# Patient Record
Sex: FEMALE | Race: WHITE | NOT HISPANIC OR LATINO | Employment: OTHER | URBAN - METROPOLITAN AREA
[De-identification: names, ages, dates, MRNs, and addresses within clinical notes are randomized per-mention and may not be internally consistent; named-entity substitution may affect disease eponyms.]

---

## 2017-04-20 ENCOUNTER — ALLSCRIPTS OFFICE VISIT (OUTPATIENT)
Dept: OTHER | Facility: OTHER | Age: 72
End: 2017-04-20

## 2017-04-20 DIAGNOSIS — E78.5 HYPERLIPIDEMIA: ICD-10-CM
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
## 2017-04-21 ENCOUNTER — ALLSCRIPTS OFFICE VISIT (OUTPATIENT)
Dept: OTHER | Facility: OTHER | Age: 72
End: 2017-04-21

## 2017-06-01 ENCOUNTER — GENERIC CONVERSION - ENCOUNTER (OUTPATIENT)
Dept: OTHER | Facility: OTHER | Age: 72
End: 2017-06-01

## 2017-06-01 ENCOUNTER — HOSPITAL ENCOUNTER (OUTPATIENT)
Dept: MAMMOGRAPHY | Facility: HOSPITAL | Age: 72
Discharge: HOME | End: 2017-06-01
Attending: FAMILY MEDICINE
Payer: MEDICARE

## 2017-06-01 DIAGNOSIS — E78.5 HYPERLIPIDEMIA: ICD-10-CM

## 2017-06-01 PROCEDURE — G0202 SCR MAMMO BI INCL CAD: HCPCS

## 2017-08-08 ENCOUNTER — TRANSCRIBE ORDERS (OUTPATIENT)
Dept: ADMINISTRATIVE | Facility: HOSPITAL | Age: 72
End: 2017-08-08

## 2017-08-08 ENCOUNTER — HOSPITAL ENCOUNTER (OUTPATIENT)
Dept: RADIOLOGY | Facility: HOSPITAL | Age: 72
Discharge: HOME | End: 2017-08-08
Attending: FAMILY MEDICINE
Payer: MEDICARE

## 2017-08-08 ENCOUNTER — ALLSCRIPTS OFFICE VISIT (OUTPATIENT)
Dept: OTHER | Facility: OTHER | Age: 72
End: 2017-08-08

## 2017-08-08 DIAGNOSIS — M54.2 CERVICALGIA: ICD-10-CM

## 2017-08-08 PROCEDURE — 72050 X-RAY EXAM NECK SPINE 4/5VWS: CPT

## 2017-08-13 ENCOUNTER — GENERIC CONVERSION - ENCOUNTER (OUTPATIENT)
Dept: OTHER | Facility: OTHER | Age: 72
End: 2017-08-13

## 2017-08-16 ENCOUNTER — APPOINTMENT (OUTPATIENT)
Dept: PHYSICAL THERAPY | Facility: CLINIC | Age: 72
End: 2017-08-16
Payer: MEDICARE

## 2017-08-16 DIAGNOSIS — M54.2 CERVICALGIA: ICD-10-CM

## 2017-08-16 PROCEDURE — G8990 OTHER PT/OT CURRENT STATUS: HCPCS

## 2017-08-16 PROCEDURE — 97140 MANUAL THERAPY 1/> REGIONS: CPT

## 2017-08-16 PROCEDURE — 97110 THERAPEUTIC EXERCISES: CPT

## 2017-08-16 PROCEDURE — G8991 OTHER PT/OT GOAL STATUS: HCPCS

## 2017-08-16 PROCEDURE — 97162 PT EVAL MOD COMPLEX 30 MIN: CPT

## 2017-08-17 ENCOUNTER — APPOINTMENT (OUTPATIENT)
Dept: PHYSICAL THERAPY | Facility: CLINIC | Age: 72
End: 2017-08-17
Payer: MEDICARE

## 2017-08-17 PROCEDURE — 97110 THERAPEUTIC EXERCISES: CPT

## 2017-08-17 PROCEDURE — 97112 NEUROMUSCULAR REEDUCATION: CPT

## 2017-08-17 PROCEDURE — 97140 MANUAL THERAPY 1/> REGIONS: CPT

## 2017-08-22 ENCOUNTER — APPOINTMENT (OUTPATIENT)
Dept: PHYSICAL THERAPY | Facility: CLINIC | Age: 72
End: 2017-08-22
Payer: MEDICARE

## 2017-08-23 ENCOUNTER — APPOINTMENT (OUTPATIENT)
Dept: PHYSICAL THERAPY | Facility: CLINIC | Age: 72
End: 2017-08-23
Payer: MEDICARE

## 2017-08-23 PROCEDURE — 97110 THERAPEUTIC EXERCISES: CPT

## 2017-08-23 PROCEDURE — 97140 MANUAL THERAPY 1/> REGIONS: CPT

## 2017-08-25 ENCOUNTER — APPOINTMENT (OUTPATIENT)
Dept: PHYSICAL THERAPY | Facility: CLINIC | Age: 72
End: 2017-08-25
Payer: MEDICARE

## 2017-08-25 PROCEDURE — 97110 THERAPEUTIC EXERCISES: CPT

## 2017-08-25 PROCEDURE — 97140 MANUAL THERAPY 1/> REGIONS: CPT

## 2017-08-25 PROCEDURE — 97112 NEUROMUSCULAR REEDUCATION: CPT

## 2017-08-29 ENCOUNTER — APPOINTMENT (OUTPATIENT)
Dept: PHYSICAL THERAPY | Facility: CLINIC | Age: 72
End: 2017-08-29
Payer: MEDICARE

## 2017-08-29 PROCEDURE — 97112 NEUROMUSCULAR REEDUCATION: CPT

## 2017-08-29 PROCEDURE — 97140 MANUAL THERAPY 1/> REGIONS: CPT

## 2017-08-29 PROCEDURE — 97110 THERAPEUTIC EXERCISES: CPT

## 2017-08-31 ENCOUNTER — APPOINTMENT (OUTPATIENT)
Dept: PHYSICAL THERAPY | Facility: CLINIC | Age: 72
End: 2017-08-31
Payer: MEDICARE

## 2017-08-31 PROCEDURE — 97110 THERAPEUTIC EXERCISES: CPT

## 2017-08-31 PROCEDURE — 97140 MANUAL THERAPY 1/> REGIONS: CPT

## 2017-09-01 ENCOUNTER — TRANSCRIBE ORDERS (OUTPATIENT)
Dept: ADMINISTRATIVE | Facility: HOSPITAL | Age: 72
End: 2017-09-01

## 2017-09-01 ENCOUNTER — ALLSCRIPTS OFFICE VISIT (OUTPATIENT)
Dept: OTHER | Facility: OTHER | Age: 72
End: 2017-09-01

## 2017-09-01 DIAGNOSIS — M54.2 CERVICALGIA: Primary | ICD-10-CM

## 2017-09-05 ENCOUNTER — APPOINTMENT (OUTPATIENT)
Dept: PHYSICAL THERAPY | Facility: CLINIC | Age: 72
End: 2017-09-05
Payer: MEDICARE

## 2017-09-05 ENCOUNTER — GENERIC CONVERSION - ENCOUNTER (OUTPATIENT)
Dept: OTHER | Facility: OTHER | Age: 72
End: 2017-09-05

## 2017-09-07 ENCOUNTER — HOSPITAL ENCOUNTER (OUTPATIENT)
Dept: RADIOLOGY | Age: 72
Discharge: HOME | End: 2017-09-07
Attending: FAMILY MEDICINE
Payer: MEDICARE

## 2017-09-07 ENCOUNTER — APPOINTMENT (OUTPATIENT)
Dept: PHYSICAL THERAPY | Facility: CLINIC | Age: 72
End: 2017-09-07
Payer: MEDICARE

## 2017-09-07 DIAGNOSIS — M54.2 CERVICALGIA: ICD-10-CM

## 2017-09-07 PROCEDURE — 72141 MRI NECK SPINE W/O DYE: CPT

## 2017-09-08 ENCOUNTER — GENERIC CONVERSION - ENCOUNTER (OUTPATIENT)
Dept: OTHER | Facility: OTHER | Age: 72
End: 2017-09-08

## 2017-09-14 ENCOUNTER — APPOINTMENT (OUTPATIENT)
Dept: PHYSICAL THERAPY | Facility: CLINIC | Age: 72
End: 2017-09-14
Payer: MEDICARE

## 2017-09-14 PROCEDURE — 97110 THERAPEUTIC EXERCISES: CPT

## 2017-09-14 PROCEDURE — 97140 MANUAL THERAPY 1/> REGIONS: CPT

## 2017-09-14 PROCEDURE — 97014 ELECTRIC STIMULATION THERAPY: CPT

## 2017-09-19 ENCOUNTER — ALLSCRIPTS OFFICE VISIT (OUTPATIENT)
Dept: OTHER | Facility: OTHER | Age: 72
End: 2017-09-19

## 2017-09-19 ENCOUNTER — APPOINTMENT (OUTPATIENT)
Dept: PHYSICAL THERAPY | Facility: CLINIC | Age: 72
End: 2017-09-19
Payer: MEDICARE

## 2017-09-26 ENCOUNTER — GENERIC CONVERSION - ENCOUNTER (OUTPATIENT)
Dept: OTHER | Facility: OTHER | Age: 72
End: 2017-09-26

## 2017-09-26 ENCOUNTER — APPOINTMENT (OUTPATIENT)
Dept: PHYSICAL THERAPY | Facility: CLINIC | Age: 72
End: 2017-09-26
Payer: MEDICARE

## 2017-09-28 ENCOUNTER — APPOINTMENT (OUTPATIENT)
Dept: PHYSICAL THERAPY | Facility: CLINIC | Age: 72
End: 2017-09-28
Payer: MEDICARE

## 2017-09-29 ENCOUNTER — GENERIC CONVERSION - ENCOUNTER (OUTPATIENT)
Dept: OTHER | Facility: OTHER | Age: 72
End: 2017-09-29

## 2017-10-03 ENCOUNTER — ALLSCRIPTS OFFICE VISIT (OUTPATIENT)
Dept: OTHER | Facility: OTHER | Age: 72
End: 2017-10-03

## 2017-10-03 ENCOUNTER — APPOINTMENT (OUTPATIENT)
Dept: PHYSICAL THERAPY | Facility: CLINIC | Age: 72
End: 2017-10-03
Payer: MEDICARE

## 2017-10-03 DIAGNOSIS — M54.81 OCCIPITAL NEURALGIA: ICD-10-CM

## 2017-10-03 DIAGNOSIS — M79.10 MYALGIA: ICD-10-CM

## 2017-10-03 DIAGNOSIS — M54.2 CERVICALGIA: ICD-10-CM

## 2017-10-05 ENCOUNTER — APPOINTMENT (OUTPATIENT)
Dept: PHYSICAL THERAPY | Facility: CLINIC | Age: 72
End: 2017-10-05
Payer: MEDICARE

## 2017-10-10 ENCOUNTER — APPOINTMENT (OUTPATIENT)
Dept: PHYSICAL THERAPY | Facility: CLINIC | Age: 72
End: 2017-10-10
Payer: MEDICARE

## 2017-10-12 ENCOUNTER — GENERIC CONVERSION - ENCOUNTER (OUTPATIENT)
Dept: OTHER | Facility: OTHER | Age: 72
End: 2017-10-12

## 2017-10-12 ENCOUNTER — APPOINTMENT (OUTPATIENT)
Dept: PHYSICAL THERAPY | Facility: CLINIC | Age: 72
End: 2017-10-12
Payer: MEDICARE

## 2017-10-20 ENCOUNTER — GENERIC CONVERSION - ENCOUNTER (OUTPATIENT)
Dept: OTHER | Facility: OTHER | Age: 72
End: 2017-10-20

## 2017-10-23 ENCOUNTER — GENERIC CONVERSION - ENCOUNTER (OUTPATIENT)
Dept: OTHER | Facility: OTHER | Age: 72
End: 2017-10-23

## 2017-10-25 ENCOUNTER — APPOINTMENT (OUTPATIENT)
Dept: PHYSICAL THERAPY | Facility: CLINIC | Age: 72
End: 2017-10-25
Payer: MEDICARE

## 2017-10-25 PROCEDURE — 97140 MANUAL THERAPY 1/> REGIONS: CPT

## 2017-10-25 PROCEDURE — G8990 OTHER PT/OT CURRENT STATUS: HCPCS

## 2017-10-25 PROCEDURE — G8991 OTHER PT/OT GOAL STATUS: HCPCS

## 2017-10-25 PROCEDURE — 97162 PT EVAL MOD COMPLEX 30 MIN: CPT

## 2017-10-25 PROCEDURE — 97110 THERAPEUTIC EXERCISES: CPT

## 2017-10-26 ENCOUNTER — GENERIC CONVERSION - ENCOUNTER (OUTPATIENT)
Dept: OTHER | Facility: OTHER | Age: 72
End: 2017-10-26

## 2017-10-26 NOTE — CONSULTS
Assessment  Assessed    1  Neck pain (723 1) (M54 2)   2  Occipital neuralgia of right side (723 8) (M54 81)    Plan  Neck pain    · PredniSONE 10 MG Oral Tablet; Take as directed according to info sheet given at  office   Rx By: Arlene Shah; Dispense: 7 Days ; #:21 Tablet; Refill: 0;For: Neck pain; SUSHMA = N; Verified Transmission to [facility]; Last Updated By: System, [employer]; 9/19/2017 8:04:45 AM  Occipital neuralgia of right side    · Follow-up visit in 1 week Evaluation and Treatment  Follow-up  Status: Hold For -  Scheduling  Requested for: 80Ova6236   Ordered; For: Occipital neuralgia of right side; Ordered By: Arlene Shah Performed:  Due: 78EOA2714  PMH: Neck pain on right side    · From  Methocarbamol 500 MG Oral Tablet Take 1 to 2 tablets up to four times  per day, as needed for muscular spasm To Methocarbamol 500 MG Oral Tablet TAKE 1  TABLET 3 times daily   Rx By: Arlene Shah; Dispense: 10 Days ; #:30 Tablet; Refill: 1;For: PMH: Neck pain on right side; SUSHMA = N; Record    Discussion/Summary    Extremely pleasant 40-year-old female with a 3 month history of right-sided neck pain despite nonsteroidal anti-inflammatories and physical therapy  stated she only took 1 methocarbamol did not feel like it provided relief so discontinued the medication  believe she has a combination of cervical myofascial pain secondary to possible underlying facet syndrome with occipital neuralgia  am going to start her on a short course oral prednisone, a titrating dose to address any inflammatory component of her pain  She understands she will not take nonsteroidal anti-inflammatories until she is finished with the course of steroids  have instructed her that start the methocarbamol on a daily basis 3 times a day for the weeks she is taking the prednisone to add in her relief    discuss in 1 week as to how she is doing, if her pain persists within 1 May consider occipital nerve block and cervical trigger points but will re-evaluate if needed  document utilizing voice recognition software and errors may have occurred  The patient has the current Goals: Decreased pain and increased function  The patent has the current Barriers: Cervical spondylosis  Patient is able to Self-Care  Educational resources provided: Information occipital neuralgia  Possible side effects of new medications were reviewed with the patient/guardian today  The treatment plan was reviewed with the patient/guardian  The patient/guardian understands and agrees with the treatment plan   The patient was counseled regarding diagnostic results,-- instructions for management,-- risk factor reductions,-- prognosis,-- patient and family education,-- risks and benefits of treatment options-- and-- importance of compliance with treatment  Self Referrals: No      Chief Complaint  Chief Complaints    1  Pain    History of Present Illness  Extremely pleasant 63-year-old female with 3 month history of right-sided neck pain now radiating to the occipital region  She is unaware of any clear precipitating event denies any trauma or injury  She has undergone physical therapy for 3 weeks per symptoms persist despite nonsteroidal anti-inflammatories and muscle relaxants  personally reviewed the patient's past medical history, surgical history, allergies, current medications social history, family history and review of systems  intake form reviewed with the patient  LIAM ALFONSO presents with complaints of constant episodes of moderate r>l and bilateral neck pain, described as aching and burning, radiating to the head  On a scale of 1 to 10, the patient rates the pain as 6  Episodes started about 3 months ago  Her symptoms are caused by no known event  Symptoms are improved by rest  Symptoms are made worse by bending  Symptoms are worsening  Review of Systems    Constitutional: no fever,-- no recent weight gain-- and-- no recent weight loss     Eyes: no double vision-- and-- no blurry vision  Cardiovascular: no chest pain,-- no palpitations-- and-- no lower extremity edema  Respiratory: no complaints of shortness of breath-- and-- no wheezing  Musculoskeletal: joint stiffness-- and-- decreased range of motion, but-- no difficulty walking,-- no muscle weakness,-- no joint swelling,-- no limb swelling-- and-- no pain in extremity  Neurological: no dizziness,-- no difficulty swallowing,-- no memory loss,-- no loss of consciousness-- and-- no seizures  Gastrointestinal: no nausea,-- no vomiting,-- no constipation-- and-- no diarrhea  Genitourinary: no difficulty initiating urine stream,-- no genital pain-- and-- no frequent urination  Integumentary: no complaints of skin rash  Psychiatric: no depression  Endocrine: no excessive thirst,-- no adrenal disease,-- no hypothyroidism-- and-- no hyperthyroidism  Hematologic/Lymphatic: no tendency for easy bruising-- and-- no tendency for easy bleeding  ROS reviewed  Active Problems  Problems    1  Hyperlipidemia (272 4) (E78 5)   2  Neck pain (723 1) (M54 2)   3  Palpitations (785 1) (R00 2)   4  Visit for screening mammogram (V76 12) (Z12 31)    Past Medical History  Problems    1  History of Effusion of left knee (719 06) (M25 462)   2  History of Hamstring strain (843 8) (S76 319A)   3  History of Neck pain on right side (723 1) (M54 2)   4  History of Tick bite (919 4,E906 4) (W57 XXXA)    The active problems and past medical history were reviewed and updated today  Surgical History  Problems    1  History of Appendectomy (47 0)   2  History of Oral Surgery Tooth Extraction [place] Tooth   3  History of Tonsillectomy   4  History of Tubal Ligation    The surgical history was reviewed and updated today  Family History  Mother    1  Family history of Coronary artery disease   2  Family history of arthritis (V17 7) (Z82 61)   3   Family history of cardiac disorder (V17 49) (Z82 49) 4  Family history of transient ischemic attacks (V17 1) (Z82 3)  Father    5  Family history of    6  Family history of myocardial infarction (V17 3) (Z82 49)   7  Family history of sudden cardiac death (SCD) (V17 41) (Z82 41)    The family history was reviewed and updated today  Social History  Problems    · Minimum alcohol consumption   · Never a smoker  The social history was reviewed and updated today  Current Meds   1  Advil TABS; Therapy: (Recorded:2017) to Recorded   2  Atorvastatin Calcium 10 MG Oral Tablet; take 1 tablet every other day  Requested for:   13XCY4335; Last Rx:26Qxw3240 Ordered   3  Calcium 600 + D TABS; TAKE 1 TABLET DAILY; Therapy: (Jian Bautista) to Recorded   4  Fish Oil 1200 MG Oral Capsule; Take 2 tablets daily; Therapy: (Jian Bautista) to Recorded   5  Naproxen 500 MG Oral Tablet; TAKE 1 TABLET EVERY 12 HOURS WITH FOOD AS   NEEDED; Therapy: 42Tnf4270 to (Evaluate:2017)  Requested for: 41Agu6569; Last   Rx:17Mxm6283 Ordered   6  PredniSONE 10 MG Oral Tablet; Therapy: (Recorded:2017) to Recorded   7  Triamterene-HCTZ 37 5-25 MG Oral Capsule; TAKE 1/2 CAPSULE DAILY AS NEEDED    Requested for: 2017; Last Rx:2017 Ordered   8  Vitamin D 1000 UNIT Oral Tablet; TAKE 1 TABLET DAILY; Therapy: (Jian Bautista) to Recorded    The medication list was reviewed and updated today  Allergies  Medication    1  No Known Drug Allergies    Vitals  Vital Signs    Recorded: 2017 07:48AM   Temperature 97 9 F   Heart Rate 58   Systolic 289   Diastolic 78   Height 5 ft 4 72 in   Weight 107 lb    BMI Calculated 17 96   BSA Calculated 1 51   Pain Scale 6     Physical Exam    Constitutional   General appearance: Well developed, well nourished, alert, in no distress, non-toxic and no overt pain behavior  Eyes   Sclera: anicteric   HEENT   Hearing grossly intact  Neck   Neck: Supple, symmetric, trachea midline, no masses  Pulmonary   Respiratory effort: Even and unlabored  Cardiovascular   Examination of extremities: No edema or pitting edema present  Skin   Skin and subcutaneous tissue: Normal without rashes or lesions, well hydrated  Psychiatric   Mood and affect: Mood and affect appropriate  Neurologic   Cranial nerves: Cranial nerves II-XII grossly intact  Musculoskeletal   Gait and station: Normal     Cervical Spine examination demonstrates  Inspection: Normal station and gait  Normal cervical curves and head posture  Skin intact without erythema  No sensory loss  There is no atrophy  There is tenderness to palpation overlying the right cervical paraspinals, cervical facet joints  There is no tenderness over the upper trapezius muscles bilateral  No shoulder tenderness5/5 strength in the bilateral upper extremitiesequal and symmetric in the upper limbs  Sensation: Sensation intact to light touch and pinprick in the upper limbs  Negative Spurling's maneuver  Negative Lhermitte's sign  Cervical Spine: Flexion was not restricted  Extension was restricted  Right lateral flexion was restricted  Rotation to the left was restricted-- and-- was painful  Rotation to the right was not restricted-- and-- was painless  Results/Data    Results    I personally reviewed the films/images in the office today  * MRI CERVICAL SPINE WO CONTRAST [id number] 11:36AM [place], [address]     Test Name Result Flag Reference   MRI CERVICAL SPINE WO CONTRAST (Report)     MRI CERVICAL SPINE WITHOUT CONTRAST     INDICATION: M54 2: Cervicalgia  History taken directly from the electronic ordering system  COMPARISON: None  TECHNIQUE: Sagittal T1, sagittal T2, sagittal inversion recovery, axial T2, axial 2D merge        IMAGE QUALITY: Diagnostic     FINDINGS:   Counting reference: Craniocervical junction   ALIGNMENT: Slight reversal of the normal cervical lordosis  1 to 2 mm of anterolisthesis of C7 on T1       MARROW SIGNAL: Marrow signal is within normal limits without signs of an infiltrative process  No signs of acute fracture or significant marrow edema pattern  Vertebral body heights are maintained  CERVICAL AND VISUALIZED THORACIC CORD: Normal signal within the visualized cord  PREVERTEBRAL AND PARASPINAL SOFT TISSUES:  Normal          VISUALIZED POSTERIOR FOSSA: The visualized posterior fossa demonstrates no abnormal signal      CERVICAL DISC SPACES:        C2-C3: No significant spinal canal stenosis  No right and no left neural foraminal stenosis  C3-C4: Central disc protrusion  No significant spinal canal stenosis  No right and no left neural foraminal stenosis  C4-C5: Bilateral facet arthropathy  No significant spinal canal stenosis  No right and no left neural foraminal stenosis  C5-C6: Disc osteophyte complex, uncovertebral hypertrophy, and facet arthropathy  No significant spinal canal stenosis  Moderate right and mild left neural foraminal stenosis  C6-C7: Disc osteophyte complex with uncovertebral hypertrophy  No significant spinal canal stenosis  Mild right and no significant left neural foraminal stenosis  C7-T1: Facet arthropathy  Slight uncovering of the disc  No significant spinal canal stenosis  Mild right and mild left neural foraminal stenosis  UPPER THORACIC DISC SPACES: Normal        IMPRESSION:     Cervical spondylosis, most significant at C5-C6 resulting in moderate right and mild left foraminal stenoses  Remainder of changes as detailed  Workstation performed: [id number]     Signed by:   Etta Sandhu MD   9/8/17     * XR SPINE CERVICAL COMPLETE 4 OR 5 VW NON INJURY [id number] 03:21PM Az Lewis Order Number: [id number]   Performing Comments: 69 yo very active female, with right neck pain x 2 months  No radicular symptoms  Thanks    Shivani Butler DO     Test Name Result Flag Reference   XR SPINE CERVICAL COMPLETE 4 OR 5 VW (Report) CERVICAL SPINE     INDICATION: Neck pain  COMPARISON: None     VIEWS: 5     IMAGES: 6     FINDINGS:     There is reversal of the normal cervical lordosis  Disc space narrowing at the C5/C6 and C6/C7 levels  Spurring in C5, C6 and C7  Mild narrowing of the C5/C6 and C6/C7 neural foramen on the right  The prevertebral soft tissues are within normal limits  The lung apices are intact  IMPRESSION:     Degenerative changes         Workstation performed: [id number]     Signed by:   Iris Almanzar MD   8/12/17     Future Appointments    Date/Time Provider Specialty Site   10/20/2017 09:30 AM Debra Philippe DO Family Medicine [facility]     Signatures   Electronically signed by : Dion Ricardo DO; Sep 19 2017  8:10AM EST                       (Author)

## 2017-10-27 ENCOUNTER — GENERIC CONVERSION - ENCOUNTER (OUTPATIENT)
Dept: OTHER | Facility: OTHER | Age: 72
End: 2017-10-27

## 2017-11-01 ENCOUNTER — APPOINTMENT (OUTPATIENT)
Dept: PHYSICAL THERAPY | Facility: CLINIC | Age: 72
End: 2017-11-01
Payer: MEDICARE

## 2017-11-03 ENCOUNTER — APPOINTMENT (OUTPATIENT)
Dept: PHYSICAL THERAPY | Facility: CLINIC | Age: 72
End: 2017-11-03
Payer: MEDICARE

## 2017-11-03 PROCEDURE — 97014 ELECTRIC STIMULATION THERAPY: CPT

## 2017-11-03 PROCEDURE — 97140 MANUAL THERAPY 1/> REGIONS: CPT

## 2017-11-03 PROCEDURE — 97110 THERAPEUTIC EXERCISES: CPT

## 2017-11-03 PROCEDURE — 97112 NEUROMUSCULAR REEDUCATION: CPT

## 2017-11-06 ENCOUNTER — GENERIC CONVERSION - ENCOUNTER (OUTPATIENT)
Dept: OTHER | Facility: OTHER | Age: 72
End: 2017-11-06

## 2017-11-08 ENCOUNTER — APPOINTMENT (OUTPATIENT)
Dept: PHYSICAL THERAPY | Facility: CLINIC | Age: 72
End: 2017-11-08
Payer: MEDICARE

## 2017-11-08 PROCEDURE — 97140 MANUAL THERAPY 1/> REGIONS: CPT

## 2017-11-08 PROCEDURE — 97112 NEUROMUSCULAR REEDUCATION: CPT

## 2017-11-08 PROCEDURE — 97014 ELECTRIC STIMULATION THERAPY: CPT

## 2017-11-08 PROCEDURE — 97110 THERAPEUTIC EXERCISES: CPT

## 2017-11-09 ENCOUNTER — APPOINTMENT (OUTPATIENT)
Dept: PHYSICAL THERAPY | Facility: CLINIC | Age: 72
End: 2017-11-09
Payer: MEDICARE

## 2017-11-09 PROCEDURE — 97014 ELECTRIC STIMULATION THERAPY: CPT

## 2017-11-09 PROCEDURE — 97110 THERAPEUTIC EXERCISES: CPT

## 2017-11-09 PROCEDURE — 97112 NEUROMUSCULAR REEDUCATION: CPT

## 2017-11-09 PROCEDURE — 97140 MANUAL THERAPY 1/> REGIONS: CPT

## 2017-11-10 ENCOUNTER — GENERIC CONVERSION - ENCOUNTER (OUTPATIENT)
Dept: OTHER | Facility: OTHER | Age: 72
End: 2017-11-10

## 2017-11-13 ENCOUNTER — APPOINTMENT (OUTPATIENT)
Dept: PHYSICAL THERAPY | Facility: CLINIC | Age: 72
End: 2017-11-13
Payer: MEDICARE

## 2017-11-13 ENCOUNTER — ALLSCRIPTS OFFICE VISIT (OUTPATIENT)
Dept: OTHER | Facility: OTHER | Age: 72
End: 2017-11-13

## 2017-11-13 DIAGNOSIS — M85.88 OTHER SPECIFIED DISORDERS OF BONE DENSITY AND STRUCTURE, OTHER SITE: ICD-10-CM

## 2017-11-13 DIAGNOSIS — E78.5 HYPERLIPIDEMIA: ICD-10-CM

## 2017-11-13 DIAGNOSIS — M79.10 MYALGIA: ICD-10-CM

## 2017-11-13 DIAGNOSIS — Z00.00 ENCOUNTER FOR GENERAL ADULT MEDICAL EXAMINATION WITHOUT ABNORMAL FINDINGS: ICD-10-CM

## 2017-11-13 PROCEDURE — 97140 MANUAL THERAPY 1/> REGIONS: CPT

## 2017-11-13 PROCEDURE — 97110 THERAPEUTIC EXERCISES: CPT

## 2017-11-13 PROCEDURE — 97112 NEUROMUSCULAR REEDUCATION: CPT

## 2017-11-16 ENCOUNTER — APPOINTMENT (OUTPATIENT)
Dept: PHYSICAL THERAPY | Facility: CLINIC | Age: 72
End: 2017-11-16
Payer: MEDICARE

## 2017-11-16 PROCEDURE — 97140 MANUAL THERAPY 1/> REGIONS: CPT

## 2017-11-16 PROCEDURE — 97112 NEUROMUSCULAR REEDUCATION: CPT

## 2017-11-17 ENCOUNTER — GENERIC CONVERSION - ENCOUNTER (OUTPATIENT)
Dept: OTHER | Facility: OTHER | Age: 72
End: 2017-11-17

## 2017-11-20 ENCOUNTER — GENERIC CONVERSION - ENCOUNTER (OUTPATIENT)
Dept: OTHER | Facility: OTHER | Age: 72
End: 2017-11-20

## 2017-11-20 ENCOUNTER — APPOINTMENT (OUTPATIENT)
Dept: PHYSICAL THERAPY | Facility: CLINIC | Age: 72
End: 2017-11-20
Payer: MEDICARE

## 2017-11-22 ENCOUNTER — APPOINTMENT (OUTPATIENT)
Dept: PHYSICAL THERAPY | Facility: CLINIC | Age: 72
End: 2017-11-22
Payer: MEDICARE

## 2017-11-27 ENCOUNTER — APPOINTMENT (OUTPATIENT)
Dept: PHYSICAL THERAPY | Facility: CLINIC | Age: 72
End: 2017-11-27
Payer: MEDICARE

## 2017-11-29 ENCOUNTER — GENERIC CONVERSION - ENCOUNTER (OUTPATIENT)
Dept: OTHER | Facility: OTHER | Age: 72
End: 2017-11-29

## 2017-11-30 ENCOUNTER — APPOINTMENT (OUTPATIENT)
Dept: PHYSICAL THERAPY | Facility: CLINIC | Age: 72
End: 2017-11-30
Payer: MEDICARE

## 2018-01-10 NOTE — MISCELLANEOUS
Message   Recorded as Task   Date: 11/08/2017 03:24 PM, Created By: Miguel Dominique   Task Name: Miscellaneous   Assigned To: [facility] [place] [facility],Team   Regarding Patient: Herbert Johnson, Status: In Progress   Comment:    Sumaya Bartlett - 08 Nov 2017 3:24 PM     TASK CREATED  phone call from patient stating that she tried to take the gabapentin and it made her feel in a fog, and very groggy  patient states she is not going to take it anymore  please call patient at [phone]  Robbie Ham - 08 Nov 2017 3:52 PM     TASK EDITED  LMOM to cb on number provided  Left cb number and office hours  Robbie Ham - 09 Nov 2017 8:41 AM     TASK EDITED  s/w pt, states she originally tried gabapentin x 1 day while caring for her grandchildren  Pt stated c/o "drowsiness", feeling in a "fog" and felt this was unsafe around children  Did not continue  Pt states she retried gabapentin a few days ago  1 pill po / day x 2 days w/ the same c/o fogginess and drowsiness  Pt states she felt that she could not complete her adl's  confirmed pt took the medication after dinner and continued w/ se's during the next day  Advised pt, stop gabapentin, continue w/ PT, proceed w/ ov w/ Dr Valeria Pacheco and fu w/ this office on 12/4  will make DG aware and cb w/ any addional comments  Pt verbalized understanding and appreciation  Omar Hodges - 09 Nov 2017 8:59 AM     TASK REPLIED TO: Previously Assigned To Irais Guadarrama  Provider aware and agree with recomendations  Thank you  Active Problems    1  Cervical myofascial pain syndrome (729 1) (M79 1)   2  Flu vaccine need (V04 81) (Z23)   3  Hyperlipidemia (272 4) (E78 5)   4  Neck pain (723 1) (M54 2)   5  Occipital neuralgia of right side (723 8) (M54 81)   6  Palpitations (785 1) (R00 2)   7  Visit for screening mammogram (V76 12) (Z12 31)    Current Meds   1  Advil TABS; Therapy: (Recorded:81Ggz7233) to Recorded   2   Atorvastatin Calcium 10 MG Oral Tablet; take 1 tablet every other day  Requested for:   92KMC9814; Last Rx:25Eja6025 Ordered   3  Calcium 600 + D TABS; TAKE 1 TABLET DAILY; Therapy: (Jules Navarrete) to Recorded   4  Fish Oil 1200 MG Oral Capsule; Take 2 tablets daily; Therapy: (Jules Navarrete) to Recorded   5  Gabapentin 100 MG Oral Capsule; Take one tablet at bedtime x4 days, then take 2   tablets at bedtime x4 days, then 3 tablets at bedtime; Therapy: 50MDV6829 to (Evaluate:45Qge2049)  Requested for: 26Oct2017; Last   Rx:26Oct2017 Ordered   6  Triamterene-HCTZ 37 5-25 MG Oral Capsule; TAKE 1/2 CAPSULE DAILY AS NEEDED    Requested for: 21Apr2017; Last Rx:21Apr2017 Ordered    Allergies    1   No Known Drug Allergies    Signatures   Electronically signed by : Collin Villela, ; Nov 10 2017  2:14PM EST                       (Author)

## 2018-01-11 NOTE — MISCELLANEOUS
Message   Recorded as Task   Date: 09/25/2017 04:33 PM, Created By: Juanita Brennan   Task Name: Care Coordination   Assigned To: [facility] [place] [facility]   Regarding Patient: Glenroy Lopez, Status: Active   Comment:    Robbie Ham - 25 Sep 2017 4:33 PM     TASK CREATED  Caller: Self; Care Coordination; [phone] (Home); [phone] (Work)  Message left w/ ans [facility] 9/25/2017 @ 200    Pt calling  Msg: She is calling with an update on the prednisone she finished yesterday  She is not feeling any better  Please call to discuss  S/w pt, states she finished prednisone yesterday  Per pt, she was away x 4 days, did not take methocarbimol tid as discussed w/ dr Silverio Argue  Resumed methocarbimol last night  states her pain is ~ 1-2 today vs sig pain over the weekend  Advised pt, continue methocarbimol tid, cb by the end of the week w/ an update  sooner if issues/ questions arise  if no relief, will d/w sl - tpi's and onb  Advised pt, will make SL aware and cb if anything additional  pt verbalized understanding, questioned if it is ok to resume nsaids  Advised pt, ok to resume nsaids now that oral steroids are complete  Pt verbalized understanding and appreciation  Vikas Batista - 26 Sep 2017 7:13 AM     TASK REPLIED TO: Previously Assigned To [facility] [place] [facility]    Can schedule her if pain returnd to ONB/TPI on office side   Robbie Ham - 26 Sep 2017 11:34 AM     TASK EDITED  s/w pt, advised of above  Advised pt to cb at the end of the week w/ an update  Pt states she has + relief, feels "loopy" on the muscle relaxers  reinforced to pt, do not drive or operate machinery while taking this medication  Pt stated that she has PT coming up, questioned how she should proceed  Advised pt, cb w/ an update at the end of the week - will discuss the next step at that time  Pt verbalized understanding and appreciation  SL aware        Active Problems    1  Hyperlipidemia (272 4) (E78 5)   2   Neck pain (723 1) (M54 2)   3  Occipital neuralgia of right side (723 8) (M54 81)   4  Palpitations (785 1) (R00 2)   5  Visit for screening mammogram (V76 12) (Z12 31)    Current Meds   1  Advil TABS; Therapy: (Recorded:40Czy1655) to Recorded   2  Atorvastatin Calcium 10 MG Oral Tablet; take 1 tablet every other day  Requested for:   19NCJ1072; Last Rx:11Ffu3558 Ordered   3  Calcium 600 + D TABS; TAKE 1 TABLET DAILY; Therapy: (Shanitar Chalino) to Recorded   4  Fish Oil 1200 MG Oral Capsule; Take 2 tablets daily; Therapy: (Elnor Chalino) to Recorded   5  Methocarbamol 500 MG Oral Tablet; TAKE 1 TABLET 3 times daily; Therapy: 59Qns5125 to (Complete:09Oct2017)  Requested for: 98Lkz3906; Last   Rx:12Rrp7516 Ordered   6  Naproxen 500 MG Oral Tablet; TAKE 1 TABLET EVERY 12 HOURS WITH FOOD AS   NEEDED; Therapy: 51Nxw9613 to (Evaluate:87Qnt3676)  Requested for: 00Soc9789; Last   Rx:74Ydd5860 Ordered   7  PredniSONE 10 MG Oral Tablet; Take as directed according to info sheet given at office    Requested for: 57Cbm9366; Last Rx:28Rbn5778 Ordered   8  Triamterene-HCTZ 37 5-25 MG Oral Capsule; TAKE 1/2 CAPSULE DAILY AS NEEDED    Requested for: 80Icu1056; Last Rx:46Npy5474 Ordered   9  Vitamin D 1000 UNIT Oral Tablet; TAKE 1 TABLET DAILY; Therapy: (Recorded:06Faf0493) to Recorded    Allergies    1   No Known Drug Allergies    Signatures   Electronically signed by : Jay Almonte, ; Sep 26 2017 11:37AM EST                       (Author)

## 2018-01-12 NOTE — PROGRESS NOTES
Assessment    1  Encounter for preventive health examination (V70 0) (Z00 00)   2  Hyperlipidemia (272 4) (E78 5)   3  Cervical myofascial pain syndrome (729 1) (M79 1)   4  Benign essential HTN (401 1) (I10)   5  Osteopenia of spine (733 90) (M85 88)    Plan  Benign essential HTN, Cervical myofascial pain syndrome, Health Maintenance,  Hyperlipidemia, Osteopenia of spine    · Follow-up visit in 6 months Evaluation and Treatment  Follow-up  Status: Hold For -  Scheduling  Requested for: 73SFL0376  Cervical myofascial pain syndrome, Health Maintenance, Hyperlipidemia    · (1) COMPREHENSIVE METABOLIC PANEL; Status:Active; Requested for:13Nov2017;    · (1) LIPID PANEL, FASTING; Status:Active; Requested for:13Nov2017;   Cervical myofascial pain syndrome, Health Maintenance, Hyperlipidemia, Osteopenia of  spine    · * DXA BONE DENSITY SPINE HIP AND PELVIS; Status:Hold For - Scheduling;  Requested for:13Nov2017;     Discussion/Summary    Dexa Scan and FBW ordered for the pt  BP is well controlled on present management  Pt to consider taking a daily 81mg ASA  She is to continue to work with PT on her neck  Impression: Subsequent Annual Wellness Visit  Colorectal cancer screening and counseling: the risks and benefits of screening were discussed and screening is current  Breast cancer screening and counseling: the risks and benefits of screening were discussed and screening is current  Cervical cancer screening and counseling: screening not indicated  Immunizations: the risks and benefits of influenza vaccination were discussed with the patient, influenza vaccine is up to date this year, the risks and benefits of pneumococcal vaccination were discussed with the patient, the lifetime pneumococcal vaccine has been completed, Will get Prevnar-13 in a few months , the risks and benefits of the Zostavax vaccine were discussed with the patient and Zostavax vaccination up to date     Advance Directive Planning: complete and up to date  Patient Discussion: follow-up visit needed in 6 months, or sooner PRN  Chief Complaint  PT is being seen for a Medicare Wellness  History of Present Illness  HPI: Deja Steven is in PT for her neck, and had this AM - sees again later this week  Had Pneumovax and Zostavax approx 5 years ago; had HD Flu Vaccine 10/2017  Will get Prevnar-13 later  Colonoscopy approx 3 years ago  Mammogram UTD - nml in 06/2017  She misses her , but functioning; no depression / anxiety  No HI/SI  Is in counseling, and still attends a [facility] with [facility]  No CP/SOB  No abd pain or rectal bleeding  No vaginal bleeding  No new breast lumps  Welcome to [employer] and Wellness Visits: The patient is being seen for the subsequent annual wellness visit  Medicare Screening and Risk Factors   Medicare Screening Tests Risk Questions   Abdominal aortic aneurysm risk assessment: none indicated  Osteoporosis risk assessment: , female gender and over 48years of age  HIV risk assessment: none indicated  Drug and Alcohol Use: The patient has never smoked cigarettes  Diet and Physical Activity: Current diet includes well balanced meals  She exercises daily  Exercise: Yoga - a little limited with her neck issues right now; improving though  Mood Disorder and Cognitive Impairment Screening: She denies feeling down, depressed, or hopeless over the past two weeks  She denies feeling little interest or pleasure in doing things over the past two weeks  Functional Ability/Level of Safety: The patient is currently able to do activities of daily living without limitations and able to do instrumental activities of daily living without limitations  Fall risk factors: The patient fell 0 times in the past 12 months  Advance Directives: Advance directives: living will, durable power of  for health care directives and advance directives  I agree with the patient's decisions  Co-Managers and Medical Equipment/Suppliers: See Patient Care Team   Reviewed Updated [facility] [address]:   Last Medicare Wellness Visit Information was reviewed, patient interviewed, no change since last AWV  Patient Care Team    Care Team Member Role Specialty Office Number   Kerri Villarreal [facility]  Cardiology [phone]   Tiffany Rees  Specialist Pain Management [phone] [address] Specialist Pain Management [phone]     Review of Systems    Constitutional: as noted in HPI  Cardiovascular: as noted in HPI  Respiratory: as noted in HPI  Gastrointestinal: as noted in HPI  Genitourinary: as noted in HPI  Musculoskeletal: as noted in HPI  Psychiatric: as noted in HPI  Active Problems    1  Cervical myofascial pain syndrome (729 1) (M79 1)   2  Flu vaccine need (V04 81) (Z23)   3  Hyperlipidemia (272 4) (E78 5)   4  Neck pain (723 1) (M54 2)   5  Occipital neuralgia of right side (723 8) (M54 81)   6  Palpitations (785 1) (R00 2)   7  Visit for screening mammogram (V76 12) (Z12 31)    Past Medical History    · History of Effusion of left knee (719 06) (M25 462)   · History of Hamstring strain (843 8) (S76 319A)   · History of Neck pain on right side (723 1) (M54 2)   · History of Tick bite (919 4,E906 4) (W57 XXXA)    The active problems and past medical history were reviewed and updated today        Surgical History    · History of Appendectomy (47 0)   · History of Oral Surgery Tooth Extraction [place] [facility]   · History of Tonsillectomy   · History of Tubal Ligation    Family History  Mother    · Family history of Coronary artery disease   · Family history of arthritis (V17 7) (Z82 61)   · Family history of cardiac disorder (V17 49) (Z82 49)   · Family history of transient ischemic attacks (V17 1) (Z82 3)  Father    · Family history of    · Family history of myocardial infarction (V17 3) (Z82 49)   · Family history of sudden cardiac death (SCD) (V17 41) (Z80 37)    Social History    · Minimum alcohol consumption   · 1 wine nightly   · Never a smoker    Current Meds   1  Advil TABS; Therapy: (Recorded:80Roe5682) to Recorded   2  Atorvastatin Calcium 10 MG Oral Tablet; take 1 tablet every other day  Requested for:   17SEH8610; Last Rx:28Swu9134 Ordered   3  Calcium 600 + D TABS; TAKE 1 TABLET DAILY; Therapy: (Sylvia Pace) to Recorded   4  Fish Oil 1200 MG Oral Capsule; Take 2 tablets daily; Therapy: (Sylvia Pace) to Recorded   5  Triamterene-HCTZ 37 5-25 MG Oral Capsule; TAKE 1/2 CAPSULE DAILY AS NEEDED    Requested for: 21Apr2017; Last Rx:14Xpg2223 Ordered    The medication list was reviewed and updated today  Allergies    1  No Known Drug Allergies    Immunizations   1 2    Influenza  01-Oct-2016 20-Oct-2017     Vitals  Signs    Heart Rate: 64  Respiration: 16  Systolic: 078  Diastolic: 72  Height: 5 ft 7 in  Weight: 107 lb   BMI Calculated: 16 76  BSA Calculated: 1 55    Physical Exam    Constitutional   General appearance: No acute distress, well appearing and well nourished  NAD; VSS; pleasant  Head and Face   Head and face: Normal     Eyes   Conjunctiva and lids: No swelling, erythema or discharge  Ears, Nose, Mouth, and Throat   External inspection of ears and nose: Normal     Otoscopic examination: Tympanic membranes translucent with normal light reflex  Canals patent without erythema  Hearing: Normal     Lips, teeth, and gums: Normal, good dentition  Oropharynx: Normal with no erythema, edema, exudate or lesions  Neck   Neck: Supple, symmetric, trachea midline, no masses  Pulmonary   Respiratory effort: No increased work of breathing or signs of respiratory distress  Auscultation of lungs: Clear to auscultation  Cardiovascular   Auscultation of heart: Normal rate and rhythm, normal S1 and S2, no murmurs  Abdomen   Abdomen: Non-tender, no masses  Liver and spleen: No hepatomegaly or splenomegaly     Lymphatic   Palpation of lymph nodes in neck: No lymphadenopathy  Musculoskeletal   Gait and station: Normal     Psychiatric   Judgment and insight: Normal     Orientation to person, place, and time: Normal     Recent and remote memory: Intact      Mood and affect: Normal        Future Appointments    Date/Time Provider Specialty Site   12/05/2017 04:00 PM [facility]   12/07/2017 11:15 AM RACHEL Long Pain Management [facility] SPINE   11/17/2017 10:30 AM Gary Che, [facility]   11/17/2017 11:00 AM Bowen Yoon MD PhD [facility]   Electronically signed by : Edil Valenzuela DO; Nov 13 2017  1:31PM EST                       (Author)

## 2018-01-12 NOTE — MISCELLANEOUS
Message   Recorded as Task   Date: 09/27/2017 11:58 AM, Created By: [address]   Task Name: Follow Up   Assigned To: [facility] [place] [facility],Team   Regarding Patient: Joe Stevenson, Status: Active   CommentAnnemarifreddy Innocent - 27 Sep 2017 11:58 AM     TASK CREATED  Patient left a message with the answering service that she needs a call from the nurse  Please call her @ [phone]  Thank you  Robbie Ham - 27 Sep 2017 2:13 PM     TASK EDITED  LMOM to cb on # provided  AristidesTessa - 29 Sep 2017 10:22 AM     TASK EDITED  Pt returned call and is interested in talking about the occipital nerve block  Pt can be reached at [phone]  Robbie Ham - 29 Sep 2017 10:39 AM     TASK EDITED  s/w pt, states she is not getting relief w/ muscle relaxers  Calling w/ numerous questions re: Katharine Ziegler  Offered an ov to address questions and concerns  Scheduled ov w/ DG on 10/3/2017 at 0815  Pt verbalized understandign and appreciation  Vikas Batista - 29 Sep 2017 11:10 AM     TASK REPLIED TO: Previously Assigned To Vikas Batista  aware        Active Problems    1  Hyperlipidemia (272 4) (E78 5)   2  Neck pain (723 1) (M54 2)   3  Occipital neuralgia of right side (723 8) (M54 81)   4  Palpitations (785 1) (R00 2)   5  Visit for screening mammogram (V76 12) (Z12 31)    Current Meds   1  Advil TABS; Therapy: (Recorded:45Ndn9604) to Recorded   2  Atorvastatin Calcium 10 MG Oral Tablet; take 1 tablet every other day  Requested for:   13FVL3666; Last Rx:64Lpr4663 Ordered   3  Calcium 600 + D TABS; TAKE 1 TABLET DAILY; Therapy: (Faina Paling) to Recorded   4  Fish Oil 1200 MG Oral Capsule; Take 2 tablets daily; Therapy: (Faina Paling) to Recorded   5  Methocarbamol 500 MG Oral Tablet; TAKE 1 TABLET 3 times daily; Therapy: 72Uld9941 to (Complete:09Oct2017)  Requested for: 63Ddk5274; Last   Rx:19Jnl8642 Ordered   6  Naproxen 500 MG Oral Tablet; TAKE 1 TABLET EVERY 12 HOURS WITH FOOD AS   NEEDED;    Therapy: 48NSD1880 to (Evaluate:80Htu0854)  Requested for: 21Job2493; Last   Rx:94Zlz6651 Ordered   7  PredniSONE 10 MG Oral Tablet; Take as directed according to info sheet given at office    Requested for: 19Sep2017; Last Rx:19Sep2017 Ordered   8  Triamterene-HCTZ 37 5-25 MG Oral Capsule; TAKE 1/2 CAPSULE DAILY AS NEEDED    Requested for: 21Apr2017; Last Rx:21Apr2017 Ordered   9  Vitamin D 1000 UNIT Oral Tablet; TAKE 1 TABLET DAILY; Therapy: (Recorded:22Apr2015) to Recorded    Allergies    1   No Known Drug Allergies    Signatures   Electronically signed by : Dangelo Barrera, ; Sep 29 2017 11:21AM EST                       (Author)

## 2018-01-12 NOTE — RESULT NOTES
Verified Results  * XR SPINE CERVICAL COMPLETE 4 OR 5 VW NON INJURY 50Tcu1021 03:21PM Michael Castellanos Order Number: [id number]   Performing Comments: 71 yo very active female, with right neck pain x 2 months  No radicular symptoms  Thanks  Duarte Manuel,      Test Name Result Flag Reference   XR SPINE CERVICAL COMPLETE 4 OR 5 VW (Report)     CERVICAL SPINE     INDICATION: Neck pain  COMPARISON: None     VIEWS: 5     IMAGES: 6     FINDINGS:     There is reversal of the normal cervical lordosis  Disc space narrowing at the C5/C6 and C6/C7 levels  Spurring in C5, C6 and C7  Mild narrowing of the C5/C6 and C6/C7 neural foramen on the right  The prevertebral soft tissues are within normal limits  The lung apices are intact  IMPRESSION:     Degenerative changes         Workstation performed: [id number]     Signed by:   Kaylynn Burroughs MD   8/12/17

## 2018-01-13 VITALS
BODY MASS INDEX: 17.83 KG/M2 | SYSTOLIC BLOOD PRESSURE: 148 MMHG | TEMPERATURE: 98.1 F | WEIGHT: 107 LBS | DIASTOLIC BLOOD PRESSURE: 80 MMHG | HEART RATE: 60 BPM | HEIGHT: 65 IN

## 2018-01-13 VITALS
WEIGHT: 113.13 LBS | SYSTOLIC BLOOD PRESSURE: 124 MMHG | HEART RATE: 68 BPM | DIASTOLIC BLOOD PRESSURE: 60 MMHG | TEMPERATURE: 98.9 F | RESPIRATION RATE: 16 BRPM | HEIGHT: 65 IN | BODY MASS INDEX: 18.85 KG/M2

## 2018-01-13 VITALS
HEIGHT: 65 IN | TEMPERATURE: 97.9 F | BODY MASS INDEX: 17.83 KG/M2 | DIASTOLIC BLOOD PRESSURE: 78 MMHG | WEIGHT: 107 LBS | HEART RATE: 58 BPM | SYSTOLIC BLOOD PRESSURE: 140 MMHG

## 2018-01-13 VITALS
HEIGHT: 67 IN | SYSTOLIC BLOOD PRESSURE: 140 MMHG | BODY MASS INDEX: 16.79 KG/M2 | RESPIRATION RATE: 16 BRPM | WEIGHT: 107 LBS | HEART RATE: 64 BPM | DIASTOLIC BLOOD PRESSURE: 72 MMHG

## 2018-01-14 VITALS
RESPIRATION RATE: 16 BRPM | SYSTOLIC BLOOD PRESSURE: 134 MMHG | DIASTOLIC BLOOD PRESSURE: 64 MMHG | BODY MASS INDEX: 17.46 KG/M2 | HEART RATE: 84 BPM | WEIGHT: 104 LBS

## 2018-01-14 VITALS
TEMPERATURE: 96.6 F | WEIGHT: 110.25 LBS | RESPIRATION RATE: 12 BRPM | HEIGHT: 65 IN | HEART RATE: 64 BPM | BODY MASS INDEX: 18.37 KG/M2 | SYSTOLIC BLOOD PRESSURE: 116 MMHG | DIASTOLIC BLOOD PRESSURE: 60 MMHG

## 2018-01-14 VITALS
HEIGHT: 65 IN | RESPIRATION RATE: 16 BRPM | WEIGHT: 104.5 LBS | DIASTOLIC BLOOD PRESSURE: 62 MMHG | SYSTOLIC BLOOD PRESSURE: 120 MMHG | HEART RATE: 72 BPM | BODY MASS INDEX: 17.41 KG/M2

## 2018-01-15 NOTE — MISCELLANEOUS
Message   Recorded as Task   Date: 10/12/2017 03:31 PM, Created By: Lupe Romo   Task Name: Miscellaneous   Assigned To: [facility] [place] [facility],Team   Regarding Patient: Gretel Garsia, Status: Active   Comment:    Lupe Romo - 12 Oct 2017 3:31 PM     TASK CREATED  Pt called stating that she had a procedure done today and is not feeling any relief so far  She wants to know how long it normally takes to notice an improvement  She also has a script for PT and wants to know when she should start  Pt can be reached at [phone]  Robbie Ham - 12 Oct 2017 3:58 PM     TASK EDITED  s/w pt, advised to continue to fill out pain diary provided  This office will fu in 1 week as it may take 3-5 days, poss 1 week for relief  C/b if redness, drainage, swelling at the site occurr or fever 100+  Advised pt ok to start PT in 1 week  Please cb w/ questions or concerns  pt verbalized understanding and appreciation  Dr Jeff Alcantara aware  Active Problems    1  Cervical myofascial pain syndrome (729 1) (M79 1)   2  Hyperlipidemia (272 4) (E78 5)   3  Neck pain (723 1) (M54 2)   4  Occipital neuralgia of right side (723 8) (M54 81)   5  Palpitations (785 1) (R00 2)   6  Visit for screening mammogram (V76 12) (Z12 31)    Current Meds   1  Advil TABS; Therapy: (Recorded:32Gay7731) to Recorded   2  Atorvastatin Calcium 10 MG Oral Tablet; take 1 tablet every other day  Requested for:   64YZW7525; Last Rx:26Xtt2226 Ordered   3  Calcium 600 + D TABS; TAKE 1 TABLET DAILY; Therapy: ([employer] [place] [employer]) to Recorded   4  Fish Oil 1200 MG Oral Capsule; Take 2 tablets daily; Therapy: ([employer]) to Recorded   5  Triamterene-HCTZ 37 5-25 MG Oral Capsule; TAKE 1/2 CAPSULE DAILY AS NEEDED    Requested for: 21Apr2017; Last Rx:21Apr2017 Ordered    Allergies    1   No Known Drug Allergies    Signatures   Electronically signed by : Dangelo Barrera, ; Oct 12 2017  3:58PM EST                       (Author)

## 2018-01-15 NOTE — RESULT NOTES
Verified Results  * MRI CERVICAL SPINE WO CONTRAST [id number] 11:36AM [place], [address]     Test Name Result Flag Reference   MRI CERVICAL SPINE [address] (Report)     MRI CERVICAL SPINE WITHOUT CONTRAST     INDICATION: M54 2: Cervicalgia  History taken directly from the electronic ordering system  COMPARISON: None  TECHNIQUE: Sagittal T1, sagittal T2, sagittal inversion recovery, axial T2, axial 2D merge        IMAGE QUALITY: Diagnostic     FINDINGS:   Counting reference: Craniocervical junction   ALIGNMENT: Slight reversal of the normal cervical lordosis  1 to 2 mm of anterolisthesis of C7 on T1  MARROW SIGNAL: Marrow signal is within normal limits without signs of an infiltrative process  No signs of acute fracture or significant marrow edema pattern  Vertebral body heights are maintained  CERVICAL AND VISUALIZED THORACIC CORD: Normal signal within the visualized cord  PREVERTEBRAL AND PARASPINAL SOFT TISSUES:  Normal          VISUALIZED POSTERIOR FOSSA: The visualized posterior fossa demonstrates no abnormal signal      CERVICAL DISC SPACES:        C2-C3: No significant spinal canal stenosis  No right and no left neural foraminal stenosis  C3-C4: Central disc protrusion  No significant spinal canal stenosis  No right and no left neural foraminal stenosis  C4-C5: Bilateral facet arthropathy  No significant spinal canal stenosis  No right and no left neural foraminal stenosis  C5-C6: Disc osteophyte complex, uncovertebral hypertrophy, and facet arthropathy  No significant spinal canal stenosis  Moderate right and mild left neural foraminal stenosis  C6-C7: Disc osteophyte complex with uncovertebral hypertrophy  No significant spinal canal stenosis  Mild right and no significant left neural foraminal stenosis  C7-T1: Facet arthropathy  Slight uncovering of the disc  No significant spinal canal stenosis  Mild right and mild left neural foraminal stenosis        UPPER THORACIC DISC SPACES: Normal        IMPRESSION:     Cervical spondylosis, most significant at C5-C6 resulting in moderate right and mild left foraminal stenoses  Remainder of changes as detailed         Workstation performed: [id number]     Signed by:   Abran Cueva MD   9/8/17

## 2018-01-16 NOTE — MISCELLANEOUS
Message   Recorded as Task   Date: 10/19/2017 03:13 PM, Created By: Gina Fox   Task Name: Miscellaneous   Assigned To: [facility] [place] [facility],Team   Regarding Patient: Moris Stern, Status: Active   Comment:    Shahida Irving - 19 Oct 2017 3:13 PM     TASK CREATED  Pt called stating she thought she was going to be getting a call regarding her procedure that she had  She said it did not help at all  She said she has a follow-up next week (10/26) and is asking if she should still come in if it did not work  Pls call pt at [phone]  Robbie Ham - 19 Oct 2017 3:53 PM     TASK EDITED  LMOM to cb on the number provided  Provided cb number and office hours  Nilda López - 20 Oct 2017 8:07 AM     TASK EDITED  Pt returned call and can be reached at [phone] before 9:15 or after 10:00 this morning  Robbie Ham - 20 Oct 2017 11:49 AM     TASK EDITED  s/w pt, states she has no relief w/ the ONB of 10/12  Questioning what to do next  Advised pt, d/w SL at ov on 10/26  Dr Marita Garza will evaluate the pt's pain, and provide options for tx  Pt verbalized understandign and confirmed 10/26 ov, arrive at 0745    sl aware        Active Problems    1  Cervical myofascial pain syndrome (729 1) (M79 1)   2  Hyperlipidemia (272 4) (E78 5)   3  Neck pain (723 1) (M54 2)   4  Occipital neuralgia of right side (723 8) (M54 81)   5  Palpitations (785 1) (R00 2)   6  Visit for screening mammogram (V76 12) (Z12 31)    Current Meds   1  Advil TABS; Therapy: (Recorded:31Goh3834) to Recorded   2  Atorvastatin Calcium 10 MG Oral Tablet; take 1 tablet every other day  Requested for:   15REH0144; Last Rx:42Jxr3893 Ordered   3  Calcium 600 + D TABS; TAKE 1 TABLET DAILY; Therapy: (León Lat) to Recorded   4  Fish Oil 1200 MG Oral Capsule; Take 2 tablets daily; Therapy: (León Lat) to Recorded   5   Triamterene-HCTZ 37 5-25 MG Oral Capsule; TAKE 1/2 CAPSULE DAILY AS NEEDED    Requested for: 21Apr2017; Last Rx:21Apr2017 Ordered    Allergies    1   No Known Drug Allergies    Signatures   Electronically signed by : Filipe Donohue, ; Oct 20 2017 11:49AM EST                       (Author)

## 2018-01-22 VITALS
HEART RATE: 64 BPM | HEIGHT: 67 IN | DIASTOLIC BLOOD PRESSURE: 82 MMHG | WEIGHT: 107 LBS | SYSTOLIC BLOOD PRESSURE: 150 MMHG | BODY MASS INDEX: 16.79 KG/M2

## 2018-01-22 VITALS
DIASTOLIC BLOOD PRESSURE: 63 MMHG | HEART RATE: 64 BPM | RESPIRATION RATE: 12 BRPM | SYSTOLIC BLOOD PRESSURE: 140 MMHG | WEIGHT: 105.38 LBS | BODY MASS INDEX: 17.56 KG/M2 | TEMPERATURE: 98.1 F | HEIGHT: 65 IN

## 2018-01-22 VITALS
BODY MASS INDEX: 16.56 KG/M2 | HEIGHT: 67 IN | RESPIRATION RATE: 16 BRPM | WEIGHT: 105.5 LBS | HEART RATE: 68 BPM | DIASTOLIC BLOOD PRESSURE: 74 MMHG | SYSTOLIC BLOOD PRESSURE: 130 MMHG

## 2018-01-22 VITALS — DIASTOLIC BLOOD PRESSURE: 60 MMHG | RESPIRATION RATE: 18 BRPM | HEART RATE: 60 BPM | SYSTOLIC BLOOD PRESSURE: 158 MMHG

## 2018-01-22 VITALS
WEIGHT: 107 LBS | HEART RATE: 68 BPM | TEMPERATURE: 97.8 F | BODY MASS INDEX: 16.79 KG/M2 | DIASTOLIC BLOOD PRESSURE: 68 MMHG | SYSTOLIC BLOOD PRESSURE: 130 MMHG | HEIGHT: 67 IN

## 2018-01-22 VITALS
HEIGHT: 67 IN | HEART RATE: 64 BPM | BODY MASS INDEX: 16.56 KG/M2 | RESPIRATION RATE: 12 BRPM | DIASTOLIC BLOOD PRESSURE: 70 MMHG | SYSTOLIC BLOOD PRESSURE: 130 MMHG | WEIGHT: 105.5 LBS

## 2018-02-16 ENCOUNTER — HOSPITAL ENCOUNTER (OUTPATIENT)
Dept: RADIOLOGY | Facility: HOSPITAL | Age: 73
Discharge: HOME | End: 2018-02-16
Payer: MEDICARE

## 2018-02-16 DIAGNOSIS — E78.5 HYPERLIPIDEMIA: ICD-10-CM

## 2018-02-16 DIAGNOSIS — Z00.00 ENCOUNTER FOR GENERAL ADULT MEDICAL EXAMINATION WITHOUT ABNORMAL FINDINGS: ICD-10-CM

## 2018-02-16 DIAGNOSIS — M79.10 MYALGIA: ICD-10-CM

## 2018-02-16 DIAGNOSIS — M85.88 OTHER SPECIFIED DISORDERS OF BONE DENSITY AND STRUCTURE, OTHER SITE: ICD-10-CM

## 2018-02-16 PROCEDURE — 77080 DXA BONE DENSITY AXIAL: CPT

## 2018-02-16 NOTE — PROGRESS NOTES
Please let the patient know that their DEXA Scan showed osteopenia at this time / thinning of the bones  Pt can take a max of 1200mg Calcium supplementation per day, with 800 - 1000 IU Vit D as well  Continue regular exercise  Thanks     Dr Nereida Denny

## 2018-03-09 ENCOUNTER — TELEPHONE (OUTPATIENT)
Dept: FAMILY MEDICINE CLINIC | Facility: CLINIC | Age: 73
End: 2018-03-09

## 2018-05-17 ENCOUNTER — OFFICE VISIT (OUTPATIENT)
Dept: FAMILY MEDICINE CLINIC | Facility: CLINIC | Age: 73
End: 2018-05-17
Payer: MEDICARE

## 2018-05-17 VITALS
BODY MASS INDEX: 17.67 KG/M2 | HEART RATE: 60 BPM | DIASTOLIC BLOOD PRESSURE: 60 MMHG | SYSTOLIC BLOOD PRESSURE: 132 MMHG | WEIGHT: 106.2 LBS | RESPIRATION RATE: 12 BRPM

## 2018-05-17 DIAGNOSIS — G89.29 CHRONIC NECK PAIN: Chronic | ICD-10-CM

## 2018-05-17 DIAGNOSIS — E78.2 MIXED HYPERLIPIDEMIA: Chronic | ICD-10-CM

## 2018-05-17 DIAGNOSIS — M54.2 CHRONIC NECK PAIN: Chronic | ICD-10-CM

## 2018-05-17 DIAGNOSIS — I10 ESSENTIAL HYPERTENSION: Primary | Chronic | ICD-10-CM

## 2018-05-17 PROCEDURE — 99213 OFFICE O/P EST LOW 20 MIN: CPT | Performed by: FAMILY MEDICINE

## 2018-05-17 RX ORDER — B-COMPLEX WITH VITAMIN C
1 TABLET ORAL DAILY
COMMUNITY
End: 2018-08-06

## 2018-05-17 NOTE — PROGRESS NOTES
Assessment/Plan:    No problem-specific Assessment & Plan notes found for this encounter  Diagnoses and all orders for this visit:    Essential hypertension  Comments:  Controlled on present management  She is to continue her healthy diet, and regular activity  Mixed hyperlipidemia  Comments:  Controlled on present management  Chronic neck pain  Comments:  Doing well right now, improved - managed by Pain Management  Other orders  -     aspirin 81 MG tablet; Take 1 tablet (81mg) by mouth once daily  -     Calcium Carbonate-Vitamin D (CALCIUM 600+D) 600-200 MG-UNIT TABS; Take 1 tablet by mouth daily          Subjective:      Patient ID: Eloina James is a 67 y o  female  Feeling well  Her neck is doing pretty well right now - had Botox injections, and did PT with [facility]  She is walking regularly for exercise  Will walk / run a 5K race soon  Discussed most recent FBW from 02/18 - Gluc 94, , LFTs nml  The following portions of the patient's history were reviewed and updated as appropriate: allergies, current medications, past family history, past social history, past surgical history and problem list     Past Medical History:   Diagnosis Date    Effusion of left knee     Last assessed - 9/10/15    Hyperlipidemia     Hypertension     Tick bite     Last assessed - 4/21/17       Current Outpatient Prescriptions:     aspirin 81 MG tablet, Take 1 tablet (81mg) by mouth once daily  , Disp: , Rfl:     atorvastatin (LIPITOR) 10 mg tablet, Take 10 mg by mouth, Disp: , Rfl:     Calcium Carb-Cholecalciferol (CALCIUM 600 + D) 600-200 MG-UNIT TABS, Calcium 600 + D TABS TAKE 1 TABLET DAILY    Refills: 0  Active, Disp: , Rfl:     Calcium Carbonate-Vitamin D (CALCIUM 600+D) 600-200 MG-UNIT TABS, Take 1 tablet by mouth daily, Disp: , Rfl:     Fish Oil-Cholecalciferol (FISH OIL + D3) 0104-1363 MG-UNIT CAPS, Fish Oil 1200 MG Oral Capsule Take 2 tablets daily  Refills: 0  Active, Disp: , Rfl:     triamterene-hydrochlorothiazide (DYAZIDE) 37 5-25 mg per capsule, Triamterene-HCTZ 37 5-25 MG Oral Capsule TAKE 1/2 CAPSULE DAILY  Refills: 0  Active, Disp: , Rfl:     Allergies   Allergen Reactions    Other          Review of Systems   Constitutional: Negative for activity change  Respiratory: Negative for shortness of breath  Cardiovascular: Negative for chest pain  Musculoskeletal: Positive for neck pain  Negative for myalgias  Chronic neck pain - doing better with recent interventions  Objective:      /60 (BP Location: Right arm, Patient Position: Sitting, Cuff Size: Standard)   Pulse 60   Resp 12   Wt 48 2 kg (106 lb 3 2 oz)   BMI 17 67 kg/m²          Physical Exam   Constitutional: She is oriented to person, place, and time  She appears well-developed and well-nourished  No distress  HENT:   Head: Normocephalic and atraumatic  Eyes: Conjunctivae are normal    Neck: Normal range of motion  Neck supple  No thyromegaly present  Cardiovascular: Normal rate, regular rhythm and normal heart sounds  Exam reveals no gallop and no friction rub  No murmur heard  Pulmonary/Chest: Effort normal and breath sounds normal  No respiratory distress  She has no wheezes  She has no rales  Lymphadenopathy:     She has no cervical adenopathy  Neurological: She is alert and oriented to person, place, and time  Skin: She is not diaphoretic  Psychiatric: She has a normal mood and affect  Her behavior is normal  Judgment and thought content normal    Nursing note and vitals reviewed

## 2018-07-07 DIAGNOSIS — I10 ESSENTIAL HYPERTENSION: Primary | ICD-10-CM

## 2018-07-08 RX ORDER — TRIAMTERENE AND HYDROCHLOROTHIAZIDE 37.5; 25 MG/1; MG/1
TABLET ORAL
Qty: 45 TABLET | Refills: 3 | Status: SHIPPED | OUTPATIENT
Start: 2018-07-08 | End: 2019-06-14 | Stop reason: SDUPTHER

## 2018-08-06 ENCOUNTER — OFFICE VISIT (OUTPATIENT)
Dept: FAMILY MEDICINE CLINIC | Facility: CLINIC | Age: 73
End: 2018-08-06
Payer: MEDICARE

## 2018-08-06 VITALS
DIASTOLIC BLOOD PRESSURE: 70 MMHG | RESPIRATION RATE: 12 BRPM | WEIGHT: 105.6 LBS | SYSTOLIC BLOOD PRESSURE: 120 MMHG | HEIGHT: 65 IN | OXYGEN SATURATION: 98 % | HEART RATE: 59 BPM | BODY MASS INDEX: 17.59 KG/M2

## 2018-08-06 DIAGNOSIS — M54.50 ACUTE LEFT-SIDED LOW BACK PAIN WITHOUT SCIATICA: Primary | ICD-10-CM

## 2018-08-06 PROCEDURE — 99213 OFFICE O/P EST LOW 20 MIN: CPT | Performed by: NURSE PRACTITIONER

## 2018-08-06 RX ORDER — OMEGA-3 FATTY ACIDS/FISH OIL 300-1000MG
CAPSULE ORAL
COMMUNITY
End: 2019-05-21

## 2018-08-06 RX ORDER — METHOCARBAMOL 500 MG/1
TABLET, FILM COATED ORAL
COMMUNITY
End: 2018-08-08 | Stop reason: SDUPTHER

## 2018-08-06 RX ORDER — MELATONIN
1000 DAILY
COMMUNITY

## 2018-08-06 RX ORDER — COVID-19 ANTIGEN TEST
KIT MISCELLANEOUS
COMMUNITY
End: 2019-05-21

## 2018-08-06 RX ORDER — CHLORAL HYDRATE 500 MG
CAPSULE ORAL
COMMUNITY
End: 2019-05-21

## 2018-08-06 RX ORDER — ASPIRIN 81 MG/1
TABLET, CHEWABLE ORAL
COMMUNITY
End: 2018-08-06

## 2018-08-06 NOTE — PROGRESS NOTES
Assessment/Plan:           Problem List Items Addressed This Visit     None      Visit Diagnoses     Acute left-sided low back pain without sciatica    -  Primary    Relevant Orders    Ambulatory referral to Physical Therapy            Subjective:      Patient ID: Jannette Arreguin is a 67 y o  female  Here for c/o left low back and now all low back  Has been having sharp shooting pain and spasm  Worsens with movement  Trouble getting in and out of bed  No numbness or tingling down b/l legs  Took leftover robaxin, helps but makes her sleepy  Only using advil  Using naproxen at night        Back Pain   Pertinent negatives include no chest pain, dysuria, fever or headaches  The following portions of the patient's history were reviewed and updated as appropriate: allergies, current medications, past family history, past medical history, past social history, past surgical history and problem list     Review of Systems   Constitutional: Negative for fatigue and fever  HENT: Negative for congestion and postnasal drip  Respiratory: Negative for cough, shortness of breath and wheezing  Cardiovascular: Negative for chest pain and palpitations  Gastrointestinal: Negative for constipation, diarrhea and vomiting  Genitourinary: Negative for decreased urine volume, dysuria, frequency, hematuria and urgency  Musculoskeletal: Positive for arthralgias, back pain and myalgias  Skin: Negative for rash  Neurological: Negative for dizziness, light-headedness and headaches  Hematological: Negative for adenopathy  Psychiatric/Behavioral: Negative for dysphoric mood and sleep disturbance           Objective:      /70   Pulse 59   Resp 12   Ht 5' 5" (1 651 m)   Wt 47 9 kg (105 lb 9 6 oz)   SpO2 98%   BMI 17 57 kg/m²     Family History   Problem Relation Age of Onset    Coronary artery disease Mother     Arthritis Mother     Other Mother         Cardiac Disorder     Transient ischemic attack Mother  Heart attack Father    [employer] Sudden death Father         SCD     Past Medical History:   Diagnosis Date    Effusion of left knee     Last assessed - 9/10/15    Hyperlipidemia     Hypertension     Tick bite     Last assessed - 4/21/17     Social History     Social History    Marital status:      Spouse name: N/A    Number of children: 3    Years of education: Post Graduate     Occupational History          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    Retired      RN     Social History Main Topics    Smoking status: Never Smoker    Smokeless tobacco: Never Used    Alcohol use Yes      Comment: 1 wine nightly    Drug use: No    Sexual activity: Not on file     Other Topics Concern    Not on file     Social History Narrative    Living alone           Current Outpatient Prescriptions:     cholecalciferol (VITAMIN D3) 1,000 units tablet, Take 1,000 Units by mouth daily, Disp: , Rfl:     aspirin 81 MG tablet, Take 1 tablet (81mg) by mouth once daily  , Disp: , Rfl:     atorvastatin (LIPITOR) 10 mg tablet, Take 10 mg by mouth, Disp: , Rfl:     Calcium Carb-Cholecalciferol (CALCIUM 600 + D) 600-200 MG-UNIT TABS, Calcium 600 + D TABS TAKE 1 TABLET DAILY    Refills: 0  Active, Disp: , Rfl:     Fish Oil-Cholecalciferol (FISH OIL + D3) 3366-3683 MG-UNIT CAPS, Fish Oil 1200 MG Oral Capsule Take 2 tablets daily  Refills: 0  Active, Disp: , Rfl:     Ibuprofen (ADVIL) 200 MG CAPS, Advil, Disp: , Rfl:     methocarbamol (ROBAXIN) 500 mg tablet, TAKE ONE TO TWO TABLETS BY MOUTH UP TO FOUR TIMES A DAY AS NEEDED FOR MUSCLE SPASM, Disp: , Rfl:     Naproxen Sodium (ALEVE) 220 MG CAPS, Aleve, Disp: , Rfl:     Omega-3 Fatty Acids (FISH OIL) 1,000 mg, Fish Oil, Disp: , Rfl:     triamterene-hydrochlorothiazide (MAXZIDE-25) 37 5-25 mg per tablet, TAKE ONE-HALF TABLET BY MOUTH EVERY DAY AS NEEDED, Disp: 45 tablet, Rfl: 3  Allergies   Allergen Reactions    Other         Physical Exam   Constitutional: She is oriented to person, place, and time  She appears well-developed and well-nourished  Eyes: Conjunctivae are normal    Neck: Normal range of motion  Neck supple  Cardiovascular: Normal rate, regular rhythm, normal heart sounds and intact distal pulses  Pulmonary/Chest: Effort normal and breath sounds normal    Musculoskeletal:        Lumbar back: She exhibits decreased range of motion, tenderness, pain and spasm  Back:    Spasm and pain left lower lumbar region    Neurological: She is alert and oriented to person, place, and time  She has normal reflexes  Skin: Skin is warm and dry  Psychiatric: She has a normal mood and affect  Her behavior is normal  Judgment and thought content normal    Nursing note and vitals reviewed

## 2018-08-07 ENCOUNTER — EVALUATION (OUTPATIENT)
Dept: PHYSICAL THERAPY | Facility: CLINIC | Age: 73
End: 2018-08-07
Payer: MEDICARE

## 2018-08-07 DIAGNOSIS — M54.50 ACUTE LEFT-SIDED LOW BACK PAIN WITHOUT SCIATICA: ICD-10-CM

## 2018-08-07 PROBLEM — F32.A DEPRESSION: Status: ACTIVE | Noted: 2017-11-20

## 2018-08-07 PROBLEM — M54.81 CERVICO-OCCIPITAL NEURALGIA: Status: ACTIVE | Noted: 2018-01-30

## 2018-08-07 PROBLEM — M19.90 ARTHRITIS: Status: ACTIVE | Noted: 2017-11-20

## 2018-08-07 PROBLEM — M54.12 CERVICAL RADICULOPATHY: Status: ACTIVE | Noted: 2018-01-10

## 2018-08-07 PROBLEM — M85.88 OSTEOPENIA OF SPINE: Status: ACTIVE | Noted: 2017-11-13

## 2018-08-07 PROBLEM — I10 HYPERTENSION: Status: ACTIVE | Noted: 2017-11-20

## 2018-08-07 PROBLEM — G24.3 SPASMODIC TORTICOLLIS: Status: ACTIVE | Noted: 2018-01-10

## 2018-08-07 PROBLEM — G44.86 CERVICOGENIC HEADACHE: Status: ACTIVE | Noted: 2018-01-30

## 2018-08-07 PROBLEM — I10 BENIGN ESSENTIAL HTN: Status: ACTIVE | Noted: 2017-11-13

## 2018-08-07 PROBLEM — M79.18 CERVICAL MYOFASCIAL PAIN SYNDROME: Status: ACTIVE | Noted: 2017-10-03

## 2018-08-07 PROCEDURE — 97162 PT EVAL MOD COMPLEX 30 MIN: CPT | Performed by: PHYSICAL THERAPIST

## 2018-08-07 PROCEDURE — G8991 OTHER PT/OT GOAL STATUS: HCPCS | Performed by: PHYSICAL THERAPIST

## 2018-08-07 PROCEDURE — G8990 OTHER PT/OT CURRENT STATUS: HCPCS | Performed by: PHYSICAL THERAPIST

## 2018-08-07 NOTE — PROGRESS NOTES
PT Evaluation     Today's date: 2018  Patient name: Hina Amaya  : 1945  MRN: [id number]  Referring provider: RACHEL Perdue  Dx:   Encounter Diagnosis     ICD-10-CM    1  Acute left-sided low back pain without sciatica M54 5 Ambulatory referral to Physical Therapy                  Assessment  Impairments: abnormal coordination, abnormal muscle firing, abnormal muscle tone, abnormal or restricted ROM, abnormal movement, impaired physical strength, lacks appropriate home exercise program, pain with function, scapular dyskinesis and weight-bearing intolerance    Assessment details: Hina Amaya is a 67 y o  female presents with signs and symptoms consistent with:    Acute left-sided low back pain without sciatica    Kiet Murillo has the above listed impairments and will benefit from skilled PT to address deficits to return to prior level of function       Understanding of Dx/Px/POC: good   Prognosis: good    Goals  Impairment Goals 4-6 weeks  - Decrease pain to <3/10  - Improve lumbar AROM to >75% throughout  - Increase hip strength to 4/5 throughout  - Increase core strength to be able to sit straight up functionally    Functional Goals 6-8 weeks  - Return to Prior Level of Function  - Increase Functional Status Measure (FOTO) to: >64  - Patient will be independent with HEP  - Patient will be able to sit without increased pain/compensation/difficulty  - Patient will be able to perform sit to stand without increased pain/compensation/difficulty   - Patient will be able to bend forward without increased pain/compensation/difficulty   - Patient will be able to lift >15 pounds with proper mechanics and no pain      Plan  Patient would benefit from: skilled PT  Referral necessary: No  Planned modality interventions: cryotherapy, electrical stimulation/Russian stimulation, H-Wave, TENS, thermotherapy: hydrocollator packs and unattended electrical stimulation  Planned therapy interventions: abdominal trunk stabilization, activity modification, ADL retraining, balance/weight bearing training, breathing training, body mechanics training, coordination, flexibility, functional ROM exercises, graded exercise, home exercise program, work reintegration, therapeutic training, therapeutic exercise, therapeutic activities, stretching, strengthening, self care, postural training, patient education, neuromuscular re-education, muscle pump exercises, motor coordination training, Riggins taping, manual therapy, joint mobilization and IADL retraining  Frequency: 2x week  Duration in visits: 16  Duration in weeks: 8  Treatment plan discussed with: patient, PTA and referring physician        Subjective Evaluation    Pain  Current pain ratin  At best pain ratin  At worst pain ratin    Patient Goals  Patient goal: 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, 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  WORK:  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, and tries to teach yoga part time  Is not able teach currently secondary to back pain  HOME LIFE: Patient reports she lives alone  She reports that she has a big dog  HOBBIES/EXERCISE:  Yoga, gardening,   PAIN LOCATION/DESCRIPTORS:  Left side of low back  Denies pain in the right side or down the leg  Reports pain does not go up her back  Left low back: sharp, dull, achy, constant, varies, deep  AGGRAVATING FACTORS:  Quick movements, getting in and out of car, lying down, getting up out of bed, up and down from chair, standing for too long  Crossing legs  Bending forward is the biggest agg  EASES: robaxin, lying down  LATENCY: seconds  DAY PATTERN:  Worse in the AM and late at night  IMAGING:  n/a  PLOF:  100% prior to back pain, doesn't recall back pain prior  HISTORY OF CURRENT INJURY:  Patient reports that she went to [facility] the last Friday in July  She noticed the pain while she was there and on the way back  She reports that she knew that it was sore    She notes that when she was sitting in the car, her pain was not that bad  She got back last Friday, and that Saturday, her pain was really bad  She saw doc yesterday who told her to take robaxin, and see PT  She reports that she has been taking it regularly, and notes that her pain is much less  She reports that she is feeling better today  SPECIAL QUESTIONS:  Pt states the following:  No previous history of cancer  No change in bowel or bladder function  Denies saddle anesthesia  No unexplained weight change  No fatigue more than normal       Objective     Postural Observations    Additional Postural Observation Details  Lateral shift to the right  Patient reports that this is a new thing for her    Neurological Testing     Additional Neurological Details  Neuro Screen of the [place]:     Dermatomes: equal and symmetrical bilaterally throughout  Myotomes: equal and symmetrical bilaterally throughout  DTR: 2+ bilateral and symmetrical throughout         Active Range of Motion     Additional Active Range of Motion Details  LS AROM:   Flexion: 50% increased pain  Extension: 10% increased pain  SideBending right: 50% catch in back on the left side  SideBending left: 50%  Rotation right: 50%  Rotation left: 50%  Sideglide right: 25% increased pain in the left  Sideglide left: 75%      Strength/Myotome Testing     Left Hip   Planes of Motion   Flexion: 4+    Right Hip   Planes of Motion   Flexion: 4+    Left Knee   Extension: 5    Right Knee   Extension: 5    Left Ankle/Foot   Dorsiflexion: 5  Plantar flexion: 3+  Great toe extension: 4+    Right Ankle/Foot   Dorsiflexion: 5  Plantar flexion: 3+  Great toe extension: 4+    General Comments     Lumbar Comments  Lateral shift corrections on wall with right shoulder on wall: only slightly decreased pain        Diagnosis: LS derangement   Precautions: lateral shift, chronic neck pain   Manual Therapy 8/7/18       [facility] LS        Lateral shift corrections Exercise Diary         Lateral shift corrections with right arm on wall 15x Increase reps if still shifted      Prone lying over pillows        Supine hooklying Kegels        Supine hooklying TrA        Supine hooklying Glut sets                                                                                                                                Modalities        IFC and CP PRN

## 2018-08-08 DIAGNOSIS — M62.838 MUSCLE SPASM: Primary | ICD-10-CM

## 2018-08-08 RX ORDER — METHOCARBAMOL 500 MG/1
500 TABLET, FILM COATED ORAL 4 TIMES DAILY
Qty: 30 TABLET | Refills: 1 | Status: SHIPPED | OUTPATIENT
Start: 2018-08-08 | End: 2019-05-21

## 2018-08-10 ENCOUNTER — OFFICE VISIT (OUTPATIENT)
Dept: PHYSICAL THERAPY | Facility: CLINIC | Age: 73
End: 2018-08-10
Payer: MEDICARE

## 2018-08-10 DIAGNOSIS — M54.50 ACUTE LEFT-SIDED LOW BACK PAIN WITHOUT SCIATICA: Primary | ICD-10-CM

## 2018-08-10 PROCEDURE — 97112 NEUROMUSCULAR REEDUCATION: CPT

## 2018-08-10 PROCEDURE — 97140 MANUAL THERAPY 1/> REGIONS: CPT

## 2018-08-10 NOTE — PROGRESS NOTES
Daily Note     Today's date: 8/10/2018  Patient name: Rachel Greene  : 1945  MRN: [id number]  Referring provider: RACHEL Mcdonald  Dx:   Encounter Diagnosis     ICD-10-CM    1  Acute left-sided low back pain without sciatica M54 5                   Subjective: Patient reports low back pain as "3/10" today  She reports that the nights "are the worst"  Objective: See treatment diary below    Diagnosis: LS derangement   Precautions: lateral shift, chronic neck pain   Manual Therapy 8/7/18 8/10/18       STM LS  RO, PTA      Lateral shift corrections  RO, PTA                               Exercise Diary         Lateral shift corrections with right arm on wall 15x 2x10      Prone lying over pillows  1 pillow: 5 mins       Supine hooklying Kegels  15x 5 sec hold       Supine hooklying TrA  15x 5 sec hold       Prone Glut sets   15x 5 sec hold                                                                                                               Patient education   Anatomy, lumbar roll, centralization, HEP, activities at home              Modalities        IFC and CP PRN  Defer                           Assessment: Tolerated treatment well  Patient exhibited good technique with therapeutic exercises   Imrproved lateral shift with manuals and with wall shifts  Decreased pain with manual therapy  She was given an updated HEP; demonstrates understanding  Consider table with face opening for next visit for neck with prone positions  Plan: Continue per plan of care

## 2018-08-13 ENCOUNTER — OFFICE VISIT (OUTPATIENT)
Dept: PHYSICAL THERAPY | Facility: CLINIC | Age: 73
End: 2018-08-13
Payer: MEDICARE

## 2018-08-13 DIAGNOSIS — M54.50 ACUTE LEFT-SIDED LOW BACK PAIN WITHOUT SCIATICA: Primary | ICD-10-CM

## 2018-08-13 PROCEDURE — 97140 MANUAL THERAPY 1/> REGIONS: CPT

## 2018-08-13 PROCEDURE — 97112 NEUROMUSCULAR REEDUCATION: CPT

## 2018-08-13 NOTE — PROGRESS NOTES
Daily Note     Today's date: 2018  Patient name: Deshawn Euceda  : 1945  MRN: [id number]  Referring provider: RACHEL Alfred  Dx:   Encounter Diagnosis     ICD-10-CM    1  Acute left-sided low back pain without sciatica M54 5          Subjective: Patient noted pain in low back pre treatment  Patient noted no change in pain post treatment  Objective: See treatment diary below      Assessment: Tolerated treatment fair  STM to lumbar helped to decrease tightness  Patient noted pain in back when going from prone to supine to standing  Therefore instructed patient to stay in prone and bring legs off table first  Patient was able to perform with no increase of pain  Patient would benefit from continued PT      Plan: Continue per plan of care       Diagnosis: LS derangement   Precautions: lateral shift, chronic neck pain   Manual Therapy 8/7/18 8/10/18  8/13/18     STM LS  RO, PTA AF     Lateral shift corrections  RO, PTA  AF                             Exercise Diary         Lateral shift corrections with right arm on wall 15x 2x10 2x10     Prone lying over pillows  1 pillow: 5 mins  1 pillow 5 mins     Supine hooklying Kegels  15x 5 sec hold  15x 5 sec hold      Supine hooklying TrA  15x 5 sec hold  15x 5 sec hold      Prone Glut sets   15x 5 sec hold  15x 5 sec hold                                                                                                              Patient education   Anatomy, lumbar roll, centralization, HEP, activities at home              Modalities        IFC and CP PRN  Defer

## 2018-08-15 ENCOUNTER — OFFICE VISIT (OUTPATIENT)
Dept: PHYSICAL THERAPY | Facility: CLINIC | Age: 73
End: 2018-08-15
Payer: MEDICARE

## 2018-08-15 DIAGNOSIS — M54.50 ACUTE LEFT-SIDED LOW BACK PAIN WITHOUT SCIATICA: Primary | ICD-10-CM

## 2018-08-15 PROCEDURE — 97112 NEUROMUSCULAR REEDUCATION: CPT

## 2018-08-15 PROCEDURE — 97140 MANUAL THERAPY 1/> REGIONS: CPT

## 2018-08-15 NOTE — PROGRESS NOTES
Daily Note     Today's date: 8/15/2018  Patient name: Steve Brown  : 1945  MRN: [id number]  Referring provider: RACHEL Jurado  Dx:   Encounter Diagnosis     ICD-10-CM    1  Acute left-sided low back pain without sciatica M54 5                   Subjective: Pt states she is feeling better overall  Has been compliant with HEP and body mechanics awareness  Objective: See treatment diary below                Diagnosis: LS derangement   Precautions: lateral shift, chronic neck pain   Manual Therapy 8/7/18 8/10/18  8/13/18  8/15     STM LS   RO, PTA AF  OANH     Lateral shift corrections   RO, PTA  AF  OANH                                               Exercise Diary              Lateral shift corrections with right arm on wall 15x 2x10 2x10  2x10     Prone lying over pillows   1 pillow: 5 mins  1 pillow 5 mins  1 pillow 10 mins (during manual therapy)     Supine hooklying Kegels   15x 5 sec hold  15x 5 sec hold   15 x 5 sec hold     Supine hooklying TrA   15x 5 sec hold  15x 5 sec hold   15 x 5 sec hold     Prone Glut sets    15x 5 sec hold  15x 5 sec hold   15 x 5 sec hold                                                                                                                                                                                           Patient education    Anatomy, lumbar roll, centralization, HEP, activities at home    reviewed body mechanics, lumbar roll for driving long distances                   Modalities             IFC and CP PRN   Defer     pt defer                                              Assessment: Tolerated treatment well  Patient would benefit from continued PT  For improved strength, flexibility and overall function  Improved gait noted compared to previous sessions  Plan: Continue per plan of care

## 2018-08-20 ENCOUNTER — OFFICE VISIT (OUTPATIENT)
Dept: PHYSICAL THERAPY | Facility: CLINIC | Age: 73
End: 2018-08-20
Payer: MEDICARE

## 2018-08-20 DIAGNOSIS — M54.50 ACUTE LEFT-SIDED LOW BACK PAIN WITHOUT SCIATICA: Primary | ICD-10-CM

## 2018-08-20 PROCEDURE — 97112 NEUROMUSCULAR REEDUCATION: CPT

## 2018-08-20 PROCEDURE — 97140 MANUAL THERAPY 1/> REGIONS: CPT

## 2018-08-20 NOTE — PROGRESS NOTES
Daily Note     Today's date: 2018  Patient name: Rosalia Negrete  : 1945  MRN: [id number]  Referring provider: RACHEL Diamond  Dx:   Encounter Diagnosis     ICD-10-CM    1  Acute left-sided low back pain without sciatica M54 5                   Subjective: Patient reports that she has "been feeling a little better"  She reports that she wants to get back to yoga  Pain rated as "2/10"  Objective: See treatment diary below  Diagnosis: LS derangement   Precautions: lateral shift, chronic neck pain   Manual Therapy  8/10/18  8/13/18  8/15/18  8/20/18    STM LS  RO, PTA AF OANH  RO, PTA   Lateral shift corrections  RO, PTA  AF OANH                             Exercise Diary         Lateral shift corrections with right arm on wall  2x10 2x10 2x10 2x 10 with extension    Prone lying over pillows  1 pillow: 5 mins  1 pillow: 5 mins  1 pillow: 5 mins  KIRIT: 3 mins    Supine hooklying Kegels  15x 5 sec hold  15x 5 sec hold 15x 5 sec hold In standing: 10x 5 sec    Supine hooklying TrA  15x 5 sec hold  15x 5 sec hold 15x 5 sec hold In standing: 10x 5 sec    Prone Glut sets   15x 5 sec hold  15x 5 sec hold 15x 5 sec hold In standinG 5 sec   Heel clicks      12 x 5 sec    Hamstring stretch at EOT      Attempted- pain    Wobbleboard      2 mins ea                                                                                   Patient education   Anatomy, lumbar roll, centralization, HEP, activities at home   Anatomy, extension preference            Modalities        IFC and CP PRN  Defer                           Assessment: Tolerated treatment well  Patient exhibited good technique with therapeutic exercises  Patient was educated on anatomy and refraining from performing flexion based exercises  Patient experienced nearly  abolished pain with performance of manual therapy  She is progressing slowly towards goals  Plan: Continue per plan of care

## 2018-08-21 ENCOUNTER — OFFICE VISIT (OUTPATIENT)
Dept: FAMILY MEDICINE CLINIC | Facility: CLINIC | Age: 73
End: 2018-08-21
Payer: MEDICARE

## 2018-08-21 VITALS
RESPIRATION RATE: 16 BRPM | SYSTOLIC BLOOD PRESSURE: 122 MMHG | OXYGEN SATURATION: 99 % | DIASTOLIC BLOOD PRESSURE: 80 MMHG | BODY MASS INDEX: 17.47 KG/M2 | WEIGHT: 105 LBS | HEART RATE: 69 BPM

## 2018-08-21 DIAGNOSIS — M79.18 CERVICAL MYOFASCIAL PAIN SYNDROME: Primary | Chronic | ICD-10-CM

## 2018-08-21 DIAGNOSIS — Z13.1 SCREENING FOR DIABETES MELLITUS: ICD-10-CM

## 2018-08-21 DIAGNOSIS — I10 BENIGN ESSENTIAL HTN: Chronic | ICD-10-CM

## 2018-08-21 DIAGNOSIS — Z12.39 SCREENING FOR BREAST CANCER: ICD-10-CM

## 2018-08-21 DIAGNOSIS — E78.5 HYPERLIPIDEMIA, UNSPECIFIED HYPERLIPIDEMIA TYPE: Chronic | ICD-10-CM

## 2018-08-21 DIAGNOSIS — S39.012D STRAIN OF LUMBAR REGION, SUBSEQUENT ENCOUNTER: Chronic | ICD-10-CM

## 2018-08-21 PROCEDURE — 99213 OFFICE O/P EST LOW 20 MIN: CPT | Performed by: FAMILY MEDICINE

## 2018-08-21 NOTE — PROGRESS NOTES
Assessment/Plan:    No problem-specific Assessment & Plan notes found for this encounter  Diagnoses and all orders for this visit:    Cervical myofascial pain syndrome  Comments:  Pt doing better with PT (neck and back), and cervical musculature Botox injections  Discussed with pt - and she will try a trial of Chiropractics  Strain of lumbar region, subsequent encounter  Comments:  As above  Screening for breast cancer  Comments:  Mammogram ordered  Orders:  -     Mammo screening bilateral w cad; Future    Screening for diabetes mellitus  Comments:  FBW ordered for the pt  Orders:  -     Comprehensive metabolic panel; Future  -     Lipid Panel with Direct LDL reflex; Future  -     CBC and differential; Future    Hyperlipidemia, unspecified hyperlipidemia type  Comments:  Stable on present management  Orders:  -     Lipid Panel with Direct LDL reflex; Future  -     CBC and differential; Future    Benign essential HTN  Comments:  Controlled on present management  Orders:  -     CBC and differential; Future          Subjective:      Patient ID: Joy Bay is a 67 y o  female  Marcial Andreberry is doing better in terms of her neck with PT and Botox injections  Also in PT for her back - slowly getting better; considering Chiropractics  The following portions of the patient's history were reviewed and updated as appropriate: allergies, current medications, past family history, past social history, past surgical history and problem list     Past Medical History:   Diagnosis Date    Effusion of left knee     Last assessed - 9/10/15    Hyperlipidemia     Hypertension     Tick bite     Last assessed - 4/21/17         Current Outpatient Prescriptions:     aspirin 81 MG tablet, Take 1 tablet (81mg) by mouth once daily  , Disp: , Rfl:     atorvastatin (LIPITOR) 10 mg tablet, Take 10 mg by mouth, Disp: , Rfl:     Calcium Carb-Cholecalciferol (CALCIUM 600 + D) 600-200 MG-UNIT TABS, Calcium 600 + D TABS TAKE 1 TABLET DAILY  Refills: 0  Active, Disp: , Rfl:     cholecalciferol (VITAMIN D3) 1,000 units tablet, Take 1,000 Units by mouth daily, Disp: , Rfl:     Fish Oil-Cholecalciferol (FISH OIL + D3) 9571-0809 MG-UNIT CAPS, Fish Oil 1200 MG Oral Capsule Take 2 tablets daily  Refills: 0  Active, Disp: , Rfl:     Ibuprofen (ADVIL) 200 MG CAPS, Advil, Disp: , Rfl:     methocarbamol (ROBAXIN) 500 mg tablet, Take 1 tablet (500 mg total) by mouth 4 (four) times a day, Disp: 30 tablet, Rfl: 1    Naproxen Sodium (ALEVE) 220 MG CAPS, Aleve, Disp: , Rfl:     Omega-3 Fatty Acids (FISH OIL) 1,000 mg, Fish Oil, Disp: , Rfl:     triamterene-hydrochlorothiazide (MAXZIDE-25) 37 5-25 mg per tablet, TAKE ONE-HALF TABLET BY MOUTH EVERY DAY AS NEEDED, Disp: 45 tablet, Rfl: 3    Allergies   Allergen Reactions    Other          Review of Systems   Constitutional: Negative for activity change  Musculoskeletal: Positive for back pain and neck pain  Objective:      /80 (BP Location: Left arm, Patient Position: Sitting, Cuff Size: Standard)   Pulse 69   Resp 16   Wt 47 6 kg (105 lb)   SpO2 99%   BMI 17 47 kg/m²          Physical Exam   Constitutional: She is oriented to person, place, and time  She appears well-developed and well-nourished  No distress  HENT:   Head: Normocephalic and atraumatic  Musculoskeletal:        Back:    Neurological: She is alert and oriented to person, place, and time  Skin: She is not diaphoretic  Psychiatric: She has a normal mood and affect  Her behavior is normal  Judgment and thought content normal    Nursing note and vitals reviewed

## 2018-08-23 ENCOUNTER — OFFICE VISIT (OUTPATIENT)
Dept: PHYSICAL THERAPY | Facility: CLINIC | Age: 73
End: 2018-08-23
Payer: MEDICARE

## 2018-08-23 DIAGNOSIS — M54.50 ACUTE LEFT-SIDED LOW BACK PAIN WITHOUT SCIATICA: Primary | ICD-10-CM

## 2018-08-23 PROCEDURE — 97112 NEUROMUSCULAR REEDUCATION: CPT

## 2018-08-23 PROCEDURE — 97110 THERAPEUTIC EXERCISES: CPT

## 2018-08-23 PROCEDURE — 97140 MANUAL THERAPY 1/> REGIONS: CPT

## 2018-08-23 NOTE — PROGRESS NOTES
Daily Note     Today's date: 2018  Patient name: Greer Shaw  : 1945  MRN: [id number]  Referring provider: RACHEL Whitman  Dx:   Encounter Diagnosis     ICD-10-CM    1  Acute left-sided low back pain without sciatica M54 5                   Subjective: Patient reports that she is taking care of her neighbor's plants  She reports that she had to sit in the car for a while and "lug water for the plants"  Objective: See treatment diary below    Diagnosis: LS derangement   Precautions: lateral shift, chronic neck pain   Manual Therapy 8/23/18   8/13/18  8/15/18  8/20/18    STM LS RO, PTA   AF OANH  RO, PTA   Lateral shift corrections RO, PTA   AF OANH                             Exercise Diary         Lateral shift corrections with right arm on wall 2x 10 with extension   2x10 2x10 2x 10 with extension    Prone lying over pillows KIRIT: 3 mins   1 pillow: 5 mins  1 pillow: 5 mins  KIRIT: 3 mins    Supine hooklying Kegels In standing: ladders: 10x  15x 5 sec hold 15x 5 sec hold In standing: 10x 5 sec    Supine hooklying TrA In standing: ladders: 10x   15x 5 sec hold 15x 5 sec hold In standing: 10x 5 sec    Prone Glut sets  In standing ladders: 10x   15x 5 sec hold 15x 5 sec hold In standinZ 5 sec   Heel clicks      12 x 5 sec    Hamstring stretch at EOT      Attempted- pain    Wobbleboard  2 mins ea    2 mins ea   Standing extensions 10x        X-walks   NV (yell)       Clams  20x                                                                Patient education  Driving positioning at car, lumbar roll     Anatomy, extension preference            Modalities        IFC and CP PRN  Defer                             Assessment: Tolerated treatment well  Patient exhibited good technique with therapeutic exercises  Pain was abolished with manual therapy  Reviewed correct posture with sitting in a car  She was able to tolerate all progressions without increased pain  She is progressing towards goals  Plan: Continue per plan of care

## 2018-08-27 ENCOUNTER — OFFICE VISIT (OUTPATIENT)
Dept: PHYSICAL THERAPY | Facility: CLINIC | Age: 73
End: 2018-08-27
Payer: MEDICARE

## 2018-08-27 DIAGNOSIS — M54.50 ACUTE LEFT-SIDED LOW BACK PAIN WITHOUT SCIATICA: Primary | ICD-10-CM

## 2018-08-27 PROCEDURE — 97112 NEUROMUSCULAR REEDUCATION: CPT

## 2018-08-27 PROCEDURE — 97140 MANUAL THERAPY 1/> REGIONS: CPT

## 2018-08-27 PROCEDURE — 97110 THERAPEUTIC EXERCISES: CPT

## 2018-08-27 NOTE — PROGRESS NOTES
Daily Note     Today's date: 2018  Patient name: Claudia Barrientos  : 1945  MRN: [id number]  Referring provider: RACHEL Figueroa  Dx:   Encounter Diagnosis     ICD-10-CM    1  Acute left-sided low back pain without sciatica M54 5                   Subjective: Patient reports that she has been feeling better  She reports that she has some discomfort along the left LS  Objective: See treatment diary below    Diagnosis: LS derangement   Precautions: lateral shift, chronic neck pain   Manual Therapy 8/23/18  8/27/18   8/15/18  8/20/18    STM LS RO, PTA  RO, PTA   OANH  RO, PTA   Lateral shift corrections RO, PTA  RO, PTA   OANH                             Exercise Diary         Lateral shift corrections with right arm on wall 2x 10 with extension  2x 10 with extension   2x10 2x 10 with extension    Prone lying over pillows KIRIT: 3 mins  KIRIT 3 mins   1 pillow: 5 mins  KIRIT: 3 mins    Supine hooklying Kegels In standing: ladders: 10x In standing ladders: 10x   15x 5 sec hold In standing: 10x 5 sec    Supine hooklying TrA In standing: ladders: 10x  In standing ladders: 10x   15x 5 sec hold In standing: 10x 5 sec    Prone Glut sets  In standing ladders: 10x  In standing ladders: 10x   15x 5 sec hold In standinH 5 sec   Heel clicks   15A 5 sec    12 x 5 sec    Hamstring stretch at EOT      Attempted- pain    Wobbleboard  2 mins ea 2 mins ea   2 mins ea   Standing extensions 10x        X-walks   [place], 2 laps       Clams  20x  20x                                                               Patient education  Driving positioning at car, lumbar roll  Exercises, anatomy    Anatomy, extension preference            Modalities        IFC and CP PRN  Defer                             Assessment: Tolerated treatment well  Patient exhibited good technique with therapeutic exercises  Patient was able to complete all progressions today  Decreased low back pain with manual therapy   She was educated on anatomy and exercises  She is progressing slowly towards goals  Plan: Continue per plan of care

## 2018-08-30 ENCOUNTER — OFFICE VISIT (OUTPATIENT)
Dept: PHYSICAL THERAPY | Facility: CLINIC | Age: 73
End: 2018-08-30
Payer: MEDICARE

## 2018-08-30 DIAGNOSIS — M54.50 ACUTE LEFT-SIDED LOW BACK PAIN WITHOUT SCIATICA: Primary | ICD-10-CM

## 2018-08-30 PROCEDURE — 97112 NEUROMUSCULAR REEDUCATION: CPT | Performed by: PHYSICAL THERAPIST

## 2018-08-30 PROCEDURE — 97110 THERAPEUTIC EXERCISES: CPT | Performed by: PHYSICAL THERAPIST

## 2018-08-30 NOTE — PROGRESS NOTES
Daily Note     Today's date: 2018  Patient name: Cee Rubi  : 1945  MRN: [id number]  Referring provider: RACHEL Springer  Dx:   Encounter Diagnosis     ICD-10-CM    1  Acute left-sided low back pain without sciatica M54 5                   Subjective: Patient reports that she has minimal pain in her back today  Would almost say no pain at all  Objective: See treatment diary below    Diagnosis: LS derangement   Precautions: lateral shift, chronic neck pain   Manual Therapy 18    STM LS RO, PTA  RO, PTA  Hold manuals today   RO, PTA   Lateral shift corrections RO, PTA  RO, PTA                               Exercise Diary         Lateral shift corrections with right arm on wall 2x 10 with extension  2x 10 with extension  2x10 with extension  2x 10 with extension    Prone lying over pillows KIRIT: 3 mins  KIRIT 3 mins  KIRIT 3 mins   KIRIT: 3 mins    Supine hooklying Kegels In standing: ladders: 10x In standing ladders: 10x  In standing ladders: 10x   In standing: 10x 5 sec    Supine hooklying TrA In standing: ladders: 10x  In standing ladders: 10x  In standing ladders: 10x   In standing: 10x 5 sec    Prone Glut sets  In standing ladders: 10x  In standing ladders: 10x  In standing ladders: 10x   In standinT 5 sec   Heel clicks   88J 5 sec  80V 5 sec  12 x 5 sec    Hamstring stretch at EOT    [id number]  Attempted- pain    Wobbleboard  2 mins ea 2 mins ea 2 mins ea  2 mins ea   Standing extensions 10x   20x     X-walks   Person, 2 laps  YTB 2 laps     Clams  20x  20x  20x ea     SB bridges   20x      Recovery of function   NV                                             Patient education  Driving positioning at car, lumbar roll  Exercises, anatomy  Recovery of function  Holding manuals, lumbar roll use  Anatomy, extension preference            Modalities        IFC and CP PRN  Defer                         Assessment: Tolerated treatment well   Patient exhibited good technique with therapeutic exercises  Patient was able to perform all exercises without increased pain  She tolerated progressions  All questions/concerns addressed  She had no pain today  Manual therapy was held today to gauge progress  She is progressing well toward long term goals  Plan: Continue per plan of care

## 2018-09-01 ENCOUNTER — HOSPITAL ENCOUNTER (EMERGENCY)
Facility: HOSPITAL | Age: 73
Discharge: HOME | End: 2018-09-02
Attending: EMERGENCY MEDICINE | Admitting: EMERGENCY MEDICINE
Payer: MEDICARE

## 2018-09-01 ENCOUNTER — APPOINTMENT (EMERGENCY)
Dept: CT IMAGING | Facility: HOSPITAL | Age: 73
End: 2018-09-01
Payer: MEDICARE

## 2018-09-01 VITALS
RESPIRATION RATE: 18 BRPM | BODY MASS INDEX: 18.31 KG/M2 | WEIGHT: 110.01 LBS | HEART RATE: 69 BPM | TEMPERATURE: 97.4 F | DIASTOLIC BLOOD PRESSURE: 86 MMHG | SYSTOLIC BLOOD PRESSURE: 184 MMHG | OXYGEN SATURATION: 98 %

## 2018-09-01 DIAGNOSIS — S09.90XA INJURY OF HEAD, INITIAL ENCOUNTER: Primary | ICD-10-CM

## 2018-09-01 DIAGNOSIS — S51.811A SKIN TEAR OF RIGHT FOREARM WITHOUT COMPLICATION, INITIAL ENCOUNTER: ICD-10-CM

## 2018-09-01 DIAGNOSIS — S01.01XA LACERATION OF SCALP, INITIAL ENCOUNTER: ICD-10-CM

## 2018-09-01 PROCEDURE — 70450 CT HEAD/BRAIN W/O DYE: CPT

## 2018-09-01 PROCEDURE — 90715 TDAP VACCINE 7 YRS/> IM: CPT | Performed by: EMERGENCY MEDICINE

## 2018-09-01 RX ORDER — ACETAMINOPHEN 325 MG/1
650 TABLET ORAL ONCE
Status: COMPLETED | OUTPATIENT
Start: 2018-09-01 | End: 2018-09-01

## 2018-09-01 RX ORDER — LIDOCAINE HYDROCHLORIDE AND EPINEPHRINE 10; 10 MG/ML; UG/ML
20 INJECTION, SOLUTION INFILTRATION; PERINEURAL ONCE
Status: COMPLETED | OUTPATIENT
Start: 2018-09-01 | End: 2018-09-01

## 2018-09-01 RX ORDER — GINSENG 100 MG
1 CAPSULE ORAL ONCE
Status: COMPLETED | OUTPATIENT
Start: 2018-09-01 | End: 2018-09-01

## 2018-09-01 RX ADMIN — ACETAMINOPHEN 650 MG: 325 TABLET, FILM COATED ORAL at 23:09

## 2018-09-01 RX ADMIN — BACITRACIN ZINC 1 SMALL APPLICATION: 500 OINTMENT TOPICAL at 23:10

## 2018-09-01 RX ADMIN — BACITRACIN ZINC 1 SMALL APPLICATION: 500 OINTMENT TOPICAL at 23:18

## 2018-09-01 RX ADMIN — LIDOCAINE HYDROCHLORIDE AND EPINEPHRINE 20 ML: 10; 10 INJECTION, SOLUTION INFILTRATION; PERINEURAL at 23:11

## 2018-09-01 RX ADMIN — TETANUS TOXOID, REDUCED DIPHTHERIA TOXOID AND ACELLULAR PERTUSSIS VACCINE, ADSORBED 0.5 ML: 5; 2.5; 8; 8; 2.5 SUSPENSION INTRAMUSCULAR at 23:09

## 2018-09-02 PROCEDURE — 90471 IMMUNIZATION ADMIN: CPT

## 2018-09-02 PROCEDURE — 99284 EMERGENCY DEPT VISIT MOD MDM: CPT

## 2018-09-02 NOTE — DISCHARGE INSTRUCTIONS
Head Injury   WHAT YOU NEED TO KNOW:   A head injury is most often caused by a blow to the head  This may occur from a fall, bicycle injury, sports injury, being struck in the head, or a motor vehicle accident  DISCHARGE INSTRUCTIONS:   Call 911 or have someone else call for any of the following:   · You cannot be woken  · You have a seizure  · You stop responding to others or you faint  · You have blurry or double vision  · Your speech becomes slurred or confused  · You have arm or leg weakness, loss of feeling, or new problems with coordination  · Your pupils are larger than usual or one pupil is a different size than the other  · You have blood or clear fluid coming out of your ears or nose  Return to the emergency department if:   · You have repeated or forceful vomiting  · You feel confused  · Your headache gets worse or becomes severe  · You or someone caring for you notices that you are harder to wake than usual   Contact your healthcare provider if:   · Your symptoms last longer than 6 weeks after the injury  · You have questions or concerns about your condition or care  Medicines:   · Acetaminophen  decreases pain  Acetaminophen is available without a doctor's order  Ask how much to take and how often to take it  Follow directions  Acetaminophen can cause liver damage if not taken correctly  · Take your medicine as directed  Contact your healthcare provider if you think your medicine is not helping or if you have side effects  Tell him or her if you are allergic to any medicine  Keep a list of the medicines, vitamins, and herbs you take  Include the amounts, and when and why you take them  Bring the list or the pill bottles to follow-up visits  Carry your medicine list with you in case of an emergency  Self-care:   · Rest  or do quiet activities for 24 to 48 hours  Limit your time watching TV, using the computer, or doing tasks that require a lot of thinking  Slowly return to your normal activities as directed  Do not play sports or do activities that may cause you to get hit in the head  Ask your healthcare provider when you can return to sports  · Apply ice  on your head for 15 to 20 minutes every hour or as directed  Use an ice pack, or put crushed ice in a plastic bag  Cover it with a towel before you apply it to your skin  Ice helps prevent tissue damage and decreases swelling and pain  · Have someone stay with you for 24 hours  or as directed  This person can monitor you for complications and call [phone]  When you are awake the person should ask you a few questions to see if you are thinking clearly  An example would be to ask your name or your address  Prevent another head injury:   · Wear a helmet that fits properly  Do this when you play sports, or ride a bike, scooter, or skateboard  Helmets help decrease your risk of a serious head injury  Talk to your healthcare provider about other ways you can protect yourself if you play sports  · Wear your seat belt every time you are in a car  This helps to decrease your risk for a head injury if you are in a car accident  Follow up with your healthcare provider as directed:  Write down your questions so you remember to ask them during your visits  © 2017 [employer] [address]  Information is for End User's use only and may not be sold, redistributed or otherwise used for commercial purposes  All illustrations and images included in CareNotes® are the copyrighted property of [employer]  or [employer]  The above information is an  only  It is not intended as medical advice for individual conditions or treatments  Talk to your doctor, nurse or pharmacist before following any medical regimen to see if it is safe and effective for you  Laceration   WHAT YOU NEED TO KNOW:   A laceration is an injury to the skin and the soft tissue underneath it   Lacerations happen when you are cut or hit by something  They can happen anywhere on the body  DISCHARGE INSTRUCTIONS:   Return to the emergency department if:   · You have heavy bleeding or bleeding that does not stop after 10 minutes of holding firm, direct pressure over the wound  · Your wound opens up  Contact your healthcare provider if:   · You have a fever or chills  · Your laceration is red, warm, or swollen  · You have red streaks on your skin coming from your wound  · You have white or yellow drainage from the wound that smells bad  · You have pain that gets worse, even after treatment  · You have questions or concerns about your condition or care  Medicines:   · Prescription pain medicine  may be given  Ask how to take this medicine safely  · Antibiotics  help treat or prevent a bacterial infection  · Take your medicine as directed  Contact your healthcare provider if you think your medicine is not helping or if you have side effects  Tell him or her if you are allergic to any medicine  Keep a list of the medicines, vitamins, and herbs you take  Include the amounts, and when and why you take them  Bring the list or the pill bottles to follow-up visits  Carry your medicine list with you in case of an emergency  Care for your wound as directed:   · Do not get your wound wet  until your healthcare provider says it is okay  Do not soak your wound in water  Do not go swimming until your healthcare provider says it is okay  Carefully wash the wound with soap and water  Gently pat the area dry or allow it to air dry  · Change your bandages  when they get wet, dirty, or after washing  Apply new, clean bandages as directed  Do not apply elastic bandages or tape too tight  Do not put powders or lotions over your incision  · Apply antibiotic ointment as directed  Your healthcare provider may give you antibiotic ointment to put over your wound if you have stitches   If you have strips of tape over your incision, let them dry up and fall off on their own  If they do not fall off within 14 days, gently remove them  If you have glue over your wound, do not remove or pick at it  If your glue comes off, do not replace it with glue that you have at home  · Check your wound every day for signs of infection such as swelling, redness, or pus  Self-care:   · Apply ice  on your wound for 15 to 20 minutes every hour or as directed  Use an ice pack, or put crushed ice in a plastic bag  Cover it with a towel  Ice helps prevent tissue damage and decreases swelling and pain  · Use a splint as directed  A splint will decrease movement and stress on your wound  It may help it heal faster  A splint may be used for lacerations over joints or areas of your body that bend  Ask your healthcare provider how to apply and remove a splint  · Decrease scarring of your wound  by applying ointments as directed  Do not apply ointments until your healthcare provider says it is okay  You may need to wait until your wound is healed  Ask which ointment to buy and how often to use it  After your wound is healed, use sunscreen over the area when you are out in the sun  You should do this for at least 6 months to 1 year after your injury  Follow up with your healthcare provider as directed: You may need to follow up in 24 to 48 hours to have your wound checked for infection  You will need to return in 3 to 14 days if you have stitches or staples so they can be removed  Care for your wound as directed to prevent infection and help it heal  Write down your questions so you remember to ask them during your visits  © 2017 [address] [employer] Information is for End User's use only and may not be sold, redistributed or otherwise used for commercial purposes  All illustrations and images included in CareNotes® are the copyrighted property of [employer]  or Tobias Etienne  The above information is an  only  It is not intended as medical advice for individual conditions or treatments  Talk to your doctor, nurse or pharmacist before following any medical regimen to see if it is safe and effective for you  Skin Tear   WHAT YOU NEED TO KNOW:   A skin tear occurs when the layers of weakened skin split open from an injury  [place], elderly, and chronically or critically ill people are at higher risk for skin tears  Long-term use of steroids can also increase the risk  It is important to treat and prevent skin tears to prevent infection  DISCHARGE INSTRUCTIONS:   Medicines:   · Medicines  may be given to reduce your pain or to treat or prevent an infection  Do not wait until the pain is severe before you ask for more medicine  · Take your medicine as directed  Contact your healthcare provider if you think your medicine is not helping or if you have side effects  Tell him of her if you are allergic to any medicine  Keep a list of the medicines, vitamins, and herbs you take  Include the amounts, and when and why you take them  Bring the list or the pill bottles to follow-up visits  Carry your medicine list with you in case of an emergency  Follow up with your healthcare provider as directed:  Write down your questions so you remember to ask them during your visits  Wound care: You may be referred to a wound specialist who will teach you how to clean your wound properly  Ask for more information about wound care  Prevent another skin tear:   · Clean, moisturize, and protect your skin  Baths, hot showers, and soap can dry your skin and increase your risk for skin tears  Take tepid showers, use mild soap as directed, and gently pat your skin dry  Use lotion to keep your skin moist after you shower  Wear long sleeves, pants, and protective footwear  · Move carefully  Ask for help if you cannot lift yourself well enough to avoid dragging your skin when you move  · Keep your home safe    Cover sharp corners, keep your pathways clear, and turn on lights so you can see clearly  Ask for more information if you have questions about home safety  · Drink liquids as directed  Ask your healthcare provider how much liquid to drink each day and which liquids are best for you  Liquids will help keep your skin moist and protected from future skin tears  · Eat high-protein foods  Examples are lean meats, fish, low-fat dairy products, and beans  A dietitian may help you create high-protein meal plans to help with wound healing  Contact your healthcare provider if:   · You have redness, swelling, pus, or a bad odor coming from your wound  · Blood soaks through your bandage  · You have increased pain, even after you take pain medicine  · Your wound tears open again  Return to the emergency department if:   · You have a fever  © 2017 [address] Information is for End User's use only and may not be sold, redistributed or otherwise used for commercial purposes  All illustrations and images included in CareNotes® are the copyrighted property of [employer]  or [employer] Etienne  The above information is an  only  It is not intended as medical advice for individual conditions or treatments  Talk to your doctor, nurse or pharmacist before following any medical regimen to see if it is safe and effective for you

## 2018-09-02 NOTE — ED NOTES
Having difficulty controlling bleeding with direct pressure to wound on posterior scalp  Dr Javid Simpson called to room  Sutures placed  Medicated per orders with DSD and bandage applied        Brigitte Ragland RN  09/01/18 0141

## 2018-09-02 NOTE — ED PROVIDER NOTES
History  Chief Complaint   Patient presents with    Head Injury     pt presents to ED for evaluation and treatment of head laceration  As per pt she was walking her dog who pulled on the leash and pt fell to [address] [place] [address] LOC     Patient is a 67year old female who was walking her dog tonight and her dog ran and dragged patient to the ground and patient fell and hit her head on the pavement and sustained a scalp laceration and skin tear of R forearm  ?last Td  No LOC  No N/V  (+) headache  No other pain  Was last seen at [facility] ED on 10/23/16 for palpitations  [facility] website checked on this patient and no Rx found  Patient needed my urgent attention  History provided by:  Patient and relative (daughter)   used: No        Prior to Admission Medications   Prescriptions Last Dose Informant Patient Reported? Taking? Calcium Carb-Cholecalciferol (CALCIUM 600 + D) 600-200 MG-UNIT TABS 9/1/2018 at Unknown time  Yes Yes   Sig: Calcium 600 + D TABS  TAKE 1 TABLET DAILY  Refills: 0    Active   Fish Oil-Cholecalciferol (FISH OIL + D3) 0970-8711 MG-UNIT CAPS 9/1/2018 at Unknown time  Yes Yes   Sig: Fish Oil 1200 MG Oral Capsule  Take 2 tablets daily   Refills: 0    Active   Ibuprofen (ADVIL) 200 MG CAPS Past Week at Unknown time  Yes Yes   Sig: Advil   Naproxen Sodium (ALEVE) 220 MG CAPS More than a month at Unknown time  Yes No   Sig: Aleve   Omega-3 Fatty Acids (FISH OIL) 1,000 mg 9/1/2018 at Unknown time  Yes Yes   Sig: Fish Oil   aspirin 81 MG tablet 9/1/2018 at Unknown time  Yes Yes   Sig: Take 1 tablet (81mg) by mouth once daily     atorvastatin (LIPITOR) 10 mg tablet 8/31/2018 at Unknown time  Yes Yes   Sig: Take 10 mg by mouth   cholecalciferol (VITAMIN D3) 1,000 units tablet 9/1/2018 at Unknown time  Yes Yes   Sig: Take 1,000 Units by mouth daily   methocarbamol (ROBAXIN) 500 mg tablet More than a month at Unknown time  No No   Sig: Take 1 tablet (500 mg total) by mouth 4 (four) times a day   triamterene-hydrochlorothiazide (MAXZIDE-25) 37 5-25 mg per tablet 9/1/2018 at Unknown time  No Yes   Sig: TAKE ONE-HALF TABLET BY MOUTH EVERY DAY AS NEEDED      Facility-Administered Medications: None       Past Medical History:   Diagnosis Date    Effusion of left knee     Last assessed - 9/10/15    Hyperlipidemia     Hypertension     Tick bite     Last assessed - 4/21/17       Past Surgical History:   Procedure Laterality Date    APPENDECTOMY      TONSILLECTOMY      Last assessed - 4/20/17    TUBAL LIGATION      Last assessed - 4/20/17    WISDOM TOOTH EXTRACTION      Last assessed - 4/20/17       Family History   Problem Relation Age of Onset    Coronary artery disease Mother     Arthritis Mother     Other Mother         Cardiac Disorder     Transient ischemic attack Mother     Heart attack Father    Nella Sibley Sudden death Father         SCD     I have reviewed and agree with the history as documented  Social History   Substance Use Topics    Smoking status: Never Smoker    Smokeless tobacco: Never Used    Alcohol use Yes      Comment: 1 wine nightly        Review of Systems   Gastrointestinal: Negative for nausea and vomiting  Musculoskeletal: Negative for neck pain  Skin: Positive for wound (scalp laceration and R forearm skin tear)  Neurological: Positive for headaches  All other systems reviewed and are negative  Physical Exam  Physical Exam   Constitutional: She is oriented to person, place, and time  She appears well-developed and well-nourished  She appears distressed (moderate)  HENT:   Head: Normocephalic  Moist mucous membranes  Eyes: EOM are normal  Pupils are equal, round, and reactive to light  No scleral icterus  Neck: Normal range of motion  Neck supple  No JVD present  Cardiovascular: Normal rate, regular rhythm and normal heart sounds  No murmur heard  Pulmonary/Chest: Effort normal and breath sounds normal  No stridor  No respiratory distress  Abdominal: Soft  Bowel sounds are normal  There is no tenderness  Musculoskeletal: She exhibits no edema, tenderness or deformity  Neurological: She is alert and oriented to person, place, and time  No focal deficits  Skin: Skin is warm and dry  No rash noted  R forearm superficial skin tear unable to be sutured  (+) 1 cm R parietal scalp laceration with active arterial bleeding and no FB noted  Psychiatric: She has a normal mood and affect  Nursing note and vitals reviewed  Vital Signs  ED Triage Vitals [09/01/18 2212]   Temperature Pulse Respirations Blood Pressure SpO2   (!) 97 4 °F (36 3 °C) 69 18 (!) 184/86 98 %      Temp Source Heart Rate Source Patient Position - Orthostatic VS BP Location FiO2 (%)   Oral Monitor Lying Right arm --      Pain Score       4           Vitals:    09/01/18 2212   BP: (!) 184/86   Pulse: 69   Patient Position - Orthostatic VS: Lying       Visual Acuity      ED Medications  Medications   tetanus-diphtheria-acellular pertussis (BOOSTRIX) IM injection 0 5 mL (0 5 mL Intramuscular Given 9/1/18 2309)   bacitracin topical ointment 1 small application (1 small application Topical Given 9/1/18 2310)   acetaminophen (TYLENOL) tablet 650 mg (650 mg Oral Given 9/1/18 2309)   lidocaine-epinephrine (XYLOCAINE/EPINEPHRINE) 1 %-1:100,000 injection 20 mL (20 mL Infiltration Given 9/1/18 2311)   bacitracin topical ointment 1 small application (1 small application Topical Given 9/1/18 2318)       Diagnostic Studies  Results Reviewed     None                 CT head without contrast   ED Interpretation by Татьяна Pederson MD (09/02 0048)   FINDINGS:      PARENCHYMA:  No intracranial mass, mass effect or midline shift  No CT signs of acute infarction   No acute parenchymal hemorrhage  VENTRICLES AND EXTRA-AXIAL SPACES:  Normal for the patient's age  VISUALIZED ORBITS AND PARANASAL SINUSES:  Unremarkable        CALVARIUM AND EXTRACRANIAL SOFT TISSUES:  Right parietal scalp injury      Impression:        Right parietal scalp injury         No acute intracranial abnormality  Workstation performed: [id number]         Final Result by Elia Chan MD (09/02 0030)      Right parietal scalp injury         No acute intracranial abnormality  Workstation performed: [id number]                    Procedures  Lac Repair  Date/Time: 9/1/2018 10:40 PM  Performed by: Shweta Raymundo  Authorized by: Shweta Raymundo   Consent: Verbal consent obtained  Consent given by: patient  Patient identity confirmed: verbally with patient  Body area: head/neck  Location details: scalp  Laceration length: 1 cm  Foreign bodies: no foreign bodies  Vascular damage: yes (arterial bleeding)  Anesthesia: local infiltration    Anesthesia:  Local Anesthetic: lidocaine 1% with epinephrine  Anesthetic total: 8 mL    Sedation:  Patient sedated: no      Procedure Details:  Preparation: Patient was prepped and draped in the usual sterile fashion  Irrigation solution: saline  Irrigation method: jet lavage  Amount of cleaning: standard  Debridement: none  Degree of undermining: none  Skin closure: 3-0 nylon  Number of sutures: 3  Technique: simple  Approximation: close  Approximation difficulty: simple  Dressing: antibiotic ointment  Patient tolerance: Patient tolerated the procedure well with no immediate complications             Phone Contacts  ED Phone Contact    ED Course  ED Course as of Sep 02 0117   Sun Sep 02, 2018   0036 Patient sleeping comfortably  0115 CT d/w patient and daughter  No active bleeding prior to discharge  MDM  Number of Diagnoses or Management Options  Diagnosis management comments: DDx including but not limited to: intracranial injury, scalp laceration, forearm skin avulsion; doubt cervical injury, intrathoracic injury, intraabdominal injury, extremity fracture or dislocation         Amount and/or Complexity of Data Reviewed  Tests in the radiology section of CPT®: ordered and reviewed  Decide to obtain previous medical records or to obtain history from someone other than the patient: yes  Review and summarize past medical records: yes      The patient presented with a condition in which there was a high probability of imminent or life-threatening deterioration, and critical care services (excluding separately billable procedures) totalled 30-74 minutes  Disposition  Final diagnoses:   Injury of head, initial encounter   Laceration of scalp, initial encounter   Skin tear of right forearm without complication, initial encounter     Time reflects when diagnosis was documented in both MDM as applicable and the Disposition within this note     Time User Action Codes Description Comment    9/2/2018  1:16 AM Suzette Radish Add [S09 90XA] Injury of head, initial encounter     9/2/2018  1:16 AM Suzette Radish Add [S01 01XA] Laceration of scalp, initial encounter     9/2/2018  1:16 AM Suzette Radish Add [O86 067E] Skin tear of right forearm without complication, initial encounter       ED Disposition     ED Disposition Condition Comment    Discharge  [place] discharge to home/self care  Condition at discharge: Stable        Follow-up Information     Follow up With Specialties Details Why [address] [facility] Call in 3 days for wound check  Sutures out in 7-10 days  Keep dry for 2 days  Return sooner if pain, fever, vomiting, bleeding, weakness, dizziness, redness, red streaks, pus, swelling  Ice, elevate  tylenol for pain  [address]  [phone]            Patient's Medications   Discharge Prescriptions    No medications on file     No discharge procedures on file      ED Provider  Electronically Signed by           Davion Alcala MD  09/02/18 1052

## 2018-09-04 ENCOUNTER — APPOINTMENT (OUTPATIENT)
Dept: PHYSICAL THERAPY | Facility: CLINIC | Age: 73
End: 2018-09-04
Payer: MEDICARE

## 2018-09-04 ENCOUNTER — OFFICE VISIT (OUTPATIENT)
Dept: FAMILY MEDICINE CLINIC | Facility: CLINIC | Age: 73
End: 2018-09-04
Payer: MEDICARE

## 2018-09-04 VITALS
TEMPERATURE: 99.1 F | WEIGHT: 105.4 LBS | HEIGHT: 65 IN | RESPIRATION RATE: 12 BRPM | SYSTOLIC BLOOD PRESSURE: 158 MMHG | BODY MASS INDEX: 17.56 KG/M2 | OXYGEN SATURATION: 97 % | DIASTOLIC BLOOD PRESSURE: 64 MMHG | HEART RATE: 70 BPM

## 2018-09-04 DIAGNOSIS — S01.01XA LACERATION OF OCCIPITAL SCALP, INITIAL ENCOUNTER: Primary | ICD-10-CM

## 2018-09-04 DIAGNOSIS — I10 BENIGN ESSENTIAL HTN: ICD-10-CM

## 2018-09-04 PROCEDURE — 99213 OFFICE O/P EST LOW 20 MIN: CPT | Performed by: NURSE PRACTITIONER

## 2018-09-04 NOTE — PROGRESS NOTES
Assessment/Plan:           Problem List Items Addressed This Visit        Cardiovascular and Mediastinum    Benign essential HTN     Stable  Cont current meds              Other    Laceration of occipital scalp - Primary     Return on 9/9 through 12 for suture removal  Call with any s/s of infection  Call with any s/s of worsening concussion symptoms  Rest                   Subjective:      Patient ID: Steve Brown is a 67 y o  female  Here for er f/u to fall and scalp laceration on 9/2  Was outside with her dog, dog saw something and darted for it, pulled her when she was not paying attention and she fell to the ground and hit her head on the pavement  Scalp was bleeding and could not get hemostasis, went to er, ct scan head was wnl, 3 sutures placed  She states no headache but is feeling cloudy with her thoughts and not herself  Her daughter is watching the dog until the can get invisible fence installed  She did get Td placed   No LOC         The following portions of the patient's history were reviewed and updated as appropriate: allergies, current medications, past family history, past medical history, past social history, past surgical history and problem list     Review of Systems   Constitutional: Positive for fatigue  Negative for fever  Eyes: Negative for photophobia and visual disturbance  Respiratory: Negative for cough, shortness of breath and wheezing  Cardiovascular: Negative for chest pain and palpitations  Gastrointestinal: Negative for constipation, diarrhea, nausea and vomiting  Endocrine: Negative for polydipsia, polyphagia and polyuria  Genitourinary: Negative for dysuria and frequency  Musculoskeletal: Negative for arthralgias, back pain, gait problem and myalgias  Skin: Positive for wound  Negative for rash          Laceration occipital region with dry blood in hair    Neurological: Negative for dizziness, tremors, seizures, syncope, facial asymmetry, speech difficulty, weakness, light-headedness, numbness and headaches  Hematological: Negative for adenopathy  Psychiatric/Behavioral: Positive for decreased concentration  Negative for agitation, behavioral problems, confusion, dysphoric mood, hallucinations and sleep disturbance  The patient is not nervous/anxious  Objective:      /64 (BP Location: Left arm, Patient Position: Sitting, Cuff Size: Standard)   Pulse 70   Temp 99 1 °F (37 3 °C)   Resp 12   Ht 5' 5" (1 651 m)   Wt 47 8 kg (105 lb 6 4 oz)   SpO2 97%   BMI 17 54 kg/m²   Family History   Problem Relation Age of Onset    Coronary artery disease Mother     Arthritis Mother     Other Mother         Cardiac Disorder     Transient ischemic attack Mother     Heart attack Father    Leeanna Crane Sudden death Father         SCD     Past Medical History:   Diagnosis Date    Effusion of left knee     Last assessed - 9/10/15    Hyperlipidemia     Hypertension     Tick bite     Last assessed - 4/21/17     Social History     Social History    Marital status:      Spouse name: N/A    Number of children: 3    Years of education: Post Graduate     Occupational History          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    Retired      RN     Social History Main Topics    Smoking status: Never Smoker    Smokeless tobacco: Never Used    Alcohol use Yes      Comment: 1 wine nightly    Drug use: No    Sexual activity: Not on file     Other Topics Concern    Not on file     Social History Narrative    Living alone           Current Outpatient Prescriptions:     aspirin 81 MG tablet, Take 1 tablet (81mg) by mouth once daily  , Disp: , Rfl:     atorvastatin (LIPITOR) 10 mg tablet, Take 10 mg by mouth, Disp: , Rfl:     Calcium Carb-Cholecalciferol (CALCIUM 600 + D) 600-200 MG-UNIT TABS, Calcium 600 + D TABS TAKE 1 TABLET DAILY    Refills: 0  Active, Disp: , Rfl:     cholecalciferol (VITAMIN D3) 1,000 units tablet, Take 1,000 Units by mouth daily, Disp: , Rfl:     Fish Oil-Cholecalciferol (FISH OIL + D3) 1141-7357 MG-UNIT CAPS, Fish Oil 1200 MG Oral Capsule Take 2 tablets daily  Refills: 0  Active, Disp: , Rfl:     Ibuprofen (ADVIL) 200 MG CAPS, Advil, Disp: , Rfl:     methocarbamol (ROBAXIN) 500 mg tablet, Take 1 tablet (500 mg total) by mouth 4 (four) times a day, Disp: 30 tablet, Rfl: 1    Naproxen Sodium (ALEVE) 220 MG CAPS, Aleve, Disp: , Rfl:     Omega-3 Fatty Acids (FISH OIL) 1,000 mg, Fish Oil, Disp: , Rfl:     triamterene-hydrochlorothiazide (MAXZIDE-25) 37 5-25 mg per tablet, TAKE ONE-HALF TABLET BY MOUTH EVERY DAY AS NEEDED, Disp: 45 tablet, Rfl: 3  Allergies   Allergen Reactions    Other           Physical Exam   Constitutional: She is oriented to person, place, and time  She appears well-developed and well-nourished  HENT:   Head: Normocephalic and atraumatic  Right Ear: External ear normal    Left Ear: External ear normal    Nose: Nose normal    Mouth/Throat: Oropharynx is clear and moist    Eyes: Conjunctivae and EOM are normal  Pupils are equal, round, and reactive to light  Neck: Normal range of motion  Neck supple  No thyromegaly present  Cardiovascular: Normal rate, regular rhythm and normal heart sounds  Pulmonary/Chest: Effort normal and breath sounds normal    Abdominal: Soft  Bowel sounds are normal    Musculoskeletal: Normal range of motion  Neurological: She is alert and oriented to person, place, and time  She has normal reflexes  Skin: Skin is warm and dry  Psychiatric: She has a normal mood and affect  Her behavior is normal  Judgment and thought content normal    Nursing note and vitals reviewed

## 2018-09-05 PROBLEM — S01.01XA LACERATION OF OCCIPITAL SCALP: Status: ACTIVE | Noted: 2018-09-05

## 2018-09-05 NOTE — ASSESSMENT & PLAN NOTE
Return on 9/9 through 12 for suture removal  Call with any s/s of infection  Call with any s/s of worsening concussion symptoms  Rest

## 2018-09-06 ENCOUNTER — EVALUATION (OUTPATIENT)
Dept: PHYSICAL THERAPY | Facility: CLINIC | Age: 73
End: 2018-09-06
Payer: MEDICARE

## 2018-09-06 DIAGNOSIS — M54.50 ACUTE LEFT-SIDED LOW BACK PAIN WITHOUT SCIATICA: Primary | ICD-10-CM

## 2018-09-06 PROCEDURE — 97110 THERAPEUTIC EXERCISES: CPT | Performed by: PHYSICAL THERAPIST

## 2018-09-06 PROCEDURE — 97112 NEUROMUSCULAR REEDUCATION: CPT | Performed by: PHYSICAL THERAPIST

## 2018-09-06 PROCEDURE — G8990 OTHER PT/OT CURRENT STATUS: HCPCS | Performed by: PHYSICAL THERAPIST

## 2018-09-06 PROCEDURE — G8991 OTHER PT/OT GOAL STATUS: HCPCS | Performed by: PHYSICAL THERAPIST

## 2018-09-06 NOTE — PROGRESS NOTES
PT Re-Evaluation     Today's date: 2018  Patient name: Jannette Arreguin  : 1945  MRN: [id number]  Referring provider: RACHEL Myles  Dx:   Encounter Diagnosis     ICD-10-CM    1  Acute left-sided low back pain without sciatica M54 5                   Assessment  Impairments: abnormal coordination, abnormal muscle firing, abnormal muscle tone, abnormal or restricted ROM, abnormal movement, impaired physical strength, lacks appropriate home exercise program, pain with function, scapular dyskinesis and weight-bearing intolerance    Assessment details: Jannette Arreguin has been compliant with attending PT and home exercise program since initial eval   William Jose  has made improvements in objective data since initial eval but continues to have limitations compared to prior level of function  William Jose continues to have deficits in the above listed impairments and would benefit from additional skilled PT to address these deficits to return to prior level of function  Patient educated on returning to lifting lighter weights and starting to return to yoga poses over the next week          Understanding of Dx/Px/POC: good   Prognosis: good    Goals  Impairment Goals 4-6 weeks  - Decrease pain to <3/10 -MET  - Improve lumbar AROM to >75% throughout -MET  - Increase hip strength to 4/5 throughout -Progressing  - Increase core strength to be able to sit straight up functionally - Progressing    Functional Goals 6-8 weeks  - Return to Prior Level of Function -Progressing  - Increase Functional Status Measure (FOTO) to: >64-MET  - Patient will be independent with HEP -MET  - Patient will be able to sit without increased pain/compensation/difficulty -MET  - Patient will be able to perform sit to stand without increased pain/compensation/difficulty -MET  - Patient will be able to bend forward without increased pain/compensation/difficulty  -MET  - Patient will be able to lift >15 pounds with proper mechanics and no pain -avoids  - Return to yoga -avoids      Plan  Patient would benefit from: skilled PT  Referral necessary: No  Planned modality interventions: cryotherapy, electrical stimulation/Russian stimulation, H-Wave, TENS, thermotherapy: hydrocollator packs and unattended electrical stimulation  Planned therapy interventions: abdominal trunk stabilization, activity modification, ADL retraining, balance/weight bearing training, breathing training, body mechanics training, coordination, flexibility, functional ROM exercises, graded exercise, home exercise program, work reintegration, therapeutic training, therapeutic exercise, therapeutic activities, stretching, strengthening, self care, postural training, patient education, neuromuscular re-education, muscle pump exercises, motor coordination training, Riggins taping, manual therapy, joint mobilization and IADL retraining  Frequency: 2x week  Duration in visits: 8  Duration in weeks: 4  Treatment plan discussed with: patient, PTA and referring physician        Subjective Evaluation    Pain  Current pain ratin  At best pain ratin  At worst pain ratin    Patient Goals  Patient goal: 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, 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  Patient reports 95% improvement since starting PT  She reports that she fell Saturday because her dog pulled her off while trying to get into her car and had to go to the ER and had to get stitches in the back of her head  HOBBIES/EXERCISE:  She is not yet back to yoga yet because of head, but she has been able to return to gardening  PAIN LOCATION/DESCRIPTORS:  She reports she does not have pain on Left side of low back right now  Reports that she has not had pain for a week  AGGRAVATING FACTORS:  Currently has no pain  She no longer has pain with Quick movements, getting in and out of car, lying down, getting up out of bed, up and down from chair, standing for too long  Crossing legs     Bending forward she no longer has pain with  Objective     Postural Observations    Additional Postural Observation Details  Slight lateral shift to the right, however, she things that this might be back to her normal       Neurological Testing     Additional Neurological Details  Neuro Screen of the [place]:     Dermatomes: equal and symmetrical bilaterally throughout  Myotomes: equal and symmetrical bilaterally throughout  DTR: 2+ bilateral and symmetrical throughout         Active Range of Motion     Additional Active Range of Motion Details  LS AROM:   Flexion: 75%  Extension: 75% only stiffness  SideBending right: 100%  SideBending left: 100% small pinch  Rotation right: 100%  Rotation left: 100%  Sideglide right: 75%  Sideglide left: 100%      Strength/Myotome Testing     Left Hip   Planes of Motion   Flexion: 4  Extension: 4-  Abduction: 4-    Right Hip   Planes of Motion   Flexion: 4  Extension: 4-  Abduction: 4-    Left Knee   Extension: 5    Right Knee   Extension: 5    Left Ankle/Foot   Dorsiflexion: 5  Plantar flexion: 3+  Great toe extension: 4+    Right Ankle/Foot   Dorsiflexion: 5  Plantar flexion: 3+  Great toe extension: 4+    General Comments     Lumbar Comments  Recovery of function supine: no distal symptoms noted        Diagnosis: LS derangement   Precautions: lateral shift, chronic neck pain   Manual Therapy 8/23/18 8/27/18 8/30/18 9/6/18    [facility] LS RO, PTA  RO, PTA  Hold manuals today  Hold manuals today    Lateral shift corrections RO, PTA  RO, PTA                               Exercise Diary         Lateral shift corrections with right arm on wall 2x 10 with extension  2x 10 with extension  2x10 with extension 2x10 with extension    Prone lying over pillows KIRIT: 3 mins  KIRIT 3 mins  KIRIT 3 mins  KIRIT 3 mins    Supine hooklying Kegels In standing: ladders: 10x In standing ladders: 10x  In standing ladders: 10x  In standing ladders: 10x     Supine hooklying TrA In standing: ladders: 10x  In standing ladders: 10x  In standing ladders: 10x  In standing ladders: 10x     Prone Glut sets  In standing ladders: 10x  In standing ladders: 10x  In standing ladders: 10x  In standing ladders: 10N     Heel clicks   17U 5 sec  37P 5 sec     Hamstring stretch at EOT    [id number]     Wobbleboard  2 mins ea 2 mins ea 2 mins ea     Standing extensions 10x   20x     X-walks   [place], 2 laps  YTB 2 laps     Clams  20x  20x  20x ea     SB bridges   20x      Recovery of function   NV completed    Standing hip flexor stretch bilaterally    3x30 sec                                    Patient education  Driving positioning at car, lumbar roll  Exercises, anatomy  Recovery of function  Holding manuals, lumbar roll use  RE completed                Modalities        IFC and CP PRN  Defer

## 2018-09-10 ENCOUNTER — OFFICE VISIT (OUTPATIENT)
Dept: FAMILY MEDICINE CLINIC | Facility: CLINIC | Age: 73
End: 2018-09-10
Payer: MEDICARE

## 2018-09-10 ENCOUNTER — OFFICE VISIT (OUTPATIENT)
Dept: PHYSICAL THERAPY | Facility: CLINIC | Age: 73
End: 2018-09-10
Payer: MEDICARE

## 2018-09-10 VITALS
OXYGEN SATURATION: 97 % | DIASTOLIC BLOOD PRESSURE: 70 MMHG | HEART RATE: 72 BPM | HEIGHT: 65 IN | TEMPERATURE: 98.6 F | RESPIRATION RATE: 12 BRPM | WEIGHT: 108.2 LBS | BODY MASS INDEX: 18.03 KG/M2 | SYSTOLIC BLOOD PRESSURE: 140 MMHG

## 2018-09-10 DIAGNOSIS — S01.01XD LACERATION OF OCCIPITAL SCALP, SUBSEQUENT ENCOUNTER: Primary | ICD-10-CM

## 2018-09-10 DIAGNOSIS — M54.50 ACUTE LEFT-SIDED LOW BACK PAIN WITHOUT SCIATICA: Primary | ICD-10-CM

## 2018-09-10 PROCEDURE — 97110 THERAPEUTIC EXERCISES: CPT

## 2018-09-10 PROCEDURE — 97112 NEUROMUSCULAR REEDUCATION: CPT

## 2018-09-10 PROCEDURE — 99213 OFFICE O/P EST LOW 20 MIN: CPT | Performed by: NURSE PRACTITIONER

## 2018-09-10 NOTE — PROGRESS NOTES
Daily Note     Today's date: 9/10/2018  Patient name: Mark Rueda  : 1945  MRN: [id number]  Referring provider: RACHEL Vo  Dx:   Encounter Diagnosis     ICD-10-CM    1  Acute left-sided low back pain without sciatica M54 5                   Subjective: Patient reports that she "is not really having any pain in my back"  She reports that she does not have pain when she is "up moving around"  She reports that she was able to try some yoga this morning  She reports that it felt good, despite her being reserved with certain poses         Objective: See treatment diary below      Diagnosis: LS derangement   Precautions: lateral shift, chronic neck pain   Manual Therapy 8/23/18  8/27/18  8/30/18 9/6/18 9/10/18    STM LS RO, PTA  RO, PTA  Hold manuals today  Hold manuals today Hold manuals today   Lateral shift corrections RO, PTA  RO, PTA                               Exercise Diary         Lateral shift corrections with right arm on wall 2x 10 with extension  2x 10 with extension  2x10 with extension 2x10 with extension 2x10 with extension   Prone lying over pillows KIRIT: 3 mins  KIRIT 3 mins  IKRIT 3 mins  KIRIT 3 mins KIRIT 3 mins    Supine hooklying Kegels In standing: ladders: 10x In standing ladders: 10x  In standing ladders: 10x  In standing ladders: 10x  In standing ladders: 10x    Supine hooklying TrA In standing: ladders: 10x  In standing ladders: 10x  In standing ladders: 10x  In standing ladders: 10x  In standing ladders: 10x    Prone Glut sets  In standing ladders: 10x  In standing ladders: 10x  In standing ladders: 10x  In standing ladders: 10x  In standing ladders: 11N    Heel clicks   97N 5 sec  74H 5 sec  15x5 sec   Hamstring stretch at EOT    [id number]  3x30 sec    Wobbleboard  2 mins ea 2 mins ea 2 mins ea     Standing extensions 10x   20x  20x    X-walks   [place], 2 laps  YTB 2 laps  YTB, 2 laps   Clams  20x  20x  20x ea  20x ea   SB bridges   20x   20x    Recovery of function   NV completed Standing hip flexor stretch bilaterally    3x30 sec Hold                                    Patient education  Driving positioning at car, lumbar roll  Exercises, anatomy  Recovery of function  Holding manuals, lumbar roll use  RE completed  Modalities        IFC and CP PRN  Defer                             Assessment: Tolerated treatment well  Patient exhibited good technique with therapeutic exercises  Patient was able to complete all given exercises today without increased pain  Held manuals secondary to gauging for discharge  Patient is progressing well towards goals  Plan: Continue per plan of care

## 2018-09-10 NOTE — PROGRESS NOTES
Assessment/Plan:           Problem List Items Addressed This Visit        Other    Laceration of occipital scalp - Primary        sutures removed  Pt tolerated well  F/u prn    Subjective:      Patient ID: Natalie Landin is a 67 y o  female  Here for f/u for suture removal of scalp  3 sutures need to be removed   Feels well           The following portions of the patient's history were reviewed and updated as appropriate: allergies, current medications, past family history, past medical history, past social history, past surgical history and problem list     Review of Systems   Constitutional: Negative for fatigue and fever  HENT: Negative for congestion, postnasal drip and rhinorrhea  Eyes: Negative for photophobia and visual disturbance  Respiratory: Negative for cough, shortness of breath and wheezing  Cardiovascular: Negative for chest pain and palpitations  Gastrointestinal: Negative for diarrhea, nausea and vomiting  Skin: Positive for wound  Negative for rash  Laceration scalp   Neurological: Negative for dizziness, numbness and headaches  Hematological: Negative for adenopathy  Objective:      /70 (BP Location: Right arm, Patient Position: Sitting, Cuff Size: Standard)   Pulse 72   Temp 98 6 °F (37 °C)   Resp 12   Ht 5' 5" (1 651 m)   Wt 49 1 kg (108 lb 3 2 oz)   SpO2 97%   BMI 18 01 kg/m²     Family History   Problem Relation Age of Onset    Coronary artery disease Mother     Arthritis Mother     Other Mother         Cardiac Disorder     Transient ischemic attack Mother     Heart attack Father    Melissa Barnes Sudden death Father         SCD     Past Medical History:   Diagnosis Date    Effusion of left knee     Last assessed - 9/10/15    Hyperlipidemia     Hypertension     Tick bite     Last assessed - 4/21/17     Social History     Social History    Marital status:       Spouse name: N/A    Number of children: 3    Years of education: Post Graduate Occupational History          P/T    Retired      RN     Social History Main Topics    Smoking status: Never Smoker    Smokeless tobacco: Never Used    Alcohol use Yes      Comment: 1 wine nightly    Drug use: No    Sexual activity: Not on file     Other Topics Concern    Not on file     Social History Narrative    Living alone           Current Outpatient Prescriptions:     aspirin 81 MG tablet, Take 1 tablet (81mg) by mouth once daily  , Disp: , Rfl:     atorvastatin (LIPITOR) 10 mg tablet, Take 10 mg by mouth, Disp: , Rfl:     Calcium Carb-Cholecalciferol (CALCIUM 600 + D) 600-200 MG-UNIT TABS, Calcium 600 + D TABS TAKE 1 TABLET DAILY  Refills: 0  Active, Disp: , Rfl:     cholecalciferol (VITAMIN D3) 1,000 units tablet, Take 1,000 Units by mouth daily, Disp: , Rfl:     Fish Oil-Cholecalciferol (FISH OIL + D3) 6176-6023 MG-UNIT CAPS, Fish Oil 1200 MG Oral Capsule Take 2 tablets daily  Refills: 0  Active, Disp: , Rfl:     Ibuprofen (ADVIL) 200 MG CAPS, Advil, Disp: , Rfl:     methocarbamol (ROBAXIN) 500 mg tablet, Take 1 tablet (500 mg total) by mouth 4 (four) times a day, Disp: 30 tablet, Rfl: 1    Naproxen Sodium (ALEVE) 220 MG CAPS, Aleve, Disp: , Rfl:     Omega-3 Fatty Acids (FISH OIL) 1,000 mg, Fish Oil, Disp: , Rfl:     triamterene-hydrochlorothiazide (MAXZIDE-25) 37 5-25 mg per tablet, TAKE ONE-HALF TABLET BY MOUTH EVERY DAY AS NEEDED, Disp: 45 tablet, Rfl: 3  Allergies   Allergen Reactions    Other         Physical Exam   Constitutional: She is oriented to person, place, and time  She appears well-developed and well-nourished  Eyes: Conjunctivae are normal    Neck: Normal range of motion  Neck supple  No thyromegaly present  Cardiovascular: Normal rate, regular rhythm and normal heart sounds  Pulmonary/Chest: Effort normal and breath sounds normal    Musculoskeletal: Normal range of motion  Neurological: She is alert and oriented to person, place, and time  Skin: Skin is warm and dry  Laceration of scalp well approximated  3 sutures removed intact  Pt tolerated well  No s/s of infection  No drainage   Psychiatric: She has a normal mood and affect  Her behavior is normal  Judgment and thought content normal    Nursing note and vitals reviewed

## 2018-09-12 ENCOUNTER — APPOINTMENT (OUTPATIENT)
Dept: PHYSICAL THERAPY | Facility: CLINIC | Age: 73
End: 2018-09-12
Payer: MEDICARE

## 2018-09-13 ENCOUNTER — EVALUATION (OUTPATIENT)
Dept: PHYSICAL THERAPY | Facility: CLINIC | Age: 73
End: 2018-09-13
Payer: MEDICARE

## 2018-09-13 DIAGNOSIS — M54.50 ACUTE LEFT-SIDED LOW BACK PAIN WITHOUT SCIATICA: Primary | ICD-10-CM

## 2018-09-13 PROCEDURE — 97112 NEUROMUSCULAR REEDUCATION: CPT | Performed by: PHYSICAL THERAPIST

## 2018-09-13 PROCEDURE — G8991 OTHER PT/OT GOAL STATUS: HCPCS | Performed by: PHYSICAL THERAPIST

## 2018-09-13 PROCEDURE — G8992 OTHER PT/OT  D/C STATUS: HCPCS | Performed by: PHYSICAL THERAPIST

## 2018-09-13 NOTE — PROGRESS NOTES
PT Discharge    Today's date: 2018  Patient name: Cee Rubi  : 1945  MRN: [id number]  Referring provider: RACHEL Springer  Dx:   Encounter Diagnosis     ICD-10-CM    1  Acute left-sided low back pain without sciatica M54 5                   Assessment    Assessment details: Cee Rubi has been compliant with attending PT and home exercise program since initial eval   Wagnerfreddy Garzon  has made improvements in objective data since initial evalulation and has achieved all goals  Patient reports having returned to their prior level of function  Patient provided with updated Home Exercise Program, all questions answered, and verbalized understanding, agreeing to plan of care   Thus it was mutually decided to discontinue this episode of care and transition to Home Exercise Program         Understanding of Dx/Px/POC: good   Prognosis: good    Goals  Impairment Goals 4-6 weeks  - Decrease pain to <3/10 -MET  - Improve lumbar AROM to >75% throughout -MET  - Increase hip strength to 4/5 throughout -MET  - Increase core strength to be able to sit straight up functionally - MET    Functional Goals 6-8 weeks  - Return to Prior Level of Function -MET  - Increase Functional Status Measure (FOTO) to: >64-MET  - Patient will be independent with HEP -MET  - Patient will be able to sit without increased pain/compensation/difficulty -MET  - Patient will be able to perform sit to stand without increased pain/compensation/difficulty -MET  - Patient will be able to bend forward without increased pain/compensation/difficulty  -MET  - Patient will be able to lift >15 pounds with proper mechanics and no pain -MET  - Return to yoga -MET      Plan  Referral necessary: No  Planned therapy interventions: home exercise program and neuromuscular re-education  Treatment plan discussed with: patient        Subjective Evaluation    Pain  Current pain ratin  At best pain ratin  At worst pain ratin        Patient reports 95% improvement since starting PT  She reports that she fell Saturday because her dog pulled her off while trying to get into her car and had to go to the ER and had to get stitches in the back of her head  HOBBIES/EXERCISE:  She is back to yoga, and gardening  PAIN LOCATION/DESCRIPTORS:  She reports she does not have pain on Left side of low back right now  Reports that she has not had pain for a week  AGGRAVATING FACTORS:  Currently has no pain  She no longer has pain with Quick movements, getting in and out of car, lying down, getting up out of bed, up and down from chair, standing for too long  Crossing legs  Bending forward she no longer has pain with  Objective     Postural Observations    Additional Postural Observation Details  Slight lateral shift to the right, however, she things that this might be back to her normal       Neurological Testing     Additional Neurological Details  Neuro Screen of the Lower Quarter:     Dermatomes: equal and symmetrical bilaterally throughout  Myotomes: equal and symmetrical bilaterally throughout  DTR: 2+ bilateral and symmetrical throughout         Active Range of Motion     Additional Active Range of Motion Details  LS AROM:   Flexion: 75%  Extension: 75%   SideBending right: 100%  SideBending left: 100%   Rotation right: 100%  Rotation left: 100%  Sideglide right: 75%  Sideglide left: 100%      Strength/Myotome Testing     Left Hip   Planes of Motion   Flexion: 4  Extension: 4  Abduction: 4    Right Hip   Planes of Motion   Flexion: 4  Extension: 4  Abduction: 4    Left Knee   Extension: 5    Right Knee   Extension: 5    Left Ankle/Foot   Dorsiflexion: 5  Plantar flexion: 3+  Great toe extension: 4+    Right Ankle/Foot   Dorsiflexion: 5  Plantar flexion: 3+  Great toe extension: 4+    General Comments     Lumbar Comments  Recovery of function supine: no distal symptoms noted        Diagnosis: LS derangement   Precautions: lateral shift, chronic neck pain   Manual Therapy 9/13/18  8/30/18 9/6/18 9/10/18    STM LS hold  Hold manuals today  Hold manuals today Hold manuals today   Lateral shift corrections                                Exercise Diary  Patient's D/C day, all exercises held       Lateral shift corrections with right arm on wall   2x10 with extension 2x10 with extension 2x10 with extension   Prone lying over pillows   KIRIT 3 mins  KIRIT 3 mins KIRIT 3 mins    Supine hooklying Kegels   In standing ladders: 10x  In standing ladders: 10x  In standing ladders: 10x    Supine hooklying TrA   In standing ladders: 10x  In standing ladders: 10x  In standing ladders: 10x    Prone Glut sets    In standing ladders: 10x  In standing ladders: 10x  In standing ladders: 92H    Heel clicks    97Q 5 sec  63S3 sec   Hamstring stretch at EOT    [id number]  3x30 sec    Wobbleboard    2 mins ea     Standing extensions   20x  20x    X-walks    YTB 2 laps  YTB, 2 laps   Clams    20x ea  20x ea   SB bridges   20x   20x    Recovery of function   NV completed    Standing hip flexor stretch bilaterally    3x30 sec Hold                                    Patient education  HEP discussed and all questions reviewed  Recovery of function  Holding manuals, lumbar roll use  RE completed                Modalities        IFC and CP PRN

## 2018-09-29 DIAGNOSIS — E78.2 MIXED HYPERLIPIDEMIA: Primary | ICD-10-CM

## 2018-09-29 RX ORDER — ATORVASTATIN CALCIUM 10 MG/1
TABLET, FILM COATED ORAL
Qty: 90 TABLET | Refills: 3 | Status: SHIPPED | OUTPATIENT
Start: 2018-09-29 | End: 2019-11-22 | Stop reason: SDUPTHER

## 2018-11-19 ENCOUNTER — HOSPITAL ENCOUNTER (OUTPATIENT)
Dept: MAMMOGRAPHY | Facility: HOSPITAL | Age: 73
Discharge: HOME | End: 2018-11-19
Attending: FAMILY MEDICINE
Payer: MEDICARE

## 2018-11-19 ENCOUNTER — APPOINTMENT (OUTPATIENT)
Dept: LAB | Facility: CLINIC | Age: 73
End: 2018-11-19
Payer: MEDICARE

## 2018-11-19 VITALS — HEIGHT: 65 IN | WEIGHT: 108 LBS | BODY MASS INDEX: 17.99 KG/M2

## 2018-11-19 DIAGNOSIS — E78.5 HYPERLIPIDEMIA, UNSPECIFIED HYPERLIPIDEMIA TYPE: Chronic | ICD-10-CM

## 2018-11-19 DIAGNOSIS — Z13.1 SCREENING FOR DIABETES MELLITUS: ICD-10-CM

## 2018-11-19 DIAGNOSIS — Z12.39 SCREENING FOR BREAST CANCER: ICD-10-CM

## 2018-11-19 DIAGNOSIS — I10 BENIGN ESSENTIAL HTN: Chronic | ICD-10-CM

## 2018-11-19 LAB
ALBUMIN SERPL BCP-MCNC: 3.9 G/DL (ref 3.5–5)
ALP SERPL-CCNC: 63 U/L (ref 46–116)
ALT SERPL W P-5'-P-CCNC: 32 U/L (ref 12–78)
ANION GAP SERPL CALCULATED.3IONS-SCNC: 6 MMOL/L (ref 4–13)
AST SERPL W P-5'-P-CCNC: 31 U/L (ref 5–45)
BASOPHILS # BLD AUTO: 0.08 THOUSANDS/ΜL (ref 0–0.1)
BASOPHILS NFR BLD AUTO: 2 % (ref 0–1)
BILIRUB SERPL-MCNC: 0.4 MG/DL (ref 0.2–1)
BUN SERPL-MCNC: 19 MG/DL (ref 5–25)
CALCIUM SERPL-MCNC: 9.2 MG/DL (ref 8.3–10.1)
CHLORIDE SERPL-SCNC: 103 MMOL/L (ref 100–108)
CHOLEST SERPL-MCNC: 198 MG/DL (ref 50–200)
CO2 SERPL-SCNC: 31 MMOL/L (ref 21–32)
CREAT SERPL-MCNC: 0.63 MG/DL (ref 0.6–1.3)
EOSINOPHIL # BLD AUTO: 0.07 THOUSAND/ΜL (ref 0–0.61)
EOSINOPHIL NFR BLD AUTO: 1 % (ref 0–6)
ERYTHROCYTE [DISTWIDTH] IN BLOOD BY AUTOMATED COUNT: 12.7 % (ref 11.6–15.1)
GFR SERPL CREATININE-BSD FRML MDRD: 89 ML/MIN/1.73SQ M
GLUCOSE P FAST SERPL-MCNC: 103 MG/DL (ref 65–99)
HCT VFR BLD AUTO: 41.2 % (ref 34.8–46.1)
HDLC SERPL-MCNC: 89 MG/DL (ref 40–60)
HGB BLD-MCNC: 13.8 G/DL (ref 11.5–15.4)
IMM GRANULOCYTES # BLD AUTO: 0.02 THOUSAND/UL (ref 0–0.2)
IMM GRANULOCYTES NFR BLD AUTO: 0 % (ref 0–2)
LDLC SERPL CALC-MCNC: 98 MG/DL (ref 0–100)
LYMPHOCYTES # BLD AUTO: 1.18 THOUSANDS/ΜL (ref 0.6–4.47)
LYMPHOCYTES NFR BLD AUTO: 24 % (ref 14–44)
MCH RBC QN AUTO: 31.4 PG (ref 26.8–34.3)
MCHC RBC AUTO-ENTMCNC: 33.5 G/DL (ref 31.4–37.4)
MCV RBC AUTO: 94 FL (ref 82–98)
MONOCYTES # BLD AUTO: 0.4 THOUSAND/ΜL (ref 0.17–1.22)
MONOCYTES NFR BLD AUTO: 8 % (ref 4–12)
NEUTROPHILS # BLD AUTO: 3.23 THOUSANDS/ΜL (ref 1.85–7.62)
NEUTS SEG NFR BLD AUTO: 65 % (ref 43–75)
NRBC BLD AUTO-RTO: 0 /100 WBCS
PLATELET # BLD AUTO: 230 THOUSANDS/UL (ref 149–390)
PMV BLD AUTO: 10.4 FL (ref 8.9–12.7)
POTASSIUM SERPL-SCNC: 3.7 MMOL/L (ref 3.5–5.3)
PROT SERPL-MCNC: 7 G/DL (ref 6.4–8.2)
RBC # BLD AUTO: 4.4 MILLION/UL (ref 3.81–5.12)
SODIUM SERPL-SCNC: 140 MMOL/L (ref 136–145)
TRIGL SERPL-MCNC: 56 MG/DL
WBC # BLD AUTO: 4.98 THOUSAND/UL (ref 4.31–10.16)

## 2018-11-19 PROCEDURE — 77063 BREAST TOMOSYNTHESIS BI: CPT

## 2018-11-19 PROCEDURE — 80053 COMPREHEN METABOLIC PANEL: CPT

## 2018-11-19 PROCEDURE — 36415 COLL VENOUS BLD VENIPUNCTURE: CPT

## 2018-11-19 PROCEDURE — 80061 LIPID PANEL: CPT

## 2018-11-19 PROCEDURE — 77067 SCR MAMMO BI INCL CAD: CPT

## 2018-11-19 PROCEDURE — 85025 COMPLETE CBC W/AUTO DIFF WBC: CPT

## 2018-11-20 ENCOUNTER — OFFICE VISIT (OUTPATIENT)
Dept: FAMILY MEDICINE CLINIC | Facility: CLINIC | Age: 73
End: 2018-11-20
Payer: MEDICARE

## 2018-11-20 VITALS
SYSTOLIC BLOOD PRESSURE: 124 MMHG | HEART RATE: 80 BPM | BODY MASS INDEX: 18.13 KG/M2 | WEIGHT: 108.8 LBS | OXYGEN SATURATION: 96 % | DIASTOLIC BLOOD PRESSURE: 80 MMHG | RESPIRATION RATE: 16 BRPM | HEIGHT: 65 IN | TEMPERATURE: 97 F

## 2018-11-20 DIAGNOSIS — Z23 ENCOUNTER FOR IMMUNIZATION: ICD-10-CM

## 2018-11-20 DIAGNOSIS — M85.88 OSTEOPENIA OF SPINE: Chronic | ICD-10-CM

## 2018-11-20 DIAGNOSIS — M19.90 ARTHRITIS: Chronic | ICD-10-CM

## 2018-11-20 DIAGNOSIS — Z13.1 SCREENING FOR DIABETES MELLITUS (DM): ICD-10-CM

## 2018-11-20 DIAGNOSIS — Z91.81 RISK FOR FALLS: ICD-10-CM

## 2018-11-20 DIAGNOSIS — I10 BENIGN ESSENTIAL HTN: Chronic | ICD-10-CM

## 2018-11-20 DIAGNOSIS — M41.25 OTHER IDIOPATHIC SCOLIOSIS, THORACOLUMBAR REGION: Chronic | ICD-10-CM

## 2018-11-20 DIAGNOSIS — Z00.00 MEDICARE ANNUAL WELLNESS VISIT, SUBSEQUENT: Primary | ICD-10-CM

## 2018-11-20 DIAGNOSIS — E78.5 HYPERLIPIDEMIA, UNSPECIFIED HYPERLIPIDEMIA TYPE: Chronic | ICD-10-CM

## 2018-11-20 PROCEDURE — G0439 PPPS, SUBSEQ VISIT: HCPCS | Performed by: FAMILY MEDICINE

## 2018-11-20 PROCEDURE — 99214 OFFICE O/P EST MOD 30 MIN: CPT | Performed by: FAMILY MEDICINE

## 2018-11-20 NOTE — PROGRESS NOTES
Assessment and Plan:    Problem List Items Addressed This Visit     None      Visit Diagnoses     Medicare annual wellness visit, subsequent        Relevant Orders    Flu Vaccine High Dose Split Preservative Free IM    Risk for falls        Screening for diabetes mellitus (DM)        Encounter for immunization        Relevant Orders    Flu Vaccine High Dose Split Preservative Free IM        Health Maintenance Due   Topic Date Due    Hepatitis C Screening  1945    CRC Screening: Colonoscopy  1945    Urinary Incontinence Screening  10/02/2010    Pneumococcal PPSV23/PCV13 65+ Years / Low and Medium Risk (2 of 2 - PCV13) 05/02/2012    INFLUENZA VACCINE  07/01/2018         HPI:  Ezequiel Hernandez is a 68 y o  female here for her Subsequent Wellness Visit      Patient Active Problem List   Diagnosis    Arthritis    Benign essential HTN    Cervical myofascial pain syndrome    Cervical radiculopathy    Cervicogenic headache    Cervico-occipital neuralgia    Depression    Hyperlipidemia    Hypertension    Osteopenia of spine    Spasmodic torticollis    Laceration of occipital scalp     Past Medical History:   Diagnosis Date    Effusion of left knee     Last assessed - 9/10/15    Hyperlipidemia     Hypertension     Tick bite     Last assessed - 4/21/17     Past Surgical History:   Procedure Laterality Date    APPENDECTOMY      TONSILLECTOMY      Last assessed - 4/20/17    TUBAL LIGATION      Last assessed - 4/20/17    WISDOM TOOTH EXTRACTION      Last assessed - 4/20/17     Family History   Problem Relation Age of Onset    Coronary artery disease Mother     Arthritis Mother     Other Mother         Cardiac Disorder     Transient ischemic attack Mother     Heart attack Father    Carlos Jarrett Sudden death Father         SCD     History   Smoking Status    Never Smoker   Smokeless Tobacco    Never Used     History   Alcohol Use    Yes     Comment: 1 wine nightly      History   Drug Use No Current Outpatient Prescriptions   Medication Sig Dispense Refill    aspirin 81 MG tablet Take 1 tablet (81mg) by mouth once daily   atorvastatin (LIPITOR) 10 mg tablet TAKE ONE TABLET BY MOUTH EVERY OTHER DAY 90 tablet 3    Calcium Carb-Cholecalciferol (CALCIUM 600 + D) 600-200 MG-UNIT TABS Calcium 600 + D TABS  TAKE 1 TABLET DAILY  Refills: 0    Active      cholecalciferol (VITAMIN D3) 1,000 units tablet Take 1,000 Units by mouth daily      Fish Oil-Cholecalciferol (FISH OIL + D3) 1560-2104 MG-UNIT CAPS Fish Oil 1200 MG Oral Capsule  Take 2 tablets daily   Refills: 0    Active      Ibuprofen (ADVIL) 200 MG CAPS Advil      methocarbamol (ROBAXIN) 500 mg tablet Take 1 tablet (500 mg total) by mouth 4 (four) times a day 30 tablet 1    Naproxen Sodium (ALEVE) 220 MG CAPS Aleve      Omega-3 Fatty Acids (FISH OIL) 1,000 mg Fish Oil      triamterene-hydrochlorothiazide (MAXZIDE-25) 37 5-25 mg per tablet TAKE ONE-HALF TABLET BY MOUTH EVERY DAY AS NEEDED 45 tablet 3     No current facility-administered medications for this visit        Allergies   Allergen Reactions    Other      Immunization History   Administered Date(s) Administered    Hep A, adult 11/19/2015    Influenza 10/01/2012, 10/10/2013, 10/10/2013, 10/01/2014, 10/01/2014, 10/01/2015, 10/16/2015, 10/01/2017, 10/20/2017    Influenza Quadrivalent Preservative Free 3 years and older IM 10/01/2016, 10/20/2017    Pneumococcal Polysaccharide PPV23 05/02/2011    Tdap 11/19/2015, 09/01/2018    Typhoid Live, oral 11/19/2015    Typhoid, Unspecified 11/19/2015    Zoster 07/11/2018       Patient Care Team:  Anais Toure DO as PCP - General (Family Medicine)  FRANDY Browne DO Max Bruin, MD Celestino Singleton, [address] [place] [address], DO Medicare Screening Tests and Risk Assessments:  Medicare Annual Wellness Visit

## 2018-11-20 NOTE — PROGRESS NOTES
Assessment/Plan:    No problem-specific Assessment & Plan notes found for this encounter  Diagnoses and all orders for this visit:    Medicare annual wellness visit, subsequent  Comments:  Healthy on exam   Pt is to continue her healthy diet, and regular weight-bearing exercise (pt does yoga regularly)  HD Flu Vaccine given IM, and tolerated  Orders:  -     Flu Vaccine High Dose Split Preservative Free IM    Risk for falls    Screening for diabetes mellitus (DM)    Encounter for immunization  -     Flu Vaccine High Dose Split Preservative Free IM    Other idiopathic scoliosis, thoracolumbar region  Comments:  Checking Scoliosis Survey - possibly related to her hx of osteopenia, age, etc     Orders:  -     XR entire spine (scoliosis) 4-5 vw; Future    Benign essential HTN  Comments:  Controlled on present management  Hyperlipidemia, unspecified hyperlipidemia type  Comments:  Controlled on present management  Osteopenia of spine  Comments:  Pt to continue OTC Ca supplement of max 1200mg daily / Vit D supplementation, and weight bearing exercise  Arthritis  Comments:  Discussed - pt will be trying OTC Tumeric  Discussed with pt that Tumeric has a BUCKNER-2 Inhibitor like property  Subjective:      Patient ID: Nehal Cisneros is a 68 y o  female  Annual Wellness today  Discussed with GI - not likely due for colonoscopy for another 2 years  Labs reviewed  No recent falls - other than, was pulled down by her dog once during a walk  Will receive Flu Vaccine today; had 1st dose of Shingrix -> will be going soon for the second dose to her pharmacy  Has a Living Will / Court Cuff  PAP Smear no longer recommended  Mammogram recently completed and normal   No current issues with depression / anxiety  Believes that she is developing scoliosis - has issues with back pain intermittently; her neck is doing well right now            The following portions of the patient's history were reviewed and updated as appropriate: allergies, current medications, past family history, past social history, past surgical history and problem list     Past Medical History:   Diagnosis Date    Effusion of left knee     Last assessed - 9/10/15    Hyperlipidemia     Hypertension     Tick bite     Last assessed - 4/21/17       Current Outpatient Prescriptions:     aspirin 81 MG tablet, Take 1 tablet (81mg) by mouth once daily  , Disp: , Rfl:     atorvastatin (LIPITOR) 10 mg tablet, TAKE ONE TABLET BY MOUTH EVERY OTHER DAY, Disp: 90 tablet, Rfl: 3    Calcium Carb-Cholecalciferol (CALCIUM 600 + D) 600-200 MG-UNIT TABS, Calcium 600 + D TABS TAKE 1 TABLET DAILY  Refills: 0  Active, Disp: , Rfl:     cholecalciferol (VITAMIN D3) 1,000 units tablet, Take 1,000 Units by mouth daily, Disp: , Rfl:     Fish Oil-Cholecalciferol (FISH OIL + D3) 9631-8788 MG-UNIT CAPS, Fish Oil 1200 MG Oral Capsule Take 2 tablets daily  Refills: 0  Active, Disp: , Rfl:     Ibuprofen (ADVIL) 200 MG CAPS, Advil, Disp: , Rfl:     methocarbamol (ROBAXIN) 500 mg tablet, Take 1 tablet (500 mg total) by mouth 4 (four) times a day, Disp: 30 tablet, Rfl: 1    Naproxen Sodium (ALEVE) 220 MG CAPS, Aleve, Disp: , Rfl:     Omega-3 Fatty Acids (FISH OIL) 1,000 mg, Fish Oil, Disp: , Rfl:     triamterene-hydrochlorothiazide (MAXZIDE-25) 37 5-25 mg per tablet, TAKE ONE-HALF TABLET BY MOUTH EVERY DAY AS NEEDED, Disp: 45 tablet, Rfl: 3    Allergies   Allergen Reactions    Other      Social History     Social History    Marital status:       Spouse name: N/A    Number of children: 3    Years of education: Post Graduate     Occupational History          P/T    Retired      RN     Social History Main Topics    Smoking status: Never Smoker    Smokeless tobacco: Never Used    Alcohol use Yes      Comment: 1 wine nightly    Drug use: No    Sexual activity: Not on file     Other Topics Concern    Not on file     Social History Narrative    Living alone Review of Systems   Constitutional: Negative for activity change  Respiratory: Negative for shortness of breath  Cardiovascular: Negative for chest pain  Gastrointestinal: Negative for blood in stool  Genitourinary: Negative for vaginal bleeding  No new breast lumps  Musculoskeletal: Positive for arthralgias  Psychiatric/Behavioral: Negative for dysphoric mood  The patient is not nervous/anxious  Objective:      /80 (BP Location: Left arm, Patient Position: Sitting, Cuff Size: Standard)   Pulse 80   Temp (!) 97 °F (36 1 °C) (Tympanic)   Resp 16   Ht 5' 5" (1 651 m)   Wt 49 4 kg (108 lb 12 8 oz)   SpO2 96%   BMI 18 11 kg/m²          Physical Exam   Constitutional: She is oriented to person, place, and time  She appears well-developed and well-nourished  No distress  HENT:   Head: Normocephalic and atraumatic  Right Ear: Hearing, tympanic membrane, external ear and ear canal normal    Left Ear: Hearing, tympanic membrane, external ear and ear canal normal    Mouth/Throat: Oropharynx is clear and moist  No oropharyngeal exudate  Eyes: Conjunctivae are normal    Neck: Normal range of motion  Neck supple  No thyromegaly present  Cardiovascular: Normal rate, regular rhythm and normal heart sounds  Exam reveals no gallop and no friction rub  No murmur heard  Pulmonary/Chest: Effort normal and breath sounds normal  No respiratory distress  She has no wheezes  She has no rales  Abdominal: Soft  Bowel sounds are normal  She exhibits no distension and no mass  There is no tenderness  There is no rebound and no guarding  Musculoskeletal:        Back:    Lymphadenopathy:     She has no cervical adenopathy  Neurological: She is alert and oriented to person, place, and time  Skin: She is not diaphoretic  Psychiatric: She has a normal mood and affect  Her behavior is normal  Judgment and thought content normal    Nursing note and vitals reviewed

## 2018-11-21 PROCEDURE — G0008 ADMIN INFLUENZA VIRUS VAC: HCPCS

## 2018-11-21 PROCEDURE — 90662 IIV NO PRSV INCREASED AG IM: CPT

## 2018-11-27 ENCOUNTER — TRANSCRIBE ORDERS (OUTPATIENT)
Dept: RADIOLOGY | Facility: HOSPITAL | Age: 73
End: 2018-11-27

## 2018-11-27 ENCOUNTER — HOSPITAL ENCOUNTER (OUTPATIENT)
Dept: RADIOLOGY | Facility: HOSPITAL | Age: 73
Discharge: HOME | End: 2018-11-27
Attending: FAMILY MEDICINE
Payer: MEDICARE

## 2018-11-27 DIAGNOSIS — M41.25 OTHER IDIOPATHIC SCOLIOSIS, THORACOLUMBAR REGION: Chronic | ICD-10-CM

## 2018-11-27 PROCEDURE — 72083 X-RAY EXAM ENTIRE SPI 4/5 VW: CPT

## 2018-12-03 DIAGNOSIS — M79.18 CERVICAL MYOFASCIAL PAIN SYNDROME: ICD-10-CM

## 2018-12-03 DIAGNOSIS — M41.24 OTHER IDIOPATHIC SCOLIOSIS, THORACIC REGION: Primary | ICD-10-CM

## 2019-01-16 ENCOUNTER — OFFICE VISIT (OUTPATIENT)
Dept: OBGYN CLINIC | Facility: CLINIC | Age: 74
End: 2019-01-16
Payer: MEDICARE

## 2019-01-16 VITALS
HEART RATE: 80 BPM | BODY MASS INDEX: 17.83 KG/M2 | SYSTOLIC BLOOD PRESSURE: 124 MMHG | WEIGHT: 107 LBS | HEIGHT: 65 IN | DIASTOLIC BLOOD PRESSURE: 68 MMHG

## 2019-01-16 DIAGNOSIS — M41.24 OTHER IDIOPATHIC SCOLIOSIS, THORACIC REGION: Primary | ICD-10-CM

## 2019-01-16 DIAGNOSIS — M79.18 CERVICAL MYOFASCIAL PAIN SYNDROME: ICD-10-CM

## 2019-01-16 DIAGNOSIS — M51.36 DDD (DEGENERATIVE DISC DISEASE), LUMBAR: ICD-10-CM

## 2019-01-16 DIAGNOSIS — M41.56 OTHER SECONDARY SCOLIOSIS, LUMBAR REGION: ICD-10-CM

## 2019-01-16 PROCEDURE — 99203 OFFICE O/P NEW LOW 30 MIN: CPT | Performed by: ORTHOPAEDIC SURGERY

## 2019-01-16 NOTE — PROGRESS NOTES
73 y o female presents for evaluation of feeling off balance  Patient recently was diagnosed with thoracolumbar scoliosis  She presents today to discuss his diagnosis and to inquire whether not there is anything she can do to limit the progression  Patient denies any back pain  Patient denies any neck pain  Patient denies any numbness or tingling  She states that she feels as though her left hip is higher than her right hip  She reportedly began to feel this way the end of last summer  She does not think that there has been a significant progression  Review of Systems  Review of systems negative unless otherwise specified in HPI    Past Medical History  Past Medical History:   Diagnosis Date    Effusion of left knee     Last assessed - 9/10/15    Hyperlipidemia     Hypertension     Tick bite     Last assessed - 4/21/17       Past Surgical History  Past Surgical History:   Procedure Laterality Date    APPENDECTOMY      TONSILLECTOMY      Last assessed - 4/20/17    TUBAL LIGATION      Last assessed - 4/20/17    WISDOM TOOTH EXTRACTION      Last assessed - 4/20/17       Current Medications  Current Outpatient Prescriptions on File Prior to Visit   Medication Sig Dispense Refill    aspirin 81 MG tablet Take 1 tablet (81mg) by mouth once daily   atorvastatin (LIPITOR) 10 mg tablet TAKE ONE TABLET BY MOUTH EVERY OTHER DAY 90 tablet 3    Calcium Carb-Cholecalciferol (CALCIUM 600 + D) 600-200 MG-UNIT TABS Calcium 600 + D TABS  TAKE 1 TABLET DAILY     Refills: 0    Active      cholecalciferol (VITAMIN D3) 1,000 units tablet Take 1,000 Units by mouth daily      Fish Oil-Cholecalciferol (FISH OIL + D3) 3664-4507 MG-UNIT CAPS Fish Oil 1200 MG Oral Capsule  Take 2 tablets daily   Refills: 0    Active      Ibuprofen (ADVIL) 200 MG CAPS Advil      methocarbamol (ROBAXIN) 500 mg tablet Take 1 tablet (500 mg total) by mouth 4 (four) times a day 30 tablet 1    Naproxen Sodium (ALEVE) 220 MG CAPS Aleve  Omega-3 Fatty Acids (FISH OIL) 1,000 mg Fish Oil      triamterene-hydrochlorothiazide (MAXZIDE-25) 37 5-25 mg per tablet TAKE ONE-HALF TABLET BY MOUTH EVERY DAY AS NEEDED 45 tablet 3     No current facility-administered medications on file prior to visit  Recent Labs [facility])    0  Lab Value Date/Time   HCT 41 2 11/19/2018 0742   HCT 36 0 03/03/2015 0029   HGB 13 8 11/19/2018 0742   HGB 11 4 (L) 03/03/2015 0029   WBC 4 98 11/19/2018 0742   WBC 5 03 03/03/2015 0029   GLUCOSE 110 03/03/2015 0029         Physical exam  · General: Awake, Alert, Oriented  · Eyes: Pupils equal, round and reactive to light  · Heart:  No palpable  arrhythmia  · Lungs: No audible wheezing  · Abdomen: soft  Back exam  · Skin intact, no erythema, no ecchymosis, no midline back tenderness to palpation, no tenderness palpation paraspinal soft tissue  · On inspection left iliac crest is slightly higher than right iliac crest when standing  · Mild right sided thoracic prominence on Alonzo for bending test  · Motor and sensation equal and intact bilaterally L2-S1  · Distal extremity warm well perfused        Imaging  Scoliosis x-rays reviewed at today's visit  Reveal diffuse thoracolumbar spondylosis with L4-T11 23 degrees apex right curve     69-year-old female with thoracolumbar scoliosis and spondylosis   Long discussion had with patient concerning her diagnosis and treatment  Explained the patient that outside of surgical intervention under is nothing that she can do to change the natural history of her scoliosis  She is advised to avoid smoking and excessive weight gain  She was also advised to remain active  She will be provided with a referral to physical therapy where she cannot exercises to focus on core back strengthening    She may follow up in the office on an as-needed basis

## 2019-01-28 ENCOUNTER — APPOINTMENT (OUTPATIENT)
Dept: PHYSICAL THERAPY | Facility: CLINIC | Age: 74
End: 2019-01-28
Payer: MEDICARE

## 2019-01-29 ENCOUNTER — EVALUATION (OUTPATIENT)
Dept: PHYSICAL THERAPY | Facility: CLINIC | Age: 74
End: 2019-01-29
Payer: MEDICARE

## 2019-01-29 DIAGNOSIS — M41.24 OTHER IDIOPATHIC SCOLIOSIS, THORACIC REGION: Primary | ICD-10-CM

## 2019-01-29 PROCEDURE — 97162 PT EVAL MOD COMPLEX 30 MIN: CPT | Performed by: PHYSICAL MEDICINE & REHABILITATION

## 2019-01-29 PROCEDURE — G8990 OTHER PT/OT CURRENT STATUS: HCPCS | Performed by: PHYSICAL MEDICINE & REHABILITATION

## 2019-01-29 PROCEDURE — 97112 NEUROMUSCULAR REEDUCATION: CPT | Performed by: PHYSICAL MEDICINE & REHABILITATION

## 2019-01-29 PROCEDURE — G8991 OTHER PT/OT GOAL STATUS: HCPCS | Performed by: PHYSICAL MEDICINE & REHABILITATION

## 2019-01-29 NOTE — PROGRESS NOTES
PT Evaluation     Today's date: 2019  Patient name: Sheryle Latina  : 1945  MRN: [id number]  Referring provider: Reed Primrose, MD  Dx:   Encounter Diagnosis     ICD-10-CM    1  Other idiopathic scoliosis, thoracic region M41 24 Ambulatory referral to Physical Therapy       Start Time: 745  Stop Time: 830  Total time in clinic (min): 45 minutes    Assessment  Assessment details: Sheryle Latina is a pleasant 68 y o  female who presents with left lower back pain which the patient describes as stiffness  The patient's greatest concerns are fear of not being able to keep active  Primary movement impairment diagnosis of poor lumbopelvic hypomobility with movement coordination deficit resulting in pathoanatomical symptoms of Other idiopathic scoliosis, thoracic region and 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, perform household chores including stacking firewood and cleaning  Impairments include:  1) poor lumbopelvic movement coordination - addressing with neuromotor retraining   2) central sensitization - addressing with extensive counseling regarding pain neuroscience, diagnosis, prognosis, care options, and care planning  3) hip hypomobility - addressing with mobs and mobility exercises   4) LE neural tension - addressing with mobs and mobility exercises   5) poor lifting mechanics - addressing with functional retraining  6) transfer intolerance - addressing with functional retraining   7) lumbopelvic hypomobility - addressing with range of motion exercises    Etiologic factors include previous hx of lumbar pain    Impairments: abnormal coordination, abnormal gait, abnormal muscle firing, abnormal muscle tone, abnormal or restricted ROM, abnormal movement, activity intolerance, difficulty understanding, impaired physical strength, lacks appropriate home exercise program and pain with function    Symptom irritability: moderateUnderstanding of Dx/Px/POC: fair   Prognosis: good  Prognosis details: Positive prognostic indicators include positive attitude toward recovery  Negative prognostic indicators include chronicity of symptoms  Goals  Patient will be independent with home exercise program    Patient will be able to manage symptoms independently  Patient will be able to roll in bed without limitation due to pain  Patient will be able to get in/out of her car without limitation due to pain  Patient will be able to get in/out of bed without limitation due to pain  Patient will be able to squat to  objects from the floor without limitation due to pain  Patient will be able to lift without limitation due to pain  Plan  Plan details: Prognosis above is given PT services 2x/week tapering to 2x/month over the next 3 months and home program adherence    Patient would benefit from: skilled physical therapy  Referral necessary: No  Planned modality interventions: thermotherapy: hydrocollator packs  Planned therapy interventions: abdominal trunk stabilization, activity modification, joint mobilization, manual therapy, motor coordination training, neuromuscular re-education, patient education, self care, therapeutic activities, therapeutic exercise, graded activity, home exercise program and behavior modification  Treatment plan discussed with: patient        Subjective Evaluation    History of Present Illness  Mechanism of injury: Work/School: retired, part time , nurse  Home Life: cooking, cleaning, yardwork, lives alone, heat with wood   Hobbies/exercise: yoga, walks her dog,    Pain location: left inferior lumber spine, slightly above PSIS  HPI: started noticing left lower stiffness over time, patient believes it may be related to the scoliosis, patient feels like she is tipping  Aggravating factors: lifting heavy objects, pushing a wheelbarrow  Relieving factors: moving and warming up  PMH:         Objective     Static Posture     Comments  Lumbar ROM:  Flexion: 25% limited  Extension: 0% limited  Rrotation: 25% limited  Lrotation: 25% limited     LE MMTs:  Hip flex: R: 4/5 L: 4/5  Knee ext: R: 4+/5 L: 4+/5  Knee flex: R: 3+/5 L: 3+/5  Glute Max: R: 3/5 L: 3/5     Gait:  Patient demonstrated decreased LLE weight bearing and right lateral shift      Joint Assessment:  Hypomobile T8-L5      Flowsheet Rows      Most Recent Value   PT/OT G-Codes   Current Score  66   Projected Score  77          Precautions: none    Daily Treatment Diary     Manual  1/29/2019                                                                                 Exercise Diary              Bike             Thoracic rotation             Lumbar rotation                                                                                                                                                                                                                             HEP teaching and return demonstration 10'                Modalities

## 2019-01-31 ENCOUNTER — OFFICE VISIT (OUTPATIENT)
Dept: PHYSICAL THERAPY | Facility: CLINIC | Age: 74
End: 2019-01-31
Payer: MEDICARE

## 2019-01-31 DIAGNOSIS — M41.24 OTHER IDIOPATHIC SCOLIOSIS, THORACIC REGION: Primary | ICD-10-CM

## 2019-01-31 PROCEDURE — 97110 THERAPEUTIC EXERCISES: CPT | Performed by: PHYSICAL MEDICINE & REHABILITATION

## 2019-01-31 PROCEDURE — 97140 MANUAL THERAPY 1/> REGIONS: CPT | Performed by: PHYSICAL MEDICINE & REHABILITATION

## 2019-01-31 PROCEDURE — 97112 NEUROMUSCULAR REEDUCATION: CPT | Performed by: PHYSICAL MEDICINE & REHABILITATION

## 2019-01-31 NOTE — PROGRESS NOTES
Daily Note     Today's date: 2019  Patient name: Timothy Calabrese  : 1945  MRN: [id number]  Referring provider: Moriah Stanton MD  Dx:   Encounter Diagnosis     ICD-10-CM    1  Other idiopathic scoliosis, thoracic region M41 24        Start Time: 1500  Stop Time: 1545  Total time in clinic (min): 45 minutes    Subjective: Ozzy Miranda reported "I have some questions about the exercises "      Objective: See treatment diary below  Precautions: none    Daily Treatment Diary     Manual  2019           IASTM lumbar spine  JR           P-A mobs  JR                                                      Exercise Diary              Bike             Thoracic rotation  2x10           Lumbar rotation  3x10           Standing rows  15# 3x10           Standing straight arm pull downs  7# 3x10                                                                                                                                                                                                 HEP teaching and return demonstration 10'                Modalities                                                         Assessment: Tolerated treatment well  Patient would benefit from continued PT  Timothy Calabrese was able to initiate his therapy program which shows progress towards his goals  Ozzy Miranda reported a decrease in restriction following her exercises  She continues to demonstrate hypomobility along T5-L3  Next session consider starting abdominal strengthening  Plan: Continue per plan of care

## 2019-02-04 ENCOUNTER — OFFICE VISIT (OUTPATIENT)
Dept: PHYSICAL THERAPY | Facility: CLINIC | Age: 74
End: 2019-02-04
Payer: MEDICARE

## 2019-02-04 DIAGNOSIS — M41.24 OTHER IDIOPATHIC SCOLIOSIS, THORACIC REGION: Primary | ICD-10-CM

## 2019-02-04 PROCEDURE — 97140 MANUAL THERAPY 1/> REGIONS: CPT | Performed by: PHYSICAL MEDICINE & REHABILITATION

## 2019-02-04 PROCEDURE — 97112 NEUROMUSCULAR REEDUCATION: CPT | Performed by: PHYSICAL MEDICINE & REHABILITATION

## 2019-02-04 PROCEDURE — 97110 THERAPEUTIC EXERCISES: CPT | Performed by: PHYSICAL MEDICINE & REHABILITATION

## 2019-02-07 ENCOUNTER — OFFICE VISIT (OUTPATIENT)
Dept: PHYSICAL THERAPY | Facility: CLINIC | Age: 74
End: 2019-02-07
Payer: MEDICARE

## 2019-02-07 ENCOUNTER — APPOINTMENT (OUTPATIENT)
Dept: PHYSICAL THERAPY | Facility: CLINIC | Age: 74
End: 2019-02-07
Payer: MEDICARE

## 2019-02-07 DIAGNOSIS — M41.24 OTHER IDIOPATHIC SCOLIOSIS, THORACIC REGION: Primary | ICD-10-CM

## 2019-02-07 PROCEDURE — 97112 NEUROMUSCULAR REEDUCATION: CPT | Performed by: PHYSICAL THERAPIST

## 2019-02-07 PROCEDURE — 97140 MANUAL THERAPY 1/> REGIONS: CPT | Performed by: PHYSICAL THERAPIST

## 2019-02-07 PROCEDURE — 97110 THERAPEUTIC EXERCISES: CPT | Performed by: PHYSICAL THERAPIST

## 2019-02-07 NOTE — PROGRESS NOTES
Daily Note     Today's date: 2019  Patient name: Behzad Whitlock  : 1945  MRN: [id number]  Referring provider: Osvaldo Kuhn MD  Dx:   Encounter Diagnosis     ICD-10-CM    1  Other idiopathic scoliosis, thoracic region M41 24        Start Time: 1501  Stop Time: 1555  Total time in clinic (min): 54 minutes    Subjective: Patient reports she had difficulty with one of her home program exercises and would like to review this  Objective: See treatment diary below  Precautions: none    Daily Treatment Diary     Manual  2019         IASTM lumbar spine  JR JR IM         P-A mobs  JR JR                                                     Exercise Diary              Bike   5' 5' bike on         Thoracic rotation  2x10 2x10 2x10         Lumbar rotation  3x10 2x10 2x10         Open books    2x10          Standing rows  15# 3x10 15# 3x10 16# 3x10         Standing straight arm pull downs  7# 3x10 7# 3x10 8# 3x10         Squats   3x10 3x10          X-Walks   Red TB 3x10 Red TB 3x10         Standing Calf stretch   4x30"x2 4x30" both         PNF Chops   [place] 3x10 Green 3x10 kneeling                                                                                                                                           HEP teaching and return demonstration 10'                Modalities                                                         Assessment: Tolerated treatment well and was able to tolerate exercise program without complaints of discomfort  Patient exhibited good technique with therapeutic exercises and PT added open book stretches for improved thoracic and lumbar rotation  Patient responded well to increased resistance, and noted improved low back flexibility following manuals  Plan: Progress treatment as tolerated

## 2019-02-08 ENCOUNTER — APPOINTMENT (OUTPATIENT)
Dept: PHYSICAL THERAPY | Facility: CLINIC | Age: 74
End: 2019-02-08
Payer: MEDICARE

## 2019-02-13 ENCOUNTER — APPOINTMENT (OUTPATIENT)
Dept: PHYSICAL THERAPY | Facility: CLINIC | Age: 74
End: 2019-02-13
Payer: MEDICARE

## 2019-02-14 ENCOUNTER — OFFICE VISIT (OUTPATIENT)
Dept: PHYSICAL THERAPY | Facility: CLINIC | Age: 74
End: 2019-02-14
Payer: MEDICARE

## 2019-02-14 DIAGNOSIS — M41.24 OTHER IDIOPATHIC SCOLIOSIS, THORACIC REGION: Primary | ICD-10-CM

## 2019-02-14 PROCEDURE — 97112 NEUROMUSCULAR REEDUCATION: CPT | Performed by: PHYSICAL MEDICINE & REHABILITATION

## 2019-02-14 PROCEDURE — 97140 MANUAL THERAPY 1/> REGIONS: CPT | Performed by: PHYSICAL MEDICINE & REHABILITATION

## 2019-02-14 PROCEDURE — 97110 THERAPEUTIC EXERCISES: CPT | Performed by: PHYSICAL MEDICINE & REHABILITATION

## 2019-02-14 NOTE — PROGRESS NOTES
Daily Note     Today's date: 2019  Patient name: Maria A Velez  : 1945  MRN: [id number]  Referring provider: Claudean Loving, MD  Dx:   Encounter Diagnosis     ICD-10-CM    1  Other idiopathic scoliosis, thoracic region M41 24                 Subjective: Deja Steven reported "I am feeling pretty good overall "    Objective: See treatment diary below  Precautions: none    Daily Treatment Diary     Manual  2019        IAS lumbar spine  JR JR IM JR        P-A mobs  Sherman Alex JR                                                   Exercise Diary              Bike   5' 5' bike on 5' UBE        Thoracic rotation  2x10 2x10 2x10 2x10        Lumbar rotation  3x10 2x10 2x10 2x10        Open books    2x10  2x10        Standing rows  15# 3x10 15# 3x10 16# 3x10 17# 3x10        Standing straight arm pull downs  7# 3x10 7# 3x10 8# 3x10 8# 3x10        Squats   3x10 3x10  3x10        X-Walks   Red TB 3x10 Red TB 3x10 Red TB 3x10        Standing Calf stretch   4x30"x2 4x30" both 4x30"        PNF Chops   [place] 3x10 Green 3x10 kneeling Red 3x10                                                                                                                                          HEP teaching and return demonstration 10'                Modalities                                                         Assessment: Tolerated treatment well  Patient would benefit from continued PT  Deja Steven needed less verbal cueing than previous sessions which shows progress towards her goals  She continues to need verbal cueing for her PNF chopping  At the end of the session she reported "I feel more relaxed and more aware of staying up straight "    Plan: Continue per plan of care

## 2019-02-18 ENCOUNTER — OFFICE VISIT (OUTPATIENT)
Dept: PHYSICAL THERAPY | Facility: CLINIC | Age: 74
End: 2019-02-18
Payer: MEDICARE

## 2019-02-18 DIAGNOSIS — M41.24 OTHER IDIOPATHIC SCOLIOSIS, THORACIC REGION: Primary | ICD-10-CM

## 2019-02-18 PROCEDURE — 97112 NEUROMUSCULAR REEDUCATION: CPT | Performed by: PHYSICAL MEDICINE & REHABILITATION

## 2019-02-18 NOTE — PROGRESS NOTES
Daily Note     Today's date: 2019  Patient name: Angella Wang  : 1945  MRN: [id number]  Referring provider: Kevin Adams MD  Dx:   Encounter Diagnosis     ICD-10-CM    1  Other idiopathic scoliosis, thoracic region M41 24                 Subjective: Theoplis Carrier reported "I went skiing yesterday and I feel pretty stiff and sore "    Objective: See treatment diary below  Precautions: none    Daily Treatment Diary     Manual  2019       IAS lumbar spine  JR JR IM JR JR       P-A [address]                                                  Exercise Diary              Bike   5' 5' bike on 5' UBE 5' UBE       Thoracic rotation  2x10 2x10 2x10 2x10 2x10       Lumbar rotation  3x10 2x10 2x10 2x10 2x10       Open books    2x10  2x10 2x10       Standing rows  15# 3x10 15# 3x10 16# 3x10 17# 3x10 18# 3x10       Standing straight arm pull downs  7# 3x10 7# 3x10 8# 3x10 8# 3x10 10# 3x10       Squats   3x10 3x10  3x10        X-Walks   Red TB 3x10 Red TB 3x10 Red TB 3x10        Standing Calf stretch   4x30"x2 4x30" both 4x30"        PNF Chops   [place] 3x10 Green 3x10 kneeling Red 3x10        Half kneeling core      2x30"       planks      3x30"       SL Bridges      3x10x2                                                                                                  HEP teaching and return demonstration 10'                Modalities                                                         Assessment: Tolerated treatment well  Patient would benefit from continued PT  Theoplis Carrier was able to add new exercises and increase her resistance for her therapy program as seen above  She needed less verbal cueing than for previous sessions  Assess next session for D/C  Plan: Continue per plan of care

## 2019-02-20 ENCOUNTER — APPOINTMENT (OUTPATIENT)
Dept: PHYSICAL THERAPY | Facility: CLINIC | Age: 74
End: 2019-02-20
Payer: MEDICARE

## 2019-02-21 ENCOUNTER — EVALUATION (OUTPATIENT)
Dept: PHYSICAL THERAPY | Facility: CLINIC | Age: 74
End: 2019-02-21
Payer: MEDICARE

## 2019-02-21 DIAGNOSIS — M41.24 OTHER IDIOPATHIC SCOLIOSIS, THORACIC REGION: Primary | ICD-10-CM

## 2019-02-21 PROCEDURE — 97140 MANUAL THERAPY 1/> REGIONS: CPT | Performed by: PHYSICAL MEDICINE & REHABILITATION

## 2019-02-21 PROCEDURE — G8979 MOBILITY GOAL STATUS: HCPCS | Performed by: PHYSICAL MEDICINE & REHABILITATION

## 2019-02-21 PROCEDURE — G8980 MOBILITY D/C STATUS: HCPCS | Performed by: PHYSICAL MEDICINE & REHABILITATION

## 2019-02-21 NOTE — PROGRESS NOTES
PT Discharge    Today's date: 2019  Patient name: Melba Abarca  : 1945  MRN: [id number]  Referring provider: Tisha Alvarez MD  Dx:   Encounter Diagnosis     ICD-10-CM    1  Other idiopathic scoliosis, thoracic region M41 24                   Assessment  Assessment details: Melba Abarca is a pleasant 68 y o  female who presents with left lower back pain which the patient describes as stiffness  The patient's greatest concerns are fear of not being able to keep active  Melba Abarca has made excellent progress towards her goals and has demonstrated independence with her HEP  Due to her progress towards her goals, she independence with her HEP and her reported self-efficacy with her HEP she is being D/[place]ec[place] to her HEP at this time  Impairments: abnormal coordination, abnormal gait, abnormal muscle firing, abnormal muscle tone, abnormal or restricted ROM, abnormal movement, activity intolerance, difficulty understanding, impaired physical strength, lacks appropriate home exercise program and pain with function    Symptom irritability: moderateUnderstanding of Dx/Px/POC: fair   Prognosis: good  Prognosis details: Positive prognostic indicators include positive attitude toward recovery  Negative prognostic indicators include chronicity of symptoms  Goals  Patient will be independent with home exercise program  - met  Patient will be able to manage symptoms independently  - met  Patient will be able to roll in bed without limitation due to pain  - met  Patient will be able to get in/out of her car without limitation due to pain  - met  Patient will be able to get in/out of bed without limitation due to pain  - met  Patient will be able to squat to  objects from the floor without limitation due to pain  - met  Patient will be able to lift without limitation due to pain  - met    Plan  Plan details: D/C to HEP at this time    Patient would benefit from: skilled physical therapy  Referral necessary: No  Planned modality interventions: thermotherapy: hydrocollator packs  Planned therapy interventions: abdominal trunk stabilization, activity modification, joint mobilization, manual therapy, motor coordination training, neuromuscular re-education, patient education, self care, therapeutic activities, therapeutic exercise, graded activity, home exercise program and behavior modification  Treatment plan discussed with: patient        Subjective Evaluation    History of Present Illness  Mechanism of injury: Zuhair Yip reported "I am feeling better, I am feeling much better and less stiff "        Objective     Static Posture     Comments  Lumbar ROM:  Flexion: 0% limited  Extension: 0% limited  Rrotation: 0% limited  Lrotation: 0% limited     LE MMTs:  Hip flex: R: 4/5 L: 4/5  Knee ext: R: 4+/5 L: 4+/5  Knee flex: R: 4/5 L: 4/5  Glute Max: R: 3+/5 L: 3+/5     Gait:  Patient demonstrated decreased LLE weight bearing and right lateral shift      Joint Assessment:  Hypomobile T8-L5      Flowsheet Rows      Most Recent Value   PT/OT G-Codes   Current Score  86   Projected Score  77   FOTO information reviewed  Yes   Assessment Type  Discharge   G code set  Mobility: Walking & Moving Around   Mobility: Walking and Moving Around Goal Status ()  CH   Mobility: Walking and Moving Around Discharge Status ()  CI          Precautions: none    Daily Treatment Diary     Manual  1/29/2019 1/31/2019 2/4/2019 2/7/2019 2/14/2019 2/18/2019 2/21/2019      IAS lumbar spine  JR JR FAROOQ [place]      P-MARIBETH mobs  NAIMA TR  XP GX TM                                                 Exercise Diary              Bike   5' 5' bike on 5' UBE 5' UBE 5'      Thoracic rotation  2x10 2x10 2x10 2x10 2x10 2x10      Lumbar rotation  3x10 2x10 2x10 2x10 2x10 2x10      Open books    2x10  2x10 2x10 2x10      Standing rows  15# 3x10 15# 3x10 16# 3x10 17# 3x10 18# 3x10       Standing straight arm pull downs  7# 3x10 7# 3x10 8# 3x10 8# 3x10 10# 3x10       Squats 3x10 3x10  3x10        X-Walks   Red TB 3x10 Red TB 3x10 Red TB 3x10        Standing Calf stretch   4x30"x2 4x30" both 4x30"        PNF Chops   [place] 3x10 Green 3x10 kneeling Red 3x10        Half kneeling core      2x30"       planks      3x30"       SL Bridges      3x10x2                                                                                                  HEP teaching and return demonstration 10'                Modalities

## 2019-04-03 ENCOUNTER — OFFICE VISIT (OUTPATIENT)
Dept: URGENT CARE | Facility: CLINIC | Age: 74
End: 2019-04-03
Payer: MEDICARE

## 2019-04-03 VITALS
SYSTOLIC BLOOD PRESSURE: 164 MMHG | HEART RATE: 68 BPM | TEMPERATURE: 98.3 F | WEIGHT: 109.6 LBS | BODY MASS INDEX: 18.26 KG/M2 | HEIGHT: 65 IN | OXYGEN SATURATION: 97 % | DIASTOLIC BLOOD PRESSURE: 73 MMHG | RESPIRATION RATE: 14 BRPM

## 2019-04-03 DIAGNOSIS — S10.96XA: Primary | ICD-10-CM

## 2019-04-03 DIAGNOSIS — S10.95XA: ICD-10-CM

## 2019-04-03 DIAGNOSIS — L08.9: Primary | ICD-10-CM
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
## 2019-04-03 PROCEDURE — 99203 OFFICE O/P NEW LOW 30 MIN: CPT | Performed by: NURSE PRACTITIONER

## 2019-04-03 PROCEDURE — G0463 HOSPITAL OUTPT CLINIC VISIT: HCPCS | Performed by: NURSE PRACTITIONER

## 2019-04-03 RX ORDER — DOXYCYCLINE HYCLATE 100 MG/1
200 CAPSULE ORAL ONCE
Qty: 2 CAPSULE | Refills: 0 | Status: SHIPPED | OUTPATIENT
Start: 2019-04-03 | End: 2019-04-03

## 2019-04-04 ENCOUNTER — TELEPHONE (OUTPATIENT)
Dept: FAMILY MEDICINE CLINIC | Facility: CLINIC | Age: 74
End: 2019-04-04
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
## 2019-04-29 ENCOUNTER — LAB (OUTPATIENT)
Dept: LAB | Facility: CLINIC | Age: 74
End: 2019-04-29
Payer: MEDICARE
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
## 2019-04-29 PROCEDURE — 86618 LYME DISEASE ANTIBODY: CPT

## 2019-04-29 PROCEDURE — 36415 COLL VENOUS BLD VENIPUNCTURE: CPT

## 2019-05-01 LAB
B BURGDOR IGG SER IA-ACNC: 0.16
B BURGDOR IGM SER IA-ACNC: 0.31

## 2019-05-06 NOTE — TELEPHONE ENCOUNTER
We received the scanned results only late last week for clarification,  Please let Shree Orellanajace know that her labs were very good - Tchol at 193, with her HDL excellent at 74, and LDL good at 107  Glucose was normal at 94, and kidney function and LFTs were normal   Thanks    Bakari details… detailed exam

## 2019-05-21 ENCOUNTER — OFFICE VISIT (OUTPATIENT)
Dept: FAMILY MEDICINE CLINIC | Facility: CLINIC | Age: 74
End: 2019-05-21
Payer: MEDICARE

## 2019-05-21 VITALS
BODY MASS INDEX: 18.49 KG/M2 | HEIGHT: 65 IN | SYSTOLIC BLOOD PRESSURE: 130 MMHG | HEART RATE: 68 BPM | OXYGEN SATURATION: 99 % | RESPIRATION RATE: 12 BRPM | TEMPERATURE: 98.3 F | WEIGHT: 111 LBS | DIASTOLIC BLOOD PRESSURE: 70 MMHG

## 2019-05-21 DIAGNOSIS — M79.18 CERVICAL MYOFASCIAL PAIN SYNDROME: ICD-10-CM

## 2019-05-21 DIAGNOSIS — Z23 NEED FOR PROPHYLACTIC VACCINATION AGAINST STREPTOCOCCUS PNEUMONIAE (PNEUMOCOCCUS) AND INFLUENZA: ICD-10-CM

## 2019-05-21 DIAGNOSIS — Z12.11 COLON CANCER SCREENING: ICD-10-CM

## 2019-05-21 DIAGNOSIS — I10 ESSENTIAL HYPERTENSION: Primary | ICD-10-CM

## 2019-05-21 DIAGNOSIS — M41.56 OTHER SECONDARY SCOLIOSIS, LUMBAR REGION: ICD-10-CM

## 2019-05-21 DIAGNOSIS — E78.5 HYPERLIPIDEMIA, UNSPECIFIED HYPERLIPIDEMIA TYPE: ICD-10-CM

## 2019-05-21 PROCEDURE — G0009 ADMIN PNEUMOCOCCAL VACCINE: HCPCS

## 2019-05-21 PROCEDURE — 90670 PCV13 VACCINE IM: CPT

## 2019-05-21 PROCEDURE — 99214 OFFICE O/P EST MOD 30 MIN: CPT | Performed by: FAMILY MEDICINE

## 2019-06-14 DIAGNOSIS — I10 ESSENTIAL HYPERTENSION: ICD-10-CM

## 2019-06-14 RX ORDER — TRIAMTERENE AND HYDROCHLOROTHIAZIDE 37.5; 25 MG/1; MG/1
TABLET ORAL
Qty: 45 TABLET | Refills: 3 | Status: SHIPPED | OUTPATIENT
Start: 2019-06-14 | End: 2020-02-04 | Stop reason: HOSPADM

## 2019-11-19 ENCOUNTER — APPOINTMENT (OUTPATIENT)
Dept: LAB | Facility: CLINIC | Age: 74
End: 2019-11-19
Payer: MEDICARE

## 2019-11-19 DIAGNOSIS — E78.5 HYPERLIPIDEMIA, UNSPECIFIED HYPERLIPIDEMIA TYPE: ICD-10-CM

## 2019-11-19 LAB
ALBUMIN SERPL BCP-MCNC: 4 G/DL (ref 3.5–5)
ALP SERPL-CCNC: 59 U/L (ref 46–116)
ALT SERPL W P-5'-P-CCNC: 31 U/L (ref 12–78)
ANION GAP SERPL CALCULATED.3IONS-SCNC: 6 MMOL/L (ref 4–13)
AST SERPL W P-5'-P-CCNC: 29 U/L (ref 5–45)
BILIRUB SERPL-MCNC: 0.41 MG/DL (ref 0.2–1)
BUN SERPL-MCNC: 18 MG/DL (ref 5–25)
CALCIUM SERPL-MCNC: 9 MG/DL (ref 8.3–10.1)
CHLORIDE SERPL-SCNC: 107 MMOL/L (ref 100–108)
CHOLEST SERPL-MCNC: 227 MG/DL (ref 50–200)
CO2 SERPL-SCNC: 28 MMOL/L (ref 21–32)
CREAT SERPL-MCNC: 0.64 MG/DL (ref 0.6–1.3)
GFR SERPL CREATININE-BSD FRML MDRD: 88 ML/MIN/1.73SQ M
GLUCOSE P FAST SERPL-MCNC: 93 MG/DL (ref 65–99)
HDLC SERPL-MCNC: 75 MG/DL
LDLC SERPL CALC-MCNC: 139 MG/DL (ref 0–100)
POTASSIUM SERPL-SCNC: 4.1 MMOL/L (ref 3.5–5.3)
PROT SERPL-MCNC: 6.9 G/DL (ref 6.4–8.2)
SODIUM SERPL-SCNC: 141 MMOL/L (ref 136–145)
TRIGL SERPL-MCNC: 64 MG/DL

## 2019-11-19 PROCEDURE — 80061 LIPID PANEL: CPT

## 2019-11-19 PROCEDURE — 36415 COLL VENOUS BLD VENIPUNCTURE: CPT

## 2019-11-19 PROCEDURE — 80053 COMPREHEN METABOLIC PANEL: CPT

## 2019-11-22 ENCOUNTER — OFFICE VISIT (OUTPATIENT)
Dept: FAMILY MEDICINE CLINIC | Facility: CLINIC | Age: 74
End: 2019-11-22
Payer: MEDICARE

## 2019-11-22 VITALS
TEMPERATURE: 97.8 F | BODY MASS INDEX: 18.74 KG/M2 | WEIGHT: 109.8 LBS | HEIGHT: 64 IN | RESPIRATION RATE: 16 BRPM | OXYGEN SATURATION: 97 % | HEART RATE: 66 BPM | SYSTOLIC BLOOD PRESSURE: 136 MMHG | DIASTOLIC BLOOD PRESSURE: 82 MMHG

## 2019-11-22 DIAGNOSIS — Z13.1 SCREENING FOR DIABETES MELLITUS: ICD-10-CM

## 2019-11-22 DIAGNOSIS — Z12.31 ENCOUNTER FOR SCREENING MAMMOGRAM FOR BREAST CANCER: ICD-10-CM

## 2019-11-22 DIAGNOSIS — Z13.6 SCREENING FOR CARDIOVASCULAR CONDITION: ICD-10-CM

## 2019-11-22 DIAGNOSIS — Z12.11 SCREENING FOR COLON CANCER: ICD-10-CM

## 2019-11-22 DIAGNOSIS — M54.12 CERVICAL RADICULOPATHY: ICD-10-CM

## 2019-11-22 DIAGNOSIS — Z00.00 MEDICARE ANNUAL WELLNESS VISIT, SUBSEQUENT: ICD-10-CM

## 2019-11-22 DIAGNOSIS — E78.5 HYPERLIPIDEMIA, UNSPECIFIED HYPERLIPIDEMIA TYPE: Primary | ICD-10-CM

## 2019-11-22 DIAGNOSIS — I10 BENIGN ESSENTIAL HTN: ICD-10-CM

## 2019-11-22 PROCEDURE — 99214 OFFICE O/P EST MOD 30 MIN: CPT | Performed by: FAMILY MEDICINE

## 2019-11-22 PROCEDURE — G0439 PPPS, SUBSEQ VISIT: HCPCS | Performed by: FAMILY MEDICINE

## 2019-11-22 RX ORDER — ATORVASTATIN CALCIUM 10 MG/1
10 TABLET, FILM COATED ORAL DAILY
Qty: 90 TABLET | Refills: 3 | Status: SHIPPED | OUTPATIENT
Start: 2019-11-22 | End: 2020-02-04 | Stop reason: HOSPADM

## 2019-11-22 NOTE — PATIENT INSTRUCTIONS
Medicare Preventive Visit Patient Instructions  Thank you for completing your Welcome to Medicare Visit or Medicare Annual Wellness Visit today  Your next wellness visit will be due in one year (11/22/2020)  The screening/preventive services that you may require over the next 5-10 years are detailed below  Some tests may not apply to you based off risk factors and/or age  Screening tests ordered at today's visit but not completed yet may show as past due  Also, please note that scanned in results may not display below  Preventive Screenings:  Service Recommendations Previous Testing/Comments   Colorectal Cancer Screening  * Colonoscopy    * Fecal Occult Blood Test (FOBT)/Fecal Immunochemical Test (FIT)  * Fecal DNA/Cologuard Test  * Flexible Sigmoidoscopy Age: 54-65 years old   Colonoscopy: every 10 years (may be performed more frequently if at higher risk)  OR  FOBT/FIT: every 1 year  OR  Cologuard: every 3 years  OR  Sigmoidoscopy: every 5 years  Screening may be recommended earlier than age 48 if at higher risk for colorectal cancer  Also, an individualized decision between you and your healthcare provider will decide whether screening between the ages of 74-80 would be appropriate  Colonoscopy: Not on file  FOBT/FIT: Not on file  Cologuard: Not on file  Sigmoidoscopy: Not on file         Breast Cancer Screening Age: 36 years old  Frequency: every 1-2 years  Not required if history of left and right mastectomy Mammogram: 11/19/2018       Cervical Cancer Screening Between the ages of 21-29, pap smear recommended once every 3 years  Between the ages of 33-67, can perform pap smear with HPV co-testing every 5 years     Recommendations may differ for women with a history of total hysterectomy, cervical cancer, or abnormal pap smears in past  Pap Smear: Not on file       Hepatitis C Screening Once for adults born between [facility]  More frequently in patients at high risk for Hepatitis C Hep C Antibody: Not on file       Diabetes Screening 1-2 times per year if you're at risk for diabetes or have pre-diabetes Fasting glucose: 93 mg/dL   A1C: No results in last 5 years       Cholesterol Screening Once every 5 years if you don't have a lipid disorder  May order more often based on risk factors  Lipid panel: 11/19/2019         Other Preventive Screenings Covered by Medicare:  1  Abdominal Aortic Aneurysm (AAA) Screening: covered once if your at risk  You're considered to be at risk if you have a family history of AAA  2  Lung Cancer Screening: covers low dose CT scan once per year if you meet all of the following conditions: (1) Age 50-69; (2) No signs or symptoms of lung cancer; (3) Current smoker or have quit smoking within the last 15 years; (4) You have a tobacco smoking history of at least 30 pack years (packs per day multiplied by number of years you smoked); (5) You get a written order from a healthcare provider  3  Glaucoma Screening: covered annually if you're considered high risk: (1) You have diabetes OR (2) Family history of glaucoma OR (3)  aged 48 and older OR (3)  American aged 72 and older  3  Osteoporosis Screening: covered every 2 years if you meet one of the following conditions: (1) You're estrogen deficient and at risk for osteoporosis based off medical history and other findings; (2) Have a vertebral abnormality; (3) On glucocorticoid therapy for more than 3 months; (4) Have primary hyperparathyroidism; (5) On osteoporosis medications and need to assess response to drug therapy  · Last bone density test (DXA Scan): 02/16/2018  5  HIV Screening: covered annually if you're between the age of 12-76  Also covered annually if you are younger than 13 and older than 72 with risk factors for HIV infection  For pregnant patients, it is covered up to 3 times per pregnancy      Immunizations:  Immunization Recommendations   Influenza Vaccine Annual influenza vaccination during flu season is recommended for all persons aged >= 6 months who do not have contraindications   Pneumococcal Vaccine (Prevnar and Pneumovax)  * Prevnar = PCV13  * Pneumovax = PPSV23   Adults 25-60 years old: 1-3 doses may be recommended based on certain risk factors  Adults 72 years old: Prevnar (PCV13) vaccine recommended followed by Pneumovax (PPSV23) vaccine  If already received PPSV23 since turning 65, then PCV13 recommended at least one year after PPSV23 dose  Hepatitis B Vaccine 3 dose series if at intermediate or high risk (ex: diabetes, end stage renal disease, liver disease)   Tetanus (Td) Vaccine - COST NOT COVERED BY MEDICARE PART B Following completion of primary series, a booster dose should be given every 10 years to maintain immunity against tetanus  Td may also be given as tetanus wound prophylaxis  Tdap Vaccine - COST NOT COVERED BY MEDICARE PART B Recommended at least once for all adults  For pregnant patients, recommended with each pregnancy  Shingles Vaccine (Shingrix) - COST NOT COVERED BY MEDICARE PART B  2 shot series recommended in those aged 48 and above     Health Maintenance Due:      Topic Date Due    Hepatitis C Screening  1945    CRC Screening: Colonoscopy  1945    MAMMOGRAM  11/19/2020     Immunizations Due:  There are no preventive care reminders to display for this patient  Advance Directives   What are advance directives? Advance directives are legal documents that state your wishes and plans for medical care  These plans are made ahead of time in case you lose your ability to make decisions for yourself  Advance directives can apply to any medical decision, such as the treatments you want, and if you want to donate organs  What are the types of advance directives? There are many types of advance directives, and each state has rules about how to use them  You may choose a combination of any of the following:  · Living will:   This is a written record of the treatment you want  You can also choose which treatments you do not want, which to limit, and which to stop at a certain time  This includes surgery, medicine, IV fluid, and tube feedings  · Durable power of  for healthcare [facility]): This is a written record that states who you want to make healthcare choices for you when you are unable to make them for yourself  This person, called a proxy, is usually a family member or a friend  You may choose more than 1 proxy  · Do not resuscitate (DNR) order:  A DNR order is used in case your heart stops beating or you stop breathing  It is a request not to have certain forms of treatment, such as CPR  A DNR order may be included in other types of advance directives  · Medical directive: This covers the care that you want if you are in a coma, near death, or unable to make decisions for yourself  You can list the treatments you want for each condition  Treatment may include pain medicine, surgery, blood transfusions, dialysis, IV or tube feedings, and a ventilator (breathing machine)  · Values history: This document has questions about your views, beliefs, and how you feel and think about life  This information can help others choose the care that you would choose  Why are advance directives important? An advance directive helps you control your care  Although spoken wishes may be used, it is better to have your wishes written down  Spoken wishes can be misunderstood, or not followed  Treatments may be given even if you do not want them  An advance directive may make it easier for your family to make difficult choices about your care  © Copyright [employer] 2018 Information is for End User's use only and may not be sold, redistributed or otherwise used for commercial purposes   All illustrations and images included in CareNotes® are the copyrighted property of [employer]  or [employer]

## 2019-11-22 NOTE — PROGRESS NOTES
Assessment and Plan:     Problem List Items Addressed This Visit        Cardiovascular and Mediastinum    Benign essential HTN       Nervous and Auditory    Cervical radiculopathy       Other    Hyperlipidemia - Primary    Relevant Medications    atorvastatin (LIPITOR) 10 mg tablet    Other Relevant Orders    Lipid Panel with Direct LDL reflex      Other Visit Diagnoses     Medicare annual wellness visit, subsequent        Max Valle is healthy on exam   She is to continue to work on a healthy, lower salt diet, and continued regular exercise  Screening for diabetes mellitus        Relevant Orders    Comprehensive metabolic panel    Screening for cardiovascular condition        Encounter for screening mammogram for breast cancer        Mammogram ordered today  Relevant Orders    Mammo screening bilateral w cad    Screening for colon cancer        Pt referred to GI for colon cancer screening  Relevant Orders    Ambulatory referral to Gastroenterology           Preventive health issues were discussed with patient, and age appropriate screening tests were ordered as noted in patient's After Visit Summary  Personalized health advice and appropriate referrals for health education or preventive services given if needed, as noted in patient's After Visit Summary       History of Present Illness:     Patient presents for Medicare Annual Wellness visit    Patient Care Team:  Timothy Mendoza DO as PCP - General (Family Medicine)  FRANDY Gupta DO Vandy Dom, MD Erenest Staples, CRNP Ricarda Getting, DO     Problem List:     Patient Active Problem List   Diagnosis    Arthritis    Benign essential HTN    Cervical myofascial pain syndrome    Cervical radiculopathy    Cervicogenic headache    Cervico-occipital neuralgia    Depression    Hyperlipidemia    Hypertension    Osteopenia of spine    Spasmodic torticollis    Laceration of occipital scalp    Other secondary scoliosis, lumbar region Past Medical and Surgical History:     Past Medical History:   Diagnosis Date    Effusion of left knee     Last assessed - 9/10/15    Hyperlipidemia     Hypertension     Tick bite     Last assessed - 4/21/17     Past Surgical History:   Procedure Laterality Date    APPENDECTOMY      TONSILLECTOMY      Last assessed - 4/20/17    TUBAL LIGATION      Last assessed - 4/20/17    WISDOM TOOTH EXTRACTION      Last assessed - 4/20/17      Family History:     Family History   Problem Relation Age of Onset    Coronary artery disease Mother     Arthritis Mother     Other Mother         Cardiac Disorder     Transient ischemic attack Mother     Heart attack Father    Jw Waterman Sudden death Father         SCD      Social History:     Social History     Socioeconomic History    Marital status:      Spouse name: None    Number of children: 3    Years of education: Post Graduate    Highest education level: None   Occupational History    Occupation:       Comment: P/T    Occupation: Retired     Comment: RN   Social Needs    Financial resource strain: None    Food insecurity:     Worry: None     Inability: None    Transportation needs:     Medical: None     Non-medical: None   Tobacco Use    Smoking status: Never Smoker    Smokeless tobacco: Never Used   Substance and Sexual Activity    Alcohol use: Yes     Comment: 1 wine nightly    Drug use: No    Sexual activity: None   Lifestyle    Physical activity:     Days per week: None     Minutes per session: None    Stress: None   Relationships    Social connections:     Talks on phone: None     Gets together: None     Attends Denominational service: None     Active member of club or organization: None     Attends meetings of clubs or organizations: None     Relationship status: None    Intimate partner violence:     Fear of current or ex partner: None     Emotionally abused: None     Physically abused: None     Forced sexual activity: None   Other Topics Concern    None   Social History Narrative    Living alone       Medications and Allergies:     Current Outpatient Medications   Medication Sig Dispense Refill    aspirin 81 MG tablet Take 1 tablet (81mg) by mouth once daily   atorvastatin (LIPITOR) 10 mg tablet Take 1 tablet (10 mg total) by mouth daily 90 tablet 3    Calcium Carb-Cholecalciferol (CALCIUM 600 + D) 600-200 MG-UNIT TABS Calcium 600 + D TABS  TAKE 1 TABLET DAILY  Refills: 0    Active      cholecalciferol (VITAMIN D3) 1,000 units tablet Take 1,000 Units by mouth daily      Fish Oil-Cholecalciferol (FISH OIL + D3) 6409-9505 MG-UNIT CAPS Fish Oil 1200 MG Oral Capsule  Take 2 tablets daily   Refills: 0    Active      triamterene-hydrochlorothiazide (MAXZIDE-25) 37 5-25 mg per tablet TAKE ONE-HALF TABLET BY MOUTH EVERY DAY AS NEEDED 45 tablet 3     No current facility-administered medications for this visit  No Known Allergies   Immunizations:     Immunization History   Administered Date(s) Administered    Hep A, adult 11/19/2015    INFLUENZA 10/01/2012, 10/10/2013, 10/10/2013, 10/01/2014, 10/02/2014, 10/01/2015, 10/16/2015, 10/01/2017, 10/20/2017, 11/06/2019    Influenza Quadrivalent Preservative Free 3 years and older IM 10/01/2016, 10/20/2017    Influenza, high dose seasonal 0 5 mL 11/21/2018    Pneumococcal Conjugate 13-Valent 05/21/2019    Pneumococcal Polysaccharide PPV23 05/02/2011    Tdap 11/19/2015, 09/01/2018    Typhoid Live, oral 11/19/2015    Typhoid, Unspecified 11/19/2015    Zoster 07/11/2018    Zoster Vaccine Recombinant 11/27/2018      Health Maintenance:         Topic Date Due    Hepatitis C Screening  1945    CRC Screening: Colonoscopy  1945    MAMMOGRAM  11/19/2020     There are no preventive care reminders to display for this patient     Medicare Health Risk Assessment:     /82   Pulse 66   Temp 97 8 °F (36 6 °C)   Resp 16   Ht 5' 4 09" (1 628 m)   Wt 49 8 kg (109 lb 12 8 oz) SpO2 97%   BMI 18 79 kg/m²      Yogesh Trevino is here for her Subsequent Wellness visit  Last Medicare Wellness visit information reviewed, patient interviewed, no change since last AWV  Health Risk Assessment:   Patient rates overall health as very good  Patient feels that their physical health rating is slightly better  Eyesight was rated as same  Hearing was rated as same  Patient feels that their emotional and mental health rating is slightly better  Pain experienced in the last 7 days has been some  Patient's pain rating has been 3/10  Patient states that she has experienced no weight loss or gain in last 6 months  Yogesh Trevino is due for mammogram being ordered  Labs discussed  - Tot Chol 227, HDL 75, , Gluc 93, Cr/LFTs normal   Is exercising - Yoga and walking  She never received the Cologuard  Immunizations are UTD  DEXA is UTD  Fall Risk Screening: In the past year, patient has experienced: no history of falling in past year      Urinary Incontinence Screening:   Patient has leaked urine accidently in the last six months  Home Safety:  Patient does not have trouble with stairs inside or outside of their home  Patient has working smoke alarms and has working carbon monoxide detector  Home safety hazards include: none  Nutrition:   Current diet is Regular and Limited junk food  Medications:   Patient is currently taking over-the-counter supplements  OTC medications include: see medication list  Patient is able to manage medications  Activities of Daily Living (ADLs)/Instrumental Activities of Daily Living (IADLs):   Walk and transfer into and out of bed and chair?: Yes  Dress and groom yourself?: Yes    Bathe or shower yourself?: Yes    Feed yourself?  Yes  Do your laundry/housekeeping?: Yes  Manage your money, pay your bills and track your expenses?: Yes  Make your own meals?: Yes    Do your own shopping?: Yes    Previous Hospitalizations:   Any hospitalizations or ED visits within the last 12 months?: No      Advance Care Planning:   Living will: Yes    Durable POA for healthcare: Yes    Advanced directive: Yes    Advanced directive counseling given: Yes    Five wishes given: Yes    Patient declined ACP directive: No    End of Life Decisions reviewed with patient: Yes    Provider agrees with end of life decisions: Yes      Cognitive Screening:   Provider or family/friend/caregiver concerned regarding cognition?: No    PREVENTIVE SCREENINGS      Cardiovascular Screening:    General: Screening Not Indicated and History Lipid Disorder      Diabetes Screening:     General: Screening Current      Colorectal Cancer Screening:       Due for: Colonoscopy - Low Risk      Breast Cancer Screening:     General: Screening Current      Cervical Cancer Screening:    General: Screening Not Indicated      Osteoporosis Screening:    General: Screening Current      Abdominal Aortic Aneurysm (AAA) Screening:        General: Screening Not Indicated      Lung Cancer Screening:     General: Screening Not Indicated      Hepatitis C Screening:    General: Screening Not Indicated    Other Counseling Topics:   Calcium and vitamin D intake and regular weightbearing exercise         Barbara Leslie DO

## 2019-11-22 NOTE — PROGRESS NOTES
Assessment/Plan:    No problem-specific Assessment & Plan notes found for this encounter  Diagnoses and all orders for this visit:    Hyperlipidemia, unspecified hyperlipidemia type  Comments:  Pt stable on exam - LDL remains elevated  She will change from taking Atorvastatin every other day, to every day  Reassess FBW prior to f/u  Orders:  -     atorvastatin (LIPITOR) 10 mg tablet; Take 1 tablet (10 mg total) by mouth daily  -     Lipid Panel with Direct LDL reflex; Future    Medicare annual wellness visit, subsequent  Comments:  Veva Sandifer is healthy on exam   She is to continue to work on a healthy, lower salt diet, and continued regular exercise  Screening for diabetes mellitus  -     Comprehensive metabolic panel; Future    Screening for cardiovascular condition    Encounter for screening mammogram for breast cancer  Comments:  Mammogram ordered today  Orders:  -     Mammo screening bilateral w cad; Future    Benign essential HTN  Comments:  Controlled on present management  Cervical radiculopathy  Comments:  Hx of; appears resolved at this time  Screening for colon cancer  Comments:  Pt referred to GI for colon cancer screening  Orders:  -     Ambulatory referral to Gastroenterology; Future          Subjective:      Patient ID: Jovita Fowler is a 76 y o  female  Pt is here today for her AWV Visit  Veva Sandifer is due for mammogram; being ordered today  Labs discussed  - Tot Chol 227, HDL 75, , Gluc 93, Cr/LFTs normal   Is exercising - Yoga and walking  She never received the Cologuard previously ordered for her    Immunizations are UTD  DEXA is UTD  The following portions of the patient's history were reviewed and updated as appropriate: allergies, current medications, past family history, past social history, past surgical history and problem list     Review of Systems   Constitutional: Negative for activity change  Respiratory: Negative for shortness of breath      Cardiovascular: Negative for chest pain  Gastrointestinal: Negative for abdominal pain and blood in stool  Genitourinary: Negative for vaginal bleeding  No new breast lumps on self-examination  Musculoskeletal: Negative for neck pain  Psychiatric/Behavioral: Negative for dysphoric mood  Occasional anxiousness  Objective:      /82   Pulse 66   Temp 97 8 °F (36 6 °C)   Resp 16   Ht 5' 4 09" (1 628 m)   Wt 49 8 kg (109 lb 12 8 oz)   SpO2 97%   BMI 18 79 kg/m²   Repeat BP = 132/72 LA seated  Physical Exam   Constitutional: She is oriented to person, place, and time  She appears well-developed and well-nourished  No distress  HENT:   Head: Normocephalic and atraumatic  Right Ear: Hearing, tympanic membrane, external ear and ear canal normal    Left Ear: Hearing, tympanic membrane, external ear and ear canal normal    Mouth/Throat: Oropharynx is clear and moist  No oropharyngeal exudate  Eyes: Conjunctivae are normal    Neck: Normal range of motion  Neck supple  No thyromegaly present  Cardiovascular: Normal rate, regular rhythm and normal heart sounds  Exam reveals no gallop and no friction rub  No murmur heard  Pulmonary/Chest: Effort normal and breath sounds normal  No stridor  No respiratory distress  She has no wheezes  She has no rales  Abdominal: Soft  Bowel sounds are normal  She exhibits no distension and no mass  There is no tenderness  There is no rebound and no guarding  Lymphadenopathy:     She has no cervical adenopathy  Neurological: She is alert and oriented to person, place, and time  Skin: She is not diaphoretic  Psychiatric: She has a normal mood and affect  Her behavior is normal  Judgment and thought content normal    Nursing note and vitals reviewed

## 2019-12-21 DIAGNOSIS — E78.2 MIXED HYPERLIPIDEMIA: ICD-10-CM

## 2019-12-21 RX ORDER — ATORVASTATIN CALCIUM 10 MG/1
TABLET, FILM COATED ORAL
Qty: 90 TABLET | Refills: 3 | Status: SHIPPED | OUTPATIENT
Start: 2019-12-21 | End: 2020-02-04 | Stop reason: HOSPADM

## 2020-01-23 ENCOUNTER — TELEPHONE (OUTPATIENT)
Dept: FAMILY MEDICINE CLINIC | Facility: CLINIC | Age: 75
End: 2020-01-23

## 2020-01-23 DIAGNOSIS — B00.9 HERPES SIMPLEX: Primary | ICD-10-CM

## 2020-01-23 RX ORDER — ACYCLOVIR 50 MG/G
OINTMENT TOPICAL
Qty: 15 G | Refills: 5 | Status: SHIPPED | OUTPATIENT
Start: 2020-01-23 | End: 2020-02-04 | Stop reason: HOSPADM

## 2020-01-23 NOTE — TELEPHONE ENCOUNTER
Pharmacy left message on script line that patients  acyclovir (ZOVIRAX) 5 % ointment [[id number]]     Order Details   Dose: -- Route: Topical Frequency: Every 3 hours scheduled   Dispense Quantity: 15 g Refills: 5 Fills remaining: --           Sig: Apply topically every 3 (three) hours        Is non-formulary per her insurance  There were no alternatives given by insurance  Are you able to order something similar? Please advise  Pharmacy on File

## 2020-01-24 ENCOUNTER — APPOINTMENT (INPATIENT)
Dept: NON INVASIVE DIAGNOSTICS | Facility: HOSPITAL | Age: 75
DRG: 282 | End: 2020-01-24
Payer: MEDICARE

## 2020-01-24 ENCOUNTER — HOSPITAL ENCOUNTER (INPATIENT)
Facility: HOSPITAL | Age: 75
LOS: 1 days | DRG: 282 | End: 2020-01-24
Attending: EMERGENCY MEDICINE | Admitting: INTERNAL MEDICINE
Payer: MEDICARE

## 2020-01-24 ENCOUNTER — APPOINTMENT (EMERGENCY)
Dept: CT IMAGING | Facility: HOSPITAL | Age: 75
DRG: 282 | End: 2020-01-24
Payer: MEDICARE

## 2020-01-24 ENCOUNTER — APPOINTMENT (EMERGENCY)
Dept: RADIOLOGY | Facility: HOSPITAL | Age: 75
DRG: 282 | End: 2020-01-24
Payer: MEDICARE

## 2020-01-24 ENCOUNTER — HOSPITAL ENCOUNTER (INPATIENT)
Facility: HOSPITAL | Age: 75
LOS: 11 days | Discharge: HOME HEALTH | DRG: 235 | End: 2020-02-04
Attending: INTERNAL MEDICINE | Admitting: THORACIC SURGERY (CARDIOTHORACIC VASCULAR SURGERY)
Payer: MEDICARE

## 2020-01-24 VITALS
HEART RATE: 81 BPM | OXYGEN SATURATION: 99 % | SYSTOLIC BLOOD PRESSURE: 139 MMHG | WEIGHT: 117.28 LBS | TEMPERATURE: 98.6 F | BODY MASS INDEX: 20.07 KG/M2 | RESPIRATION RATE: 18 BRPM | DIASTOLIC BLOOD PRESSURE: 58 MMHG

## 2020-01-24 DIAGNOSIS — Z95.1 S/P CABG X 3: ICD-10-CM

## 2020-01-24 DIAGNOSIS — R55 SYNCOPE: ICD-10-CM

## 2020-01-24 DIAGNOSIS — J94.0 CHYLOTHORAX, UNSPECIFIED LATERALITY: ICD-10-CM

## 2020-01-24 DIAGNOSIS — R77.8 ELEVATED TROPONIN: Primary | ICD-10-CM

## 2020-01-24 DIAGNOSIS — R61 DIAPHORESIS: ICD-10-CM

## 2020-01-24 DIAGNOSIS — I25.119: ICD-10-CM

## 2020-01-24 DIAGNOSIS — I21.4 NSTEMI (NON-ST ELEVATION MYOCARDIAL INFARCTION) (HCC): Primary | ICD-10-CM

## 2020-01-24 LAB
ALBUMIN SERPL BCP-MCNC: 3.7 G/DL (ref 3.5–5)
ALP SERPL-CCNC: 76 U/L (ref 46–116)
ALT SERPL W P-5'-P-CCNC: 32 U/L (ref 12–78)
ANION GAP SERPL CALCULATED.3IONS-SCNC: 6 MMOL/L (ref 4–13)
APTT PPP: 23 SECONDS (ref 23–37)
APTT PPP: 24 SECONDS (ref 23–37)
AST SERPL W P-5'-P-CCNC: 44 U/L (ref 5–45)
ATRIAL RATE: 66 BPM
ATRIAL RATE: 67 BPM
BASOPHILS # BLD AUTO: 0.08 THOUSANDS/ΜL (ref 0–0.1)
BASOPHILS NFR BLD AUTO: 1 % (ref 0–1)
BILIRUB SERPL-MCNC: 0.21 MG/DL (ref 0.2–1)
BUN SERPL-MCNC: 29 MG/DL (ref 5–25)
CALCIUM SERPL-MCNC: 9.3 MG/DL (ref 8.3–10.1)
CHLORIDE SERPL-SCNC: 105 MMOL/L (ref 100–108)
CO2 SERPL-SCNC: 32 MMOL/L (ref 21–32)
CREAT SERPL-MCNC: 0.69 MG/DL (ref 0.6–1.3)
EOSINOPHIL # BLD AUTO: 0.14 THOUSAND/ΜL (ref 0–0.61)
EOSINOPHIL NFR BLD AUTO: 2 % (ref 0–6)
ERYTHROCYTE [DISTWIDTH] IN BLOOD BY AUTOMATED COUNT: 13.8 % (ref 11.6–15.1)
GFR SERPL CREATININE-BSD FRML MDRD: 86 ML/MIN/1.73SQ M
GLUCOSE SERPL-MCNC: 113 MG/DL (ref 65–140)
HCT VFR BLD AUTO: 38.8 % (ref 34.8–46.1)
HGB BLD-MCNC: 12.4 G/DL (ref 11.5–15.4)
IMM GRANULOCYTES # BLD AUTO: 0.03 THOUSAND/UL (ref 0–0.2)
IMM GRANULOCYTES NFR BLD AUTO: 0 % (ref 0–2)
INR PPP: 0.84 (ref 0.84–1.19)
INR PPP: 1.01 (ref 0.84–1.19)
LIPASE SERPL-CCNC: 176 U/L (ref 73–393)
LYMPHOCYTES # BLD AUTO: 1.62 THOUSANDS/ΜL (ref 0.6–4.47)
LYMPHOCYTES NFR BLD AUTO: 23 % (ref 14–44)
MAGNESIUM SERPL-MCNC: 2.2 MG/DL (ref 1.6–2.6)
MCH RBC QN AUTO: 29.9 PG (ref 26.8–34.3)
MCHC RBC AUTO-ENTMCNC: 32 G/DL (ref 31.4–37.4)
MCV RBC AUTO: 94 FL (ref 82–98)
MONOCYTES # BLD AUTO: 0.57 THOUSAND/ΜL (ref 0.17–1.22)
MONOCYTES NFR BLD AUTO: 8 % (ref 4–12)
NEUTROPHILS # BLD AUTO: 4.51 THOUSANDS/ΜL (ref 1.85–7.62)
NEUTS SEG NFR BLD AUTO: 66 % (ref 43–75)
NRBC BLD AUTO-RTO: 0 /100 WBCS
NT-PROBNP SERPL-MCNC: 49 PG/ML
P AXIS: 76 DEGREES
P AXIS: 91 DEGREES
PLATELET # BLD AUTO: 292 THOUSANDS/UL (ref 149–390)
PMV BLD AUTO: 11 FL (ref 8.9–12.7)
POTASSIUM SERPL-SCNC: 3.7 MMOL/L (ref 3.5–5.3)
PR INTERVAL: 188 MS
PR INTERVAL: 232 MS
PROT SERPL-MCNC: 7 G/DL (ref 6.4–8.2)
PROTHROMBIN TIME: 11 SECONDS (ref 11.6–14.5)
PROTHROMBIN TIME: 12.9 SECONDS (ref 11.6–14.5)
QRS AXIS: 69 DEGREES
QRS AXIS: 74 DEGREES
QRSD INTERVAL: 74 MS
QRSD INTERVAL: 78 MS
QT INTERVAL: 394 MS
QT INTERVAL: 400 MS
QTC INTERVAL: 416 MS
QTC INTERVAL: 419 MS
RBC # BLD AUTO: 4.15 MILLION/UL (ref 3.81–5.12)
SODIUM SERPL-SCNC: 143 MMOL/L (ref 136–145)
T WAVE AXIS: 69 DEGREES
T WAVE AXIS: 78 DEGREES
TROPONIN I SERPL-MCNC: 1.75 NG/ML
TROPONIN I SERPL-MCNC: 2.48 NG/ML
TROPONIN I SERPL-MCNC: 3.17 NG/ML
TROPONIN I SERPL-MCNC: 3.97 NG/ML
VENTRICULAR RATE: 66 BPM
VENTRICULAR RATE: 67 BPM
WBC # BLD AUTO: 6.95 THOUSAND/UL (ref 4.31–10.16)

## 2020-01-24 PROCEDURE — 83880 ASSAY OF NATRIURETIC PEPTIDE: CPT | Performed by: EMERGENCY MEDICINE

## 2020-01-24 PROCEDURE — 93454 CORONARY ARTERY ANGIO S&I: CPT | Performed by: INTERNAL MEDICINE

## 2020-01-24 PROCEDURE — 93454 CORONARY ARTERY ANGIO S&I: CPT | Performed by: NURSE PRACTITIONER

## 2020-01-24 PROCEDURE — 85610 PROTHROMBIN TIME: CPT | Performed by: INTERNAL MEDICINE

## 2020-01-24 PROCEDURE — 99223 1ST HOSP IP/OBS HIGH 75: CPT | Performed by: INTERNAL MEDICINE

## 2020-01-24 PROCEDURE — C1769 GUIDE WIRE: HCPCS | Performed by: NURSE PRACTITIONER

## 2020-01-24 PROCEDURE — 83690 ASSAY OF LIPASE: CPT | Performed by: EMERGENCY MEDICINE

## 2020-01-24 PROCEDURE — 84484 ASSAY OF TROPONIN QUANT: CPT | Performed by: EMERGENCY MEDICINE

## 2020-01-24 PROCEDURE — 96375 TX/PRO/DX INJ NEW DRUG ADDON: CPT

## 2020-01-24 PROCEDURE — 1123F ACP DISCUSS/DSCN MKR DOCD: CPT | Performed by: STUDENT IN AN ORGANIZED HEALTH CARE EDUCATION/TRAINING PROGRAM

## 2020-01-24 PROCEDURE — 99285 EMERGENCY DEPT VISIT HI MDM: CPT

## 2020-01-24 PROCEDURE — 36415 COLL VENOUS BLD VENIPUNCTURE: CPT | Performed by: EMERGENCY MEDICINE

## 2020-01-24 PROCEDURE — 99153 MOD SED SAME PHYS/QHP EA: CPT | Performed by: NURSE PRACTITIONER

## 2020-01-24 PROCEDURE — NC001 PR NO CHARGE: Performed by: INTERNAL MEDICINE

## 2020-01-24 PROCEDURE — 71046 X-RAY EXAM CHEST 2 VIEWS: CPT

## 2020-01-24 PROCEDURE — 99152 MOD SED SAME PHYS/QHP 5/>YRS: CPT | Performed by: NURSE PRACTITIONER

## 2020-01-24 PROCEDURE — 99152 MOD SED SAME PHYS/QHP 5/>YRS: CPT | Performed by: INTERNAL MEDICINE

## 2020-01-24 PROCEDURE — 93306 TTE W/DOPPLER COMPLETE: CPT | Performed by: INTERNAL MEDICINE

## 2020-01-24 PROCEDURE — 99221 1ST HOSP IP/OBS SF/LOW 40: CPT | Performed by: INTERNAL MEDICINE

## 2020-01-24 PROCEDURE — 83735 ASSAY OF MAGNESIUM: CPT | Performed by: EMERGENCY MEDICINE

## 2020-01-24 PROCEDURE — 99285 EMERGENCY DEPT VISIT HI MDM: CPT | Performed by: EMERGENCY MEDICINE

## 2020-01-24 PROCEDURE — 96365 THER/PROPH/DIAG IV INF INIT: CPT

## 2020-01-24 PROCEDURE — 96361 HYDRATE IV INFUSION ADD-ON: CPT

## 2020-01-24 PROCEDURE — 93010 ELECTROCARDIOGRAM REPORT: CPT | Performed by: INTERNAL MEDICINE

## 2020-01-24 PROCEDURE — 80053 COMPREHEN METABOLIC PANEL: CPT | Performed by: EMERGENCY MEDICINE

## 2020-01-24 PROCEDURE — 70450 CT HEAD/BRAIN W/O DYE: CPT

## 2020-01-24 PROCEDURE — C1894 INTRO/SHEATH, NON-LASER: HCPCS | Performed by: NURSE PRACTITIONER

## 2020-01-24 PROCEDURE — B2111ZZ FLUOROSCOPY OF MULTIPLE CORONARY ARTERIES USING LOW OSMOLAR CONTRAST: ICD-10-PCS | Performed by: INTERNAL MEDICINE

## 2020-01-24 PROCEDURE — 84484 ASSAY OF TROPONIN QUANT: CPT | Performed by: INTERNAL MEDICINE

## 2020-01-24 PROCEDURE — C1760 CLOSURE DEV, VASC: HCPCS | Performed by: NURSE PRACTITIONER

## 2020-01-24 PROCEDURE — 93005 ELECTROCARDIOGRAM TRACING: CPT

## 2020-01-24 PROCEDURE — 93306 TTE W/DOPPLER COMPLETE: CPT

## 2020-01-24 PROCEDURE — 85730 THROMBOPLASTIN TIME PARTIAL: CPT | Performed by: INTERNAL MEDICINE

## 2020-01-24 PROCEDURE — 85610 PROTHROMBIN TIME: CPT | Performed by: EMERGENCY MEDICINE

## 2020-01-24 PROCEDURE — 85025 COMPLETE CBC W/AUTO DIFF WBC: CPT | Performed by: EMERGENCY MEDICINE

## 2020-01-24 PROCEDURE — 85730 THROMBOPLASTIN TIME PARTIAL: CPT | Performed by: EMERGENCY MEDICINE

## 2020-01-24 RX ORDER — HEPARIN SODIUM 1000 [USP'U]/ML
1500 INJECTION, SOLUTION INTRAVENOUS; SUBCUTANEOUS AS NEEDED
Status: DISCONTINUED | OUTPATIENT
Start: 2020-01-24 | End: 2020-01-24 | Stop reason: HOSPADM

## 2020-01-24 RX ORDER — SODIUM CHLORIDE 9 MG/ML
INJECTION, SOLUTION INTRAVENOUS
Status: COMPLETED | OUTPATIENT
Start: 2020-01-24 | End: 2020-01-24

## 2020-01-24 RX ORDER — LIDOCAINE HYDROCHLORIDE 10 MG/ML
INJECTION, SOLUTION EPIDURAL; INFILTRATION; INTRACAUDAL; PERINEURAL CODE/TRAUMA/SEDATION MEDICATION
Status: COMPLETED | OUTPATIENT
Start: 2020-01-24 | End: 2020-01-24

## 2020-01-24 RX ORDER — ONDANSETRON 2 MG/ML
1 INJECTION INTRAMUSCULAR; INTRAVENOUS ONCE
Status: DISCONTINUED | OUTPATIENT
Start: 2020-01-24 | End: 2020-01-24 | Stop reason: HOSPADM

## 2020-01-24 RX ORDER — HEPARIN SODIUM 10000 [USP'U]/100ML
3-20 INJECTION, SOLUTION INTRAVENOUS
Status: DISCONTINUED | OUTPATIENT
Start: 2020-01-24 | End: 2020-01-26

## 2020-01-24 RX ORDER — HEPARIN SODIUM 10000 [USP'U]/100ML
3-20 INJECTION, SOLUTION INTRAVENOUS
Status: DISCONTINUED | OUTPATIENT
Start: 2020-01-24 | End: 2020-01-24 | Stop reason: HOSPADM

## 2020-01-24 RX ORDER — ASPIRIN 81 MG/1
81 TABLET, CHEWABLE ORAL DAILY
Status: DISCONTINUED | OUTPATIENT
Start: 2020-01-25 | End: 2020-01-27 | Stop reason: HOSPADM

## 2020-01-24 RX ORDER — ACETAMINOPHEN 325 MG/1
650 TABLET ORAL EVERY 6 HOURS PRN
Status: DISCONTINUED | OUTPATIENT
Start: 2020-01-24 | End: 2020-01-24

## 2020-01-24 RX ORDER — ONDANSETRON 2 MG/ML
4 INJECTION INTRAMUSCULAR; INTRAVENOUS EVERY 6 HOURS PRN
Status: DISCONTINUED | OUTPATIENT
Start: 2020-01-24 | End: 2020-01-24 | Stop reason: HOSPADM

## 2020-01-24 RX ORDER — ASPIRIN 81 MG/1
81 TABLET, CHEWABLE ORAL DAILY
Status: DISCONTINUED | OUTPATIENT
Start: 2020-01-25 | End: 2020-01-24 | Stop reason: HOSPADM

## 2020-01-24 RX ORDER — ONDANSETRON 2 MG/ML
4 INJECTION INTRAMUSCULAR; INTRAVENOUS ONCE
Status: COMPLETED | OUTPATIENT
Start: 2020-01-24 | End: 2020-01-24

## 2020-01-24 RX ORDER — SODIUM CHLORIDE 9 MG/ML
75 INJECTION, SOLUTION INTRAVENOUS CONTINUOUS
Status: DISCONTINUED | OUTPATIENT
Start: 2020-01-24 | End: 2020-01-25

## 2020-01-24 RX ORDER — ACETAMINOPHEN 325 MG/1
650 TABLET ORAL EVERY 6 HOURS PRN
Status: DISCONTINUED | OUTPATIENT
Start: 2020-01-24 | End: 2020-01-27 | Stop reason: HOSPADM

## 2020-01-24 RX ORDER — SODIUM CHLORIDE 9 MG/ML
75 INJECTION, SOLUTION INTRAVENOUS CONTINUOUS
Status: DISCONTINUED | OUTPATIENT
Start: 2020-01-24 | End: 2020-01-24 | Stop reason: HOSPADM

## 2020-01-24 RX ORDER — ACETAMINOPHEN 325 MG/1
650 TABLET ORAL EVERY 6 HOURS PRN
Status: DISCONTINUED | OUTPATIENT
Start: 2020-01-24 | End: 2020-01-24 | Stop reason: HOSPADM

## 2020-01-24 RX ORDER — CALCIUM CARBONATE 200(500)MG
1000 TABLET,CHEWABLE ORAL DAILY PRN
Status: DISCONTINUED | OUTPATIENT
Start: 2020-01-24 | End: 2020-01-24 | Stop reason: HOSPADM

## 2020-01-24 RX ORDER — ATORVASTATIN CALCIUM 10 MG/1
10 TABLET, FILM COATED ORAL EVERY OTHER DAY
Status: DISCONTINUED | OUTPATIENT
Start: 2020-01-24 | End: 2020-01-24

## 2020-01-24 RX ORDER — ASPIRIN 325 MG
325 TABLET ORAL DAILY
Status: DISCONTINUED | OUTPATIENT
Start: 2020-01-25 | End: 2020-01-24

## 2020-01-24 RX ORDER — ATORVASTATIN CALCIUM 40 MG/1
40 TABLET, FILM COATED ORAL DAILY
Status: DISCONTINUED | OUTPATIENT
Start: 2020-01-25 | End: 2020-01-27 | Stop reason: HOSPADM

## 2020-01-24 RX ORDER — HEPARIN SODIUM 1000 [USP'U]/ML
1500 INJECTION, SOLUTION INTRAVENOUS; SUBCUTANEOUS AS NEEDED
Status: DISCONTINUED | OUTPATIENT
Start: 2020-01-24 | End: 2020-01-26

## 2020-01-24 RX ORDER — MIDAZOLAM HYDROCHLORIDE 2 MG/2ML
INJECTION, SOLUTION INTRAMUSCULAR; INTRAVENOUS CODE/TRAUMA/SEDATION MEDICATION
Status: COMPLETED | OUTPATIENT
Start: 2020-01-24 | End: 2020-01-24

## 2020-01-24 RX ORDER — DOCUSATE SODIUM 100 MG/1
100 CAPSULE, LIQUID FILLED ORAL 2 TIMES DAILY PRN
Status: DISCONTINUED | OUTPATIENT
Start: 2020-01-24 | End: 2020-01-24 | Stop reason: HOSPADM

## 2020-01-24 RX ORDER — ONDANSETRON 2 MG/ML
4 INJECTION INTRAMUSCULAR; INTRAVENOUS EVERY 6 HOURS PRN
Status: DISCONTINUED | OUTPATIENT
Start: 2020-01-24 | End: 2020-01-27 | Stop reason: HOSPADM

## 2020-01-24 RX ORDER — ASPIRIN 81 MG/1
324 TABLET, CHEWABLE ORAL ONCE
Status: COMPLETED | OUTPATIENT
Start: 2020-01-24 | End: 2020-01-24

## 2020-01-24 RX ORDER — ATORVASTATIN CALCIUM 40 MG/1
40 TABLET, FILM COATED ORAL DAILY
Status: DISCONTINUED | OUTPATIENT
Start: 2020-01-24 | End: 2020-01-24 | Stop reason: HOSPADM

## 2020-01-24 RX ORDER — DOCUSATE SODIUM 100 MG/1
100 CAPSULE, LIQUID FILLED ORAL 2 TIMES DAILY PRN
Status: DISCONTINUED | OUTPATIENT
Start: 2020-01-24 | End: 2020-01-27 | Stop reason: HOSPADM

## 2020-01-24 RX ORDER — FENTANYL CITRATE 50 UG/ML
INJECTION, SOLUTION INTRAMUSCULAR; INTRAVENOUS CODE/TRAUMA/SEDATION MEDICATION
Status: COMPLETED | OUTPATIENT
Start: 2020-01-24 | End: 2020-01-24

## 2020-01-24 RX ORDER — HEPARIN SODIUM 1000 [USP'U]/ML
3000 INJECTION, SOLUTION INTRAVENOUS; SUBCUTANEOUS AS NEEDED
Status: DISCONTINUED | OUTPATIENT
Start: 2020-01-24 | End: 2020-01-26

## 2020-01-24 RX ORDER — CALCIUM CARBONATE 200(500)MG
1000 TABLET,CHEWABLE ORAL DAILY PRN
Status: DISCONTINUED | OUTPATIENT
Start: 2020-01-24 | End: 2020-01-27 | Stop reason: HOSPADM

## 2020-01-24 RX ORDER — HEPARIN SODIUM 1000 [USP'U]/ML
3000 INJECTION, SOLUTION INTRAVENOUS; SUBCUTANEOUS AS NEEDED
Status: DISCONTINUED | OUTPATIENT
Start: 2020-01-24 | End: 2020-01-24 | Stop reason: HOSPADM

## 2020-01-24 RX ORDER — HEPARIN SODIUM 1000 [USP'U]/ML
3000 INJECTION, SOLUTION INTRAVENOUS; SUBCUTANEOUS ONCE
Status: COMPLETED | OUTPATIENT
Start: 2020-01-24 | End: 2020-01-24

## 2020-01-24 RX ADMIN — FENTANYL CITRATE 50 MCG: 50 INJECTION INTRAMUSCULAR; INTRAVENOUS at 13:10

## 2020-01-24 RX ADMIN — IOHEXOL 70 ML: 350 INJECTION, SOLUTION INTRAVENOUS at 13:26

## 2020-01-24 RX ADMIN — SODIUM CHLORIDE 75 ML/HR: 0.9 INJECTION, SOLUTION INTRAVENOUS at 14:41

## 2020-01-24 RX ADMIN — LIDOCAINE HYDROCHLORIDE 10 ML: 10 INJECTION, SOLUTION EPIDURAL; INFILTRATION; INTRACAUDAL; PERINEURAL at 13:12

## 2020-01-24 RX ADMIN — HEPARIN SODIUM AND DEXTROSE 12 UNITS/KG/HR: 10000; 5 INJECTION INTRAVENOUS at 20:25

## 2020-01-24 RX ADMIN — FENTANYL CITRATE 25 MCG: 50 INJECTION INTRAMUSCULAR; INTRAVENOUS at 13:21

## 2020-01-24 RX ADMIN — SODIUM CHLORIDE 75 ML/HR: 0.9 INJECTION, SOLUTION INTRAVENOUS at 20:12

## 2020-01-24 RX ADMIN — SODIUM CHLORIDE 1000 ML: 0.9 INJECTION, SOLUTION INTRAVENOUS at 07:25

## 2020-01-24 RX ADMIN — ATORVASTATIN CALCIUM 40 MG: 40 TABLET, FILM COATED ORAL at 14:52

## 2020-01-24 RX ADMIN — HEPARIN SODIUM AND DEXTROSE 12 UNITS/KG/HR: 10000; 5 INJECTION INTRAVENOUS at 08:43

## 2020-01-24 RX ADMIN — ASPIRIN 81 MG 324 MG: 81 TABLET ORAL at 08:27

## 2020-01-24 RX ADMIN — HEPARIN SODIUM 3000 UNITS: 1000 INJECTION INTRAVENOUS; SUBCUTANEOUS at 08:41

## 2020-01-24 RX ADMIN — ACETAMINOPHEN 650 MG: 325 TABLET ORAL at 22:38

## 2020-01-24 RX ADMIN — SODIUM CHLORIDE 75 ML/HR: 0.9 INJECTION, SOLUTION INTRAVENOUS at 13:12

## 2020-01-24 RX ADMIN — ONDANSETRON 4 MG: 2 INJECTION INTRAMUSCULAR; INTRAVENOUS at 08:12

## 2020-01-24 RX ADMIN — MIDAZOLAM HYDROCHLORIDE 2 MG: 1 INJECTION, SOLUTION INTRAMUSCULAR; INTRAVENOUS at 13:09

## 2020-01-24 RX ADMIN — MIDAZOLAM HYDROCHLORIDE 1 MG: 1 INJECTION, SOLUTION INTRAMUSCULAR; INTRAVENOUS at 13:21

## 2020-01-24 NOTE — ASSESSMENT & PLAN NOTE
· Concerning for cardiac syncope    · Check 2D echocardiogram  · Cardiology evaluation  · Monitor on telemetry

## 2020-01-24 NOTE — ED PROVIDER NOTES
History  Chief Complaint   Patient presents with    Syncope     pt woke up this morning feeling lightheaded, nauseas, and had a syncopal episode woke up on the floor       History provided by:  Patient   used: No    Syncope   Episode history:  Single  Most recent episode: Today  Timing:  Intermittent  Progression:  Waxing and waning  Chronicity:  New  Witnessed: no    Relieved by:  None tried (time, position)  Worsened by:  Nothing  Ineffective treatments:  None tried  Associated symptoms: no chest pain, no diaphoresis, no dizziness, no fever, no nausea, no palpitations, no shortness of breath, no vomiting and no weakness    Risk factors comment:  Hypertension      Prior to Admission Medications   Prescriptions Last Dose Informant Patient Reported? Taking? Calcium Carb-Cholecalciferol (CALCIUM 600 + D) 600-200 MG-UNIT TABS  Self Yes No   Sig: Calcium 600 + D TABS  TAKE 1 TABLET DAILY  Refills: 0    Active   Fish Oil-Cholecalciferol (FISH OIL + D3) 7467-9712 MG-UNIT CAPS  Self Yes No   Sig: Fish Oil 1200 MG Oral Capsule  Take 2 tablets daily   Refills: 0    Active   acyclovir (ZOVIRAX) 5 % ointment   No No   Sig: Apply topically every 3 (three) hours   aspirin 81 MG tablet  Self Yes No   Sig: Take 1 tablet (81mg) by mouth once daily     atorvastatin (LIPITOR) 10 mg tablet   No No   Sig: Take 1 tablet (10 mg total) by mouth daily   atorvastatin (LIPITOR) 10 mg tablet   No No   Sig: TAKE ONE TABLET BY MOUTH EVERY OTHER DAY   cholecalciferol (VITAMIN D3) 1,000 units tablet  Self Yes No   Sig: Take 1,000 Units by mouth daily   triamterene-hydrochlorothiazide (MAXZIDE-25) 37 5-25 mg per tablet   No No   Sig: TAKE ONE-HALF TABLET BY MOUTH EVERY DAY AS NEEDED      Facility-Administered Medications: None       Past Medical History:   Diagnosis Date    Effusion of left knee     Last assessed - 9/10/15    Hyperlipidemia     Hypertension     Tick bite     Last assessed - 4/21/17       Past Surgical History:   Procedure Laterality Date    APPENDECTOMY      TONSILLECTOMY      Last assessed - 4/20/17    TUBAL LIGATION      Last assessed - 4/20/17    WISDOM TOOTH EXTRACTION      Last assessed - 4/20/17       Family History   Problem Relation Age of Onset    Coronary artery disease Mother     Arthritis Mother     Other Mother         Cardiac Disorder     Transient ischemic attack Mother     Heart attack Father    Bekah Barahona Sudden death Father         SCD     I have reviewed and agree with the history as documented  Social History     Tobacco Use    Smoking status: Never Smoker    Smokeless tobacco: Never Used   Substance Use Topics    Alcohol use: Yes     Comment: 1 wine nightly    Drug use: No        Review of Systems   Constitutional: Negative for activity change, appetite change, chills, diaphoresis, fatigue and fever  HENT: Negative for congestion, dental problem, ear discharge, facial swelling, nosebleeds, rhinorrhea, sinus pressure and trouble swallowing  Eyes: Negative for photophobia, discharge, itching and visual disturbance  Respiratory: Negative for choking, chest tightness and shortness of breath  Cardiovascular: Positive for syncope  Negative for chest pain, palpitations and leg swelling  Gastrointestinal: Negative for abdominal distention, abdominal pain, constipation, diarrhea, nausea and vomiting  Endocrine: Negative for polydipsia and polyphagia  Genitourinary: Negative for decreased urine volume, difficulty urinating, dysuria, flank pain, frequency and hematuria  Musculoskeletal: Negative for back pain, gait problem, joint swelling, neck pain and neck stiffness  Skin: Negative for color change and rash  Neurological: Positive for syncope  Negative for dizziness, facial asymmetry, speech difficulty, weakness and light-headedness  Psychiatric/Behavioral: Negative for agitation and behavioral problems  The patient is not nervous/anxious and is not hyperactive      All other systems reviewed and are negative  Physical Exam  Physical Exam   Constitutional: She is oriented to person, place, and time  She appears well-developed and well-nourished  No distress  HENT:   Head: Normocephalic and atraumatic  Eyes: Pupils are equal, round, and reactive to light  EOM are normal    Neck: Normal range of motion  Neck supple  Cardiovascular: Normal rate, regular rhythm and normal heart sounds  No murmur heard  Pulmonary/Chest: Effort normal and breath sounds normal  No respiratory distress  She has no wheezes  She has no rales  Clear, symmetric   Abdominal: Soft  Bowel sounds are normal  She exhibits no distension  There is no tenderness  There is no rebound and no guarding  Musculoskeletal: Normal range of motion  She exhibits no edema or deformity  No lower extremity swelling or edema  Lymphadenopathy:     She has no cervical adenopathy  Neurological: She is alert and oriented to person, place, and time  No cranial nerve deficit  She exhibits normal muscle tone  Coordination normal    Normal cranial nerve exam   Normal strength and sensation bilateral upper and lower extremities  Normal coordination  Skin: Capillary refill takes less than 2 seconds  No rash noted  No erythema  Psychiatric: She has a normal mood and affect  Her behavior is normal    Nursing note and vitals reviewed        Vital Signs  ED Triage Vitals   Temperature Pulse Respirations Blood Pressure SpO2   01/24/20 0709 01/24/20 0704 01/24/20 0704 01/24/20 0704 01/24/20 0704   97 5 °F (36 4 °C) 68 19 163/82 99 %      Temp Source Heart Rate Source Patient Position - Orthostatic VS BP Location FiO2 (%)   01/24/20 0709 01/24/20 0704 01/24/20 0704 01/24/20 0704 --   Oral Monitor Lying Right arm       Pain Score       01/24/20 0704       No Pain           Vitals:    01/24/20 0704 01/24/20 0815 01/24/20 1031   BP: 163/82  129/67   Pulse: 68 74 77   Patient Position - Orthostatic VS: Lying  Lying Visual Acuity      ED Medications  Medications   ondansetron (FOR EMS ONLY) (ZOFRAN) 4 mg/2 mL injection 4 mg (has no administration in time range)   heparin (porcine) 25,000 units in 250 mL infusion (premix) (12 Units/kg/hr × 50 kg (Order-Specific) Intravenous New Bag 1/24/20 0843)   heparin (porcine) injection 3,000 Units (has no administration in time range)   heparin (porcine) injection 1,500 Units (has no administration in time range)   atorvastatin (LIPITOR) tablet 40 mg (has no administration in time range)   aspirin tablet 325 mg (has no administration in time range)   atorvastatin (LIPITOR) tablet 10 mg (has no administration in time range)   docusate sodium (COLACE) capsule 100 mg (has no administration in time range)   ondansetron (ZOFRAN) injection 4 mg (has no administration in time range)   calcium carbonate (TUMS) chewable tablet 1,000 mg (has no administration in time range)   acetaminophen (TYLENOL) tablet 650 mg (has no administration in time range)   sodium chloride 0 9 % bolus 1,000 mL (1,000 mL Intravenous New Bag 1/24/20 0725)   ondansetron (ZOFRAN) injection 4 mg (4 mg Intravenous Given 1/24/20 0812)   aspirin chewable tablet 324 mg (324 mg Oral Given 1/24/20 0827)   heparin (porcine) injection 3,000 Units (3,000 Units Intravenous Given 1/24/20 0841)       Diagnostic Studies  Results Reviewed     Procedure Component Value Units Date/Time    Troponin I [[id number]]  (Abnormal) Collected:  01/24/20 1034    Lab Status:  Final result Specimen:  Blood from Arm, Right Updated:  01/24/20 1127     Troponin I 3 17 ng/mL     APTT six (6) hours after Heparin bolus/drip initiation or dosing change [[id number]]     Lab Status:  No result Specimen:  Blood from Arm, Right     Protime-INR [[id number]]     Lab Status:  No result Specimen:  Blood from Arm, Right     Magnesium [[id number]]  (Normal) Collected:  01/24/20 0712    Lab Status:  Final result Specimen:  Blood from Arm, Left Updated:  01/24/20 4389 Magnesium 2 2 mg/dL     NT-BNP PRO [[id number]]  (Normal) Collected:  01/24/20 0712    Lab Status:  Final result Specimen:  Blood from Arm, Left Updated:  01/24/20 0746     NT-proBNP 49 pg/mL     Lipase [[id number]]  (Normal) Collected:  01/24/20 0712    Lab Status:  Final result Specimen:  Blood from Arm, Left Updated:  01/24/20 0746     Lipase 176 u/L     Comprehensive metabolic panel [[id number]]  (Abnormal) Collected:  01/24/20 0712    Lab Status:  Final result Specimen:  Blood from Arm, Left Updated:  01/24/20 0741     Sodium 143 mmol/L      Potassium 3 7 mmol/L      Chloride 105 mmol/L      CO2 32 mmol/L      ANION GAP 6 mmol/L      BUN 29 mg/dL      Creatinine 0 69 mg/dL      Glucose 113 mg/dL      Calcium 9 3 mg/dL      AST 44 U/L      ALT 32 U/L      Alkaline Phosphatase 76 U/L      Total Protein 7 0 g/dL      Albumin 3 7 g/dL      Total Bilirubin 0 21 mg/dL      eGFR 86 ml/min/1 73sq m     Narrative:       [place] guidelines for Chronic Kidney Disease (CKD):     Stage 1 with normal or high GFR (GFR > 90 mL/min/1 73 square meters)    Stage 2 Mild CKD (GFR = 60-89 mL/min/1 73 square meters)    Stage 3A Moderate CKD (GFR = 45-59 mL/min/1 73 square meters)    Stage 3B Moderate CKD (GFR = 30-44 mL/min/1 73 square meters)    Stage 4 Severe CKD (GFR = 15-29 mL/min/1 73 square meters)    Stage 5 End Stage CKD (GFR <15 mL/min/1 73 square meters)  Note: GFR calculation is accurate only with a steady state creatinine    Troponin I [[id number]]  (Abnormal) Collected:  01/24/20 0712    Lab Status:  Final result Specimen:  Blood from Arm, Left Updated:  01/24/20 0741     Troponin I 1 75 ng/mL     Protime-INR [[id number]]  (Abnormal) Collected:  01/24/20 4217    Lab Status:  Final result Specimen:  Blood from Arm, Left Updated:  01/24/20 0736     Protime 11 0 seconds      INR 0 84    APTT [[id number]]  (Normal) Collected:  01/24/20 3110    Lab Status:  Final result Specimen:  Blood from Arm, Left Updated:  01/24/20 0736     PTT 23 seconds     CBC and differential [[id number]] Collected:  01/24/20 0781    Lab Status:  Final result Specimen:  Blood from Arm, Left Updated:  01/24/20 0724     WBC 6 95 Thousand/uL      RBC 4 15 Million/uL      Hemoglobin 12 4 g/dL      Hematocrit 38 8 %      MCV 94 fL      MCH 29 9 pg      MCHC 32 0 g/dL      RDW 13 8 %      MPV 11 0 fL      Platelets 967 Thousands/uL      nRBC 0 /100 WBCs      Neutrophils Relative 66 %      Immat GRANS % 0 %      Lymphocytes Relative 23 %      Monocytes Relative 8 %      Eosinophils Relative 2 %      Basophils Relative 1 %      Neutrophils Absolute 4 51 Thousands/µL      Immature Grans Absolute 0 03 Thousand/uL      Lymphocytes Absolute 1 62 Thousands/µL      Monocytes Absolute 0 57 Thousand/µL      Eosinophils Absolute 0 14 Thousand/µL      Basophils Absolute 0 08 Thousands/µL                  CT head without contrast   Final Result by Gómez West DO (01/24 1958)   Cerebral atrophy with chronic small vessel ischemic white matter disease  No acute intracranial abnormality  Workstation performed: [id number]         XR chest 2 views   Final Result by Tracey Olmedo MD (01/24 1157)      No acute cardiopulmonary disease              Workstation performed: [id number]                    Procedures  ECG 12 Lead Documentation Only  Date/Time: 1/24/2020 7:21 AM  Performed by: Kirk Bolivar MD  Authorized by: Kirk Bolivar MD     Indications / Diagnosis:  Syncope  ECG reviewed by me, the ED Provider: yes    Patient location:  ED  Previous ECG:     Previous ECG:  Compared to current    Similarity:  Changes noted  Interpretation:     Interpretation: non-specific    Ectopy:     Ectopy: none    QRS:     QRS axis:  Normal    QRS intervals:  Normal  Conduction:     Conduction: abnormal    ST segments:     ST segments:  Normal  T waves:     T waves: normal      ECG 12 Lead Documentation Only  Date/Time: 1/24/2020 7:47 AM  Performed by: Earl Cuellar MD  Authorized by: Earl Cuellar MD     Indications / Diagnosis:  CP, elevated troponin, repeat EKG  ECG reviewed by me, the ED Provider: yes    Patient location:  ED  Interpretation:     Interpretation: normal    Rate:     ECG rate:  67    ECG rate assessment: normal    Rhythm:     Rhythm: sinus rhythm    Ectopy:     Ectopy: none    QRS:     QRS axis:  Normal    QRS intervals:  Normal  Conduction:     Conduction: normal    ST segments:     ST segments:  Normal  T waves:     T waves: normal    CriticalCare Time  Performed by: Earl Cuellar MD  Authorized by: Earl Cuellar MD     Critical care provider statement:     Critical care time (minutes):  45    Critical care time was exclusive of:  Teaching time and separately billable procedures and treating other patients    Critical care was necessary to treat or prevent imminent or life-threatening deterioration of the following conditions:  Cardiac failure    Critical care was time spent personally by me on the following activities:  Ordering and review of laboratory studies, ordering and review of radiographic studies and discussions with primary provider             ED Course                               MDM  Number of Diagnoses or Management Options  Diaphoresis: new and requires workup  Elevated troponin: new and requires workup  Syncope: new and requires workup  Diagnosis management comments:   Otherwise healthy 77-year-old female presents via EMS for evaluation for syncopal episode  Patient was lying in bed this morning when she felt warm, flushed  She has attempted to stand up  She felt lightheaded and passed out  She did fall the ground, hit her head  No blood thinning medications  Given Zofran by EMS, now currently asymptomatic  Vital signs unremarkable, physical exam unremarkable  Will check EKG, laboratory studies, monitor on telemetry, CT head, CXR  Troponin elevated at 1 75    Repeat EKG with no signs of acute ischemia  Concern for NSTEMI  Will admit to hospitalist   Aspirin and heparin started in ER  Hemodynamically stable and chest pain-free at time of admission  CT head with no signs of intracranial hemorrhage  No ripping or tearing chest pain to suggest aortic dissection  Stable at time of transfer to the floor on telemetry         Amount and/or Complexity of Data Reviewed  Clinical lab tests: ordered and reviewed  Tests in the radiology section of CPT®: ordered and reviewed  Tests in the medicine section of CPT®: reviewed and ordered  Discuss the patient with other providers: yes    Risk of Complications, Morbidity, and/or Mortality  Presenting problems: high  Diagnostic procedures: high  Management options: high    Patient Progress  Patient progress: stable        Disposition  Final diagnoses:   Elevated troponin   Diaphoresis   Syncope     Time reflects when diagnosis was documented in both MDM as applicable and the Disposition within this note     Time User Action Codes Description Comment    1/24/2020  9:06 AM Maricela Schaumann [R79 [id number]] Elevated troponin     1/24/2020  9:06 AM Francemichel Santoyo [R61] Diaphoresis     1/24/2020  9:08 AM Rocio Santoyo [R55] Syncope     1/24/2020 10:35 AM Laura Bishop [R55] Syncope       ED Disposition     ED Disposition Condition Date/Time Comment    Admit Stable Fri Jan 24, 2020  9:08 AM Case was discussed with Dr Levy Nina and the patient's admission status was agreed to be Admission Status: inpatient status to the service of Dr Levy Nina   Follow-up Information    None         Current Discharge Medication List      CONTINUE these medications which have NOT CHANGED    Details   acyclovir (ZOVIRAX) 5 % ointment Apply topically every 3 (three) hours  Qty: 15 g, Refills: 5    Associated Diagnoses: Herpes simplex      aspirin 81 MG tablet Take 1 tablet (81mg) by mouth once daily        !! atorvastatin (LIPITOR) 10 mg tablet Take 1 tablet (10 mg total) by mouth daily  Qty: 90 tablet, Refills: 3    Associated Diagnoses: Hyperlipidemia, unspecified hyperlipidemia type      !! atorvastatin (LIPITOR) 10 mg tablet TAKE ONE TABLET BY MOUTH EVERY OTHER DAY  Qty: 90 tablet, Refills: 3    Associated Diagnoses: Mixed hyperlipidemia      Calcium Carb-Cholecalciferol (CALCIUM 600 + D) 600-200 MG-UNIT TABS Calcium 600 + D TABS  TAKE 1 TABLET DAILY  Refills: 0    Active      cholecalciferol (VITAMIN D3) 1,000 units tablet Take 1,000 Units by mouth daily      Fish Oil-Cholecalciferol (FISH OIL + D3) 1832-8044 MG-UNIT CAPS Fish Oil 1200 MG Oral Capsule  Take 2 tablets daily   Refills: 0    Active      triamterene-hydrochlorothiazide (MAXZIDE-25) 37 5-25 mg per tablet TAKE ONE-HALF TABLET BY MOUTH EVERY DAY AS NEEDED  Qty: 45 tablet, Refills: 3    Associated Diagnoses: Essential hypertension       !! - Potential duplicate medications found  Please discuss with provider  No discharge procedures on file      ED Provider  Electronically Signed by           Skye Sims MD  01/24/20 1527

## 2020-01-24 NOTE — ASSESSMENT & PLAN NOTE
· Concerning for type 1 NSTEMI  · Continue IV heparin drip  · Continue aspirin  · Atorvastatin 40 mg  · Likely beta-blockers if blood pressure permits  · 2D echocardiogram  · Cardiology evaluation  · Keep NPO until seen by Cardiology

## 2020-01-24 NOTE — H&P
H&P- Serenity Macias 1945, 76 y o  female MRN: [id number]    Unit/Bed#: S -01 Encounter: [id number]    Primary Care Provider: Debbie Kwan DO   Date and time admitted to hospital: 1/24/2020  6:59 AM      * NSTEMI (non-ST elevation myocardial infarction) [facility])  Assessment & Plan  · Concerning for type 1 NSTEMI  · Continue IV heparin drip  · Continue aspirin  · Atorvastatin 40 mg  · Likely beta-blockers if blood pressure permits  · 2D echocardiogram  · Cardiology evaluation  · Keep NPO until seen by Cardiology    Syncope  Assessment & Plan  · Concerning for cardiac syncope  · Check 2D echocardiogram  · Cardiology evaluation  · Monitor on telemetry    Benign essential HTN  Assessment & Plan  · Patient on triamterene and hydrochlorothiazide  · Will hold  · Consider beta-blockers instead given NSTEMI    Hyperlipidemia  Assessment & Plan  · Increase atorvastatin to 40 mg    VTE Prophylaxis: Heparin Drip  / sequential compression device   Code Status:  Full code  POLST: There is no POLST form on file for this patient (pre-hospital)  Discussion with family:  Discussed with family at bedside    Anticipated Length of Stay:  Patient will be admitted on an Inpatient basis with an anticipated length of stay of  > 2 midnights  Justification for Hospital Stay:  NSTEMI    Total Time for Visit, including Counseling / Coordination of Care: 70 minutes  Greater than 50% of this total time spent on direct patient counseling and coordination of care  Chief Complaint:  Loss of consciousness    History of Present Illness:    Serenity Macias is a 76 y o  female with past medical significant for hypertension and hyperlipidemia who presents with an episode of syncope  Patient woke up with drenching sweats this morning, felt very nauseous and fatigued  Subsequently felt lightheaded and dizzy, passed out for few minutes on the floor  Her friends helped her to bathroom, and then lied on the floor as she felt so fatigued  Subsequently brought to the emergency department for evaluation  Denies any chest pain  No exertional chest pain or shortness of breath  No similar events in the past   Dad  at the age of 36 with heart attack  Mother alive 80years old, has some cardiac issues  Saw Dr Sarah Brooke about 4-5 years ago for frequent PVCs  Review of Systems:    Review of Systems  Twelve point review systems negative except noted above  Past Medical and Surgical History:     Past Medical History:   Diagnosis Date    Effusion of left knee     Last assessed - 9/10/15    Hyperlipidemia     Hypertension     Tick bite     Last assessed - 17       Past Surgical History:   Procedure Laterality Date    APPENDECTOMY      TONSILLECTOMY      Last assessed - 17    TUBAL LIGATION      Last assessed - 17    WISDOM TOOTH EXTRACTION      Last assessed - 17       Meds/Allergies:    Prior to Admission medications    Medication Sig Start Date End Date Taking? Authorizing Provider   acyclovir (ZOVIRAX) 5 % ointment Apply topically every 3 (three) hours 20   Sylvester Tsang DO   aspirin 81 MG tablet Take 1 tablet (81mg) by mouth once daily  Historical Provider, MD   atorvastatin (LIPITOR) 10 mg tablet Take 1 tablet (10 mg total) by mouth daily 19   Sylvester Tsang DO   atorvastatin (LIPITOR) 10 mg tablet TAKE ONE TABLET BY MOUTH EVERY OTHER DAY 19   Sylvester Tsang DO   Calcium Carb-Cholecalciferol (CALCIUM 600 + D) 600-200 MG-UNIT TABS Calcium 600 + D TABS  TAKE 1 TABLET DAILY     Refills: 0    Active    Historical Provider, MD   cholecalciferol (VITAMIN D3) 1,000 units tablet Take 1,000 Units by mouth daily    Historical Provider, MD   Fish Oil-Cholecalciferol (FISH OIL + D3) 3491-8908 MG-UNIT CAPS Fish Oil 1200 MG Oral Capsule  Take 2 tablets daily   Refills: 0    Active    Historical Provider, MD   triamterene-hydrochlorothiazide (MAXZIDE-25) 37 5-25 mg per tablet TAKE ONE-HALF TABLET BY MOUTH EVERY DAY AS NEEDED 6/14/19   Sylvester Tsang DO     I have reviewed home medications with patient personally  Allergies: No Known Allergies    Social History:     Marital Status:    Occupation:  Retired  Patient Pre-hospital Living Situation:  Lives at home  Patient Pre-hospital Level of Mobility:  Independent  Patient Pre-hospital Diet Restrictions:  None  Substance Use History:   Social History     Substance and Sexual Activity   Alcohol Use Yes    Comment: 1 wine nightly     Social History     Tobacco Use   Smoking Status Never Smoker   Smokeless Tobacco Never Used     Social History     Substance and Sexual Activity   Drug Use No       Family History:    non-contributory    Physical Exam:     Vitals:   Blood Pressure: 129/67 (01/24/20 1031)  Pulse: 77 (01/24/20 1031)  Temperature: 98 °F (36 7 °C) (01/24/20 1031)  Temp Source: Oral (01/24/20 1031)  Respirations: 18 (01/24/20 1031)  Weight - Scale: 53 2 kg (117 lb 4 6 oz) (01/24/20 0704)  SpO2: 100 % (01/24/20 1031)    Physical Exam    Gen -Patient comfortable at rest  Neck- Supple  No thyromegaly or lymphadenopathy  Lungs-Clear bilaterally without any wheeze or rales   Heart S1-S2, regular rate and rhythm, no murmurs  Abdomen-soft nontender, no organomegaly  Bowel sounds present  Extremities-no cyanosi,  clubbing or edema  Skin- no rash  Neuro-nonfocal     Additional Data:     Lab Results: I have personally reviewed pertinent reports        Results from last 7 days   Lab Units 01/24/20  0712   WBC Thousand/uL 6 95   HEMOGLOBIN g/dL 12 4   HEMATOCRIT % 38 8   PLATELETS Thousands/uL 292   NEUTROS PCT % 66   LYMPHS PCT % 23   MONOS PCT % 8   EOS PCT % 2     Results from last 7 days   Lab Units 01/24/20  0712   SODIUM mmol/L 143   POTASSIUM mmol/L 3 7   CHLORIDE mmol/L 105   CO2 mmol/L 32   BUN mg/dL 29*   CREATININE mg/dL 0 69   ANION GAP mmol/L 6   CALCIUM mg/dL 9 3   ALBUMIN g/dL 3 7   TOTAL BILIRUBIN mg/dL 0 21   ALK PHOS U/L 76   ALT U/L 32   AST U/L 44 GLUCOSE RANDOM mg/dL 113     Results from last 7 days   Lab Units 01/24/20  0712   INR  0 84                   Imaging: I have personally reviewed pertinent reports  CT head without contrast   Final Result by Carmen Oscar DO (01/24 4832)   Cerebral atrophy with chronic small vessel ischemic white matter disease  No acute intracranial abnormality  Workstation performed: [id number]         XR chest 2 views    (Results Pending)       EKG, Pathology, and Other Studies Reviewed on Admission:   · EKG:  Normal sinus rhythm    Allscripts / Epic Records Reviewed: Yes     ** Please Note: This note has been constructed using a voice recognition system   **

## 2020-01-24 NOTE — CONSULTS
Consultation - Cardiology Team One  Kristen Velazquez 76 y o  female MRN: [id number]  Unit/Bed#: S -01 Encounter: [id number]    Inpatient consult to Cardiology  Consult performed by: RACHEL Shelby  Consult ordered by: Vania Le MD          Physician Requesting Consult: Vania Le MD     Reason for Consult / Principal Problem:  Elevated troponin, syncope      Assessment/ Plan:    1  Elevated troponin:  Initial 1 75, trending up 3 17  EKG without ischemic changes  She does not have any clear chest pain  Has nausea, diaphoresis a syncopal episode  Risk factors for CAD include hypertension, dyslipidemia and strong family history  Recommend cardiac catheterization today  Procedure reviewed, patient is agreeable to proceed  Continue intravenous heparin, aspirin  Agree with increased dose of statin  Check echocardiogram  Further plan pending results of cardiac catheterization and ECHO    2  Syncope:  Unclear etiology, no palpitations or sudden onset to suggest arrhythmia  Will be undergoing cardiac catheterization as above  Check echocardiogram and continue to monitor on telemetry    3  Dyslipidemia:  On atorvastatin 10 mg daily; agree with increasing dose to 40 mg daily  Repeat lipid panel in a   Most recent lipid panel 11/2019 total cholesterol 227, triglycerides 64, HDL 75     4  HTN:  Current blood pressure 129/67, takes Maxzide as outpatient  5  History of PVCs:  Evaluated by Dr Kimmie Ornelas in past, was never placed on any medication  History of Present Illness      HPI: Kristen Velazquez is a 76y o  year old female who has hypertension, dyslipidemia, PVCs, cervical radiculopathy  No prior history of coronary artery disease, mi, CHF  She does not follow with a cardiologist     Patient presented to the emergency department today with complaints of nausea, diaphoresis syncope  States she was feeling in her normal state of health when she went to bed last night    She was awakened at approximately 5:00 a m  Feeling very nauseous extremely diaphoretic, felt lightheaded and fatigued  She tried to get up to go to the bathroom had a syncopal event  She did not feel any palpitations, chest pain or pressure or have any visual changes prior to her syncope  She was unconscious for a very short time as she awaken to hear her friends in another room coming to check on her  After wakening she felt very fatigued, weak and nauseous  They helped her shuffling to the bathroom where she laid on the floor for approximately an hour  She did not vomit  Felt her pulse and thought it was around 50  After approximately an hour lying on the bathroom floor she tried walking to her bedroom but she was unable to due to generalized weakness  EMS was called and she was brought to the emergency department  Received Zofran EN route with improvement in her nausea  Presentation her EKG showed sinus rhythm without any acute ST or T-wave changes  Blood pressure on presentation was 160/82, SpO2 99% on room air, she was afebrile  Chest x-ray NAD, CT of the head negative for any acute intracranial abnormalities  The emergency department she has received a L of intravenous fluids and more Zofran  Her initial troponins 1 75; she was placed on intravenous heparin  Second troponin was 3 17    Time my exam she reports feeling better  She no longer feeling nauseous still feels weak and fatigued  She is cleared tell me she has not had any chest pressure or tightness  No recent illness, fever chills, nausea vomiting or diarrhea  She does note yesterday when she was walking up a hill she felt mildly short of breath which is very abnormal for her  Remains quite active walks 2-3 miles a day with her dog and does yoga  She has to run marathons stopped running about 3 years ago  She is retired nurse  Nonsmoker      Family history of coronary artery disease, her mother had an MI in her father  of sudden cardiac death  Echocardiogram 2015:  Normal LV systolic function with EF of 70%, no regional motion abnormalities  Mildly dilated RV with normal systolic function  Trace MR  Exercise nuclear stress test 2010:  Exercise for 10 minutes; perfusion images without evidence of ischemia  EKG reviewed personally:  1/24/2020  Sinus rhythm; no ST or T-wave abnormalities  Unchanged from EKG 2016    Telemetry reviewed personally:   Sinus rhythm, no significant events    Review of Systems   Constitution: Positive for malaise/fatigue  Negative for decreased appetite and fever  Cardiovascular: Positive for syncope  Negative for chest pain, dyspnea on exertion, leg swelling, orthopnea and palpitations  Respiratory: Negative for cough, shortness of breath and wheezing  Gastrointestinal: Positive for nausea (Nausea resolved with Zofran)  Negative for abdominal pain and vomiting  Genitourinary: Negative for dysuria  Neurological: Negative for dizziness and light-headedness  Psychiatric/Behavioral: Negative for altered mental status        Historical Information   Past Medical History:   Diagnosis Date    Effusion of left knee     Last assessed - 9/10/15    Hyperlipidemia     Hypertension     Tick bite     Last assessed - 4/21/17     Past Surgical History:   Procedure Laterality Date    APPENDECTOMY      TONSILLECTOMY      Last assessed - 4/20/17    TUBAL LIGATION      Last assessed - 4/20/17    WISDOM TOOTH EXTRACTION      Last assessed - 4/20/17     Social History     Substance and Sexual Activity   Alcohol Use Yes    Comment: 1 wine nightly     Social History     Substance and Sexual Activity   Drug Use No     Social History     Tobacco Use   Smoking Status Never Smoker   Smokeless Tobacco Never Used     Family History:   Family History   Problem Relation Age of Onset    Coronary artery disease Mother     Arthritis Mother     Other Mother         Cardiac Disorder     Transient ischemic attack Mother     Heart attack Father     Sudden death Father         SCD       Meds/Allergies   all current active meds have been reviewed    heparin (porcine) 3-20 Units/kg/hr (Order-Specific) Last Rate: 12 Units/kg/hr (01/24/20 0843)       No Known Allergies    Objective   Vitals: Blood pressure 129/67, pulse 77, temperature 98 °F (36 7 °C), temperature source Oral, resp  rate 18, weight 53 2 kg (117 lb 4 6 oz), SpO2 100 %  ,     Body mass index is 20 07 kg/m²  ,     Systolic (54ECD), WTS:431 , Min:129 , JNE:068     Diastolic (91WUE), CHD:06, Min:67, Max:82          No intake or output data in the 24 hours ending 01/24/20 1128  Weight (last 2 days)     Date/Time   Weight    01/24/20 0704   53 2 (117 29)            Invasive Devices     Peripheral Intravenous Line            Peripheral IV 01/24/20 Left Antecubital less than 1 day              Physical Exam   Constitutional: She is oriented to person, place, and time  No distress  Patient lying in bed in NAD alert pleasant cooperative   HENT:   Head: Normocephalic and atraumatic  Neck: No JVD present  Cardiovascular: Normal rate, regular rhythm, S1 normal and S2 normal    No murmur heard  No lower extremity edema   Pulmonary/Chest: Effort normal  She has no wheezes  She has no rales  On room air   Abdominal: Soft  Musculoskeletal: She exhibits no edema  Neurological: She is alert and oriented to person, place, and time  Skin: Skin is warm and dry  She is not diaphoretic  Psychiatric: She has a normal mood and affect  Her behavior is normal    Nursing note and vitals reviewed      LABORATORY RESULTS:  Results from last 7 days   Lab Units 01/24/20  1034 01/24/20  0712   TROPONIN I ng/mL 3 17* 1 75*     CBC with diff: Results from last 7 days   Lab Units 01/24/20  0712   WBC Thousand/uL 6 95   HEMOGLOBIN g/dL 12 4   HEMATOCRIT % 38 8   MCV fL 94   PLATELETS Thousands/uL 292   MCH pg 29 9   MCHC g/dL 32 0   RDW % 13 8   MPV fL 11 0   NRBC AUTO /100 WBCs 0     CMP:  Results from last 7 days   Lab Units 01/24/20  0712   POTASSIUM mmol/L 3 7   CHLORIDE mmol/L 105   CO2 mmol/L 32   BUN mg/dL 29*   CREATININE mg/dL 0 69   CALCIUM mg/dL 9 3   AST U/L 44   ALT U/L 32   ALK PHOS U/L 76   EGFR ml/min/1 73sq m 86     BMP:  Results from last 7 days   Lab Units 01/24/20  0712   POTASSIUM mmol/L 3 7   CHLORIDE mmol/L 105   CO2 mmol/L 32   BUN mg/dL 29*   CREATININE mg/dL 0 69   CALCIUM mg/dL 9 3     Lab Results   Component Value Date    NTBNP 49 01/24/2020     Results from last 7 days   Lab Units 01/24/20  0712   MAGNESIUM mg/dL 2 2     Results from last 7 days   Lab Units 01/24/20  0712   INR  0 84     Lipid Profile:   No results found for: CHOL  Lab Results   Component Value Date    HDL 75 11/19/2019    HDL 89 (H) 11/19/2018     Lab Results   Component Value Date    LDLCALC 139 (H) 11/19/2019    LDLCALC 98 11/19/2018     Lab Results   Component Value Date    TRIG 64 11/19/2019    TRIG 56 11/19/2018     Imaging: I have personally reviewed pertinent reports  Ct Head Without Contrast    Result Date: 1/24/2020  Narrative: CT BRAIN - WITHOUT CONTRAST INDICATION:   Lightheaded, fall, nausea  Lightheadedness  Syncope and fall  Nausea  Unknown head trauma  COMPARISON:  Multiple prior studies, most recent of which is a noncontrast CT brain September 1, 2018 TECHNIQUE:  CT examination of the brain was performed  In addition to axial images, coronal 2D reformatted images were created and submitted for interpretation  Radiation dose length product (DLP) for this visit:  892 53 mGy-cm   This examination, like all CT scans performed in the [facility], was performed utilizing techniques to minimize radiation dose exposure, including the use of iterative  reconstruction and automated exposure control  IMAGE QUALITY:  Diagnostic   FINDINGS: PARENCHYMA: Decreased attenuation is noted in periventricular and subcortical white matter demonstrating an appearance that is statistically most likely to represent mild microangiopathic change  No CT signs of acute infarction  No intracranial mass, mass effect or midline shift  No acute parenchymal hemorrhage  Bilateral internal carotid artery and vertebral artery calcifications  VENTRICLES AND EXTRA-AXIAL SPACES:  Enlargement of ventricles and extra-axial CSF spaces, out of proportion to the patient's age most consistent with cerebral and cerebellar atrophy  VISUALIZED ORBITS AND PARANASAL SINUSES:  Unremarkable  CALVARIUM AND EXTRACRANIAL SOFT TISSUES:  Normal      Impression: Cerebral atrophy with chronic small vessel ischemic white matter disease  No acute intracranial abnormality  Workstation performed: [id number]     Assessment  Principal Problem:    NSTEMI (non-ST elevation myocardial infarction) [facility])  Active Problems:    Benign essential HTN    Hyperlipidemia    Syncope    Thank you for allowing us to participate in this patient's care  Counseling / Coordination of Care  Total floor / unit time spent today 45 minutes  Greater than 50% of total time was spent with the patient and / or family counseling and / or coordination of care  A description of the counseling / coordination of care: Review of history, current assessment, development of a plan  Code Status: Level 1 - Full Code    ** Please Note: Dragon 360 Dictation voice to text software may have been used in the creation of this document   **

## 2020-01-24 NOTE — PROGRESS NOTES
Cardiac cath performed via the RFA  Closed with angioseal at 1:30 PM     Severely calcified coronary arteries diffusely  Lesions:    80 % ostial L Cx   80-90 % distal L Cx    Multiple bordlerline calcified lesions throughout the entire RCA  There is not one lesion that appears to be a culprit  There is SUZAN 3 flow  PCI of any of the lesions would be technically difficult, high risk  Recommend - Nuclear stress test tomorrow to assess for ischemia  Would do exercise test if possible  Continue to check serial troponins  If no significant ischemia, would d/c on medical management and f/up with Dr Jayla Cortez for his opinion in this high risk case

## 2020-01-25 LAB
ANION GAP SERPL CALCULATED.3IONS-SCNC: 3 MMOL/L (ref 4–13)
APTT PPP: 36 SECONDS (ref 23–37)
APTT PPP: 64 SECONDS (ref 23–37)
APTT PPP: 88 SECONDS (ref 23–37)
BILIRUB UR QL STRIP: NEGATIVE
BUN SERPL-MCNC: 12 MG/DL (ref 5–25)
CALCIUM SERPL-MCNC: 8.3 MG/DL (ref 8.3–10.1)
CHLORIDE SERPL-SCNC: 111 MMOL/L (ref 100–108)
CHOLEST SERPL-MCNC: 191 MG/DL (ref 50–200)
CLARITY UR: CLEAR
CO2 SERPL-SCNC: 28 MMOL/L (ref 21–32)
COLOR UR: YELLOW
CREAT SERPL-MCNC: 0.59 MG/DL (ref 0.6–1.3)
ERYTHROCYTE [DISTWIDTH] IN BLOOD BY AUTOMATED COUNT: 13.9 % (ref 11.6–15.1)
GFR SERPL CREATININE-BSD FRML MDRD: 91 ML/MIN/1.73SQ M
GLUCOSE SERPL-MCNC: 96 MG/DL (ref 65–140)
GLUCOSE UR STRIP-MCNC: NEGATIVE MG/DL
HCT VFR BLD AUTO: 35.3 % (ref 34.8–46.1)
HDLC SERPL-MCNC: 79 MG/DL
HGB BLD-MCNC: 11.1 G/DL (ref 11.5–15.4)
HGB UR QL STRIP.AUTO: NEGATIVE
KETONES UR STRIP-MCNC: NEGATIVE MG/DL
LDLC SERPL CALC-MCNC: 95 MG/DL (ref 0–100)
LEUKOCYTE ESTERASE UR QL STRIP: NEGATIVE
MAGNESIUM SERPL-MCNC: 2.1 MG/DL (ref 1.6–2.6)
MCH RBC QN AUTO: 29.4 PG (ref 26.8–34.3)
MCHC RBC AUTO-ENTMCNC: 31.4 G/DL (ref 31.4–37.4)
MCV RBC AUTO: 93 FL (ref 82–98)
NITRITE UR QL STRIP: NEGATIVE
NONHDLC SERPL-MCNC: 112 MG/DL
PH UR STRIP.AUTO: 7 [PH]
PLATELET # BLD AUTO: 257 THOUSANDS/UL (ref 149–390)
PMV BLD AUTO: 10.8 FL (ref 8.9–12.7)
POTASSIUM SERPL-SCNC: 3.5 MMOL/L (ref 3.5–5.3)
PROT UR STRIP-MCNC: NEGATIVE MG/DL
RBC # BLD AUTO: 3.78 MILLION/UL (ref 3.81–5.12)
SODIUM SERPL-SCNC: 142 MMOL/L (ref 136–145)
SP GR UR STRIP.AUTO: 1.01 (ref 1–1.03)
TRIGL SERPL-MCNC: 87 MG/DL
TSH SERPL DL<=0.05 MIU/L-ACNC: 0.82 UIU/ML (ref 0.36–3.74)
UROBILINOGEN UR QL STRIP.AUTO: 0.2 E.U./DL
WBC # BLD AUTO: 8.27 THOUSAND/UL (ref 4.31–10.16)

## 2020-01-25 PROCEDURE — 99232 SBSQ HOSP IP/OBS MODERATE 35: CPT | Performed by: INTERNAL MEDICINE

## 2020-01-25 PROCEDURE — 99233 SBSQ HOSP IP/OBS HIGH 50: CPT | Performed by: PHYSICIAN ASSISTANT

## 2020-01-25 PROCEDURE — 84443 ASSAY THYROID STIM HORMONE: CPT | Performed by: INTERNAL MEDICINE

## 2020-01-25 PROCEDURE — 85027 COMPLETE CBC AUTOMATED: CPT | Performed by: INTERNAL MEDICINE

## 2020-01-25 PROCEDURE — 81003 URINALYSIS AUTO W/O SCOPE: CPT | Performed by: PHYSICIAN ASSISTANT

## 2020-01-25 PROCEDURE — 80061 LIPID PANEL: CPT | Performed by: INTERNAL MEDICINE

## 2020-01-25 PROCEDURE — 87081 CULTURE SCREEN ONLY: CPT | Performed by: PHYSICIAN ASSISTANT

## 2020-01-25 PROCEDURE — 80048 BASIC METABOLIC PNL TOTAL CA: CPT | Performed by: INTERNAL MEDICINE

## 2020-01-25 PROCEDURE — 83735 ASSAY OF MAGNESIUM: CPT | Performed by: INTERNAL MEDICINE

## 2020-01-25 PROCEDURE — 85730 THROMBOPLASTIN TIME PARTIAL: CPT | Performed by: INTERNAL MEDICINE

## 2020-01-25 PROCEDURE — 99223 1ST HOSP IP/OBS HIGH 75: CPT | Performed by: THORACIC SURGERY (CARDIOTHORACIC VASCULAR SURGERY)

## 2020-01-25 RX ORDER — IBUPROFEN 400 MG/1
400 TABLET ORAL ONCE
Status: COMPLETED | OUTPATIENT
Start: 2020-01-25 | End: 2020-01-25

## 2020-01-25 RX ORDER — CHLORHEXIDINE GLUCONATE 0.12 MG/ML
15 RINSE ORAL ONCE
Status: DISCONTINUED | OUTPATIENT
Start: 2020-01-27 | End: 2020-01-27 | Stop reason: HOSPADM

## 2020-01-25 RX ADMIN — METOPROLOL TARTRATE 25 MG: 25 TABLET ORAL at 21:42

## 2020-01-25 RX ADMIN — HEPARIN SODIUM 3000 UNITS: 1000 INJECTION INTRAVENOUS; SUBCUTANEOUS at 03:49

## 2020-01-25 RX ADMIN — IBUPROFEN 400 MG: 400 TABLET ORAL at 03:40

## 2020-01-25 RX ADMIN — ATORVASTATIN CALCIUM 40 MG: 40 TABLET, FILM COATED ORAL at 08:56

## 2020-01-25 RX ADMIN — METOPROLOL TARTRATE 25 MG: 25 TABLET ORAL at 14:18

## 2020-01-25 RX ADMIN — SODIUM CHLORIDE 75 ML/HR: 0.9 INJECTION, SOLUTION INTRAVENOUS at 00:43

## 2020-01-25 RX ADMIN — HEPARIN SODIUM AND DEXTROSE 16 UNITS/KG/HR: 10000; 5 INJECTION INTRAVENOUS at 21:46

## 2020-01-25 RX ADMIN — ASPIRIN 81 MG 81 MG: 81 TABLET ORAL at 08:56

## 2020-01-25 NOTE — ASSESSMENT & PLAN NOTE
Patient has no history of coronary artery disease presented this morning after waking from sleep in a drenching sweat  She felt ill and was going to going to the bathroom in the next thing she knows she woke up on the floor at the bottom of her bed  She had friend staying over at her home who got her up to the bathroom and subsequently she ended up having to lay out on the floor due to severe fatigue  She was brought to the emergency room for further evaluation at [facility]  Decision was to take the patient to the cardiac catheterization lab due to elevated troponins  Cardiac catheterization was completed showing an 80% ostial lesion of the left circumflex and a distal 80-90% lesion of the circumflex along with scattered calcification of the right coronary artery  Since no culprit lesion was noted the patient was referred to [facility] for further evaluation  Recommendation from the primary cardiologist aneurysm was perhaps a stress test   Patient remains on heparin drip at this time with no chest pain and no dizziness on ambulation  2D echo showed 70% ejection fraction with reasonable wall motion  · Continue aspirin  · Continue Lipitor  · Continue heparin drip  · Cardiology consult in a m   And consideration for CT surgery eval depending on the decision of the primary cardiology team

## 2020-01-25 NOTE — PROGRESS NOTES
Progress Note - Candis Clifford 1945, 76 y o  female MRN: [id number]  Unit/Bed#: [facility] 527-01 Encounter: [id number]  Primary Care Provider: Sukumar Montiel DO   Date and time admitted to hospital: 1/24/2020  7:31 PM    * NSTEMI (non-ST elevation myocardial infarction) [facility])  Assessment & Plan  Patient has no history of coronary artery disease presented this morning after waking from sleep in a drenching sweat  She felt ill and was going to going to the bathroom in the next thing she knows she woke up on the floor at the bottom of her bed  She had friend staying over at her home who got her up to the bathroom and subsequently she ended up having to lay out on the floor due to severe fatigue  She was brought to the emergency room for further evaluation at [facility]  Decision was to take the patient to the cardiac catheterization lab due to elevated troponins  Cardiac catheterization was completed showing an 80% ostial lesion of the left circumflex and a distal 80-90% lesion of the circumflex along with scattered calcification of the right coronary artery  Since no culprit lesion was noted the patient was referred to [facility] for further evaluation  Recommendation from the primary cardiologist aneurysm was perhaps a stress test   Patient remains on heparin drip at this time with no chest pain and no dizziness on ambulation  2D echo showed 70% ejection fraction with reasonable wall motion  · Continue aspirin  · Continue Lipitor  · Continue heparin drip  · Cardiology following, appreciate recommendations   · Started on metoprolol tartrate 25 mg BID   · CT surgery following with plan for CABG     Syncope  Assessment & Plan  Unclear etiology but in the context of the diaphoresis and 3 vessel disease cardiac source is expected  CT scan of the head was negative    · Telemetry monitoring  · Serial laboratories to keep potassium and magnesium within normal range  · Will hold hydrochlorothiazide/triamterene, which is her home dose of antihypertensive  · Check orthostatics    Atherosclerosis of native coronary artery with angina pectoris [facility])  Assessment & Plan  Patient is showing signs of narrowing in the ostium of the left circumflex and an 80% lesion in the distal circumflex along with diffuse RCA calcification  No culprit lesion identified  · Consult cardiology in a m  · Continue aspirin  · Continue statin  · Continue telemetry monitoring  · Continue heparin  · Troponin with a m  Labs    Essential hypertension  Assessment & Plan  BP elevated today, monitor with medication adjustments   Home regimen is triamterene/hydrochlorothiazide at home, currently on hold   Cardiology following, appreciate recommendations   Started on BB     Hyperlipidemia  Assessment & Plan  Lipid panel within normal limits   Continue statin     VTE Pharmacologic Prophylaxis:   Pharmacologic: Heparin Drip  Mechanical VTE Prophylaxis in Place: Yes    Patient Centered Rounds: I have performed bedside rounds with nursing staff today  Discussions with Specialists or Other Care Team Provider: none    Education and Discussions with Family / Patient: patient and daughter at bedside     Time Spent for Care: 20 minutes  More than 50% of total time spent on counseling and coordination of care as described above  Current Length of Stay: 1 day(s)    Current Patient Status: Inpatient   Certification Statement: The patient will continue to require additional inpatient hospital stay due to CABG workup     Discharge Plan: not medically stable     Code Status: Level 1 - Full Code    Subjective:   Patient offers no complaints, denies fever/chills, chest pain, SOB, n/v/d       Objective:     Vitals:   Temp (24hrs), Av 5 °F (36 9 °C), Min:98 4 °F (36 9 °C), Max:98 6 °F (37 °C)    Temp:  [98 4 °F (36 9 °C)-98 6 °F (37 °C)] 98 4 °F (36 9 °C)  HR:  [65-81] 72  Resp:  [16-18] 18  BP: (133-173)/(58-91) 173/63  SpO2:  [97 %-100 %] 100 %  Body mass index is 18 56 kg/m²  Input and Output Summary (last 24 hours): Intake/Output Summary (Last 24 hours) at 1/25/2020 1533  Last data filed at 1/25/2020 1422  Gross per 24 hour   Intake 762 75 ml   Output 2450 ml   Net -1687 25 ml       Physical Exam:     Physical Exam   Constitutional: She is oriented to person, place, and time  She appears well-developed and well-nourished  No distress  Cardiovascular: Normal rate, regular rhythm, normal heart sounds and intact distal pulses  No murmur heard  Pulmonary/Chest: Effort normal and breath sounds normal  No respiratory distress  She has no wheezes  She has no rales  Abdominal: Soft  Bowel sounds are normal  She exhibits no distension  There is no tenderness  Musculoskeletal: She exhibits no edema  Neurological: She is alert and oriented to person, place, and time  Skin: Skin is warm and dry  No rash noted  She is not diaphoretic  No erythema  No pallor  Additional Data:     Labs:    Results from last 7 days   Lab Units 01/25/20  0321 01/24/20  0712   WBC Thousand/uL 8 27 6 95   HEMOGLOBIN g/dL 11 1* 12 4   HEMATOCRIT % 35 3 38 8   PLATELETS Thousands/uL 257 292   NEUTROS PCT %  --  66   LYMPHS PCT %  --  23   MONOS PCT %  --  8   EOS PCT %  --  2     Results from last 7 days   Lab Units 01/25/20  0352 01/24/20  0712   SODIUM mmol/L 142 143   POTASSIUM mmol/L 3 5 3 7   CHLORIDE mmol/L 111* 105   CO2 mmol/L 28 32   BUN mg/dL 12 29*   CREATININE mg/dL 0 59* 0 69   ANION GAP mmol/L 3* 6   CALCIUM mg/dL 8 3 9 3   ALBUMIN g/dL  --  3 7   TOTAL BILIRUBIN mg/dL  --  0 21   ALK PHOS U/L  --  76   ALT U/L  --  32   AST U/L  --  44   GLUCOSE RANDOM mg/dL 96 113     Results from last 7 days   Lab Units 01/24/20  1959   INR  1 01                       * I Have Reviewed All Lab Data Listed Above  * Additional Pertinent Lab Tests Reviewed:  [address] Admission Reviewed    Imaging:    Imaging Reports Reviewed Today Include: none  Imaging Personally Reviewed by Myself Includes:  none    Recent Cultures (last 7 days):           Last 24 Hours Medication List:     Current Facility-Administered Medications:  acetaminophen 650 mg Oral Q6H PRN Karuna Parham PA-C    aspirin 81 mg Oral Daily Renée Mcdonough MD    atorvastatin 40 mg Oral Daily Renée Mcdonough MD    calcium carbonate 1,000 mg Oral Daily PRN Renée Mcdonough MD    [START ON 1/27/2020] chlorhexidine 15 mL Mouth/Throat Once Ulysees BoundFRANDY    docusate sodium 100 mg Oral BID PRN Renée Mcdonough MD    heparin (porcine) 3-20 Units/kg/hr (Order-Specific) Intravenous Titrated Renée Mcdonough MD Last Rate: 16 Units/kg/hr (01/25/20 0350)   heparin (porcine) 1,500 Units Intravenous PRN Renée Mcdonough MD    heparin (porcine) 3,000 Units Intravenous PRN Renée Mcdonough MD    metoprolol tartrate 25 mg Oral Q12H [address] Melanie Mercado MD    [START ON 1/51/1244] mupirocin 1 application Nasal Once Ulysees BoundFRANDY    ondansetron 4 mg Intravenous Q6H PRN Renée Mcdonough MD         Today, Patient Was Seen By: Denise Churchill PA-C    ** Please Note: Dictation voice to text software may have been used in the creation of this document   **

## 2020-01-25 NOTE — ASSESSMENT & PLAN NOTE
Patient is showing signs of narrowing in the ostium of the left circumflex and an 80% lesion in the distal circumflex along with diffuse RCA calcification  No culprit lesion identified  · Consult cardiology in a m  · Continue aspirin  · Continue statin  · Continue telemetry monitoring  · Continue heparin  · Troponin with a m   Labs

## 2020-01-25 NOTE — ASSESSMENT & PLAN NOTE
BP elevated today, monitor with medication adjustments   Home regimen is triamterene/hydrochlorothiazide at home, currently on hold   Cardiology following, appreciate recommendations   Started on BB

## 2020-01-25 NOTE — ASSESSMENT & PLAN NOTE
Unclear etiology but in the context of the diaphoresis and 3 vessel disease cardiac source is expected  CT scan of the head was negative  · Telemetry monitoring  · Serial laboratories to keep potassium and magnesium within normal range  · Cardiology consult in the a m  · Will hold hydrochlorothiazide and triamterene which is or home dose of antihypertensive  · Orthostatics in a m

## 2020-01-25 NOTE — ASSESSMENT & PLAN NOTE
Unclear etiology but in the context of the diaphoresis and 3 vessel disease cardiac source is expected  CT scan of the head was negative    · Telemetry monitoring  · Serial laboratories to keep potassium and magnesium within normal range  · Will hold hydrochlorothiazide/triamterene, which is her home dose of antihypertensive  · Check orthostatics

## 2020-01-25 NOTE — PLAN OF CARE
Problem: PAIN - ADULT  Goal: Verbalizes/displays adequate comfort level or baseline comfort level  Description  Interventions:  - Encourage patient to monitor pain and request assistance  - Assess pain using appropriate pain scale  - Administer analgesics based on type and severity of pain and evaluate response  - Implement non-pharmacological measures as appropriate and evaluate response  - Consider cultural and social influences on pain and pain management  - Notify physician/advanced practitioner if interventions unsuccessful or patient reports new pain  Outcome: Progressing     Problem: INFECTION - ADULT  Goal: Absence or prevention of progression during hospitalization  Description  INTERVENTIONS:  - Assess and monitor for signs and symptoms of infection  - Monitor lab/diagnostic results  - Monitor all insertion sites, i e  indwelling lines, tubes, and drains  - Monitor endotracheal if appropriate and nasal secretions for changes in amount and color  - [place] appropriate cooling/warming therapies per order  - Administer medications as ordered  - Instruct and encourage patient and family to use good hand hygiene technique  - Identify and instruct in appropriate isolation precautions for identified infection/condition  Outcome: Progressing  Goal: Absence of fever/infection during neutropenic period  Description  INTERVENTIONS:  - Monitor WBC    Outcome: Progressing     Problem: SAFETY ADULT  Goal: Patient will remain free of falls  Description  INTERVENTIONS:  - Assess patient frequently for physical needs  -  Identify cognitive and physical deficits and behaviors that affect risk of falls    -  [place] fall precautions as indicated by assessment   - Educate patient/family on patient safety including physical limitations  - Instruct patient to call for assistance with activity based on assessment  - Modify environment to reduce risk of injury  - Consider OT/PT consult to assist with strengthening/mobility  Outcome: Progressing  Goal: Maintain or return to baseline ADL function  Description  INTERVENTIONS:  -  Assess patient's ability to carry out ADLs; assess patient's baseline for ADL function and identify physical deficits which impact ability to perform ADLs (bathing, care of mouth/teeth, toileting, grooming, dressing, etc )  - Assess/evaluate cause of self-care deficits   - Assess range of motion  - Assess patient's mobility; develop plan if impaired  - Assess patient's need for assistive devices and provide as appropriate  - Encourage maximum independence but intervene and supervise when necessary  - Involve family in performance of ADLs  - Assess for home care needs following discharge   - Consider OT consult to assist with ADL evaluation and planning for discharge  - Provide patient education as appropriate  Outcome: Progressing  Goal: Maintain or return mobility status to optimal level  Description  INTERVENTIONS:  - Assess patient's baseline mobility status (ambulation, transfers, stairs, etc )    - Identify cognitive and physical deficits and behaviors that affect mobility  - Identify mobility aids required to assist with transfers and/or ambulation (gait belt, sit-to-stand, lift, walker, cane, etc )  - [place] fall precautions as indicated by assessment  - Record patient progress and toleration of activity level on Mobility SBAR; progress patient to next Phase/Stage  - Instruct patient to call for assistance with activity based on assessment  - Consider rehabilitation consult to assist with strengthening/weightbearing, etc   Outcome: Progressing     Problem: DISCHARGE PLANNING  Goal: Discharge to home or other facility with appropriate resources  Description  INTERVENTIONS:  - Identify barriers to discharge w/patient and caregiver  - Arrange for needed discharge resources and transportation as appropriate  - Identify discharge learning needs (meds, wound care, etc )  - Arrange for interpretive services to assist at discharge as needed  - Refer to Case Management Department for coordinating discharge planning if the patient needs post-hospital services based on physician/advanced practitioner order or complex needs related to functional status, cognitive ability, or social support system  Outcome: Progressing     Problem: Knowledge Deficit  Goal: Patient/family/caregiver demonstrates understanding of disease process, treatment plan, medications, and discharge instructions  Description  Complete learning assessment and assess knowledge base    Interventions:  - Provide teaching at level of understanding  - Provide teaching via preferred learning methods  Outcome: Progressing     Problem: CARDIOVASCULAR - ADULT  Goal: Maintains optimal cardiac output and hemodynamic stability  Description  INTERVENTIONS:  - Monitor I/O, vital signs and rhythm  - Monitor for S/S and trends of decreased cardiac output  - Administer and titrate ordered vasoactive medications to optimize hemodynamic stability  - Assess quality of pulses, skin color and temperature  - Assess for signs of decreased coronary artery perfusion  - Instruct patient to report change in severity of symptoms  Outcome: Progressing  Goal: Absence of cardiac dysrhythmias or at baseline rhythm  Description  INTERVENTIONS:  - Continuous cardiac monitoring, vital signs, obtain 12 lead EKG if ordered  - Administer antiarrhythmic and heart rate control medications as ordered  - Monitor electrolytes and administer replacement therapy as ordered  Outcome: Progressing

## 2020-01-25 NOTE — PLAN OF CARE
Problem: PAIN - ADULT  Goal: Verbalizes/displays adequate comfort level or baseline comfort level  Description  Interventions:  - Encourage patient to monitor pain and request assistance  - Assess pain using appropriate pain scale  - Administer analgesics based on type and severity of pain and evaluate response  - Implement non-pharmacological measures as appropriate and evaluate response  - Consider cultural and social influences on pain and pain management  - Notify physician/advanced practitioner if interventions unsuccessful or patient reports new pain  1/25/2020 0005 by Mani Russell RN  Outcome: Progressing  1/24/2020 2226 by Mani Russell RN  Outcome: Progressing     Problem: INFECTION - ADULT  Goal: Absence or prevention of progression during hospitalization  Description  INTERVENTIONS:  - Assess and monitor for signs and symptoms of infection  - Monitor lab/diagnostic results  - Monitor all insertion sites, i e  indwelling lines, tubes, and drains  - Monitor endotracheal if appropriate and nasal secretions for changes in amount and color  - [place] appropriate cooling/warming therapies per order  - Administer medications as ordered  - Instruct and encourage patient and family to use good hand hygiene technique  - Identify and instruct in appropriate isolation precautions for identified infection/condition  1/25/2020 0005 by Mani Russell RN  Outcome: Progressing  1/24/2020 2226 by Mani Russell RN  Outcome: Progressing  Goal: Absence of fever/infection during neutropenic period  Description  INTERVENTIONS:  - Monitor WBC    1/25/2020 0005 by Mani Russell RN  Outcome: Progressing  1/24/2020 2226 by Mani Russell RN  Outcome: Progressing     Problem: SAFETY ADULT  Goal: Patient will remain free of falls  Description  INTERVENTIONS:  - Assess patient frequently for physical needs  -  Identify cognitive and physical deficits and behaviors that affect risk of falls    -  [place] fall precautions as indicated by assessment   - Educate patient/family on patient safety including physical limitations  - Instruct patient to call for assistance with activity based on assessment  - Modify environment to reduce risk of injury  - Consider OT/PT consult to assist with strengthening/mobility  1/25/2020 0005 by Pauline Garcia RN  Outcome: Progressing  1/24/2020 2226 by Pauline Garcia RN  Outcome: Progressing  Goal: Maintain or return to baseline ADL function  Description  INTERVENTIONS:  -  Assess patient's ability to carry out ADLs; assess patient's baseline for ADL function and identify physical deficits which impact ability to perform ADLs (bathing, care of mouth/teeth, toileting, grooming, dressing, etc )  - Assess/evaluate cause of self-care deficits   - Assess range of motion  - Assess patient's mobility; develop plan if impaired  - Assess patient's need for assistive devices and provide as appropriate  - Encourage maximum independence but intervene and supervise when necessary  - Involve family in performance of ADLs  - Assess for home care needs following discharge   - Consider OT consult to assist with ADL evaluation and planning for discharge  - Provide patient education as appropriate  1/25/2020 0005 by Pauline Garcia RN  Outcome: Progressing  1/24/2020 2226 by Pauline Garcia RN  Outcome: Progressing  Goal: Maintain or return mobility status to optimal level  Description  INTERVENTIONS:  - Assess patient's baseline mobility status (ambulation, transfers, stairs, etc )    - Identify cognitive and physical deficits and behaviors that affect mobility  - Identify mobility aids required to assist with transfers and/or ambulation (gait belt, sit-to-stand, lift, walker, cane, etc )  - [place] fall precautions as indicated by assessment  - Record patient progress and toleration of activity level on Mobility SBAR; progress patient to next Phase/Stage  - Instruct patient to call for assistance with activity based on assessment  - Consider rehabilitation consult to assist with strengthening/weightbearing, etc   1/25/2020 0005 by Bailey Snowden RN  Outcome: Progressing  1/24/2020 2226 by Bailey Snowden RN  Outcome: Progressing     Problem: DISCHARGE PLANNING  Goal: Discharge to home or other facility with appropriate resources  Description  INTERVENTIONS:  - Identify barriers to discharge w/patient and caregiver  - Arrange for needed discharge resources and transportation as appropriate  - Identify discharge learning needs (meds, wound care, etc )  - Arrange for interpretive services to assist at discharge as needed  - Refer to Case Management Department for coordinating discharge planning if the patient needs post-hospital services based on physician/advanced practitioner order or complex needs related to functional status, cognitive ability, or social support system  1/25/2020 0005 by Bailey Snowden RN  Outcome: Progressing  1/24/2020 2226 by Bailey Snowden RN  Outcome: Progressing     Problem: Knowledge Deficit  Goal: Patient/family/caregiver demonstrates understanding of disease process, treatment plan, medications, and discharge instructions  Description  Complete learning assessment and assess knowledge base    Interventions:  - Provide teaching at level of understanding  - Provide teaching via preferred learning methods  1/25/2020 0005 by Bailey Snowden RN  Outcome: Progressing  1/24/2020 2226 by Bailey Snowden RN  Outcome: Progressing     Problem: CARDIOVASCULAR - ADULT  Goal: Maintains optimal cardiac output and hemodynamic stability  Description  INTERVENTIONS:  - Monitor I/O, vital signs and rhythm  - Monitor for S/S and trends of decreased cardiac output  - Administer and titrate ordered vasoactive medications to optimize hemodynamic stability  - Assess quality of pulses, skin color and temperature  - Assess for signs of decreased coronary artery perfusion  - Instruct patient to report change in severity of symptoms  1/25/2020 0005 by Allie East RN  Outcome: Progressing  1/24/2020 2226 by Allie East RN  Outcome: Progressing  Goal: Absence of cardiac dysrhythmias or at baseline rhythm  Description  INTERVENTIONS:  - Continuous cardiac monitoring, vital signs, obtain 12 lead EKG if ordered  - Administer antiarrhythmic and heart rate control medications as ordered  - Monitor electrolytes and administer replacement therapy as ordered  1/25/2020 0005 by Allie East RN  Outcome: Progressing  1/24/2020 2226 by Allie East RN  Outcome: Progressing

## 2020-01-25 NOTE — ASSESSMENT & PLAN NOTE
Patient usually uses triamterene hydrochlorothiazide at home  Will hold for now    Consider adding antihypertensive medication once clinically stable

## 2020-01-25 NOTE — H&P
H&P- Carrie Taylor 1945, 76 y o  female MRN: [id number]    Unit/Bed#: [facility] 527-01 Encounter: [id number]    Primary Care Provider: Mando Soriano DO   Date and time admitted to hospital: 1/24/2020  7:31 PM        * NSTEMI (non-ST elevation myocardial infarction) [facility])  Assessment & Plan  Patient has no history of coronary artery disease presented this morning after waking from sleep in a drenching sweat  She felt ill and was going to going to the bathroom in the next thing she knows she woke up on the floor at the bottom of her bed  She had friend staying over at her home who got her up to the bathroom and subsequently she ended up having to lay out on the floor due to severe fatigue  She was brought to the emergency room for further evaluation at [facility]  Decision was to take the patient to the cardiac catheterization lab due to elevated troponins  Cardiac catheterization was completed showing an 80% ostial lesion of the left circumflex and a distal 80-90% lesion of the circumflex along with scattered calcification of the right coronary artery  Since no culprit lesion was noted the patient was referred to [facility] for further evaluation  Recommendation from the primary cardiologist aneurysm was perhaps a stress test   Patient remains on heparin drip at this time with no chest pain and no dizziness on ambulation  2D echo showed 70% ejection fraction with reasonable wall motion  · Continue aspirin  · Continue Lipitor  · Continue heparin drip  · Cardiology consult in a   And consideration for CT surgery eval depending on the decision of the primary cardiology team     Atherosclerosis of native coronary artery with angina pectoris [facility])  Assessment & Plan  Patient is showing signs of narrowing in the ostium of the left circumflex and an 80% lesion in the distal circumflex along with diffuse RCA calcification  No culprit lesion identified    · Consult cardiology in a m   · Continue aspirin  · Continue statin  · Continue telemetry monitoring  · Continue heparin  · Troponin with a m  Labs    Syncope  Assessment & Plan  Unclear etiology but in the context of the diaphoresis and 3 vessel disease cardiac source is expected  CT scan of the head was negative  · Telemetry monitoring  · Serial laboratories to keep potassium and magnesium within normal range  · Cardiology consult in the a m  · Will hold hydrochlorothiazide and triamterene which is or home dose of antihypertensive  · Orthostatics in a m  Hyperlipidemia  Assessment & Plan  Continue statin  Lipid panel pending in a m  Was elevated in 11/2019    Hypertension  Assessment & Plan  Patient usually uses triamterene hydrochlorothiazide at home  Will hold for now  Consider adding antihypertensive medication once clinically stable      VTE Prophylaxis: Heparin Drip  / sequential compression device   Code Status:  Full code  POLST: POLST form is not discussed and not completed at this time  Discussion with family:  None    Anticipated Length of Stay:  Patient will be admitted on an Inpatient basis with an anticipated length of stay of  greater 2 midnights  Justification for Hospital Stay:  Needs definitive intervention for possible multivessel disease  Total Time for Visit, including Counseling / Coordination of Care: 50 minutes  Greater than 50% of this total time spent on direct patient counseling and coordination of care  Chief Complaint:   Diaphoresis, syncope    History of Present Illness:    Shannan Christian is a 76 y o  female who presents with acute onset of diaphoresis waking her up from sleep  Patient subsequently went to going to the bathroom because she was feeling sick and had a syncopal episode  Patient was taken off the floor by her friends were staying at her house and subsequently sent to the emergency room at [facility] by EMS  Cardiology was consulted for elevated troponin    She was taken to the cardiac catheterization lab which showed ostial left circumflex disease and a distal 80% to 90% lesion of the left circumflex along with diffuse right coronary artery calcification  Patient was sent to [facility] in response to what appeared to be multivessel disease without a specific site of intervention for further evaluation  Patient has had no further diaphoretic events  Plan for Cardiology evaluation in a   May need CT surgery evaluation if no intervention is available    Review of Systems:    Review of Systems   Constitutional: Positive for diaphoresis and unexpected weight change ( after the loss of her )  Negative for appetite change, chills, fatigue and fever  HENT: Negative for dental problem, ear discharge, ear pain, facial swelling, hearing loss, mouth sores, nosebleeds and rhinorrhea  Eyes: Negative for pain, discharge, redness and visual disturbance  Respiratory: Positive for shortness of breath  Negative for cough, chest tightness and wheezing  Cardiovascular: Negative for chest pain, palpitations and leg swelling  Gastrointestinal: Negative for abdominal distention, abdominal pain, blood in stool, constipation, diarrhea, nausea and vomiting  Endocrine: Negative for cold intolerance, heat intolerance, polydipsia, polyphagia and polyuria  Genitourinary: Negative for dysuria, frequency, hematuria and urgency  Musculoskeletal: Negative for arthralgias and back pain  Skin: Negative for rash and wound  Neurological: Positive for syncope  Negative for dizziness, weakness, light-headedness, numbness and headaches  Hematological: Negative for adenopathy  Does not bruise/bleed easily  Psychiatric/Behavioral: Negative for confusion and dysphoric mood         Past Medical and Surgical History:     Past Medical History:   Diagnosis Date    Effusion of left knee     Last assessed - 9/10/15    Hyperlipidemia     Hypertension     Tick bite     Last assessed - 4/21/17       Past Surgical History:   Procedure Laterality Date    APPENDECTOMY      TONSILLECTOMY      Last assessed - 4/20/17    TUBAL LIGATION      Last assessed - 4/20/17    WISDOM TOOTH EXTRACTION      Last assessed - 4/20/17       Meds/Allergies:    Prior to Admission medications    Medication Sig Start Date End Date Taking? Authorizing Provider   aspirin 81 MG tablet Take 1 tablet (81mg) by mouth once daily  Yes Historical Provider, MD   atorvastatin (LIPITOR) 10 mg tablet Take 1 tablet (10 mg total) by mouth daily 11/22/19  Yes Sylvester Tsang DO   atorvastatin (LIPITOR) 10 mg tablet TAKE ONE TABLET BY MOUTH EVERY OTHER DAY 12/21/19  Yes Sylvester Tsang DO   Calcium Carb-Cholecalciferol (CALCIUM 600 + D) 600-200 MG-UNIT TABS Calcium 600 + D TABS  TAKE 1 TABLET DAILY  Refills: 0    Active   Yes Historical Provider, MD   cholecalciferol (VITAMIN D3) 1,000 units tablet Take 1,000 Units by mouth daily   Yes Historical Provider, MD   Fish Oil-Cholecalciferol (FISH OIL + D3) 5964-7072 MG-UNIT CAPS Fish Oil 1200 MG Oral Capsule  Take 2 tablets daily   Refills: 0    Active   Yes Historical Provider, MD   triamterene-hydrochlorothiazide (MAXZIDE-25) 37 5-25 mg per tablet TAKE ONE-HALF TABLET BY MOUTH EVERY DAY AS NEEDED 6/14/19  Yes Donna Child,    acyclovir (ZOVIRAX) 5 % ointment Apply topically every 3 (three) hours  Patient not taking: Reported on 1/24/2020 1/23/20   Sylvester Leon,      I have reviewed home medications with patient personally  Allergies: No Known Allergies    Social History:     Marital Status:     Occupation:  RN  Patient Pre-hospital Living Situation:  Lives at home by herself  Patient Pre-hospital Level of Mobility:  No restriction  Patient Pre-hospital Diet Restrictions:  No restrictions  Substance Use History:   Social History     Substance and Sexual Activity   Alcohol Use Yes    Comment: 1 wine nightly     Social History     Tobacco Use   Smoking Status Never Smoker   Smokeless Tobacco Never Used     Social History     Substance and Sexual Activity   Drug Use No       Family History:    Family History   Problem Relation Age of Onset    Coronary artery disease Mother     Arthritis Mother     Other Mother         Cardiac Disorder     Transient ischemic attack Mother     Heart attack Father    [facility] Sudden death Father         SCD       Physical Exam:     Vitals:   Blood Pressure: 152/81 (01/24/20 1950)  Pulse: 72 (01/24/20 1950)  Temperature: 98 5 °F (36 9 °C) (01/24/20 1950)  Temp Source: Oral (01/24/20 1950)  Respirations: 16 (01/24/20 1950)  Height: 5' 5" (165 1 cm) (01/24/20 1950)  Weight - Scale: 50 6 kg (111 lb 8 8 oz) (01/24/20 1950)  SpO2: 97 % (01/24/20 1950)    Physical Exam    Generally thin elderly female no acute distress normocephalic atraumatic pupils equal round and reactive to light extraocular muscles intact mucous membranes are moist neck is supple there is no JVD no lymph nodes no carotid bruits chest is decreased but clear to auscultation is no rhonchi rales wheezes S4 murmur or gallop nontender nondistended with positive bowel sounds there is no hepatosplenomegaly no guarding or rebound  Neurally the patient awake alert oriented cranial nerves 2-12 intact    Additional Data:     Lab Results: I have personally reviewed pertinent reports        Results from last 7 days   Lab Units 01/24/20  0712   WBC Thousand/uL 6 95   HEMOGLOBIN g/dL 12 4   HEMATOCRIT % 38 8   PLATELETS Thousands/uL 292   NEUTROS PCT % 66   LYMPHS PCT % 23   MONOS PCT % 8   EOS PCT % 2     Results from last 7 days   Lab Units 01/24/20  0712   SODIUM mmol/L 143   POTASSIUM mmol/L 3 7   CHLORIDE mmol/L 105   CO2 mmol/L 32   BUN mg/dL 29*   CREATININE mg/dL 0 69   ANION GAP mmol/L 6   CALCIUM mg/dL 9 3   ALBUMIN g/dL 3 7   TOTAL BILIRUBIN mg/dL 0 21   ALK PHOS U/L 76   ALT U/L 32   AST U/L 44   GLUCOSE RANDOM mg/dL 113     Results from last 7 days   Lab Units 01/24/20 1959   INR  1 01 Imaging: I have personally reviewed pertinent reports  No orders to display       EKG, Pathology, and Other Studies Reviewed on Admission:   · EKG:  EKG showed normal sinus rhythm  Minimal voltage for LVH  2D echo showed EF of 70% no regional wall motion abnormalities  Allscripts / Epic Records Reviewed: Yes     ** Please Note: This note has been constructed using a voice recognition system   **

## 2020-01-25 NOTE — DISCHARGE SUMMARY
Discharge Summary - [facility]    Patient Information: Isabella Torres 76 y o  female MRN: [id number]  Unit[facility]Bed[facility] Encounter: [id number]    Discharging Physician / Practitioner: Kristine Ngo MD  PCP: Tavo Maldonado DO  Admission Date: 1/24/2020  Discharge Date: 01/25/20    Disposition:     Other: [facility]    Reason for Admission:  Syncope    Discharge Diagnoses:     Principal Problem:    NSTEMI (non-ST elevation myocardial infarction) [facility])  Active Problems:    Syncope    Hyperlipidemia    Hypertension    Atherosclerosis of native coronary artery with angina pectoris ([address] )  Resolved Problems:    * No resolved hospital problems  *      Consultations During Hospital Stay:  · Cardiology    Procedures Performed:     · Cardiac catheter:  3 vessel CAD noted    Hospital Course:     Isabella Torres is a 76 y o  female patient with past medical significant hypertension and hyperlipidemia who originally presented to the hospital on 1/24/2020 due to syncopal episode  Patient well woke up from sleep she was drenched in sweats, felt very nauseous and then developed dizziness, subsequently had a syncopal episode  Patient was brought to the emergency department and noted to have troponin elevation at 1 75  She was subsequently admitted and placed on IV heparin drip  Seen by cardiology and cardiac catheterization was performed  She was noted to have 3 with CAD and recommended to be transferred to [facility] for CT surgery evaluation      Condition at Discharge: good     Discharge Day Visit / Exam:     Subjective:    Vitals: Blood Pressure: (!) 171/91 (01/25/20 0747)  Pulse: 65 (01/25/20 0747)  Temperature: 98 4 °F (36 9 °C) (01/25/20 0747)  Temp Source: Oral (01/25/20 0747)  Respirations: 18 (01/25/20 0747)  Height: 5' 5" (165 1 cm) (01/24/20 1950)  Weight - Scale: 50 6 kg (111 lb 8 8 oz) (01/24/20 1950)  SpO2: 99 % (01/25/20 0747)  Exam:   Physical Exam    Discussion with Family:  Discussed with family at bedside    Discharge instructions/Information to patient and family:   See after visit summary for information provided to patient and family  Provisions for Follow-Up Care:  See after visit summary for information related to follow-up care and any pertinent home health orders  Planned Readmission:no     Discharge Statement:  I spent 35 minutes discharging the patient  This time was spent on the day of discharge  I had direct contact with the patient on the day of discharge  Greater than 50% of the total time was spent examining patient, answering all patient questions, arranging and discussing plan of care with patient as well as directly providing post-discharge instructions  Additional time then spent on discharge activities  Discharge Medications:  See after visit summary for reconciled discharge medications provided to patient and family        ** Please Note: This note has been constructed using a voice recognition system **

## 2020-01-25 NOTE — H&P (VIEW-ONLY)
Consultation - Cardiothoracic Surgery   Lyssa Hinson 76 y o  female MRN: [id number]  Unit/Bed#: [facility] 527-01 Encounter: [id number]    Physician Requesting Consult: Nina Garcia MD    Reason for Consult / Principal Problem: CAD    Inpatient consult to Cardiothoracic Surgery  Consult performed by: Clark Rollins PA-C  Consult ordered by: RACHEL Long        History of Present Illness: Lyssa Hinson is a 76y o  year old female without a longstanding cardiovascular past medical history  Patient was in usual state of health until earlier last night she was woken from her sleep with significant diaphoresis  She also was experiencing nausea  In the bathroom she had a syncopal episode  EMS was notified and she was transferred to [facility] ED for urgent evaluation  Cardiac enzymes were drawn and NSTEMI was identified  She underwent cardiac catheterization urgently  Severe three-vessel coronary artery disease was identified  She was transferred to [facility] for cardiovascular surgery consultation  During interview today she denies any past or current angina  She does admit to intermittent exertional dyspnea  She denies any recent fevers, sweats, or chills      Past Medical History:  Past Medical History:   Diagnosis Date    Effusion of left knee     Last assessed - 9/10/15    Hyperlipidemia     Hypertension     Tick bite     Last assessed - 4/21/17       Past Surgical History:   Past Surgical History:   Procedure Laterality Date    APPENDECTOMY      TONSILLECTOMY      Last assessed - 4/20/17    TUBAL LIGATION      Last assessed - 4/20/17    WISDOM TOOTH EXTRACTION      Last assessed - 4/20/17       Family History:  Family History   Problem Relation Age of Onset    Coronary artery disease Mother     Arthritis Mother     Other Mother         Cardiac Disorder     Transient ischemic attack Mother     Heart attack Father     Sudden death Father         SCD         Social History:  Social History     Substance and Sexual Activity   Alcohol Use Yes    Comment: 1 wine nightly     Social History     Substance and Sexual Activity   Drug Use No     Social History     Tobacco Use   Smoking Status Never Smoker   Smokeless Tobacco Never Used     Marital Status:     Home Medications:   Prior to Admission medications    Medication Sig Start Date End Date Taking? Authorizing Provider   aspirin 81 MG tablet Take 1 tablet (81mg) by mouth once daily  Yes Historical Provider, MD   atorvastatin (LIPITOR) 10 mg tablet Take 1 tablet (10 mg total) by mouth daily 11/22/19  Yes Sylvester Tsang DO   atorvastatin (LIPITOR) 10 mg tablet TAKE ONE TABLET BY MOUTH EVERY OTHER DAY 12/21/19  Yes Sylvester Tsang DO   Calcium Carb-Cholecalciferol (CALCIUM 600 + D) 600-200 MG-UNIT TABS Calcium 600 + D TABS  TAKE 1 TABLET DAILY     Refills: 0    Active   Yes Historical Provider, MD   cholecalciferol (VITAMIN D3) 1,000 units tablet Take 1,000 Units by mouth daily   Yes Historical Provider, MD   Fish Oil-Cholecalciferol (FISH OIL + D3) 2588-1666 MG-UNIT CAPS Fish Oil 1200 MG Oral Capsule  Take 2 tablets daily   Refills: 0    Active   Yes Historical Provider, MD   triamterene-hydrochlorothiazide (MAXZIDE-25) 37 5-25 mg per tablet TAKE ONE-HALF TABLET BY MOUTH EVERY DAY AS NEEDED 6/14/19  Yes Merton Cushing, DO   acyclovir (ZOVIRAX) 5 % ointment Apply topically every 3 (three) hours  Patient not taking: Reported on 1/24/2020 1/23/20   Merton Cushing, DO       Inpatient Medications:  Scheduled Meds:   Current Facility-Administered Medications:  acetaminophen 650 mg Oral Q6H PRN Karuna Parham PA-C    aspirin 81 mg Oral Daily Taylor Cohen MD    atorvastatin 40 mg Oral Daily Taylor Cohen MD    calcium carbonate 1,000 mg Oral Daily PRN Taylor Cohen MD    chlorhexidine 15 mL Mouth/Throat Q12H [facility] Mirela Connolly PA-C    docusate sodium 100 mg Oral BID PRN Taylor Cohen MD    heparin (porcine) 3-20 Units/kg/hr (Order-Specific) Intravenous Titrated Brittany Lindo MD Last Rate: 16 Units/kg/hr (01/25/20 0350)   heparin (porcine) 1,500 Units Intravenous PRN Brittany Lindo MD    heparin (porcine) 3,000 Units Intravenous PRN Brittany Lindo MD    mupirocin 1 application Nasal T[address]Z [address] Nilda Cuellar PA-C    ondansetron 4 mg Intravenous Q6H PRN Brittany Lindo MD    sodium chloride 75 mL/hr Intravenous Continuous Brittany Lindo MD Last Rate: 75 mL/hr (01/25/20 0043)     Continuous Infusions:   heparin (porcine) 3-20 Units/kg/hr (Order-Specific) Last Rate: 16 Units/kg/hr (01/25/20 0350)   sodium chloride 75 mL/hr Last Rate: 75 mL/hr (01/25/20 0043)     PRN Meds:    acetaminophen 650 mg Q6H PRN   calcium carbonate 1,000 mg Daily PRN   docusate sodium 100 mg BID PRN   heparin (porcine) 1,500 Units PRN   heparin (porcine) 3,000 Units PRN   ondansetron 4 mg Q6H PRN       Allergies:  No Known Allergies    Review of Systems:  Review of Systems   Constitutional: Positive for fatigue  HENT: Negative  Eyes: Negative  Respiratory: Positive for shortness of breath  Negative for chest tightness  Cardiovascular: Positive for chest pain  Negative for leg swelling  Endocrine: Negative  Genitourinary: Negative  Musculoskeletal: Negative  Skin: Negative  Neurological: Negative  Psychiatric/Behavioral: Negative          Vital Signs:     Vitals:    01/24/20 1950 01/24/20 2300 01/25/20 0300 01/25/20 0747   BP: 152/81 147/80 142/78 (!) 171/91   BP Location: Left arm Left arm Left arm Left arm   Pulse: 72 74 67 65   Resp: 16 16 16 18   Temp: 98 5 °F (36 9 °C) 98 4 °F (36 9 °C) 98 4 °F (36 9 °C) 98 4 °F (36 9 °C)   TempSrc: Oral Oral Oral Oral   SpO2: 97% 98% 97% 99%   Weight: 50 6 kg (111 lb 8 8 oz)      Height: 5' 5" (1 651 m)        Invasive Devices     Peripheral Intravenous Line            Peripheral IV 01/24/20 Left Antecubital 1 day    Peripheral IV 01/24/20 Dorsal (posterior); Left Forearm less than 1 day                Physical Exam:  Physical Exam   Constitutional: She is oriented to person, place, and time  She appears well-developed and well-nourished  HENT:   Head: Normocephalic and atraumatic  Eyes: Pupils are equal, round, and reactive to light  Conjunctivae are normal    Neck: Normal range of motion  Neck supple  Cardiovascular: Normal rate and regular rhythm  Pulmonary/Chest: Effort normal and breath sounds normal    Abdominal: Soft  Bowel sounds are normal    Musculoskeletal: Normal range of motion  Neurological: She is alert and oriented to person, place, and time  Skin: Skin is warm and dry  Psychiatric: She has a normal mood and affect  Her behavior is normal        Lab Results:     Results from last 7 days   Lab Units 01/25/20  0321 01/24/20  0712   WBC Thousand/uL 8 27 6 95   HEMOGLOBIN g/dL 11 1* 12 4   HEMATOCRIT % 35 3 38 8   PLATELETS Thousands/uL 257 292     Results from last 7 days   Lab Units 01/25/20  0352 01/24/20  0712   POTASSIUM mmol/L 3 5 3 7   CHLORIDE mmol/L 111* 105   CO2 mmol/L 28 32   BUN mg/dL 12 29*   CREATININE mg/dL 0 59* 0 69   CALCIUM mg/dL 8 3 9 3     Results from last 7 days   Lab Units 01/25/20  0307 01/24/20  1959 01/24/20  0712   INR   --  1 01 0 84   PTT seconds 36 24 23     No results found for: HGBA1C  Lab Results   Component Value Date    TROPONINI 3 97 (H) 01/24/2020       Imaging Studies:     Cardiac Catheterization: 3 V CAD; Final report pending  Echocardiogram:  EF 60%  Mild mitral regurgitation  I have personally reviewed pertinent reports  and I have personally reviewed pertinent films in PACS    Assessment:  Principal Problem:    NSTEMI (non-ST elevation myocardial infarction) [facility])  Active Problems:    Hyperlipidemia    Hypertension    Syncope    Atherosclerosis of native coronary artery with angina pectoris ([address] )    Severe coronary artery disease;  Ongoing CABG workup    Plan:  Risks and benefits of coronary artery bypass grafting were discussed in detail today with the patient  They understand and wish to proceed with further workup and ultimately surgical intervention  We have ordered routine preoperative laboratory and vascular studies  Pending the results of these tests, they will be scheduled for surgery Monday with LEANN Arriaga was comfortable with our recommendations, and their questions were answered to their satisfaction  We will continue to evaluate the patient daily with further recommendations as work up is completed  Thank you for allowing us to participate in the care of this patient       SIGNATURE: Praveena Romero PA-C  DATE: January 25, 2020  TIME: 9:56 AM

## 2020-01-25 NOTE — ASSESSMENT & PLAN NOTE
Patient has no history of coronary artery disease presented this morning after waking from sleep in a drenching sweat  She felt ill and was going to going to the bathroom in the next thing she knows she woke up on the floor at the bottom of her bed  She had friend staying over at her home who got her up to the bathroom and subsequently she ended up having to lay out on the floor due to severe fatigue  She was brought to the emergency room for further evaluation at [facility]  Decision was to take the patient to the cardiac catheterization lab due to elevated troponins  Cardiac catheterization was completed showing an 80% ostial lesion of the left circumflex and a distal 80-90% lesion of the circumflex along with scattered calcification of the right coronary artery  Since no culprit lesion was noted the patient was referred to [facility] for further evaluation  Recommendation from the primary cardiologist aneurysm was perhaps a stress test   Patient remains on heparin drip at this time with no chest pain and no dizziness on ambulation  2D echo showed 70% ejection fraction with reasonable wall motion    · Continue aspirin  · Continue Lipitor  · Continue heparin drip  · Cardiology following, appreciate recommendations   · Started on metoprolol tartrate 25 mg BID   · CT surgery following with plan for CABG

## 2020-01-25 NOTE — PROGRESS NOTES
Cardiology Progress Note - Miriam Chaney 76 y o  female MRN: [id number]    Unit/Bed#: [facility] 527-01 Encounter: [id number]      Assessment:  1  NSTEMI with 3-vessel CAD  2  Preserved LV systolic function   3  Syncope  4  Hypertension   5  Dyslipidemia  6  History of PVCs    Plan:  1   CT surgical evaluation in process   2  Pre-operative studies ordered  3  Continue aspirin, statin - lipids noted  4  Will begin beta-blocker   5  Continue IV heparin, d/c IVF    Subjective:   Patient seen and examined  No significant events overnight  Objective:     Vitals: Blood pressure (!) 171/91, pulse 65, temperature 98 4 °F (36 9 °C), temperature source Oral, resp  rate 18, height 5' 5" (1 651 m), weight 50 6 kg (111 lb 8 8 oz), SpO2 99 %  , Body mass index is 18 56 kg/m² ,   Orthostatic Blood Pressures      Most Recent Value   Blood Pressure  (!) 171/91 filed at 01/25/2020 0747   Patient Position - Orthostatic VS  Sitting filed at 01/25/2020 0747            Intake/Output Summary (Last 24 hours) at 1/25/2020 1052  Last data filed at 1/25/2020 0341  Gross per 24 hour   Intake 338 75 ml   Output 1450 ml   Net -1111 25 ml         Physical Exam:    GEN: Miriam Chaney appears well, alert and oriented x 3, pleasant and cooperative   HEENT: pupils equal, round, and reactive to light; extraocular muscles intact  NECK: supple, no carotid bruits   HEART: regular rhythm, normal S1 and S2, no murmur  LUNGS: clear to auscultation bilaterally; no wheezes, rales, or rhonchi   ABDOMEN: normal bowel sounds, soft, no tenderness, no distention  EXTREMITIES: no edema  NEURO: no focal findings   SKIN: normal without suspicious lesions on exposed skin    Medications:      Current Facility-Administered Medications:     acetaminophen (TYLENOL) tablet 650 mg, 650 mg, Oral, Q6H PRN, Karuna Parham PA-C, 650 mg at 01/24/20 2238    aspirin chewable tablet 81 mg, 81 mg, Oral, Daily, Cassie Mckinney MD, 81 mg at 01/25/20 0856    atorvastatin (LIPITOR) tablet 40 mg, 40 mg, Oral, Daily, Dylon Nation MD, 40 mg at 01/25/20 0856    calcium carbonate (TUMS) chewable tablet 1,000 mg, 1,000 mg, Oral, Daily PRN, Dylon Nation MD  Howard Jiménez  [START ON 1/27/2020] chlorhexidine (PERIDEX) 0 12 % oral rinse 15 mL, 15 mL, Mouth/Throat, Once, Joe Almanzar PA-C    docusate sodium (COLACE) capsule 100 mg, 100 mg, Oral, BID PRN, Dylon Nation MD    heparin (porcine) 25,000 units in 250 mL infusion (premix), 3-20 Units/kg/hr (Order-Specific), Intravenous, Titrated, Dylon Nation MD, Last Rate: 8 mL/hr at 01/25/20 0350, 16 Units/kg/hr at 01/25/20 0350    heparin (porcine) injection 1,500 Units, 1,500 Units, Intravenous, PRN, Dylon Nation MD    heparin (porcine) injection 3,000 Units, 3,000 Units, Intravenous, PRN, Dylon Nation MD, 3,000 Units at 01/25/20 0349    [START ON 1/27/2020] mupirocin (BACTROBAN) 2 % nasal ointment 1 application, 1 application, Nasal, Once, Joe Almanzar PA-C    ondansetron [facility]) injection 4 mg, 4 mg, Intravenous, Q6H PRN, Dylon Nation MD    sodium chloride 0 9 % infusion, 75 mL/hr, Intravenous, Continuous, Dylon Nation MD, Last Rate: 75 mL/hr at 01/25/20 0043, 75 mL/hr at 01/25/20 0043     Labs & Results:    Results from last 7 days   Lab Units 01/24/20  1755 01/24/20  1440 01/24/20  1034   TROPONIN I ng/mL 3 97* 2 48* 3 17*     Results from last 7 days   Lab Units 01/25/20  0321 01/24/20  0712   WBC Thousand/uL 8 27 6 95   HEMOGLOBIN g/dL 11 1* 12 4   HEMATOCRIT % 35 3 38 8   PLATELETS Thousands/uL 257 292     Results from last 7 days   Lab Units 01/25/20  0352   TRIGLYCERIDES mg/dL 87   HDL mg/dL 79     Results from last 7 days   Lab Units 01/25/20  0352 01/24/20  0712   POTASSIUM mmol/L 3 5 3 7   CHLORIDE mmol/L 111* 105   CO2 mmol/L 28 32   BUN mg/dL 12 29*   CREATININE mg/dL 0 59* 0 69   CALCIUM mg/dL 8 3 9 3   ALK PHOS U/L  --  76   ALT U/L  --  32   AST U/L  --  44     Results from last 7 days Lab Units 01/25/20  0307 01/24/20  1959 01/24/20  0712   INR   --  1 01 0 84   PTT seconds 36 24 23     Results from last 7 days   Lab Units 01/25/20  0352 01/24/20  0712   MAGNESIUM mg/dL 2 1 2 2         Counseling / Coordination of Care  Total floor / unit time spent today 25  minutes  Greater than 50% of total time was spent with the patient and / or family counseling and / or coordination of care

## 2020-01-25 NOTE — CONSULTS
Consultation - Cardiothoracic Surgery   Tommy Jones 76 y o  female MRN: [id number]  Unit/Bed#: [facility] 527-01 Encounter: [id number]    Physician Requesting Consult: Meli Wilson MD    Reason for Consult / Principal Problem: CAD    Inpatient consult to Cardiothoracic Surgery  Consult performed by: Rodrigo Manuel PA-C  Consult ordered by: RACHEL Lal        History of Present Illness: Tommy Jones is a 76y o  year old female without a longstanding cardiovascular past medical history  Patient was in usual state of health until earlier last night she was woken from her sleep with significant diaphoresis  She also was experiencing nausea  In the bathroom she had a syncopal episode  EMS was notified and she was transferred to [facility] for urgent evaluation  Cardiac enzymes were drawn and NSTEMI was identified  She underwent cardiac catheterization urgently  Severe three-vessel coronary artery disease was identified  She was transferred to [facility] for cardiovascular surgery consultation  During interview today she denies any past or current angina  She does admit to intermittent exertional dyspnea  She denies any recent fevers, sweats, or chills      Past Medical History:  Past Medical History:   Diagnosis Date    Effusion of left knee     Last assessed - 9/10/15    Hyperlipidemia     Hypertension     Tick bite     Last assessed - 4/21/17       Past Surgical History:   Past Surgical History:   Procedure Laterality Date    APPENDECTOMY      TONSILLECTOMY      Last assessed - 4/20/17    TUBAL LIGATION      Last assessed - 4/20/17    WISDOM TOOTH EXTRACTION      Last assessed - 4/20/17       Family History:  Family History   Problem Relation Age of Onset    Coronary artery disease Mother     Arthritis Mother     Other Mother         Cardiac Disorder     Transient ischemic attack Mother     Heart attack Father     Sudden death Father         SCD         Social History:  Social History     Substance and Sexual Activity   Alcohol Use Yes    Comment: 1 wine nightly     Social History     Substance and Sexual Activity   Drug Use No     Social History     Tobacco Use   Smoking Status Never Smoker   Smokeless Tobacco Never Used     Marital Status:     Home Medications:   Prior to Admission medications    Medication Sig Start Date End Date Taking? Authorizing Provider   aspirin 81 MG tablet Take 1 tablet (81mg) by mouth once daily  Yes Historical Provider, MD   atorvastatin (LIPITOR) 10 mg tablet Take 1 tablet (10 mg total) by mouth daily 11/22/19  Yes Sylvester Tsang DO   atorvastatin (LIPITOR) 10 mg tablet TAKE ONE TABLET BY MOUTH EVERY OTHER DAY 12/21/19  Yes Sylvester Tsang DO   Calcium Carb-Cholecalciferol (CALCIUM 600 + D) 600-200 MG-UNIT TABS Calcium 600 + D TABS  TAKE 1 TABLET DAILY     Refills: 0    Active   Yes Historical Provider, MD   cholecalciferol (VITAMIN D3) 1,000 units tablet Take 1,000 Units by mouth daily   Yes Historical Provider, MD   Fish Oil-Cholecalciferol (FISH OIL + D3) 4240-6894 MG-UNIT CAPS Fish Oil 1200 MG Oral Capsule  Take 2 tablets daily   Refills: 0    Active   Yes Historical Provider, MD   triamterene-hydrochlorothiazide (MAXZIDE-25) 37 5-25 mg per tablet TAKE ONE-HALF TABLET BY MOUTH EVERY DAY AS NEEDED 6/14/19  Yes Barbara Leslie DO   acyclovir (ZOVIRAX) 5 % ointment Apply topically every 3 (three) hours  Patient not taking: Reported on 1/24/2020 1/23/20   Barbara Leslie DO       Inpatient Medications:  Scheduled Meds:   Current Facility-Administered Medications:  acetaminophen 650 mg Oral Q6H PRN Karuna Parham PA-C    aspirin 81 mg Oral Daily Padma Hollins MD    atorvastatin 40 mg Oral Daily Padma Hollins MD    calcium carbonate 1,000 mg Oral Daily PRN Padma Hollins MD    chlorhexidine 15 mL Mouth/Throat Q12H [facility] Lillian Schmidt PA-C    docusate sodium 100 mg Oral BID PRN Padma Hollins MD    heparin (porcine) 3-20 Units/kg/hr (Order-Specific) Intravenous Titrated Bautista Shafer MD Last Rate: 16 Units/kg/hr (01/25/20 0350)   heparin (porcine) 1,500 Units Intravenous PRN Bautista Shafer MD    heparin (porcine) 3,000 Units Intravenous PRN Bautisat Shafer MD    mupirocin 1 application Nasal C18R [address] Caity Garcia PA-C    ondansetron 4 mg Intravenous Q6H PRN Bautista Shafer MD    sodium chloride 75 mL/hr Intravenous Continuous Bautista Shafer MD Last Rate: 75 mL/hr (01/25/20 0043)     Continuous Infusions:   heparin (porcine) 3-20 Units/kg/hr (Order-Specific) Last Rate: 16 Units/kg/hr (01/25/20 0350)   sodium chloride 75 mL/hr Last Rate: 75 mL/hr (01/25/20 0043)     PRN Meds:    acetaminophen 650 mg Q6H PRN   calcium carbonate 1,000 mg Daily PRN   docusate sodium 100 mg BID PRN   heparin (porcine) 1,500 Units PRN   heparin (porcine) 3,000 Units PRN   ondansetron 4 mg Q6H PRN       Allergies:  No Known Allergies    Review of Systems:  Review of Systems   Constitutional: Positive for fatigue  HENT: Negative  Eyes: Negative  Respiratory: Positive for shortness of breath  Negative for chest tightness  Cardiovascular: Positive for chest pain  Negative for leg swelling  Endocrine: Negative  Genitourinary: Negative  Musculoskeletal: Negative  Skin: Negative  Neurological: Negative  Psychiatric/Behavioral: Negative          Vital Signs:     Vitals:    01/24/20 1950 01/24/20 2300 01/25/20 0300 01/25/20 0747   BP: 152/81 147/80 142/78 (!) 171/91   BP Location: Left arm Left arm Left arm Left arm   Pulse: 72 74 67 65   Resp: 16 16 16 18   Temp: 98 5 °F (36 9 °C) 98 4 °F (36 9 °C) 98 4 °F (36 9 °C) 98 4 °F (36 9 °C)   TempSrc: Oral Oral Oral Oral   SpO2: 97% 98% 97% 99%   Weight: 50 6 kg (111 lb 8 8 oz)      Height: 5' 5" (1 651 m)        Invasive Devices     Peripheral Intravenous Line            Peripheral IV 01/24/20 Left Antecubital 1 day    Peripheral IV 01/24/20 Dorsal (posterior); Left Forearm less than 1 day                Physical Exam:  Physical Exam   Constitutional: She is oriented to person, place, and time  She appears well-developed and well-nourished  HENT:   Head: Normocephalic and atraumatic  Eyes: Pupils are equal, round, and reactive to light  Conjunctivae are normal    Neck: Normal range of motion  Neck supple  Cardiovascular: Normal rate and regular rhythm  Pulmonary/Chest: Effort normal and breath sounds normal    Abdominal: Soft  Bowel sounds are normal    Musculoskeletal: Normal range of motion  Neurological: She is alert and oriented to person, place, and time  Skin: Skin is warm and dry  Psychiatric: She has a normal mood and affect  Her behavior is normal        Lab Results:     Results from last 7 days   Lab Units 01/25/20  0321 01/24/20  0712   WBC Thousand/uL 8 27 6 95   HEMOGLOBIN g/dL 11 1* 12 4   HEMATOCRIT % 35 3 38 8   PLATELETS Thousands/uL 257 292     Results from last 7 days   Lab Units 01/25/20  0352 01/24/20  0712   POTASSIUM mmol/L 3 5 3 7   CHLORIDE mmol/L 111* 105   CO2 mmol/L 28 32   BUN mg/dL 12 29*   CREATININE mg/dL 0 59* 0 69   CALCIUM mg/dL 8 3 9 3     Results from last 7 days   Lab Units 01/25/20  0307 01/24/20  1959 01/24/20  0712   INR   --  1 01 0 84   PTT seconds 36 24 23     No results found for: HGBA1C  Lab Results   Component Value Date    TROPONINI 3 97 (H) 01/24/2020       Imaging Studies:     Cardiac Catheterization: 3 V CAD; Final report pending  Echocardiogram:  EF 60%  Mild mitral regurgitation  I have personally reviewed pertinent reports  and I have personally reviewed pertinent films in PACS    Assessment:  Principal Problem:    NSTEMI (non-ST elevation myocardial infarction) [facility])  Active Problems:    Hyperlipidemia    Hypertension    Syncope    Atherosclerosis of native coronary artery with angina pectoris ([address] )    Severe coronary artery disease;  Ongoing CABG workup    Plan:  Risks and benefits of coronary artery bypass grafting were discussed in detail today with the patient  They understand and wish to proceed with further workup and ultimately surgical intervention  We have ordered routine preoperative laboratory and vascular studies  Pending the results of these tests, they will be scheduled for surgery Monday with LEANN Escoto was comfortable with our recommendations, and their questions were answered to their satisfaction  We will continue to evaluate the patient daily with further recommendations as work up is completed  Thank you for allowing us to participate in the care of this patient       SIGNATURE: Khoa Andre PA-C  DATE: January 25, 2020  TIME: 9:56 AM

## 2020-01-26 ENCOUNTER — APPOINTMENT (INPATIENT)
Dept: NON INVASIVE DIAGNOSTICS | Facility: HOSPITAL | Age: 75
DRG: 235 | End: 2020-01-26
Payer: MEDICARE

## 2020-01-26 ENCOUNTER — ANESTHESIA EVENT (INPATIENT)
Dept: PERIOP | Facility: HOSPITAL | Age: 75
DRG: 235 | End: 2020-01-26
Payer: MEDICARE

## 2020-01-26 LAB
ABO GROUP BLD: NORMAL
APTT PPP: 79 SECONDS (ref 23–37)
BLD GP AB SCN SERPL QL: NEGATIVE
MRSA NOSE QL CULT: NORMAL
RH BLD: POSITIVE
SPECIMEN EXPIRATION DATE: NORMAL

## 2020-01-26 PROCEDURE — 86900 BLOOD TYPING SEROLOGIC ABO: CPT | Performed by: PHYSICIAN ASSISTANT

## 2020-01-26 PROCEDURE — 99233 SBSQ HOSP IP/OBS HIGH 50: CPT | Performed by: PHYSICIAN ASSISTANT

## 2020-01-26 PROCEDURE — 93971 EXTREMITY STUDY: CPT

## 2020-01-26 PROCEDURE — 86901 BLOOD TYPING SEROLOGIC RH(D): CPT | Performed by: PHYSICIAN ASSISTANT

## 2020-01-26 PROCEDURE — 86850 RBC ANTIBODY SCREEN: CPT | Performed by: PHYSICIAN ASSISTANT

## 2020-01-26 PROCEDURE — 93880 EXTRACRANIAL BILAT STUDY: CPT

## 2020-01-26 PROCEDURE — 86920 COMPATIBILITY TEST SPIN: CPT

## 2020-01-26 PROCEDURE — 85730 THROMBOPLASTIN TIME PARTIAL: CPT | Performed by: THORACIC SURGERY (CARDIOTHORACIC VASCULAR SURGERY)

## 2020-01-26 PROCEDURE — 99231 SBSQ HOSP IP/OBS SF/LOW 25: CPT | Performed by: INTERNAL MEDICINE

## 2020-01-26 PROCEDURE — 99231 SBSQ HOSP IP/OBS SF/LOW 25: CPT | Performed by: THORACIC SURGERY (CARDIOTHORACIC VASCULAR SURGERY)

## 2020-01-26 RX ORDER — CHLORHEXIDINE GLUCONATE 0.12 MG/ML
15 RINSE ORAL EVERY 12 HOURS SCHEDULED
Status: DISCONTINUED | OUTPATIENT
Start: 2020-01-26 | End: 2020-01-27 | Stop reason: HOSPADM

## 2020-01-26 RX ORDER — LANOLIN ALCOHOL/MO/W.PET/CERES
3 CREAM (GRAM) TOPICAL
Status: DISCONTINUED | OUTPATIENT
Start: 2020-01-26 | End: 2020-01-27 | Stop reason: HOSPADM

## 2020-01-26 RX ADMIN — ASPIRIN 81 MG 81 MG: 81 TABLET ORAL at 08:28

## 2020-01-26 RX ADMIN — METOPROLOL TARTRATE 25 MG: 25 TABLET ORAL at 21:43

## 2020-01-26 RX ADMIN — MELATONIN 3 MG: 3 TAB ORAL at 21:43

## 2020-01-26 RX ADMIN — ATORVASTATIN CALCIUM 40 MG: 40 TABLET, FILM COATED ORAL at 08:28

## 2020-01-26 RX ADMIN — MUPIROCIN 1 APPLICATION: 20 OINTMENT TOPICAL at 21:44

## 2020-01-26 RX ADMIN — METOPROLOL TARTRATE 25 MG: 25 TABLET ORAL at 08:28

## 2020-01-26 RX ADMIN — CHLORHEXIDINE GLUCONATE 0.12% ORAL RINSE 15 ML: 1.2 LIQUID ORAL at 17:03

## 2020-01-26 RX ADMIN — DOCUSATE SODIUM 100 MG: 100 CAPSULE, LIQUID FILLED ORAL at 06:08

## 2020-01-26 NOTE — PROGRESS NOTES
Progress Note - Isa Ferris 1945, 76 y o  female MRN: [id number]  Unit/Bed#: [facility] 404-01 Encounter: [id number]  Primary Care Provider: Donna Bragg,    Date and time admitted to hospital: 1/24/2020  7:31 PM    * NSTEMI (non-ST elevation myocardial infarction) [facility])  Assessment & Plan  Patient has no history of coronary artery disease presented this morning after waking from sleep in a drenching sweat  She felt ill and was going to going to the bathroom in the next thing she knows she woke up on the floor at the bottom of her bed  She had friend staying over at her home who got her up to the bathroom and subsequently she ended up having to lay out on the floor due to severe fatigue  She was brought to the emergency room for further evaluation at [facility]  Decision was to take the patient to the cardiac catheterization lab due to elevated troponins  Cardiac catheterization was completed showing an 80% ostial lesion of the left circumflex and a distal 80-90% lesion of the circumflex along with scattered calcification of the right coronary artery  Since no culprit lesion was noted the patient was referred to [facility] for further evaluation  Recommendation from the primary cardiologist aneurysm was perhaps a stress test   Patient remains on heparin drip at this time with no chest pain and no dizziness on ambulation  2D echo showed 70% ejection fraction with reasonable wall motion  · Continue aspirin, statin, BB  · Continue heparin drip  · Cardiology following, appreciate recommendations   · CT surgery following with plan for CABG tomorrow     Syncope  Assessment & Plan  Unclear etiology but in the context of the diaphoresis and 3 vessel disease cardiac source is expected  CT scan of the head was negative    · Telemetry monitoring  · Serial laboratories to keep potassium and magnesium within normal range  · Will hold hydrochlorothiazide/triamterene, which is her home dose of antihypertensive  · Orthostatics negative     Atherosclerosis of native coronary artery with angina pectoris [facility])  Assessment & Plan  Patient is showing signs of narrowing in the ostium of the left circumflex and an 80% lesion in the distal circumflex along with diffuse RCA calcification  No culprit lesion identified  · Continue aspirin, BB, statin  · Continue telemetry monitoring  · Continue heparin gtt  · Cardiology following, appreciate recommendations   · CT surgery following, plan is for CABG tomorrow     Essential hypertension  Assessment & Plan  · BP acceptable, monitor routinely   · Home regimen is triamterene/hydrochlorothiazide at home, currently on hold   · Cardiology following, appreciate recommendations   · Started on BB     Hyperlipidemia  Assessment & Plan  · Lipid panel within normal limits   · Continue statin     VTE Pharmacologic Prophylaxis:   Pharmacologic: Heparin Drip  Mechanical VTE Prophylaxis in Place: Yes    Discussions with Specialists or Other Care Team Provider: none    Education and Discussions with Family / Patient: patient     Time Spent for Care: 15 minutes  More than 50% of total time spent on counseling and coordination of care as described above  Current Length of Stay: 2 day(s)    Current Patient Status: Inpatient   Certification Statement: The patient will continue to require additional inpatient hospital stay due to CABG     Discharge Plan: not medically stable, for CABG tomorrow     Code Status: Level 1 - Full Code    Subjective:   Patient offers no complaints  Currently having vein mapping of lower extremities  Denies chest pain, SOB, n/v/d  Objective:     Vitals:   Temp (24hrs), Av 1 °F (36 7 °C), Min:97 8 °F (36 6 °C), Max:98 4 °F (36 9 °C)    Temp:  [97 8 °F (36 6 °C)-98 4 °F (36 9 °C)] 97 8 °F (36 6 °C)  HR:  [59-72] 65  Resp:  [18] 18  BP: (130-188)/(60-87) 161/72  SpO2:  [96 %-100 %] 100 %  Body mass index is 18 56 kg/m²       Input and Output Summary (last 24 hours): Intake/Output Summary (Last 24 hours) at 1/26/2020 1143  Last data filed at 1/26/2020 2219  Gross per 24 hour   Intake 1389 2 ml   Output 1700 ml   Net -310 8 ml       Physical Exam:     Physical Exam   Constitutional: She is oriented to person, place, and time  She appears well-developed and well-nourished  No distress  Cardiovascular: Normal rate, regular rhythm, normal heart sounds and intact distal pulses  No murmur heard  Pulmonary/Chest: Effort normal and breath sounds normal  No respiratory distress  She has no wheezes  She has no rales  Abdominal: Soft  Bowel sounds are normal  She exhibits no distension  There is no tenderness  Musculoskeletal: She exhibits no edema  Neurological: She is alert and oriented to person, place, and time  Skin: Skin is warm and dry  She is not diaphoretic  No erythema  No pallor  Nursing note and vitals reviewed  Additional Data:     Labs:    Results from last 7 days   Lab Units 01/25/20  0321 01/24/20  0712   WBC Thousand/uL 8 27 6 95   HEMOGLOBIN g/dL 11 1* 12 4   HEMATOCRIT % 35 3 38 8   PLATELETS Thousands/uL 257 292   NEUTROS PCT %  --  66   LYMPHS PCT %  --  23   MONOS PCT %  --  8   EOS PCT %  --  2     Results from last 7 days   Lab Units 01/25/20  0352 01/24/20  0712   SODIUM mmol/L 142 143   POTASSIUM mmol/L 3 5 3 7   CHLORIDE mmol/L 111* 105   CO2 mmol/L 28 32   BUN mg/dL 12 29*   CREATININE mg/dL 0 59* 0 69   ANION GAP mmol/L 3* 6   CALCIUM mg/dL 8 3 9 3   ALBUMIN g/dL  --  3 7   TOTAL BILIRUBIN mg/dL  --  0 21   ALK PHOS U/L  --  76   ALT U/L  --  32   AST U/L  --  44   GLUCOSE RANDOM mg/dL 96 113     Results from last 7 days   Lab Units 01/24/20  1959   INR  1 01                       * I Have Reviewed All Lab Data Listed Above  * Additional Pertinent Lab Tests Reviewed:  All Labs For Current Hospital Admission Reviewed    Imaging:    Imaging Reports Reviewed Today Include: none  Imaging Personally Reviewed by Myself Includes:  none    Recent Cultures (last 7 days):           Last 24 Hours Medication List:     Current Facility-Administered Medications:  acetaminophen 650 mg Oral Q6H PRN aKruna Parham PA-C   aspirin 81 mg Oral Daily Nabor Hill MD   atorvastatin 40 mg Oral Daily Nabor Hill MD   calcium carbonate 1,000 mg Oral Daily PRN Nabor Hill MD   [START ON 1/27/2020] chlorhexidine 15 mL Mouth/Throat Once Marvetta Fruits, FRANDY   chlorhexidine 15 mL Swish & Spit Q12H [address] Marvetta Fruits, FRANDY   docusate sodium 100 mg Oral BID PRN Nabor Hill MD   [START ON 1/27/2020] metoprolol tartrate 12 5 mg Oral Once Marvetta Fruits, FRANDY   metoprolol tartrate 25 mg Oral Q12H [address] Michelle Garzon MD   [START ON 9/56/9706] mupirocin 1 application Nasal Once Marvetta Fruits, FRANDY   mupirocin 1 application Nasal Once Marvetta Fruits, FRANDY   ondansetron 4 mg Intravenous Q6H PRN Nabor Hill MD        Today, Patient Was Seen By: Tona Méndez PA-C    ** Please Note: Dictation voice to text software may have been used in the creation of this document   **

## 2020-01-26 NOTE — ASSESSMENT & PLAN NOTE
Patient has no history of coronary artery disease presented this morning after waking from sleep in a drenching sweat  She felt ill and was going to going to the bathroom in the next thing she knows she woke up on the floor at the bottom of her bed  She had friend staying over at her home who got her up to the bathroom and subsequently she ended up having to lay out on the floor due to severe fatigue  She was brought to the emergency room for further evaluation at [facility]  Decision was to take the patient to the cardiac catheterization lab due to elevated troponins  Cardiac catheterization was completed showing an 80% ostial lesion of the left circumflex and a distal 80-90% lesion of the circumflex along with scattered calcification of the right coronary artery  Since no culprit lesion was noted the patient was referred to [facility] for further evaluation  Recommendation from the primary cardiologist aneurysm was perhaps a stress test   Patient remains on heparin drip at this time with no chest pain and no dizziness on ambulation  2D echo showed 70% ejection fraction with reasonable wall motion    · Continue aspirin, statin, BB  · Continue heparin drip  · Cardiology following, appreciate recommendations   · CT surgery following with plan for CABG tomorrow

## 2020-01-26 NOTE — ASSESSMENT & PLAN NOTE
Patient is showing signs of narrowing in the ostium of the left circumflex and an 80% lesion in the distal circumflex along with diffuse RCA calcification  No culprit lesion identified    · Continue aspirin, BB, statin  · Continue telemetry monitoring  · Continue heparin gtt  · Cardiology following, appreciate recommendations   · CT surgery following, plan is for CABG tomorrow

## 2020-01-26 NOTE — SOCIAL WORK
CM met with patient and explained cm role  Pt alert and oriented  Pt reports she lives in 2 story home alone w/2 bryson  Pt reports DME: shower chair, denies VNA and rehab  Pt reports being independent PTA, reports good support by family and friends, pt drives and has transport home with family at d/c  Pts pharmacy is [facility]  POA is pts daughters Raymundo Mccloud and Bandar Guzmán  Pt denies hx/admission for drugs/etoh and psych/mental health  CM reviewed d/c planning process including the following: identifying help at home, patient preference for d/c planning needs, Discharge Lounge, [facility] Meds to Bed program, availability of treatment team to discuss questions or concerns patient and/or family may have regarding understanding medications and recognizing signs and symptoms once discharged  CM also encouraged patient to follow up with all recommended appointments after discharge  Patient advised of importance for patient and family to participate in managing patients medical well being

## 2020-01-26 NOTE — ASSESSMENT & PLAN NOTE
Unclear etiology but in the context of the diaphoresis and 3 vessel disease cardiac source is expected  CT scan of the head was negative    · Telemetry monitoring  · Serial laboratories to keep potassium and magnesium within normal range  · Will hold hydrochlorothiazide/triamterene, which is her home dose of antihypertensive  · Orthostatics negative

## 2020-01-26 NOTE — PROGRESS NOTES
Progress Note - Cardiothoracic Surgery   Eva Carlos 76 y o  female MRN: [id number]  Unit/Bed#: [facility] 404-01 Encounter: [id number]      24 Hour Events: No events/angina  Medications:   Scheduled Meds:  Current Facility-Administered Medications:  acetaminophen 650 mg Oral Q6H PRN Karuna Parham PA-C    aspirin 81 mg Oral Daily Vangie Soriano MD    atorvastatin 40 mg Oral Daily Vangie Soriano MD    calcium carbonate 1,000 mg Oral Daily PRN Vangie Soriano MD    [START ON 1/27/2020] chlorhexidine 15 mL Mouth/Throat Once Dianah SaveFRANDY falcon    docusate sodium 100 mg Oral BID PRN Vangie Soriano MD    heparin (porcine) 3-20 Units/kg/hr (Order-Specific) Intravenous Titrated Vangie Soriano MD Last Rate: 16 Units/kg/hr (01/26/20 0657)   heparin (porcine) 1,500 Units Intravenous PRN Vangie Soriano MD    heparin (porcine) 3,000 Units Intravenous PRN Vangie Soriano MD    metoprolol tartrate 25 mg Oral Q12H [address] Renuka Hall MD    [START ON 8/98/2421] mupirocin 1 application Nasal Once Kenneth Rodrigez PA-C    ondansetron 4 mg Intravenous Q6H PRN Vangie Soriano MD      Continuous Infusions:  heparin (porcine) 3-20 Units/kg/hr (Order-Specific) Last Rate: 16 Units/kg/hr (01/26/20 0657)       Results:   Results from last 7 days   Lab Units 01/25/20  0321 01/24/20  0712   WBC Thousand/uL 8 27 6 95   HEMOGLOBIN g/dL 11 1* 12 4   HEMATOCRIT % 35 3 38 8   PLATELETS Thousands/uL 257 292     Results from last 7 days   Lab Units 01/25/20  0352 01/24/20  0712   POTASSIUM mmol/L 3 5 3 7   CHLORIDE mmol/L 111* 105   CO2 mmol/L 28 32   BUN mg/dL 12 29*   CREATININE mg/dL 0 59* 0 69   CALCIUM mg/dL 8 3 9 3     Results from last 7 days   Lab Units 01/26/20  0512 01/25/20  1824 01/25/20  1013  01/24/20 1959 01/24/20  0712   INR   --   --   --   --  1 01 0 84   PTT seconds 79* 64* 88*   < > 24 23    < > = values in this interval not displayed         Studies:     Cardiac Catheterization: LAD: The vessel was medium sized  The proximal and mid LAD is heavily calcified with diffuse disease of 70-80%  Ostial circumflex: There was a discrete 85 % stenosis  Mid circumflex: There was a discrete 85 % stenosis  1st obtuse marginal: The vessel was small to medium sized  There was a tubular 50 % stenosis  2nd obtuse marginal: The vessel was small to medium sized  There was a discrete 85 % stenosis  RCA: The vessel was medium sized  Dominant  There is heavy calcification in the proximal, mid, and distal vessel  There are multiple significant lesions ( 70-90% ) seen throughout this entire area  Echocardiogram: Ef 60%  Mild MR    Carotid artery duplex: Pending     Greater saphenous vein mapping: Pending    Vitals:   Vitals:    01/25/20 2142 01/25/20 2345 01/26/20 0346 01/26/20 0729   BP: 130/61 132/63 132/60 161/72   BP Location:  Right arm Right arm Right arm   Pulse: 62 59 66 65   Resp:  18 18 18   Temp:  98 °F (36 7 °C) 98 1 °F (36 7 °C) 97 8 °F (36 6 °C)   TempSrc:  Oral Oral Oral   SpO2:  97% 98% 100%   Weight:       Height:           Physical Exam:    HEENT/NECK:  PERRLA  No jugular venous distention  Cardiac: Regular rate and rhythm  Pulmonary:  Breath sounds clear bilaterally and Breath sounds diminished in the bases bilaterally   Abdomen:  Non-tender, Non-distended and Normal bowel sounds  Extremities: Extremities warm/dry and No edema B/L  Neuro: Alert and oriented X 3 and Sensation is grossly intact  Skin: Warm/Dry, without rashes or lesions  Assessment:    Severe coronary artery disease; Ongoing CABG workup    Plan:  Patient agreeable to proceed with surgery;  Informed consent signed  Blood type and cross match ordered; RBC prepared  Continue Mupirocin 2% nasal ointment q 12 hrs  Continue topical chlorhexidine bath and mouth rise  NPO after midnight  Discontinue heparin infusion on call to OR  Preoperative oxygen, beta blocker, insulin, and antibiotics ordered coronary artery bypass grafting scheduled for tomorrow with LEANN Yanes: Ashley Rae PA-C  DATE: January 26, 2020  TIME: 9:41 AM

## 2020-01-26 NOTE — ASSESSMENT & PLAN NOTE
· BP acceptable, monitor routinely   · Home regimen is triamterene/hydrochlorothiazide at home, currently on hold   · Cardiology following, appreciate recommendations   · Started on BB

## 2020-01-26 NOTE — PROGRESS NOTES
Cardiology Progress Note - Jovita Fowler 76 y o  female MRN: [id number]    Unit/Bed#: [facility] 404-01 Encounter: [id number]      Assessment:  1  NSTEMI with 3-vessel CAD  2  Preserved LV systolic function   3  Syncope  4  Hypertension   5  Dyslipidemia  6  History of PVCs    Plan:  1   CT surgical evaluation in process   2  Pre-operative studies ordered - pending   3  Continue aspirin, statin - lipids noted  4  Beta-blocker started yesterday, continue at current dose   5  Continue IV heparin    Subjective:   Patient seen and examined  No significant events overnight  Objective:     Vitals: Blood pressure 161/72, pulse 65, temperature 97 8 °F (36 6 °C), temperature source Oral, resp  rate 18, height 5' 5" (1 651 m), weight 50 6 kg (111 lb 8 8 oz), SpO2 100 %  , Body mass index is 18 56 kg/m² ,   Orthostatic Blood Pressures      Most Recent Value   Blood Pressure  161/72 [Map 104] filed at 01/26/2020 5026   Patient Position - Orthostatic VS  Sitting filed at 01/26/2020 9213            Intake/Output Summary (Last 24 hours) at 1/26/2020 0816  Last data filed at 1/26/2020 0601  Gross per 24 hour   Intake 1509 2 ml   Output 2700 ml   Net -1190 8 ml         Physical Exam:    GEN: Jovita Fowler appears well, alert and oriented x 3, pleasant and cooperative   HEENT: pupils equal, round, and reactive to light; extraocular muscles intact  NECK: supple, no carotid bruits   HEART: regular rhythm, normal S1 and S2, no murmur  LUNGS: clear to auscultation bilaterally; no wheezes, rales, or rhonchi   ABDOMEN: normal bowel sounds, soft, no tenderness, no distention  EXTREMITIES: no edema  NEURO: no focal findings   SKIN: normal without suspicious lesions on exposed skin    Medications:      Current Facility-Administered Medications:     acetaminophen (TYLENOL) tablet 650 mg, 650 mg, Oral, Q6H PRN, Karuna Parham PA-C, 650 mg at 01/24/20 2080    aspirin chewable tablet 81 mg, 81 mg, Oral, Daily, Dondra Councilman, MD, 81 mg at 01/25/20 0856    atorvastatin (LIPITOR) tablet 40 mg, 40 mg, Oral, Daily, Sherie Song MD, 40 mg at 01/25/20 0856    calcium carbonate (TUMS) chewable tablet 1,000 mg, 1,000 mg, Oral, Daily PRN, Sherie Song MD  [facility]  [START ON 1/27/2020] chlorhexidine (PERIDEX) 0 12 % oral rinse 15 mL, 15 mL, Mouth/Throat, Once, Deneen Beltran PA-C    docusate sodium (COLACE) capsule 100 mg, 100 mg, Oral, BID PRN, Sherie Song MD, 100 mg at 01/26/20 0608    heparin (porcine) 25,000 units in 250 mL infusion (premix), 3-20 Units/kg/hr (Order-Specific), Intravenous, Titrated, Sherie Song MD, Last Rate: 8 mL/hr at 01/26/20 0657, 16 Units/kg/hr at 01/26/20 0657    heparin (porcine) injection 1,500 Units, 1,500 Units, Intravenous, PRN, Sherie Song MD    heparin (porcine) injection 3,000 Units, 3,000 Units, Intravenous, PRN, Sherie Song MD, 3,000 Units at 01/25/20 0349    metoprolol tartrate (LOPRESSOR) tablet 25 mg, 25 mg, Oral, Q12H [address], Jose Orta MD, 25 mg at 01/25/20 2142    [START ON 1/27/2020] mupirocin (BACTROBAN) 2 % nasal ointment 1 application, 1 application, Nasal, Once, Deneen Beltran PA-C    ondansetron [facility]) injection 4 mg, 4 mg, Intravenous, Q6H PRN, Sherie Song MD     Labs & Results:    Results from last 7 days   Lab Units 01/24/20  1755 01/24/20  1440 01/24/20  1034   TROPONIN I ng/mL 3 97* 2 48* 3 17*     Results from last 7 days   Lab Units 01/25/20  0321 01/24/20  0712   WBC Thousand/uL 8 27 6 95   HEMOGLOBIN g/dL 11 1* 12 4   HEMATOCRIT % 35 3 38 8   PLATELETS Thousands/uL 257 292     Results from last 7 days   Lab Units 01/25/20  0352   TRIGLYCERIDES mg/dL 87   HDL mg/dL 79     Results from last 7 days   Lab Units 01/25/20  0352 01/24/20  0712   POTASSIUM mmol/L 3 5 3 7   CHLORIDE mmol/L 111* 105   CO2 mmol/L 28 32   BUN mg/dL 12 29*   CREATININE mg/dL 0 59* 0 69   CALCIUM mg/dL 8 3 9 3   ALK PHOS U/L  --  76   ALT U/L  --  32   AST U/L  --  44     Results from last 7 days   Lab Units 01/26/20  0512 01/25/20  1824 01/25/20  1013  01/24/20  1959 01/24/20  0712   INR   --   --   --   --  1 01 0 84   PTT seconds 79* 64* 88*   < > 24 23    < > = values in this interval not displayed  Results from last 7 days   Lab Units 01/25/20  0352 01/24/20  0712   MAGNESIUM mg/dL 2 1 2 2         Counseling / Coordination of Care  Total floor / unit time spent today 25  minutes  Greater than 50% of total time was spent with the patient and / or family counseling and / or coordination of care

## 2020-01-26 NOTE — ANESTHESIA PREPROCEDURE EVALUATION
Review of Systems/Medical History    Chart reviewed  No history of anesthetic complications     Cardiovascular  Hyperlipidemia, Hypertension controlled, Past MI , CAD , CAD status: 3VD and obstructive, Angina ,   Comment: Echo: LEFT VENTRICLE:  Systolic function was vigorous  Ejection fraction was estimated to be 70 %  There were no regional wall motion abnormalities  Wall thickness was at the upper limits of normal      RIGHT VENTRICLE:  The size was normal   Systolic function was normal      MITRAL VALVE:  There was mild regurgitation  Cardiac cath: CORONARY CIRCULATION:  LAD: The vessel was medium sized  The proximal and mid LAD is heavily calcified with diffuse disease of 70-80%  Circumflex: The vessel was medium sized and heavily calcified  Ostial circumflex: There was a discrete 85 % stenosis  Mid circumflex: There was a discrete 85 % stenosis  1st obtuse marginal: The vessel was small to medium sized  There was a tubular 50 % stenosis  2nd obtuse marginal: The vessel was small to medium sized  There was a discrete 85 % stenosis  RCA: The vessel was medium sized  Dominant  There is heavy calcification in the proximal, mid, and distal vessel  There are multiple significant lesions ( 70-90% ) seen throughout this entire area   ,  Pulmonary  Negative pulmonary ROS        GI/Hepatic  Negative GI/hepatic ROS          Negative  ROS        Endo/Other  Negative endo/other ROS      GYN       Hematology  Negative hematology ROS      Musculoskeletal    Arthritis     Neurology    Headaches,    Psychology   Depression ,              Physical Exam    Airway  Comment: Anticipate anterior airway  Mallampati score: III  TM Distance: >3 FB  Neck ROM: full     Dental   No notable dental hx     Cardiovascular      Pulmonary      Other Findings        Anesthesia Plan  ASA Score- 4     Anesthesia Type- general with ASA Monitors     Additional Monitors: arterial line, central venous line and pulmonary artery catheter  Airway Plan: ETT  Comment: GA with ETT, IV, [place], GET, PAC/TLC, blood product, postop mechanical ventilation  Plan Factors-    Induction- intravenous  Postoperative Plan-     Informed Consent- Anesthetic plan and risks discussed with patient

## 2020-01-27 ENCOUNTER — APPOINTMENT (INPATIENT)
Dept: RADIOLOGY | Facility: HOSPITAL | Age: 75
DRG: 235 | End: 2020-01-27
Payer: MEDICARE

## 2020-01-27 ENCOUNTER — APPOINTMENT (INPATIENT)
Dept: NON INVASIVE DIAGNOSTICS | Facility: HOSPITAL | Age: 75
DRG: 235 | End: 2020-01-27
Attending: THORACIC SURGERY (CARDIOTHORACIC VASCULAR SURGERY)
Payer: MEDICARE

## 2020-01-27 ENCOUNTER — ANESTHESIA (INPATIENT)
Dept: PERIOP | Facility: HOSPITAL | Age: 75
DRG: 235 | End: 2020-01-27
Payer: MEDICARE

## 2020-01-27 PROBLEM — Z95.1 S/P CABG X 3: Status: ACTIVE | Noted: 2020-01-27

## 2020-01-27 LAB
ADDITIONAL SETTING: 5
ANCILLARY VALUES: 0
ANION GAP SERPL CALCULATED.3IONS-SCNC: 3 MMOL/L (ref 4–13)
ANION GAP SERPL CALCULATED.3IONS-SCNC: 8 MMOL/L (ref 4–13)
ATRIAL RATE: 66 BPM
BASE EXCESS BLDA CALC-SCNC: -1 MMOL/L (ref -2–3)
BASE EXCESS BLDA CALC-SCNC: 1 MMOL/L (ref -2–3)
BASE EXCESS BLDA CALC-SCNC: 1 MMOL/L (ref -2–3)
BASE EXCESS BLDA CALC-SCNC: 2 MMOL/L (ref -2–3)
BASE EXCESS BLDA CALC-SCNC: 2 MMOL/L (ref -2–3)
BASE EXCESS BLDA CALC-SCNC: 5 MMOL/L (ref -2–3)
BASE EXCESS BLDA CALC-SCNC: 7 MMOL/L (ref -2–3)
BASOPHILS # BLD AUTO: 0.08 THOUSANDS/ΜL (ref 0–0.1)
BASOPHILS NFR BLD AUTO: 1 % (ref 0–1)
BUN SERPL-MCNC: 12 MG/DL (ref 5–25)
BUN SERPL-MCNC: 17 MG/DL (ref 5–25)
CA-I BLD-SCNC: 0.94 MMOL/L (ref 1.12–1.32)
CA-I BLD-SCNC: 1.01 MMOL/L (ref 1.12–1.32)
CA-I BLD-SCNC: 1.02 MMOL/L (ref 1.12–1.32)
CA-I BLD-SCNC: 1.12 MMOL/L (ref 1.12–1.32)
CA-I BLD-SCNC: 1.15 MMOL/L (ref 1.12–1.32)
CA-I BLD-SCNC: 1.21 MMOL/L (ref 1.12–1.32)
CA-I BLD-SCNC: 1.25 MMOL/L (ref 1.12–1.32)
CALCIUM SERPL-MCNC: 7.8 MG/DL (ref 8.3–10.1)
CALCIUM SERPL-MCNC: 9.4 MG/DL (ref 8.3–10.1)
CHLORIDE SERPL-SCNC: 107 MMOL/L (ref 100–108)
CHLORIDE SERPL-SCNC: 114 MMOL/L (ref 100–108)
CO2 SERPL-SCNC: 23 MMOL/L (ref 21–32)
CO2 SERPL-SCNC: 31 MMOL/L (ref 21–32)
CREAT SERPL-MCNC: 0.55 MG/DL (ref 0.6–1.3)
CREAT SERPL-MCNC: 0.65 MG/DL (ref 0.6–1.3)
DS:DELIVERY SYSTEM: AC
EOSINOPHIL # BLD AUTO: 0.16 THOUSAND/ΜL (ref 0–0.61)
EOSINOPHIL NFR BLD AUTO: 2 % (ref 0–6)
ERYTHROCYTE [DISTWIDTH] IN BLOOD BY AUTOMATED COUNT: 14.2 % (ref 11.6–15.1)
FIO2 GAS DIL.REBREATH: 60 L
GFR SERPL CREATININE-BSD FRML MDRD: 88 ML/MIN/1.73SQ M
GFR SERPL CREATININE-BSD FRML MDRD: 93 ML/MIN/1.73SQ M
GLUCOSE SERPL-MCNC: 118 MG/DL (ref 65–140)
GLUCOSE SERPL-MCNC: 120 MG/DL (ref 65–140)
GLUCOSE SERPL-MCNC: 122 MG/DL (ref 65–140)
GLUCOSE SERPL-MCNC: 125 MG/DL (ref 65–140)
GLUCOSE SERPL-MCNC: 128 MG/DL (ref 65–140)
GLUCOSE SERPL-MCNC: 128 MG/DL (ref 65–140)
GLUCOSE SERPL-MCNC: 129 MG/DL (ref 65–140)
GLUCOSE SERPL-MCNC: 138 MG/DL (ref 65–140)
GLUCOSE SERPL-MCNC: 140 MG/DL (ref 65–140)
GLUCOSE SERPL-MCNC: 142 MG/DL (ref 65–140)
GLUCOSE SERPL-MCNC: 89 MG/DL (ref 65–140)
GLUCOSE SERPL-MCNC: 95 MG/DL (ref 65–140)
GLUCOSE SERPL-MCNC: 96 MG/DL (ref 65–140)
GLUCOSE SERPL-MCNC: 99 MG/DL (ref 65–140)
HCO3 BLDA-SCNC: 24.7 MMOL/L (ref 22–28)
HCO3 BLDA-SCNC: 24.9 MMOL/L (ref 22–28)
HCO3 BLDA-SCNC: 25.3 MMOL/L (ref 22–28)
HCO3 BLDA-SCNC: 26.2 MMOL/L (ref 22–28)
HCO3 BLDA-SCNC: 26.9 MMOL/L (ref 24–30)
HCO3 BLDA-SCNC: 28.7 MMOL/L (ref 22–28)
HCO3 BLDA-SCNC: 31 MMOL/L (ref 22–28)
HCT VFR BLD AUTO: 34.2 % (ref 34.8–46.1)
HCT VFR BLD AUTO: 41.1 % (ref 34.8–46.1)
HCT VFR BLD CALC: 20 % (ref 34.8–46.1)
HCT VFR BLD CALC: 24 % (ref 34.8–46.1)
HCT VFR BLD CALC: 24 % (ref 34.8–46.1)
HCT VFR BLD CALC: 25 % (ref 34.8–46.1)
HCT VFR BLD CALC: 28 % (ref 34.8–46.1)
HCT VFR BLD CALC: 30 % (ref 34.8–46.1)
HCT VFR BLD CALC: 33 % (ref 34.8–46.1)
HGB BLD-MCNC: 11.1 G/DL (ref 11.5–15.4)
HGB BLD-MCNC: 12.8 G/DL (ref 11.5–15.4)
HGB BLDA-MCNC: 10.2 G/DL (ref 11.5–15.4)
HGB BLDA-MCNC: 11.2 G/DL (ref 11.5–15.4)
HGB BLDA-MCNC: 6.8 G/DL (ref 11.5–15.4)
HGB BLDA-MCNC: 8.2 G/DL (ref 11.5–15.4)
HGB BLDA-MCNC: 8.2 G/DL (ref 11.5–15.4)
HGB BLDA-MCNC: 8.5 G/DL (ref 11.5–15.4)
HGB BLDA-MCNC: 9.5 G/DL (ref 11.5–15.4)
IMM GRANULOCYTES # BLD AUTO: 0.03 THOUSAND/UL (ref 0–0.2)
IMM GRANULOCYTES NFR BLD AUTO: 0 % (ref 0–2)
KCT BLD-ACNC: 110 SEC (ref 89–137)
KCT BLD-ACNC: 126 SEC (ref 89–137)
KCT BLD-ACNC: 342 SEC (ref 89–137)
KCT BLD-ACNC: 520 SEC (ref 89–137)
KCT BLD-ACNC: 560 SEC (ref 89–137)
KCT BLD-ACNC: 582 SEC (ref 89–137)
LYMPHOCYTES # BLD AUTO: 1.79 THOUSANDS/ΜL (ref 0.6–4.47)
LYMPHOCYTES NFR BLD AUTO: 24 % (ref 14–44)
MCH RBC QN AUTO: 29.6 PG (ref 26.8–34.3)
MCHC RBC AUTO-ENTMCNC: 31.1 G/DL (ref 31.4–37.4)
MCV RBC AUTO: 95 FL (ref 82–98)
MONOCYTES # BLD AUTO: 0.58 THOUSAND/ΜL (ref 0.17–1.22)
MONOCYTES NFR BLD AUTO: 8 % (ref 4–12)
NEUTROPHILS # BLD AUTO: 4.8 THOUSANDS/ΜL (ref 1.85–7.62)
NEUTS SEG NFR BLD AUTO: 65 % (ref 43–75)
NRBC BLD AUTO-RTO: 0 /100 WBCS
P AXIS: 80 DEGREES
PCO2 BLD: 26 MMOL/L (ref 21–32)
PCO2 BLD: 27 MMOL/L (ref 21–32)
PCO2 BLD: 28 MMOL/L (ref 21–32)
PCO2 BLD: 30 MMOL/L (ref 21–32)
PCO2 BLD: 32 MMOL/L (ref 21–32)
PCO2 BLD: 32.1 MM HG (ref 36–44)
PCO2 BLD: 37.6 MM HG (ref 36–44)
PCO2 BLD: 38.1 MM HG (ref 36–44)
PCO2 BLD: 39.7 MM HG (ref 42–50)
PCO2 BLD: 41.6 MM HG (ref 36–44)
PCO2 BLD: 42.4 MM HG (ref 36–44)
PCO2 BLD: 45.8 MM HG (ref 36–44)
PH BLD: 7.34 [PH] (ref 7.35–7.45)
PH BLD: 7.41 [PH] (ref 7.35–7.45)
PH BLD: 7.42 [PH] (ref 7.35–7.45)
PH BLD: 7.44 [PH] (ref 7.3–7.4)
PH BLD: 7.47 [PH] (ref 7.35–7.45)
PH BLD: 7.49 [PH] (ref 7.35–7.45)
PH BLD: 7.5 [PH] (ref 7.35–7.45)
PLATELET # BLD AUTO: 154 THOUSANDS/UL (ref 149–390)
PLATELET # BLD AUTO: 177 THOUSANDS/UL (ref 149–390)
PLATELET # BLD AUTO: 289 THOUSANDS/UL (ref 149–390)
PMV BLD AUTO: 10.7 FL (ref 8.9–12.7)
PMV BLD AUTO: 11 FL (ref 8.9–12.7)
PMV BLD AUTO: 11.2 FL (ref 8.9–12.7)
PO2 BLD: 169 MM HG (ref 75–129)
PO2 BLD: 241 MM HG (ref 75–129)
PO2 BLD: 267 MM HG (ref 75–129)
PO2 BLD: 268 MM HG (ref 75–129)
PO2 BLD: 270 MM HG (ref 75–129)
PO2 BLD: 338 MM HG (ref 75–129)
PO2 BLD: 42 MM HG (ref 35–45)
POTASSIUM BLD-SCNC: 3.2 MMOL/L (ref 3.5–5.3)
POTASSIUM BLD-SCNC: 3.5 MMOL/L (ref 3.5–5.3)
POTASSIUM BLD-SCNC: 3.5 MMOL/L (ref 3.5–5.3)
POTASSIUM BLD-SCNC: 3.7 MMOL/L (ref 3.5–5.3)
POTASSIUM BLD-SCNC: 4.1 MMOL/L (ref 3.5–5.3)
POTASSIUM BLD-SCNC: 4.2 MMOL/L (ref 3.5–5.3)
POTASSIUM BLD-SCNC: 4.3 MMOL/L (ref 3.5–5.3)
POTASSIUM SERPL-SCNC: 3.3 MMOL/L (ref 3.5–5.3)
POTASSIUM SERPL-SCNC: 3.6 MMOL/L (ref 3.5–5.3)
POTASSIUM SERPL-SCNC: 4.1 MMOL/L (ref 3.5–5.3)
PR INTERVAL: 163 MS
PRESSURE SETTING: 10
QRS AXIS: 86 DEGREES
QRSD INTERVAL: 79 MS
QT INTERVAL: 450 MS
QTC INTERVAL: 472 MS
RBC # BLD AUTO: 4.33 MILLION/UL (ref 3.81–5.12)
RESPIRATORY RATE: 120
SAO2 % BLD FROM PO2: 100 % (ref 60–85)
SAO2 % BLD FROM PO2: 79 % (ref 60–85)
SODIUM BLD-SCNC: 136 MMOL/L (ref 136–145)
SODIUM BLD-SCNC: 136 MMOL/L (ref 136–145)
SODIUM BLD-SCNC: 137 MMOL/L (ref 136–145)
SODIUM BLD-SCNC: 138 MMOL/L (ref 136–145)
SODIUM BLD-SCNC: 139 MMOL/L (ref 136–145)
SODIUM BLD-SCNC: 140 MMOL/L (ref 136–145)
SODIUM BLD-SCNC: 140 MMOL/L (ref 136–145)
SODIUM SERPL-SCNC: 141 MMOL/L (ref 136–145)
SODIUM SERPL-SCNC: 145 MMOL/L (ref 136–145)
SPECIMEN SOURCE: ABNORMAL
SPECIMEN SOURCE: NORMAL
SPECIMEN SOURCE: NORMAL
T WAVE AXIS: 82 DEGREES
VENT TYPE: ABNORMAL
VENTILATION VALUE: 380
VENTRICULAR RATE: 66 BPM
WBC # BLD AUTO: 7.44 THOUSAND/UL (ref 4.31–10.16)

## 2020-01-27 PROCEDURE — 99223 1ST HOSP IP/OBS HIGH 75: CPT | Performed by: EMERGENCY MEDICINE

## 2020-01-27 PROCEDURE — 82947 ASSAY GLUCOSE BLOOD QUANT: CPT

## 2020-01-27 PROCEDURE — 5A1221Z PERFORMANCE OF CARDIAC OUTPUT, CONTINUOUS: ICD-10-PCS | Performed by: THORACIC SURGERY (CARDIOTHORACIC VASCULAR SURGERY)

## 2020-01-27 PROCEDURE — 93010 ELECTROCARDIOGRAM REPORT: CPT | Performed by: INTERNAL MEDICINE

## 2020-01-27 PROCEDURE — 33518 CABG ARTERY-VEIN TWO: CPT | Performed by: PHYSICIAN ASSISTANT

## 2020-01-27 PROCEDURE — 80048 BASIC METABOLIC PNL TOTAL CA: CPT | Performed by: PHYSICIAN ASSISTANT

## 2020-01-27 PROCEDURE — 93005 ELECTROCARDIOGRAM TRACING: CPT

## 2020-01-27 PROCEDURE — 85025 COMPLETE CBC W/AUTO DIFF WBC: CPT | Performed by: PHYSICIAN ASSISTANT

## 2020-01-27 PROCEDURE — 71045 X-RAY EXAM CHEST 1 VIEW: CPT

## 2020-01-27 PROCEDURE — 84132 ASSAY OF SERUM POTASSIUM: CPT

## 2020-01-27 PROCEDURE — 021109W BYPASS CORONARY ARTERY, TWO ARTERIES FROM AORTA WITH AUTOLOGOUS VENOUS TISSUE, OPEN APPROACH: ICD-10-PCS | Performed by: THORACIC SURGERY (CARDIOTHORACIC VASCULAR SURGERY)

## 2020-01-27 PROCEDURE — 85014 HEMATOCRIT: CPT | Performed by: PHYSICIAN ASSISTANT

## 2020-01-27 PROCEDURE — 85049 AUTOMATED PLATELET COUNT: CPT | Performed by: THORACIC SURGERY (CARDIOTHORACIC VASCULAR SURGERY)

## 2020-01-27 PROCEDURE — 02HV33Z INSERTION OF INFUSION DEVICE INTO SUPERIOR VENA CAVA, PERCUTANEOUS APPROACH: ICD-10-PCS | Performed by: ANESTHESIOLOGY

## 2020-01-27 PROCEDURE — 33518 CABG ARTERY-VEIN TWO: CPT | Performed by: THORACIC SURGERY (CARDIOTHORACIC VASCULAR SURGERY)

## 2020-01-27 PROCEDURE — 82330 ASSAY OF CALCIUM: CPT

## 2020-01-27 PROCEDURE — 82948 REAGENT STRIP/BLOOD GLUCOSE: CPT

## 2020-01-27 PROCEDURE — 93971 EXTREMITY STUDY: CPT | Performed by: SURGERY

## 2020-01-27 PROCEDURE — 94002 VENT MGMT INPAT INIT DAY: CPT

## 2020-01-27 PROCEDURE — 82803 BLOOD GASES ANY COMBINATION: CPT

## 2020-01-27 PROCEDURE — 85018 HEMOGLOBIN: CPT | Performed by: PHYSICIAN ASSISTANT

## 2020-01-27 PROCEDURE — 33508 ENDOSCOPIC VEIN HARVEST: CPT | Performed by: THORACIC SURGERY (CARDIOTHORACIC VASCULAR SURGERY)

## 2020-01-27 PROCEDURE — 33508 ENDOSCOPIC VEIN HARVEST: CPT | Performed by: PHYSICIAN ASSISTANT

## 2020-01-27 PROCEDURE — 06BQ4ZZ EXCISION OF LEFT SAPHENOUS VEIN, PERCUTANEOUS ENDOSCOPIC APPROACH: ICD-10-PCS | Performed by: THORACIC SURGERY (CARDIOTHORACIC VASCULAR SURGERY)

## 2020-01-27 PROCEDURE — 33533 CABG ARTERIAL SINGLE: CPT | Performed by: THORACIC SURGERY (CARDIOTHORACIC VASCULAR SURGERY)

## 2020-01-27 PROCEDURE — 84295 ASSAY OF SERUM SODIUM: CPT

## 2020-01-27 PROCEDURE — 93880 EXTRACRANIAL BILAT STUDY: CPT | Performed by: SURGERY

## 2020-01-27 PROCEDURE — 33533 CABG ARTERIAL SINGLE: CPT | Performed by: PHYSICIAN ASSISTANT

## 2020-01-27 PROCEDURE — 02100Z9 BYPASS CORONARY ARTERY, ONE ARTERY FROM LEFT INTERNAL MAMMARY, OPEN APPROACH: ICD-10-PCS | Performed by: THORACIC SURGERY (CARDIOTHORACIC VASCULAR SURGERY)

## 2020-01-27 PROCEDURE — 84132 ASSAY OF SERUM POTASSIUM: CPT | Performed by: PHYSICIAN ASSISTANT

## 2020-01-27 PROCEDURE — 93312 ECHO TRANSESOPHAGEAL: CPT

## 2020-01-27 PROCEDURE — 85049 AUTOMATED PLATELET COUNT: CPT | Performed by: PHYSICIAN ASSISTANT

## 2020-01-27 PROCEDURE — 85347 COAGULATION TIME ACTIVATED: CPT

## 2020-01-27 PROCEDURE — 30233N0 TRANSFUSION OF AUTOLOGOUS RED BLOOD CELLS INTO PERIPHERAL VEIN, PERCUTANEOUS APPROACH: ICD-10-PCS | Performed by: THORACIC SURGERY (CARDIOTHORACIC VASCULAR SURGERY)

## 2020-01-27 PROCEDURE — 85014 HEMATOCRIT: CPT

## 2020-01-27 PROCEDURE — 5A1223Z PERFORMANCE OF CARDIAC PACING, CONTINUOUS: ICD-10-PCS | Performed by: THORACIC SURGERY (CARDIOTHORACIC VASCULAR SURGERY)

## 2020-01-27 PROCEDURE — P9016 RBC LEUKOCYTES REDUCED: HCPCS

## 2020-01-27 PROCEDURE — 94760 N-INVAS EAR/PLS OXIMETRY 1: CPT

## 2020-01-27 DEVICE — MARKER CORONARY BYPASS VOSS GRAFT: Type: IMPLANTABLE DEVICE | Site: AORTA | Status: FUNCTIONAL

## 2020-01-27 RX ORDER — CEFAZOLIN SODIUM 2 G/50ML
SOLUTION INTRAVENOUS AS NEEDED
Status: DISCONTINUED | OUTPATIENT
Start: 2020-01-27 | End: 2020-01-27 | Stop reason: SURG

## 2020-01-27 RX ORDER — MIDAZOLAM HYDROCHLORIDE 2 MG/2ML
INJECTION, SOLUTION INTRAMUSCULAR; INTRAVENOUS AS NEEDED
Status: DISCONTINUED | OUTPATIENT
Start: 2020-01-27 | End: 2020-01-27 | Stop reason: SURG

## 2020-01-27 RX ORDER — CEFAZOLIN SODIUM 2 G/50ML
2000 SOLUTION INTRAVENOUS EVERY 8 HOURS
Status: COMPLETED | OUTPATIENT
Start: 2020-01-27 | End: 2020-01-28

## 2020-01-27 RX ORDER — CEFAZOLIN SODIUM 2 G/50ML
2000 SOLUTION INTRAVENOUS ONCE
Status: DISCONTINUED | OUTPATIENT
Start: 2020-01-27 | End: 2020-01-27 | Stop reason: HOSPADM

## 2020-01-27 RX ORDER — POTASSIUM CHLORIDE 14.9 MG/ML
20 INJECTION INTRAVENOUS ONCE AS NEEDED
Status: DISCONTINUED | OUTPATIENT
Start: 2020-01-27 | End: 2020-01-28

## 2020-01-27 RX ORDER — ATORVASTATIN CALCIUM 80 MG/1
80 TABLET, FILM COATED ORAL
Status: DISCONTINUED | OUTPATIENT
Start: 2020-01-27 | End: 2020-02-04 | Stop reason: HOSPADM

## 2020-01-27 RX ORDER — ONDANSETRON 2 MG/ML
4 INJECTION INTRAMUSCULAR; INTRAVENOUS EVERY 6 HOURS PRN
Status: DISCONTINUED | OUTPATIENT
Start: 2020-01-27 | End: 2020-02-04 | Stop reason: HOSPADM

## 2020-01-27 RX ORDER — CALCIUM CHLORIDE 100 MG/ML
1 INJECTION INTRAVENOUS; INTRAVENTRICULAR ONCE
Status: DISCONTINUED | OUTPATIENT
Start: 2020-01-27 | End: 2020-01-28

## 2020-01-27 RX ORDER — ETOMIDATE 2 MG/ML
INJECTION INTRAVENOUS AS NEEDED
Status: DISCONTINUED | OUTPATIENT
Start: 2020-01-27 | End: 2020-01-27 | Stop reason: SURG

## 2020-01-27 RX ORDER — CHLORHEXIDINE GLUCONATE 0.12 MG/ML
15 RINSE ORAL 2 TIMES DAILY
Status: DISCONTINUED | OUTPATIENT
Start: 2020-01-27 | End: 2020-01-28

## 2020-01-27 RX ORDER — SODIUM CHLORIDE, SODIUM LACTATE, POTASSIUM CHLORIDE, CALCIUM CHLORIDE 600; 310; 30; 20 MG/100ML; MG/100ML; MG/100ML; MG/100ML
INJECTION, SOLUTION INTRAVENOUS CONTINUOUS PRN
Status: DISCONTINUED | OUTPATIENT
Start: 2020-01-27 | End: 2020-01-27 | Stop reason: SURG

## 2020-01-27 RX ORDER — MAGNESIUM SULFATE HEPTAHYDRATE 40 MG/ML
2 INJECTION, SOLUTION INTRAVENOUS ONCE
Status: COMPLETED | OUTPATIENT
Start: 2020-01-27 | End: 2020-01-27

## 2020-01-27 RX ORDER — FUROSEMIDE 10 MG/ML
40 INJECTION INTRAMUSCULAR; INTRAVENOUS EVERY 6 HOURS PRN
Status: DISCONTINUED | OUTPATIENT
Start: 2020-01-27 | End: 2020-01-28

## 2020-01-27 RX ORDER — ROCURONIUM BROMIDE 10 MG/ML
INJECTION, SOLUTION INTRAVENOUS AS NEEDED
Status: DISCONTINUED | OUTPATIENT
Start: 2020-01-27 | End: 2020-01-27 | Stop reason: SURG

## 2020-01-27 RX ORDER — MELATONIN
1000 DAILY
Status: DISCONTINUED | OUTPATIENT
Start: 2020-01-28 | End: 2020-02-04 | Stop reason: HOSPADM

## 2020-01-27 RX ORDER — ONDANSETRON 2 MG/ML
4 INJECTION INTRAMUSCULAR; INTRAVENOUS ONCE
Status: COMPLETED | OUTPATIENT
Start: 2020-01-27 | End: 2020-01-27

## 2020-01-27 RX ORDER — ALBUMIN, HUMAN INJ 5% 5 %
SOLUTION INTRAVENOUS
Status: COMPLETED
Start: 2020-01-27 | End: 2020-01-27

## 2020-01-27 RX ORDER — OXYCODONE HYDROCHLORIDE AND ACETAMINOPHEN 5; 325 MG/1; MG/1
2 TABLET ORAL EVERY 6 HOURS PRN
Status: DISCONTINUED | OUTPATIENT
Start: 2020-01-27 | End: 2020-01-28

## 2020-01-27 RX ORDER — EPHEDRINE SULFATE 50 MG/ML
INJECTION INTRAVENOUS AS NEEDED
Status: DISCONTINUED | OUTPATIENT
Start: 2020-01-27 | End: 2020-01-27 | Stop reason: SURG

## 2020-01-27 RX ORDER — ALBUMIN, HUMAN INJ 5% 5 %
12.5 SOLUTION INTRAVENOUS ONCE
Status: COMPLETED | OUTPATIENT
Start: 2020-01-27 | End: 2020-01-27

## 2020-01-27 RX ORDER — SODIUM CHLORIDE 450 MG/100ML
20 INJECTION, SOLUTION INTRAVENOUS CONTINUOUS
Status: DISCONTINUED | OUTPATIENT
Start: 2020-01-27 | End: 2020-01-28

## 2020-01-27 RX ORDER — FONDAPARINUX SODIUM 2.5 MG/.5ML
2.5 INJECTION SUBCUTANEOUS DAILY
Status: DISCONTINUED | OUTPATIENT
Start: 2020-01-28 | End: 2020-02-04 | Stop reason: HOSPADM

## 2020-01-27 RX ORDER — MAGNESIUM HYDROXIDE 1200 MG/15ML
LIQUID ORAL AS NEEDED
Status: DISCONTINUED | OUTPATIENT
Start: 2020-01-27 | End: 2020-01-27 | Stop reason: HOSPADM

## 2020-01-27 RX ORDER — SODIUM CHLORIDE 9 MG/ML
INJECTION, SOLUTION INTRAVENOUS CONTINUOUS PRN
Status: DISCONTINUED | OUTPATIENT
Start: 2020-01-27 | End: 2020-01-27 | Stop reason: SURG

## 2020-01-27 RX ORDER — LIDOCAINE HYDROCHLORIDE 10 MG/ML
INJECTION, SOLUTION EPIDURAL; INFILTRATION; INTRACAUDAL; PERINEURAL
Status: COMPLETED | OUTPATIENT
Start: 2020-01-27 | End: 2020-01-27

## 2020-01-27 RX ORDER — LIDOCAINE HYDROCHLORIDE 10 MG/ML
INJECTION, SOLUTION EPIDURAL; INFILTRATION; INTRACAUDAL; PERINEURAL AS NEEDED
Status: DISCONTINUED | OUTPATIENT
Start: 2020-01-27 | End: 2020-01-27 | Stop reason: SURG

## 2020-01-27 RX ORDER — ISOSORBIDE MONONITRATE 30 MG/1
30 TABLET, EXTENDED RELEASE ORAL DAILY
Status: DISCONTINUED | OUTPATIENT
Start: 2020-01-28 | End: 2020-02-04 | Stop reason: HOSPADM

## 2020-01-27 RX ORDER — HEPARIN SODIUM 1000 [USP'U]/ML
INJECTION, SOLUTION INTRAVENOUS; SUBCUTANEOUS AS NEEDED
Status: DISCONTINUED | OUTPATIENT
Start: 2020-01-27 | End: 2020-01-27 | Stop reason: SURG

## 2020-01-27 RX ORDER — OXYCODONE HYDROCHLORIDE AND ACETAMINOPHEN 5; 325 MG/1; MG/1
1 TABLET ORAL EVERY 4 HOURS PRN
Status: DISCONTINUED | OUTPATIENT
Start: 2020-01-27 | End: 2020-01-28

## 2020-01-27 RX ORDER — POTASSIUM CHLORIDE 14.9 MG/ML
20 INJECTION INTRAVENOUS
Status: DISCONTINUED | OUTPATIENT
Start: 2020-01-27 | End: 2020-01-28

## 2020-01-27 RX ORDER — FENTANYL CITRATE 50 UG/ML
INJECTION, SOLUTION INTRAMUSCULAR; INTRAVENOUS AS NEEDED
Status: DISCONTINUED | OUTPATIENT
Start: 2020-01-27 | End: 2020-01-27 | Stop reason: SURG

## 2020-01-27 RX ORDER — FENTANYL CITRATE 50 UG/ML
50 INJECTION, SOLUTION INTRAMUSCULAR; INTRAVENOUS ONCE
Status: DISCONTINUED | OUTPATIENT
Start: 2020-01-27 | End: 2020-01-28

## 2020-01-27 RX ORDER — ACETAMINOPHEN 325 MG/1
650 TABLET ORAL EVERY 4 HOURS PRN
Status: DISCONTINUED | OUTPATIENT
Start: 2020-01-27 | End: 2020-01-28

## 2020-01-27 RX ORDER — AMIODARONE HYDROCHLORIDE 200 MG/1
200 TABLET ORAL EVERY 8 HOURS SCHEDULED
Status: DISCONTINUED | OUTPATIENT
Start: 2020-01-27 | End: 2020-01-29

## 2020-01-27 RX ORDER — HYDROMORPHONE HCL/PF 1 MG/ML
0.5 SYRINGE (ML) INJECTION
Status: DISCONTINUED | OUTPATIENT
Start: 2020-01-27 | End: 2020-01-28

## 2020-01-27 RX ORDER — FENTANYL CITRATE 50 UG/ML
50 INJECTION, SOLUTION INTRAMUSCULAR; INTRAVENOUS
Status: DISCONTINUED | OUTPATIENT
Start: 2020-01-27 | End: 2020-01-28

## 2020-01-27 RX ORDER — VANCOMYCIN HYDROCHLORIDE 1 G/20ML
INJECTION, POWDER, LYOPHILIZED, FOR SOLUTION INTRAVENOUS AS NEEDED
Status: DISCONTINUED | OUTPATIENT
Start: 2020-01-27 | End: 2020-01-27 | Stop reason: HOSPADM

## 2020-01-27 RX ORDER — PROTAMINE SULFATE 10 MG/ML
INJECTION, SOLUTION INTRAVENOUS AS NEEDED
Status: DISCONTINUED | OUTPATIENT
Start: 2020-01-27 | End: 2020-01-27 | Stop reason: SURG

## 2020-01-27 RX ORDER — ACETAMINOPHEN 650 MG/1
650 SUPPOSITORY RECTAL EVERY 4 HOURS PRN
Status: DISCONTINUED | OUTPATIENT
Start: 2020-01-27 | End: 2020-01-28

## 2020-01-27 RX ORDER — POLYETHYLENE GLYCOL 3350 17 G/17G
17 POWDER, FOR SOLUTION ORAL DAILY
Status: DISCONTINUED | OUTPATIENT
Start: 2020-01-28 | End: 2020-02-01

## 2020-01-27 RX ORDER — POTASSIUM CHLORIDE 14.9 MG/ML
20 INJECTION INTRAVENOUS
Status: COMPLETED | OUTPATIENT
Start: 2020-01-27 | End: 2020-01-27

## 2020-01-27 RX ORDER — PANTOPRAZOLE SODIUM 40 MG/1
40 TABLET, DELAYED RELEASE ORAL
Status: DISCONTINUED | OUTPATIENT
Start: 2020-01-28 | End: 2020-01-30

## 2020-01-27 RX ORDER — BISACODYL 10 MG
10 SUPPOSITORY, RECTAL RECTAL DAILY PRN
Status: DISCONTINUED | OUTPATIENT
Start: 2020-01-27 | End: 2020-02-01

## 2020-01-27 RX ORDER — HYDROMORPHONE HCL/PF 1 MG/ML
1 SYRINGE (ML) INJECTION
Status: DISCONTINUED | OUTPATIENT
Start: 2020-01-27 | End: 2020-01-28

## 2020-01-27 RX ORDER — ASPIRIN 325 MG
325 TABLET ORAL DAILY
Status: DISCONTINUED | OUTPATIENT
Start: 2020-01-27 | End: 2020-02-04 | Stop reason: HOSPADM

## 2020-01-27 RX ADMIN — ALBUMIN (HUMAN) 12.5 G: 12.5 INJECTION, SOLUTION INTRAVENOUS at 18:34

## 2020-01-27 RX ADMIN — ALBUMIN (HUMAN) 12.5 G: 12.5 INJECTION, SOLUTION INTRAVENOUS at 17:37

## 2020-01-27 RX ADMIN — LIDOCAINE HYDROCHLORIDE 5 ML: 10 INJECTION, SOLUTION EPIDURAL; INFILTRATION; INTRACAUDAL; PERINEURAL at 11:58

## 2020-01-27 RX ADMIN — CEFAZOLIN SODIUM 2000 MG: 2 SOLUTION INTRAVENOUS at 12:35

## 2020-01-27 RX ADMIN — EPHEDRINE SULFATE 5 MG: 50 INJECTION, SOLUTION INTRAVENOUS at 12:15

## 2020-01-27 RX ADMIN — FENTANYL CITRATE 100 MCG: 50 INJECTION, SOLUTION INTRAMUSCULAR; INTRAVENOUS at 11:58

## 2020-01-27 RX ADMIN — CEFAZOLIN SODIUM 2000 MG: 2 SOLUTION INTRAVENOUS at 20:23

## 2020-01-27 RX ADMIN — CHLORHEXIDINE GLUCONATE 0.12% ORAL RINSE 15 ML: 1.2 LIQUID ORAL at 18:15

## 2020-01-27 RX ADMIN — SODIUM CHLORIDE 20 ML/HR: 0.45 INJECTION, SOLUTION INTRAVENOUS at 16:00

## 2020-01-27 RX ADMIN — OXYCODONE HYDROCHLORIDE AND ACETAMINOPHEN 1 TABLET: 5; 325 TABLET ORAL at 20:33

## 2020-01-27 RX ADMIN — FENTANYL CITRATE 100 MCG: 50 INJECTION, SOLUTION INTRAMUSCULAR; INTRAVENOUS at 12:36

## 2020-01-27 RX ADMIN — HEPARIN SODIUM 10000 UNITS: 1000 INJECTION INTRAVENOUS; SUBCUTANEOUS at 13:28

## 2020-01-27 RX ADMIN — SODIUM CHLORIDE 2 UNITS/HR: 9 INJECTION, SOLUTION INTRAVENOUS at 13:45

## 2020-01-27 RX ADMIN — ROCURONIUM BROMIDE 50 MG: 50 INJECTION, SOLUTION INTRAVENOUS at 11:58

## 2020-01-27 RX ADMIN — METOPROLOL TARTRATE 25 MG: 25 TABLET ORAL at 09:05

## 2020-01-27 RX ADMIN — ONDANSETRON 4 MG: 2 INJECTION INTRAMUSCULAR; INTRAVENOUS at 18:15

## 2020-01-27 RX ADMIN — Medication 2 MCG/MIN: at 14:32

## 2020-01-27 RX ADMIN — AMINOCAPROIC ACID 10 G: 250 INJECTION, SOLUTION INTRAVENOUS at 12:29

## 2020-01-27 RX ADMIN — PROTAMINE SULFATE 300 MG: 10 INJECTION, SOLUTION INTRAVENOUS at 14:38

## 2020-01-27 RX ADMIN — MUPIROCIN 1 APPLICATION: 20 OINTMENT TOPICAL at 21:52

## 2020-01-27 RX ADMIN — CHLORHEXIDINE GLUCONATE 0.12% ORAL RINSE 15 ML: 1.2 LIQUID ORAL at 09:05

## 2020-01-27 RX ADMIN — FENTANYL CITRATE 250 MCG: 50 INJECTION, SOLUTION INTRAMUSCULAR; INTRAVENOUS at 14:53

## 2020-01-27 RX ADMIN — FENTANYL CITRATE 150 MCG: 50 INJECTION, SOLUTION INTRAMUSCULAR; INTRAVENOUS at 14:40

## 2020-01-27 RX ADMIN — FENTANYL CITRATE 150 MCG: 50 INJECTION, SOLUTION INTRAMUSCULAR; INTRAVENOUS at 12:06

## 2020-01-27 RX ADMIN — PHENYLEPHRINE HYDROCHLORIDE 20 MCG/MIN: 10 INJECTION INTRAVENOUS at 16:00

## 2020-01-27 RX ADMIN — ALBUMIN, HUMAN INJ 5% 12.5 G: 5 SOLUTION at 17:37

## 2020-01-27 RX ADMIN — MIDAZOLAM 2 MG: 1 INJECTION INTRAMUSCULAR; INTRAVENOUS at 11:28

## 2020-01-27 RX ADMIN — POTASSIUM CHLORIDE 20 MEQ: 14.9 INJECTION, SOLUTION INTRAVENOUS at 21:37

## 2020-01-27 RX ADMIN — ETOMIDATE 12 MG: 20 INJECTION, SOLUTION INTRAVENOUS at 11:58

## 2020-01-27 RX ADMIN — ALBUMIN, HUMAN INJ 5% 12.5 G: 5 SOLUTION at 18:34

## 2020-01-27 RX ADMIN — ALBUMIN (HUMAN) 12.5 G: 12.5 INJECTION, SOLUTION INTRAVENOUS at 17:21

## 2020-01-27 RX ADMIN — HYDROMORPHONE HYDROCHLORIDE 0.5 MG: 1 INJECTION, SOLUTION INTRAMUSCULAR; INTRAVENOUS; SUBCUTANEOUS at 22:27

## 2020-01-27 RX ADMIN — MAGNESIUM SULFATE HEPTAHYDRATE 2 G: 40 INJECTION, SOLUTION INTRAVENOUS at 15:50

## 2020-01-27 RX ADMIN — CHLORHEXIDINE GLUCONATE 0.12% ORAL RINSE 15 ML: 1.2 LIQUID ORAL at 09:04

## 2020-01-27 RX ADMIN — ONDANSETRON 4 MG: 2 INJECTION INTRAMUSCULAR; INTRAVENOUS at 21:31

## 2020-01-27 RX ADMIN — SODIUM CHLORIDE: 0.9 INJECTION, SOLUTION INTRAVENOUS at 12:28

## 2020-01-27 RX ADMIN — SODIUM CHLORIDE, SODIUM LACTATE, POTASSIUM CHLORIDE, AND CALCIUM CHLORIDE: .6; .31; .03; .02 INJECTION, SOLUTION INTRAVENOUS at 14:59

## 2020-01-27 RX ADMIN — ROCURONIUM BROMIDE 50 MG: 50 INJECTION, SOLUTION INTRAVENOUS at 13:31

## 2020-01-27 RX ADMIN — LIDOCAINE HYDROCHLORIDE 1 ML: 10 INJECTION, SOLUTION EPIDURAL; INFILTRATION; INTRACAUDAL; PERINEURAL at 11:45

## 2020-01-27 RX ADMIN — SODIUM CHLORIDE 3 MG/HR: 0.9 INJECTION, SOLUTION INTRAVENOUS at 12:45

## 2020-01-27 RX ADMIN — POTASSIUM CHLORIDE 20 MEQ: 14.9 INJECTION, SOLUTION INTRAVENOUS at 16:44

## 2020-01-27 RX ADMIN — HEPARIN SODIUM 20000 UNITS: 1000 INJECTION INTRAVENOUS; SUBCUTANEOUS at 13:15

## 2020-01-27 RX ADMIN — FENTANYL CITRATE 250 MCG: 50 INJECTION, SOLUTION INTRAMUSCULAR; INTRAVENOUS at 12:48

## 2020-01-27 RX ADMIN — SODIUM CHLORIDE, SODIUM LACTATE, POTASSIUM CHLORIDE, AND CALCIUM CHLORIDE: .6; .31; .03; .02 INJECTION, SOLUTION INTRAVENOUS at 11:54

## 2020-01-27 NOTE — ANESTHESIA POSTPROCEDURE EVALUATION
Post-Op Assessment Note    CV Status:  Stable  Pain scale: intubated, sedated  Pain control: intubated, sedated  Post-procedure mental status: intubated, sedated  Hydration Status:  Stable   PONV status: intubated, sedated  Airway patency: intubated  Intubated: intubated  Post Op Vitals Reviewed: Yes      Staff: Anesthesiologist   Comments: transferred to CICU on epi gtt             BP (!) 86/37 (01/27/20 1541)    Temp (!) 95 9 °F (35 5 °C)(PA cath) (01/27/20 1541)    Pulse 66(off pacing) (01/27/20 1541)   Resp 14 (01/27/20 1541)    SpO2 100 % (01/27/20 1541)

## 2020-01-27 NOTE — RESPIRATORY THERAPY NOTE
RT Ventilator Management Note  Tommy Jones 76 y o  female MRN: [id number]  Unit/Bed#: [facility] 417-01 Encounter: [id number]      Daily Screen     No data found in the last 10 encounters  Physical Exam:   Assessment Type: (P) Assess only  General Appearance: (P) Sedated  Respiratory Pattern: (P) Assisted  Chest Assessment: (P) Chest expansion symmetrical  Bilateral Breath Sounds: (P) Clear      Resp Comments: (P) Pt arrived post CABG and placed on [place]  Pt tolerating settings well, no resp distress noted  tube 20 at the lip bs clear   Will cont to follow per ventilator management

## 2020-01-27 NOTE — CONSULTS
Stuart Hinojosa U  97  Arie 76 y o  female MRN: [id number]  Unit/Bed#: [facility] 417-01 Encounter: [id number]      Physician Requesting Consult: Aramis Lehman DO    Reason for Consult / Principal Problem: Atherosclerosis of native coronary artery with angina pectoris [facility])    HPI: Cuong Navarrete is a 76 y o  female  who presented to [address] [place] [address] 1/24 after waking up from sleep with diaphoresis and nausea  She got up and went into the bathroom and had a syncopal episode  In the ED patient had elevated troponin but no EKG changes  She was taken urgently for cardiac cath which was significant for MVCAD  She was transferred to [facility] and cardiac surgery was consulted  After undergoing further pre-op work-up patient was recommended to undergo surgical revascularization  She presents to the ICU today s/p CABG x3 (LIMA to LAD, SVG to ramus intermedius and saphenous vein graft to OM 1)  She received 1u PRBC intraoperatively  Post procedure GET demonstrates EF 60%  She arrives to the ICU A paced (sinus rhythm in the 60s underneath) and on epinephrine at 2mcg  Cardiopulmonary bypass time: 53 minutes  Crossclamp time: 48 minutes    PMH: CAD, HTN, HLD    History obtained from chart review due to patient being intubated and sedated  ---------------------------------------------------------------------------------------------------------------------------------------------------------------------  Impressions:  1  CAD s/p CABG   2  HTN  3  HLD      Plan:    Neuro: Discontinue continuous sedation  PRN fentanyl/dilaudid for pain  Trend neuro exam   Delirium precautions  CV: MAP goal >65  SBP goal <130  CI>2 2  Post-op medications: Epinephrine, 2 mcg/min  Wean Epinephrine as able  Volume resuscitation as needed  Monitor rhythm on telemetry  Epicardial pacing wires Ax1, Vx1  Intra-op GET LVEF 60%  Lung: Check STAT post-op ABG and CXR  Wean vent with spontaneous breathing trial with goal to extubate       GI: GI prophylaxis with PPI  Bowel regimen  Zofran PRN for nausea  FEN: NPO  Replete K >4 0, mag >2 0 and calcium >7 0  : Check STAT post-op BMP  Morel in place  Monitor UOP with goal >0 5cc/kg/hour  Lasix versus volume resuscitate as needed depending on hemodynamics and volume status  ID: Prophylactic post-op abx  Maintain normothermia  Trend temps  Heme: Check STAT post-op H/H and platelets  Monitor incision site, invasive lines, and chest tube outputs for bleeding  Send coag panel if needed  Endo: Insulin gtt for blood sugar control  Disposition: ICU Care   ---------------------------------------------------------------------------------------------------------------------------------------------------------------------  Historical Information   Past Medical History:   Diagnosis Date    Effusion of left knee     Last assessed - 9/10/15    Hyperlipidemia     Hypertension     Tick bite     Last assessed - 4/21/17     Past Surgical History:   Procedure Laterality Date    APPENDECTOMY      TONSILLECTOMY      Last assessed - 4/20/17    TUBAL LIGATION      Last assessed - 4/20/17    WISDOM TOOTH EXTRACTION      Last assessed - 4/20/17     Social History   Social History     Substance and Sexual Activity   Alcohol Use Yes    Comment: 1 wine nightly     Social History     Substance and Sexual Activity   Drug Use No     Social History     Tobacco Use   Smoking Status Never Smoker   Smokeless Tobacco Never Used     Family History   Problem Relation Age of Onset    Coronary artery disease Mother     Arthritis Mother     Other Mother         Cardiac Disorder     Transient ischemic attack Mother     Heart attack Father    [facility] Sudden death Father         SCD     I have reviewed this patient's family history and commented on sigificant items within the HPI    ROS: ROS unable to be obtained due to patient being intubated and sedated       Allergies: No Known Allergies    Home Medications: Prior to Admission medications    Medication Sig Start Date End Date Taking? Authorizing Provider   aspirin 81 MG tablet Take 1 tablet (81mg) by mouth once daily  Yes Historical Provider, MD   atorvastatin (LIPITOR) 10 mg tablet Take 1 tablet (10 mg total) by mouth daily 11/22/19  Yes Sylvester Tsang DO   atorvastatin (LIPITOR) 10 mg tablet TAKE ONE TABLET BY MOUTH EVERY OTHER DAY 12/21/19  Yes Sylvester Tsang DO   Calcium Carb-Cholecalciferol (CALCIUM 600 + D) 600-200 MG-UNIT TABS Calcium 600 + D TABS  TAKE 1 TABLET DAILY     Refills: 0    Active   Yes Historical Provider, MD   cholecalciferol (VITAMIN D3) 1,000 units tablet Take 1,000 Units by mouth daily   Yes Historical Provider, MD   Fish Oil-Cholecalciferol (FISH OIL + D3) 8805-6670 MG-UNIT CAPS Fish Oil 1200 MG Oral Capsule  Take 2 tablets daily   Refills: 0    Active   Yes Historical Provider, MD   triamterene-hydrochlorothiazide (MAXZIDE-25) 37 5-25 mg per tablet TAKE ONE-HALF TABLET BY MOUTH EVERY DAY AS NEEDED 6/14/19  Yes Marsh Rubinstein, DO   acyclovir (ZOVIRAX) 5 % ointment Apply topically every 3 (three) hours  Patient not taking: Reported on 1/24/2020 1/23/20   Marsh Rubinstein, DO     Inpatient Medications:  Scheduled Meds:    Current Facility-Administered Medications:  acetaminophen 650 mg Rectal Q4H PRN Edvin Stringer PA-C   acetaminophen 650 mg Oral Q4H PRN Edvin Stringer PA-C   amiodarone 200 mg Oral Q8H [address]   aspirin 325 mg Oral Daily Edvin Stringer PA-C   atorvastatin 80 mg Oral Daily With [employer]ANDY   bisacodyl 10 mg Rectal Daily PRN Edvin Stringer PA-C   calcium chloride 1 g Intravenous Once Edvin Stringer PA-C   cefazolin 2,000 mg Intravenous Q8H Chiqui Hernandez PA-C   chlorhexidine 15 mL Swish & Spit BID Tanvi Babcock   [START ON 1/28/2020] cholecalciferol 1,000 Units Oral Daily Edvin Stringer PA-C   epinephrine 1-10 mcg/min Intravenous Titrated Edvin Stringer PA-C fentanyl citrate (PF) 50 mcg Intravenous Once [facility], PA-TERRENCE   fentanyl citrate (PF) 50 mcg Intravenous Q1H PRN [facility], PA-TERRENCE   [START ON 1/28/2020] fondaparinux 2 5 mg Subcutaneous Daily [facility], PA-TERRENCE   furosemide 40 mg Intravenous Q6H PRN [facility], PA-TERRENCE   HYDROmorphone 0 5 mg Intravenous Q1H PRN [facility], PA-TERRENCE   HYDROmorphone 1 mg Intravenous Q1H PRN [facility], PA-TERRENCE   insulin regular (HumuLIN R,NovoLIN R) infusion 0 3-21 Units/hr Intravenous Titrated [facility], FRANDY   [START ON 1/28/2020] isosorbide mononitrate 30 mg Oral Daily Chiqui Land, FRANDY   lactated ringers 500 mL Intravenous Q30 Min PRN [facility], FRANDY   lidocaine (cardiac) 100 mg Intravenous Q30 Min PRN [facility], FRANDY   magnesium sulfate 2 g Intravenous Once [facility], FRANDY   mupirocin 1 application Nasal [address] [place] [address] & half-way Chiqui Land, FRANDY   niCARdipine 2 5-15 mg/hr Intravenous Titrated Chiqui Land PA-C   ondansetron 4 mg Intravenous Q6H PRN [facility], FRANDY   oxyCODONE-acetaminophen 1 tablet Oral Q4H PRN [facility], FRANDY   oxyCODONE-acetaminophen 2 tablet Oral Q6H PRN [facility], [place]   [START ON 1/28/2020] pantoprazole 40 mg Oral Early Morning [facility], FRANDY   [START ON 1/28/2020] polyethylene glycol 17 g Oral Daily [facility], PA-TERRENCE   potassium chloride 20 mEq Intravenous Once PRN [facility], PA-TERRENCE   potassium chloride 20 mEq Intravenous Q1H PRN [facility], PA-TERRENCE   potassium chloride 20 mEq Intravenous Q30 Min PRN [facility], FRANDY   sodium chloride 20 mL/hr Intravenous Continuous Chiqui Land PA-C     Continuous Infusions:    epinephrine 1-10 mcg/min   insulin regular (HumuLIN R,NovoLIN R) infusion 0 3-21 Units/hr   niCARdipine 2 5-15 mg/hr   sodium chloride 20 mL/hr     PRN Meds:    acetaminophen 650 mg Q4H PRN   acetaminophen 650 mg Q4H PRN   bisacodyl 10 mg Daily PRN   fentanyl citrate (PF) 50 mcg Q1H PRN   furosemide 40 mg Q6H PRN HYDROmorphone 0 5 mg Q1H PRN   HYDROmorphone 1 mg Q1H PRN   lactated ringers 500 mL Q30 Min PRN   lidocaine (cardiac) 100 mg Q30 Min PRN   ondansetron 4 mg Q6H PRN   oxyCODONE-acetaminophen 1 tablet Q4H PRN   oxyCODONE-acetaminophen 2 tablet Q6H PRN   potassium chloride 20 mEq Once PRN   potassium chloride 20 mEq Q1H PRN   potassium chloride 20 mEq Q30 Min PRN     ---------------------------------------------------------------------------------------------------------------------------------------------------------------------  Vitals:   Vitals:    20 1500 20 1541 20 1545 20 1600   BP:  (!) 86/37     BP Location:       Pulse:  66 78 78   Resp:  14 (!) 11 12   Temp:  (!) 95 9 °F (35 5 °C) (!) 95 4 °F (35 2 °C) (!) 95 4 °F (35 2 °C)   TempSrc:   Probe Probe   SpO2: 100% 100% 100% 100%   Weight:       Height:         Arterial Line:  Arterial Line BP: 120/56  Arterial Line MAP (mmHg): 80 mmHg    Temperature: Temp (24hrs), Av 1 °F (36 2 °C), Min:95 4 °F (35 2 °C), Max:99 2 °F (37 3 °C)  Current: Temperature: (!) 95 4 °F (35 2 °C)    Weights: IBW: 57 kg  Body mass index is 18 56 kg/m²      Hemodynamic Monitoring:  PAP: PAP: 28/13, CVP: CVP (mean): 10 mmHg, CO: CO (L/min): 4 9 L/min, CI: CI (L/min/m2): 3 2 L/min/m2, SVR: SVR (dyne*sec)/cm5: 1256 (dyne*sec)/cm5    Ventilator Settings:  Respiratory    Lab Data (Last 4 hours)    None         O2/Vent Data (Last 4 hours)       1500          MV 4 9                 Labs:   Results from last 7 days   Lab Units 20  1548 20  1447  20  0436 20  0321 20  0712   WBC Thousand/uL  --   --   --  7 44 8 27 6 95   HEMOGLOBIN g/dL 11 1*  --   --  12 8 11 1* 12 4   I STAT HEMOGLOBIN g/dl 10 2* 8 2*   < >  --   --   --    HEMATOCRIT % 34 2*  --   --  41 1 35 3 38 8   HEMATOCRIT, ISTAT % 30* 24*   < >  --   --   --    PLATELETS Thousands/uL 154  --   --  289 257 292   NEUTROS PCT %  --   --   --  65  --  66   MONOS PCT %  --   -- --  8  --  8    < > = values in this interval not displayed  Results from last 7 days   Lab Units 20  1548 20  1447 20  1427  20  0436 20  0352 20  0712   SODIUM mmol/L  --   --   --   --  141 142 143   POTASSIUM mmol/L  --   --   --   --  4 1 3 5 3 7   CHLORIDE mmol/L  --   --   --   --  107 111* 105   CO2 mmol/L  --   --   --   --  31 28 32   CO2, I-STAT mmol/L 26 26 30   < >  --   --   --    BUN mg/dL  --   --   --   --  17 12 29*   CREATININE mg/dL  --   --   --   --  0 65 0 59* 0 69   CALCIUM mg/dL  --   --   --   --  9 4 8 3 9 3   ALK PHOS U/L  --   --   --   --   --   --  76   ALT U/L  --   --   --   --   --   --  32   AST U/L  --   --   --   --   --   --  44   GLUCOSE, ISTAT mg/dl 138 120 129   < >  --   --   --     < > = values in this interval not displayed  Post-op AB 42/38/268/24 %  Post-op CXR: No acute cardiopulmonary findings  No obvious pneumo  Lines and tubes in adequate position  I have personally reviewed pertinent films in PACS  Post-op EKG: Sinus rhythm with ST depressions in V3, V4  This was personally reviewed by myself  Physical Exam   Constitutional: She appears well-developed and well-nourished  No distress  She is sedated and intubated  HENT:   Head: Normocephalic and atraumatic  Eyes: Pupils are equal, round, and reactive to light  No scleral icterus  Neck: Normal range of motion  Neck supple  No JVD present  R IJ TLC/swan    Cardiovascular: Normal rate and regular rhythm  Exam reveals friction rub  Pulses:       Radial pulses are 1+ on the right side, and 1+ on the left side  Dorsalis pedis pulses are 1+ on the right side, and 1+ on the left side  AV epicardial wires   Pulmonary/Chest: Effort normal and breath sounds normal  No stridor  She is intubated  No respiratory distress  Chest tubes with sanguineous drainage   Abdominal: Soft  She exhibits no distension  There is no tenderness  There is no guarding  Musculoskeletal: Normal range of motion  She exhibits no edema  Lymphadenopathy:     She has no cervical adenopathy  Neurological:   Sedated immediately post op    Skin: Skin is warm and dry  Capillary refill takes less than 2 seconds  She is not diaphoretic  No erythema  Invasive lines and devices: Invasive Devices     Central Venous Catheter Line            CVC Central Lines 01/27/20 Triple less than 1 day    Introducer 01/27/20 less than 1 day          Peripheral Intravenous Line            Peripheral IV 01/24/20 Dorsal (posterior); Left Forearm 3 days    Peripheral IV 01/24/20 Left Antecubital 3 days          Arterial Line            Arterial Line 01/27/20 Brachial less than 1 day          Line            Pacer Wires less than 1 day    Pacer Wires less than 1 day          Drain            Chest Tube 1 Left Pleural 32 Fr  less than 1 day    Chest Tube 2 Posterior Mediastinal 32 Fr  less than 1 day    Chest Tube 3 Anterior Mediastinal 32 Fr  less than 1 day    Urethral Catheter Non-latex; Temperature probe 16 Fr  less than 1 day          Airway            ETT  Hi-Lo; Cuffed;Oral;Straight 7 mm less than 1 day              ---------------------------------------------------------------------------------------------------------------------------------------------------------------------  Counseling / Coordination of Care  Total Critical Care time spent 32 minutes excluding procedures, teaching and family updates        SIGNATURE: RACHEL Trinidad  DATE: January 27, 2020  TIME: 4:10 PM

## 2020-01-27 NOTE — ANESTHESIA PROCEDURE NOTES
Procedure Performed: GET Anesthesia  Start Time:  1/27/2020 12:42 PM        Preanesthesia Checklist    Patient identified, IV assessed, risks and benefits discussed, monitors and equipment assessed, procedure being performed at surgeon's request and anesthesia consent obtained  Procedure    Diagnostic Indications for GET:  assessment of surgical repair and hemodynamic monitoring  Type of GET: interventional GET with real time guidance of intracardiac procedure  Images Saved: ultrasound permanent image saved  Physician Requesting Echo: Aramis Lehman DO  Location performed: OR  Intubated  Bite block not placed  Heart visualized  Insertion of GET Probe:  Atraumatic  Probe Type:  Multiplane  Modalities:  3D, pulse wave Doppler and continuous wave Doppler  Echocardiographic and Doppler Measurements    PREPROCEDURE    LEFT VENTRICLE:  Systolic Function: normal  Ejection Fraction: 60%  Cavity size: normal  Regional Wall Motion Abnormalities: none seen  RIGHT VENTRICLE:  Systolic Function: normal   Cavity size normal  No hypertrophy              AORTIC VALVE:  Leaflets: normal and trileaflet  Leaflet motions normal and normal  Stenosis: none  Regurgitation: central jet and trace  MITRAL VALVE:  Leaflets: myxomatous  Leaflet Motions: normal  Regurgitation: trace  Stenosis: none  TRICUSPID VALVE:  Leaflets: normal  Leaflet Motions: normal  Stenosis: none  Regurgitation: trace  PULMONIC VALVE:  Leaflets: normal  Regurgitation: trace  Stenosis: none  ASCENDING AORTA:  Size:  normal  Dissection not present  AORTIC ARCH:  Size:  normal  dissection not present  Grade 3: atheroma protruding < 0 5 cm into lumen  DESCENDING AORTA:  Size: normal  Dissection not present  Grade 3: atheroma protruding < 0 5 cm into lumen          RIGHT ATRIUM:  Size:  normal  No spontaneous echo contrast     LEFT ATRIUM:  Size: normal  No spontaneous echo contrast     LEFT ATRIAL APPENDAGE:  Size: normal  No spontaneous echo contrast         ATRIAL SEPTUM:  Intra-atrial septal morphology: normal          VENTRICULAR SEPTUM:  Intra-ventricular septum morphology: normal          EPIAORTIC:  Plaque Thickness: 0-5 mm  OTHER FINDINGS:  Pericardium:  normal  Pleural Effusion:  none  POSTPROCEDURE    LEFT VENTRICLE: Unchanged   Systolic Function: normal  Cavity Size: normal  Regional Wall Motion Abnormalities: none seen  RIGHT VENTRICLE: Unchanged   Systolic Function: normal  Cavity Size: normal         AORTIC VALVE: Unchanged   Leaflets: native  Stenosis: none  Regurgitation: trace  MITRAL VALVE: Unchanged   Leaflets: native  Regurgitation: trace  TRICUSPID VALVE: Unchanged   Leaflets: native  Regurgitation: trace  Stenosis: none  PULMONIC VALVE: Unchanged   Leaflets: native  Regurgitation: trace  Stenosis: none  ATRIA: Unchanged   Left Atrial Appendage Ligate: No  Residual Flow in Left Atrial Appendage by Color Flow Doppler: No       AORTA:   Dissection: Dissection not present  REMOVAL:  Probe Removal: atraumatic

## 2020-01-27 NOTE — ANESTHESIA PROCEDURE NOTES
Arterial Line Insertion  Date/Time: 1/27/2020 11:45 AM  Performed by: Bola Paulson MD  Authorized by: Bola Paulson MD   Consent: Written consent obtained  Risks and benefits: risks, benefits and alternatives were discussed  Consent given by: patient  Patient understanding: patient states understanding of the procedure being performed  Required items: required blood products, implants, devices, and special equipment available  Patient identity confirmed: arm band  Time out called: intraop  Preparation: Patient was prepped and draped in the usual sterile fashion  Indications: multiple ABGs and hemodynamic monitoring    Location: brachial arterylidocaine (PF) (XYLOCAINE-MPF) 1 % infiltration, 1 mL  Sedation:  Patient sedated: yes  Sedatives: fentanyl and midazolam  Vitals: Vital signs were monitored during sedation      Procedure Details:  Antonio's test normal: yes  Seldinger technique: Seldinger technique used  Number of attempts: 1    Post-procedure:  Post-procedure: dressing applied and line sutured  Waveform: good waveform and waveform confirmed  Post-procedure CNS: normal  Patient tolerance: Patient tolerated the procedure well with no immediate complications  Comments: Two attempts, first attempt right radial, unsuccessful with 20 ga arrow and micropuncture  Second attempt brachial access with ultrasound guidance, successful  Performed preinduction to anesthesia [id number]

## 2020-01-27 NOTE — ANESTHESIA PROCEDURE NOTES
Central Line Insertion  Performed by: Ramiro Gramajo MD  Authorized by: Ramiro Gramajo MD   Date/Time: 1/27/2020 12:28 PM  Catheter Type:  triple lumen  Consent: Written consent obtained  Risks and benefits: risks, benefits and alternatives were discussed  Consent given by: patient  Patient understanding: patient states understanding of the procedure being performed  Required items: required blood products, implants, devices, and special equipment available  Patient identity confirmed: arm band  Time out called: intraop  Indications: vascular access and central pressure monitoring  Location details: right internal jugular  Catheter size: 7 Fr  Patient position: Trendelenburg  Anesthesia method: under GA  Sedation:  Patient sedated: under GA  Assessment: blood return through all ports and free fluid flow  Preparation: Chloraprep  Skin prep agent dried: skin prep agent completely dried prior to procedure  Sterile barriers: all five maximum sterile barriers used - cap, mask, sterile gown, sterile gloves, and large sterile sheet  Hand hygiene: hand hygiene performed prior to central venous catheter insertion  Ultrasound guidance: yes  sterile gel and probe cover used in ultrasound-guided central venous catheter insertionultrasound permanent image saved  Reason All Sterile Barriers Not Used:   All elements of maximal sterile barrier technique not followed for medical reasons  Pre-procedure: landmarks identified  Number of attempts: 1  Successful placement: yes  Post-procedure: dressing applied and line sutured  Patient tolerance: Patient tolerated the procedure well with no immediate complications  Comments: Ultrasound guidance  Ultrasound pic in paper chart  Performed postinduction to anesthesia [id number] Declined

## 2020-01-27 NOTE — ANESTHESIA PROCEDURE NOTES
Introducer/[place]  Performed by: Imer Chase MD  Authorized by: Imer Chase MD   Date/Time: 1/27/2020 12:28 PM  Consent: Written consent obtained  Risks and benefits: risks, benefits and alternatives were discussed  Consent given by: patient  Patient understanding: patient states understanding of the procedure being performed  Required items: required blood products, implants, devices, and special equipment available  Patient identity confirmed: arm band  Time out called: intraop  Indications: vascular access and central pressure monitoring  Location details: right internal jugular  Catheter size: 7 Fr  Patient position: Trendelenburg  Anesthesia method: under GA  Sedation:  Patient sedated: under GA  Assessment: blood return through all ports and free fluid flow  Preparation: Chloraprep  Skin prep agent dried: skin prep agent completely dried prior to procedure  Sterile barriers: all five maximum sterile barriers used - cap, mask, sterile gown, sterile gloves, and large sterile sheet  Hand hygiene: hand hygiene performed prior to central venous catheter insertion  Ultrasound guidance: yes  ultrasound permanent image saved  Reason All Sterile Barriers Not Used:   All elements of maximal sterile barrier technique not followed for medical reasons  Pre-procedure: landmarks identified  Number of attempts: 1  Successful placement: yes  Post-procedure: line sutured and dressing applied  Patient tolerance: Patient tolerated the procedure well with no immediate complications  Comments: Ultrasound guidance  Ultrasound pic in paper chart  Performed postinduction to anesthesia [id number]

## 2020-01-27 NOTE — OP NOTE
OPERATIVE REPORT  PATIENT NAME: Miriam Chaney    :  1945  MRN: [id number]  Pt Location: [facility] OR ROOM 16    SURGERY DATE: 2020    SURGEON: Che Gandhi DO    ASSISTANT: Anh Herbert PA-C    ADDITIONAL ASSISTANT: n/a    PREOPERATIVE DIAGNOSIS:  Multivessel coronary artery disease    POSTOPERATIVE DIAGNOSIS:  Multivessel coronary artery disease    NYHA: 3  CCS: 3    PROCEDURE:   Coronary artery bypass grafting x 3 with left internal mammary artery to left anterior descending artery, saphenous vein graft to ramus intermedius and saphenous vein graft to obtuse marginal 1    CARDIOPULMONARY BYPASS TIME: 53 minutes  CROSSCLAMP TIME: 48 minutes  ANESTHESIA: General endotracheal anesthesia with transesophageal echocardiogram  guidance, Dr Swenson Page    INDICATIONS:  The patient is a 76y o  year-old female diagnosed with Multivessel coronary artery disease  FINDINGS:  1  Intraoperative transesophageal echocardiogram revealed normal LVEF  2  Epiaortic ultrasound showed less than 5 mm non-mobile plaque  3  Coronary anatomy as described in coronary angiography  4  Final transesophageal echocardiogram demonstrated normal LVEF  OPERATIVE TECHNIQUE:    The patient was taken to the operating room, properly identified and placed supine on the operating table  Following the satisfactory induction of general anesthesia and placement of monitoring lines, the patient was prepped and draped in the usual sterile fashion  A time-out procedure was performed  The patient underwent median sternotomy, pericardiotomy, left internal mammary artery harvest with the standard technique and endoscopic left greater saphenous vein harvest, systemic heparinization and conduit preparation  Epiaortic ultrasound showed less than 5 mm non-mobile plaque   Cannulation for cardiopulmonary bypass was performed with an arterial dispersion cannula, double stage venous cannula and a vented antegrade cardioplegia cannula  Once the ACT was greater than 480 seconds we placed the patient on cardiopulmonary bypass, the ascending aorta was crossclamped and cardiac arrest was obtained without complications with [place] cardioplegia  The following grafts were completed, left internal mamamry artery to left anterior descending artery with adequate flow, saphenous vein graft to obtuse marginal 1 with flow of 100 ml/min and saphenous vein graft to right posterolateral branch with flow of 90 ml/min  All the distal anastomoses were performed in end-to-side fashion with 7-0 Prolene  A total of 2 proximal anastomoses were completed on the ascending aorta in end-to-side fashion using running 6-0 Prolene suture  The patient was placed in the Trendelenburg position, the heart was de-aired, a hotshot was given and the crossclamp was removed  Atrial and ventricular pacing wires were placed  Following a period of reperfusion, the patient was weaned from cardiopulmonary bypass and decannulated  Protamine was administered with normalization of the ACT  Hemostasis was confirmed in all fields  Thoracostomy tubes were placed  The sternum was closed with stainless steel wires  The fascia, subdermis and skin were closed with multiple layers of running absorbable suture  COMPLICATIONS: Lorelle Anand  PACKS/TUBES/DRAINS: Chest tubes x 3  EBL: 250mL  TRANSFUSION: None  SPECIMENS: None  As the attending surgeon, I was present and scrubbed for all critical portions of this procedure  There was no qualified surgical resident available  Sponge, needle and instrument counts were reported as correct by the nursing staff  The assistance of a PA was required to complete this case, specifically for assistance with endoscopic saphenous vein harvesting, cannulation, decannulation, and construction of the bypass grafts             SIGNATURE: Gregoria Glover DO  DATE: January 27, 2020  TIME: 3:10 PM

## 2020-01-27 NOTE — RESPIRATORY THERAPY NOTE
resp care      01/27/20 6266   Respiratory Assessment   Resp Comments pt trialed on cpap/ps  Pt went apneic  pt placed back on acvc settings, tolerating well  no resp distress noted  pt resting comfortably   ETT  Hi-Lo; Cuffed;Oral;Straight 7 mm   Placement Date/Time: 01/27/20 1205   Mask Ventilation: Ventilated by mask (1)  Preoxygenated: Yes  Technique: Direct laryngoscopy;Stylet  Type: Hi-Lo; Cuffed;Oral;Straight  Tube Size: 7 mm  Laryngoscope: Mac  Blade Size: 4  Location: Oral  Grade View:        Secured at (cm) 22   Measured from Lips   Secured Location Right

## 2020-01-27 NOTE — INTERVAL H&P NOTE
H&P reviewed  After examining the patient I find no changes in the patients condition since the H&P had been written      Vitals:    01/27/20 0700   BP: 116/65   Pulse: 72   Resp: 20   Temp: 98 2 °F (36 8 °C)   SpO2: 95%

## 2020-01-28 ENCOUNTER — APPOINTMENT (INPATIENT)
Dept: RADIOLOGY | Facility: HOSPITAL | Age: 75
DRG: 235 | End: 2020-01-28
Payer: MEDICARE

## 2020-01-28 LAB
ABO GROUP BLD BPU: NORMAL
ANION GAP SERPL CALCULATED.3IONS-SCNC: 5 MMOL/L (ref 4–13)
ATRIAL RATE: 76 BPM
BPU ID: NORMAL
BUN SERPL-MCNC: 12 MG/DL (ref 5–25)
CALCIUM SERPL-MCNC: 7.4 MG/DL (ref 8.3–10.1)
CHLORIDE SERPL-SCNC: 114 MMOL/L (ref 100–108)
CO2 SERPL-SCNC: 25 MMOL/L (ref 21–32)
CREAT SERPL-MCNC: 0.42 MG/DL (ref 0.6–1.3)
CROSSMATCH: NORMAL
ERYTHROCYTE [DISTWIDTH] IN BLOOD BY AUTOMATED COUNT: 14.9 % (ref 11.6–15.1)
GFR SERPL CREATININE-BSD FRML MDRD: 101 ML/MIN/1.73SQ M
GLUCOSE SERPL-MCNC: 105 MG/DL (ref 65–140)
GLUCOSE SERPL-MCNC: 105 MG/DL (ref 65–140)
GLUCOSE SERPL-MCNC: 110 MG/DL (ref 65–140)
GLUCOSE SERPL-MCNC: 112 MG/DL (ref 65–140)
GLUCOSE SERPL-MCNC: 114 MG/DL (ref 65–140)
GLUCOSE SERPL-MCNC: 117 MG/DL (ref 65–140)
GLUCOSE SERPL-MCNC: 132 MG/DL (ref 65–140)
GLUCOSE SERPL-MCNC: 152 MG/DL (ref 65–140)
GLUCOSE SERPL-MCNC: 152 MG/DL (ref 65–140)
HCT VFR BLD AUTO: 32.8 % (ref 34.8–46.1)
HCT VFR BLD AUTO: 33.4 % (ref 34.8–46.1)
HGB BLD-MCNC: 10.4 G/DL (ref 11.5–15.4)
HGB BLD-MCNC: 10.5 G/DL (ref 11.5–15.4)
MAGNESIUM SERPL-MCNC: 2.5 MG/DL (ref 1.6–2.6)
MCH RBC QN AUTO: 28.9 PG (ref 26.8–34.3)
MCHC RBC AUTO-ENTMCNC: 31.1 G/DL (ref 31.4–37.4)
MCV RBC AUTO: 93 FL (ref 82–98)
P AXIS: 77 DEGREES
PLATELET # BLD AUTO: 145 THOUSANDS/UL (ref 149–390)
PMV BLD AUTO: 11.5 FL (ref 8.9–12.7)
POTASSIUM SERPL-SCNC: 3.8 MMOL/L (ref 3.5–5.3)
POTASSIUM SERPL-SCNC: 4.1 MMOL/L (ref 3.5–5.3)
PR INTERVAL: 167 MS
QRS AXIS: 80 DEGREES
QRSD INTERVAL: 83 MS
QT INTERVAL: 379 MS
QTC INTERVAL: 427 MS
RBC # BLD AUTO: 3.6 MILLION/UL (ref 3.81–5.12)
SODIUM SERPL-SCNC: 144 MMOL/L (ref 136–145)
T WAVE AXIS: 74 DEGREES
UNIT DISPENSE STATUS: NORMAL
UNIT PRODUCT CODE: NORMAL
UNIT RH: NORMAL
VENTRICULAR RATE: 76 BPM
WBC # BLD AUTO: 12.59 THOUSAND/UL (ref 4.31–10.16)

## 2020-01-28 PROCEDURE — 97535 SELF CARE MNGMENT TRAINING: CPT

## 2020-01-28 PROCEDURE — 80048 BASIC METABOLIC PNL TOTAL CA: CPT | Performed by: PHYSICIAN ASSISTANT

## 2020-01-28 PROCEDURE — 71045 X-RAY EXAM CHEST 1 VIEW: CPT

## 2020-01-28 PROCEDURE — 83735 ASSAY OF MAGNESIUM: CPT | Performed by: PHYSICIAN ASSISTANT

## 2020-01-28 PROCEDURE — 93010 ELECTROCARDIOGRAM REPORT: CPT | Performed by: INTERNAL MEDICINE

## 2020-01-28 PROCEDURE — 97163 PT EVAL HIGH COMPLEX 45 MIN: CPT

## 2020-01-28 PROCEDURE — 85027 COMPLETE CBC AUTOMATED: CPT | Performed by: PHYSICIAN ASSISTANT

## 2020-01-28 PROCEDURE — 82948 REAGENT STRIP/BLOOD GLUCOSE: CPT

## 2020-01-28 PROCEDURE — 94760 N-INVAS EAR/PLS OXIMETRY 1: CPT

## 2020-01-28 PROCEDURE — 97167 OT EVAL HIGH COMPLEX 60 MIN: CPT

## 2020-01-28 PROCEDURE — 93005 ELECTROCARDIOGRAM TRACING: CPT

## 2020-01-28 PROCEDURE — 99232 SBSQ HOSP IP/OBS MODERATE 35: CPT | Performed by: EMERGENCY MEDICINE

## 2020-01-28 RX ORDER — POTASSIUM CHLORIDE 750 MG/1
10 TABLET, EXTENDED RELEASE ORAL DAILY
Status: DISCONTINUED | OUTPATIENT
Start: 2020-01-28 | End: 2020-01-29

## 2020-01-28 RX ORDER — B-COMPLEX WITH VITAMIN C
1 TABLET ORAL
Status: DISCONTINUED | OUTPATIENT
Start: 2020-01-28 | End: 2020-02-04 | Stop reason: HOSPADM

## 2020-01-28 RX ORDER — KETOROLAC TROMETHAMINE 30 MG/ML
15 INJECTION, SOLUTION INTRAMUSCULAR; INTRAVENOUS EVERY 6 HOURS
Status: DISPENSED | OUTPATIENT
Start: 2020-01-28 | End: 2020-01-29

## 2020-01-28 RX ORDER — KETOROLAC TROMETHAMINE 30 MG/ML
15 INJECTION, SOLUTION INTRAMUSCULAR; INTRAVENOUS EVERY 6 HOURS SCHEDULED
Status: DISCONTINUED | OUTPATIENT
Start: 2020-01-28 | End: 2020-01-28

## 2020-01-28 RX ORDER — DOCUSATE SODIUM 100 MG/1
100 CAPSULE, LIQUID FILLED ORAL 2 TIMES DAILY
Status: DISCONTINUED | OUTPATIENT
Start: 2020-01-28 | End: 2020-02-01

## 2020-01-28 RX ORDER — OXYCODONE HYDROCHLORIDE 5 MG/1
5 TABLET ORAL EVERY 4 HOURS PRN
Status: DISCONTINUED | OUTPATIENT
Start: 2020-01-28 | End: 2020-01-30

## 2020-01-28 RX ORDER — TEMAZEPAM 15 MG/1
15 CAPSULE ORAL
Status: DISCONTINUED | OUTPATIENT
Start: 2020-01-28 | End: 2020-02-04 | Stop reason: HOSPADM

## 2020-01-28 RX ORDER — ACETAMINOPHEN 325 MG/1
975 TABLET ORAL EVERY 8 HOURS SCHEDULED
Status: DISCONTINUED | OUTPATIENT
Start: 2020-01-28 | End: 2020-02-04 | Stop reason: HOSPADM

## 2020-01-28 RX ORDER — FUROSEMIDE 10 MG/ML
20 INJECTION INTRAMUSCULAR; INTRAVENOUS DAILY
Status: DISCONTINUED | OUTPATIENT
Start: 2020-01-28 | End: 2020-01-29

## 2020-01-28 RX ORDER — OXYCODONE HYDROCHLORIDE 10 MG/1
10 TABLET ORAL EVERY 4 HOURS PRN
Status: DISCONTINUED | OUTPATIENT
Start: 2020-01-28 | End: 2020-01-28

## 2020-01-28 RX ORDER — OXYCODONE HYDROCHLORIDE 10 MG/1
10 TABLET ORAL EVERY 4 HOURS PRN
Status: DISCONTINUED | OUTPATIENT
Start: 2020-01-28 | End: 2020-01-30

## 2020-01-28 RX ORDER — ALBUMIN, HUMAN INJ 5% 5 %
12.5 SOLUTION INTRAVENOUS ONCE
Status: COMPLETED | OUTPATIENT
Start: 2020-01-28 | End: 2020-01-28

## 2020-01-28 RX ORDER — OXYCODONE HYDROCHLORIDE 5 MG/1
5 TABLET ORAL EVERY 4 HOURS PRN
Status: DISCONTINUED | OUTPATIENT
Start: 2020-01-28 | End: 2020-01-28

## 2020-01-28 RX ADMIN — ACETAMINOPHEN 975 MG: 325 TABLET ORAL at 06:28

## 2020-01-28 RX ADMIN — ACETAMINOPHEN 975 MG: 325 TABLET ORAL at 02:13

## 2020-01-28 RX ADMIN — ONDANSETRON 4 MG: 2 INJECTION INTRAMUSCULAR; INTRAVENOUS at 13:05

## 2020-01-28 RX ADMIN — AMIODARONE HYDROCHLORIDE 200 MG: 200 TABLET ORAL at 22:12

## 2020-01-28 RX ADMIN — KETOROLAC TROMETHAMINE 15 MG: 30 INJECTION, SOLUTION INTRAMUSCULAR at 08:58

## 2020-01-28 RX ADMIN — DOCUSATE SODIUM 100 MG: 100 CAPSULE, LIQUID FILLED ORAL at 17:06

## 2020-01-28 RX ADMIN — POLYETHYLENE GLYCOL 3350 17 G: 17 POWDER, FOR SOLUTION ORAL at 09:05

## 2020-01-28 RX ADMIN — MUPIROCIN 1 APPLICATION: 20 OINTMENT TOPICAL at 09:06

## 2020-01-28 RX ADMIN — POTASSIUM CHLORIDE 10 MEQ: 750 TABLET, EXTENDED RELEASE ORAL at 09:06

## 2020-01-28 RX ADMIN — FONDAPARINUX SODIUM 2.5 MG: 2.5 INJECTION, SOLUTION SUBCUTANEOUS at 09:05

## 2020-01-28 RX ADMIN — ACETAMINOPHEN 975 MG: 325 TABLET ORAL at 22:12

## 2020-01-28 RX ADMIN — ASPIRIN 325 MG ORAL TABLET 325 MG: 325 PILL ORAL at 09:06

## 2020-01-28 RX ADMIN — ATORVASTATIN CALCIUM 80 MG: 80 TABLET, FILM COATED ORAL at 16:47

## 2020-01-28 RX ADMIN — AMIODARONE HYDROCHLORIDE 200 MG: 200 TABLET ORAL at 14:26

## 2020-01-28 RX ADMIN — INSULIN LISPRO 1 UNITS: 100 INJECTION, SOLUTION INTRAVENOUS; SUBCUTANEOUS at 22:35

## 2020-01-28 RX ADMIN — METOPROLOL TARTRATE 12.5 MG: 25 TABLET ORAL at 09:05

## 2020-01-28 RX ADMIN — MELATONIN 1000 UNITS: at 09:05

## 2020-01-28 RX ADMIN — ALBUMIN (HUMAN) 12.5 G: 12.5 INJECTION, SOLUTION INTRAVENOUS at 02:13

## 2020-01-28 RX ADMIN — PANTOPRAZOLE SODIUM 40 MG: 40 TABLET, DELAYED RELEASE ORAL at 06:28

## 2020-01-28 RX ADMIN — CEFAZOLIN SODIUM 2000 MG: 2 SOLUTION INTRAVENOUS at 06:28

## 2020-01-28 RX ADMIN — AMIODARONE HYDROCHLORIDE 200 MG: 200 TABLET ORAL at 06:28

## 2020-01-28 RX ADMIN — ISOSORBIDE MONONITRATE 30 MG: 30 TABLET, EXTENDED RELEASE ORAL at 09:07

## 2020-01-28 RX ADMIN — CEFAZOLIN SODIUM 2000 MG: 2 SOLUTION INTRAVENOUS at 12:14

## 2020-01-28 RX ADMIN — ACETAMINOPHEN 975 MG: 325 TABLET ORAL at 14:26

## 2020-01-28 RX ADMIN — DOCUSATE SODIUM 100 MG: 100 CAPSULE, LIQUID FILLED ORAL at 09:06

## 2020-01-28 RX ADMIN — OXYCODONE HYDROCHLORIDE 5 MG: 5 TABLET ORAL at 20:47

## 2020-01-28 RX ADMIN — ONDANSETRON 4 MG: 2 INJECTION INTRAMUSCULAR; INTRAVENOUS at 02:12

## 2020-01-28 RX ADMIN — MUPIROCIN 1 APPLICATION: 20 OINTMENT TOPICAL at 20:47

## 2020-01-28 RX ADMIN — INSULIN LISPRO 1 UNITS: 100 INJECTION, SOLUTION INTRAVENOUS; SUBCUTANEOUS at 12:14

## 2020-01-28 RX ADMIN — METOPROLOL TARTRATE 12.5 MG: 25 TABLET ORAL at 20:46

## 2020-01-28 RX ADMIN — FUROSEMIDE 20 MG: 10 INJECTION, SOLUTION INTRAMUSCULAR; INTRAVENOUS at 09:06

## 2020-01-28 RX ADMIN — Medication 1 TABLET: at 12:14

## 2020-01-28 RX ADMIN — KETOROLAC TROMETHAMINE 15 MG: 30 INJECTION, SOLUTION INTRAMUSCULAR at 16:47

## 2020-01-28 NOTE — PROGRESS NOTES
Progress Note - Critical Care   Daphene Patches 76 y o  female MRN: [id number]  Unit/Bed#: [facility] 417-01 Encounter: [id number]      Attending Physician: Mila Carlton DO    24 Hour Events/HPI: POD # 1 s/p CABG x 3  Received post op volume secondary to high SVR and low filling pressures  Epinephrine quickly weaned off yesterday afternoon  Extubated to nasal cannula last evening  Now on room air  Hemodynamically stable overnight  De'lined this morning  ROS: Review of Systems   Constitutional: Negative  Respiratory: Negative for shortness of breath  Cardiovascular: Positive for chest pain  Negative for leg swelling  Gastrointestinal: Positive for nausea  Negative for vomiting  Review of systems was reviewed and negative unless stated above in HPI/24-hour events   ---------------------------------------------------------------------------------------------------------------------------------------------------------------------  Impressions:  1  CAD s/p CABG   2  HTN  3  HLD    Plan:    Neuro:   · Pain controlled with: PRN percocet   Consider toradol x 3 doses  · Regulate sleep/wake cycle  · Delirium precautions  · CAM-ICU daily  · Trend neuro exam    CV:   · Cardiac infusions: None  · MAP goal > 65 and CI >2 2  · Continue imdur for small LIMA  · Continue ASA/statin/amio    · D/C Jacky Rik catheter  · D/C Arterial line  · Rhythm: NSR  · Follow rhythm on telemetry  · Lopressor 12 5 mg PO BID  · Maintain epicardial pacing wires for POD 1    Lung:   · Good Room air oxygen saturation; Continue incentive spirometry/Coughing/Deep breathing exercises  · Chest tube output remains persistently high; Continue chest tubes to suction today    GI:   · Continue PPI for stress ulcer prophylaxis  · Continue bowel regimen  · Trend abdominal exam and bowel function    FEN:   · Diuretic plan: Lasix 20 mg IV q BID  · K-dur 10 mEQ PO q BID  · Nutrition/diet plan: cardiac diet  · Replete electrolytes with goals: K >4 0, Mag >2 0, and Phos >3 0    :   · Indwelling Morel present: yes   · D/C Morel  · Trend UOP and BUN/creat  · Strict I and O    ID:   · Trend temps and WBC count  · Maintain normothermia    Heme:   · Trend hgb and plts    Endo:   · Glycemic control plan: No history of diabetes: Glucose well-controlled   Discontinue continuous insulin infusion and add Insulin sliding scale coverage    MSK/Skin:  · Mobility goal: out of bed as tolerated   · PT consult: yes  · OT consult: yes  · Frequent turning and pressure off-loading  · Local wound care as needed    Disposition:  Transfer to telemetry  ---------------------------------------------------------------------------------------------------------------------------------------------------------------------  Allergies: No Known Allergies  Medications:   Scheduled Meds:  Current Facility-Administered Medications:  acetaminophen 975 mg Oral Q8H [facility] Kaity Groves PA-C    amiodarone 200 mg Oral Q8H [address]    aspirin 325 mg Oral Daily FRANDY Mahmood    atorvastatin 80 mg Oral Daily With TR[employer] AutomotiveFRANDY    bisacodyl 10 mg Rectal Daily PRN FRANDY Mahmood    calcium chloride 1 g Intravenous Once FRANDY Mahmood    cefazolin 2,000 mg Intravenous [address] [place], [place] [address]FRANDY Last Rate: 2,000 mg (01/28/20 5738)   chlorhexidine 15 mL Swish & Spit BID FRANDY Mahmood    cholecalciferol 1,000 Units Oral Daily FRANDY Mahmood    epinephrine 1-10 mcg/min Intravenous Titrated FRANDY Mahmood Last Rate: Stopped (01/27/20 1700)   fentanyl citrate (PF) 50 mcg Intravenous Once [place], PA-C    fondaparinux 2 5 mg Subcutaneous Daily [place], PA-C    furosemide 40 mg Intravenous Q6H PRN [place], PA-C    HYDROmorphone 0 5 mg Intravenous Q1H PRN [place], PA-C    insulin regular (HumuLIN R,NovoLIN R) infusion 0 3-21 Units/hr Intravenous Titrated [place], PA-C Last Rate: Stopped (01/27/20 1800) isosorbide mononitrate 30 mg Oral Daily EUGENIA Zhong-C    lactated ringers 500 mL Intravenous Q30 Min PRN Jessica Cools, PA-C    lidocaine (cardiac) 100 mg Intravenous Q30 Min PRN Jessica Cools, PA-C    mupirocin 1 application Nasal E72Q [address] EUGENIA Zhong-TERRENCE    niCARdipine 2 5-15 mg/hr Intravenous Titrated EUGENIA Zhong-TERRENCE    ondansetron 4 mg Intravenous Q6H PRN Jessica Cools, PA-C    oxyCODONE 5 mg Oral Q4H PRN Megan Ganser, PA-C    Or        oxyCODONE 10 mg Oral Q4H PRN Megan Ganser, PA-C    pantoprazole 40 mg Oral Early Morning Jessica Cools, PA-C    phenylephine  mcg/min Intravenous Titrated Megan Ganser, PA-C Last Rate: Stopped (01/28/20 0001)   polyethylene glycol 17 g Oral Daily Jessica Cools, PA-C    potassium chloride 20 mEq Intravenous Once PRN Jessica Cools, PA-C    potassium chloride 20 mEq Intravenous Q30 Min PRN Jessica Cools, PA-C    sodium chloride 20 mL/hr Intravenous Continuous Jessica Cools, PA-C Last Rate: 20 mL/hr (01/27/20 1600)     VTE Pharmacologic Prophylaxis: Fondaparinux (Arixtra)  VTE Mechanical Prophylaxis: sequential compression device    Continuous Infusions:  epinephrine 1-10 mcg/min Last Rate: Stopped (01/27/20 1700)   insulin regular (HumuLIN R,NovoLIN R) infusion 0 3-21 Units/hr Last Rate: Stopped (01/27/20 1800)   niCARdipine 2 5-15 mg/hr    phenylephine  mcg/min Last Rate: Stopped (01/28/20 0001)   sodium chloride 20 mL/hr Last Rate: 20 mL/hr (01/27/20 1600)     PRN Meds:    bisacodyl 10 mg Daily PRN   furosemide 40 mg Q6H PRN   HYDROmorphone 0 5 mg Q1H PRN   lactated ringers 500 mL Q30 Min PRN   lidocaine (cardiac) 100 mg Q30 Min PRN   ondansetron 4 mg Q6H PRN   oxyCODONE 5 mg Q4H PRN   Or     oxyCODONE 10 mg Q4H PRN   potassium chloride 20 mEq Once PRN   potassium chloride 20 mEq Q30 Min PRN     Home Medications:   Prior to Admission medications    Medication Sig Start Date End Date Taking?  Authorizing Provider aspirin 81 MG tablet Take 1 tablet (81mg) by mouth once daily  Yes Historical Provider, MD   atorvastatin (LIPITOR) 10 mg tablet Take 1 tablet (10 mg total) by mouth daily 19  Yes Sylvester Tsang DO   atorvastatin (LIPITOR) 10 mg tablet TAKE ONE TABLET BY MOUTH EVERY OTHER DAY 19  Yes Sylvester Tsang DO   Calcium Carb-Cholecalciferol (CALCIUM 600 + D) 600-200 MG-UNIT TABS Calcium 600 + D TABS  TAKE 1 TABLET DAILY  Refills: 0    Active   Yes Historical Provider, MD   cholecalciferol (VITAMIN D3) 1,000 units tablet Take 1,000 Units by mouth daily   Yes Historical Provider, MD   Fish Oil-Cholecalciferol (FISH OIL + D3) 8336-3795 MG-UNIT CAPS Fish Oil 1200 MG Oral Capsule  Take 2 tablets daily   Refills: 0    Active   Yes Historical Provider, MD   triamterene-hydrochlorothiazide (MAXZIDE-25) 37 5-25 mg per tablet TAKE ONE-HALF TABLET BY MOUTH EVERY DAY AS NEEDED 19  Yes Joaquim Diaz,    acyclovir (ZOVIRAX) 5 % ointment Apply topically every 3 (three) hours  Patient not taking: Reported on 2020   Sylvester Angel, DO     ---------------------------------------------------------------------------------------------------------------------------------------------------------------------  Vitals:   Vitals:    20 0100 20 0200 20 0300 20 0600   BP: 104/61 112/76 101/60    BP Location:       Pulse: 74 74 74    Resp: (!) 11 14 14    Temp:  98 4 °F (36 9 °C) 98 4 °F (36 9 °C)    TempSrc:       SpO2: 100% 100% 100%    Weight:    52 5 kg (115 lb 11 9 oz)   Height:         Arterial Line:  Arterial Line BP: 110/48  Arterial Line MAP (mmHg): 74 mmHg    Tele Rhythm: NSR This was personally reviewed by myself      Respiratory:  SpO2: SpO2: 100 %  O2 Flow Rate (L/min): 2 L/min    Ventilator:  Respiratory    Lab Data (Last 4 hours)    None         O2/Vent Data (Last 4 hours)    None              Temperature: Temp (24hrs), Av 3 °F (36 3 °C), Min:95 4 °F (35 2 °C), Max:98 4 °F (36 9 °C)  Current: Temperature: 98 4 °F (36 9 °C)    Weights:   Weight (last 2 days)     Date/Time   Weight    01/28/20 0600   52 5 (115 74)            Body mass index is 19 26 kg/m²  Hemodynamic Monitoring:  PAP: PAP: 26/9, CVP: CVP (mean): 6 mmHg, CO: CO (L/min): 4 2 L/min, CI: CI (L/min/m2): 2 7 L/min/m2, SVR: SVR (dyne*sec)/cm5: 1332 (dyne*sec)/cm5  PAP: (17-30)/(7-16) 26/9  CO:  [3 5 L/min-4 9 L/min] 4 2 L/min  CI:  [2 3 L/min/m2-3 2 L/min/m2] 2 7 L/min/m2    Intake and Outputs:    Intake/Output Summary (Last 24 hours) at 1/28/2020 0643  Last data filed at 1/28/2020 0300  Gross per 24 hour   Intake 5388 8 ml   Output 3470 ml   Net 1918 8 ml     I/O last 24 hours: In: 5628 8 [P O :240; I V :3213 8; Blood:350; IV Piggyback:1325]  Out: 3770 [Urine:2840; Blood:500; Chest Tube:430]    UOP: 40-60/hour   BM: 0 in the last 24 hours    Chest tube Output:  Mediastinal tubes: 130 mL/8 hours  380 mL/24 hours   Pleural tubes: 0 mL/8 hours  50 mL/24 hours     Labs:  Results from last 7 days   Lab Units 01/28/20  0425 01/27/20  2359 01/27/20  1548  01/27/20  1424  01/27/20  0436 01/25/20  0321 01/24/20  0712   WBC Thousand/uL 12 59*  --   --   --   --   --  7 44 8 27 6 95   HEMOGLOBIN g/dL 10 4* 10 5* 11 1*  --   --   --  12 8 11 1* 12 4   I STAT HEMOGLOBIN g/dl  --   --  10 2*   < >  --    < >  --   --   --    HEMATOCRIT % 33 4* 32 8* 34 2*  --   --   --  41 1 35 3 38 8   HEMATOCRIT, ISTAT %  --   --  30*   < >  --    < >  --   --   --    PLATELETS Thousands/uL 145*  --  154  --  177  --  289 257 292   NEUTROS PCT %  --   --   --   --   --   --  65  --  66   MONOS PCT %  --   --   --   --   --   --  8  --  8    < > = values in this interval not displayed       Results from last 7 days   Lab Units 01/28/20  0425 01/27/20  2359 01/27/20 2017 01/27/20  1548 01/27/20  1447 01/27/20  1427  01/27/20  0436  01/24/20  0712   SODIUM mmol/L 144  --   --  145  --   --   --  141   < > 143   POTASSIUM mmol/L 3 8 4 1 3 6 3 3*  --   --   --  4 1   < > 3 7   CHLORIDE mmol/L 114*  --   --  114*  --   --   --  107   < > 105   CO2 mmol/L 25  --   --  23  --   --   --  31   < > 32   CO2, I-STAT mmol/L  --   --   --  26 26 30   < >  --   --   --    BUN mg/dL 12  --   --  12  --   --   --  17   < > 29*   CREATININE mg/dL 0 42*  --   --  0 55*  --   --   --  0 65   < > 0 69   CALCIUM mg/dL 7 4*  --   --  7 8*  --   --   --  9 4   < > 9 3   ALK PHOS U/L  --   --   --   --   --   --   --   --   --  76   ALT U/L  --   --   --   --   --   --   --   --   --  32   AST U/L  --   --   --   --   --   --   --   --   --  44   GLUCOSE, ISTAT mg/dl  --   --   --  138 120 129   < >  --   --   --     < > = values in this interval not displayed  Baseline Creat: 0 6    Results from last 7 days   Lab Units 01/28/20  0425 01/25/20  0352 01/24/20  0712   MAGNESIUM mg/dL 2 5 2 1 2 2          Results from last 7 days   Lab Units 01/26/20  0512 01/25/20  1824 01/25/20  1013  01/24/20  1959 01/24/20  0712   INR   --   --   --   --  1 01 0 84   PTT seconds 79* 64* 88*   < > 24 23    < > = values in this interval not displayed  Results from last 7 days   Lab Units 01/25/20  0352   TSH 3RD GENERATON uIU/mL 0 821       Micro:   Blood Culture: No results found for: BLOODCX  Urine Culture:   Lab Results   Component Value Date    URINECX No Growth <1000 cfu/mL 04/21/2016     Sputum Culture: No components found for: SPUTUMCX  Wound Culure: No results found for: WOUNDCULT    Diagnositic Studies:  01/28/20 CXR: Mild vascular congestion  No obvious pneumothorax  Lines and tubes in adequate position  This was personally reviewed by myself in PACS   01/28/20 EKG: normal sinus rhythm  This was personally reviewed by myself       Nutrition:        Diet Orders   (From admission, onward)             Start     Ordered    01/28/20 0000  Diet Cardiovascular; Cardiac; Fluid Restriction 1800 ML  Diet effective 0500     Question Answer Comment   Diet Type Cardiovascular    Cardiac Cardiac    Other Restriction(s): Fluid Restriction 1800 ML    RD to adjust diet per protocol? Yes        01/27/20 1537                  Physical Exam   Constitutional: She is oriented to person, place, and time  She appears well-developed and well-nourished  No distress  HENT:   Head: Normocephalic and atraumatic  Mouth/Throat: No oropharyngeal exudate  Eyes: Pupils are equal, round, and reactive to light  EOM are normal  No scleral icterus  Neck: Normal range of motion  Neck supple  No JVD present  Cardiovascular: Normal rate, regular rhythm, normal heart sounds and intact distal pulses  Exam reveals no gallop and no friction rub  No murmur heard  AV epicardial wires   Pulmonary/Chest: Effort normal and breath sounds normal  No stridor  No respiratory distress  She has no wheezes  Chest tubes with sero-sang drainage   Abdominal: Soft  Bowel sounds are normal  She exhibits no distension  There is no tenderness  There is no guarding  Genitourinary:   Genitourinary Comments: Morel with clear, yellow urine    Musculoskeletal: Normal range of motion  She exhibits no edema  Neurological: She is alert and oriented to person, place, and time  No cranial nerve deficit  Coordination normal    Skin: Skin is warm and dry  Capillary refill takes less than 2 seconds  She is not diaphoretic  No erythema  MSI dressing and left leg ACE wrap intact      Invasive lines and devices: Invasive Devices     Central Venous Catheter Line            CVC Central Lines 01/27/20 Triple less than 1 day    Introducer 01/27/20 less than 1 day          Peripheral Intravenous Line            Peripheral IV 01/24/20 Dorsal (posterior); Left Forearm 3 days    Peripheral IV 01/24/20 Left Antecubital 3 days          Arterial Line            Arterial Line 01/27/20 Brachial less than 1 day          Line            Pacer Wires less than 1 day    Pacer Wires less than 1 day          Drain            Chest Tube 1 Left Pleural 32 Fr  less than 1 day    Chest Tube 2 Posterior Mediastinal 32 Fr  less than 1 day    Chest Tube 3 Anterior Mediastinal 32 Fr  less than 1 day    Urethral Catheter Non-latex; Temperature probe 16 Fr  less than 1 day              ---------------------------------------------------------------------------------------------------------------------------------------------------------------------  Code Status: Level 1 - Full Code    Counseling / Coordination of Care  Total time spent today 32 minutes  Greater than 50% of total time was spent with the patient and / or family counseling and / or coordination of care  Collaborative bedside rounds performed with cardiac surgery attending and bedside RN      SIGNATURE: RACHEL Mak  DATE: January 28, 2020  TIME: 6:43 AM

## 2020-01-28 NOTE — RESPIRATORY THERAPY NOTE
RT Ventilator Management Note  Rickie Simeon 76 y o  female MRN: [id number]  Unit/Bed#: [facility] 417-01 Encounter: [id number]      Daily Screen       1/27/2020 1915 1/27/2020 1935          Patient safety screen outcome[de-identified]  Passed  Passed              Physical Exam:   Assessment Type: Assess only  General Appearance: Drowsy  Respiratory Pattern: Normal  Chest Assessment: Chest expansion symmetrical  Bilateral Breath Sounds: Clear, Diminished  Cough: Weak, Non-productive  O2 Device: 4 lpm N/C      Resp Comments: post extubation; no stridor noted, will encourage IS when pt is able  will continue to monitor

## 2020-01-28 NOTE — OCCUPATIONAL THERAPY NOTE
OccupationalTherapy Evaluation & TX     Patient Name: Aleksey GIBSON Date: 1/28/2020  Problem List  Principal Problem: Atherosclerosis of native coronary artery with angina pectoris [facility])  Active Problems:    Hyperlipidemia    Essential hypertension    NSTEMI (non-ST elevation myocardial infarction) ([facility] [address] )    Syncope    S/P CABG x 3    Past Medical History  Past Medical History:   Diagnosis Date    Effusion of left knee     Last assessed - 9/10/15    Hyperlipidemia     Hypertension     Tick bite     Last assessed - 4/21/17     Past Surgical History  Past Surgical History:   Procedure Laterality Date    APPENDECTOMY      VT CABG, ARTERY-VEIN, FOUR N/A 1/27/2020    Procedure: CORONARY ARTERY BYPASS GRAFT (CABG) x3 VESSELS, LIMA TO LAD, AND SVG TO PLB & OM;  Surgeon: Chintan Newell DO;  [facility];  Service: Cardiac Surgery    VT ECHO TRANSESOPHAG R-T 2D W/PRB IMG ACQUISJ I&R N/A 1/27/2020    Procedure: TRANSESOPHAGEAL ECHOCARDIOGRAM (GET); Surgeon: Chintan Newell DO;  [facility];  Service: Cardiac Surgery    VT ENDOSCOPY W/VIDEO-ASST VEIN HARVEST,CABG Left 1/27/2020    Procedure: HARVEST VEIN ENDOSCOPIC ([address]); Surgeon: Chintan Newell DO;  [facility]: [facility];  Service: Cardiac Surgery    TONSILLECTOMY      Last assessed - 4/20/17    TUBAL LIGATION      Last assessed - 4/20/17    WISDOM TOOTH EXTRACTION      Last assessed - 4/20/17 01/28/20 1147   Note Type   Note type Eval/Treat   Restrictions/Precautions   Weight Bearing Precautions Per Order No   Other Precautions Cardiac/sternal;Telemetry; Fall Risk   Pain Assessment   Pain Assessment 0-10   Pain Score 5   Pain Type Acute pain   Pain Location Chest   Pain Orientation Mid   Hospital Pain Intervention(s) Repositioned; Ambulation/increased activity; Emotional support   Response to Interventions tolerated   Home Living   Type of [address] Two level;1/2 bath on main level   Bathroom Shower/Tub Tub/shower unit   Bathroom Toilet Standard   Bathroom Equipment Shower chair   Prior Function   Level of [place] Independent with ADLs and functional mobility   Lives With Alone  (pt's dtr planning on stay w/ her upon d/c)   Receives Help From Family   ADL Assistance Independent   IADLs Independent   Falls in the last 6 months 1 to 4   Vocational Part time employment   Lifestyle   Autonomy pta pt reports I in ADLs/IADLs/functional mobility   Reciprocal Relationships supportive dtr who will be staying w/ her   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   Intrinsic Gratification enjoys yoga and walking   Psychosocial   Psychosocial (WDL) WDL   Subjective   Subjective "That was great education!"   ADL   Where Assessed Chair   Eating Assistance 5  Supervision/Setup   Grooming Assistance 5  Supervision/Setup   UB Bathing Assistance 5  Supervision/Setup   LB Bathing Assistance 4  Minimal Assistance   [address] 5  Supervision/Setup   LB Dressing Assistance 4  Minimal Assistance   Transfers   Sit to Stand 4  Minimal assistance   Additional items Assist x 1   Stand to Sit 4  Minimal assistance   Additional items Assist x 1   Functional Mobility   Functional Mobility 4  Minimal assistance   Additional items Rolling walker   Balance   Static Sitting Fair   Static Standing Fair -   Ambulatory Poor +   Activity Tolerance   Activity Tolerance Patient tolerated treatment well   Medical Staff Made Aware PT Damián   Nurse Made Aware okay to see per CHRISTIANO Rosenthal   RUE Assessment   RUE Assessment WFL   LUE Assessment   LUE Assessment WFL   Hand Function   Gross Motor Coordination Functional   Fine Motor Coordination Functional   Cognition   Overall Cognitive Status WFL   Arousal/Participation Cooperative   Attention Within functional limits   Orientation Level Oriented X4   Memory Within functional limits   Following Commands Follows all commands and directions without difficulty   Comments pt pleasant and cooperative Assessment   Limitation Decreased ADL status; Decreased endurance;Decreased high-level ADLs   Prognosis Good   Assessment Pt is a 76 y o  YO  female admitted to [facility] on 2020 w/ CAD s/p CABG x 3 on 20  Pt w/ syncopal episode w/ NSTEMI,  Comorbidities include a h/o HTN, HLD, effusion of L knee, and tubal ligation   Pt with active OT orders and ambulate  orders    Pt resides in a [address] alone  Pt reports dtr will stay w/ her upon dc    Pt was I w/  ADLS and IADLS, (+) drove, & required no use of DME PTA  Currently pt is Min A fir LB ADLS and functional mobility  Pt is limited at this time 2*: pain, endurance, activity tolerance, decreased I w/ ADLS/IADLS and decreased safety awareness  The following Occupational Performance Areas to address include: dressing, functional mobility, household maintenance and job performance/volunteering  Based on the aforementioned OT evaluation, functional performance deficits, and assessments, pt has been identified as a high complexity evaluation  From OT standpoint, anticipate d/c home with family support  Pt to continue to benefit from acute immediate OT services to address the following goals 1-2 sessions to  w/in 3-5 days: Mario Guise Goals   Patient Goals teach yoga again   STG Time Frame 3-5   Plan   Treatment Interventions Compensatory technique education;Patient/family training;Cardiac education   Goal Expiration Date 20   OT Treatment Day 1   OT Frequency 1-2x/wk   Additional Treatment Session   Start Time 7901   End Time 1147   Treatment Assessment Pt participated in additional OT session focusing on cardiac education  Provided pt with Recovering After Cardiac Surgery packet and educated pt regarding; sternal precautions, cardiac precautions, lifiting restrictions, safe activity engagement, energy conservation, lifestyle modifications, stress management and cardiac rehabilitation programs  Pt's questions were addressed after discussion of the packet   Provided pt with contact information for OT department if questions arrise  Pt is limited by decreased endurance and decreased ability to complete higher level ADLs, however her dtr will be home and able to assist as needed upon d/c  Rec pt cont participation in self care after s/u w/ nrsg staff and cont mobility w/ non OT staff while in the hospital  Pt denies any questions or concerns from an OT standpoint at this time  Rec pt return home w/ increased family support upon d/c  D/C OT  Recommendation   OT Discharge Recommendation Home with family support   OT - OK to Discharge Yes   Modified [place] Scale   Modified [place] Scale 4     Goals:    1 ) Pt/family will participate in cardiac education w/ G attn and carryover during functional/leisure activities      2 ) Pt will demonstrate 100% carryover of energy conservation techniques t/o functional I/ADL/leisure tasks w/o cues s/p skilled education      Zoey Bueno, MS, OTR/L

## 2020-01-28 NOTE — PLAN OF CARE
Problem: PHYSICAL THERAPY ADULT  Goal: Performs mobility at highest level of function for planned discharge setting  See evaluation for individualized goals  Description  Treatment/Interventions: Functional transfer training, LE strengthening/ROM, Elevations, Therapeutic exercise, Endurance training, Patient/family training, Equipment eval/education, Bed mobility, Gait training, Spoke to nursing, OT, Family(PT spoke to CM)  Equipment Recommended: Walker(at this time; will monitor progression to SPC/no AD )       See flowsheet documentation for full assessment, interventions and recommendations  Note:   Prognosis: Good  Problem List: Decreased strength, Decreased endurance, Impaired balance, Decreased mobility  Assessment: Pt is a 75 y/o female s/p episode of syncope and fall in home following significant diaphoresis and nausea that woke pt from her sleep who presented to [facility] on 1/24/20 w/ elevated troponins and dx of NSTEMI  Further w/u revealed dx of MVCAD and pt was transferred to [facility] for CT surgery consultation  Rec for CABG from cardiac surgery on 1/25/20  Pt underwent "Coronary artery bypass grafting x 3 with left internal mammary artery to left anterior descending artery, saphenous vein graft to ramus intermedius and saphenous vein graft to obtuse marginal 1" on 1/27/20  Pt is now 1 day s/p CABGx3  PT now consulted for assessment of mobility and d/c needs  Pt's PMHx/co-morbidities affecting current course include HTN, past MI, arthritis, depression, headaches and personal factors of living alone and 2 [place] home w/ 10 steps to access 2nd floor bed/bath  Patient's clinical presentation is currently unstable/unpredictable due to telemetry monitoring, abnormal lab values, CT in place, increased reliance on AD, and ongoing medical management/monitoring in the ICU   Pt presents with post-op pain and guarding, generalized weakness of the LEs, decreased functional endurance and activity tolerance compared to baseline, and impaired balance and gait deviations requiring the use of a RW at this time  Will cont to follow pt in PT as medical status allows to progress tx targeting pt's mobility, functional endurance, progression to stairs, level of (I), as well as pt safety in return to pt's PLOF  Otherwise, anticipate pt will return home w/ available support (from dtr who will be staying w/ her) upon D/C provided she cont improving w/ mobility, safety, stairs navigation and endurance and when medically cleared; home PT follow up is recommended at this time; will follow  Barriers to Discharge: Inaccessible home environment     Recommendation: Home with family support, Home PT(rule out 1st floor set up )          See flowsheet documentation for full assessment

## 2020-01-28 NOTE — SOCIAL WORK
CM spoke with pt  POD#1 CABG x3  Alert and oriented  Discussed need for [address] postop and available agencies discussed  Pt  Ramiro Velásquez for postop care  Referral sent  Also given Bundle letter  Pt  With good understanding

## 2020-01-28 NOTE — PLAN OF CARE
Problem: OCCUPATIONAL THERAPY ADULT  Goal: Performs self-care activities at highest level of function for planned discharge setting  See evaluation for individualized goals  Description  Treatment Interventions: Compensatory technique education, Patient/family training, Cardiac education          See flowsheet documentation for full assessment, interventions and recommendations  Outcome: Completed  Note:   Limitation: Decreased ADL status, Decreased endurance, Decreased high-level ADLs  Prognosis: Good  Assessment: Pt is a 76 y o  YO  female admitted to [facility] on 2020 w/ CAD s/p CABG x 3 on 20  Pt w/ syncopal episode w/ NSTEMI,  Comorbidities include a h/o HTN, HLD, effusion of L knee, and tubal ligation   Pt with active OT orders and ambulate  orders    Pt resides in a [facility]  Pt reports dtr will stay w/ her upon dc    Pt was I w/  ADLS and IADLS, (+) drove, & required no use of DME PTA  Currently pt is Min A fir LB ADLS and functional mobility  Pt is limited at this time 2*: pain, endurance, activity tolerance, decreased I w/ ADLS/IADLS and decreased safety awareness  The following Occupational Performance Areas to address include: dressing, functional mobility, household maintenance and job performance/volunteering  Based on the aforementioned OT evaluation, functional performance deficits, and assessments, pt has been identified as a high complexity evaluation  From OT standpoint, anticipate d/c home with family support  Pt to continue to benefit from acute immediate OT services to address the following goals 1-2 sessions to  w/in 3-5 days:  OT Discharge Recommendation: Home with family support  OT - OK to Discharge:  Yes

## 2020-01-29 PROBLEM — D64.9 ANEMIA: Status: ACTIVE | Noted: 2020-01-29

## 2020-01-29 PROBLEM — D69.6 THROMBOCYTOPENIA (HCC): Status: ACTIVE | Noted: 2020-01-29

## 2020-01-29 LAB
ANION GAP SERPL CALCULATED.3IONS-SCNC: 2 MMOL/L (ref 4–13)
BUN SERPL-MCNC: 22 MG/DL (ref 5–25)
CALCIUM SERPL-MCNC: 8.5 MG/DL (ref 8.3–10.1)
CHLORIDE SERPL-SCNC: 108 MMOL/L (ref 100–108)
CO2 SERPL-SCNC: 29 MMOL/L (ref 21–32)
CREAT SERPL-MCNC: 0.62 MG/DL (ref 0.6–1.3)
ERYTHROCYTE [DISTWIDTH] IN BLOOD BY AUTOMATED COUNT: 15.2 % (ref 11.6–15.1)
GFR SERPL CREATININE-BSD FRML MDRD: 89 ML/MIN/1.73SQ M
GLUCOSE SERPL-MCNC: 110 MG/DL (ref 65–140)
GLUCOSE SERPL-MCNC: 116 MG/DL (ref 65–140)
GLUCOSE SERPL-MCNC: 125 MG/DL (ref 65–140)
GLUCOSE SERPL-MCNC: 126 MG/DL (ref 65–140)
GLUCOSE SERPL-MCNC: 93 MG/DL (ref 65–140)
HCT VFR BLD AUTO: 31.1 % (ref 34.8–46.1)
HGB BLD-MCNC: 9.8 G/DL (ref 11.5–15.4)
MAGNESIUM SERPL-MCNC: 2.6 MG/DL (ref 1.6–2.6)
MCH RBC QN AUTO: 29.7 PG (ref 26.8–34.3)
MCHC RBC AUTO-ENTMCNC: 31.5 G/DL (ref 31.4–37.4)
MCV RBC AUTO: 94 FL (ref 82–98)
PLATELET # BLD AUTO: 132 THOUSANDS/UL (ref 149–390)
PMV BLD AUTO: 11.8 FL (ref 8.9–12.7)
POTASSIUM SERPL-SCNC: 3.7 MMOL/L (ref 3.5–5.3)
RBC # BLD AUTO: 3.3 MILLION/UL (ref 3.81–5.12)
SODIUM SERPL-SCNC: 139 MMOL/L (ref 136–145)
WBC # BLD AUTO: 8.24 THOUSAND/UL (ref 4.31–10.16)

## 2020-01-29 PROCEDURE — 99024 POSTOP FOLLOW-UP VISIT: CPT | Performed by: THORACIC SURGERY (CARDIOTHORACIC VASCULAR SURGERY)

## 2020-01-29 PROCEDURE — 85027 COMPLETE CBC AUTOMATED: CPT | Performed by: NURSE PRACTITIONER

## 2020-01-29 PROCEDURE — 83735 ASSAY OF MAGNESIUM: CPT | Performed by: NURSE PRACTITIONER

## 2020-01-29 PROCEDURE — 80048 BASIC METABOLIC PNL TOTAL CA: CPT | Performed by: NURSE PRACTITIONER

## 2020-01-29 PROCEDURE — 99232 SBSQ HOSP IP/OBS MODERATE 35: CPT | Performed by: INTERNAL MEDICINE

## 2020-01-29 PROCEDURE — NC001 PR NO CHARGE: Performed by: INTERNAL MEDICINE

## 2020-01-29 PROCEDURE — 82948 REAGENT STRIP/BLOOD GLUCOSE: CPT

## 2020-01-29 PROCEDURE — 99024 POSTOP FOLLOW-UP VISIT: CPT | Performed by: PHYSICIAN ASSISTANT

## 2020-01-29 RX ORDER — POTASSIUM CHLORIDE 20 MEQ/1
20 TABLET, EXTENDED RELEASE ORAL 2 TIMES DAILY
Status: DISCONTINUED | OUTPATIENT
Start: 2020-01-29 | End: 2020-01-31

## 2020-01-29 RX ORDER — FUROSEMIDE 10 MG/ML
40 INJECTION INTRAMUSCULAR; INTRAVENOUS
Status: DISCONTINUED | OUTPATIENT
Start: 2020-01-29 | End: 2020-01-31

## 2020-01-29 RX ORDER — SCOLOPAMINE TRANSDERMAL SYSTEM 1 MG/1
1 PATCH, EXTENDED RELEASE TRANSDERMAL
Status: DISCONTINUED | OUTPATIENT
Start: 2020-01-29 | End: 2020-02-04 | Stop reason: HOSPADM

## 2020-01-29 RX ADMIN — MUPIROCIN 1 APPLICATION: 20 OINTMENT TOPICAL at 21:52

## 2020-01-29 RX ADMIN — MUPIROCIN 1 APPLICATION: 20 OINTMENT TOPICAL at 08:09

## 2020-01-29 RX ADMIN — TEMAZEPAM 15 MG: 15 CAPSULE ORAL at 21:57

## 2020-01-29 RX ADMIN — METOPROLOL TARTRATE 12.5 MG: 25 TABLET ORAL at 08:09

## 2020-01-29 RX ADMIN — ISOSORBIDE MONONITRATE 30 MG: 30 TABLET, EXTENDED RELEASE ORAL at 10:05

## 2020-01-29 RX ADMIN — POLYETHYLENE GLYCOL 3350 17 G: 17 POWDER, FOR SOLUTION ORAL at 08:09

## 2020-01-29 RX ADMIN — POTASSIUM CHLORIDE 20 MEQ: 1500 TABLET, EXTENDED RELEASE ORAL at 17:30

## 2020-01-29 RX ADMIN — SCOPALAMINE 1 PATCH: 1 PATCH, EXTENDED RELEASE TRANSDERMAL at 10:05

## 2020-01-29 RX ADMIN — OXYCODONE HYDROCHLORIDE 5 MG: 5 TABLET ORAL at 21:52

## 2020-01-29 RX ADMIN — FONDAPARINUX SODIUM 2.5 MG: 2.5 INJECTION, SOLUTION SUBCUTANEOUS at 08:09

## 2020-01-29 RX ADMIN — METOPROLOL TARTRATE 12.5 MG: 25 TABLET ORAL at 21:52

## 2020-01-29 RX ADMIN — OXYCODONE HYDROCHLORIDE 5 MG: 5 TABLET ORAL at 08:10

## 2020-01-29 RX ADMIN — ONDANSETRON 4 MG: 2 INJECTION INTRAMUSCULAR; INTRAVENOUS at 03:20

## 2020-01-29 RX ADMIN — ASPIRIN 325 MG ORAL TABLET 325 MG: 325 PILL ORAL at 08:09

## 2020-01-29 RX ADMIN — POTASSIUM CHLORIDE 10 MEQ: 750 TABLET, EXTENDED RELEASE ORAL at 08:09

## 2020-01-29 RX ADMIN — AMIODARONE HYDROCHLORIDE 200 MG: 200 TABLET ORAL at 06:13

## 2020-01-29 RX ADMIN — DOCUSATE SODIUM 100 MG: 100 CAPSULE, LIQUID FILLED ORAL at 17:30

## 2020-01-29 RX ADMIN — FUROSEMIDE 40 MG: 10 INJECTION, SOLUTION INTRAMUSCULAR; INTRAVENOUS at 17:30

## 2020-01-29 RX ADMIN — PANTOPRAZOLE SODIUM 40 MG: 40 TABLET, DELAYED RELEASE ORAL at 06:13

## 2020-01-29 RX ADMIN — ATORVASTATIN CALCIUM 80 MG: 80 TABLET, FILM COATED ORAL at 17:30

## 2020-01-29 RX ADMIN — ACETAMINOPHEN 975 MG: 325 TABLET ORAL at 13:18

## 2020-01-29 RX ADMIN — ONDANSETRON 4 MG: 2 INJECTION INTRAMUSCULAR; INTRAVENOUS at 21:03

## 2020-01-29 RX ADMIN — DOCUSATE SODIUM 100 MG: 100 CAPSULE, LIQUID FILLED ORAL at 08:09

## 2020-01-29 RX ADMIN — FUROSEMIDE 20 MG: 10 INJECTION, SOLUTION INTRAMUSCULAR; INTRAVENOUS at 08:09

## 2020-01-29 NOTE — PROGRESS NOTES
Progress Note - Cardiothoracic Surgery   Candis Clifford 76 y o  female MRN: [id number]  Unit/Bed#: [facility] 423-01 Encounter: [id number]  Coronary artery disease  S/P coronary artery bypass grafting; POD # 2    24 Hour Events: No events  C/o N not alleviated w/ Zofran  Tolerating diet otherwise  (+) flatus, (-) BM  Ambulating well  Emily ncontrolled      Medications:   Scheduled Meds:  Current Facility-Administered Medications:  acetaminophen 975 mg Oral [facility] RACHEL Betancur   amiodarone 200 mg Oral [facility] Sherma Hodgkins Scott, RACHEL   aspirin 325 mg Oral Daily RACHEL Betancur   atorvastatin 80 mg Oral Daily With RACHEL Erickson   bisacodyl 10 mg Rectal Daily PRN RACHEL Betancur   calcium carbonate-vitamin D 1 tablet Oral Daily With Breakfast RACHEL Betancur   cholecalciferol 1,000 Units Oral Daily RACHEL Betancur   docusate sodium 100 mg Oral BID RACHEL Betancur   fondaparinux 2 5 mg Subcutaneous Daily RACHEL Betancur   furosemide 20 mg Intravenous Daily RACHEL Betancur   insulin lispro 1-5 Units Subcutaneous TID AC RACHEL Betancur   insulin lispro 1-5 Units Subcutaneous HS RACHEL Betancur   isosorbide mononitrate 30 mg Oral Daily RACHEL Betancur   ketorolac 15 mg Intravenous Q6H RACHEL Betancur   metoprolol tartrate 12 5 mg Oral Q12H [address] [place], [place] [address]RACHEL   mupirocin 1 application Nasal I95Q [address] [place], [place] [address]RACHEL   ondansetron 4 mg Intravenous Q6H PRN RACHEL Betancur   oxyCODONE 5 mg Oral Q4H PRN RACHEL Betancur   Or       oxyCODONE 10 mg Oral Q4H PRN RACHEL Betancur   pantoprazole 40 mg Oral Early Morning RACHEL Betancur   polyethylene glycol 17 g Oral Daily RACHEL Betancur   potassium chloride 10 mEq Oral Daily RACHEL Betancur   temazepam 15 mg Oral HS PRN Drucilla Griffins, CRNP     Continuous Infusions:   PRN Meds: bisacodyl   ondansetron    oxyCODONE **OR** oxyCODONE    temazepam    Vitals:   Vitals:    01/28/20 2326 01/29/20 0304 01/29/20 0538 01/29/20 0734   BP: 99/54 97/54  121/58   BP Location: Right arm Right arm  Left arm   Pulse: 65 69  61   Resp: 18 18  18   Temp: 97 9 °F (36 6 °C)   (!) 97 3 °F (36 3 °C)   TempSrc: Oral   Oral   SpO2: 95% 92%  92%   Weight:   53 1 kg (117 lb 1 6 oz)    Height:         Bp x24hrs: /50-60    Telemetry: NSR; Heart Rate: 68    Respiratory:   SpO2: SpO2: 92 %, SpO2 Activity: SpO2 Activity: At Rest, SpO2 Device: O2 Device: None (Room air); Room Air    Intake/Output:   I/O       01/27 0701 - 01/28 0700 01/28 0701 - 01/29 0700 01/29 0701 - 01/30 0700    P  O  0 420     I V  (mL/kg) 3293 8 (62 7) 40 7 (0 8)     Blood 350      IV Piggyback 1325 50     Cell Saver 500      Total Intake(mL/kg) 5468 8 (104 2) 510 7 (9 6)     Urine (mL/kg/hr) 2715 (2 2) 930 (0 7)     Blood 500      Chest Tube 530 440     Total Output 3745 1370     Net +1723 8 -859 3                UOp - none recorded/8hrs; 930cc/24hrs    Chest tube Output:    Mediastinal tubes: 40 mL/8 hours  190 mL/24 hours   Pleural tubes: 70 mL/8 hours  250 mL/24 hours     Weights:   Weight (last 2 days)     Date/Time   Weight    01/29/20 0538   53 1 (117 1)    01/28/20 0600   52 5 (115 74)            Admit weight: 50 6kg - up 2 5kg from admission weight    Results:   Results from last 7 days   Lab Units 01/29/20  0519 01/28/20  0425 01/27/20  2359 01/27/20  1548  01/27/20  0436   WBC Thousand/uL 8 24 12 59*  --   --   --  7 44   HEMOGLOBIN g/dL 9 8* 10 4* 10 5* 11 1*  --  12 8   I STAT HEMOGLOBIN g/dl  --   --   --  10 2*   < >  --    HEMATOCRIT % 31 1* 33 4* 32 8* 34 2*  --  41 1   HEMATOCRIT, ISTAT %  --   --   --  30*   < >  --    PLATELETS Thousands/uL 132* 145*  --  154   < > 289    < > = values in this interval not displayed       Results from last 7 days   Lab Units 01/29/20  0519 01/28/20  0425 01/27/20  1179  01/27/20  1548   SODIUM mmol/L 139 144  --   --  145   POTASSIUM mmol/L 3 7 3 8 4 1   < > 3 3*   CHLORIDE mmol/L 108 114*  --   --  114*   CO2 mmol/L 29 25  --   --  23   CO2, I-STAT mmol/L  --   --   --   --  26   BUN mg/dL 22 12  --   --  12   CREATININE mg/dL 0 62 0 42*  --   --  0 55*   GLUCOSE, ISTAT mg/dl  --   --   --   --  138   CALCIUM mg/dL 8 5 7 4*  --   --  7 8*    < > = values in this interval not displayed  Results from last 7 days   Lab Units 01/26/20  0512 01/25/20  1824 01/25/20  1013  01/24/20  1959 01/24/20  0712   INR   --   --   --   --  1 01 0 84   PTT seconds 79* 64* 88*   < > 24 23    < > = values in this interval not displayed  Point of care glucose: 116 - 152 - 132 - 152    Studies:  No new studies    I have personally reviewed pertinent reports  and I have personally reviewed pertinent films in PACS    Invasive Lines/Tubes:  Invasive Devices     Central Venous Catheter Line            CVC Central Lines 01/27/20 Triple 1 day          Peripheral Intravenous Line            Peripheral IV 01/28/20 Left Wrist less than 1 day          Line            Pacer Wires 1 day    Pacer Wires 1 day          Drain            Chest Tube 1 Left Pleural 32 Fr  1 day    Chest Tube 2 Posterior Mediastinal 32 Fr  1 day    Chest Tube 3 Anterior Mediastinal 32 Fr  1 day                Physical Exam:    HEENT/NECK:  PERRLA  No jugular venous distention  Cardiac: Regular rate and rhythm  Pulmonary:  Breath sounds clear bilaterally  Abdomen:  Normal bowel sounds  Incisions: Sternum is stable  Incision dressed with Acticoat  No erythema or drainage and Saphenectomy incision dressed with Acticoat  No erythema or drainage  Extremities: Extremities warm/dry and Trace edema B/L  Neuro: Alert and oriented X 3, Sensation is grossly intact and No focal deficits  Skin: Warm/Dry, without rashes or lesions  Assessment:  Principal Problem:     Atherosclerosis of native coronary artery with angina pectoris [facility])  Active Problems: Hyperlipidemia    Essential hypertension    NSTEMI (non-ST elevation myocardial infarction) ([facility] [address] )    Syncope    S/P CABG x 3       Coronary artery disease  S/P coronary artery bypass grafting; POD # 2    Plan:    1  Cardiac:   NSR; HR/BP well-controlled  Lopressor, 12 5mg PO BID   Continue Imdur 30mg PO QDay  Continue ASA and Statin therapy  Discontinue amio PO  Epicardial pacing wires no longer required  Remove today  Maintain central IV access today for blood draws  Continue DVT prophylaxis    2  Pulmonary:   Good Room air oxygen saturation; Continue incentive spirometry/Coughing/Deep breathing exercises  Chest tube output remains persistently high; Continue chest tubes to suction today    3  Renal:   Intake/Output net: (-)859 3 mL/24 hours  Continue diuresis   Lasix 20 mg IV QD - increase to 40mg BID  Potassium Chloride 10 mEq PO QD - increase to 20mEq BID  Post op Creatinine stable; Follow up labs prn    4  Neuro:  Neurologically intact; No active issues  Incisional pain well-controlled; Continue prn Percocet   Add scopolamine patch    5  GI:  Tolerating TLC 2 3 gm sodium diet  Maintain 1800 mL daily fluid restriction   Continue stool softeners and prn suppository  Continue GI prophylaxis    6  Endo:   No history of diabetes; Glucose well-controlled with sliding scale coverage   Continue vitamin D supplementation    7    Hematology:    Post-operative acute blood loss anemia; Hemoglobin 9 8; trend prn   Thrombocytopenia, no active bleeding    8  Disposition:      Ambulating independently, Anticipate discharge to home once medically stable     VTE Pharmacologic Prophylaxis: Fondaparinux (Arixtra)  VTE Mechanical Prophylaxis: sequential compression device    Collaborative rounds completed with LEANN Burr    Plan of care discussed with bedside nurse    SIGNATURE: Amaris Correia PA-C  DATE: January 29, 2020  TIME: 8:31 AM

## 2020-01-29 NOTE — DISCHARGE INSTRUCTIONS
Sternal Precautions   WHAT YOU NEED TO KNOW:   What are sternal precautions? Sternal precautions are used to help protect your sternum (breastbone) after open chest surgery  Wires are placed during surgery to hold the sternum together as it heals  Sternal precautions help prevent the wires from cutting through the sternum  The precautions also help prevent the sternum from coming apart from an injury, and prevent pain and bleeding  You may need to use the precautions for up to 12 weeks after surgery  Your surgeon will give you specific instructions based on the type of surgery you had  It is important to follow the instructions carefully  An injury to the healing sternum can be life-threatening  What are some general sternal precautions? Start slowly and do more as you get stronger  Pain medicine might make it harder for you to know when to slow down or be careful  Stop immediately if you hear a crunch or pop in your sternum  · Protect your sternum  Hug a pillow to your chest or cross your arms over your chest when you laugh, sneeze, or cough  · Be careful when you get into or out of a chair or bed  Hug a pillow or cross your arms when you stand or sit  Do not twist as you move  Use only your legs to sit and stand  You may need to use a raised toilet seat if you have trouble standing up without using your arms  Your healthcare provider may teach you to use your elbow for support as you move from lying to sitting  · Ask when you may take a bath or shower  You may need to use a bath chair if you have trouble getting into or out of the tub  Do not use a grab bar  · Do not lift or carry anything heavier than 10 pounds  For example, a gallon of milk weighs 8 pounds  · Keep your arms down as much as possible  Do not put your arms out to the side, behind you, or over your head  Do not let anyone pull your arms to help you move or dress  Do not reach for items  · Do not push or pull anything  Examples include a car door or a vacuum   · Do not drive while you are healing  Your surgeon will tell you when it is safe for you to start driving again  How do I care for my surgery wound? Always wash your hands before you care for your wound  Wash your wound as directed  Do not rub the wound as you wash or dry the area  Pat the area dry with a clean towel  Change the bandages as directed and when they get wet or dirty  Do not smoke:  Nicotine can damage blood vessels and make it more difficult to heal  Do not use e-cigarettes or smokeless tobacco in place of cigarettes or to help you quit  They still contain nicotine  Ask your healthcare provider for information if you currently smoke and need help quitting  Call 911 for any of the following:   · You have sudden pain in your sternum and hear a crunch or pop  · You have bleeding that does not stop even after you apply pressure for 5 minutes  When should I seek immediate care? · You hear crunching or grinding in your sternum  · You have signs of an infection, such as a fever, red or warm skin, or pus in the surgery wound  When should I contact my healthcare provider? · You continue to feel pain, even after you take your pain medicine  · You have new or worsening pain, or any pain with movement  · You have questions or concerns about your condition or care  CARE AGREEMENT:   You have the right to help plan your care  Learn about your health condition and how it may be treated  Discuss treatment options with your caregivers to decide what care you want to receive  You always have the right to refuse treatment  The above information is an  only  It is not intended as medical advice for individual conditions or treatments  Talk to your doctor, nurse or pharmacist before following any medical regimen to see if it is safe and effective for you    © 2017 [employer] Information is for End User's use only and may not be sold, redistributed or otherwise used for commercial purposes  All illustrations and images included in CareNotes® are the copyrighted property of [employer]  or [employer]  Coronary Artery Bypass Graft   WHAT YOU NEED TO KNOW:   A coronary artery bypass graft (CABG) is open heart surgery to clear blocked arteries in your heart  CABG surgery improves blood flow to your heart by bypassing (sending blood around) the blocked part of an artery  This restores blood flow to your heart and helps prevent a heart attack  DISCHARGE INSTRUCTIONS:   Call 911 for any of the following:   · You feel lightheaded, short of breath, and have chest pain  · You cough up blood  · You have a fast heartbeat that flutters  · You feel like you are going to faint  Seek care immediately if:   · Your arm or leg feels warm, tender, and painful  It may look swollen and red  · You have numbness or tingling in your arms or legs  · You have a severe headache  Contact your healthcare provider if:   · You have a fever higher than 101°F (38 4°C)  · You have gained 2 pounds in 24 hours  · Your wound is red, swollen, or draining pus  · Your signs and symptoms return  · You feel depressed  · You have questions or concerns about your condition or care  Medicines: You may need any of the following:  · Prescription pain medicine  may be given  Ask how to take this medicine safely  · Antiplatelets , such as aspirin, help prevent blood clots  Take your antiplatelet medicine exactly as directed  These medicines make it more likely for you to bleed or bruise  If you are told to take aspirin, do not take acetaminophen or ibuprofen instead  · Cholesterol medicine  helps lower cholesterol and lipid levels in your blood  · Antibiotics  help prevent a bacterial infection  · Heart medicine  helps strengthen and regulate your heartbeat      · Blood pressure medicine  helps lower or control your blood pressure  · Take your medicine as directed  Contact your healthcare provider if you think your medicine is not helping or if you have side effects  Tell him or her if you are allergic to any medicine  Keep a list of the medicines, vitamins, and herbs you take  Include the amounts, and when and why you take them  Bring the list or the pill bottles to follow-up visits  Carry your medicine list with you in case of an emergency  Go to cardiac rehabilitation:  Cardiac rehabilitation (rehab) is a program run by specialists who will help you safely strengthen your heart and prevent more heart disease  This plan includes exercise, relaxation, stress management, and heart-healthy nutrition  Healthcare providers will also check to make sure any medicines you take are working  The plan may also include instructions for when you can drive, return to work, and do other normal daily activities  Care for your wound as directed:  Carefully wash the wound with soap and water  If you do not have a bandage, gently pat the incision dry with a clean towel  If you have a bandage, dry the area and put on a new, clean bandage  Change your bandage if it gets wet or dirty  Prevent another blocked artery:   · Eat heart healthy foods  You may need to eat foods that are low in salt, fat, or cholesterol  Healthy foods include fruits, vegetables, whole-grain breads, low-fat dairy products, beans, lean meats, and fish  Ask your healthcare provider for more information about a heart healthy diet  · Do not smoke  Nicotine and other chemicals in cigarettes and cigars can cause heart and lung damage  Ask your healthcare provider for information if you currently smoke and need help to quit  E-cigarettes or smokeless tobacco still contain nicotine  Talk to your healthcare provider before you use these products  · Maintain a healthy weight  Ask your healthcare provider how much you should weigh   Extra weight can increase the stress on your heart  Ask him to help you create a weight loss plan if you are overweight  Get a flu shot: The flu can be dangerous for a person who has heart disease  To prevent influenza (flu), all adults should get the influenza vaccine every year as soon as it becomes available  Follow up with your healthcare provider as directed:  Write down your questions so you remember to ask them during your visits  © 2017 [address] [employer] Information is for End User's use only and may not be sold, redistributed or otherwise used for commercial purposes  All illustrations and images included in CareNotes® are the copyrighted property of [employer]  or [employer]ias Etienne  The above information is an  only  It is not intended as medical advice for individual conditions or treatments  Talk to your doctor, nurse or pharmacist before following any medical regimen to see if it is safe and effective for you  Heart Healthy Diet   WHAT YOU NEED TO KNOW:   What is a heart healthy diet? A heart healthy diet is an eating plan low in total fat, unhealthy fats, and sodium (salt)  A heart healthy diet helps decrease your risk for heart disease and stroke  Limit the amount of fat you eat to 25% to 35% of your total daily calories  Limit sodium to less than 2,300 mg each day  What is healthy fat, and where is it found? Healthy fats can help improve cholesterol levels  The risk for heart disease is decreased when cholesterol levels are normal  Choose healthy fats, such as the following:  · Unsaturated fat  is found in foods such as soybean, canola, olive, corn, and safflower oils  It is also found in soft tub margarine that is made with liquid vegetable oil  · Omega-3 fat  is found in certain fish, such as salmon, tuna, and trout, and in walnuts and flaxseed  What is unhealthy fat, and where is it found?   Unhealthy fats can cause unhealthy cholesterol levels in your blood and increase your risk of heart disease  Limit unhealthy fats, such as the following:  · Cholesterol  is found in animal foods, such as eggs and lobster, and in dairy products made from whole milk  Limit cholesterol to less than 300 milligrams (mg) each day  You may need to limit cholesterol to 200 mg each day if you have heart disease  · Saturated fat  is found in meats, such as forman and hamburger  It is also found in chicken or turkey skin, whole milk, and butter  Limit saturated fat to less than 7% of your total daily calories  Limit saturated fat to less than 6% if you have heart disease or are at increased risk for it  · Trans fat  is found in packaged foods, such as potato chips and cookies  It is also in hard margarine, some fried foods, and shortening  Avoid trans fats as much as possible  What can I eat and drink on a heart healthy diet?   Ask your dietitian or healthcare provider how many servings to have from each of the following food groups:  · Grains:      ¨ Whole-wheat breads, cereals, and pastas, and brown rice    ¨ Low-fat, low-sodium crackers and chips    · Vegetables:      ¨ Broccoli, green beans, green peas, and spinach    ¨ Collards, kale, and lima beans    ¨ Carrots, sweet potatoes, tomatoes, and peppers    ¨ Canned vegetables with no salt added    · Fruits:      ¨ Bananas, peaches, pears, and pineapple    ¨ Grapes, raisins, and dates    ¨ Oranges, tangerines, grapefruit, orange juice, and grapefruit juice    ¨ Apricots, mangoes, melons, and papaya    ¨ Raspberries and strawberries    ¨ Canned fruit with no added sugar    · Low-fat dairy products:      ¨ Nonfat (skim) milk, 1% milk, and low-fat almond, cashew, or soy milks fortified with calcium    ¨ Low-fat cheese, regular or frozen yogurt, and cottage cheese    · Meats and proteins , such as lean cuts of beef and pork (loin, leg, round), skinless chicken and turkey, legumes, soy products, egg whites, and nuts  Which foods and drinks do I need to limit or avoid? Ask your dietitian or healthcare provider about these and other foods that are high in unhealthy fat, sodium, and sugar:  · Snack or packaged foods , such as frozen dinners, cookies, macaroni and cheese, and cereals with more than 300 mg of sodium per serving    · Canned or dry mixes  for cakes, soups, sauces, or gravies    · Vegetables with added sodium , such as instant potatoes, vegetables with added sauces, or regular canned vegetables    · Other foods high in sodium , such as ketchup, barbecue sauce, salad dressing, pickles, olives, soy sauce, and miso    · High-fat dairy foods  such as whole or 2% milk, cream cheese, or sour cream, and cheeses     · High-fat protein foods  such as high-fat cuts of beef (T-bone steaks, ribs), chicken or turkey with skin, and organ meats, such as liver    · Cured or smoked meats , such as hot dogs, forman, and sausage    · Unhealthy fats and oils , such as butter, stick margarine, shortening, and cooking oils such as coconut or palm oil    · Food and drinks high in sugar , such as soft drinks (soda), sports drinks, sweetened tea, candy, cake, cookies, pies, and doughnuts  What other diet guidelines should I follow? · Eat more foods containing omega-3 fats  Eat fish high in omega-3 fats at least 2 times a week  · Limit alcohol  Too much alcohol can damage your heart and raise your blood pressure  Women should limit alcohol to 1 drink a day  Men should limit alcohol to 2 drinks a day  A drink of alcohol is 12 ounces of beer, 5 ounces of wine, or 1½ ounces of liquor  · Choose low-sodium foods  High-sodium foods can lead to high blood pressure  Add little or no salt to food you prepare  Use herbs and spices in place of salt  · Eat more fiber  to help lower cholesterol levels  Eat at least 5 servings of fruits and vegetables each day  Eat 3 ounces of whole-grain foods each day  Legumes (beans) are also a good source of fiber    What lifestyle guidelines should I follow? · Do not smoke  Nicotine and other chemicals in cigarettes and cigars can cause lung and heart damage  Ask your healthcare provider for information if you currently smoke and need help to quit  E-cigarettes or smokeless tobacco still contain nicotine  Talk to your healthcare provider before you use these products  · Exercise regularly  to help you maintain a healthy weight and improve your blood pressure and cholesterol levels  Ask your healthcare provider about the best exercise plan for you  Do not start an exercise program without asking your healthcare provider  CARE AGREEMENT:   You have the right to help plan your care  Discuss treatment options with your caregivers to decide what care you want to receive  You always have the right to refuse treatment  The above information is an  only  It is not intended as medical advice for individual conditions or treatments  Talk to your doctor, nurse or pharmacist before following any medical regimen to see if it is safe and effective for you  © 2017 [address] [employer] Information is for End User's use only and may not be sold, redistributed or otherwise used for commercial purposes  All illustrations and images included in CareNotes® are the copyrighted property of [employer]  or [employer]ias Etienne  Narcotic Pain Management   WHAT YOU NEED TO KNOW:   What do I need to know about narcotics? A narcotic is a type of medicine used to treat pain  Examples of narcotics are codeine, oxycodone, and fentanyl  Why is it important to manage my pain? Pain can cause changes in your physical and emotional health, such as depression and sleep problems  Pain control and management may help you rest, heal, and return to your daily activities  What are the side effects of narcotic medicines? The most common side effect is constipation   Drink more liquids and eat high-fiber foods to help prevent constipation  Ask your healthcare provider what liquids are right for you and how much you should drink  Also ask for a list of foods that contain fiber  Other side effects include nausea, sleepiness, and itchiness  You may need to take your narcotic medicine with food to decrease nausea  Ask your healthcare provider other ways to manage side effects  Why it is important that I take narcotic medicines as directed? · Health problems such as  trouble breathing, liver or kidney damage, or stomach bleeding may occur  Any of these problems can become life-threatening  · Acetaminophen or ibuprofen  may be included in some narcotic medicines  Too much of these medicines can cause liver or kidney damage, or stomach bleeding  These problems can become life-threatening  · Dependence  means your body needs the medicine to keep it from going through withdrawal      · Tolerance  means the medicine does not control pain as well as it used to  You need higher doses of the medicine to get pain relief  · Addiction  means you are not able to control the use of the medicine  You use it when you do not have pain and you have cravings for the medicine  What do I need to know about narcotic safety? · Take your medicine as directed  Ask if you need more information on how to take your medicine correctly  Follow up with your healthcare provider regularly  You may need to have your dose adjusted  Do not use narcotic medicine if you are pregnant or breastfeeding  Narcotic medicines can be transferred to your baby through your blood and breast milk  · Give your healthcare provider a list of all your medicines  Include any over-the-counter medicines, vitamins, and herbs  It can be dangerous to take narcotics with certain other medicines, such as antihistamines  · Keep your medicine in a safe place  Store your narcotic medicine in a locked cabinet to keep it away from children and others      · Do not drink alcohol while you use narcotics  Alcohol use with a narcotic medicine can make you sleepy and slow your breathing rate  You may stop breathing completely  · Do not drive or operate heavy machinery after you take narcotic medicine  Narcotic medicine can make you drowsy and make it hard to concentrate  You may injure yourself or others if you drive or operate heavy machinery while taking your medicine  Call 911 or have someone call 911 for any of the following:   · You are breathing slower than normal, or you have trouble breathing  · You cannot be woken  · You have a seizure  When should I seek immediate care? · Your heart is beating slower than usual     · Your heart feels like it is jumping or fluttering  · You have trouble staying awake  · You have severe muscle pain or weakness  · You see or hear things that are not real   When should I contact my healthcare provider? · You are too dizzy to stand up  · Your pain gets worse or you have new pain  · Your pain does not get better after you use your narcotic medicine  · You cannot do your usual activities because of side effects from the narcotic  · You are constipated or have abdominal pain  · You have questions or concerns about your condition or care  CARE AGREEMENT:   You have the right to help plan your care  Learn about your health condition and how it may be treated  Discuss treatment options with your caregivers to decide what care you want to receive  You always have the right to refuse treatment  The above information is an  only  It is not intended as medical advice for individual conditions or treatments  Talk to your doctor, nurse or pharmacist before following any medical regimen to see if it is safe and effective for you  © 2017 [address] [employer] Information is for End User's use only and may not be sold, redistributed or otherwise used for commercial purposes   All illustrations and images included in CareNotes® are the copyrighted property of [employer]  or [employer] Etienne

## 2020-01-29 NOTE — PROCEDURES
01/29/20    Procedure: Epicardial Pacing Wire removal    Nick Ambrose was returned to bed and informed of mandatory one hour post-procedure bed rest   The assigned nurse was notified  Epicardial pacing wires removed in routine fashion, without incident  The patient tolerated the procedure well  Vital signs ordered  q 15 minutes for one hour, as per protocol      SIGNATURE: Fiona Mckinney PA-C  DATE: January 29, 2020  TIME: 11:15 AM

## 2020-01-29 NOTE — RESTORATIVE TECHNICIAN NOTE
Restorative Specialist Mobility Note       Activity: Ambulate in demarco(back to bed for wires)     Assistive Device: Front wheel walker

## 2020-01-29 NOTE — PROGRESS NOTES
Team 2 Cardiology - Progress Note  Janna Carrillo 76 y o  female MRN: [id number]  Unit/Bed#: [facility] 423-01 Encounter: [id number]    Assessment:  Principal Problem: Atherosclerosis of native coronary artery with angina pectoris [facility])  Active Problems:    Hyperlipidemia    Essential hypertension    NSTEMI (non-ST elevation myocardial infarction) ([facility] [address] )    Syncope    S/P CABG x 3    Anemia    Thrombocytopenia (HCC)    # NSTEMI, syncope  # CAD  -1/24 LHC: mLAD 80%, o+mLCX 85%, OM1 50%, OM2 85%, RCA with multiple high grade lesions  -s/p CABG 1/27; LIMA, SVG:RI, SVG:OM1  # Preserved LVEF  # HTN  # HLD  # Hx of PVCs      Plan:  1  Continue ASA, statin, metoprolol 12 5mg BID, Imdur 30mg daily  2  Continue Lasix 40mg IV BID for another 1-2 days  3  Monitor on telemetry  4  Patient to follow at our [facility] office  Subjective/Objective     Subjective: Patient underwent 3V CABG 1/27  She has had a typical post-op course and currently is out of the ICU  Epicardial wires were removed today  She feels well overall  Denies dizziness, CP, dyspnea      Objective:  Vitals: /62   Pulse 65   Temp 97 9 °F (36 6 °C) (Oral)   Resp 18   Ht 5' 5" (1 651 m)   Wt 53 1 kg (117 lb 1 6 oz)   SpO2 97%   BMI 19 49 kg/m²   Vitals:    01/28/20 0600 01/29/20 0538   Weight: 52 5 kg (115 lb 11 9 oz) 53 1 kg (117 lb 1 6 oz)     Orthostatic Blood Pressures      Most Recent Value   Blood Pressure  118/62 filed at 01/29/2020 1213   Patient Position - Orthostatic VS  Lying filed at 01/29/2020 1100            Intake/Output Summary (Last 24 hours) at 1/29/2020 1256  Last data filed at 1/29/2020 1112  Gross per 24 hour   Intake 240 ml   Output 1180 ml   Net -940 ml       Physical Exam:   General appearance: alert and in no acute distress  Head: Normocephalic, without obvious abnormality, atraumatic  Neck: no JVD and supple, symmetrical, trachea midline  Lungs: Normal air entry, Normal effort, mild bibasilar rales, no wheezing  Heart: S1, S2 normal and no S3 or S4, No murmur, No gallop, No rub  Abdomen: soft, non-tender; no masses,  no organomegaly  Extremities: extremities normal, atraumatic, no cyanosis, 1+ edema  Pulses: 2+ and symmetric bilaterally  Skin: Skin color, texture, turgor normal; No rashes or lesions  Neurologic: Grossly normal, Alert and oriented      Medications:    Current Facility-Administered Medications:     acetaminophen (TYLENOL) tablet 975 mg, 975 mg, Oral, Q8H [address], SKY StoddardNP, 975 mg at 01/28/20 2212    aspirin tablet 325 mg, 325 mg, Oral, Daily, RACHEL Stoddard, 325 mg at 01/29/20 0809    atorvastatin (LIPITOR) tablet 80 mg, 80 mg, Oral, Daily With [place], SKYNP, 80 mg at 01/28/20 1647    bisacodyl (DULCOLAX) rectal suppository 10 mg, 10 mg, Rectal, Daily PRN, RACHEL Stoddard    calcium carbonate-vitamin D (OSCAL-D) 500 mg-200 units per tablet 1 tablet, 1 tablet, Oral, Daily With Breakfast, RACHEL Stoddard, 1 tablet at 01/28/20 1214    cholecalciferol (VITAMIN D3) tablet 1,000 Units, 1,000 Units, Oral, Daily, RACHEL Stoddard, 1,000 Units at 01/28/20 2012    docusate sodium (COLACE) capsule 100 mg, 100 mg, Oral, BID, RACHEL Stoddard, 100 mg at 01/29/20 9306    fondaparinux (ARIXTRA) subcutaneous injection 2 5 mg, 2 5 mg, Subcutaneous, Daily, Masood SalSKY stollNP, 2 5 mg at 01/29/20 0809    furosemide (LASIX) injection 40 mg, 40 mg, Intravenous, BID (diuretic), Miesha Kiran PA-C    insulin lispro (HumaLOG) 100 units/mL subcutaneous injection 1-5 Units, 1-5 Units, Subcutaneous, TID AC, 1 Units at 01/28/20 1214 **AND** Fingerstick Glucose (POCT), , , TID AC, RACHEL Stoddard    insulin lispro (HumaLOG) 100 units/mL subcutaneous injection 1-5 Units, 1-5 Units, Subcutaneous, HS, RACHEL Stoddard, 1 Units at 01/28/20 2235    isosorbide mononitrate (IMDUR) 24 hr tablet 30 mg, 30 mg, Oral, Daily, RACHEL Stoddard, 30 mg at 01/29/20 1005    metoprolol tartrate (LOPRESSOR) partial tablet 12 5 mg, 12 5 mg, Oral, Q12H [facility], Saddie Oiler, CRNP, 12 5 mg at 01/29/20 0809    mupirocin (BACTROBAN) 2 % nasal ointment 1 application, 1 application, Nasal, J52H [facility], Pauline Oiler, CRNP, 1 application at 84/14/87 0809    ondansetron [facility]) injection 4 mg, 4 mg, Intravenous, Q6H PRN, Saddie Oiler, CRNP, 4 mg at 01/29/20 0320    oxyCODONE (ROXICODONE) IR tablet 5 mg, 5 mg, Oral, Q4H PRN, 5 mg at 01/29/20 0810 **OR** oxyCODONE (ROXICODONE) immediate release tablet 10 mg, 10 mg, Oral, Q4H PRN, Saddie Oiler, CRNP    pantoprazole (PROTONIX) EC tablet 40 mg, 40 mg, Oral, Early Morning, Saddie Oiler, CRNP, 40 mg at 01/29/20 7077    polyethylene glycol (MIRALAX) packet 17 g, 17 g, Oral, Daily, Saddie Oiler, CRNP, 17 g at 01/29/20 0809    potassium chloride (K-DUR,KLOR-CON) CR tablet 20 mEq, 20 mEq, Oral, BID, EUGENIA Mccullough-C    scopolamine (TRANSDERM-SCOP) 1 5 mg/3 days TD 72 hr patch 1 patch, 1 patch, Transdermal, Q72H, NorEUGENIA Quezada-C, 1 patch at 01/29/20 1005    temazepam (RESTORIL) capsule 15 mg, 15 mg, Oral, HS PRN, Saddie Oiler, CRNP    Lab Results:  Results from last 7 days   Lab Units 01/24/20  1755 01/24/20  1440 01/24/20  1034   TROPONIN I ng/mL 3 97* 2 48* 3 17*     Results from last 7 days   Lab Units 01/29/20  0519 01/28/20  0425 01/27/20  2359 01/27/20  1548  01/27/20  0436   WBC Thousand/uL 8 24 12 59*  --   --   --  7 44   HEMOGLOBIN g/dL 9 8* 10 4* 10 5* 11 1*  --  12 8   I STAT HEMOGLOBIN g/dl  --   --   --  10 2*   < >  --    HEMATOCRIT % 31 1* 33 4* 32 8* 34 2*  --  41 1   HEMATOCRIT, ISTAT %  --   --   --  30*   < >  --    PLATELETS Thousands/uL 132* 145*  --  154   < > 289    < > = values in this interval not displayed       Results from last 7 days   Lab Units 01/25/20  0352   TRIGLYCERIDES mg/dL 87   HDL mg/dL 79     Results from last 7 days   Lab Units 01/29/20  0519 01/28/20  0425 01/27/20  2359  01/27/20  1548 01/27/20  1447 01/27/20  1427  01/24/20  0712   POTASSIUM mmol/L 3 7 3 8 4 1   < > 3 3*  --   --    < > 3 7   CHLORIDE mmol/L 108 114*  --   --  114*  --   --    < > 105   CO2 mmol/L 29 25  --   --  23  --   --    < > 32   CO2, I-STAT mmol/L  --   --   --   --  26 26 30   < >  --    BUN mg/dL 22 12  --   --  12  --   --    < > 29*   CREATININE mg/dL 0 62 0 42*  --   --  0 55*  --   --    < > 0 69   CALCIUM mg/dL 8 5 7 4*  --   --  7 8*  --   --    < > 9 3   ALK PHOS U/L  --   --   --   --   --   --   --   --  76   ALT U/L  --   --   --   --   --   --   --   --  32   AST U/L  --   --   --   --   --   --   --   --  44   GLUCOSE, ISTAT mg/dl  --   --   --   --  138 120 129   < >  --     < > = values in this interval not displayed  Results from last 7 days   Lab Units 01/26/20  0512 01/25/20  1824 01/25/20  1013  01/24/20  1959 01/24/20  0712   INR   --   --   --   --  1 01 0 84   PTT seconds 79* 64* 88*   < > 24 23    < > = values in this interval not displayed  Results from last 7 days   Lab Units 01/29/20  0519 01/28/20  0425 01/25/20  0352   MAGNESIUM mg/dL 2 6 2 5 2 1       Telemetry: Personally reviewed  Imaging: Personally reviewed  EKG: Personally reviewed       Erika Rodriguez MD  Cardiology Fellow

## 2020-01-30 PROBLEM — E87.8 HYPERCHLOREMIA: Status: ACTIVE | Noted: 2020-01-30

## 2020-01-30 LAB
ANION GAP SERPL CALCULATED.3IONS-SCNC: 2 MMOL/L (ref 4–13)
ANION GAP SERPL CALCULATED.3IONS-SCNC: 5 MMOL/L (ref 4–13)
BUN SERPL-MCNC: 18 MG/DL (ref 5–25)
BUN SERPL-MCNC: 19 MG/DL (ref 5–25)
CALCIUM SERPL-MCNC: 8.6 MG/DL (ref 8.3–10.1)
CALCIUM SERPL-MCNC: 8.8 MG/DL (ref 8.3–10.1)
CHLORIDE SERPL-SCNC: 108 MMOL/L (ref 100–108)
CHLORIDE SERPL-SCNC: 113 MMOL/L (ref 100–108)
CO2 SERPL-SCNC: 30 MMOL/L (ref 21–32)
CO2 SERPL-SCNC: 33 MMOL/L (ref 21–32)
CREAT SERPL-MCNC: 0.63 MG/DL (ref 0.6–1.3)
CREAT SERPL-MCNC: 0.65 MG/DL (ref 0.6–1.3)
ERYTHROCYTE [DISTWIDTH] IN BLOOD BY AUTOMATED COUNT: 14.8 % (ref 11.6–15.1)
GFR SERPL CREATININE-BSD FRML MDRD: 88 ML/MIN/1.73SQ M
GFR SERPL CREATININE-BSD FRML MDRD: 89 ML/MIN/1.73SQ M
GLUCOSE SERPL-MCNC: 103 MG/DL (ref 65–140)
GLUCOSE SERPL-MCNC: 108 MG/DL (ref 65–140)
GLUCOSE SERPL-MCNC: 110 MG/DL (ref 65–140)
GLUCOSE SERPL-MCNC: 120 MG/DL (ref 65–140)
GLUCOSE SERPL-MCNC: 95 MG/DL (ref 65–140)
GLUCOSE SERPL-MCNC: 97 MG/DL (ref 65–140)
HCT VFR BLD AUTO: 35.2 % (ref 34.8–46.1)
HGB BLD-MCNC: 10.9 G/DL (ref 11.5–15.4)
MCH RBC QN AUTO: 29.3 PG (ref 26.8–34.3)
MCHC RBC AUTO-ENTMCNC: 31 G/DL (ref 31.4–37.4)
MCV RBC AUTO: 95 FL (ref 82–98)
PLATELET # BLD AUTO: 157 THOUSANDS/UL (ref 149–390)
PMV BLD AUTO: 12 FL (ref 8.9–12.7)
POTASSIUM SERPL-SCNC: 4 MMOL/L (ref 3.5–5.3)
POTASSIUM SERPL-SCNC: 4.4 MMOL/L (ref 3.5–5.3)
RBC # BLD AUTO: 3.72 MILLION/UL (ref 3.81–5.12)
SODIUM SERPL-SCNC: 143 MMOL/L (ref 136–145)
SODIUM SERPL-SCNC: 148 MMOL/L (ref 136–145)
WBC # BLD AUTO: 7.54 THOUSAND/UL (ref 4.31–10.16)

## 2020-01-30 PROCEDURE — 82948 REAGENT STRIP/BLOOD GLUCOSE: CPT

## 2020-01-30 PROCEDURE — 97116 GAIT TRAINING THERAPY: CPT

## 2020-01-30 PROCEDURE — 99024 POSTOP FOLLOW-UP VISIT: CPT | Performed by: THORACIC SURGERY (CARDIOTHORACIC VASCULAR SURGERY)

## 2020-01-30 PROCEDURE — 85027 COMPLETE CBC AUTOMATED: CPT | Performed by: PHYSICIAN ASSISTANT

## 2020-01-30 PROCEDURE — 99232 SBSQ HOSP IP/OBS MODERATE 35: CPT | Performed by: INTERNAL MEDICINE

## 2020-01-30 PROCEDURE — 80048 BASIC METABOLIC PNL TOTAL CA: CPT | Performed by: PHYSICIAN ASSISTANT

## 2020-01-30 RX ORDER — OXYCODONE HYDROCHLORIDE 5 MG/1
2.5 TABLET ORAL EVERY 4 HOURS PRN
Status: DISCONTINUED | OUTPATIENT
Start: 2020-01-30 | End: 2020-02-04 | Stop reason: HOSPADM

## 2020-01-30 RX ORDER — OXYCODONE HYDROCHLORIDE 5 MG/1
5 TABLET ORAL EVERY 6 HOURS PRN
Status: DISCONTINUED | OUTPATIENT
Start: 2020-01-30 | End: 2020-02-04 | Stop reason: HOSPADM

## 2020-01-30 RX ORDER — PANTOPRAZOLE SODIUM 40 MG/1
40 TABLET, DELAYED RELEASE ORAL
Status: DISCONTINUED | OUTPATIENT
Start: 2020-01-30 | End: 2020-02-04 | Stop reason: HOSPADM

## 2020-01-30 RX ORDER — SENNOSIDES 8.6 MG
1 TABLET ORAL
Status: DISCONTINUED | OUTPATIENT
Start: 2020-01-30 | End: 2020-02-01

## 2020-01-30 RX ORDER — IBUPROFEN 600 MG/1
600 TABLET ORAL EVERY 6 HOURS
Status: DISCONTINUED | OUTPATIENT
Start: 2020-01-30 | End: 2020-02-01

## 2020-01-30 RX ADMIN — POLYETHYLENE GLYCOL 3350 17 G: 17 POWDER, FOR SOLUTION ORAL at 09:04

## 2020-01-30 RX ADMIN — IBUPROFEN 600 MG: 600 TABLET ORAL at 11:44

## 2020-01-30 RX ADMIN — ASPIRIN 325 MG ORAL TABLET 325 MG: 325 PILL ORAL at 09:03

## 2020-01-30 RX ADMIN — FUROSEMIDE 40 MG: 10 INJECTION, SOLUTION INTRAMUSCULAR; INTRAVENOUS at 16:42

## 2020-01-30 RX ADMIN — POTASSIUM CHLORIDE 20 MEQ: 1500 TABLET, EXTENDED RELEASE ORAL at 09:03

## 2020-01-30 RX ADMIN — ATORVASTATIN CALCIUM 80 MG: 80 TABLET, FILM COATED ORAL at 16:42

## 2020-01-30 RX ADMIN — MELATONIN 1000 UNITS: at 09:03

## 2020-01-30 RX ADMIN — POTASSIUM CHLORIDE 20 MEQ: 1500 TABLET, EXTENDED RELEASE ORAL at 17:02

## 2020-01-30 RX ADMIN — TEMAZEPAM 15 MG: 15 CAPSULE ORAL at 21:43

## 2020-01-30 RX ADMIN — DOCUSATE SODIUM 100 MG: 100 CAPSULE, LIQUID FILLED ORAL at 17:02

## 2020-01-30 RX ADMIN — ONDANSETRON 4 MG: 2 INJECTION INTRAMUSCULAR; INTRAVENOUS at 07:38

## 2020-01-30 RX ADMIN — ISOSORBIDE MONONITRATE 30 MG: 30 TABLET, EXTENDED RELEASE ORAL at 09:09

## 2020-01-30 RX ADMIN — FONDAPARINUX SODIUM 2.5 MG: 2.5 INJECTION, SOLUTION SUBCUTANEOUS at 09:03

## 2020-01-30 RX ADMIN — Medication 1 TABLET: at 09:01

## 2020-01-30 RX ADMIN — IBUPROFEN 600 MG: 600 TABLET ORAL at 17:02

## 2020-01-30 RX ADMIN — MUPIROCIN 1 APPLICATION: 20 OINTMENT TOPICAL at 09:04

## 2020-01-30 RX ADMIN — PANTOPRAZOLE SODIUM 40 MG: 40 TABLET, DELAYED RELEASE ORAL at 16:42

## 2020-01-30 RX ADMIN — MUPIROCIN 1 APPLICATION: 20 OINTMENT TOPICAL at 21:42

## 2020-01-30 RX ADMIN — SENNOSIDES 8.6 MG: 8.6 TABLET, FILM COATED ORAL at 09:11

## 2020-01-30 RX ADMIN — ONDANSETRON 4 MG: 2 INJECTION INTRAMUSCULAR; INTRAVENOUS at 21:42

## 2020-01-30 RX ADMIN — DOCUSATE SODIUM 100 MG: 100 CAPSULE, LIQUID FILLED ORAL at 09:03

## 2020-01-30 RX ADMIN — METOPROLOL TARTRATE 12.5 MG: 25 TABLET ORAL at 09:01

## 2020-01-30 RX ADMIN — FUROSEMIDE 40 MG: 10 INJECTION, SOLUTION INTRAMUSCULAR; INTRAVENOUS at 09:05

## 2020-01-30 RX ADMIN — PANTOPRAZOLE SODIUM 40 MG: 40 TABLET, DELAYED RELEASE ORAL at 05:45

## 2020-01-30 NOTE — PROGRESS NOTES
Cardiology Progress Note - Elmarie Collet 76 y o  female MRN: [id number]    Unit/Bed#: [facility] 423-01 Encounter: [id number]    Assessment and plan  1  Coronary artery disease status post CABG x3  2  Preserved left ventricular ejection fraction  3  Hypertension  4  Hyperlipidemia  5  Non-STEMI type 1    Recommendations:  Overall doing well from a cardiac standpoint  Continue aspirin, statin, beta-blocker  Continue diuresis  Subjective:    No significant events overnight  Denies chest pain, palpitations, lightheadedness, dizziness, or syncope  Lower extremity edema has been improving    ROS    Objective:   Vitals: Blood pressure 127/63, pulse 72, temperature 97 9 °F (36 6 °C), temperature source Oral, resp  rate 18, height 5' 5" (1 651 m), weight 52 1 kg (114 lb 13 8 oz), SpO2 96 %  , Body mass index is 19 11 kg/m² ,   Orthostatic Blood Pressures      Most Recent Value   Blood Pressure  127/63 filed at 01/30/2020 0708   Patient Position - Orthostatic VS  Sitting filed at 01/30/2020 4792         Systolic (87QNC), NIF:640 , Min:100 , UBK:893     Diastolic (90JIV), RPH:33, Min:50, Max:71      Intake/Output Summary (Last 24 hours) at 1/30/2020 1018  Last data filed at 1/30/2020 0747  Gross per 24 hour   Intake 240 ml   Output 1920 ml   Net -1680 ml     Weight (last 2 days)     Date/Time   Weight    01/30/20 0550   52 1 (114 86)    01/29/20 0538   53 1 (117 1)    01/28/20 0600   52 5 (115 74)                Telemetry Review: No significant arrhythmias seen on telemetry review  EKG personally reviewed by Kim Walden DO  Physical Exam   Constitutional: She is oriented to person, place, and time  She appears well-developed and well-nourished  No distress  HENT:   Head: Normocephalic and atraumatic  Eyes: Pupils are equal, round, and reactive to light  Conjunctivae are normal    Neck: Neck supple  Cardiovascular: Normal rate, regular rhythm and normal heart sounds  Exam reveals no friction rub     No murmur heard   Pulmonary/Chest: Effort normal and breath sounds normal  No respiratory distress  She has no wheezes  She has no rales  Abdominal: Soft  Bowel sounds are normal  She exhibits no distension  There is no tenderness  There is no rebound  Musculoskeletal: Normal range of motion  She exhibits edema  She exhibits no tenderness or deformity  Neurological: She is alert and oriented to person, place, and time  No cranial nerve deficit  Skin: Skin is warm and dry  No erythema  Psychiatric: She has a normal mood and affect  Nursing note and vitals reviewed  Laboratory Results:  Results from last 7 days   Lab Units 01/24/20  1755 01/24/20  1440 01/24/20  1034   TROPONIN I ng/mL 3 97* 2 48* 3 17*       CBC with diff:   Results from last 7 days   Lab Units 01/30/20  0516 01/29/20  0519 01/28/20  0425 01/27/20  2359 01/27/20  1548 01/27/20  1447  01/27/20  1424  01/27/20  0436 01/25/20  0321 01/24/20  0712   WBC Thousand/uL 7 54 8 24 12 59*  --   --   --   --   --   --  7 44 8 27 6 95   HEMOGLOBIN g/dL 10 9* 9 8* 10 4* 10 5* 11 1*  --   --   --   --  12 8 11 1* 12 4   I STAT HEMOGLOBIN g/dl  --   --   --   --  10 2* 8 2*   < >  --    < >  --   --   --    HEMATOCRIT % 35 2 31 1* 33 4* 32 8* 34 2*  --   --   --   --  41 1 35 3 38 8   HEMATOCRIT, ISTAT %  --   --   --   --  30* 24*   < >  --    < >  --   --   --    MCV fL 95 94 93  --   --   --   --   --   --  95 93 94   PLATELETS Thousands/uL 157 132* 145*  --  154  --   --  177  --  289 257 292   MCH pg 29 3 29 7 28 9  --   --   --   --   --   --  29 6 29 4 29 9   MCHC g/dL 31 0* 31 5 31 1*  --   --   --   --   --   --  31 1* 31 4 32 0   RDW % 14 8 15 2* 14 9  --   --   --   --   --   --  14 2 13 9 13 8   MPV fL 12 0 11 8 11 5  --  11 0  --   --  10 7  --  11 2 10 8 11 0   NRBC AUTO /100 WBCs  --   --   --   --   --   --   --   --   --  0  --  0    < > = values in this interval not displayed           CMP:  Results from last 7 days   Lab Units 01/30/20  0516 01/29/20  0519 01/28/20  0425 01/27/20  2359 01/27/20 2017 01/27/20  1548 01/27/20  1447 01/27/20  1427 01/27/20  1419 01/27/20  1354 01/27/20  1323 01/27/20  1157  01/27/20  0436 01/25/20  0352 01/24/20  0712   POTASSIUM mmol/L 4 4 3 7 3 8 4 1 3 6 3 3*  --   --   --   --   --   --   --  4 1 3 5 3 7   CHLORIDE mmol/L 113* 108 114*  --   --  114*  --   --   --   --   --   --   --  107 111* 105   CO2 mmol/L 30 29 25  --   --  23  --   --   --   --   --   --   --  31 28 32   CO2, I-STAT mmol/L  --   --   --   --   --  26 26 30 28 32 26 27   < >  --   --   --    BUN mg/dL 19 22 12  --   --  12  --   --   --   --   --   --   --  17 12 29*   CREATININE mg/dL 0 63 0 62 0 42*  --   --  0 55*  --   --   --   --   --   --   --  0 65 0 59* 0 69   GLUCOSE, ISTAT mg/dl  --   --   --   --   --  138 120 129 128 122 142* 99  --   --   --   --    CALCIUM mg/dL 8 6 8 5 7 4*  --   --  7 8*  --   --   --   --   --   --   --  9 4 8 3 9 3   AST U/L  --   --   --   --   --   --   --   --   --   --   --   --   --   --   --  44   ALT U/L  --   --   --   --   --   --   --   --   --   --   --   --   --   --   --  32   ALK PHOS U/L  --   --   --   --   --   --   --   --   --   --   --   --   --   --   --  76   EGFR ml/min/1 73sq m 89 89 101  --   --  93  --   --   --   --   --   --   --  88 91 86    < > = values in this interval not displayed           BMP:  Results from last 7 days   Lab Units 01/30/20  0516 01/29/20  0519 01/28/20  0425 01/27/20  2359 01/27/20 2017 01/27/20  1548 01/27/20  1447 01/27/20  1427  01/27/20  0436 01/25/20  0352 01/24/20  0712   POTASSIUM mmol/L 4 4 3 7 3 8 4 1 3 6 3 3*  --   --   --  4 1 3 5 3 7   CHLORIDE mmol/L 113* 108 114*  --   --  114*  --   --   --  107 111* 105   CO2 mmol/L 30 29 25  --   --  23  --   --   --  31 28 32   CO2, I-STAT mmol/L  --   --   --   --   --  26 26 30   < >  --   --   --    BUN mg/dL 19 22 12  --   --  12  --   --   --  17 12 29*   CREATININE mg/dL 0 63 0 62 0 42*  -- --  0 55*  --   --   --  0 65 0 59* 0 69   GLUCOSE, ISTAT mg/dl  --   --   --   --   --  138 120 129   < >  --   --   --    CALCIUM mg/dL 8 6 8 5 7 4*  --   --  7 8*  --   --   --  9 4 8 3 9 3    < > = values in this interval not displayed  BNP: No results for input(s): BNP in the last 72 hours  Magnesium:   Results from last 7 days   Lab Units 20  0519 20  0425 20  0352 20  0712   MAGNESIUM mg/dL 2 6 2 5 2 1 2 2       Coags:   Results from last 7 days   Lab Units 20  0512 20  1824 20  1013 20  0307 20  1959 20  0712   PTT seconds 79* 64* 88* 36 24 23   INR   --   --   --   --  1 01 0 84       TSH:        Hemoglobin A1C       Lipid Profile:   Results from last 7 days   Lab Units 20  0352   TRIGLYCERIDES mg/dL 87   HDL mg/dL 79       Cardiac testing:   Results for orders placed during the hospital encounter of 20   Echo complete with contrast if indicated    Narrative [place] [address], [address] [facility]  [phone]    Transthoracic Echocardiogram  2D, M-mode, Doppler, and Color Doppler    Study date:  2020    Patient: Rik Varela  MR number: [id number]  Account number: [de-identified]  : 1945  Age: 76 years  Gender: Female  Status: Inpatient  Location: Bedside  Height: 64 in  Weight: 116 8 lb  BP: 129/ 67 mmHg    Indications: Non ST elevation MI  Diagnoses: I21 4 - Non-ST elevation (NSTEMI) myocardial infarction    Sonographer:  ERIC Kern  Primary Physician:  Sav Urban DO  Referring Physician:  Dorie Brittle, MD  Group:  [facility]  cc:  Sandrita Hurt MD  Interpreting Physician:  Sandrita Hurt MD    SUMMARY    LEFT VENTRICLE:  Systolic function was vigorous  Ejection fraction was estimated to be 70 %  There were no regional wall motion abnormalities    Wall thickness was at the upper limits of normal     RIGHT VENTRICLE:  The size was normal   Systolic function was normal     MITRAL VALVE:  There was mild regurgitation  HISTORY: PRIOR HISTORY: HTN, HLD, syncope  PROCEDURE: The procedure was performed at the bedside  This was a routine study  The transthoracic approach was used  The study included complete 2D imaging, M-mode, complete spectral Doppler, and color Doppler  The heart rate was 66 bpm,  at the start of the study  Echocardiographic views were limited due to poor acoustic window availability  This was a technically difficult study  LEFT VENTRICLE: Size was normal  Systolic function was vigorous  Ejection fraction was estimated to be 70 %  There were no regional wall motion abnormalities  Wall thickness was at the upper limits of normal  DOPPLER: Left ventricular  diastolic function parameters were normal     RIGHT VENTRICLE: The size was normal  Systolic function was normal  Wall thickness was normal     LEFT ATRIUM: Size was normal     RIGHT ATRIUM: Size was normal     MITRAL VALVE: Valve structure was normal  There was normal leaflet separation  DOPPLER: The transmitral velocity was within the normal range  There was no evidence for stenosis  There was mild regurgitation  AORTIC VALVE: The valve was trileaflet  Leaflets exhibited mildly increased thickness, normal cuspal separation, and sclerosis  DOPPLER: Transaortic velocity was within the normal range  There was no evidence for stenosis  There was no  significant regurgitation  TRICUSPID VALVE: The valve structure was normal  There was normal leaflet separation  DOPPLER: The transtricuspid velocity was within the normal range  There was no evidence for stenosis  There was no significant regurgitation  The  tricuspid jet envelope definition was inadequate for estimation of RV systolic pressure  PULMONIC VALVE: Leaflets exhibited normal thickness, no calcification, and normal cuspal separation  DOPPLER: The transpulmonic velocity was within the normal range   There was no significant regurgitation  PERICARDIUM: There was no pericardial effusion  AORTA: The root exhibited normal size  SYSTEMIC VEINS: IVC: The inferior vena cava was normal in size  Respirophasic changes were normal     SYSTEM MEASUREMENT TABLES    2D  %FS: 36 42 %  Ao Diam: 3 15 cm  EDV(Teich): 40 82 ml  EF(Teich): 67 55 %  ESV(Teich): 13 25 ml  IVSd: 1 05 cm  LA Diam: 2 94 cm  LVIDd: 3 2 cm  LVIDs: 2 03 cm  LVPWd: 0 88 cm  SV(Teich): 27 58 ml    CW  AV Env  Ti: 304 5 ms  AV MaxP 2 mmHg  AV VTI: 21 69 cm  AV Vmax: 1 14 m/s  AV Vmean: 0 71 m/s  AV meanP 33 mmHg    MM  TAPSE: 2 46 cm    PW  LVOT Env  Ti: 349 48 ms  LVOT VTI: 19 33 cm  LVOT Vmax: 0 91 m/s  LVOT Vmean: 0 55 m/s  LVOT maxPG: 3 34 mmHg  LVOT meanP 44 mmHg  MV A [place]: 0 96 m/s  MV Dec [place]: 5 8 m/s2  MV DecT: 180 05 ms  MV E [place]: 1 04 m/s  MV E/A Ratio: 1 08  MV PHT: 52 21 ms  MVA By PHT: 4 21 cm2    [address] Accredited Echocardiography [facility]    Prepared and electronically signed by    Steff Arredondo MD  Signed 2020 19:02:39       No results found for this or any previous visit  No results found for this or any previous visit  No results found for this or any previous visit  Meds/Allergies   all current active meds have been reviewed  Medications Prior to Admission   Medication    aspirin 81 MG tablet    atorvastatin (LIPITOR) 10 mg tablet    atorvastatin (LIPITOR) 10 mg tablet    Calcium Carb-Cholecalciferol (CALCIUM 600 + D) 600-200 MG-UNIT TABS    cholecalciferol (VITAMIN D3) 1,000 units tablet    Fish Oil-Cholecalciferol (FISH OIL + D3) 5908-0612 MG-UNIT CAPS    triamterene-hydrochlorothiazide (MAXZIDE-25) 37 5-25 mg per tablet    acyclovir (ZOVIRAX) 5 % ointment          Assessment:  Principal Problem:     Atherosclerosis of native coronary artery with angina pectoris [facility])  Active Problems:    Hyperlipidemia    Essential hypertension    NSTEMI (non-ST elevation myocardial infarction) ([facility] [address] )    Syncope    S/P CABG x 3    Anemia    Thrombocytopenia (HCC)    Hyperchloremia

## 2020-01-30 NOTE — PHYSICAL THERAPY NOTE
PHYSICAL THERAPY NOTE          [facility]is Clifford  [facility] Date: 1/30/2020 01/30/20 8154   Pain Assessment   Pain Assessment No/denies pain   Restrictions/Precautions   Other Precautions Cardiac/sternal;Telemetry;Multiple lines  (CT in place)   General   Chart Reviewed Yes   Additional Pertinent History cleared for Tx session by pankaj   Response to Previous Treatment Patient with no complaints from previous session  Cognition   Overall Cognitive Status WFL   Arousal/Participation Alert; Cooperative   Attention Attends with cues to redirect   Orientation Level Oriented to person;Oriented to place;Oriented to situation   Memory Within functional limits   Following Commands Follows one step commands without difficulty   Subjective   Subjective Pt is observed sitting on the edge of bed starting her meal; reports she will take a break to amb (also to try steps); reports she can walk w/o AD   Transfers   Sit to Stand 6  Modified independent   Stand to Sit 6  Modified independent   Ambulation/Elevation   Gait pattern Inconsistent alejandro; Short stride  (occasional min sway but no overt LOB)   Gait Assistance 5  Supervision  (mainly for CT management)   Additional items Assist x 1;Verbal cues   Assistive Device None   Distance  2 x 50 ft w/ steps negotiation in between   Stair Management Assistance 5  Supervision   Additional items Assist x 1;Verbal cues  (reviewed sequencing and technique)   Stair Management Technique No rails; One rail R;Step to pattern; Foreward;Backward;Nonreciprocal  (2 steps w/ (L) hand glide only on the wall; 7 steps w/ HR)   Number of Stairs 9  (1 step up and down x 2 (no HR); another 7 steps w/ hand rail)   Balance   Static Sitting Good   Static Standing Fair +   Ambulatory Fair   Activity Tolerance   Activity Tolerance Patient tolerated treatment well   Nurse Made Aware spoke to Bear barajas [place]   Assessment Prognosis Good   Problem List Decreased endurance   Assessment Pt demonstrated overall improvement in all aspects of functional mobility progressing to mod (I) level w/ transfers and (S) w/ amb w/o AD and steps negotiation (w/ (S) required mainly for CT management); no excessive fatigue or increased discomfort expressed during the session  Overall, cont to anticipate pt will return home w/ available family support upon D/C when medically cleared; home PT follow up is still recommended to assess functional status w/in pt's own home environment; will follow  Barriers to Discharge Other (Comment)  (med status)   Goals   Patient Goals to go home tomorrow   STG Expiration Date 02/07/20   PT Treatment Day 1   Plan   Treatment/Interventions LE strengthening/ROM; Elevations; Therapeutic exercise; Endurance training;Bed mobility;Gait training;Spoke to nursing   Progress Improving as expected   PT Frequency Other (Comment)  (4-6x/wk)   Recommendation   Recommendation Home PT; Home with family support   Equipment Recommended Other (Comment)  (none at this time)       Fercho Charles, PT

## 2020-01-30 NOTE — PROGRESS NOTES
Progress Note - Cardiothoracic Surgery   Gia Romano 76 y o  female MRN: [id number]  Unit/Bed#: [facility] 423-01 Encounter: [id number]    Coronary artery disease  S/P coronary artery bypass grafting; POD # 3      24 Hour Events: No events  C/o N, not before/after eating or pain medications, tolerating narcotics & does not want to discontinue  Tolerating diet  (+) flatus, (-) BM  Ambulating well  Pain controlled      Medications:   Scheduled Meds:  Current Facility-Administered Medications:  acetaminophen 975 mg Oral [facility] Haily Imus, CRNP   aspirin 325 mg Oral Daily Haily Imus, CRNP   atorvastatin 80 mg Oral Daily With Tauna Paganini, CRNP   bisacodyl 10 mg Rectal Daily PRN Haily Imus, CRNP   calcium carbonate-vitamin D 1 tablet Oral Daily With Breakfast Haily Imus, CRNP   cholecalciferol 1,000 Units Oral Daily Haily Imus, CRNP   docusate sodium 100 mg Oral BID Haily Imus, CRNP   fondaparinux 2 5 mg Subcutaneous Daily Haily Imus, CRNP   furosemide 40 mg Intravenous BID (diuretic) Amaris Correia PA-C   insulin lispro 1-5 Units Subcutaneous TID AC Haily Imus, CRNP   insulin lispro 1-5 Units Subcutaneous HS Haily Imus, CRNP   isosorbide mononitrate 30 mg Oral Daily Haily Imus, CRNP   metoprolol tartrate 12 5 mg Oral Q12H [address] [place] [address], CRNP   mupirocin 1 application Nasal M89T [address] [place] [address], CRNP   ondansetron 4 mg Intravenous Q6H PRN Haily Imus, CRNP   oxyCODONE 5 mg Oral Q4H PRN Haily Imus, CRNP   Or       oxyCODONE 10 mg Oral Q4H PRN Haily Imus, CRNP   pantoprazole 40 mg Oral Early Morning Haily Imus, CRNP   polyethylene glycol 17 g Oral Daily Haily Imus, CRNP   potassium chloride 20 mEq Oral BID Amaris Correia PA-C   scopolamine 1 patch Transdermal Q72H Amaris Correia PA-C   temazepam 15 mg Oral HS PRN Haily Imus, CRNP     Continuous Infusions:   PRN Meds: bisacodyl    ondansetron    oxyCODONE **OR** oxyCODONE    temazepam    Vitals:   Vitals:    01/29/20 2229 01/30/20 0337 01/30/20 0550 01/30/20 0708   BP: 113/56 100/50  127/63   BP Location: Right arm Left arm  Left arm   Pulse: 71 70  72   Resp: 18 18  18   Temp: 97 9 °F (36 6 °C) 97 8 °F (36 6 °C)  97 9 °F (36 6 °C)   TempSrc: Oral Oral  Oral   SpO2: 95% 93%  96%   Weight:   52 1 kg (114 lb 13 8 oz)    Height:         Bp x24hrs: 100-130/650-70    Telemetry: NSR; Heart Rate: 66    Respiratory:   SpO2: SpO2: 96 %, SpO2 Activity: SpO2 Activity: At Rest, SpO2 Device: O2 Device: None (Room air); Room Air    Intake/Output:   I/O       01/28 0701 - 01/29 0700 01/29 0701 - 01/30 0700 01/30 0701 - 01/31 0700    P  O  600 360     I V  (mL/kg) 40 7 (0 8)      Blood       IV Piggyback 50      Cell Saver       Total Intake(mL/kg) 690 7 (13) 360 (6 9)     Urine (mL/kg/hr) 930 (0 7) 1550 (1 2)     Blood       Chest Tube 440 370     Total Output 1370 1920     Net -679 3 -1560                UOP - 200cc/8hr; 1550cc/24hrs    Chest tube Output:    Mediastinal tubes: 20 mL/8 hours  80 mL/24 hours   Pleural tubes: 50 mL/8 hours  290 mL/24 hours     Weights:   Weight (last 2 days)     Date/Time   Weight    01/30/20 0550   52 1 (114 86)    01/29/20 0538   53 1 (117 1)    01/28/20 0600   52 5 (115 74)            Admit weight: 50 6kg - up 1 5kg from admission weight    Results:   Results from last 7 days   Lab Units 01/30/20  0516 01/29/20  0519 01/28/20  0425   WBC Thousand/uL 7 54 8 24 12 59*   HEMOGLOBIN g/dL 10 9* 9 8* 10 4*   HEMATOCRIT % 35 2 31 1* 33 4*   PLATELETS Thousands/uL 157 132* 145*     Results from last 7 days   Lab Units 01/30/20  0516 01/29/20  0519 01/28/20  0425  01/27/20  1548   SODIUM mmol/L 148* 139 144  --  145   POTASSIUM mmol/L 4 4 3 7 3 8   < > 3 3*   CHLORIDE mmol/L 113* 108 114*  --  114*   CO2 mmol/L 30 29 25  --  23   CO2, I-STAT mmol/L  --   --   --   --  26   BUN mg/dL 19 22 12  --  12 CREATININE mg/dL 0 63 0 62 0 42*  --  0 55*   GLUCOSE, ISTAT mg/dl  --   --   --   --  138   CALCIUM mg/dL 8 6 8 5 7 4*  --  7 8*    < > = values in this interval not displayed  Results from last 7 days   Lab Units 01/26/20  0512 01/25/20  1824 01/25/20  1013  01/24/20  1959 01/24/20  0712   INR   --   --   --   --  1 01 0 84   PTT seconds 79* 64* 88*   < > 24 23    < > = values in this interval not displayed  Point of care glucose: 103 - 93 - 125 - 126    Studies:  No new studies    I have personally reviewed pertinent reports  Invasive Lines/Tubes:  Invasive Devices     Central Venous Catheter Line            CVC Central Lines 01/27/20 Triple 2 days          Peripheral Intravenous Line            Peripheral IV 01/30/20 Left Antecubital less than 1 day          Drain            Chest Tube 1 Left Pleural 32 Fr  2 days    Chest Tube 2 Posterior Mediastinal 32 Fr  2 days    Chest Tube 3 Anterior Mediastinal 32 Fr  2 days                Physical Exam:    HEENT/NECK:  PERRLA  No jugular venous distention  Cardiac: Regular rate and rhythm  Pulmonary:  Breath sounds clear bilaterally  Abdomen:  Normal bowel sounds  Incisions: Sternum is stable  Incision dressed with Acticoat  No erythema or drainage and Saphenectomy incision dressed with Acticoat  No erythema or drainage  Extremities: Extremities warm/dry and Trace edema B/L  Neuro: Alert and oriented X 3, Sensation is grossly intact and No focal deficits  Skin: Warm/Dry, without rashes or lesions  Assessment:  Principal Problem: Atherosclerosis of native coronary artery with angina pectoris [facility])  Active Problems:    Hyperlipidemia    Essential hypertension    NSTEMI (non-ST elevation myocardial infarction) ([facility] [address] )    Syncope    S/P CABG x 3    Anemia    Thrombocytopenia (HCC)       Coronary artery disease  S/P coronary artery bypass grafting; POD # 3    Plan:    1   Cardiac:   NSR; HR/BP well-controlled  Lopressor, 25mg PO BID  Continue ASA and Statin therapy  Epicardial pacing wires out  Central IV access no longer required; Remove central venous catheter today  Continue DVT prophylaxis    2  Pulmonary:   Good Room air oxygen saturation; Continue incentive spirometry/Coughing/Deep breathing exercises  Chest tube output remains persistently high; Continue chest tubes to suction today    3  Renal:   Intake/Output net: (-)1560 mL/24 hours  Continue diuresis   Lasix 40 mg IV BID  Potassium Chloride 20 mEq PO BID  Post op Creatinine stable; Follow up labs prn    4  Neuro:  Neurologically intact; No active issues  Incisional pain well-controlled; Continue schedule Tylenol & PRN Oxy    5  GI:  Tolerating TLC 2 3 gm sodium diet  Maintain 1800 mL daily fluid restriction   Continue stool softeners and prn suppository - add senna  Continue GI prophylaxis - increase to BID    6  Endo:   No history of diabetes; Glucose well-controlled with sliding scale coverage    7    Hematology:    Post-operative acute blood loss anemia; Hemoglobin 10 9; trend prn   Hyperchloremia   Hypernatremia, recheck BMP at 1200    8  Disposition:      Ambulating independently, Anticipate discharge to home once CT can be removed     VTE Pharmacologic Prophylaxis: Fondaparinux (Arixtra)  VTE Mechanical Prophylaxis: sequential compression device    Collaborative rounds completed with LEANN Rucker    Plan of care discussed with bedside nurse    SIGNATURE: Martin Quiroga PA-C  DATE: January 30, 2020  TIME: 7:35 AM

## 2020-01-30 NOTE — PLAN OF CARE
Problem: PHYSICAL THERAPY ADULT  Goal: Performs mobility at highest level of function for planned discharge setting  See evaluation for individualized goals  Description  Treatment/Interventions: Functional transfer training, LE strengthening/ROM, Elevations, Therapeutic exercise, Endurance training, Patient/family training, Equipment eval/education, Bed mobility, Gait training, Spoke to nursing, OT, Family(PT spoke to CM)  Equipment Recommended: Walker(at this time; will monitor progression to SPC/no AD )       See flowsheet documentation for full assessment, interventions and recommendations  Outcome: Progressing  Note:   Prognosis: Good  Problem List: Decreased endurance  Assessment: Pt demonstrated overall improvement in all aspects of functional mobility progressing to mod (I) level w/ transfers and (S) w/ amb w/o AD and steps negotiation (w/ (S) required mainly for CT management); no excessive fatigue or increased discomfort expressed during the session  Overall, cont to anticipate pt will return home w/ available family support upon D/C when medically cleared; home PT follow up is still recommended to assess functional status w/in pt's own home environment; will follow  Barriers to Discharge: Other (Comment)(med status)     Recommendation: Home PT, Home with family support          See flowsheet documentation for full assessment

## 2020-01-31 ENCOUNTER — APPOINTMENT (INPATIENT)
Dept: RADIOLOGY | Facility: HOSPITAL | Age: 75
DRG: 235 | End: 2020-01-31
Payer: MEDICARE

## 2020-01-31 LAB
ANION GAP SERPL CALCULATED.3IONS-SCNC: 5 MMOL/L (ref 4–13)
BUN SERPL-MCNC: 18 MG/DL (ref 5–25)
CALCIUM SERPL-MCNC: 8.7 MG/DL (ref 8.3–10.1)
CHLORIDE SERPL-SCNC: 109 MMOL/L (ref 100–108)
CO2 SERPL-SCNC: 29 MMOL/L (ref 21–32)
CREAT SERPL-MCNC: 0.6 MG/DL (ref 0.6–1.3)
ERYTHROCYTE [DISTWIDTH] IN BLOOD BY AUTOMATED COUNT: 14.6 % (ref 11.6–15.1)
GFR SERPL CREATININE-BSD FRML MDRD: 90 ML/MIN/1.73SQ M
GLUCOSE SERPL-MCNC: 105 MG/DL (ref 65–140)
GLUCOSE SERPL-MCNC: 122 MG/DL (ref 65–140)
GLUCOSE SERPL-MCNC: 180 MG/DL (ref 65–140)
GLUCOSE SERPL-MCNC: 89 MG/DL (ref 65–140)
GLUCOSE SERPL-MCNC: 90 MG/DL (ref 65–140)
HCT VFR BLD AUTO: 35 % (ref 34.8–46.1)
HGB BLD-MCNC: 11.2 G/DL (ref 11.5–15.4)
MCH RBC QN AUTO: 30 PG (ref 26.8–34.3)
MCHC RBC AUTO-ENTMCNC: 32 G/DL (ref 31.4–37.4)
MCV RBC AUTO: 94 FL (ref 82–98)
PLATELET # BLD AUTO: 175 THOUSANDS/UL (ref 149–390)
PMV BLD AUTO: 12.3 FL (ref 8.9–12.7)
POTASSIUM SERPL-SCNC: 4.1 MMOL/L (ref 3.5–5.3)
RBC # BLD AUTO: 3.73 MILLION/UL (ref 3.81–5.12)
SODIUM SERPL-SCNC: 143 MMOL/L (ref 136–145)
TRIGL FLD-MCNC: 417 MG/DL
WBC # BLD AUTO: 5.55 THOUSAND/UL (ref 4.31–10.16)

## 2020-01-31 PROCEDURE — 71046 X-RAY EXAM CHEST 2 VIEWS: CPT

## 2020-01-31 PROCEDURE — 85027 COMPLETE CBC AUTOMATED: CPT | Performed by: PHYSICIAN ASSISTANT

## 2020-01-31 PROCEDURE — 99232 SBSQ HOSP IP/OBS MODERATE 35: CPT | Performed by: INTERNAL MEDICINE

## 2020-01-31 PROCEDURE — 84478 ASSAY OF TRIGLYCERIDES: CPT | Performed by: PHYSICIAN ASSISTANT

## 2020-01-31 PROCEDURE — 99222 1ST HOSP IP/OBS MODERATE 55: CPT | Performed by: STUDENT IN AN ORGANIZED HEALTH CARE EDUCATION/TRAINING PROGRAM

## 2020-01-31 PROCEDURE — 82948 REAGENT STRIP/BLOOD GLUCOSE: CPT

## 2020-01-31 PROCEDURE — 99024 POSTOP FOLLOW-UP VISIT: CPT | Performed by: THORACIC SURGERY (CARDIOTHORACIC VASCULAR SURGERY)

## 2020-01-31 PROCEDURE — 80048 BASIC METABOLIC PNL TOTAL CA: CPT | Performed by: PHYSICIAN ASSISTANT

## 2020-01-31 RX ORDER — FUROSEMIDE 10 MG/ML
40 INJECTION INTRAMUSCULAR; INTRAVENOUS
Status: DISCONTINUED | OUTPATIENT
Start: 2020-01-31 | End: 2020-02-01

## 2020-01-31 RX ORDER — POTASSIUM CHLORIDE 20 MEQ/1
20 TABLET, EXTENDED RELEASE ORAL
Status: DISCONTINUED | OUTPATIENT
Start: 2020-01-31 | End: 2020-02-01

## 2020-01-31 RX ORDER — FUROSEMIDE 10 MG/ML
40 INJECTION INTRAMUSCULAR; INTRAVENOUS ONCE
Status: COMPLETED | OUTPATIENT
Start: 2020-01-31 | End: 2020-01-31

## 2020-01-31 RX ADMIN — DOCUSATE SODIUM 100 MG: 100 CAPSULE, LIQUID FILLED ORAL at 17:12

## 2020-01-31 RX ADMIN — FUROSEMIDE 40 MG: 10 INJECTION, SOLUTION INTRAMUSCULAR; INTRAVENOUS at 17:12

## 2020-01-31 RX ADMIN — DOCUSATE SODIUM 100 MG: 100 CAPSULE, LIQUID FILLED ORAL at 08:29

## 2020-01-31 RX ADMIN — ISOSORBIDE MONONITRATE 30 MG: 30 TABLET, EXTENDED RELEASE ORAL at 08:33

## 2020-01-31 RX ADMIN — IBUPROFEN 600 MG: 600 TABLET ORAL at 12:05

## 2020-01-31 RX ADMIN — PANTOPRAZOLE SODIUM 40 MG: 40 TABLET, DELAYED RELEASE ORAL at 06:00

## 2020-01-31 RX ADMIN — IBUPROFEN 600 MG: 600 TABLET ORAL at 08:33

## 2020-01-31 RX ADMIN — FONDAPARINUX SODIUM 2.5 MG: 2.5 INJECTION, SOLUTION SUBCUTANEOUS at 08:28

## 2020-01-31 RX ADMIN — TEMAZEPAM 15 MG: 15 CAPSULE ORAL at 21:55

## 2020-01-31 RX ADMIN — MUPIROCIN 1 APPLICATION: 20 OINTMENT TOPICAL at 08:33

## 2020-01-31 RX ADMIN — MUPIROCIN 1 APPLICATION: 20 OINTMENT TOPICAL at 21:52

## 2020-01-31 RX ADMIN — SENNOSIDES 8.6 MG: 8.6 TABLET, FILM COATED ORAL at 21:50

## 2020-01-31 RX ADMIN — ATORVASTATIN CALCIUM 80 MG: 80 TABLET, FILM COATED ORAL at 17:12

## 2020-01-31 RX ADMIN — POTASSIUM CHLORIDE 20 MEQ: 1500 TABLET, EXTENDED RELEASE ORAL at 12:04

## 2020-01-31 RX ADMIN — PANTOPRAZOLE SODIUM 40 MG: 40 TABLET, DELAYED RELEASE ORAL at 17:11

## 2020-01-31 RX ADMIN — ASPIRIN 325 MG ORAL TABLET 325 MG: 325 PILL ORAL at 08:29

## 2020-01-31 RX ADMIN — ONDANSETRON 4 MG: 2 INJECTION INTRAMUSCULAR; INTRAVENOUS at 18:23

## 2020-01-31 RX ADMIN — INSULIN LISPRO 1 UNITS: 100 INJECTION, SOLUTION INTRAVENOUS; SUBCUTANEOUS at 23:01

## 2020-01-31 RX ADMIN — METOPROLOL TARTRATE 12.5 MG: 25 TABLET ORAL at 21:51

## 2020-01-31 RX ADMIN — POTASSIUM CHLORIDE 20 MEQ: 1500 TABLET, EXTENDED RELEASE ORAL at 08:29

## 2020-01-31 RX ADMIN — POLYETHYLENE GLYCOL 3350 17 G: 17 POWDER, FOR SOLUTION ORAL at 08:29

## 2020-01-31 RX ADMIN — IBUPROFEN 600 MG: 600 TABLET ORAL at 23:02

## 2020-01-31 RX ADMIN — FUROSEMIDE 40 MG: 10 INJECTION, SOLUTION INTRAMUSCULAR; INTRAVENOUS at 11:47

## 2020-01-31 RX ADMIN — POTASSIUM CHLORIDE 20 MEQ: 1500 TABLET, EXTENDED RELEASE ORAL at 17:12

## 2020-01-31 RX ADMIN — METOPROLOL TARTRATE 12.5 MG: 25 TABLET ORAL at 08:29

## 2020-01-31 RX ADMIN — FUROSEMIDE 40 MG: 10 INJECTION, SOLUTION INTRAMUSCULAR; INTRAVENOUS at 08:29

## 2020-01-31 RX ADMIN — IBUPROFEN 600 MG: 600 TABLET ORAL at 17:12

## 2020-01-31 NOTE — PROGRESS NOTES
Progress Note - Cardiothoracic Surgery   Carly Burnett 76 y o  female MRN: [id number]  Unit/Bed#: [facility] 423-01 Encounter: [id number]  Coronary artery disease  S/P coronary artery bypass grafting; POD # 4    24 Hour Events: No events  No complaints  No BM since sx  N improved      Medications:   Scheduled Meds:  Current Facility-Administered Medications:  acetaminophen 975 mg Oral [facility] RACHEL Deleon   aspirin 325 mg Oral Daily RACHEL Deleon   atorvastatin 80 mg Oral Daily With Faby Maciel, SKYNP   bisacodyl 10 mg Rectal Daily PRN RACHEL Deleon   calcium carbonate-vitamin D 1 tablet Oral Daily With Breakfast Nasra Morgan, RACHEL   cholecalciferol 1,000 Units Oral Daily RACHEL Deleon   docusate sodium 100 mg Oral BID Nasra Morgan, RACHEL   fondaparinux 2 5 mg Subcutaneous Daily RACHEL Deleon   furosemide 40 mg Intravenous BID (diuretic) Bo Tijerina PA-C   ibuprofen 600 mg Oral Q6H Bo Tijerina PA-C   insulin lispro 1-5 Units Subcutaneous TID AC Nasra Morgan, RACHEL   insulin lispro 1-5 Units Subcutaneous HS RACHEL Deleon   isosorbide mononitrate 30 mg Oral Daily RACHEL Deleon   metoprolol tartrate 12 5 mg Oral Q12H [address] [place] [address], CRNP   mupirocin 1 application Nasal O26A [address] [place] [address], CRNP   ondansetron 4 mg Intravenous Q6H PRN RACHEL Deleon   oxyCODONE 2 5 mg Oral Q4H PRN Bo Tijerina PA-C   Or       oxyCODONE 5 mg Oral Q6H PRN EUGENIA Hamilton-TERRENCE   pantoprazole 40 mg Oral BID AC Bo Tijerina PA-C   polyethylene glycol 17 g Oral Daily RACHEL Deleon   potassium chloride 20 mEq Oral BID EUGENIA Hamilton-TERRENCE   scopolamine 1 patch Transdermal Q72H EUGENIA Hamilton-TERRENCE   senna 1 tablet Oral HS Bo Tijerina PA-C   temazepam 15 mg Oral HS PRN RACHEL Deleon     Continuous Infusions:   PRN Meds: bisacodyl    ondansetron    oxyCODONE **OR** oxyCODONE    temazepam    Vitals:   Vitals:    01/30/20 2139 01/30/20 2345 01/31/20 0333 01/31/20 0600   BP: 97/56 108/62 98/55    BP Location: Left arm Left arm Right arm    Pulse: 77 72 64    Resp:  18 18    Temp:  98 4 °F (36 9 °C) 98 2 °F (36 8 °C)    TempSrc:  Oral Oral    SpO2:  97% 96%    Weight:    51 kg (112 lb 7 oz)   Height:         Bp x24hrs: /50-70    Telemetry: NSR; Heart Rate: 65 - 3 NSVT    Respiratory:   SpO2: SpO2: 96 %, SpO2 Activity: SpO2 Activity: At Rest, SpO2 Device: O2 Device: None (Room air); Room Air    Intake/Output:   I/O       01/29 0701 - 01/30 0700 01/30 0701 - 01/31 0700 01/31 0701 - 02/01 0700    P  O  360 540     I V  (mL/kg)       IV Piggyback       Total Intake(mL/kg) 360 (6 9) 540 (10 6)     Urine (mL/kg/hr) 1550 (1 2) 1120 (0 9)     Chest Tube 370 310     Total Output 1920 1430     Net -1560 -890                UOP - 300cc/8hr; 1120cc/24hrs    Chest tube Output:    Mediastinal tubes: 60 mL/8 hours  260 mL/24 hours   Pleural tubes: 0 mL/8 hours  50 mL/24 hours     Weights:   Weight (last 2 days)     Date/Time   Weight    01/31/20 0600   51 (112 43)    01/30/20 0550   52 1 (114 86)    01/29/20 0538   53 1 (117 1)            Admit weight: 50 6kg - up 0 5kg from admission weight    Results:   CBC & BMP PENDING THIS AM  Results from last 7 days   Lab Units 01/30/20  0516 01/29/20  0519 01/28/20  0425   WBC Thousand/uL 7 54 8 24 12 59*   HEMOGLOBIN g/dL 10 9* 9 8* 10 4*   HEMATOCRIT % 35 2 31 1* 33 4*   PLATELETS Thousands/uL 157 132* 145*     Results from last 7 days   Lab Units 01/30/20  1150 01/30/20  0516 01/29/20  0519  01/27/20  1548   SODIUM mmol/L 143 148* 139   < > 145   POTASSIUM mmol/L 4 0 4 4 3 7   < > 3 3*   CHLORIDE mmol/L 108 113* 108   < > 114*   CO2 mmol/L 33* 30 29   < > 23   CO2, I-STAT mmol/L  --   --   --   --  26   BUN mg/dL 18 19 22   < > 12   CREATININE mg/dL 0 65 0 63 0 62   < > 0 55*   GLUCOSE, ISTAT mg/dl  --   --   --   --  138   CALCIUM mg/dL 8 8 8 6 8 5   < > 7 8*    < > = values in this interval not displayed  Results from last 7 days   Lab Units 01/26/20  0512 01/25/20  1824 01/25/20  1013  01/24/20  1959 01/24/20  0712   INR   --   --   --   --  1 01 0 84   PTT seconds 79* 64* 88*   < > 24 23    < > = values in this interval not displayed  Point of care glucose: 89 - 110 - 108 - 120    Studies:  No new studies    I have personally reviewed pertinent reports  and I have personally reviewed pertinent films in PACS    Invasive Lines/Tubes:  Invasive Devices     Peripheral Intravenous Line            Peripheral IV 01/30/20 Left Antecubital 1 day          Drain            Chest Tube 1 Left Pleural 32 Fr  3 days    Chest Tube 2 Posterior Mediastinal 32 Fr  3 days    Chest Tube 3 Anterior Mediastinal 32 Fr  3 days                Physical Exam:    HEENT/NECK:  PERRLA  No jugular venous distention  Cardiac: Regular rate and rhythm  Pulmonary:  Breath sounds clear bilaterally  Abdomen:  Hypo-active bowel sounds  Incisions: Sternum is stable  Incision is clean, dry, and intact  and Saphenectomy incison is clean, dry, and intact  Extremities: Extremities warm/dry and Trace edema B/L  Neuro: Alert and oriented X 3, Sensation is grossly intact and No focal deficits  Skin: Warm/Dry, without rashes or lesions  Assessment:  Principal Problem: Atherosclerosis of native coronary artery with angina pectoris [facility])  Active Problems:    Hyperlipidemia    Essential hypertension    NSTEMI (non-ST elevation myocardial infarction) ([facility] [address] )    Syncope    S/P CABG x 3    Anemia    Thrombocytopenia (HCC)    Hyperchloremia       Coronary artery disease  S/P coronary artery bypass grafting; POD # 4    Plan:    1  Cardiac:   NSR; HR/BP well-controlled  Lopressor, 25mg PO BID  Continue ASA and Statin therapy  Epicardial pacing wires out  Patient no longer has central IV access   Continue DVT prophylaxis    2   Pulmonary:   Good Room air oxygen saturation; Continue incentive spirometry/Coughing/Deep breathing exercises  Chest tubes have been discontinued    3  Renal:   Intake/Output net: (-)890 mL/24 hours  Continue diuresis   Lasix 40 mg IV BID - increase to TID  Potassium Chloride 20 mEq PO BID - increase to TID  BMP pending    4  Neuro:  Neurologically intact; No active issues  Incisional pain well-controlled; Continue prn Percocet    5  GI:  Tolerating TLC 2 3 gm sodium diet  Maintain 1800 mL daily fluid restriction   Continue stool softeners and prn suppository  Continue GI prophylaxis    6  Endo:   No history of diabetes; Glucose well-controlled with sliding scale coverage    7    Hematology:    CBC pending    8  Disposition:      Ambulating independently, Anticipate discharge to home once CT can be removed     VTE Pharmacologic Prophylaxis: Fondaparinux (Arixtra)  VTE Mechanical Prophylaxis: sequential compression device    Collaborative rounds completed with LEANN Sapp    Plan of care discussed with bedside nurse    SIGNATURE: Timothy Ba PA-C  DATE: January 31, 2020  TIME: 7:03 AM

## 2020-01-31 NOTE — PROGRESS NOTES
INTERVENTIONAL RADIOLOGY CONSULT NOTE:    History of Present Illness:  Jerrod Jenkins is a 76 y o  female with a PMH of CAD s/p CABG  She is POD 4  She has had approximately 500 ml/day of chylous output from her left chest tube  Triglycerides were sent today which are positive  She reports an occasional cough  She denies chest pain  A low fat/no fat diet was started this morning, however the patient did eat this morning  IR has been consulted for possible thoracic duct embolization  I have been been consulted to determine the appropriate procedure, whether or not a procedure can and should be performed, and to place the correct procedure orders  Past Medical History:  Patient Active Problem List   Diagnosis    Arthritis    Cervical myofascial pain syndrome    Cervical radiculopathy    Cervicogenic headache    Cervico-occipital neuralgia    Depression    Hyperlipidemia    Essential hypertension    Osteopenia of spine    Spasmodic torticollis    Laceration of occipital scalp    Other secondary scoliosis, lumbar region    NSTEMI (non-ST elevation myocardial infarction) ([facility] [address] )    Syncope    Atherosclerosis of native coronary artery with angina pectoris (HCC)    S/P CABG x 3    Anemia    Thrombocytopenia (HCC)    Hyperchloremia     Reviewed  Past Surgical History:  Past Surgical History:   Procedure Laterality Date    APPENDECTOMY      [facility] CABG, ARTERY-VEIN, FOUR N/A 1/27/2020    Procedure: CORONARY ARTERY BYPASS GRAFT (CABG) x3 VESSELS, LIMA TO LAD, AND SVG TO PLB & OM;  Surgeon: Gregoria Glover DO;  [facility];  Service: Cardiac Surgery    VA ECHO TRANSESOPHAG R-T 2D W/PRB IMG ACQUISJ I&R N/A 1/27/2020    Procedure: TRANSESOPHAGEAL ECHOCARDIOGRAM (GET); Surgeon: Gregoria Glover DO;  [facility];  Service: Cardiac Surgery    VA ENDOSCOPY W/VIDEO-ASST VEIN HARVEST,CABG Left 1/27/2020    Procedure: HARVEST VEIN ENDOSCOPIC ([address]);   Surgeon: Gregoria Glover DO;  [facility];  Service: Cardiac Surgery    TONSILLECTOMY      Last assessed - 4/20/17    TUBAL LIGATION      Last assessed - 4/20/17    WISDOM TOOTH EXTRACTION      Last assessed - 4/20/17     Reviewed  Family History:  Family History   Problem Relation Age of Onset    Coronary artery disease Mother     Arthritis Mother     Other Mother         Cardiac Disorder     Transient ischemic attack Mother     Heart attack Father     Sudden death Father         SCD     Reviewed  Social History:  Social History     Socioeconomic History    Marital status:      Spouse name: Not on file    Number of children: 3    Years of education: Post Graduate    Highest education level: Not on file   Occupational History    Occupation:       Comment: P/T    Occupation: Retired     Comment: RN   Social Needs    Financial resource strain: Not on file    Food insecurity:     Worry: Not on file     Inability: Not on file   Whisk needs:     Medical: Not on file     Non-medical: Not on file   Tobacco Use    Smoking status: Never Smoker    Smokeless tobacco: Never Used   Substance and Sexual Activity    Alcohol use: Yes     Comment: 1 wine nightly    Drug use: No    Sexual activity: Not on file   Lifestyle    Physical activity:     Days per week: Not on file     Minutes per session: Not on file    Stress: Not on file   Relationships    Social connections:     Talks on phone: Not on file     Gets together: Not on file     Attends Church service: Not on file     Active member of club or organization: Not on file     Attends meetings of clubs or organizations: Not on file     Relationship status: Not on file    Intimate partner violence:     Fear of current or ex partner: Not on file     Emotionally abused: Not on file     Physically abused: Not on file     Forced sexual activity: Not on file   Other Topics Concern    Not on file   Social History Narrative    Living alone Reviewed  Allergies:  No Known Allergies  Reviewed      Current Medications:    Current Facility-Administered Medications:     acetaminophen (TYLENOL) tablet 975 mg, 975 mg, Oral, Q8H [facility], Teretha Carmenza, CRNP, 975 mg at 01/29/20 1318    aspirin tablet 325 mg, 325 mg, Oral, Daily, Teretha Alu, CRNP, 325 mg at 01/31/20 5694    atorvastatin (LIPITOR) tablet 80 mg, 80 mg, Oral, Daily With Norman Cutting, CRNP, 80 mg at 01/30/20 1642    bisacodyl (DULCOLAX) rectal suppository 10 mg, 10 mg, Rectal, Daily PRN, Teretha Alu, CRNP    calcium carbonate-vitamin D (OSCAL-D) 500 mg-200 units per tablet 1 tablet, 1 tablet, Oral, Daily With Breakfast, Teretha Alu, CRNP, 1 tablet at 01/30/20 0901    cholecalciferol (VITAMIN D3) tablet 1,000 Units, 1,000 Units, Oral, Daily, Teretha Alu, CRNP, 1,000 Units at 01/30/20 0111    docusate sodium (COLACE) capsule 100 mg, 100 mg, Oral, BID, Teretha Alu, CRNP, 100 mg at 01/31/20 4447    fondaparinux (ARIXTRA) subcutaneous injection 2 5 mg, 2 5 mg, Subcutaneous, Daily, Teretha Alu, CRNP, 2 5 mg at 01/31/20 2509    furosemide (LASIX) injection 40 mg, 40 mg, Intravenous, TID (diuretic), Erin Beckham PA-C, 40 mg at 01/31/20 1147    ibuprofen (MOTRIN) tablet 600 mg, 600 mg, Oral, Q6H, Erin Beckham PA-C, 600 mg at 01/31/20 1205    insulin lispro (HumaLOG) 100 units/mL subcutaneous injection 1-5 Units, 1-5 Units, Subcutaneous, TID AC, 1 Units at 01/28/20 1214 **AND** Fingerstick Glucose (POCT), , , TID AC, Teretha Alu, CRNP    insulin lispro (HumaLOG) 100 units/mL subcutaneous injection 1-5 Units, 1-5 Units, Subcutaneous, HS, Teretha Alu, CRNP, 1 Units at 01/28/20 2235    isosorbide mononitrate (IMDUR) 24 hr tablet 30 mg, 30 mg, Oral, Daily, RACHEL Burgos, 30 mg at 01/31/20 4245    metoprolol tartrate (LOPRESSOR) partial tablet 12 5 mg, 12 5 mg, Oral, Q12H [facility], RACHEL Burgos, 12 5 mg at 01/31/20 0829    mupirocin (BACTROBAN) 2 % nasal ointment 1 application, 1 application, Nasal, Y57G [address], RACHEL Venegas, 1 application at 16/63/66 9697    ondansetron [facility]) injection 4 mg, 4 mg, Intravenous, Q6H PRN, RACHEL Venegas, 4 mg at 01/30/20 2142    oxyCODONE (ROXICODONE) IR tablet 5 mg, 5 mg, Oral, Q6H PRN **OR** oxyCODONE (ROXICODONE) IR tablet 2 5 mg, 2 5 mg, Oral, Q4H PRN, Vanesa Hunter PA-C    pantoprazole (PROTONIX) EC tablet 40 mg, 40 mg, Oral, BID AC, EUGENIA Blackmon-C, 40 mg at 01/31/20 0600    polyethylene glycol (MIRALAX) packet 17 g, 17 g, Oral, Daily, RACHEL Venegas, 17 g at 01/31/20 8894    potassium chloride (K-DUR,KLOR-CON) CR tablet 20 mEq, 20 mEq, Oral, TID With Meals, Vanesa Hunter PA-C, 20 mEq at 01/31/20 1204    scopolamine (TRANSDERM-SCOP) 1 5 mg/3 days TD 72 hr patch 1 patch, 1 patch, Transdermal, Q72H, Vanesa Hunter PA-C, 1 patch at 01/29/20 1005    senna (SENOKOT) tablet 8 6 mg, 1 tablet, Oral, HS, EUGENIA Blackmon-C, 8 6 mg at 01/30/20 0911    temazepam (RESTORIL) capsule 15 mg, 15 mg, Oral, HS PRN, RACHEL Venegas, 15 mg at 01/30/20 2143  Reviewed  Physical Examination:  Vitals:    01/31/20 0333 01/31/20 0600 01/31/20 0730 01/31/20 1107   BP: 98/55  132/61 103/59   BP Location: Right arm  Right arm Right arm   Pulse: 64  72 75   Resp: 18  18 18   Temp: 98 2 °F (36 8 °C)  (!) 97 3 °F (36 3 °C) (!) 97 4 °F (36 3 °C)   TempSrc: Oral  Oral Oral   SpO2: 96%  96% 96%   Weight:  51 kg (112 lb 7 oz)     Height:           CONSTITUTIONAL: The patient appeared well in no acute distress  NEUROLOGICAL: alert, awake, answering questions appropriately  PSYCHIATRIC: Affect normal   EYES: PERRL and anicteric  PULMONARY: clear to auscultation bilaterally  No respiratory distress  L chest tube c/d/i, draining milky fluid  CARDIOVASCULAR: HR regular  No gallops, murmurs, or rubs heard on auscultation   No peripheral edema present  GASTROINTESTINAL: abdomen was soft, round, nontender with +BS and no appreciable palpable masses  No distention present  No CVA tenderness  MUSCULOSKELETAL: gait was grossly intact  The patient did not exhibit muscle wasting  SKIN: no rashes or ulcerations present  EXTREMITIES: Without cyanosis or clubbing  Pulses are present  Labs/Imaging:  CBC with diff:   Lab Results   Component Value Date    WBC 5 55 01/31/2020    HGB 11 2 (L) 01/31/2020    HCT 35 0 01/31/2020    MCV 94 01/31/2020     01/31/2020    MCH 30 0 01/31/2020    MCHC 32 0 01/31/2020    RDW 14 6 01/31/2020    MPV 12 3 01/31/2020    NRBC 0 01/27/2020     BMP/CMP:  Lab Results   Component Value Date     03/03/2015    K 4 1 01/31/2020    K 3 5 (L) 03/03/2015     (H) 01/31/2020     03/03/2015    CO2 29 01/31/2020    CO2 26 01/27/2020    ANIONGAP 6 03/03/2015    BUN 18 01/31/2020    BUN 18 03/03/2015    CREATININE 0 60 01/31/2020    CREATININE 0 45 (L) 03/03/2015    GLUCOSE 138 01/27/2020    GLUCOSE 110 03/03/2015    CALCIUM 8 7 01/31/2020    CALCIUM 8 9 03/03/2015    AST 44 01/24/2020    AST 26 03/03/2015    ALT 32 01/24/2020    ALT 22 03/03/2015    ALKPHOS 76 01/24/2020    ALKPHOS 51 03/03/2015    PROT 6 6 03/03/2015    BILITOT 0 2 03/03/2015    EGFR 90 01/31/2020   ,     Coags:   Lab Results   Component Value Date    PTT 79 (H) 01/26/2020    INR 1 01 01/24/2020   ,   Results from last 7 days   Lab Units 01/26/20  0512 01/25/20  1824 01/25/20  1013 01/25/20  0307 01/24/20  1959   PTT seconds 79* 64* 88* 36 24   INR   --   --   --   --  1 01        I reviewed prior clinical lab tests, independently reviewed relevant prior imaging, and reviewed and summarized old records  Review of Systems:  Review of Systems   Constitution: Negative  HENT: Negative  Eyes: Negative  Cardiovascular: Negative  Respiratory: Positive for cough  Endocrine: Negative  Skin: Negative  Musculoskeletal: Negative  Gastrointestinal: Negative  Genitourinary: Negative  Neurological: Negative  Psychiatric/Behavioral: Negative  Allergic/Immunologic: Negative  All other systems reviewed and are negative  Assessment and Plan:  Nick Ambrose is a 76 y o  female with a past medical history of coronary artery disease, status post recent coronary artery bypass graft  Her left chest tube has continued to put out chylous fluid, raising suspicion for thoracic duct injury  I was consulted regarding possible thoracic duct embolization  I went into a great amount of detail regarding the procedure of thoracic duct embolization with both the patient and her daughter  I explained that in general, adhering to a low fat or no fat diet has an approximately 30-40% chance of success, and is usually more successful in low volume chylous effusions present (less than approximately 300 mL per day)  I also recommended that the low fat/no fat diet be adhered to for several days prior to attempts at a thoracic duct embolization, if possible  I explained that most studies suggest that if the thoracic duct can successfully be cannulated and embolized, that there is an approximately 90% chance of adequate resolution of the chylous effusion  Thoracic duct disruption usually yields approximately 70% efficacy, and simply instilling lipidol into the lymphatic system has an approximately 70% efficacy as well Darren Diaz, et  al , JVIR, 2016)  At this point, the patient and her daughter would like to attempt conservative therapy with a low fat/no fat diet for several days to see if this improves chylous output  I think this is a reasonable plan going forward  Should the output not significantly reduce over the next several days, we will plan for a thoracic duct embolization on Monday afternoon  This will require general anesthesia    Please proactively make the patient NPO at midnight on Sunday night, and we will readdress whether a thoracic duct embolization as indicated on Monday morning  Please adhere to strict I/O's  This plan was discussed with the thoracic surgical team, who are in agreement  Thank you for allowing me to participate in the care of Yvon Mansfield  Please don't hesitate to call, text, email, or TigerText with any questions  Bo Carter MD  Interventional Radiologist  [facility]  Personal Cell: [phone]  [email or web address]

## 2020-01-31 NOTE — PLAN OF CARE
Patient and daughter given disposable washcloths, CHG soap, gauze, and paper tape and made aware to shower daily using new disposable washcloth and soap as well as using a clean disposable washcloth on the surgical wounds  Patient and daughter aware of shower instructions and to change chest tube dressing daily after showering using gauze and paper tape

## 2020-01-31 NOTE — PROGRESS NOTES
talked with patient on the hallway and will visit later       01/31/20 1100   Clinical Encounter Type   Visited With Patient   Routine Visit Introduction

## 2020-01-31 NOTE — PLAN OF CARE
Problem: PAIN - ADULT  Goal: Verbalizes/displays adequate comfort level or baseline comfort level  Description  Interventions:  - Encourage patient to monitor pain and request assistance  - Assess pain using appropriate pain scale  - Administer analgesics based on type and severity of pain and evaluate response  - Implement non-pharmacological measures as appropriate and evaluate response  - Consider cultural and social influences on pain and pain management  - Notify physician/advanced practitioner if interventions unsuccessful or patient reports new pain  Outcome: Progressing     Problem: INFECTION - ADULT  Goal: Absence or prevention of progression during hospitalization  Description  INTERVENTIONS:  - Assess and monitor for signs and symptoms of infection  - Monitor lab/diagnostic results  - Monitor all insertion sites, i e  indwelling lines, tubes, and drains  - Monitor endotracheal if appropriate and nasal secretions for changes in amount and color  - [place] appropriate cooling/warming therapies per order  - Administer medications as ordered  - Instruct and encourage patient and family to use good hand hygiene technique  - Identify and instruct in appropriate isolation precautions for identified infection/condition  Outcome: Progressing  Goal: Absence of fever/infection during neutropenic period  Description  INTERVENTIONS:  - Monitor WBC    Outcome: Progressing     Problem: SAFETY ADULT  Goal: Patient will remain free of falls  Description  INTERVENTIONS:  - Assess patient frequently for physical needs  -  Identify cognitive and physical deficits and behaviors that affect risk of falls    -  [place] fall precautions as indicated by assessment   - Educate patient/family on patient safety including physical limitations  - Instruct patient to call for assistance with activity based on assessment  - Modify environment to reduce risk of injury  - Consider OT/PT consult to assist with strengthening/mobility  Outcome: Progressing  Goal: Maintain or return to baseline ADL function  Description  INTERVENTIONS:  -  Assess patient's ability to carry out ADLs; assess patient's baseline for ADL function and identify physical deficits which impact ability to perform ADLs (bathing, care of mouth/teeth, toileting, grooming, dressing, etc )  - Assess/evaluate cause of self-care deficits   - Assess range of motion  - Assess patient's mobility; develop plan if impaired  - Assess patient's need for assistive devices and provide as appropriate  - Encourage maximum independence but intervene and supervise when necessary  - Involve family in performance of ADLs  - Assess for home care needs following discharge   - Consider OT consult to assist with ADL evaluation and planning for discharge  - Provide patient education as appropriate  Outcome: Progressing  Goal: Maintain or return mobility status to optimal level  Description  INTERVENTIONS:  - Assess patient's baseline mobility status (ambulation, transfers, stairs, etc )    - Identify cognitive and physical deficits and behaviors that affect mobility  - Identify mobility aids required to assist with transfers and/or ambulation (gait belt, sit-to-stand, lift, walker, cane, etc )  - [place] fall precautions as indicated by assessment  - Record patient progress and toleration of activity level on Mobility SBAR; progress patient to next Phase/Stage  - Instruct patient to call for assistance with activity based on assessment  - Consider rehabilitation consult to assist with strengthening/weightbearing, etc   Outcome: Progressing     Problem: DISCHARGE PLANNING  Goal: Discharge to home or other facility with appropriate resources  Description  INTERVENTIONS:  - Identify barriers to discharge w/patient and caregiver  - Arrange for needed discharge resources and transportation as appropriate  - Identify discharge learning needs (meds, wound care, etc )  - Arrange for interpretive services to assist at discharge as needed  - Refer to Case Management Department for coordinating discharge planning if the patient needs post-hospital services based on physician/advanced practitioner order or complex needs related to functional status, cognitive ability, or social support system  Outcome: Progressing     Problem: Knowledge Deficit  Goal: Patient/family/caregiver demonstrates understanding of disease process, treatment plan, medications, and discharge instructions  Description  Complete learning assessment and assess knowledge base    Interventions:  - Provide teaching at level of understanding  - Provide teaching via preferred learning methods  Outcome: Progressing     Problem: CARDIOVASCULAR - ADULT  Goal: Maintains optimal cardiac output and hemodynamic stability  Description  INTERVENTIONS:  - Monitor I/O, vital signs and rhythm  - Monitor for S/S and trends of decreased cardiac output  - Administer and titrate ordered vasoactive medications to optimize hemodynamic stability  - Assess quality of pulses, skin color and temperature  - Assess for signs of decreased coronary artery perfusion  - Instruct patient to report change in severity of symptoms  Outcome: Progressing  Goal: Absence of cardiac dysrhythmias or at baseline rhythm  Description  INTERVENTIONS:  - Continuous cardiac monitoring, vital signs, obtain 12 lead EKG if ordered  - Administer antiarrhythmic and heart rate control medications as ordered  - Monitor electrolytes and administer replacement therapy as ordered  Outcome: Progressing     Problem: DISCHARGE PLANNING - CARE MANAGEMENT  Goal: Discharge to post-acute care or home with appropriate resources  Description  INTERVENTIONS:  - Conduct assessment to determine patient/family and health care team treatment goals, and need for post-acute services based on payer coverage, community resources, and patient preferences, and barriers to discharge  - Address psychosocial, clinical, and financial barriers to discharge as identified in assessment in conjunction with the patient/family and health care team  - Arrange appropriate level of post-acute services according to patient's   needs and preference and payer coverage in collaboration with the physician and health care team  - Communicate with and update the patient/family, physician, and health care team regarding progress on the discharge plan  - Arrange appropriate transportation to post-acute venues  - Discharge to home when medically cleared   Outcome: Progressing     Problem: Potential for Falls  Goal: Patient will remain free of falls  Description  INTERVENTIONS:  - Assess patient frequently for physical needs  -  Identify cognitive and physical deficits and behaviors that affect risk of falls    -  [place] fall precautions as indicated by assessment   - Educate patient/family on patient safety including physical limitations  - Instruct patient to call for assistance with activity based on assessment  - Modify environment to reduce risk of injury  - Consider OT/PT consult to assist with strengthening/mobility  Outcome: Progressing     Problem: Prexisting or High Potential for Compromised Skin Integrity  Goal: Skin integrity is maintained or improved  Description  INTERVENTIONS:  - Identify patients at risk for skin breakdown  - Assess and monitor skin integrity  - Assess and monitor nutrition and hydration status  - Monitor labs   - Assess for incontinence   - Turn and reposition patient  - Assist with mobility/ambulation  - Relieve pressure over bony prominences  - Avoid friction and shearing  - Provide appropriate hygiene as needed including keeping skin clean and dry  - Evaluate need for skin moisturizer/barrier cream  - Collaborate with interdisciplinary team   - Patient/family teaching  - Consider wound care consult   Outcome: Progressing

## 2020-01-31 NOTE — PROGRESS NOTES
Cardiology Progress Note - Yani Melendez 76 y o  female MRN: [id number]    Unit/Bed#: [facility] 423-01 Encounter: [id number]    Assessment and plan  1  Coronary artery disease status post CABG x3  2  Preserved left ventricular ejection fraction  3  Hypertension  4  Hyperlipidemia  5  Non-STEMI type 1    Recommendations:  Overall doing well from a cardiac standpoint  Continue current cardiac medications  No events on telemetry  Volume status has been improving  Unfortunately she may have a chylothorax  Subjective:    No significant events overnight  Denies chest pain or shortness of breath  Admitted some mild confusion earlier this morning  Chest tube drainage appeared milky suggesting chylothorax  ROS    Objective:   Vitals: Blood pressure 132/61, pulse 72, temperature (!) 97 3 °F (36 3 °C), temperature source Oral, resp  rate 18, height 5' 5" (1 651 m), weight 51 kg (112 lb 7 oz), SpO2 96 %  , Body mass index is 18 71 kg/m² ,   Orthostatic Blood Pressures      Most Recent Value   Blood Pressure  132/61 filed at 01/31/2020 0730   Patient Position - Orthostatic VS  Sitting filed at 01/31/2020 3185         Systolic (26CPT), MUF:830 , Min:97 , TMW:165     Diastolic (49VPX), BBJ:10, Min:54, Max:62      Intake/Output Summary (Last 24 hours) at 1/31/2020 1022  Last data filed at 1/31/2020 0920  Gross per 24 hour   Intake 600 ml   Output 1955 ml   Net -1355 ml     Weight (last 2 days)     Date/Time   Weight    01/31/20 0600   51 (112 43)    01/30/20 0550   52 1 (114 86)    01/29/20 0538   53 1 (117 1)                Telemetry Review: No significant arrhythmias seen on telemetry review  EKG personally reviewed by Karlee Rajan DO       Physical Exam:  Vital signs reviewed  General:  Alert and cooperative, appears stated age, no acute distress  HEENT:  PERRLA, EOMI, no scleral icterus, no conjunctival pallor  Neck:  No lymphadenopathy, no thyromegaly, no carotid bruits, no elevated JVP  Heart:  Regular rate and rhythm, normal S1/S2, no S3/S4, no murmur, rubs or gallops  PMI nondisplaced  Lungs:  Clear to auscultation bilaterally, no wheezes rales or rhonchi  Abdomen:  Soft, non-tender, positive bowel sounds, no rebound or guarding,   no organomegaly   Extremities:  Normal range of motion  No clubbing, cyanosis or edema   Vascular:  2+ pedal pulses  Skin:  No rashes or lesions on exposed skin  Neurologic:  Cranial nerves II-XII grossly intact without focal deficits      Laboratory Results:  Results from last 7 days   Lab Units 01/24/20  1755 01/24/20  1440 01/24/20  1034   TROPONIN I ng/mL 3 97* 2 48* 3 17*       CBC with diff:   Results from last 7 days   Lab Units 01/31/20  0621 01/30/20  0516 01/29/20  0519 01/28/20  0425 01/27/20  2359 01/27/20  1548  01/27/20  1424  01/27/20  0436 01/25/20  0321   WBC Thousand/uL 5 55 7 54 8 24 12 59*  --   --   --   --   --  7 44 8 27   HEMOGLOBIN g/dL 11 2* 10 9* 9 8* 10 4* 10 5* 11 1*  --   --   --  12 8 11 1*   I STAT HEMOGLOBIN g/dl  --   --   --   --   --  10 2*   < >  --    < >  --   --    HEMATOCRIT % 35 0 35 2 31 1* 33 4* 32 8* 34 2*  --   --   --  41 1 35 3   HEMATOCRIT, ISTAT %  --   --   --   --   --  30*   < >  --    < >  --   --    MCV fL 94 95 94 93  --   --   --   --   --  95 93   PLATELETS Thousands/uL 175 157 132* 145*  --  154  --  177  --  289 257   MCH pg 30 0 29 3 29 7 28 9  --   --   --   --   --  29 6 29 4   MCHC g/dL 32 0 31 0* 31 5 31 1*  --   --   --   --   --  31 1* 31 4   RDW % 14 6 14 8 15 2* 14 9  --   --   --   --   --  14 2 13 9   MPV fL 12 3 12 0 11 8 11 5  --  11 0  --  10 7  --  11 2 10 8   NRBC AUTO /100 WBCs  --   --   --   --   --   --   --   --   --  0  --     < > = values in this interval not displayed           CMP:  Results from last 7 days   Lab Units 01/31/20  0621 01/30/20  1150 01/30/20  0516 01/29/20  0519 01/28/20  0425 01/27/20  2359 01/27/20 2017 01/27/20  1548 01/27/20  1447 01/27/20  1427 01/27/20  1419 01/27/20  1354 01/27/20  1323 01/27/20  1157  01/27/20  0436   POTASSIUM mmol/L 4 1 4 0 4 4 3 7 3 8 4 1 3 6 3 3*  --   --   --   --   --   --   --  4 1   CHLORIDE mmol/L 109* 108 113* 108 114*  --   --  114*  --   --   --   --   --   --   --  107   CO2 mmol/L 29 33* 30 29 25  --   --  23  --   --   --   --   --   --   --  31   CO2, I-STAT mmol/L  --   --   --   --   --   --   --  26 26 30 28 32 26 27   < >  --    BUN mg/dL 18 18 19 22 12  --   --  12  --   --   --   --   --   --   --  17   CREATININE mg/dL 0 60 0 65 0 63 0 62 0 42*  --   --  0 55*  --   --   --   --   --   --   --  0 65   GLUCOSE, ISTAT mg/dl  --   --   --   --   --   --   --  138 120 129 128 122 142* 99  --   --    CALCIUM mg/dL 8 7 8 8 8 6 8 5 7 4*  --   --  7 8*  --   --   --   --   --   --   --  9 4   EGFR ml/min/1 73sq m 90 88 89 89 101  --   --  93  --   --   --   --   --   --   --  88    < > = values in this interval not displayed  BMP:  Results from last 7 days   Lab Units 01/31/20  0621 01/30/20  1150 01/30/20  0516 01/29/20  0519 01/28/20  0425 01/27/20  2359 01/27/20  2017 01/27/20  1548  01/27/20  0436   POTASSIUM mmol/L 4 1 4 0 4 4 3 7 3 8 4 1 3 6 3 3*  --  4 1   CHLORIDE mmol/L 109* 108 113* 108 114*  --   --  114*  --  107   CO2 mmol/L 29 33* 30 29 25  --   --  23  --  31   CO2, I-STAT mmol/L  --   --   --   --   --   --   --  26   < >  --    BUN mg/dL 18 18 19 22 12  --   --  12  --  17   CREATININE mg/dL 0 60 0 65 0 63 0 62 0 42*  --   --  0 55*  --  0 65   GLUCOSE, ISTAT mg/dl  --   --   --   --   --   --   --  138   < >  --    CALCIUM mg/dL 8 7 8 8 8 6 8 5 7 4*  --   --  7 8*  --  9 4    < > = values in this interval not displayed  BNP: No results for input(s): BNP in the last 72 hours      Magnesium:   Results from last 7 days   Lab Units 01/29/20  0519 01/28/20  0425 01/25/20  0352   MAGNESIUM mg/dL 2 6 2 5 2 1       Coags:   Results from last 7 days   Lab Units 01/26/20  0512 01/25/20  1824 01/25/20  1013 01/25/20  7767 20   PTT seconds 79* 64* 88* 36 24   INR   --   --   --   --  1 01       TSH:        Hemoglobin A1C       Lipid Profile:   Results from last 7 days   Lab Units 20  0352   TRIGLYCERIDES mg/dL 87   HDL mg/dL 79       Cardiac testing:   Results for orders placed during the hospital encounter of 20   Echo complete with contrast if indicated    Narrative [address], [address] [facility]  [phone]    Transthoracic Echocardiogram  2D, M-mode, Doppler, and Color Doppler    Study date:  2020    Patient: Duran De Leon  MR number: [id number]  Account number: [de-identified]  : 1945  Age: 76 years  Gender: Female  Status: Inpatient  Location: Bedside  Height: 64 in  Weight: 116 8 lb  BP: 129/ 67 mmHg    Indications: Non ST elevation MI  Diagnoses: I21 4 - Non-ST elevation (NSTEMI) myocardial infarction    Sonographer:  ERIC Gutierrez  Primary Physician:  Manuel Garcia DO  Referring Physician:  Ruperto Whipple MD  Group:  [facility]  cc:  Cristin Phillips MD  Interpreting Physician:  Cristin Phillips MD    SUMMARY    LEFT VENTRICLE:  Systolic function was vigorous  Ejection fraction was estimated to be 70 %  There were no regional wall motion abnormalities  Wall thickness was at the upper limits of normal     RIGHT VENTRICLE:  The size was normal   Systolic function was normal     MITRAL VALVE:  There was mild regurgitation  HISTORY: PRIOR HISTORY: HTN, HLD, syncope  PROCEDURE: The procedure was performed at the bedside  This was a routine study  The transthoracic approach was used  The study included complete 2D imaging, M-mode, complete spectral Doppler, and color Doppler  The heart rate was 66 bpm,  at the start of the study  Echocardiographic views were limited due to poor acoustic window availability  This was a technically difficult study  LEFT VENTRICLE: Size was normal  Systolic function was vigorous  Ejection fraction was estimated to be 70 %  There were no regional wall motion abnormalities  Wall thickness was at the upper limits of normal  DOPPLER: Left ventricular  diastolic function parameters were normal     RIGHT VENTRICLE: The size was normal  Systolic function was normal  Wall thickness was normal     LEFT ATRIUM: Size was normal     RIGHT ATRIUM: Size was normal     MITRAL VALVE: Valve structure was normal  There was normal leaflet separation  DOPPLER: The transmitral velocity was within the normal range  There was no evidence for stenosis  There was mild regurgitation  AORTIC VALVE: The valve was trileaflet  Leaflets exhibited mildly increased thickness, normal cuspal separation, and sclerosis  DOPPLER: Transaortic velocity was within the normal range  There was no evidence for stenosis  There was no  significant regurgitation  TRICUSPID VALVE: The valve structure was normal  There was normal leaflet separation  DOPPLER: The transtricuspid velocity was within the normal range  There was no evidence for stenosis  There was no significant regurgitation  The  tricuspid jet envelope definition was inadequate for estimation of RV systolic pressure  PULMONIC VALVE: Leaflets exhibited normal thickness, no calcification, and normal cuspal separation  DOPPLER: The transpulmonic velocity was within the normal range  There was no significant regurgitation  PERICARDIUM: There was no pericardial effusion  AORTA: The root exhibited normal size  SYSTEMIC VEINS: IVC: The inferior vena cava was normal in size  Respirophasic changes were normal     SYSTEM MEASUREMENT TABLES    2D  %FS: 36 42 %  Ao Diam: 3 15 cm  EDV(Teich): 40 82 ml  EF(Teich): 67 55 %  ESV(Teich): 13 25 ml  IVSd: 1 05 cm  LA Diam: 2 94 cm  LVIDd: 3 2 cm  LVIDs: 2 03 cm  LVPWd: 0 88 cm  SV(Teich): 27 58 ml    CW  AV Env  Ti: 304 5 ms  AV MaxP 2 mmHg  AV VTI: 21 69 cm  AV Vmax: 1 14 m/s  AV Vmean: 0 71 m/s  AV meanP 33 mmHg    MM  TAPSE: 2 46 cm    PW  LVOT Env  Ti: 349 48 ms  LVOT VTI: 19 33 cm  LVOT Vmax: 0 91 m/s  LVOT Vmean: 0 55 m/s  LVOT maxPG: 3 34 mmHg  LVOT meanP 44 mmHg  MV A Fortino: 0 96 m/s  MV Dec Leslie: 5 8 m/s2  MV DecT: 180 05 ms  MV E Fortino: 1 04 m/s  MV E/A Ratio: 1 08  MV PHT: 52 21 ms  MVA By PHT: 4 21 cm2    [address] [facility]redited Ech[facility]    Prepared and electronically signed by    Steff Arredondo MD  Signed 2020 19:02:39       No results found for this or any previous visit  No results found for this or any previous visit  No results found for this or any previous visit  Meds/Allergies   all current active meds have been reviewed  Medications Prior to Admission   Medication    aspirin 81 MG tablet    atorvastatin (LIPITOR) 10 mg tablet    atorvastatin (LIPITOR) 10 mg tablet    Calcium Carb-Cholecalciferol (CALCIUM 600 + D) 600-200 MG-UNIT TABS    cholecalciferol (VITAMIN D3) 1,000 units tablet    Fish Oil-Cholecalciferol (FISH OIL + D3) 3188-9195 MG-UNIT CAPS    triamterene-hydrochlorothiazide (MAXZIDE-25) 37 5-25 mg per tablet    acyclovir (ZOVIRAX) 5 % ointment          Assessment:  Principal Problem:     Atherosclerosis of native coronary artery with angina pectoris [facility])  Active Problems:    Hyperlipidemia    Essential hypertension    [facility] (non[facility]ST [facility]) ([facility] )    Syncope    S/P CABG x 3    Anemia    Thrombocytopenia (HCC)    Hyperchloremia

## 2020-01-31 NOTE — SOCIAL WORK
Pt remains medically unstable for d/c  Pt has been referred to and accepted for services by [facility] for her aftercare plan  When pt is medically ready for d/c, pt will return home w/ [facility] services  CM to follow

## 2020-01-31 NOTE — PROGRESS NOTES
Pts chest tube output from her pleural CT having drainage throughout day of serosanguinous color to now a thicker milky and cloudy yellow color mixed with serosanguinous color drainage  Her mediastinal CT output still serosanguinous in color  FRANDY Fermin made aware and at bedside to assess  Will continue to monitor

## 2020-02-01 LAB
GLUCOSE SERPL-MCNC: 102 MG/DL (ref 65–140)
GLUCOSE SERPL-MCNC: 124 MG/DL (ref 65–140)
GLUCOSE SERPL-MCNC: 129 MG/DL (ref 65–140)
GLUCOSE SERPL-MCNC: 88 MG/DL (ref 65–140)

## 2020-02-01 PROCEDURE — 97116 GAIT TRAINING THERAPY: CPT

## 2020-02-01 PROCEDURE — 99232 SBSQ HOSP IP/OBS MODERATE 35: CPT | Performed by: INTERNAL MEDICINE

## 2020-02-01 PROCEDURE — 99024 POSTOP FOLLOW-UP VISIT: CPT | Performed by: THORACIC SURGERY (CARDIOTHORACIC VASCULAR SURGERY)

## 2020-02-01 PROCEDURE — 82948 REAGENT STRIP/BLOOD GLUCOSE: CPT

## 2020-02-01 RX ORDER — DOCUSATE SODIUM 100 MG/1
100 CAPSULE, LIQUID FILLED ORAL 2 TIMES DAILY PRN
Status: DISCONTINUED | OUTPATIENT
Start: 2020-02-01 | End: 2020-02-04 | Stop reason: HOSPADM

## 2020-02-01 RX ORDER — BISACODYL 10 MG
10 SUPPOSITORY, RECTAL RECTAL DAILY PRN
Status: DISCONTINUED | OUTPATIENT
Start: 2020-02-01 | End: 2020-02-01

## 2020-02-01 RX ORDER — FUROSEMIDE 10 MG/ML
40 INJECTION INTRAMUSCULAR; INTRAVENOUS
Status: DISCONTINUED | OUTPATIENT
Start: 2020-02-01 | End: 2020-02-04

## 2020-02-01 RX ORDER — IBUPROFEN 600 MG/1
600 TABLET ORAL EVERY 6 HOURS PRN
Status: DISCONTINUED | OUTPATIENT
Start: 2020-02-01 | End: 2020-02-04 | Stop reason: HOSPADM

## 2020-02-01 RX ORDER — BISACODYL 10 MG
10 SUPPOSITORY, RECTAL RECTAL DAILY PRN
Status: DISCONTINUED | OUTPATIENT
Start: 2020-02-01 | End: 2020-02-04 | Stop reason: HOSPADM

## 2020-02-01 RX ORDER — POLYETHYLENE GLYCOL 3350 17 G/17G
17 POWDER, FOR SOLUTION ORAL DAILY PRN
Status: DISCONTINUED | OUTPATIENT
Start: 2020-02-01 | End: 2020-02-04 | Stop reason: HOSPADM

## 2020-02-01 RX ORDER — POTASSIUM CHLORIDE 20 MEQ/1
20 TABLET, EXTENDED RELEASE ORAL 2 TIMES DAILY
Status: DISCONTINUED | OUTPATIENT
Start: 2020-02-01 | End: 2020-02-04

## 2020-02-01 RX ORDER — POLYETHYLENE GLYCOL 3350 17 G/17G
17 POWDER, FOR SOLUTION ORAL DAILY PRN
Status: DISCONTINUED | OUTPATIENT
Start: 2020-02-01 | End: 2020-02-01

## 2020-02-01 RX ADMIN — SCOPALAMINE 1 PATCH: 1 PATCH, EXTENDED RELEASE TRANSDERMAL at 11:15

## 2020-02-01 RX ADMIN — METOPROLOL TARTRATE 12.5 MG: 25 TABLET ORAL at 08:21

## 2020-02-01 RX ADMIN — POTASSIUM CHLORIDE 20 MEQ: 1500 TABLET, EXTENDED RELEASE ORAL at 08:22

## 2020-02-01 RX ADMIN — ONDANSETRON 4 MG: 2 INJECTION INTRAMUSCULAR; INTRAVENOUS at 16:00

## 2020-02-01 RX ADMIN — ISOSORBIDE MONONITRATE 30 MG: 30 TABLET, EXTENDED RELEASE ORAL at 08:27

## 2020-02-01 RX ADMIN — IBUPROFEN 600 MG: 600 TABLET ORAL at 18:33

## 2020-02-01 RX ADMIN — MUPIROCIN 1 APPLICATION: 20 OINTMENT TOPICAL at 08:22

## 2020-02-01 RX ADMIN — IBUPROFEN 600 MG: 600 TABLET ORAL at 23:42

## 2020-02-01 RX ADMIN — FONDAPARINUX SODIUM 2.5 MG: 2.5 INJECTION, SOLUTION SUBCUTANEOUS at 08:21

## 2020-02-01 RX ADMIN — METOPROLOL TARTRATE 12.5 MG: 25 TABLET ORAL at 21:04

## 2020-02-01 RX ADMIN — POTASSIUM CHLORIDE 20 MEQ: 1500 TABLET, EXTENDED RELEASE ORAL at 17:29

## 2020-02-01 RX ADMIN — ASPIRIN 325 MG ORAL TABLET 325 MG: 325 PILL ORAL at 08:21

## 2020-02-01 RX ADMIN — FUROSEMIDE 40 MG: 10 INJECTION, SOLUTION INTRAMUSCULAR; INTRAVENOUS at 16:00

## 2020-02-01 RX ADMIN — IBUPROFEN 600 MG: 600 TABLET ORAL at 06:16

## 2020-02-01 RX ADMIN — FUROSEMIDE 40 MG: 10 INJECTION, SOLUTION INTRAMUSCULAR; INTRAVENOUS at 06:16

## 2020-02-01 RX ADMIN — PANTOPRAZOLE SODIUM 40 MG: 40 TABLET, DELAYED RELEASE ORAL at 06:17

## 2020-02-01 RX ADMIN — PANTOPRAZOLE SODIUM 40 MG: 40 TABLET, DELAYED RELEASE ORAL at 16:00

## 2020-02-01 RX ADMIN — MELATONIN 1000 UNITS: at 08:21

## 2020-02-01 RX ADMIN — Medication 1 TABLET: at 08:22

## 2020-02-01 RX ADMIN — ATORVASTATIN CALCIUM 80 MG: 80 TABLET, FILM COATED ORAL at 16:00

## 2020-02-01 NOTE — PROGRESS NOTES
Progress Note - Cardiothoracic Surgery   Jignesh Dean 76 y o  female MRN: [id number]  Unit/Bed#: [facility] 423-01 Encounter: [id number]    Coronary artery disease  S/P coronary artery bypass grafting; POD # 5      24 Hour Events: Confirmed chylothorax yesterday, IR consulted, plan for low fat diet then reassess for possible embolization next week  No other events  No complaints      Medications:   Scheduled Meds:  Current Facility-Administered Medications:  acetaminophen 975 mg Oral [facility] Lashawn Lackey, CRNP   aspirin 325 mg Oral Daily Lashawn Lackey, CRNP   atorvastatin 80 mg Oral Daily With Taniya Castro, CRNP   bisacodyl 10 mg Rectal Daily PRN Lashawn Lackey, CRNP   calcium carbonate-vitamin D 1 tablet Oral Daily With Breakfast Lashawn Lackey, CRNP   cholecalciferol 1,000 Units Oral Daily Lashawn Lackey, CRNP   docusate sodium 100 mg Oral BID Lashawn Lackey, CRNP   fondaparinux 2 5 mg Subcutaneous Daily Lashawn Lackey, CRNP   furosemide 40 mg Intravenous TID (diuretic) Manuel Judge PA-C   ibuprofen 600 mg Oral Q6H Manuel Judge PA-C   insulin lispro 1-5 Units Subcutaneous TID AC Lashawn Lackey, SKYNP   insulin lispro 1-5 Units Subcutaneous HS Lashawn Lackey, CRSONIDO   isosorbide mononitrate 30 mg Oral Daily Lashawn Lackey, CRNP   metoprolol tartrate 12 5 mg Oral Q12H [address] [place] [address], CRNP   mupirocin 1 application Nasal B07A [address] [facility], RACHEL   ondansetron 4 mg Intravenous Q6H PRN Lashawn Lackey, SKYNP   oxyCODONE 2 5 mg Oral Q4H PRN Manuel Judge PA-C   Or       oxyCODONE 5 mg Oral Q6H PRN Manuel Judge PA-C   pantoprazole 40 mg Oral BID AC Meera Renee PA-C   polyethylene glycol 17 g Oral Daily Lashawn Lackey, RACHEL   potassium chloride 20 mEq Oral TID With Meals Manuel Judge PA-C   scopolamine 1 patch Transdermal Q72H Manuel Judge PA-C   senna 1 tablet Oral HS Meera Renee PA-C   temazepam 15 mg Oral HS PRN Nelly Kern RACHEL Hickey     Continuous Infusions:   PRN Meds: bisacodyl    ondansetron    oxyCODONE **OR** oxyCODONE    temazepam    Vitals:   Vitals:    01/31/20 2306 02/01/20 0254 02/01/20 0600 02/01/20 0653   BP: 105/52 106/57  125/71   BP Location: Right arm Right arm  Left arm   Pulse: 68 80  78   Resp: 18 18  18   Temp: 98 1 °F (36 7 °C) 98 2 °F (36 8 °C)  (!) 97 4 °F (36 3 °C)   TempSrc: Oral Oral  Oral   SpO2: 96% 95%  99%   Weight:   49 8 kg (109 lb 12 6 oz)    Height:         Bp x24hrs: 100-120/50-60    Telemetry: NSR; Heart Rate: 79    Respiratory:   SpO2: SpO2: 99 %, SpO2 Activity: SpO2 Activity: At Rest, SpO2 Device: O2 Device: None (Room air); Room Air    Intake/Output:   I/O       01/30 0701 - 01/31 0700 01/31 0701 - 02/01 0700 02/01 0701 - 02/02 0700    P  O  540 540     Total Intake(mL/kg) 540 (10 6) 540 (10 8)     Urine (mL/kg/hr) 1120 (0 9) 3025 (2 5)     Chest Tube 310 109     Total Output 1430 3134     Net -890 -2594                UOP - none recorded/8hrs; 3025cc/24hrs w/ 1 unrecorded shift    Chest tube Output:    Mediastinal tubes: 19 mL/8 hours  29 mL/24 hours   Pleural tubes: 40 mL/8 hours  80 mL/24 hours     Weights:   Weight (last 2 days)     Date/Time   Weight    02/01/20 0600   49 8 (109 79)    01/31/20 0600   51 (112 43)    01/30/20 0550   52 1 (114 86)            Admit weight: 50 6kg - down 1kg from admission weight    Results:   NO NEW CBC OR BMP  Results from last 7 days   Lab Units 01/31/20  0621 01/30/20  0516 01/29/20  0519   WBC Thousand/uL 5 55 7 54 8 24   HEMOGLOBIN g/dL 11 2* 10 9* 9 8*   HEMATOCRIT % 35 0 35 2 31 1*   PLATELETS Thousands/uL 175 157 132*     Results from last 7 days   Lab Units 01/31/20  0621 01/30/20  1150 01/30/20  0516  01/27/20  1548   SODIUM mmol/L 143 143 148*   < > 145   POTASSIUM mmol/L 4 1 4 0 4 4   < > 3 3*   CHLORIDE mmol/L 109* 108 113*   < > 114*   CO2 mmol/L 29 33* 30   < > 23   CO2, I-STAT mmol/L  --   --   --   --  26   BUN mg/dL 18 18 19   < > 12 CREATININE mg/dL 0 60 0 65 0 63   < > 0 55*   GLUCOSE, ISTAT mg/dl  --   --   --   --  138   CALCIUM mg/dL 8 7 8 8 8 6   < > 7 8*    < > = values in this interval not displayed  Results from last 7 days   Lab Units 01/26/20  0512 01/25/20  1824 01/25/20  1013   PTT seconds 79* 64* 88*     Point of care glucose: 88 - 180 - 105 - 122    Studies:  CXR 1/31: resolution of bibasilar atelectasis, left CT w/ new small left apical PTX, trace right effusion    I have personally reviewed pertinent reports  and I have personally reviewed pertinent films in PACS    Invasive Lines/Tubes:  Invasive Devices     Peripheral Intravenous Line            Peripheral IV 01/30/20 Left Antecubital 2 days          Drain            Chest Tube 1 Left Pleural 32 Fr  4 days    Chest Tube 2 Posterior Mediastinal 32 Fr  4 days    Chest Tube 3 Anterior Mediastinal 32 Fr  4 days                Physical Exam:    HEENT/NECK:  PERRLA  No jugular venous distention  Cardiac: Regular rate and rhythm  Pulmonary:  Breath sounds diminished in the bases bilaterally   Abdomen:  Normal bowel sounds  Incisions: Sternum is stable  Incision is clean, dry, and intact  and Saphenectomy incison is clean, dry, and intact  Extremities: Extremities warm/dry and Trace edema B/L  Neuro: Alert and oriented X 3, Sensation is grossly intact and No focal deficits  Skin: Warm/Dry, without rashes or lesions  Assessment:  Principal Problem: Atherosclerosis of native coronary artery with angina pectoris [facility])  Active Problems:    Hyperlipidemia    Essential hypertension    NSTEMI (non-ST elevation myocardial infarction) ([address] )    Syncope    S/P CABG x 3    Anemia    Thrombocytopenia (HCC)    Hyperchloremia       Coronary artery disease  S/P coronary artery bypass grafting; POD # 5    Plan:    1   Cardiac:   NSR; HR/BP well-controlled  Lopressor, 25mg PO BID  Continue ASA and Statin therapy  Epicardial pacing wires out  Patient no longer has central IV access Continue DVT prophylaxis    2  Pulmonary:   Good Room air oxygen saturation; Continue incentive spirometry/Coughing/Deep breathing exercises  Chylothorax, CT output lower, maintain today    3  Renal:   Intake/Output net: (-)2594 mL/24 hours  Continue diuresis   Lasix 40 mg IV TID - decrease to BID  Potassium Chloride 20 mEq PO TID - decrease to BID    4  Neuro:  Neurologically intact; No active issues  Incisional pain well-controlled; Continue prn Percocet    5  GI:  Maintain low fat diet w/ fluid restriction  Maintain 1800 mL daily fluid restriction   Continue stool softeners and prn suppository  Continue GI prophylaxis    6  Endo:   No history of diabetes; Glucose well-controlled with sliding scale coverage    7  Disposition:      Ambulating independently, Anticipate discharge to home once CT can be removed     VTE Pharmacologic Prophylaxis: Fondaparinux (Arixtra)  VTE Mechanical Prophylaxis: sequential compression device    Collaborative rounds completed with LEANN Galvin    Plan of care discussed with bedside nurse    SIGNATURE: Laisha Berg PA-C  DATE: February 1, 2020  TIME: 7:33 AM

## 2020-02-01 NOTE — RESTORATIVE TECHNICIAN NOTE
Restorative Specialist Mobility Note       Activity: Ambulate in demarco     Assistive Device: Front wheel walker

## 2020-02-01 NOTE — PLAN OF CARE
Problem: PHYSICAL THERAPY ADULT  Goal: Performs mobility at highest level of function for planned discharge setting  See evaluation for individualized goals  Description  Treatment/Interventions: Functional transfer training, LE strengthening/ROM, Elevations, Therapeutic exercise, Endurance training, Patient/family training, Equipment eval/education, Bed mobility, Gait training, Spoke to nursing, OT, Family(PT spoke to CM)  Equipment Recommended: Walker(at this time; will monitor progression to SPC/no AD )       See flowsheet documentation for full assessment, interventions and recommendations  Outcome: Progressing  Note:   Prognosis: Good  Problem List: Decreased strength, Decreased endurance, Impaired balance, Decreased mobility  Assessment: Patient was modified independent for all sit to stand transfers, demonstrating good safety  Patient able to ambulate increased distance this session with no AD, CS, with 1 seated rest break required in between ambulation trials  Occasional use of HR used in hallway as a steadying balance  Trialed stairs again this session, supervision, with patient able to ascend/descend 7 steps with use of 1 HR, and 2 steps no HR use of wall only for support  Patient educated on proper step sequencing with patient reporting understanding demonstrating overall good safety  Patient would continue to benefit from physical therapy to improve functional mobility until medically cleared  Barriers to Discharge: Other (Comment)(med status)     Recommendation: Home PT, Home with family support          See flowsheet documentation for full assessment

## 2020-02-01 NOTE — PROGRESS NOTES
Cardiology Progress Note - Satish Sebastian 76 y o  female MRN: [id number]    Unit/Bed#: [facility] 423-01 Encounter: [id number]    Assessment and plan  1  Coronary artery disease status post CABG x3  2  Preserved left ventricular ejection fraction  3  Hypertension  4  Hyperlipidemia  5  Non-STEMI type 1    Recommendations:  From a cardiac standpoint she has been stable  Continues to heal following surgery  No events on telemetry  No signs decompensated heart failure volume status has been improving  Unfortunately she has a chylous effusion and they are going to try conservative measures before considering thoracic duct embolization  Will follow-up on Monday  Subjective:    No significant events overnight  Denies chest pain or shortness of breath  Was out of bed walking feels well no events on telemetry  ROS    Objective:   Vitals: Blood pressure 103/64, pulse 67, temperature 98 2 °F (36 8 °C), temperature source Oral, resp  rate 18, height 5' 5" (1 651 m), weight 49 8 kg (109 lb 12 6 oz), SpO2 100 %  , Body mass index is 18 27 kg/m² ,   Orthostatic Blood Pressures      Most Recent Value   Blood Pressure  103/64 [Map 79] filed at 02/01/2020 1142   Patient Position - Orthostatic VS  Lying filed at 02/01/2020 5175         Systolic (30QNS), WZJ:440 , Min:103 , ELY:566     Diastolic (46DXF), SNP:06, Min:52, Max:71      Intake/Output Summary (Last 24 hours) at 2/1/2020 1245  Last data filed at 2/1/2020 1200  Gross per 24 hour   Intake 180 ml   Output 3234 ml   Net -3054 ml     Weight (last 2 days)     Date/Time   Weight    02/01/20 0600   49 8 (109 79)    01/31/20 0600   51 (112 43)    01/30/20 0550   52 1 (114 86)                Telemetry Review: No significant arrhythmias seen on telemetry review  EKG personally reviewed by Aliza Del Rosario DO       Physical Exam:  Vital signs reviewed  General:  Alert and cooperative, appears stated age, no acute distress  HEENT:  PERRLA, EOMI, no scleral icterus, no conjunctival pallor  Neck:  No lymphadenopathy, no thyromegaly, no carotid bruits, no elevated JVP  Heart:  Regular rate and rhythm, normal S1/S2, no S3/S4, no murmur, rubs or gallops  PMI nondisplaced  Lungs:  Clear to auscultation bilaterally, no wheezes rales or rhonchi  Abdomen:  Soft, non-tender, positive bowel sounds, no rebound or guarding,   no organomegaly   Extremities:  Normal range of motion  No clubbing, cyanosis or edema   Vascular:  2+ pedal pulses  Skin:  No rashes or lesions on exposed skin  Neurologic:  Cranial nerves II-XII grossly intact without focal deficits      Laboratory Results:        CBC with diff:   Results from last 7 days   Lab Units 01/31/20  0621 01/30/20  0516 01/29/20  0519 01/28/20  0425 01/27/20  2359 01/27/20  1548  01/27/20  1424  01/27/20  0436   WBC Thousand/uL 5 55 7 54 8 24 12 59*  --   --   --   --   --  7 44   HEMOGLOBIN g/dL 11 2* 10 9* 9 8* 10 4* 10 5* 11 1*  --   --   --  12 8   I STAT HEMOGLOBIN g/dl  --   --   --   --   --  10 2*   < >  --    < >  --    HEMATOCRIT % 35 0 35 2 31 1* 33 4* 32 8* 34 2*  --   --   --  41 1   HEMATOCRIT, ISTAT %  --   --   --   --   --  30*   < >  --    < >  --    MCV fL 94 95 94 93  --   --   --   --   --  95   PLATELETS Thousands/uL 175 157 132* 145*  --  154  --  177  --  289   MCH pg 30 0 29 3 29 7 28 9  --   --   --   --   --  29 6   MCHC g/dL 32 0 31 0* 31 5 31 1*  --   --   --   --   --  31 1*   RDW % 14 6 14 8 15 2* 14 9  --   --   --   --   --  14 2   MPV fL 12 3 12 0 11 8 11 5  --  11 0  --  10 7  --  11 2   NRBC AUTO /100 WBCs  --   --   --   --   --   --   --   --   --  0    < > = values in this interval not displayed           CMP:  Results from last 7 days   Lab Units 01/31/20  0621 01/30/20  1150 01/30/20  0516 01/29/20  0519 01/28/20  0425 01/27/20  2359 01/27/20  2017 01/27/20  1548 01/27/20  1447 01/27/20  1427 01/27/20  1419 01/27/20  1354 01/27/20  1323 01/27/20  1157  01/27/20  0436   POTASSIUM mmol/L 4 1 4 0 4 4 3 7 3 8 4 1 3 6 3 3*  --   --   --   --   --   --   --  4 1   CHLORIDE mmol/L 109* 108 113* 108 114*  --   --  114*  --   --   --   --   --   --   --  107   CO2 mmol/L 29 33* 30 29 25  --   --  23  --   --   --   --   --   --   --  31   CO2, I-STAT mmol/L  --   --   --   --   --   --   --  26 26 30 28 32 26 27   < >  --    BUN mg/dL 18 18 19 22 12  --   --  12  --   --   --   --   --   --   --  17   CREATININE mg/dL 0 60 0 65 0 63 0 62 0 42*  --   --  0 55*  --   --   --   --   --   --   --  0 65   GLUCOSE, ISTAT mg/dl  --   --   --   --   --   --   --  138 120 129 128 122 142* 99  --   --    CALCIUM mg/dL 8 7 8 8 8 6 8 5 7 4*  --   --  7 8*  --   --   --   --   --   --   --  9 4   EGFR ml/min/1 73sq m 90 88 89 89 101  --   --  93  --   --   --   --   --   --   --  88    < > = values in this interval not displayed  BMP:  Results from last 7 days   Lab Units 01/31/20  0621 01/30/20  1150 01/30/20  0516 01/29/20  0519 01/28/20  0425 01/27/20  2359 01/27/20 2017 01/27/20  1548  01/27/20  0436   POTASSIUM mmol/L 4 1 4 0 4 4 3 7 3 8 4 1 3 6 3 3*  --  4 1   CHLORIDE mmol/L 109* 108 113* 108 114*  --   --  114*  --  107   CO2 mmol/L 29 33* 30 29 25  --   --  23  --  31   CO2, I-STAT mmol/L  --   --   --   --   --   --   --  26   < >  --    BUN mg/dL 18 18 19 22 12  --   --  12  --  17   CREATININE mg/dL 0 60 0 65 0 63 0 62 0 42*  --   --  0 55*  --  0 65   GLUCOSE, ISTAT mg/dl  --   --   --   --   --   --   --  138   < >  --    CALCIUM mg/dL 8 7 8 8 8 6 8 5 7 4*  --   --  7 8*  --  9 4    < > = values in this interval not displayed  BNP: No results for input(s): BNP in the last 72 hours      Magnesium:   Results from last 7 days   Lab Units 01/29/20  0519 01/28/20  0425   MAGNESIUM mg/dL 2 6 2 5       Coags:   Results from last 7 days   Lab Units 01/26/20  0512 01/25/20  1824   PTT seconds 79* 64*       TSH:        Hemoglobin A1C       Lipid Profile:         Cardiac testing:   Results for orders placed during the hospital encounter of 20   Echo complete with contrast if indicated    Narrative [address], [address] [facility]  [phone]    Transthoracic Echocardiogram  2D, M-mode, Doppler, and Color Doppler    Study date:  2020    Patient: Sola Finney  MR number: [id number]  Account number: [de-identified]  : 1945  Age: 76 years  Gender: Female  Status: Inpatient  Location: Bedside  Height: 64 in  Weight: 116 8 lb  BP: 129/ 67 mmHg    Indications: Non ST elevation MI  Diagnoses: I21 4 - Non-ST elevation (NSTEMI) myocardial infarction    Sonographer:  ERIC Jay  Primary Physician:  Debbie Kwan DO  Referring Physician:  Sid Ruth MD  Group:  [facility]  cc:  Sarina Plaza MD  Interpreting Physician:  Sarina Plaza MD    SUMMARY    LEFT VENTRICLE:  Systolic function was vigorous  Ejection fraction was estimated to be 70 %  There were no regional wall motion abnormalities  Wall thickness was at the upper limits of normal     RIGHT VENTRICLE:  The size was normal   Systolic function was normal     MITRAL VALVE:  There was mild regurgitation  HISTORY: PRIOR HISTORY: HTN, HLD, syncope  PROCEDURE: The procedure was performed at the bedside  This was a routine study  The transthoracic approach was used  The study included complete 2D imaging, M-mode, complete spectral Doppler, and color Doppler  The heart rate was 66 bpm,  at the start of the study  Echocardiographic views were limited due to poor acoustic window availability  This was a technically difficult study  LEFT VENTRICLE: Size was normal  Systolic function was vigorous  Ejection fraction was estimated to be 70 %  There were no regional wall motion abnormalities   Wall thickness was at the upper limits of normal  DOPPLER: Left ventricular  diastolic function parameters were normal     RIGHT VENTRICLE: The size was normal  Systolic function was normal  Wall thickness was normal     LEFT ATRIUM: Size was normal     RIGHT ATRIUM: Size was normal     MITRAL VALVE: Valve structure was normal  There was normal leaflet separation  DOPPLER: The transmitral velocity was within the normal range  There was no evidence for stenosis  There was mild regurgitation  AORTIC VALVE: The valve was trileaflet  Leaflets exhibited mildly increased thickness, normal cuspal separation, and sclerosis  DOPPLER: Transaortic velocity was within the normal range  There was no evidence for stenosis  There was no  significant regurgitation  TRICUSPID VALVE: The valve structure was normal  There was normal leaflet separation  DOPPLER: The transtricuspid velocity was within the normal range  There was no evidence for stenosis  There was no significant regurgitation  The  tricuspid jet envelope definition was inadequate for estimation of RV systolic pressure  PULMONIC VALVE: Leaflets exhibited normal thickness, no calcification, and normal cuspal separation  DOPPLER: The transpulmonic velocity was within the normal range  There was no significant regurgitation  PERICARDIUM: There was no pericardial effusion  AORTA: The root exhibited normal size  SYSTEMIC VEINS: IVC: The inferior vena cava was normal in size  Respirophasic changes were normal     SYSTEM MEASUREMENT TABLES    2D  %FS: 36 42 %  Ao Diam: 3 15 cm  EDV(Teich): 40 82 ml  EF(Teich): 67 55 %  ESV(Teich): 13 25 ml  IVSd: 1 05 cm  LA Diam: 2 94 cm  LVIDd: 3 2 cm  LVIDs: 2 03 cm  LVPWd: 0 88 cm  SV(Teich): 27 58 ml    CW  AV Env  Ti: 304 5 ms  AV MaxP 2 mmHg  AV VTI: 21 69 cm  AV Vmax: 1 14 m/s  AV Vmean: 0 71 m/s  AV meanP 33 mmHg    MM  TAPSE: 2 46 cm    PW  LVOT Env  Ti: 349 48 ms  LVOT VTI: 19 33 cm  LVOT Vmax: 0 91 m/s  LVOT Vmean: 0 55 m/s  LVOT maxPG: 3 34 mmHg  LVOT meanP 44 mmHg  MV A Fortino: 0 96 m/s  MV Dec Millard: 5 8 m/s2  MV DecT: 180 05 ms  MV E Fortino: 1 04 m/s  MV E/A Ratio: 1 08  MV PHT: 52 21 ms  MVA By PHT: 4 21 cm2    [facility] Accredited Echocardiography Laboratory    Prepared and electronically signed by    Miguel Johnson MD  Signed 24-Jan-2020 19:02:39       No results found for this or any previous visit  No results found for this or any previous visit  No results found for this or any previous visit  Meds/Allergies   all current active meds have been reviewed  Medications Prior to Admission   Medication    aspirin 81 MG tablet    atorvastatin (LIPITOR) 10 mg tablet    atorvastatin (LIPITOR) 10 mg tablet    Calcium Carb-Cholecalciferol (CALCIUM 600 + D) 600-200 MG-UNIT TABS    cholecalciferol (VITAMIN D3) 1,000 units tablet    Fish Oil-Cholecalciferol (FISH OIL + D3) 9987-2814 MG-UNIT CAPS    triamterene-hydrochlorothiazide (MAXZIDE-25) 37 5-25 mg per tablet    acyclovir (ZOVIRAX) 5 % ointment          Assessment:  Principal Problem:     Atherosclerosis of native coronary artery with angina pectoris [facility])  Active Problems:    Hyperlipidemia    Essential hypertension    NSTEMI (non-ST elevation myocardial infarction) ([facility] [address] )    Syncope    S/P CABG x 3    Anemia    Thrombocytopenia (HCC)    Hyperchloremia

## 2020-02-01 NOTE — PHYSICAL THERAPY NOTE
Physical Therapy Progress Note     02/01/20 0856   Pain Assessment   Pain Assessment 0-10   Pain Score No Pain   Restrictions/Precautions   Weight Bearing Precautions Per Order No   Other Precautions Cardiac/sternal;Telemetry;Multiple lines; Fall Risk   General   Chart Reviewed Yes   Response to Previous Treatment Patient with no complaints from previous session  Family/Caregiver Present Yes   Cognition   Overall Cognitive Status WFL   Arousal/Participation Alert; Responsive; Cooperative   Attention Attends with cues to redirect   Following Commands Follows one step commands without difficulty   Subjective   Subjective Patient seated EOB, daughter present, agreeable and motivated for physical therapy  Cleared by RN for session  Transfers   Sit to Stand 6  Modified independent   Stand to Sit 6  Modified independent   Toilet transfer 6  Modified independent   Ambulation/Elevation   Gait pattern Decreased foot clearance; Excessively slow; Step to; Inconsistent alejandro;Narrow THOMAS  (slight lateral sway)   Gait Assistance 5  Supervision  (CS )   Additional items Assist x 1   Assistive Device None   Distance 50ft x2, stair negotiation in between, 80ft x2    Stair Management Assistance 5  Supervision   Additional items Assist x 1;Verbal cues   Stair Management Technique No rails; One rail R;Step to pattern; Foreward   Number of Stairs   (7 steps w/ hand rail, 2 steps up and down no HR use of wall )   Balance   Static Sitting Good   Dynamic Sitting Fair +   Static Standing Fair +   Ambulatory Fair   Endurance Deficit   Endurance Deficit Yes   Endurance Deficit Description fatigue   Activity Tolerance   Activity Tolerance Patient tolerated treatment well   Nurse Made Aware appropriate to see   Assessment   Prognosis Good   Problem List Decreased strength;Decreased endurance; Impaired balance;Decreased mobility   Assessment Patient was modified independent for all sit to stand transfers, demonstrating good safety   Patient able to ambulate increased distance this session with no AD, CS, with 1 seated rest break required in between ambulation trials  Occasional use of HR used in hallway as a steadying balance  Trialed stairs again this session, supervision, with patient able to ascend/descend 7 steps with use of 1 HR, and 2 steps no HR use of wall only for support  Patient educated on proper step sequencing with patient reporting understanding demonstrating overall good safety  Patient would continue to benefit from physical therapy to improve functional mobility until medically cleared  Goals   Patient Goals to go home    STG Expiration Date 02/07/20   Short Term Goal #1 7-10 days  Pt will ambulate 400 ft with least restrictive device PRN mod (I) to facilitate safe return to home environment and community ambulatory distances  Pt will navigate 2 step w/o railing, but with SPC PRN mod (I) to allow pt to enter/exit home environment safely  Pt will also navigate 10 stairs w/ rail and SPC PRN mod(I) to allow pt to access 2nd floor bed/bath in home environment  Pt will be (I) for transfers to allow pt increased (I), ability to navigate home set up, and safely transfer to and from desired locations in home environment  Pt will be (I) for bed mobility to allow pt to transition into bed and OOB safely  Pt will progress to balance and therapeutic exercises in order to continue building upon pt's strength, mobility, safety and (I)  PT Treatment Day 2   Plan   Treatment/Interventions Functional transfer training;LE strengthening/ROM; Elevations; Endurance training;Patient/family training;Gait training;Spoke to nursing   Progress Improving as expected   PT Frequency Other (Comment)  (4-6x/wk)   Recommendation   Recommendation Home PT; Home with family support   Equipment Recommended Other (Comment)  (none at this time)     Solomon Epps, PTA

## 2020-02-02 ENCOUNTER — TELEPHONE (OUTPATIENT)
Dept: RADIOLOGY | Facility: HOSPITAL | Age: 75
End: 2020-02-02

## 2020-02-02 PROBLEM — J94.0 CHYLOTHORAX: Status: ACTIVE | Noted: 2020-02-02

## 2020-02-02 LAB
GLUCOSE SERPL-MCNC: 100 MG/DL (ref 65–140)
GLUCOSE SERPL-MCNC: 100 MG/DL (ref 65–140)
GLUCOSE SERPL-MCNC: 102 MG/DL (ref 65–140)
GLUCOSE SERPL-MCNC: 123 MG/DL (ref 65–140)

## 2020-02-02 PROCEDURE — 82948 REAGENT STRIP/BLOOD GLUCOSE: CPT

## 2020-02-02 PROCEDURE — 99231 SBSQ HOSP IP/OBS SF/LOW 25: CPT | Performed by: STUDENT IN AN ORGANIZED HEALTH CARE EDUCATION/TRAINING PROGRAM

## 2020-02-02 PROCEDURE — 99024 POSTOP FOLLOW-UP VISIT: CPT | Performed by: THORACIC SURGERY (CARDIOTHORACIC VASCULAR SURGERY)

## 2020-02-02 RX ADMIN — ACETAMINOPHEN 975 MG: 325 TABLET ORAL at 06:08

## 2020-02-02 RX ADMIN — PANTOPRAZOLE SODIUM 40 MG: 40 TABLET, DELAYED RELEASE ORAL at 06:08

## 2020-02-02 RX ADMIN — ACETAMINOPHEN 975 MG: 325 TABLET ORAL at 22:02

## 2020-02-02 RX ADMIN — IBUPROFEN 600 MG: 600 TABLET ORAL at 11:30

## 2020-02-02 RX ADMIN — FUROSEMIDE 40 MG: 10 INJECTION, SOLUTION INTRAMUSCULAR; INTRAVENOUS at 16:37

## 2020-02-02 RX ADMIN — ATORVASTATIN CALCIUM 80 MG: 80 TABLET, FILM COATED ORAL at 16:37

## 2020-02-02 RX ADMIN — POTASSIUM CHLORIDE 20 MEQ: 1500 TABLET, EXTENDED RELEASE ORAL at 17:51

## 2020-02-02 RX ADMIN — FONDAPARINUX SODIUM 2.5 MG: 2.5 INJECTION, SOLUTION SUBCUTANEOUS at 09:01

## 2020-02-02 RX ADMIN — POTASSIUM CHLORIDE 20 MEQ: 1500 TABLET, EXTENDED RELEASE ORAL at 08:59

## 2020-02-02 RX ADMIN — FUROSEMIDE 40 MG: 10 INJECTION, SOLUTION INTRAMUSCULAR; INTRAVENOUS at 09:01

## 2020-02-02 RX ADMIN — IBUPROFEN 600 MG: 600 TABLET ORAL at 20:32

## 2020-02-02 RX ADMIN — PANTOPRAZOLE SODIUM 40 MG: 40 TABLET, DELAYED RELEASE ORAL at 16:37

## 2020-02-02 RX ADMIN — ASPIRIN 325 MG ORAL TABLET 325 MG: 325 PILL ORAL at 08:58

## 2020-02-02 RX ADMIN — MELATONIN 1000 UNITS: at 08:59

## 2020-02-02 RX ADMIN — ACETAMINOPHEN 975 MG: 325 TABLET ORAL at 14:12

## 2020-02-02 RX ADMIN — METOPROLOL TARTRATE 12.5 MG: 25 TABLET ORAL at 20:32

## 2020-02-02 RX ADMIN — Medication 1 TABLET: at 08:58

## 2020-02-02 NOTE — PROGRESS NOTES
Progress Note - Cardiothoracic Surgery   Shashank Erickson 76 y o  female MRN: [id number]  Unit/Bed#: [facility] 423-01 Encounter: [id number]    Coronary artery disease  S/P coronary artery bypass grafting; POD # 6    24 Hour Events: D/w surgeon & anesthesia POC for chylothorax & feeling much more comfortable  No other events  No complaints      Medications:   Scheduled Meds:  Current Facility-Administered Medications:  acetaminophen 975 mg Oral [facility] RACHEL Ferguson   aspirin 325 mg Oral Daily Luca Trotter, CRNP   atorvastatin 80 mg Oral Daily With Albesa [place], CRSONIDO   bisacodyl 10 mg Rectal Daily PRN Cesar Goodman,    calcium carbonate-vitamin D 1 tablet Oral Daily With Breakfast Luca Trotter, RACHEL   cholecalciferol 1,000 Units Oral Daily Luca Trotter, RACHEL   docusate sodium 100 mg Oral BID PRN Christa Azar PA-C   fondaparinux 2 5 mg Subcutaneous Daily RACHEL Ferguson   furosemide 40 mg Intravenous BID (diuretic) Christa Azar PA-C   ibuprofen 600 mg Oral Q6H PRN Christa Azar PA-C   insulin lispro 1-5 Units Subcutaneous TID AC Luca Trotter, RACHEL   insulin lispro 1-5 Units Subcutaneous HS RACHEL Ferguson   isosorbide mononitrate 30 mg Oral Daily RACHEL Ferguson   metoprolol tartrate 12 5 mg Oral Q12H [facility], CRNP   ondansetron 4 mg Intravenous Q6H PRN RACHEL Ferguson   oxyCODONE 2 5 mg Oral Q4H PRN Christa Azar PA-C   Or       oxyCODONE 5 mg Oral Q6H PRN Christa Azar PA-C   pantoprazole 40 mg Oral BID AC Meera Renee PA-C   polyethylene glycol 17 g Oral Daily PRN Shaneka Goodman,    potassium chloride 20 mEq Oral BID Christa Azar PA-C   scopolamine 1 patch Transdermal Q72H Christa Azar PA-C   temazepam 15 mg Oral HS PRN RACHEL Ferguson     Continuous Infusions:   PRN Meds: bisacodyl    docusate sodium    ibuprofen    ondansetron    oxyCODONE **OR** oxyCODONE   polyethylene glycol    temazepam    Vitals:   Vitals:    02/01/20 2319 02/02/20 0253 02/02/20 0600 02/02/20 0651   BP: 135/76 109/66  103/59   BP Location: Right arm Left arm  Right arm   Pulse: 89 76  70   Resp: 18 18  18   Temp: 98 °F (36 7 °C) 98 °F (36 7 °C)  98 °F (36 7 °C)   TempSrc: Oral Oral  Oral   SpO2: 99% 97%  97%   Weight:   48 9 kg (107 lb 12 9 oz)    Height:         Bp x24hrs: 100-120/50-70    Telemetry: NSR; Heart Rate: 69 - 3 & 4 beat runs of NSVT    Respiratory:   SpO2: SpO2: 97 %, SpO2 Activity: SpO2 Activity: At Rest, SpO2 Device: O2 Device: None (Room air); Room Air    Intake/Output:   I/O       01/31 0701 - 02/01 0700 02/01 0701 - 02/02 0700    P  O  540 540    Total Intake(mL/kg) 540 (10 8) 540 (11)    Urine (mL/kg/hr) 3025 (2 5) 1950 (1 7)    Stool  0    Chest Tube 109 85    Total Output 3134 2035    Net -2594 -1495          Unmeasured Stool Occurrence  1 x        UOP - nonre recorded/8hrs; 1950cc/24hrs    Stool - 1/24hrs    Chest tube Output:    Mediastinal tubes: 0 mL/8 hours  25 mL/24 hours   Pleural tubes: 10 mL/8 hours  60 mL/24 hours     Weights:   Weight (last 2 days)     Date/Time   Weight    02/02/20 0600   48 9 (107 81)    02/01/20 0600   49 8 (109 79)    01/31/20 0600   51 (112 43)            Admit weight: 50 6kg - down 2kg from admission weight    Results:   NO NEW CBC OR BMP TODAY  Results from last 7 days   Lab Units 01/31/20  0621 01/30/20  0516 01/29/20  0519   WBC Thousand/uL 5 55 7 54 8 24   HEMOGLOBIN g/dL 11 2* 10 9* 9 8*   HEMATOCRIT % 35 0 35 2 31 1*   PLATELETS Thousands/uL 175 157 132*     Results from last 7 days   Lab Units 01/31/20  0621 01/30/20  1150 01/30/20  0516  01/27/20  1548   SODIUM mmol/L 143 143 148*   < > 145   POTASSIUM mmol/L 4 1 4 0 4 4   < > 3 3*   CHLORIDE mmol/L 109* 108 113*   < > 114*   CO2 mmol/L 29 33* 30   < > 23   CO2, I-STAT mmol/L  --   --   --   --  26   BUN mg/dL 18 18 19   < > 12   CREATININE mg/dL 0 60 0 65 0 63   < > 0 55*   GLUCOSE, ISTAT mg/dl  --   --   --   --  138   CALCIUM mg/dL 8 7 8 8 8 6   < > 7 8*    < > = values in this interval not displayed  Point of care glucose: 100 - 124 - 102 - 129    Studies:  No new studies    I have personally reviewed pertinent reports  Invasive Lines/Tubes:  Invasive Devices     Peripheral Intravenous Line            Peripheral IV 01/30/20 Left Antecubital 3 days          Drain            Chest Tube 1 Left Pleural 32 Fr  5 days    Chest Tube 2 Posterior Mediastinal 32 Fr  5 days    Chest Tube 3 Anterior Mediastinal 32 Fr  5 days                Physical Exam:    HEENT/NECK:  PERRLA  No jugular venous distention  Cardiac: Regular rate and rhythm  Pulmonary:  Breath sounds clear bilaterally  Abdomen:  Normal bowel sounds  Incisions: Sternum is stable  Incision is clean, dry, and intact  and Saphenectomy incison is clean, dry, and intact  Extremities: Extremities warm/dry and Trace edema B/L  Neuro: Alert and oriented X 3, Sensation is grossly intact and No focal deficits  Skin: Warm/Dry, without rashes or lesions  Assessment:  Principal Problem: Atherosclerosis of native coronary artery with angina pectoris [facility])  Active Problems:    Hyperlipidemia    Essential hypertension    NSTEMI (non-ST elevation myocardial infarction) ([facility] [address] )    Syncope    S/P CABG x 3    Anemia    Thrombocytopenia (HCC)    Hyperchloremia       Coronary artery disease  S/P coronary artery bypass grafting; POD # 6    Plan:    1  Cardiac:   NSR; HR/BP well-controlled  Lopressor, 25mg PO BID  Continue ASA and Statin therapy  Epicardial pacing wires out  Patient no longer has central IV access   Continue DVT prophylaxis    2  Pulmonary:   Good Room air oxygen saturation; Continue incentive spirometry/Coughing/Deep breathing exercises  Chloythorax, CT output lower, maintain today -- TRIAL W/ CREAM 0600 2/3 THEN CHECK CT OUTPUT TO DETERMINE NEED FOR EMBOLIZATION OR NOT    3   Renal:   Intake/Output net: (-)1495 mL/24 hours - likely more (-) given unmeasured/8hrs  Continue diuresis   Lasix 40 mg IV BID  Potassium Chloride 20 mEq PO BID    4  Neuro:  Neurologically intact; No active issues  Incisional pain well-controlled; Continue prn Percocet    5  GI:  Maintain low fat diet  Maintain 1800 mL daily fluid restriction   Continue stool softeners and prn suppository  Continue GI prophylaxis    6  Endo:   No history of diabetes; Glucose well-controlled with sliding scale coverage    7  Disposition:      Ambulating independently, Anticipate discharge to home once CT can be removed     VTE Pharmacologic Prophylaxis: Fondaparinux (Arixtra)  VTE Mechanical Prophylaxis: sequential compression device    Collaborative rounds completed with LEANN Cooney    Plan of care discussed with bedside nurse    SIGNATURE: Simón Keenan PA-C  DATE: February 2, 2020  TIME: 6:56 AM

## 2020-02-02 NOTE — PROGRESS NOTES
INTERVENTIONAL RADIOLOGY PROGRESS NOTE:    History of Present Illness:  No overnight events  Pt reports decreased output from R chest catheters  Minimal pain at incision sites  Tolerating low fat/no fat diet  Past Medical History:  Patient Active Problem List   Diagnosis    Arthritis    Cervical myofascial pain syndrome    Cervical radiculopathy    Cervicogenic headache    Cervico-occipital neuralgia    Depression    Hyperlipidemia    Essential hypertension    Osteopenia of spine    Spasmodic torticollis    Laceration of occipital scalp    Other secondary scoliosis, lumbar region    NSTEMI (non-ST elevation myocardial infarction) ([facility] [address] )    Syncope    Atherosclerosis of native coronary artery with angina pectoris (HCC)    S/P CABG x 3    Anemia    Thrombocytopenia (HCC)    Hyperchloremia    Chylothorax     Reviewed  Past Surgical History:  Past Surgical History:   Procedure Laterality Date    APPENDECTOMY      MD CABG, ARTERY-VEIN, FOUR N/A 1/27/2020    Procedure: CORONARY ARTERY BYPASS GRAFT (CABG) x3 VESSELS, LIMA TO LAD, AND SVG TO PLB & OM;  Surgeon: Papo Max DO;  [facility];  Service: Cardiac Surgery    MD ECHO TRANSESOPHAG R-T 2D W/PRB IMG ACQUISJ I&R N/A 1/27/2020    Procedure: TRANSESOPHAGEAL ECHOCARDIOGRAM (GET); Surgeon: Papo Max DO;  [facility];  Service: Cardiac Surgery    MD ENDOSCOPY W/VIDEO-ASST VEIN HARVEST,CABG Left 1/27/2020    Procedure: HARVEST VEIN ENDOSCOPIC ([address]); Surgeon: Papo Max DO;  [facility];  Service: Cardiac Surgery    TONSILLECTOMY      Last assessed - 4/20/17    TUBAL LIGATION      Last assessed - 4/20/17    WISDOM TOOTH EXTRACTION      Last assessed - 4/20/17     Reviewed      Family History:  Family History   Problem Relation Age of Onset    Coronary artery disease Mother     Arthritis Mother     Other Mother         Cardiac Disorder     Transient ischemic attack Mother     Heart attack Father     Sudden death Father         SCD     Reviewed  Social History:  Social History     Socioeconomic History    Marital status:      Spouse name: Not on file    Number of children: 3    Years of education: Post Graduate    Highest education level: Not on file   Occupational History    Occupation:      Comment: P/T    Occupation: Retired     Comment: RN   Social Needs    Financial resource strain: Not on file    Food insecurity:     Worry: Not on file     Inability: Not on file   [employer] needs:     Medical: Not on file     Non-medical: Not on file   Tobacco Use    Smoking status: Never Smoker    Smokeless tobacco: Never Used   Substance and Sexual Activity    Alcohol use: Yes     Comment: 1 wine nightly    Drug use: No    Sexual activity: Not on file   Lifestyle    Physical activity:     Days per week: Not on file     Minutes per session: Not on file    Stress: Not on file   Relationships    Social connections:     Talks on phone: Not on file     Gets together: Not on file     Attends Mandaen service: Not on file     Active member of club or organization: Not on file     Attends meetings of clubs or organizations: Not on file     Relationship status: Not on file    Intimate partner violence:     Fear of current or ex partner: Not on file     Emotionally abused: Not on file     Physically abused: Not on file     Forced sexual activity: Not on file   Other Topics Concern    Not on file   Social History Narrative    Living alone     Reviewed  Allergies:  No Known Allergies  Reviewed      Current Medications:    Current Facility-Administered Medications:     acetaminophen (TYLENOL) tablet 975 mg, 975 mg, Oral, Q8H [address], RACHEL Trinidad, 975 mg at 02/02/20 1412    aspirin tablet 325 mg, 325 mg, Oral, Daily, RACHEL Trinidad, 325 mg at 02/02/20 4314    atorvastatin (LIPITOR) tablet 80 mg, 80 mg, Oral, Daily With Adriel Campos Radha [place], 80 mg at 02/01/20 1600    bisacodyl (DULCOLAX) rectal suppository 10 mg, 10 mg, Rectal, Daily PRN, Dale Emili Goodman DO    calcium carbonate-vitamin D (OSCAL-D) 500 mg-200 units per tablet 1 tablet, 1 tablet, Oral, Daily With Breakfast, Maria Alejandra Copeland, CRNP, 1 tablet at 02/02/20 7889    cholecalciferol (VITAMIN D3) tablet 1,000 Units, 1,000 Units, Oral, Daily, Stevphen Min, CRNP, 1,000 Units at 02/02/20 2535    docusate sodium (COLACE) capsule 100 mg, 100 mg, Oral, BID PRN, Ricky To PA-C    fondaparinux (ARIXTRA) subcutaneous injection 2 5 mg, 2 5 mg, Subcutaneous, Daily, Maria Alejandra Copeland, CRNP, 2 5 mg at 02/02/20 0901    furosemide (LASIX) injection 40 mg, 40 mg, Intravenous, BID (diuretic), Ricky Zuleika, PA-C, 40 mg at 02/02/20 0901    ibuprofen (MOTRIN) tablet 600 mg, 600 mg, Oral, Q6H PRN, Ricky Dudinesh, PA-C, 600 mg at 02/02/20 1130    insulin lispro (HumaLOG) 100 units/mL subcutaneous injection 1-5 Units, 1-5 Units, Subcutaneous, TID AC, 1 Units at 01/28/20 1214 **AND** Fingerstick Glucose (POCT), , , TID AC, Robbiphen Pace, CRNP    insulin lispro (HumaLOG) 100 units/mL subcutaneous injection 1-5 Units, 1-5 Units, Subcutaneous, HS, Stevphen Pace, CRNP, 1 Units at 01/31/20 2301    isosorbide mononitrate (IMDUR) 24 hr tablet 30 mg, 30 mg, Oral, Daily, Robbiphen Min, CRNP, 30 mg at 02/01/20 0827    metoprolol tartrate (LOPRESSOR) partial tablet 12 5 mg, 12 5 mg, Oral, Q12H [address], Stevphen Pace, CRNP, 12 5 mg at 02/01/20 2104    ondansetron [facility] injection 4 mg, 4 mg, Intravenous, Q6H PRN, RACHEL Stokes, 4 mg at 02/01/20 1600    oxyCODONE (ROXICODONE) IR tablet 5 mg, 5 mg, Oral, Q6H PRN **OR** oxyCODONE (ROXICODONE) IR tablet 2 5 mg, 2 5 mg, Oral, Q4H PRN, Ricky Zuleika PA-C    pantoprazole (PROTONIX) EC tablet 40 mg, 40 mg, Oral, BID AC, Rickyisrael To PA-C, 40 mg at 02/02/20 0608    polyethylene glycol (MIRALAX) packet 17 g, 17 g, Oral, Daily PRN, Sarah Horseman Axelband, DO    potassium chloride (K-DUR,KLOR-CON) CR tablet 20 mEq, 20 mEq, Oral, BID, Petronafederica Murphy PA-C, 20 mEq at 02/02/20 0859    scopolamine (TRANSDERM-SCOP) 1 5 mg/3 days TD 72 hr patch 1 patch, 1 patch, Transdermal, Q72H, Petrona Murphy PA-C, 1 patch at 02/01/20 1115    temazepam (RESTORIL) capsule 15 mg, 15 mg, Oral, HS PRN, Earleen Jessica, CRNP, 15 mg at 01/31/20 2155  Reviewed  Physical Examination:  Vitals:    02/02/20 0600 02/02/20 0651 02/02/20 0855 02/02/20 1128   BP:  103/59 93/52 120/60   BP Location:  Right arm  Left arm   Pulse:  70  78   Resp:  18  18   Temp:  98 °F (36 7 °C)  97 9 °F (36 6 °C)   TempSrc:  Oral  Oral   SpO2:  97%  98%   Weight: 48 9 kg (107 lb 12 9 oz)      Height:           CONSTITUTIONAL: The patient appeared well in no acute distress  NEUROLOGICAL: alert, awake, answering questions appropriately  PSYCHIATRIC: Affect normal   EYES: PERRL and anicteric  PULMONARY: clear to auscultation bilaterally  No respiratory distress  CARDIOVASCULAR: HR regular  No gallops, murmurs, or rubs heard on auscultation  No peripheral edema present  GASTROINTESTINAL: abdomen was soft, round, nontender with +BS and no appreciable palpable masses  No distention present  No CVA tenderness  MUSCULOSKELETAL: gait was grossly intact  The patient did not exhibit muscle wasting  SKIN: no rashes or ulcerations present  EXTREMITIES: Without cyanosis or clubbing  Pulses are present      Labs/Imaging:  CBC with diff:   Lab Results   Component Value Date    WBC 5 55 01/31/2020    HGB 11 2 (L) 01/31/2020    HCT 35 0 01/31/2020    MCV 94 01/31/2020     01/31/2020    MCH 30 0 01/31/2020    MCHC 32 0 01/31/2020    RDW 14 6 01/31/2020    MPV 12 3 01/31/2020    NRBC 0 01/27/2020     BMP/CMP:  Lab Results   Component Value Date     03/03/2015    K 4 1 01/31/2020    K 3 5 (L) 03/03/2015     (H) 01/31/2020     03/03/2015    CO2 29 01/31/2020    CO2 26 01/27/2020    ANIONGAP 6 03/03/2015    BUN 18 01/31/2020    BUN 18 03/03/2015    CREATININE 0 60 01/31/2020    CREATININE 0 45 (L) 03/03/2015    GLUCOSE 138 01/27/2020    GLUCOSE 110 03/03/2015    CALCIUM 8 7 01/31/2020    CALCIUM 8 9 03/03/2015    AST 44 01/24/2020    AST 26 03/03/2015    ALT 32 01/24/2020    ALT 22 03/03/2015    ALKPHOS 76 01/24/2020    ALKPHOS 51 03/03/2015    PROT 6 6 03/03/2015    BILITOT 0 2 03/03/2015    EGFR 90 01/31/2020   ,     Coags:   Lab Results   Component Value Date    PTT 79 (H) 01/26/2020    INR 1 01 01/24/2020   ,          I reviewed prior clinical lab tests, independently reviewed relevant prior imaging, and reviewed and summarized old records  Review of Systems:  Negative    Assessment and Plan:  Nieves Bell is a 76 y o  female with a PMH of CAD s/p CABG c/b R chylothorax  She is tolerating her low fat diet well, with significant decrease in chest tube output  I once again had a long discussion with the patient and her family at bedside about the procedure  The thoracic surgery team is doing a fat challenge this evening, which is reasonable  Should her output bump up again after fat challenge, we can consider TDE  However, given how precipitously her output has decreased, she may not require intervention  Will discuss with the thoracic surgery team   tomorrow  Thank you for allowing me to participate in the care of Nieves Bell  Please don't hesitate to call, text, email, or TigerText with any questions  Maria Elena Osman MD  Interventional Radiologist  [facility]  Personal Cell: [phone]  [email or web address]

## 2020-02-02 NOTE — PLAN OF CARE
Problem: PAIN - ADULT  Goal: Verbalizes/displays adequate comfort level or baseline comfort level  Description  Interventions:  - Encourage patient to monitor pain and request assistance  - Assess pain using appropriate pain scale  - Administer analgesics based on type and severity of pain and evaluate response  - Implement non-pharmacological measures as appropriate and evaluate response  - Consider cultural and social influences on pain and pain management  - Notify physician/advanced practitioner if interventions unsuccessful or patient reports new pain  Outcome: Progressing     Problem: INFECTION - ADULT  Goal: Absence or prevention of progression during hospitalization  Description  INTERVENTIONS:  - Assess and monitor for signs and symptoms of infection  - Monitor lab/diagnostic results  - Monitor all insertion sites, i e  indwelling lines, tubes, and drains  - Monitor endotracheal if appropriate and nasal secretions for changes in amount and color  - [place] appropriate cooling/warming therapies per order  - Administer medications as ordered  - Instruct and encourage patient and family to use good hand hygiene technique  - Identify and instruct in appropriate isolation precautions for identified infection/condition  Outcome: Progressing  Goal: Absence of fever/infection during neutropenic period  Description  INTERVENTIONS:  - Monitor WBC    Outcome: Progressing     Problem: SAFETY ADULT  Goal: Patient will remain free of falls  Description  INTERVENTIONS:  - Assess patient frequently for physical needs  -  Identify cognitive and physical deficits and behaviors that affect risk of falls    -  [place] fall precautions as indicated by assessment   - Educate patient/family on patient safety including physical limitations  - Instruct patient to call for assistance with activity based on assessment  - Modify environment to reduce risk of injury  - Consider OT/PT consult to assist with strengthening/mobility  Outcome: Progressing  Goal: Maintain or return to baseline ADL function  Description  INTERVENTIONS:  -  Assess patient's ability to carry out ADLs; assess patient's baseline for ADL function and identify physical deficits which impact ability to perform ADLs (bathing, care of mouth/teeth, toileting, grooming, dressing, etc )  - Assess/evaluate cause of self-care deficits   - Assess range of motion  - Assess patient's mobility; develop plan if impaired  - Assess patient's need for assistive devices and provide as appropriate  - Encourage maximum independence but intervene and supervise when necessary  - Involve family in performance of ADLs  - Assess for home care needs following discharge   - Consider OT consult to assist with ADL evaluation and planning for discharge  - Provide patient education as appropriate  Outcome: Progressing  Goal: Maintain or return mobility status to optimal level  Description  INTERVENTIONS:  - Assess patient's baseline mobility status (ambulation, transfers, stairs, etc )    - Identify cognitive and physical deficits and behaviors that affect mobility  - Identify mobility aids required to assist with transfers and/or ambulation (gait belt, sit-to-stand, lift, walker, cane, etc )  - [place] fall precautions as indicated by assessment  - Record patient progress and toleration of activity level on Mobility SBAR; progress patient to next Phase/Stage  - Instruct patient to call for assistance with activity based on assessment  - Consider rehabilitation consult to assist with strengthening/weightbearing, etc   Outcome: Progressing     Problem: DISCHARGE PLANNING  Goal: Discharge to home or other facility with appropriate resources  Description  INTERVENTIONS:  - Identify barriers to discharge w/patient and caregiver  - Arrange for needed discharge resources and transportation as appropriate  - Identify discharge learning needs (meds, wound care, etc )  - Arrange for interpretive services to assist at discharge as needed  - Refer to Case Management Department for coordinating discharge planning if the patient needs post-hospital services based on physician/advanced practitioner order or complex needs related to functional status, cognitive ability, or social support system  Outcome: Progressing     Problem: Knowledge Deficit  Goal: Patient/family/caregiver demonstrates understanding of disease process, treatment plan, medications, and discharge instructions  Description  Complete learning assessment and assess knowledge base    Interventions:  - Provide teaching at level of understanding  - Provide teaching via preferred learning methods  Outcome: Progressing     Problem: CARDIOVASCULAR - ADULT  Goal: Maintains optimal cardiac output and hemodynamic stability  Description  INTERVENTIONS:  - Monitor I/O, vital signs and rhythm  - Monitor for S/S and trends of decreased cardiac output  - Administer and titrate ordered vasoactive medications to optimize hemodynamic stability  - Assess quality of pulses, skin color and temperature  - Assess for signs of decreased coronary artery perfusion  - Instruct patient to report change in severity of symptoms  Outcome: Progressing  Goal: Absence of cardiac dysrhythmias or at baseline rhythm  Description  INTERVENTIONS:  - Continuous cardiac monitoring, vital signs, obtain 12 lead EKG if ordered  - Administer antiarrhythmic and heart rate control medications as ordered  - Monitor electrolytes and administer replacement therapy as ordered  Outcome: Progressing     Problem: DISCHARGE PLANNING - CARE MANAGEMENT  Goal: Discharge to post-acute care or home with appropriate resources  Description  INTERVENTIONS:  - Conduct assessment to determine patient/family and health care team treatment goals, and need for post-acute services based on payer coverage, community resources, and patient preferences, and barriers to discharge  - Address psychosocial, clinical, and financial barriers to discharge as identified in assessment in conjunction with the patient/family and health care team  - Arrange appropriate level of post-acute services according to patient's   needs and preference and payer coverage in collaboration with the physician and health care team  - Communicate with and update the patient/family, physician, and health care team regarding progress on the discharge plan  - Arrange appropriate transportation to post-acute venues  - Discharge to home when medically cleared   Outcome: Progressing     Problem: Potential for Falls  Goal: Patient will remain free of falls  Description  INTERVENTIONS:  - Assess patient frequently for physical needs  -  Identify cognitive and physical deficits and behaviors that affect risk of falls    -  [place] fall precautions as indicated by assessment   - Educate patient/family on patient safety including physical limitations  - Instruct patient to call for assistance with activity based on assessment  - Modify environment to reduce risk of injury  - Consider OT/PT consult to assist with strengthening/mobility  Outcome: Progressing     Problem: Prexisting or High Potential for Compromised Skin Integrity  Goal: Skin integrity is maintained or improved  Description  INTERVENTIONS:  - Identify patients at risk for skin breakdown  - Assess and monitor skin integrity  - Assess and monitor nutrition and hydration status  - Monitor labs   - Assess for incontinence   - Turn and reposition patient  - Assist with mobility/ambulation  - Relieve pressure over bony prominences  - Avoid friction and shearing  - Provide appropriate hygiene as needed including keeping skin clean and dry  - Evaluate need for skin moisturizer/barrier cream  - Collaborate with interdisciplinary team   - Patient/family teaching  - Consider wound care consult   Outcome: Progressing

## 2020-02-03 LAB
GLUCOSE SERPL-MCNC: 181 MG/DL (ref 65–140)
GLUCOSE SERPL-MCNC: 97 MG/DL (ref 65–140)

## 2020-02-03 PROCEDURE — 97116 GAIT TRAINING THERAPY: CPT

## 2020-02-03 PROCEDURE — 99024 POSTOP FOLLOW-UP VISIT: CPT | Performed by: THORACIC SURGERY (CARDIOTHORACIC VASCULAR SURGERY)

## 2020-02-03 PROCEDURE — 82948 REAGENT STRIP/BLOOD GLUCOSE: CPT

## 2020-02-03 PROCEDURE — 99232 SBSQ HOSP IP/OBS MODERATE 35: CPT | Performed by: INTERNAL MEDICINE

## 2020-02-03 RX ADMIN — POTASSIUM CHLORIDE 20 MEQ: 1500 TABLET, EXTENDED RELEASE ORAL at 18:23

## 2020-02-03 RX ADMIN — FUROSEMIDE 40 MG: 10 INJECTION, SOLUTION INTRAMUSCULAR; INTRAVENOUS at 18:14

## 2020-02-03 RX ADMIN — FUROSEMIDE 40 MG: 10 INJECTION, SOLUTION INTRAMUSCULAR; INTRAVENOUS at 09:09

## 2020-02-03 RX ADMIN — ATORVASTATIN CALCIUM 80 MG: 80 TABLET, FILM COATED ORAL at 18:23

## 2020-02-03 RX ADMIN — METOPROLOL TARTRATE 12.5 MG: 25 TABLET ORAL at 21:41

## 2020-02-03 RX ADMIN — DOCUSATE SODIUM 100 MG: 100 CAPSULE, LIQUID FILLED ORAL at 21:40

## 2020-02-03 RX ADMIN — PANTOPRAZOLE SODIUM 40 MG: 40 TABLET, DELAYED RELEASE ORAL at 18:23

## 2020-02-03 RX ADMIN — ISOSORBIDE MONONITRATE 30 MG: 30 TABLET, EXTENDED RELEASE ORAL at 09:10

## 2020-02-03 RX ADMIN — METOPROLOL TARTRATE 12.5 MG: 25 TABLET ORAL at 09:08

## 2020-02-03 RX ADMIN — PANTOPRAZOLE SODIUM 40 MG: 40 TABLET, DELAYED RELEASE ORAL at 06:04

## 2020-02-03 RX ADMIN — ACETAMINOPHEN 975 MG: 325 TABLET ORAL at 21:41

## 2020-02-03 RX ADMIN — POTASSIUM CHLORIDE 20 MEQ: 1500 TABLET, EXTENDED RELEASE ORAL at 09:08

## 2020-02-03 RX ADMIN — FONDAPARINUX SODIUM 2.5 MG: 2.5 INJECTION, SOLUTION SUBCUTANEOUS at 09:08

## 2020-02-03 RX ADMIN — TEMAZEPAM 15 MG: 15 CAPSULE ORAL at 21:41

## 2020-02-03 RX ADMIN — MELATONIN 1000 UNITS: at 09:08

## 2020-02-03 RX ADMIN — ACETAMINOPHEN 975 MG: 325 TABLET ORAL at 06:04

## 2020-02-03 RX ADMIN — ASPIRIN 325 MG ORAL TABLET 325 MG: 325 PILL ORAL at 09:08

## 2020-02-03 RX ADMIN — IBUPROFEN 600 MG: 600 TABLET ORAL at 03:10

## 2020-02-03 NOTE — PROGRESS NOTES
Progress Note - Cardiothoracic Surgery   Janna Carrillo 76 y o  female MRN: [id number]  Unit/Bed#: [facility] 423-01 Encounter: [id number]    Coronary artery disease  S/P coronary artery bypass grafting; POD # 7    24 Hour Events: Patient with high fat breakfast (ice cream) this AM with no appreciable increase in chylous drainage       Medications:   Scheduled Meds:    Current Facility-Administered Medications:  acetaminophen 975 mg Oral [facility] RACHEL Lorenzo   aspirin 325 mg Oral Daily RACHEL Lorenzo   atorvastatin 80 mg Oral Daily With Dwana CallenderRACHEL   bisacodyl 10 mg Rectal Daily PRN [address],    calcium carbonate-vitamin D 1 tablet Oral Daily With Breakfast RACHEL Lorenzo   cholecalciferol 1,000 Units Oral Daily RACHEL Lorenzo   docusate sodium 100 mg Oral BID PRN Fercho Bah PA-C   fondaparinux 2 5 mg Subcutaneous Daily RACHEL Lorenzo   furosemide 40 mg Intravenous BID (diuretic) Fercho Bah PA-C   ibuprofen 600 mg Oral Q6H PRN Fercho Bah PA-C   insulin lispro 1-5 Units Subcutaneous TID AC RACHEL Lorenzo   insulin lispro 1-5 Units Subcutaneous HS RACHEL Lorenzo   isosorbide mononitrate 30 mg Oral Daily RACHEL Lorenzo   metoprolol tartrate 12 5 mg Oral Q12H [address] [facility], RACHEL   ondansetron 4 mg Intravenous Q6H PRN RACHEL Lorenzo   oxyCODONE 2 5 mg Oral Q4H PRN Fercho Bah PA-C   Or       oxyCODONE 5 mg Oral Q6H PRN Fercho Bah PA-C   pantoprazole 40 mg Oral BID AC Meera Renee PA-C   polyethylene glycol 17 g Oral Daily PRN Shaneka Goodman,    potassium chloride 20 mEq Oral BID Fercho Bah PA-C   scopolamine 1 patch Transdermal Q72H Fercho Bah PA-C   temazepam 15 mg Oral HS PRN RACHEL Lorenzo     Continuous Infusions:   PRN Meds: bisacodyl    docusate sodium    ibuprofen    ondansetron    oxyCODONE **OR** oxyCODONE    polyethylene glycol    temazepam    Vitals:   Vitals:    02/02/20 1923 02/02/20 2244 02/03/20 0348 02/03/20 0700   BP: 103/59 122/65 124/59 132/67   BP Location: Left arm Left arm Right arm Right arm   Pulse: 75 79 70 80   Resp: 18 18 19 20   Temp: 98 7 °F (37 1 °C) 98 2 °F (36 8 °C) 98 2 °F (36 8 °C) 97 6 °F (36 4 °C)   TempSrc: Oral Oral Oral Oral   SpO2: 97% 98% 100% 95%   Weight:   48 5 kg (106 lb 14 8 oz)    Height:             Telemetry: NSR; Heart Rate: 70s    Respiratory:   SpO2: SpO2: 95 %, SpO2 Activity: SpO2 Activity: At Rest, SpO2 Device: O2 Device: None (Room air); Room Air    Intake/Output:   I/O       01/31 0701 - 02/01 0700 02/01 0701 - 02/02 0700    P  O  540 540    Total Intake(mL/kg) 540 (10 8) 540 (11)    Urine (mL/kg/hr) 3025 (2 5) 1950 (1 7)    Stool  0    Chest Tube 109 85    Total Output 3134 2035    Net -2594 -1495          Unmeasured Stool Occurrence  1 x            Chest tube Output:    Mediastinal tubes: 9 mL/8 hours  45 mL/24 hours   Pleural tubes: 30 mL/8 hours  90 mL/24 hours     Weights:   Weight (last 2 days)     Date/Time   Weight    02/03/20 0348   48 5 (106 92)    02/02/20 0600   48 9 (107 81)    02/01/20 0600   49 8 (109 79)            Admit weight: 50 6kg - down 2kg from admission weight    Results:   NO NEW CBC OR BMP TODAY  Results from last 7 days   Lab Units 01/31/20  0621 01/30/20  0516 01/29/20  0519   WBC Thousand/uL 5 55 7 54 8 24   HEMOGLOBIN g/dL 11 2* 10 9* 9 8*   HEMATOCRIT % 35 0 35 2 31 1*   PLATELETS Thousands/uL 175 157 132*     Results from last 7 days   Lab Units 01/31/20  0621 01/30/20  1150 01/30/20  0516  01/27/20  1548   SODIUM mmol/L 143 143 148*   < > 145   POTASSIUM mmol/L 4 1 4 0 4 4   < > 3 3*   CHLORIDE mmol/L 109* 108 113*   < > 114*   CO2 mmol/L 29 33* 30   < > 23   CO2, I-STAT mmol/L  --   --   --   --  26   BUN mg/dL 18 18 19   < > 12   CREATININE mg/dL 0 60 0 65 0 63   < > 0 55*   GLUCOSE, ISTAT mg/dl  --   --   --   --  138   CALCIUM mg/dL 8 7 8 8 8 6   < > 7 8*    < > = values in this interval not displayed  Studies:  No new studies    I have personally reviewed pertinent reports  Invasive Lines/Tubes:  Invasive Devices     Peripheral Intravenous Line            Peripheral IV 02/03/20 Left Forearm less than 1 day          Drain            Chest Tube 1 Left Pleural 32 Fr  6 days    Chest Tube 2 Posterior Mediastinal 32 Fr  6 days    Chest Tube 3 Anterior Mediastinal 32 Fr  6 days                Physical Exam:    HEENT/NECK:  PERRLA  No jugular venous distention  Cardiac: Regular rate and rhythm and No murmurs/rubs/gallops  Pulmonary:  Breath sounds clear bilaterally  Abdomen:  Non-tender, Non-distended and Normal bowel sounds  Incisions: Sternum is stable  Incision is clean, dry, and intact  and Saphenectomy incison is clean, dry, and intact  Extremities: Extremities warm/dry and Radial pulses 2+ bilaterally  Neuro: Alert and oriented X 3, Sensation is grossly intact and No focal deficits  Skin: Warm/Dry, without rashes or lesions  Assessment:  Principal Problem: Atherosclerosis of native coronary artery with angina pectoris [facility])  Active Problems:    Hyperlipidemia    Essential hypertension    NSTEMI (non-ST elevation myocardial infarction) ([facility] [address] )    Syncope    S/P CABG x 3    Anemia    Thrombocytopenia (HCC)    Hyperchloremia    Chylothorax       Coronary artery disease  S/P coronary artery bypass grafting; POD # 7    Plan:    1  Cardiac:   NSR; HR/BP well-controlled  Lopressor, 25mg PO BID  Continue ASA and Statin therapy  Epicardial pacing wires out  Patient no longer has central IV access   Continue DVT prophylaxis    2  Pulmonary:   Good Room air oxygen saturation; Continue incentive spirometry/Coughing/Deep breathing exercises  D/C CT's today  Follow up with PA/Lat CXR  3  Renal:   Intake/Output net: (-)675 mL/24 hours Continue diuresis   Lasix 40 mg IV BID  Potassium Chloride 20 mEq PO BID    4   Neuro:  Neurologically intact; No active issues  Incisional pain well-controlled; Continue prn Percocet    5  GI:  Maintain low fat diet  Maintain 1800 mL daily fluid restriction   Continue stool softeners and prn suppository  Continue GI prophylaxis    6  Endo:   No history of diabetes; Glucose well-controlled with sliding scale coverage    7  Disposition:      Ambulating independently, Anticipate discharge to home once CT can be removed     VTE Pharmacologic Prophylaxis: Fondaparinux (Arixtra)  VTE Mechanical Prophylaxis: sequential compression device    Collaborative rounds completed with LEANN Thapa    Plan of care discussed with bedside nurse    SIGNATURE: Christy Mcgee PA-C  DATE: February 3, 2020  TIME: 8:38 AM

## 2020-02-03 NOTE — PLAN OF CARE
Problem: PHYSICAL THERAPY ADULT  Goal: Performs mobility at highest level of function for planned discharge setting  See evaluation for individualized goals  Description  Treatment/Interventions: Functional transfer training, LE strengthening/ROM, Elevations, Therapeutic exercise, Endurance training, Patient/family training, Equipment eval/education, Bed mobility, Gait training, Spoke to nursing, OT, Family(PT spoke to CM)  Equipment Recommended: Walker(at this time; will monitor progression to SPC/no AD )       See flowsheet documentation for full assessment, interventions and recommendations  Outcome: Completed  Note:   Prognosis: Good  Problem List: Impaired balance, Decreased strength, Decreased mobility  Assessment: Pt was enthusiastic and appeared confident in her abilities throughout treatment  Pt ambulated with no device and no assistance or use of handrails  She was slow moving performing stairs and used short strides during ambulation  Decrease mobility speed may be a result of lack of activity during stay, chest tubes and dietary restrictions  Pt is independent with mobility  Recommended to patient to continue ambulation on her own  Barriers to Discharge: None     Recommendation: Home with family support     PT - OK to Discharge: Yes    See flowsheet documentation for full assessment

## 2020-02-03 NOTE — PHYSICAL THERAPY NOTE
Physical Therapy Treatment Note     02/03/20 0915   Pain Assessment   Pain Assessment 0-10   Pain Score No Pain   Restrictions/Precautions   Other Precautions Cardiac/sternal;Multiple lines   Subjective   Subjective Pt states feeling better and capable of walking and doing stairs (I)  However, she stated feeling "a little off" in regards to balance during ambulation  Transfers   Sit to Stand 7  Independent   Additional items Increased time required   Stand to Sit 7  Independent   Ambulation/Elevation   Gait pattern Narrow THOMAS; Short stride; Excessively slow   Gait Assistance 7  Independent   Assistive Device None   Distance 240'   Stair Management Assistance 6  Modified independent   Additional items Increased time required;Assist x 2   Stair Management Technique One rail R;Step to pattern  (11 stairs w/ railing, 3 stairs w/ wall (A))   Number of Stairs 14   Balance   Static Sitting Normal   Static Standing Good   Ambulatory Fair +   Activity Tolerance   Activity Tolerance Patient tolerated treatment well   Nurse Mily Durham RN   Assessment   Prognosis Good   Problem List Impaired balance;Decreased strength;Decreased mobility   Assessment Pt was enthusiastic and appeared confident in her abilities throughout treatment  Pt ambulated with no device and no assistance or use of handrails  She was slow moving performing stairs and used short strides during ambulation  Decrease mobility speed may be a result of lack of activity during stay, chest tubes and dietary restrictions  Pt is independent with mobility  Recommended to patient to continue ambulation on her own  Barriers to Discharge None   Goals   Patient Goals to go home    STG Expiration Date 02/07/20   PT Treatment Day 3   Plan   Treatment/Interventions Functional transfer training;LE strengthening/ROM; Elevations; Therapeutic exercise; Endurance training;Patient/family training;Bed mobility;Gait training   Progress Discontinue PT   Recommendation Recommendation Home with family support   PT - OK to Discharge Yes       NINO Nuno

## 2020-02-03 NOTE — PROGRESS NOTES
General Cardiology Progress Note   Faye Kingston 76 y o  female MRN: [id number]  Unit/Bed#: [facility] 423-01 Encounter: [id number]      Assessment:  Principal Problem: Atherosclerosis of native coronary artery with angina pectoris ([address] )  Active Problems:    Hyperlipidemia    Essential hypertension    NSTEMI (non-ST elevation myocardial infarction) (HCC)    Syncope    S/P CABG x 3    Anemia    Thrombocytopenia (HCC)    Hyperchloremia    Chylothorax      Impression:     NSTEMI/syncope  o Diaphoresis and syncope were her admitting symptoms   Coronary artery disease  o Multivessel CAD discovered upon presentation of the above   Hypertension  o Controlled   Hyperlipidemia  o On appropriate statin therapy   CABG x3   Chylothorax  o Being treated conservatively, appears to have had decreased drainage    Plan:     Continue current plan, chest tube management per the surgical team, otherwise stable from cardiac perspective  Subjective:   Patient seen and examined  Chest tubes remain in place, she was given a high fat diet this morning, chylous drainage appears to be decreased per surgical notes    Review of Systems   Constitution: Negative for diaphoresis, fever and malaise/fatigue  Cardiovascular: Positive for chest pain (Pleuritic) and dyspnea on exertion  Respiratory: Positive for shortness of breath  Negative for cough  Objective   Vitals: Blood pressure 132/67, pulse 80, temperature 97 6 °F (36 4 °C), temperature source Oral, resp  rate 20, height 5' 5" (1 651 m), weight 48 5 kg (106 lb 14 8 oz), SpO2 95 %  , Body mass index is 17 79 kg/m² , I/O last 3 completed shifts: In: 660 [P O :660]  Out: 1563 [Urine:1600; Chest Tube:175]  No intake/output data recorded    Wt Readings from Last 3 Encounters:   02/03/20 48 5 kg (106 lb 14 8 oz)   01/24/20 53 2 kg (117 lb 4 6 oz)   11/22/19 49 8 kg (109 lb 12 8 oz)       Intake/Output Summary (Last 24 hours) at 2/3/2020 7646  Last data filed at 2/3/2020 0830  Gross per 24 hour   Intake 480 ml   Output 1335 ml   Net -855 ml     I/O last 3 completed shifts: In: 660 [P O :660]  Out: 8152 [Urine:1600; Chest Tube:175]    No significant arrhythmias seen on telemetry review  Physical Exam   Constitutional: She is oriented to person, place, and time  No distress  HENT:   Head: Normocephalic  Eyes: Conjunctivae are normal    Neck: Normal range of motion  Neck supple  No JVD present  Cardiovascular: Normal rate, regular rhythm and intact distal pulses  Exam reveals friction rub  Exam reveals no gallop  No murmur heard  Pulmonary/Chest: Effort normal  No respiratory distress  She has no wheezes  She has rales (Bilateral basilar rales are audible with slightly decreased breath sounds)  Abdominal: Soft  Bowel sounds are normal  She exhibits no distension  There is no tenderness  Musculoskeletal: Normal range of motion  She exhibits no edema  Neurological: She is alert and oriented to person, place, and time  Skin: Skin is warm and dry  She is not diaphoretic         Meds/Allergies   No Known Allergies    Current Facility-Administered Medications:     acetaminophen (TYLENOL) tablet 975 mg, 975 mg, Oral, Q8H [address], RACHEL Barone, 975 mg at 02/03/20 0604    aspirin tablet 325 mg, 325 mg, Oral, Daily, RACHEL Barone, 325 mg at 02/03/20 0908    atorvastatin (LIPITOR) tablet 80 mg, 80 mg, Oral, Daily With RACHEL Mitchell, 80 mg at 02/02/20 1637    bisacodyl (DULCOLAX) rectal suppository 10 mg, 10 mg, Rectal, Daily PRN, Sarah Goodman DO    calcium carbonate-vitamin D (OSCAL-D) 500 mg-200 units per tablet 1 tablet, 1 tablet, Oral, Daily With Breakfast, RACHEL Barone, 1 tablet at 02/02/20 0735    cholecalciferol (VITAMIN D3) tablet 1,000 Units, 1,000 Units, Oral, Daily, RACHEL Barone, 1,000 Units at 02/03/20 0908    docusate sodium (COLACE) capsule 100 mg, 100 mg, Oral, BID PRN, [place], PA-C    fondaparinux (ARIXTRA) subcutaneous injection 2 5 mg, 2 5 mg, Subcutaneous, Daily, [facility] RACHEL Morgan, 2 5 mg at 02/03/20 0908    furosemide (LASIX) injection 40 mg, 40 mg, Intravenous, BID (diuretic), Bo Tijerina PA-C, 40 mg at 02/03/20 0909    ibuprofen (MOTRIN) tablet 600 mg, 600 mg, Oral, Q6H PRN, Bo Tijerina PA-C, 600 mg at 02/03/20 0310    isosorbide mononitrate (IMDUR) 24 hr tablet 30 mg, 30 mg, Oral, Daily, [facility] RACHEL Morgan, 30 mg at 02/03/20 0910    metoprolol tartrate (LOPRESSOR) partial tablet 12 5 mg, 12 5 mg, Oral, Q12H [facility], [facility] RACHEL Morgan, 12 5 mg at 02/03/20 0908    ondansetron [facility]) injection 4 mg, 4 mg, Intravenous, Q6H PRN, [facility] RACHEL Morgan, 4 mg at 02/01/20 1600    oxyCODONE (ROXICODONE) IR tablet 5 mg, 5 mg, Oral, Q6H PRN **OR** oxyCODONE (ROXICODONE) IR tablet 2 5 mg, 2 5 mg, Oral, Q4H PRN, Bo Tijerina PA-C    pantoprazole (PROTONIX) EC tablet 40 mg, 40 mg, Oral, BID AC, Bo Tijerina PA-C, 40 mg at 02/03/20 0604    polyethylene glycol (MIRALAX) packet 17 g, 17 g, Oral, Daily PRN, Rashad Goodman, DO    potassium chloride (K-DUR,KLOR-CON) CR tablet 20 mEq, 20 mEq, Oral, BID, Bo Tijerina PA-C, 20 mEq at 02/03/20 0908    scopolamine (TRANSDERM-SCOP) 1 5 mg/3 days TD 72 hr patch 1 patch, 1 patch, Transdermal, Q72H, Bo Tijerina PA-C, 1 patch at 02/01/20 1115    temazepam (RESTORIL) capsule 15 mg, 15 mg, Oral, HS PRN, Nasra Morgan, RACHEL, 15 mg at 01/31/20 5817    Laboratory Results:        CBC with diff:   Results from last 7 days   Lab Units 01/31/20  0621 01/30/20  0516 01/29/20  0519 01/28/20  0425 01/27/20  2359 01/27/20  1548  01/27/20  1424   WBC Thousand/uL 5 55 7 54 8 24 12 59*  --   --   --   --    HEMOGLOBIN g/dL 11 2* 10 9* 9 8* 10 4* 10 5* 11 1*  --   --    I STAT HEMOGLOBIN g/dl  --   --   --   --   --  10 2*   < >  --    HEMATOCRIT % 35 0 35 2 31 1* 33 4* 32 8* 34 2*  --   --    HEMATOCRIT, ISTAT %  --   --   --   --   --  30*   < >  --    MCV fL 94 95 94 93  --   --   --   --    PLATELETS Thousands/uL 175 157 132* 145*  --  154  --  177   MCH pg 30 0 29 3 29 7 28 9  --   --   --   --    MCHC g/dL 32 0 31 0* 31 5 31 1*  --   --   --   --    RDW % 14 6 14 8 15 2* 14 9  --   --   --   --    MPV fL 12 3 12 0 11 8 11 5  --  11 0  --  10 7    < > = values in this interval not displayed  CMP:  Results from last 7 days   Lab Units 01/31/20  0621 01/30/20  1150 01/30/20  0516 01/29/20  0519 01/28/20  0425 01/27/20  2359 01/27/20 2017 01/27/20  1548  01/27/20  1447 01/27/20  1427 01/27/20  1419 01/27/20  1354 01/27/20  1323 01/27/20  1157   POTASSIUM mmol/L 4 1 4 0 4 4 3 7 3 8 4 1 3 6 3 3*   < >  --   --   --   --   --   --    CHLORIDE mmol/L 109* 108 113* 108 114*  --   --  114*  --   --   --   --   --   --   --    CO2 mmol/L 29 33* 30 29 25  --   --  23  --   --   --   --   --   --   --    CO2, I-STAT mmol/L  --   --   --   --   --   --   --  26  --  26 30 28 32 26 27   BUN mg/dL 18 18 19 22 12  --   --  12  --   --   --   --   --   --   --    CREATININE mg/dL 0 60 0 65 0 63 0 62 0 42*  --   --  0 55*  --   --   --   --   --   --   --    GLUCOSE, ISTAT mg/dl  --   --   --   --   --   --   --  138  --  120 129 128 122 142* 99   CALCIUM mg/dL 8 7 8 8 8 6 8 5 7 4*  --   --  7 8*  --   --   --   --   --   --   --    EGFR ml/min/1 73sq m 90 88 89 89 101  --   --  93  --   --   --   --   --   --   --     < > = values in this interval not displayed         BMP:  Results from last 7 days   Lab Units 01/31/20  0621 01/30/20  1150 01/30/20  0516 01/29/20  0519 01/28/20  0425 01/27/20  2359 01/27/20 2017 01/27/20  1548   POTASSIUM mmol/L 4 1 4 0 4 4 3 7 3 8 4 1 3 6 3 3*   CHLORIDE mmol/L 109* 108 113* 108 114*  --   --  114*   CO2 mmol/L 29 33* 30 29 25  --   --  23   CO2, I-STAT mmol/L  --   --   --   --   --   --   --  26   BUN mg/dL 18 18 19 22 12  --   --  12   CREATININE mg/dL 0 60 0 65 0 63 0 62 0 42*  -- --  0 55*   GLUCOSE, ISTAT mg/dl  --   --   --   --   --   --   --  138   CALCIUM mg/dL 8 7 8 8 8 6 8 5 7 4*  --   --  7 8*       NT-proBNP: No results for input(s): NTBNP in the last 72 hours  Magnesium:   Results from last 7 days   Lab Units 20  0519 20  0425   MAGNESIUM mg/dL 2 6 2 5       Coags:       TSH:        Hemoglobin A1C )      Lipid Profile:   No results found for: CHOL  Lab Results   Component Value Date    HDL 79 2020    HDL 75 2019    HDL 89 (H) 2018     Lab Results   Component Value Date    LDLCALC 95 2020    LDLCALC 139 (H) 2019    LDLCALC 98 2018     No results found for: LDLDIRECT  Lab Results   Component Value Date    TRIG 87 2020    TRIG 64 2019    TRIG 56 2018       Cardiac testing:   EKG personally reviewed by Dilshad Ventura MD      Results for orders placed during the hospital encounter of 20   Echo complete with contrast if indicated    Narrative [place] [address], [facility]  [phone]    Transthoracic Echocardiogram  2D, M-mode, Doppler, and Color Doppler    Study date:  2020    Patient: Tonja Paget  MR number: [id number]  Account number: [de-identified]  : 1945  Age: 76 years  Gender: Female  Status: Inpatient  Location: Bedside  Height: 64 in  Weight: 116 8 lb  BP: 129/ 67 mmHg    Indications: Non ST elevation MI  Diagnoses: I21 4 - Non-ST elevation (NSTEMI) myocardial infarction    Sonographer:  ERIC Shah  Primary Physician:  Da Gleason DO  Referring Physician:  Agatha Sandhu MD  Group:  [facility]  cc:  Kala Azevedo MD  Interpreting Physician:  Kala Azevedo MD    SUMMARY    LEFT VENTRICLE:  Systolic function was vigorous  Ejection fraction was estimated to be 70 %  There were no regional wall motion abnormalities    Wall thickness was at the upper limits of normal     RIGHT VENTRICLE:  The size was normal   Systolic function was normal     MITRAL VALVE:  There was mild regurgitation  HISTORY: PRIOR HISTORY: HTN, HLD, syncope  PROCEDURE: The procedure was performed at the bedside  This was a routine study  The transthoracic approach was used  The study included complete 2D imaging, M-mode, complete spectral Doppler, and color Doppler  The heart rate was 66 bpm,  at the start of the study  Echocardiographic views were limited due to poor acoustic window availability  This was a technically difficult study  LEFT VENTRICLE: Size was normal  Systolic function was vigorous  Ejection fraction was estimated to be 70 %  There were no regional wall motion abnormalities  Wall thickness was at the upper limits of normal  DOPPLER: Left ventricular  diastolic function parameters were normal     RIGHT VENTRICLE: The size was normal  Systolic function was normal  Wall thickness was normal     LEFT ATRIUM: Size was normal     RIGHT ATRIUM: Size was normal     MITRAL VALVE: Valve structure was normal  There was normal leaflet separation  DOPPLER: The transmitral velocity was within the normal range  There was no evidence for stenosis  There was mild regurgitation  AORTIC VALVE: The valve was trileaflet  Leaflets exhibited mildly increased thickness, normal cuspal separation, and sclerosis  DOPPLER: Transaortic velocity was within the normal range  There was no evidence for stenosis  There was no  significant regurgitation  TRICUSPID VALVE: The valve structure was normal  There was normal leaflet separation  DOPPLER: The transtricuspid velocity was within the normal range  There was no evidence for stenosis  There was no significant regurgitation  The  tricuspid jet envelope definition was inadequate for estimation of RV systolic pressure  PULMONIC VALVE: Leaflets exhibited normal thickness, no calcification, and normal cuspal separation  DOPPLER: The transpulmonic velocity was within the normal range   There was no significant regurgitation  PERICARDIUM: There was no pericardial effusion  AORTA: The root exhibited normal size  SYSTEMIC VEINS: IVC: The inferior vena cava was normal in size  Respirophasic changes were normal     SYSTEM MEASUREMENT TABLES    2D  %FS: 36 42 %  Ao Diam: 3 15 cm  EDV(Teich): 40 82 ml  EF(Teich): 67 55 %  ESV(Teich): 13 25 ml  IVSd: 1 05 cm  LA Diam: 2 94 cm  LVIDd: 3 2 cm  LVIDs: 2 03 cm  LVPWd: 0 88 cm  SV(Teich): 27 58 ml    CW  AV Env  Ti: 304 5 ms  AV MaxP 2 mmHg  AV VTI: 21 69 cm  AV Vmax: 1 14 m/s  AV Vmean: 0 71 m/s  AV meanP 33 mmHg    MM  TAPSE: 2 46 cm    PW  LVOT Env  Ti: 349 48 ms  LVOT VTI: 19 33 cm  LVOT Vmax: 0 91 m/s  LVOT Vmean: 0 55 m/s  LVOT maxPG: 3 34 mmHg  LVOT meanP 44 mmHg  MV A Fortino: 0 96 m/s  MV Dec [place]: 5 8 m/s2  MV DecT: 180 05 ms  MV E Fortino: 1 04 m/s  MV E/A Ratio: 1 08  MV PHT: 52 21 ms  MVA By PHT: 4 21 cm2    [address] Accredited Echocardiography [facility]    Prepared and electronically signed by    Donald Reveles MD  Signed 2020 19:02:39       No results found for this or any previous visit  Results for orders placed during the hospital encounter of 20   Cardiac catheterization    Narrative [address]  [phone]    [facility]    Invasive Cardiovascular Lab Complete Report    Patient: Silvio Mendes  MR number: [id number]  Account number: [de-identified]  Study date: 2020  Gender: Female  : 1945  Height: 64 2 in  Weight: 117 lb  BSA: 1 56 mï¾²    Allergies: NO KNOWN ALLERGIES    Diagnostic Cardiologist:  Praveen Smith MD  Primary Physician:  Giles Swan    SUMMARY    CORONARY CIRCULATION:  LAD: The vessel was medium sized  The proximal and mid LAD is heavily calcified with diffuse disease of 70-80%  Circumflex: The vessel was medium sized and heavily calcified  Ostial circumflex: There was a discrete 85 % stenosis  Mid circumflex:  There was a discrete 85 % stenosis  1st obtuse marginal: The vessel was small to medium sized  There was a tubular 50 % stenosis  2nd obtuse marginal: The vessel was small to medium sized  There was a discrete 85 % stenosis  RCA: The vessel was medium sized  Dominant  There is heavy calcification in the proximal, mid, and distal vessel  There are multiple significant lesions ( 70-90% ) seen throughout this entire area  INDICATIONS:  Myocardial infarction without ST elevation (NSTEMI)  INDICATIONS:  --  Myocardial infarction without ST elevation (NSTEMI)  PROCEDURES PERFORMED    --  Left coronary angiography  --  Right coronary angiography  --  Inpatient  --  Mod Sedation Same Physician Initial 15min  --  Mod Sedation Same Physician Add 15min  --  Coronary Catheterization (w/o LHC)  PROCEDURE: The risks and alternatives of the procedures and conscious sedation were explained to the patient and informed consent was obtained  The patient was brought to the cath lab and placed on the table  The planned puncture sites  were prepped and draped in the usual sterile fashion  --  Right femoral artery access  The puncture site was infiltrated with local anesthetic  The vessel was accessed using the modified Seldinger technique, a wire was advanced into the vessel, and a sheath was advanced over the wire into the  vessel  --  Left coronary artery angiography  A catheter was advanced over a guidewire into the aorta and positioned in the left coronary artery ostium under fluoroscopic guidance  Angiography was performed  --  Right coronary artery angiography  A catheter was advanced over a guidewire into the aorta and positioned in the right coronary artery ostium under fluoroscopic guidance  Angiography was performed  --  Inpatient  --  Mod Sedation Same Physician Initial 15min  --  Mod Sedation Same Physician Add 15min  --  Coronary Catheterization (w/o LHC)      PROCEDURE COMPLETION: The patient tolerated the procedure well and was discharged from the cath lab  TIMING: Test started at 13:06  Test concluded at 13:26  HEMOSTASIS: The sheath was removed over a wire and the Angioseal delivery sheath  was inserted into the femoral artery  Hemostasis was obtained using a closure device ( Angioseal) deployed through the delivery sheath  MEDICATIONS GIVEN: Versed (2mg/2ml), 2 mg, IV, at 13:06  Fentanyl (1OOmcg/2 ml), 50 mcg, IV, at 13:06   0 9 NSS, infusion rate of 75 ml/hr, IV, at 13:09  1% Lidocaine, 10 ml, subcutaneously, at 13:10  Versed (2mg/2ml), 1 mg, IV, at 13:21  Fentanyl (1OOmcg/2 ml), 25 mcg, IV, at 13:21  CONTRAST GIVEN: 70 ml Omnipaque (350mg I /ml)  RADIATION  EXPOSURE: Fluoroscopy time: 1 75 min  CORONARY VESSELS:   --  Left main: The vessel was medium to large sized and heavily calcified  Angiography showed minor luminal irregularities  --  LAD: The vessel was medium sized  The proximal and mid LAD is heavily calcified with diffuse disease of 70-80%  --  Circumflex: The vessel was medium sized and heavily calcified  --  Ostial circumflex: There was a discrete 85 % stenosis  --  Mid circumflex: There was a discrete 85 % stenosis  --  1st obtuse marginal: The vessel was small to medium sized  There was a tubular 50 % stenosis  --  2nd obtuse marginal: The vessel was small to medium sized  There was a discrete 85 % stenosis  --  RCA: The vessel was medium sized  Dominant  There is heavy calcification in the proximal, mid, and distal vessel  There are multiple significant lesions ( 70-90% ) seen throughout this entire area  IMPRESSIONS:  Severely calcified 3V CAD  RECOMMENDATIONS  Consultation with a cardiac surgeon be obtained consideration of coronary artery bypass grafting      Prepared and signed by    Dorothy Kay MD  Signed 01/25/2020 11:05:48    Study diagram    Angiographic findings  Native coronary lesions:  ï¾·Ostial circumflex: Lesion 1: discrete, 85 % stenosis  ï¾·Mid circumflex: Lesion 1: discrete, 85 % stenosis  ï¾·OM1: Lesion 1: tubular, 50 % stenosis  ï¾·OM2: Lesion 1: discrete, 85 % stenosis  Hemodynamic tables    Outputs:  Baseline  Outputs:  -- CALCULATIONS: [age]  Outputs:  -- CALCULATIONS: Body Surface Area: 1 56  Outputs:  -- CALCULATIONS: Height in cm: 163 00  Outputs:  -- CALCULATIONS: Sex: Female  Outputs:  -- CALCULATIONS: Weight in k 20       No results found for this or any previous visit  No results found for this or any previous visit  No results found for this or any previous visit  Jalil Zapata MD    Portions of the record may have been created with voice recognition software  Occasional wrong word or "sound a like" substitutions may have occurred due to the inherent limitations of voice recognition software  Read the chart carefully and recognize, using context, where substitutions have occurred

## 2020-02-03 NOTE — PROGRESS NOTES
Pt tolerated ice cream this morning, 240mL, as ordered to be given this morning  CT output was charted before ice cream given

## 2020-02-04 ENCOUNTER — TRANSITIONAL CARE MANAGEMENT (OUTPATIENT)
Dept: FAMILY MEDICINE CLINIC | Facility: CLINIC | Age: 75
End: 2020-02-04

## 2020-02-04 ENCOUNTER — APPOINTMENT (INPATIENT)
Dept: RADIOLOGY | Facility: HOSPITAL | Age: 75
DRG: 235 | End: 2020-02-04
Payer: MEDICARE

## 2020-02-04 VITALS
TEMPERATURE: 98.5 F | DIASTOLIC BLOOD PRESSURE: 70 MMHG | HEART RATE: 74 BPM | RESPIRATION RATE: 18 BRPM | SYSTOLIC BLOOD PRESSURE: 110 MMHG | HEIGHT: 65 IN | OXYGEN SATURATION: 97 % | WEIGHT: 106.92 LBS | BODY MASS INDEX: 17.81 KG/M2

## 2020-02-04 PROCEDURE — 71045 X-RAY EXAM CHEST 1 VIEW: CPT

## 2020-02-04 PROCEDURE — 99232 SBSQ HOSP IP/OBS MODERATE 35: CPT | Performed by: INTERNAL MEDICINE

## 2020-02-04 PROCEDURE — 99024 POSTOP FOLLOW-UP VISIT: CPT | Performed by: THORACIC SURGERY (CARDIOTHORACIC VASCULAR SURGERY)

## 2020-02-04 RX ORDER — OXYCODONE HYDROCHLORIDE 5 MG/1
TABLET ORAL
Qty: 30 TABLET | Refills: 0 | Status: SHIPPED | OUTPATIENT
Start: 2020-02-04 | End: 2020-02-26

## 2020-02-04 RX ORDER — POTASSIUM CHLORIDE 20 MEQ/1
20 TABLET, EXTENDED RELEASE ORAL DAILY
Status: DISCONTINUED | OUTPATIENT
Start: 2020-02-04 | End: 2020-02-04 | Stop reason: HOSPADM

## 2020-02-04 RX ORDER — TORSEMIDE 20 MG/1
20 TABLET ORAL DAILY
Qty: 10 TABLET | Refills: 0 | Status: SHIPPED | OUTPATIENT
Start: 2020-02-04 | End: 2020-02-10 | Stop reason: ALTCHOICE

## 2020-02-04 RX ORDER — ISOSORBIDE MONONITRATE 30 MG/1
30 TABLET, EXTENDED RELEASE ORAL DAILY
Qty: 30 TABLET | Refills: 0 | Status: SHIPPED | OUTPATIENT
Start: 2020-02-05 | End: 2020-04-10

## 2020-02-04 RX ORDER — ASPIRIN 325 MG
325 TABLET ORAL DAILY
Qty: 90 TABLET | Refills: 0 | Status: SHIPPED | OUTPATIENT
Start: 2020-02-05 | End: 2020-02-10 | Stop reason: SDUPTHER

## 2020-02-04 RX ORDER — ATORVASTATIN CALCIUM 80 MG/1
80 TABLET, FILM COATED ORAL
Qty: 90 TABLET | Refills: 0 | Status: SHIPPED | OUTPATIENT
Start: 2020-02-04 | End: 2020-04-10 | Stop reason: SDUPTHER

## 2020-02-04 RX ORDER — TORSEMIDE 20 MG/1
20 TABLET ORAL DAILY
Status: DISCONTINUED | OUTPATIENT
Start: 2020-02-04 | End: 2020-02-04 | Stop reason: HOSPADM

## 2020-02-04 RX ORDER — POTASSIUM CHLORIDE 20 MEQ/1
20 TABLET, EXTENDED RELEASE ORAL DAILY
Qty: 10 TABLET | Refills: 0 | Status: SHIPPED | OUTPATIENT
Start: 2020-02-04 | End: 2020-02-10 | Stop reason: ALTCHOICE

## 2020-02-04 RX ORDER — ACETAMINOPHEN 325 MG/1
TABLET ORAL
Qty: 90 TABLET | Refills: 0 | Status: SHIPPED | OUTPATIENT
Start: 2020-02-04 | End: 2020-06-15

## 2020-02-04 RX ORDER — PANTOPRAZOLE SODIUM 20 MG/1
20 TABLET, DELAYED RELEASE ORAL DAILY
Qty: 30 TABLET | Refills: 0 | Status: SHIPPED | OUTPATIENT
Start: 2020-02-04 | End: 2020-04-10 | Stop reason: ALTCHOICE

## 2020-02-04 RX ORDER — POLYETHYLENE GLYCOL 3350 17 G/17G
17 POWDER, FOR SOLUTION ORAL DAILY
Qty: 510 G | Refills: 0 | Status: SHIPPED | OUTPATIENT
Start: 2020-02-04 | End: 2020-04-10

## 2020-02-04 RX ADMIN — ISOSORBIDE MONONITRATE 30 MG: 30 TABLET, EXTENDED RELEASE ORAL at 09:04

## 2020-02-04 RX ADMIN — METOPROLOL TARTRATE 12.5 MG: 25 TABLET ORAL at 09:01

## 2020-02-04 RX ADMIN — PANTOPRAZOLE SODIUM 40 MG: 40 TABLET, DELAYED RELEASE ORAL at 05:09

## 2020-02-04 RX ADMIN — POTASSIUM CHLORIDE 20 MEQ: 1500 TABLET, EXTENDED RELEASE ORAL at 09:02

## 2020-02-04 RX ADMIN — MELATONIN 1000 UNITS: at 09:02

## 2020-02-04 RX ADMIN — TORSEMIDE 20 MG: 20 TABLET ORAL at 09:03

## 2020-02-04 RX ADMIN — Medication 1 TABLET: at 09:02

## 2020-02-04 RX ADMIN — ACETAMINOPHEN 975 MG: 325 TABLET ORAL at 05:07

## 2020-02-04 RX ADMIN — FONDAPARINUX SODIUM 2.5 MG: 2.5 INJECTION, SOLUTION SUBCUTANEOUS at 09:03

## 2020-02-04 RX ADMIN — ASPIRIN 325 MG ORAL TABLET 325 MG: 325 PILL ORAL at 09:02

## 2020-02-04 NOTE — DISCHARGE SUMMARY
Discharge Summary - Cardiothoracic Surgery   Jose E Vergara 76 y o  female MRN: [id number]  Unit/Bed#: [facility] 423-01 Encounter: [id number]    Admission Date: 1/24/2020     Discharge Date: 02/04/20    Admitting Diagnosis: NSTEMI (non-ST elevated myocardial infarction) [facility]) [I21 4]    Primary Discharge Diagnosis:   Coronary artery disease  S/P coronary artery bypass grafting;    Secondary Discharge Diagnosis:   Patient Active Problem List   Diagnosis    Arthritis    Cervical myofascial pain syndrome    Cervical radiculopathy    Cervicogenic headache    Cervico-occipital neuralgia    Depression    Hyperlipidemia    Essential hypertension    Osteopenia of spine    Spasmodic torticollis    Laceration of occipital scalp    Other secondary scoliosis, lumbar region    NSTEMI (non-ST elevation myocardial infarction) ([facility] [address] )    Syncope    Atherosclerosis of native coronary artery with angina pectoris (HCC)    S/P CABG x 3    Anemia    Thrombocytopenia (HCC)    Hyperchloremia    Chylothorax   Thrombocytopenia, chylothorax and hyperchloremia resolved at discharge  Attending: LEANN Sapp  Consulting Physician(s):   Cardiology  Medical/Critical Care  Interventional Radiology    Procedures Performed:   1/27: Coronary artery bypass grafting x 3 with left internal mammary artery to left anterior descending artery, saphenous vein graft to ramus intermedius and saphenous vein graft to obtuse marginal 1;   [place] [facility] Course:   1/24: Presented to ED with complaints of nausea, diaphoresis, and syncope  EKG was (-) for ischemic changes  Troponin elevated  Placed on heparin drip and taken for urgent cath, which showed MV CAD  Transferred to [facility] for cardiac surgery consultation  1/25: Patient evaluated  Preoperative workup initiated  1/26: No angina/dyspnea  Consent signed  Pre Op orders placed  IV heparin D/C   1/27: CABG x3  Given 1 PRBC intraoperatively   Transferred to ICU hemodynamically stable on Epi 2  Wean to extubate, not bleeding  Start Imdur 30mg daily  PM: Off epi  On melinda  Given 1L LR for low CI with improvement  Good UOP  Slow vent wean   1/28: No events overnight  Weaned to RA  Maintaining NSR  Delined this AM  Starting Lopressor 12 5  Lasix 40 IV daily  D/C [place]  Keep CT/EPW  Imdur continued for potential NICK spasm  Toradol x 3 doses, Tylenol ATC for narcotic intolerance  Transfer to Tele  Transition to SSI   1/29: No events  C/o N this AM, discontinue amio, add Scopolamine patch  Discontinue EPWs  Maintain CTs for high output  Increase Lasix to 40mg IV BID   1/30: Still w/ N, increase Protonix to BID, decrease PRN Oxy to 2 5 & 5mg dosing  Maintain CTs for high output, add Iboprofen 600mg PO Q6    Na 148 from 139, repeat at 1200 143   1/31: Increase in milky appearance of CT output, change to low fat diet,  in pleural fluid, CXR w/o effusion, reached out to IR for possible thoracic duct embolization  Improved N  No other events  IR consult completed; Low fat diet over the weekend, and likely thoracic duct ligation Monday; Patient NPO at midnight Sunday for procedure  2/1: No events  CT output improved on low fat diet  Requesting anesthesia eval if goes for procedure Monday 2/2: No events  CT output improved on low fat diet, to eat dairy 0600 2/3 & test CT output then determine discontinue CTs vs need for IR procedure, order placed to notify RN   2/3: minimal chylous output after ice cream  Chest tubes to be removed tomorrow, will start regular diet today  PA/Lat after  Will plan to d/c tomorrow  PM: minimal chest tube output after diet today  2/4: Minimal output on full diet  Chest tubes removed without incident  Home             today  Will order PA/Lat CXR prior to appointment with Dr Barby Lambert  Condition at Discharge:   good     Discharge Physical Exam:  HEENT/NECK:  PERRLA  No jugular venous distention  Cardiac: Regular rate and rhythm    No rubs/murmurs/gallops  Pulmonary:  Breath sounds slightly diminished at the bases bilaterally  Abdomen:  Non-tender, Non-distended  Positive bowel sounds  Incisions: Sternum is stable  Incision is clean, dry, and intact  and Saphenectomy incison is clean, dry, and intact     Saphenectomy incision is clean, dry, and intact  Lower extremities: Extremities warm/dry  Radial/PT/DP pulses 2+ bilaterally  No edema B/L  Neuro: Alert and oriented X 3  Sensation is grossly intact  No focal deficits  Skin: Warm/Dry, without rashes or lesions  Discharge Data:  Results from last 7 days   Lab Units 01/31/20  0621 01/30/20  0516 01/29/20  0519   WBC Thousand/uL 5 55 7 54 8 24   HEMOGLOBIN g/dL 11 2* 10 9* 9 8*   HEMATOCRIT % 35 0 35 2 31 1*   PLATELETS Thousands/uL 175 157 132*     Results from last 7 days   Lab Units 01/31/20  0621 01/30/20  1150 01/30/20  0516   POTASSIUM mmol/L 4 1 4 0 4 4   CHLORIDE mmol/L 109* 108 113*   CO2 mmol/L 29 33* 30   BUN mg/dL 18 18 19   CREATININE mg/dL 0 60 0 65 0 63   CALCIUM mg/dL 8 7 8 8 8 6           Discharge instructions/Information to patient and family:   See after visit summary for information provided to patient and family  Candis Clifford was educated on restrictions regarding driving and lifting, and techniques of proper incisional care  They were specifically counselled on signs and symptoms of a sternal wound infection, and advised to contact our service immediately should they develop fevers, sweats, chill, redness or drainage at the site of any incisions  Provisions for Follow-Up Care:  See after visit summary for information related to follow-up care and any pertinent home health orders  Disposition:  Home    Planned Readmission:   No    Discharge Medications:  See after visit summary for reconciled discharge medications provided to patient and family        Candis Clifford was provided contact information and scheduled a follow up appointment with Babita Corrales Erik Grimm  Additionally, they were advised to schedule follow up appointments to be seen by their primary care physician within 7-10 days, and their cardiologist within 2-3 weeks  Contact information was provided  Upon admission, troponins were elevated at 3 16  NSTEMI was identified and documented  Nieves Bell was counseled on the importance of avoiding tobacco products  As with all patients whom have undergone open heart surgery, tobacco cessation medication was contraindicated at the time of discharge  ACE/ARB was Contraindicated secondary to EF > 40% and no history of heart failure      The patient was discharged on ongoing diuretic therapy with Torsemide 20 mg, PO QD and Potassium Chloride 20 mEq, PO QD  They were advised to continue these medications for 10 days, unless otherwise directed  Narcotic pain medication was prescribed in the form of oxycodone  Prior to prescribing, their prescription profile was reviewed on the [facility] of health prescription drug monitoring program     The patient was informed that following their postoperative surgical evaluation, they will be referred to outpatient cardiac rehabilitation  They were counseled that this program is run by specialists who will help them safely strengthen their heart and prevent more heart disease  Cardiac rehabilitation will include exercise, relaxation, stress management, and heart-healthy nutrition  Caregivers will also check to make sure their medication regimen is working  I spent 30 minutes discharging the patient  This time was spent on the day of discharge  I had direct contact with the patient on the day of discharge  Additional documentation is required if more than 30 minutes were spent on discharge       SIGNATURE: Lotus Gan PA-C  DATE: February 4, 2020  TIME: 9:49 AM

## 2020-02-04 NOTE — QUICK NOTE
02/04/20    Procedure: Chest tube removal    Chest tubes removed in routine fashion without incident  Insertion site dressed with Acticoat  Vito Paddock tolerated the procedure well  Nurse notified      SIGNATURE: Alix Pierre PA-C  DATE: February 4, 2020  TIME: 9:38 AM

## 2020-02-04 NOTE — PROGRESS NOTES
General Cardiology Progress Note   Stephanie Peoples 76 y o  female MRN: [id number]  Unit/Bed#: [facility] 423-01 Encounter: [id number]      Assessment:  Principal Problem: Atherosclerosis of native coronary artery with angina pectoris ([facility] [address] )  Active Problems:    Hyperlipidemia    Essential hypertension    NSTEMI (non-ST elevation myocardial infarction) (HCC)    Syncope    S/P CABG x 3    Anemia    Thrombocytopenia (HCC)    Hyperchloremia    Chylothorax      Impression:     NSTEMI/syncope  o Diaphoresis and syncope were her admitting symptoms   Coronary artery disease  o Multivessel CAD discovered upon presentation of the above   Hypertension  o Blood pressure is well controlled   Hyperlipidemia  o Remains on appropriate statin therapy   CABG x3  o Progressing well  o Imdur added 1/28/2020 postop day 1 due to the small caliber of her LIMA  o On postoperative oral diuretic   Chylothorax  o Chest tubes removed after diminished drainage noted    Plan:     Torsemide for short temporary postoperative course, likely until left pleural effusion resolved   She has preserved LV function and no CHF   Imdur for small caliber LIMA   On appropriate statin and aspirin therapy otherwise   Beta-blocker tolerated   Ready for discharge    Subjective:   Patient seen and examined  Chest tubes removed  Pleuritic chest pain resolved  Off oxygen  No arrhythmias on telemetry  Blood pressure stable    Review of Systems   Constitution: Negative for diaphoresis, fever, malaise/fatigue and night sweats  Cardiovascular: Negative for chest pain, dyspnea on exertion, leg swelling, orthopnea, palpitations and syncope  Respiratory: Negative for cough, shortness of breath, sputum production and wheezing  Skin: Negative for itching and rash  Gastrointestinal: Negative for abdominal pain, change in bowel habit and diarrhea  Neurological: Negative for dizziness, focal weakness, light-headedness and weakness         Objective   Vitals: Blood pressure 110/70, pulse 74, temperature 98 5 °F (36 9 °C), temperature source Oral, resp  rate 18, height 5' 5" (1 651 m), weight 48 5 kg (106 lb 14 8 oz), SpO2 97 %  , Body mass index is 17 79 kg/m² , I/O last 3 completed shifts: In: 1140 [P O :1140]  Out: 9718 [Urine:1450; Chest Tube:202]  No intake/output data recorded  Wt Readings from Last 3 Encounters:   02/04/20 48 5 kg (106 lb 14 8 oz)   01/24/20 53 2 kg (117 lb 4 6 oz)   11/22/19 49 8 kg (109 lb 12 8 oz)       Intake/Output Summary (Last 24 hours) at 2/4/2020 0940  Last data filed at 2/4/2020 0793  Gross per 24 hour   Intake 900 ml   Output 1597 ml   Net -697 ml     I/O last 3 completed shifts: In: 1140 [P O :1140]  Out: 7169 [Urine:1450; Chest Tube:202]    No arrhythmias on telemetry    Physical Exam   Constitutional: She is oriented to person, place, and time  No distress  HENT:   Head: Normocephalic  Eyes: Conjunctivae are normal    Neck: Normal range of motion  Neck supple  No JVD present  Cardiovascular: Normal rate, regular rhythm, normal heart sounds and intact distal pulses  Exam reveals no gallop  No murmur heard  Pulmonary/Chest: Effort normal  No respiratory distress  She has no wheezes  She has rales (Left base primarily with decreased breath sounds)  Abdominal: Soft  Bowel sounds are normal  She exhibits no distension  There is no tenderness  Musculoskeletal: Normal range of motion  She exhibits no edema  Neurological: She is alert and oriented to person, place, and time  Skin: Skin is warm and dry  She is not diaphoretic         Meds/Allergies   No Known Allergies    Current Facility-Administered Medications:     acetaminophen (TYLENOL) tablet 975 mg, 975 mg, Oral, Q8H [facility], RACHEL Jain, 975 mg at 02/04/20 0507    aspirin tablet 325 mg, 325 mg, Oral, Daily, RACHEL Jain, 325 mg at 02/04/20 5243    atorvastatin (LIPITOR) tablet 80 mg, 80 mg, Oral, Daily With Patti GuayanillaRACHEL peter, 80 mg at 02/03/20 1823    bisacodyl (DULCOLAX) rectal suppository 10 mg, 10 mg, Rectal, Daily PRN, Nicko Goodman DO    calcium carbonate-vitamin D (OSCAL-D) 500 mg-200 units per tablet 1 tablet, 1 tablet, Oral, Daily With Breakfast, RACHEL Wise, 1 tablet at 02/04/20 5671    cholecalciferol (VITAMIN D3) tablet 1,000 Units, 1,000 Units, Oral, Daily, RACHEL Wise, 1,000 Units at 02/04/20 0902    docusate sodium (COLACE) capsule 100 mg, 100 mg, Oral, BID PRN, Liv Chaudhary PA-C, 100 mg at 02/03/20 2140    fondaparinux (ARIXTRA) subcutaneous injection 2 5 mg, 2 5 mg, Subcutaneous, Daily, RACHEL Wise, 2 5 mg at 02/04/20 8594    ibuprofen (MOTRIN) tablet 600 mg, 600 mg, Oral, Q6H PRN, Liv Chaudhary PA-C, 600 mg at 02/03/20 0310    isosorbide mononitrate (IMDUR) 24 hr tablet 30 mg, 30 mg, Oral, Daily, RACHEL Wise, 30 mg at 02/04/20 3365    metoprolol tartrate (LOPRESSOR) partial tablet 12 5 mg, 12 5 mg, Oral, Q12H [address], RACHEL Wise, 12 5 mg at 02/04/20 0901    ondansetron [facility]) injection 4 mg, 4 mg, Intravenous, Q6H PRN, RACHEL Wise, 4 mg at 02/01/20 1600    oxyCODONE (ROXICODONE) IR tablet 5 mg, 5 mg, Oral, Q6H PRN **OR** oxyCODONE (ROXICODONE) IR tablet 2 5 mg, 2 5 mg, Oral, Q4H PRN, Liv Chaudhary PA-C    pantoprazole (PROTONIX) EC tablet 40 mg, 40 mg, Oral, BID AC, Liv Chaudhary PA-C, 40 mg at 02/04/20 0509    polyethylene glycol (MIRALAX) packet 17 g, 17 g, Oral, Daily PRN, Nicko Goodman DO    potassium chloride (K-DUR,KLOR-CON) CR tablet 20 mEq, 20 mEq, Oral, Daily, Amadeo Miner PA-C, 20 mEq at 02/04/20 0902    scopolamine (TRANSDERM-SCOP) 1 5 mg/3 days TD 72 hr patch 1 patch, 1 patch, Transdermal, Q72H, Liv Chaudhary PA-C, 1 patch at 02/01/20 1115    temazepam (RESTORIL) capsule 15 mg, 15 mg, Oral, HS PRN, RACHEL Wise, 15 mg at 02/03/20 2141    torsemide (DEMADEX) tablet 20 mg, 20 mg, Oral, Daily, Gopi Jacinto PA-C, 20 mg at 02/04/20 7578    Laboratory Results:        CBC with diff:   Results from last 7 days   Lab Units 01/31/20  0621 01/30/20  0516 01/29/20  0519   WBC Thousand/uL 5 55 7 54 8 24   HEMOGLOBIN g/dL 11 2* 10 9* 9 8*   HEMATOCRIT % 35 0 35 2 31 1*   MCV fL 94 95 94   PLATELETS Thousands/uL 175 157 132*   MCH pg 30 0 29 3 29 7   MCHC g/dL 32 0 31 0* 31 5   RDW % 14 6 14 8 15 2*   MPV fL 12 3 12 0 11 8       CMP:  Results from last 7 days   Lab Units 01/31/20  0621 01/30/20  1150 01/30/20  0516 01/29/20  0519   POTASSIUM mmol/L 4 1 4 0 4 4 3 7   CHLORIDE mmol/L 109* 108 113* 108   CO2 mmol/L 29 33* 30 29   BUN mg/dL 18 18 19 22   CREATININE mg/dL 0 60 0 65 0 63 0 62   CALCIUM mg/dL 8 7 8 8 8 6 8 5   EGFR ml/min/1 73sq m 90 88 89 89       BMP:  Results from last 7 days   Lab Units 01/31/20  0621 01/30/20  1150 01/30/20  0516 01/29/20  0519   POTASSIUM mmol/L 4 1 4 0 4 4 3 7   CHLORIDE mmol/L 109* 108 113* 108   CO2 mmol/L 29 33* 30 29   BUN mg/dL 18 18 19 22   CREATININE mg/dL 0 60 0 65 0 63 0 62   CALCIUM mg/dL 8 7 8 8 8 6 8 5       NT-proBNP: No results for input(s): NTBNP in the last 72 hours       Magnesium:   Results from last 7 days   Lab Units 01/29/20  0519   MAGNESIUM mg/dL 2 6       Coags:       TSH:        Hemoglobin A1C )      Lipid Profile:   No results found for: CHOL  Lab Results   Component Value Date    HDL 79 01/25/2020    HDL 75 11/19/2019    HDL 89 (H) 11/19/2018     Lab Results   Component Value Date    LDLCALC 95 01/25/2020    LDLCALC 139 (H) 11/19/2019    LDLCALC 98 11/19/2018     No results found for: LDLDIRECT  Lab Results   Component Value Date    TRIG 87 01/25/2020    TRIG 64 11/19/2019    TRIG 56 11/19/2018       Cardiac testing:   EKG personally reviewed by Pepper Tse MD      Results for orders placed during the hospital encounter of 01/24/20   Echo complete with contrast if indicated    Narrative Rodney [address] [place] [id number]  [phone]    Transthoracic Echocardiogram  2D, M-mode, Doppler, and Color Doppler    Study date:  2020    Patient: Tess Urban  MR number: [id number]  Account number: [de-identified]  : 1945  Age: 76 years  Gender: Female  Status: Inpatient  Location: Bedside  Height: 64 in  Weight: 116 8 lb  BP: 129/ 67 mmHg    Indications: Non ST elevation MI  Diagnoses: I21 4 - Non-ST elevation (NSTEMI) myocardial infarction    Sonographer:  ERIC Valenzuela  Primary Physician:  Larisa Ordaz DO  Referring Physician:  Cara Fitzgerald MD  Group:  [facility]  cc:  Naya Leal MD  Interpreting Physician:  Naya Leal MD    SUMMARY    LEFT VENTRICLE:  Systolic function was vigorous  Ejection fraction was estimated to be 70 %  There were no regional wall motion abnormalities  Wall thickness was at the upper limits of normal     RIGHT VENTRICLE:  The size was normal   Systolic function was normal     MITRAL VALVE:  There was mild regurgitation  HISTORY: PRIOR HISTORY: HTN, HLD, syncope  PROCEDURE: The procedure was performed at the bedside  This was a routine study  The transthoracic approach was used  The study included complete 2D imaging, M-mode, complete spectral Doppler, and color Doppler  The heart rate was 66 bpm,  at the start of the study  Echocardiographic views were limited due to poor acoustic window availability  This was a technically difficult study  LEFT VENTRICLE: Size was normal  Systolic function was vigorous  Ejection fraction was estimated to be 70 %  There were no regional wall motion abnormalities   Wall thickness was at the upper limits of normal  DOPPLER: Left ventricular  diastolic function parameters were normal     RIGHT VENTRICLE: The size was normal  Systolic function was normal  Wall thickness was normal     LEFT ATRIUM: Size was normal     RIGHT ATRIUM: Size was normal     MITRAL VALVE: Valve structure was normal  There was normal leaflet separation  DOPPLER: The transmitral velocity was within the normal range  There was no evidence for stenosis  There was mild regurgitation  AORTIC VALVE: The valve was trileaflet  Leaflets exhibited mildly increased thickness, normal cuspal separation, and sclerosis  DOPPLER: Transaortic velocity was within the normal range  There was no evidence for stenosis  There was no  significant regurgitation  TRICUSPID VALVE: The valve structure was normal  There was normal leaflet separation  DOPPLER: The transtricuspid velocity was within the normal range  There was no evidence for stenosis  There was no significant regurgitation  The  tricuspid jet envelope definition was inadequate for estimation of RV systolic pressure  PULMONIC VALVE: Leaflets exhibited normal thickness, no calcification, and normal cuspal separation  DOPPLER: The transpulmonic velocity was within the normal range  There was no significant regurgitation  PERICARDIUM: There was no pericardial effusion  AORTA: The root exhibited normal size  SYSTEMIC VEINS: IVC: The inferior vena cava was normal in size  Respirophasic changes were normal     SYSTEM MEASUREMENT TABLES    2D  %FS: 36 42 %  Ao Diam: 3 15 cm  EDV(Teich): 40 82 ml  EF(Teich): 67 55 %  ESV(Teich): 13 25 ml  IVSd: 1 05 cm  LA Diam: 2 94 cm  LVIDd: 3 2 cm  LVIDs: 2 03 cm  LVPWd: 0 88 cm  SV(Teich): 27 58 ml    CW  AV Env  Ti: 304 5 ms  AV MaxP 2 mmHg  AV VTI: 21 69 cm  AV Vmax: 1 14 m/s  AV Vmean: 0 71 m/s  AV meanP 33 mmHg    MM  TAPSE: 2 46 cm    PW  LVOT Env  Ti: 349 48 ms  LVOT VTI: 19 33 cm  LVOT Vmax: 0 91 m/s  LVOT Vmean: 0 55 m/s  LVOT maxPG: 3 34 mmHg  LVOT meanP 44 mmHg  MV A Fortino: 0 96 m/s  MV Dec Rutherford: 5 8 m/s2  MV DecT: 180 05 ms  MV E Fortino: 1 04 m/s  MV E/A Ratio: 1 08  MV PHT: 52 21 ms  MVA By PHT: 4 21 cm2    [address] Accredited Echocardiography Laboratory    Prepared and electronically signed by    Rebel Jasmine MD  Signed 2020 19:02:39       No results found for this or any previous visit  Results for orders placed during the hospital encounter of 20   Cardiac catheterization    Narrative [address] [facility]  [phone]    [facility]    Invasive Cardiovascular Lab Complete Report    Patient: Guero Avila  MR number: [id number]  Account number: [de-identified]  Study date: 2020  Gender: Female  : 1945  Height: 64 2 in  Weight: 117 lb  BSA: 1 56 mï¾²    Allergies: NO KNOWN ALLERGIES    Diagnostic Cardiologist:  Dorothy Kay MD  Primary Physician:  Gurvinder DOCKERY    CORONARY CIRCULATION:  LAD: The vessel was medium sized  The proximal and mid LAD is heavily calcified with diffuse disease of 70-80%  Circumflex: The vessel was medium sized and heavily calcified  Ostial circumflex: There was a discrete 85 % stenosis  Mid circumflex: There was a discrete 85 % stenosis  1st obtuse marginal: The vessel was small to medium sized  There was a tubular 50 % stenosis  2nd obtuse marginal: The vessel was small to medium sized  There was a discrete 85 % stenosis  RCA: The vessel was medium sized  Dominant  There is heavy calcification in the proximal, mid, and distal vessel  There are multiple significant lesions ( 70-90% ) seen throughout this entire area  INDICATIONS:  Myocardial infarction without ST elevation (NSTEMI)  INDICATIONS:  --  Myocardial infarction without ST elevation (NSTEMI)  PROCEDURES PERFORMED    --  Left coronary angiography  --  Right coronary angiography  --  Inpatient  --  Mod Sedation Same Physician Initial 15min  --  Mod Sedation Same Physician Add 15min  --  Coronary Catheterization (w/o LHC)  PROCEDURE: The risks and alternatives of the procedures and conscious sedation were explained to the patient and informed consent was obtained  The patient was brought to the cath lab and placed on the table   The planned puncture sites  were prepped and draped in the usual sterile fashion  --  Right femoral artery access  The puncture site was infiltrated with local anesthetic  The vessel was accessed using the modified Seldinger technique, a wire was advanced into the vessel, and a sheath was advanced over the wire into the  vessel  --  Left coronary artery angiography  A catheter was advanced over a guidewire into the aorta and positioned in the left coronary artery ostium under fluoroscopic guidance  Angiography was performed  --  Right coronary artery angiography  A catheter was advanced over a guidewire into the aorta and positioned in the right coronary artery ostium under fluoroscopic guidance  Angiography was performed  --  Inpatient  --  Mod Sedation Same Physician Initial 15min  --  Mod Sedation Same Physician Add 15min  --  Coronary Catheterization (w/o [facility])  PROCEDURE COMPLETION: The patient tolerated the procedure well and was discharged from the cath lab  TIMING: Test started at 13:06  Test concluded at 13:26  HEMOSTASIS: The sheath was removed over a wire and the Angioseal delivery sheath  was inserted into the femoral artery  Hemostasis was obtained using a closure device ( Angioseal) deployed through the delivery sheath  MEDICATIONS GIVEN: Versed (2mg/2ml), 2 mg, IV, at 13:06  Fentanyl (1OOmcg/2 ml), 50 mcg, IV, at 13:06   0 9 NSS, infusion rate of 75 ml/hr, IV, at 13:09  1% Lidocaine, 10 ml, subcutaneously, at 13:10  Versed (2mg/2ml), 1 mg, IV, at 13:21  Fentanyl (1OOmcg/2 ml), 25 mcg, IV, at 13:21  CONTRAST GIVEN: 70 ml Omnipaque (350mg I /ml)  RADIATION  EXPOSURE: Fluoroscopy time: 1 75 min  CORONARY VESSELS:   --  Left main: The vessel was medium to large sized and heavily calcified  Angiography showed minor luminal irregularities  --  LAD: The vessel was medium sized  The proximal and mid LAD is heavily calcified with diffuse disease of 70-80%    --  Circumflex: The vessel was medium sized and heavily calcified  --  Ostial circumflex: There was a discrete 85 % stenosis  --  Mid circumflex: There was a discrete 85 % stenosis  --  1st obtuse marginal: The vessel was small to medium sized  There was a tubular 50 % stenosis  --  2nd obtuse marginal: The vessel was small to medium sized  There was a discrete 85 % stenosis  --  RCA: The vessel was medium sized  Dominant  There is heavy calcification in the proximal, mid, and distal vessel  There are multiple significant lesions ( 70-90% ) seen throughout this entire area  IMPRESSIONS:  Severely calcified 3V CAD  RECOMMENDATIONS  Consultation with a cardiac surgeon be obtained consideration of coronary artery bypass grafting  Prepared and signed by    Slava Krishnan MD  Signed 2020 11:05:48    Study diagram    Angiographic findings  Native coronary lesions:  ï¾·Ostial circumflex: Lesion 1: discrete, 85 % stenosis  ï¾·Mid circumflex: Lesion 1: discrete, 85 % stenosis  ï¾·OM1: Lesion 1: tubular, 50 % stenosis  ï¾·OM2: Lesion 1: discrete, 85 % stenosis  Hemodynamic tables    Outputs:  Baseline  Outputs:  -- CALCULATIONS: [age]  Outputs:  -- CALCULATIONS: Body Surface Area: 1 56  Outputs:  -- CALCULATIONS: Height in cm: 163 00  Outputs:  -- CALCULATIONS: Sex: Female  Outputs:  -- CALCULATIONS: Weight in k 20       No results found for this or any previous visit  No results found for this or any previous visit  No results found for this or any previous visit  Suzanna Hutchison MD    Portions of the record may have been created with voice recognition software  Occasional wrong word or "sound a like" substitutions may have occurred due to the inherent limitations of voice recognition software  Read the chart carefully and recognize, using context, where substitutions have occurred

## 2020-02-04 NOTE — PROGRESS NOTES
Progress Note - Cardiothoracic Surgery   Tommy Jones 76 y o  female MRN: [id number]  Unit/Bed#: [facility] 423-01 Encounter: [id number]    Coronary artery disease  S/P coronary artery bypass grafting; POD # 8    24 Hour Events: Patient with high fat breakfast (ice cream) this AM with no appreciable increase in chylous drainage       Medications:   Scheduled Meds:    Current Facility-Administered Medications:  acetaminophen 975 mg Oral [facility] RACHEL Barone   aspirin 325 mg Oral Daily Earleen Jessica, SKYNP   atorvastatin 80 mg Oral Daily With Alroy RadRACHEL   bisacodyl 10 mg Rectal Daily PRN Sarah Goodman,    calcium carbonate-vitamin D 1 tablet Oral Daily With Breakfast RACHEL Barone   cholecalciferol 1,000 Units Oral Daily Wilfredo Lopez, RACHEL   docusate sodium 100 mg Oral BID PRN Petrona Murphy PA-C   fondaparinux 2 5 mg Subcutaneous Daily RACHEL Barone   furosemide 40 mg Intravenous BID (diuretic) Petrona Murphy PA-C   ibuprofen 600 mg Oral Q6H PRN Petrona Murphy PA-C   isosorbide mononitrate 30 mg Oral Daily RACHEL Barone   metoprolol tartrate 12 5 mg Oral Q12H [address] [place] [address], RACHEL   ondansetron 4 mg Intravenous Q6H PRN RACHEL Barone   oxyCODONE 2 5 mg Oral Q4H PRN Petrona Murphy PA-C   Or       oxyCODONE 5 mg Oral Q6H PRN Petrona Murphy PA-C   pantoprazole 40 mg Oral BID AC Meera Renee PA-C   polyethylene glycol 17 g Oral Daily PRN Shaneka Goodman,    potassium chloride 20 mEq Oral BID Petrona Murphy PA-C   scopolamine 1 patch Transdermal Q72H Petrona Murphy PA-C   temazepam 15 mg Oral HS PRN RACHEL Barone     Continuous Infusions:   PRN Meds: bisacodyl    docusate sodium    ibuprofen    ondansetron    oxyCODONE **OR** oxyCODONE    polyethylene glycol    temazepam    Vitals:   Vitals:    02/04/20 0301 02/04/20 0600 02/04/20 0634 02/04/20 0754   BP: 103/56   108/66   BP Location: Left arm   Left arm   Pulse: 73   74   Resp: 17   18   Temp: 98 2 °F (36 8 °C)   98 5 °F (36 9 °C)   TempSrc: Oral   Oral   SpO2: 97%   97%   Weight:  48 5 kg (106 lb 14 8 oz) 48 5 kg (106 lb 14 8 oz)    Height:             Telemetry: NSR; Heart Rate: 70s    Respiratory:   SpO2: SpO2: 97 %, SpO2 Activity: SpO2 Activity: At Rest, SpO2 Device: O2 Device: None (Room air); Room Air    Intake/Output:   I/O       01/31 0701 - 02/01 0700 02/01 0701 - 02/02 0700    P  O  540 540    Total Intake(mL/kg) 540 (10 8) 540 (11)    Urine (mL/kg/hr) 3025 (2 5) 1950 (1 7)    Stool  0    Chest Tube 109 85    Total Output 3134 2035    Net -1046 -0034          Unmeasured Stool Occurrence  1 x            Chest tube Output:    Mediastinal tubes: 9 mL/8 hours  45 mL/24 hours   Pleural tubes: 30 mL/8 hours  90 mL/24 hours     Weights:   Weight (last 2 days)     Date/Time   Weight    02/04/20 0634   48 5 (106 92)    02/04/20 0600   48 5 (106 92)    02/03/20 0348   48 5 (106 92)    02/02/20 0600   48 9 (107 81)            Admit weight: 50 6kg - down 2kg from admission weight    Results:   NO NEW CBC OR BMP TODAY  Results from last 7 days   Lab Units 01/31/20  0621 01/30/20  0516 01/29/20  0519   WBC Thousand/uL 5 55 7 54 8 24   HEMOGLOBIN g/dL 11 2* 10 9* 9 8*   HEMATOCRIT % 35 0 35 2 31 1*   PLATELETS Thousands/uL 175 157 132*     Results from last 7 days   Lab Units 01/31/20  0621 01/30/20  1150 01/30/20  0516   SODIUM mmol/L 143 143 148*   POTASSIUM mmol/L 4 1 4 0 4 4   CHLORIDE mmol/L 109* 108 113*   CO2 mmol/L 29 33* 30   BUN mg/dL 18 18 19   CREATININE mg/dL 0 60 0 65 0 63   CALCIUM mg/dL 8 7 8 8 8 6             Studies:  No new studies    I have personally reviewed pertinent reports        Invasive Lines/Tubes:  Invasive Devices     Peripheral Intravenous Line            Peripheral IV 02/03/20 Left Forearm 1 day          Drain            Chest Tube 1 Left Pleural 32 Fr  7 days    Chest Tube 2 Posterior Mediastinal 32 Fr  7 days    Chest Tube 3 Anterior Mediastinal 32 Fr  7 days              Physical Exam:    HEENT/NECK:  PERRLA  No jugular venous distention  Cardiac: Regular rate and rhythm and No murmurs/rubs/gallops  Pulmonary:  Breath sounds clear bilaterally  Abdomen:  Non-tender, Non-distended and Normal bowel sounds  Incisions: Sternum is stable  Incision is clean, dry, and intact  Extremities: Extremities warm/dry and Radial pulses 2+ bilaterally  Neuro: Alert and oriented X 3 and Sensation is grossly intact  Skin: Warm/Dry, without rashes or lesions  Assessment:  Principal Problem: Atherosclerosis of native coronary artery with angina pectoris [facility])  Active Problems:    Hyperlipidemia    Essential hypertension    NSTEMI (non-ST elevation myocardial infarction) ([facility] [address] )    Syncope    S/P CABG x 3    Anemia    Thrombocytopenia (HCC)    Hyperchloremia    Chylothorax       Coronary artery disease  S/P coronary artery bypass grafting; POD # 8    Plan:    1  Cardiac:   NSR; HR/BP well-controlled  Lopressor, 25mg PO BID  Continue ASA and Statin therapy  Epicardial pacing wires out  Patient no longer has central IV access   Continue DVT prophylaxis    2  Pulmonary:   Good Room air oxygen saturation; Continue incentive spirometry/Coughing/Deep breathing exercises  D/C CT's today  Follow up with PA/Lat CXR  3  Renal:   Intake/Output net: (-)697 mL/24 hours Continue diuresis     Patient euvolemic, will decrease diuresis to Torsemide daily with K   4  Neuro:  Neurologically intact; No active issues  Incisional pain well-controlled; Continue prn Percocet    5  GI:  Maintain low fat diet  Maintain 1800 mL daily fluid restriction   Continue stool softeners and prn suppository  Continue GI prophylaxis    6  Endo:   No history of diabetes; Glucose well-controlled with sliding scale coverage    7     Disposition:      Ambulating independently, Anticipate discharge to home once CT can be removed     VTE Pharmacologic Prophylaxis: Fondaparinux (Arixtra)  VTE Mechanical Prophylaxis: sequential compression device    Collaborative rounds completed with LEANN Lopez    Plan of care discussed with bedside nurse    SIGNATURE: Korin Melvin PA-C  DATE: February 4, 2020  TIME: 8:02 AM

## 2020-02-04 NOTE — SOCIAL WORK
Pt is cleared for d/c by Cardiothoracic Surgery [facility] Alley Hussein was notified of d/c order  Pt is accepted for services by [facility] for her aftercare plan  The pt and her daughter Mae Waterman were both informed of d/c  Daughter will transport pt home later this day, pickup time TBD  IMM signed by pt  Medicare Bundle Letter given  No chart copy required  CM to follow

## 2020-02-06 ENCOUNTER — OFFICE VISIT (OUTPATIENT)
Dept: FAMILY MEDICINE CLINIC | Facility: CLINIC | Age: 75
End: 2020-02-06
Payer: MEDICARE

## 2020-02-06 VITALS
SYSTOLIC BLOOD PRESSURE: 118 MMHG | OXYGEN SATURATION: 97 % | WEIGHT: 108.8 LBS | RESPIRATION RATE: 16 BRPM | DIASTOLIC BLOOD PRESSURE: 62 MMHG | HEART RATE: 76 BPM | TEMPERATURE: 97 F | BODY MASS INDEX: 18.11 KG/M2

## 2020-02-06 DIAGNOSIS — I25.119 ATHEROSCLEROSIS OF NATIVE CORONARY ARTERY OF NATIVE HEART WITH ANGINA PECTORIS (HCC): Primary | ICD-10-CM

## 2020-02-06 DIAGNOSIS — Z95.1 S/P CABG X 3: ICD-10-CM

## 2020-02-06 DIAGNOSIS — D50.8 OTHER IRON DEFICIENCY ANEMIA: ICD-10-CM

## 2020-02-06 DIAGNOSIS — I21.4 NSTEMI (NON-ST ELEVATION MYOCARDIAL INFARCTION) (HCC): ICD-10-CM

## 2020-02-06 DIAGNOSIS — E78.00 PURE HYPERCHOLESTEROLEMIA: ICD-10-CM

## 2020-02-06 PROCEDURE — 99496 TRANSJ CARE MGMT HIGH F2F 7D: CPT | Performed by: FAMILY MEDICINE

## 2020-02-06 RX ORDER — ASPIRIN 325 MG
325 TABLET, DELAYED RELEASE (ENTERIC COATED) ORAL DAILY
COMMUNITY
Start: 2020-02-04 | End: 2020-04-10

## 2020-02-06 NOTE — PROGRESS NOTES
Hospital Follow Up   Office Visit Note  Faye Kingston   76 y o    female   MRN: [id number]  [address]  [address]  [place] [address] [place] 1889  [phone]    PCP: Marsh Rubinstein, DO  Cardiologist: Will be Dr Sandy Franks           Assessment/plan  S/P type 1 MI (NSTEMI/trop to 3 97) with 3vCAD ( presented with diphoresis and syncope);  S/P CABGx3 LIMA--> LAD, SVG to RI, SVG--> OM1;  adm 1/24-2/4/2020  --On ASA 325mg/d, a high intensity statin, BB, nitrate  -- Imdur added 1/28/2020 postop day 1 due to the small caliber of her LIMA  --post operatively developed a chylothrax   conservative rx with a CT-averted thoracic duct embolization  --EKG today:  Sinus rhythm 74 beats per minute  Chest tube sites-skin infection    --> Begin Keflex 500 Q 6h x7 days  Hypertension, essential   /64  now on on metoprolol , Imdur and loop diuretic   prior to admission, she was on Maxzide  --> she is orthostatic  She is at her pre-hospital weight  Will stop torsemide and potassium  Hyperlipidemia, on atorvastatin 80 mg daily, increased from 10 daily January 24  Danyell Russo Heart healthy diet recommended  --labs 1/25/20:  Direct LDL 95, non   Goal LDL< 70, non HDL <100  --labs November 2019: direct , non   Cardiac testing  --TTE 1/24/2020 EF 70  No RWMA  RV normal  Mild MR   --cardiac catheterization 1/24/2020  LAD: The vessel was medium sized  The proximal and mid LAD is heavily calcified with diffuse disease of 70-80%  Circumflex: The vessel was medium sized and heavily calcified  Ostial circumflex: There was a discrete 85 % stenosis  Mid circumflex: There was a discrete 85 % stenosis  1st obtuse marginal: The vessel was small to medium sized  There was a tubular 50 % stenosis  2nd obtuse marginal: The vessel was small to medium sized  There was a discrete 85 % stenosis  RCA: The vessel was medium sized  Dominant   There is heavy calcification in the proximal, mid, and distal vessel  There are multiple significant lesions ( 70-90% ) seen throughout this entire area  Impression:  Severely calcified 3 vessel disease  Evaluation for CABG      Summary of recommendations  Heart healthy diet  Educational information provided   Follow up labs have been ordered by her PCP  Fasting lipids 1 month  Stop torsemide and potassium  Start Keflex 500 q 6h x7 days  Follow-up with CT surgery   Follow up will be scheduled with Dr Keith Brown 20              HPI  Yvon Mansfield is a 77 yo female with hypertension and dyslipidemia  She is a retired registered nurse and lifelong nonsmoker  She has not had any previously known coronary disease nor heart failure  She has been quite active walking 2-3 miles a day with her dog, and performing yoga  In the past she has run marathons but stopped a few years ago  She does have a strong family history of heart disease; mother had a myocardial fraction and her father  of sudden death  Recently she presented to the hospital with nausea, diaphoresis and syncope  On the morning of admission she awoke around 5:00 a m  walked to the bathroom and had a syncopal event  She apparently was lying on the bathroom floor for about an hour and had difficulty walking back to the bedroom due to generalized weakness  When she came to, she felt very tired, weak and nauseous  EMS was called and she was brought to the ED  Her presenting EKG showed sinus rhythm without any acute ST segment changes  Chest x-ray and CT head showed no acute abnormalities  She received supportive care  He Her initial troponin was 1 75, the 2nd 3 17  She was followed by Cardiology who recommended cardiac catheterization  This showed severely calcified 3 vessel disease  She was referred to CT surgery for candidacy for CABG  After appropriate testing, she underwent CABG x3 ; LIMA-LAD,  SVG--RI, SVG--Om1  A final GET demonstrated normal LV function    She developed a chylothorax postoperatively, treated with chest tube  Imdur was added to her regimen given a small LIMA  2/10/2020  She presents for follow-up, accompanied by her sister  She has been feeling fatigued , occasionally lightheaded and dizzy  Her EKG today shows : NSR 74 bpm  /64  She is at her pre-hospital weight  Will stop torsemide and potassium  She also is concerned about her chest tube site incisions as 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  They are open, draining and erythematous  Sternal incision is satisfactory , healing   Will Treat with Keflex x7 days  Follow-up labs have been requested by her PCP  Will stop her diuretic and potassium        Assessment  Diagnoses and all orders for this visit:    Hospital discharge follow-up    Type 1 myocardial infarction [facility])    Coronary artery disease involving native heart without angina pectoris, unspecified vessel or lesion type    S/P CABG x 3  -     POCT ECG    Hyperlipidemia, unspecified hyperlipidemia type    Essential hypertension    Skin infection  -     cephalexin (KEFLEX) 500 mg capsule; Take 1 capsule (500 mg total) by mouth every 6 (six) hours for 7 days          Past Medical History:   Diagnosis Date    Effusion of left knee     Last assessed - 9/10/15    Hyperlipidemia     Hypertension     Tick bite     Last assessed - 4/21/17       Review of Systems   Constitution: Negative for chills  Cardiovascular: Negative for chest pain, claudication, cyanosis, dyspnea on exertion, irregular heartbeat, leg swelling, near-syncope, orthopnea, palpitations, paroxysmal nocturnal dyspnea and syncope  Respiratory: Negative for cough and shortness of breath  Skin:        Chest tube incisions red/ draiing   Gastrointestinal: Negative for heartburn and nausea  Neurological: Positive for dizziness and light-headedness  Negative for focal weakness, headaches and weakness  All other systems reviewed and are negative  No Known Allergies        Current Outpatient Medications:     acetaminophen (TYLENOL) 325 mg tablet, Take 1-2 tabs q6h PRN mild fever/pain, Disp: 90 tablet, Rfl: 0    aspirin (ECOTRIN) 325 mg EC tablet, Take 325 mg by mouth daily, Disp: , Rfl:     atorvastatin (LIPITOR) 80 mg tablet, Take 1 tablet (80 mg total) by mouth daily with dinner, Disp: 90 tablet, Rfl: 0    Calcium Carb-Cholecalciferol (CALCIUM 600 + D) 600-200 MG-UNIT TABS, Calcium 600 + D TABS TAKE 1 TABLET DAILY  Refills: 0  Active, Disp: , Rfl:     cholecalciferol (VITAMIN D3) 1,000 units tablet, Take 1,000 Units by mouth daily, Disp: , Rfl:     isosorbide mononitrate (IMDUR) 30 mg 24 hr tablet, Take 1 tablet (30 mg total) by mouth daily Take for 30 days then stop, Disp: 30 tablet, Rfl: 0    metoprolol tartrate (LOPRESSOR) 25 mg tablet, Take 0 5 tablets (12 5 mg total) by mouth every 12 (twelve) hours, Disp: 180 tablet, Rfl: 0    pantoprazole (PROTONIX) 20 mg tablet, Take 1 tablet (20 mg total) by mouth daily, Disp: 30 tablet, Rfl: 0    bisacodyl (DULCOLAX) 5 mg EC tablet, Take 2 tablets (10 mg total) by mouth daily as needed for constipation (Patient not taking: Reported on 2/10/2020), Disp: 60 tablet, Rfl: 0    cephalexin (KEFLEX) 500 mg capsule, Take 1 capsule (500 mg total) by mouth every 6 (six) hours for 7 days, Disp: 28 capsule, Rfl: 0    oxyCODONE (ROXICODONE) 5 mg immediate release tablet, Take 1-2 pills PO q6h as needed for mild to moderate pain  Ongoing therapy for postoperative pain  (Patient not taking: Reported on 2/10/2020), Disp: 30 tablet, Rfl: 0    polyethylene glycol (MIRALAX) 17 g packet, Take 17 g by mouth daily IF PACKETS UNAVAILABLE, MAY TAKE OTC EQUIVALENT  USED AS DIRECTED  (Patient not taking: Reported on 2/10/2020), Disp: 510 g, Rfl: 0        Social History     Socioeconomic History    Marital status:       Spouse name: Not on file    Number of children: 3    Years of education: Post Graduate    Highest education level: Not on file Occupational History    Occupation:      Comment: P/T    Occupation: Retired     Comment: RN   Social Needs    Financial resource strain: Not on file    Food insecurity:     Worry: Not on file     Inability: Not on file   Mashery needs:     Medical: Not on file     Non-medical: Not on file   Tobacco Use    Smoking status: Never Smoker    Smokeless tobacco: Never Used   Substance and Sexual Activity    Alcohol use: Yes     Comment: 1 wine nightly    Drug use: No    Sexual activity: Not on file   Lifestyle    Physical activity:     Days per week: Not on file     Minutes per session: Not on file    Stress: Not on file   Relationships    Social connections:     Talks on phone: Not on file     Gets together: Not on file     Attends Orthodox service: Not on file     Active member of club or organization: Not on file     Attends meetings of clubs or organizations: Not on file     Relationship status: Not on file    Intimate partner violence:     Fear of current or ex partner: Not on file     Emotionally abused: Not on file     Physically abused: Not on file     Forced sexual activity: Not on file   Other Topics Concern    Not on file   Social History Narrative    Living alone       Family History   Problem Relation Age of Onset    Coronary artery disease Mother     Arthritis Mother     Other Mother         Cardiac Disorder     Transient ischemic attack Mother     Heart attack Father     Sudden death Father         SCD       Physical Exam   Constitutional: She is oriented to person, place, and time  No distress  HENT:   Head: Normocephalic and atraumatic  Eyes: Conjunctivae and EOM are normal    Neck: Normal range of motion  Neck supple  Cardiovascular: Normal rate, regular rhythm, normal heart sounds and intact distal pulses  Sternal incision healing nicely  Chest tube sites, open, with surrounding erythema    Draining clear fluid at this point   Pulmonary/Chest: Effort normal and breath sounds normal    Abdominal: Soft  Bowel sounds are normal    Musculoskeletal: Normal range of motion  She exhibits no edema  Neurological: She is alert and oriented to person, place, and time  Skin: Skin is warm and dry  She is not diaphoretic  Psychiatric: She has a normal mood and affect  Nursing note and vitals reviewed  Vitals: Blood pressure 100/64, pulse 78, height 5' 5" (1 651 m), weight 49 kg (108 lb), SpO2 97 %  Wt Readings from Last 3 Encounters:   02/10/20 49 kg (108 lb)   02/06/20 49 4 kg (108 lb 12 8 oz)   02/04/20 48 5 kg (106 lb 14 8 oz)         Labs & Results:  Lab Results   Component Value Date    WBC 5 55 01/31/2020    HGB 11 2 (L) 01/31/2020    HCT 35 0 01/31/2020    MCV 94 01/31/2020     01/31/2020     No results found for: BNP  No components found for: CHEM  Troponin I   Date Value Ref Range Status   01/24/2020 3 97 (H) <=0 04 ng/mL Final     Comment:       Siemens Chemistry analyzer 99% cutoff is > 0 04 ng/mL in network labs     o cTnI 99% cutoff is useful only when applied to patients in the clinical setting of myocardial ischemia   o cTnI 99% cutoff should be interpreted in the context of clinical history, ECG findings and possibly cardiac imaging to establish correct diagnosis  o cTnI 99% cutoff may be suggestive but clearly not indicative of a coronary event without the clinical setting of myocardial ischemia      01/24/2020 2 48 (H) <=0 04 ng/mL Final     Comment:       S[employer]ens Chemistry analyzer 99% cutoff is > 0 04 ng/mL in network labs     o cTnI 99% cutoff is useful only when applied to patients in the clinical setting of myocardial ischemia   o cTnI 99% cutoff should be interpreted in the context of clinical history, ECG findings and possibly cardiac imaging to establish correct diagnosis     o cTnI 99% cutoff may be suggestive but clearly not indicative of a coronary event without the clinical setting of myocardial ischemia      01/24/2020 3 17 (H) <=0 04 ng/mL Final     Comment:       Siemens Chemistry analyzer 99% cutoff is > 0 04 ng/mL in network labs     o cTnI 99% cutoff is useful only when applied to patients in the clinical setting of myocardial ischemia   o cTnI 99% cutoff should be interpreted in the context of clinical history, ECG findings and possibly cardiac imaging to establish correct diagnosis  o cTnI 99% cutoff may be suggestive but clearly not indicative of a coronary event without the clinical setting of myocardial ischemia  Results for orders placed during the hospital encounter of 20   Echo complete with contrast if indicated    Narrative [facility], [address] [facility]  [phone]    Transthoracic Echocardiogram  2D, M-mode, Doppler, and Color Doppler    Study date:  2020    Patient: Vidya Huerta  MR number: [id number]  Account number: [de-identified]  : 1945  Age: 76 years  Gender: Female  Status: Inpatient  Location: Bedside  Height: 64 in  Weight: 116 8 lb  BP: 129/ 67 mmHg    Indications: Non ST elevation MI  Diagnoses: I21 4 - Non-ST elevation (NSTEMI) myocardial infarction    Sonographer:  ERIC Cortez  Primary Physician:  Barbara Leslie DO  Referring Physician:  Ama Lepe MD  Group:  [facility]  cc:  Shelby Ruiz MD  Interpreting Physician:  Shelby Ruiz MD    SUMMARY    LEFT VENTRICLE:  Systolic function was vigorous  Ejection fraction was estimated to be 70 %  There were no regional wall motion abnormalities  Wall thickness was at the upper limits of normal     RIGHT VENTRICLE:  The size was normal   Systolic function was normal     MITRAL VALVE:  There was mild regurgitation  HISTORY: PRIOR HISTORY: HTN, HLD, syncope  PROCEDURE: The procedure was performed at the bedside  This was a routine study  The transthoracic approach was used   The study included complete 2D imaging, M-mode, complete spectral Doppler, and color Doppler  The heart rate was 66 bpm,  at the start of the study  Echocardiographic views were limited due to poor acoustic window availability  This was a technically difficult study  LEFT VENTRICLE: Size was normal  Systolic function was vigorous  Ejection fraction was estimated to be 70 %  There were no regional wall motion abnormalities  Wall thickness was at the upper limits of normal  DOPPLER: Left ventricular  diastolic function parameters were normal     RIGHT VENTRICLE: The size was normal  Systolic function was normal  Wall thickness was normal     LEFT ATRIUM: Size was normal     RIGHT ATRIUM: Size was normal     MITRAL VALVE: Valve structure was normal  There was normal leaflet separation  DOPPLER: The transmitral velocity was within the normal range  There was no evidence for stenosis  There was mild regurgitation  AORTIC VALVE: The valve was trileaflet  Leaflets exhibited mildly increased thickness, normal cuspal separation, and sclerosis  DOPPLER: Transaortic velocity was within the normal range  There was no evidence for stenosis  There was no  significant regurgitation  TRICUSPID VALVE: The valve structure was normal  There was normal leaflet separation  DOPPLER: The transtricuspid velocity was within the normal range  There was no evidence for stenosis  There was no significant regurgitation  The  tricuspid jet envelope definition was inadequate for estimation of RV systolic pressure  PULMONIC VALVE: Leaflets exhibited normal thickness, no calcification, and normal cuspal separation  DOPPLER: The transpulmonic velocity was within the normal range  There was no significant regurgitation  PERICARDIUM: There was no pericardial effusion  AORTA: The root exhibited normal size  SYSTEMIC VEINS: IVC: The inferior vena cava was normal in size   Respirophasic changes were normal     SYSTEM MEASUREMENT TABLES    2D  %FS: 36 42 %  Ao Diam: 3 15 cm  EDV(Teich): 40 82 ml  EF(Teich): 67 55 %  ESV(Teich): 13 25 ml  IVSd: 1 05 cm  LA Diam: 2 94 cm  LVIDd: 3 2 cm  LVIDs: 2 03 cm  LVPWd: 0 88 cm  SV(Teich): 27 58 ml    CW  AV Env  Ti: 304 5 ms  AV MaxP 2 mmHg  AV VTI: 21 69 cm  AV Vmax: 1 14 m/s  AV Vmean: 0 71 m/s  AV meanP 33 mmHg    MM  TAPSE: 2 46 cm    PW  LVOT Env  Ti: 349 48 ms  LVOT VTI: 19 33 cm  LVOT Vmax: 0 91 m/s  LVOT Vmean: 0 55 m/s  LVOT maxPG: 3 34 mmHg  LVOT meanP 44 mmHg  MV A [place]: 0 96 m/s  MV Dec [place]: 5 8 m/s2  MV DecT: 180 05 ms  MV E [place]: 1 04 m/s  MV E/A Ratio: 1 08  MV PHT: 52 21 ms  MVA By PHT: 4 21 cm2    [address] Accredited [facility]ch[facility]    Prepared and electronically signed by    Steff Arredondo MD  Signed 2020 19:02:39       No results found for this or any previous visit  This note was completed in part utilizing m-modal fluency direct voice recognition software  Grammatical errors, random word insertion, spelling mistakes, and incomplete sentences may be an occasional consequence of the system secondary to software limitations, ambient noise and hardware issues  At the time of dictation, efforts were made to edit, clarify and /or correct errors  Please read the chart carefully and recognize, using context, where substitutions have occurred    If you have any questions or concerns about the context, text or information contained within the body of this dictation, please contact myself, the provider, for further clarification

## 2020-02-06 NOTE — PROGRESS NOTES
Assessment/Plan:    No problem-specific Assessment & Plan notes found for this encounter  Diagnoses and all orders for this visit:    Atherosclerosis of native coronary artery of native heart with angina pectoris [facility])  Comments:  Jeimy Mcclelland is stable on exam; slowly improving  Only really needing OTC Tylenol PRN for pain  Continuing f/u with CT Surgery and Cardiology  Precautions  Orders:  -     Basic metabolic panel; Future    NSTEMI (non-ST elevation myocardial infarction) (HCC)    S/P CABG x 3    Pure hypercholesterolemia  Comments:  Stable; on high does Atorvastatin now  Will need full FBW done in 3 months  Other iron deficiency anemia  Comments:  Stable; 1 Unit PRBCs received during her surgery  Check non-fasting labs next week  Orders:  -     CBC and differential; Future    Other orders  -     aspirin (ECOTRIN) 325 mg EC tablet; Take 325 mg by mouth daily          Subjective:      Patient ID: Yani Melendez is a 76 y o  female  Jeimy Mcclelland presents in f/u today from her hospitalization 01/24/20 - 02/04/20  She presents with her daughter  Jeimy Mcclelland was taken to the [facility] ER with diaphoresis, SOB, and a syncopal episode  Was found to have a Troponin level > 3; MVD found on cardiac cath  She was transferred to the [address] [place] [address], where Dr Carlos Haley of CT Surgery performed CABG x 3 vessels on 1/27/20  She was transfused 1 unit of PRBCs intraoperatively  She just had her chest tubes removed on 2/4/20  Jeimy Mcclelland states that she is feeling better  She will see Cardiology (Dr Grace Espino) in f/u soon          The following portions of the patient's history were reviewed and updated as appropriate: allergies, current medications, past family history, past social history, past surgical history and problem list     Past Medical History:   Diagnosis Date    Effusion of left knee     Last assessed - 9/10/15    Hyperlipidemia     Hypertension     Tick bite     Last assessed - 4/21/17     Past Surgical History: Procedure Laterality Date    APPENDECTOMY      MN CABG, ARTERY-VEIN, FOUR N/A 1/27/2020    Procedure: CORONARY ARTERY BYPASS GRAFT (CABG) x3 VESSELS, LIMA TO LAD, AND SVG TO PLB & OM;  Surgeon: Paul Wharton DO;  [facility];  Service: Cardiac Surgery    MN ECHO TRANSESOPHAG R-T 2D W/PRB IMG ACQUISJ I&R N/A 1/27/2020    Procedure: TRANSESOPHAGEAL ECHOCARDIOGRAM (GET); Surgeon: Paul Wharton DO;  [facility];  Service: Cardiac Surgery    MN ENDOSCOPY W/VIDEO-ASST VEIN HARVEST,CABG Left 1/27/2020    Procedure: HARVEST VEIN ENDOSCOPIC ([address]); Surgeon: Paul Wharton DO;  Location[facility];  Service: Cardiac Surgery    TONSILLECTOMY      Last assessed - 4/20/17    TUBAL LIGATION      Last assessed - 4/20/17    WISDOM TOOTH EXTRACTION      Last assessed - 4/20/17       Current Outpatient Medications:     acetaminophen (TYLENOL) 325 mg tablet, Take 1-2 tabs q6h PRN mild fever/pain, Disp: 90 tablet, Rfl: 0    aspirin (ECOTRIN) 325 mg EC tablet, Take 325 mg by mouth daily, Disp: , Rfl:     aspirin 325 mg tablet, Take 1 tablet (325 mg total) by mouth daily, Disp: 90 tablet, Rfl: 0    atorvastatin (LIPITOR) 80 mg tablet, Take 1 tablet (80 mg total) by mouth daily with dinner, Disp: 90 tablet, Rfl: 0    bisacodyl (DULCOLAX) 5 mg EC tablet, Take 2 tablets (10 mg total) by mouth daily as needed for constipation, Disp: 60 tablet, Rfl: 0    Calcium Carb-Cholecalciferol (CALCIUM 600 + D) 600-200 MG-UNIT TABS, Calcium 600 + D TABS TAKE 1 TABLET DAILY    Refills: 0  Active, Disp: , Rfl:     cholecalciferol (VITAMIN D3) 1,000 units tablet, Take 1,000 Units by mouth daily, Disp: , Rfl:     isosorbide mononitrate (IMDUR) 30 mg 24 hr tablet, Take 1 tablet (30 mg total) by mouth daily Take for 30 days then stop, Disp: 30 tablet, Rfl: 0    metoprolol tartrate (LOPRESSOR) 25 mg tablet, Take 0 5 tablets (12 5 mg total) by mouth every 12 (twelve) hours, Disp: 180 tablet, Rfl: 0   oxyCODONE (ROXICODONE) 5 mg immediate release tablet, Take 1-2 pills PO q6h as needed for mild to moderate pain  Ongoing therapy for postoperative pain , Disp: 30 tablet, Rfl: 0    pantoprazole (PROTONIX) 20 mg tablet, Take 1 tablet (20 mg total) by mouth daily, Disp: 30 tablet, Rfl: 0    polyethylene glycol (MIRALAX) 17 g packet, Take 17 g by mouth daily IF PACKETS UNAVAILABLE, MAY TAKE OTC EQUIVALENT  USED AS DIRECTED , Disp: 510 g, Rfl: 0    potassium chloride (K-DUR,KLOR-CON) 20 mEq tablet, Take 1 tablet (20 mEq total) by mouth daily for 10 days, Disp: 10 tablet, Rfl: 0    torsemide (DEMADEX) 20 mg tablet, Take 1 tablet (20 mg total) by mouth daily for 10 days, Disp: 10 tablet, Rfl: 0    No Known Allergies    Family History   Problem Relation Age of Onset    Coronary artery disease Mother     Arthritis Mother     Other Mother         Cardiac Disorder     Transient ischemic attack Mother     Heart attack Father     Sudden death Father         SCD     Social History     Tobacco Use    Smoking status: Never Smoker    Smokeless tobacco: Never Used   Substance Use Topics    Alcohol use: Yes     Comment: 1 wine nightly    Drug use: No         Review of Systems   Constitutional: Positive for fatigue  Negative for activity change and fever  Respiratory: Negative for cough and shortness of breath  Cardiovascular: Negative for chest pain  +Chest wall soreness  Gastrointestinal: Negative for abdominal pain, blood in stool and constipation  Genitourinary: Negative for difficulty urinating  Objective:      /62   Pulse 76   Temp (!) 97 °F (36 1 °C)   Resp 16   Wt 49 4 kg (108 lb 12 8 oz)   SpO2 97%   BMI 18 11 kg/m²          Physical Exam   Constitutional: She is oriented to person, place, and time  She appears well-developed and well-nourished  No distress  HENT:   Head: Normocephalic and atraumatic  Eyes: Conjunctivae are normal    Neck: Normal range of motion   Neck supple  No JVD present  No thyromegaly present  Cardiovascular: Normal rate, regular rhythm and normal heart sounds  Exam reveals no gallop and no friction rub  No murmur heard  Pulmonary/Chest: Effort normal and breath sounds normal  No stridor  No respiratory distress  She has no wheezes  She has no rales  Abdominal: Soft  Bowel sounds are normal  She exhibits no distension and no mass  There is no tenderness  There is no rebound and no guarding  Musculoskeletal: She exhibits no edema  Lymphadenopathy:     She has no cervical adenopathy  Neurological: She is alert and oriented to person, place, and time  Skin: She is not diaphoretic  Psychiatric: She has a normal mood and affect  Her behavior is normal  Judgment and thought content normal    Nursing note and vitals reviewed

## 2020-02-10 ENCOUNTER — OFFICE VISIT (OUTPATIENT)
Dept: CARDIOLOGY CLINIC | Facility: CLINIC | Age: 75
End: 2020-02-10
Payer: MEDICARE

## 2020-02-10 VITALS
HEIGHT: 65 IN | BODY MASS INDEX: 17.99 KG/M2 | SYSTOLIC BLOOD PRESSURE: 100 MMHG | HEART RATE: 78 BPM | DIASTOLIC BLOOD PRESSURE: 64 MMHG | WEIGHT: 108 LBS | OXYGEN SATURATION: 97 %

## 2020-02-10 DIAGNOSIS — E78.5 HYPERLIPIDEMIA, UNSPECIFIED HYPERLIPIDEMIA TYPE: ICD-10-CM

## 2020-02-10 DIAGNOSIS — I10 ESSENTIAL HYPERTENSION: ICD-10-CM

## 2020-02-10 DIAGNOSIS — I25.10 CORONARY ARTERY DISEASE INVOLVING NATIVE HEART WITHOUT ANGINA PECTORIS, UNSPECIFIED VESSEL OR LESION TYPE: ICD-10-CM

## 2020-02-10 DIAGNOSIS — L08.9 SKIN INFECTION: ICD-10-CM

## 2020-02-10 DIAGNOSIS — I21.9 TYPE 1 MYOCARDIAL INFARCTION (HCC): ICD-10-CM

## 2020-02-10 DIAGNOSIS — Z95.1 S/P CABG X 3: ICD-10-CM

## 2020-02-10 DIAGNOSIS — Z09 HOSPITAL DISCHARGE FOLLOW-UP: Primary | ICD-10-CM

## 2020-02-10 PROCEDURE — 93000 ELECTROCARDIOGRAM COMPLETE: CPT | Performed by: NURSE PRACTITIONER

## 2020-02-10 PROCEDURE — 3008F BODY MASS INDEX DOCD: CPT | Performed by: NURSE PRACTITIONER

## 2020-02-10 PROCEDURE — 1111F DSCHRG MED/CURRENT MED MERGE: CPT | Performed by: NURSE PRACTITIONER

## 2020-02-10 PROCEDURE — 3074F SYST BP LT 130 MM HG: CPT | Performed by: NURSE PRACTITIONER

## 2020-02-10 PROCEDURE — 1160F RVW MEDS BY RX/DR IN RCRD: CPT | Performed by: NURSE PRACTITIONER

## 2020-02-10 PROCEDURE — 4040F PNEUMOC VAC/ADMIN/RCVD: CPT | Performed by: NURSE PRACTITIONER

## 2020-02-10 PROCEDURE — 99214 OFFICE O/P EST MOD 30 MIN: CPT | Performed by: NURSE PRACTITIONER

## 2020-02-10 PROCEDURE — 1036F TOBACCO NON-USER: CPT | Performed by: NURSE PRACTITIONER

## 2020-02-10 PROCEDURE — 3078F DIAST BP <80 MM HG: CPT | Performed by: NURSE PRACTITIONER

## 2020-02-10 RX ORDER — CEPHALEXIN 500 MG/1
500 CAPSULE ORAL EVERY 6 HOURS SCHEDULED
Qty: 28 CAPSULE | Refills: 0 | Status: SHIPPED | OUTPATIENT
Start: 2020-02-10 | End: 2020-02-17

## 2020-02-10 NOTE — LETTER
February 10, 2020     Floridalma Rodriguez, [address] [facility] [address]  [address] [address]    Patient: Carly Burnett   YOB: 1945   Date of Visit: 2/10/2020       Dear Dr Rick Leon:    Thank you for referring Carly Burnett to me for evaluation  Below are my notes for this consultation  If you have questions, please do not hesitate to call me  I look forward to following your patient along with you  Sincerely,        RACHEL Almanza        CC: DO Beto Vega, [address]  2/10/2020 11:20 AM  Sign at close encounter  Hospital Follow Up   Office Visit Note  Carly Burnett   76 y o    female   MRN: [id number]  [address]  [address]  [place] [address] [place] Humph[address]  [phone]  [phone]    PCP: Floridalma Rodriguez DO  Cardiologist: Will be Dr Sandra Craig           Assessment/plan  S/P type 1 MI (NSTEMI/trop to 3 97) with 3vCAD ( presented with diphoresis and syncope);  S/P CABGx3 LIMA--> LAD, SVG to  RI, SVG--> OM1;   adm 1/24-2/4/2020  --On ASA 325mg/d, a high intensity statin, BB, nitrate  -- Imdur added 1/28/2020 postop day 1 due to the small caliber of her LIMA  --post operatively developed a chylothrax   conservative rx with a CT-averted thoracic duct embolization  --EKG today:  Sinus rhythm 74 beats per minute  Chest tube sites-skin infection    --> Begin Keflex 500 Q 6h x7 days  Hypertension, essential   /64  now on on metoprolol , Imdur and loop diuretic   prior to admission, she was on Maxzide  --> she is orthostatic  She is at her pre-hospital weight  Will stop torsemide and potassium  Hyperlipidemia, on atorvastatin 80 mg daily, increased from 10 daily January 24  [facility] healthy diet recommended  --labs 1/25/20:  Direct LDL 95, non   Goal LDL< 70, non HDL <100  --labs November 2019: direct , non   Cardiac testing  --TTE 1/24/2020 EF 70  No RWMA   RV normal  Mild MR   --cardiac catheterization 1/24/2020  LAD: The vessel was medium sized  The proximal and mid LAD is heavily calcified with diffuse disease of 70-80%  Circumflex: The vessel was medium sized and heavily calcified  Ostial circumflex: There was a discrete 85 % stenosis  Mid circumflex: There was a discrete 85 % stenosis  1st obtuse marginal: The vessel was small to medium sized  There was a tubular 50 % stenosis  2nd obtuse marginal: The vessel was small to medium sized  There was a discrete 85 % stenosis  RCA: The vessel was medium sized  Dominant  There is heavy calcification in the proximal, mid, and distal vessel  There are multiple significant lesions ( 70-90% ) seen throughout this entire area  Impression:  Severely calcified 3 vessel disease  Evaluation for CABG      Summary of recommendations  Heart healthy diet  Educational information provided   Follow up labs have been ordered by her PCP  Fasting lipids 1 month  Stop torsemide and potassium  Start Keflex 500 q 6h x7 days  Follow-up with CT surgery   Follow up will be scheduled with Dr Josh Hermosillo 20              HPI  Shannan Christian is a 77 yo female with hypertension and dyslipidemia  She is a retired registered nurse and lifelong nonsmoker  She has not had any previously known coronary disease nor heart failure  She has been quite active walking 2-3 miles a day with her dog, and performing yoga  In the past she has run marathons but stopped a few years ago  She does have a strong family history of heart disease; mother had a myocardial fraction and her father  of sudden death  Recently she presented to the hospital with nausea, diaphoresis and syncope  On the morning of admission she awoke around 5:00 a m  walked to the bathroom and had a syncopal event  She apparently was lying on the bathroom floor for about an hour and had difficulty walking back to the bedroom due to generalized weakness  When she came to, she felt very tired, weak and nauseous    EMS was called and she was brought to the ED  Her presenting EKG showed sinus rhythm without any acute ST segment changes  Chest x-ray and CT head showed no acute abnormalities  She received supportive care  He Her initial troponin was 1 75, the 2nd 3 17  She was followed by Cardiology who recommended cardiac catheterization  This showed severely calcified 3 vessel disease  She was referred to CT surgery for candidacy for CABG  After appropriate testing, she underwent CABG x3 ; LIMA-LAD,  SVG--RI, SVG--Om1  A final GET demonstrated normal LV function  She developed a chylothorax postoperatively, treated with chest tube  Imdur was added to her regimen given a small LIMA  2/10/2020  She presents for follow-up, accompanied by her sister  She has been feeling fatigued , occasionally lightheaded and dizzy  Her EKG today shows : NSR 74 bpm  /64  She is at her pre-hospital weight  Will stop torsemide and potassium  She also is concerned about her chest tube site incisions as 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  They are open, draining and erythematous  Sternal incision is satisfactory , healing   Will Treat with Keflex x7 days  Follow-up labs have been requested by her PCP  Will stop her diuretic and potassium        Assessment  Diagnoses and all orders for this visit:    Hospital discharge follow-up    Type 1 myocardial infarction [facility])    Coronary artery disease involving native heart without angina pectoris, unspecified vessel or lesion type    S/P CABG x 3  -     POCT ECG    Hyperlipidemia, unspecified hyperlipidemia type    Essential hypertension    Skin infection  -     cephalexin (KEFLEX) 500 mg capsule; Take 1 capsule (500 mg total) by mouth every 6 (six) hours for 7 days          Past Medical History:   Diagnosis Date    Effusion of left knee     Last assessed - 9/10/15    Hyperlipidemia     Hypertension     Tick bite     Last assessed - 4/21/17       Review of Systems   Constitution: Negative for chills  Cardiovascular: Negative for chest pain, claudication, cyanosis, dyspnea on exertion, irregular heartbeat, leg swelling, near-syncope, orthopnea, palpitations, paroxysmal nocturnal dyspnea and syncope  Respiratory: Negative for cough and shortness of breath  Skin:        Chest tube incisions red/ draiing   Gastrointestinal: Negative for heartburn and nausea  Neurological: Positive for dizziness and light-headedness  Negative for focal weakness, headaches and weakness  All other systems reviewed and are negative  No Known Allergies    Current Outpatient Medications:     acetaminophen (TYLENOL) 325 mg tablet, Take 1-2 tabs q6h PRN mild fever/pain, Disp: 90 tablet, Rfl: 0    aspirin (ECOTRIN) 325 mg EC tablet, Take 325 mg by mouth daily, Disp: , Rfl:     atorvastatin (LIPITOR) 80 mg tablet, Take 1 tablet (80 mg total) by mouth daily with dinner, Disp: 90 tablet, Rfl: 0    Calcium Carb-Cholecalciferol (CALCIUM 600 + D) 600-200 MG-UNIT TABS, Calcium 600 + D TABS TAKE 1 TABLET DAILY    Refills: 0  Active, Disp: , Rfl:     cholecalciferol (VITAMIN D3) 1,000 units tablet, Take 1,000 Units by mouth daily, Disp: , Rfl:     isosorbide mononitrate (IMDUR) 30 mg 24 hr tablet, Take 1 tablet (30 mg total) by mouth daily Take for 30 days then stop, Disp: 30 tablet, Rfl: 0    metoprolol tartrate (LOPRESSOR) 25 mg tablet, Take 0 5 tablets (12 5 mg total) by mouth every 12 (twelve) hours, Disp: 180 tablet, Rfl: 0    pantoprazole (PROTONIX) 20 mg tablet, Take 1 tablet (20 mg total) by mouth daily, Disp: 30 tablet, Rfl: 0    bisacodyl (DULCOLAX) 5 mg EC tablet, Take 2 tablets (10 mg total) by mouth daily as needed for constipation (Patient not taking: Reported on 2/10/2020), Disp: 60 tablet, Rfl: 0    cephalexin (KEFLEX) 500 mg capsule, Take 1 capsule (500 mg total) by mouth every 6 (six) hours for 7 days, Disp: 28 capsule, Rfl: 0    oxyCODONE (ROXICODONE) 5 mg immediate release tablet, Take 1-2 pills PO q6h as needed for mild to moderate pain  Ongoing therapy for postoperative pain  (Patient not taking: Reported on 2/10/2020), Disp: 30 tablet, Rfl: 0    polyethylene glycol (MIRALAX) 17 g packet, Take 17 g by mouth daily IF PACKETS UNAVAILABLE, MAY TAKE OTC EQUIVALENT  USED AS DIRECTED  (Patient not taking: Reported on 2/10/2020), Disp: 510 g, Rfl: 0        Social History     Socioeconomic History    Marital status:      Spouse name: Not on file    Number of children: 3    Years of education: Post Graduate    Highest education level: Not on file   Occupational History    Occupation:       Comment: P/T    Occupation: Retired     Comment: RN   Social Needs    Financial resource strain: Not on file    Food insecurity:     Worry: Not on file     Inability: Not on file   mSpot needs:     Medical: Not on file     Non-medical: Not on file   Tobacco Use    Smoking status: Never Smoker    Smokeless tobacco: Never Used   Substance and Sexual Activity    Alcohol use: Yes     Comment: 1 wine nightly    Drug use: No    Sexual activity: Not on file   Lifestyle    Physical activity:     Days per week: Not on file     Minutes per session: Not on file    Stress: Not on file   Relationships    Social connections:     Talks on phone: Not on file     Gets together: Not on file     Attends Judaism service: Not on file     Active member of club or organization: Not on file     Attends meetings of clubs or organizations: Not on file     Relationship status: Not on file    Intimate partner violence:     Fear of current or ex partner: Not on file     Emotionally abused: Not on file     Physically abused: Not on file     Forced sexual activity: Not on file   Other Topics Concern    Not on file   Social History Narrative    Living alone       Family History   Problem Relation Age of Onset    Coronary artery disease Mother     Arthritis Mother     Other Mother         Cardiac Disorder     Transient ischemic attack Mother     Heart attack Father    Bhupinder Valera Sudden death Father         SCD       Physical Exam   Constitutional: She is oriented to person, place, and time  No distress  HENT:   Head: Normocephalic and atraumatic  Eyes: Conjunctivae and EOM are normal    Neck: Normal range of motion  Neck supple  Cardiovascular: Normal rate, regular rhythm, normal heart sounds and intact distal pulses  Sternal incision healing nicely  Chest tube sites, open, with surrounding erythema  Draining clear fluid at this point   Pulmonary/Chest: Effort normal and breath sounds normal    Abdominal: Soft  Bowel sounds are normal    Musculoskeletal: Normal range of motion  She exhibits no edema  Neurological: She is alert and oriented to person, place, and time  Skin: Skin is warm and dry  She is not diaphoretic  Psychiatric: She has a normal mood and affect  Nursing note and vitals reviewed  Vitals: Blood pressure 100/64, pulse 78, height 5' 5" (1 651 m), weight 49 kg (108 lb), SpO2 97 %  Wt Readings from Last 3 Encounters:   02/10/20 49 kg (108 lb)   02/06/20 49 4 kg (108 lb 12 8 oz)   02/04/20 48 5 kg (106 lb 14 8 oz)         Labs & Results:  Lab Results   Component Value Date    WBC 5 55 01/31/2020    HGB 11 2 (L) 01/31/2020    HCT 35 0 01/31/2020    MCV 94 01/31/2020     01/31/2020     No results found for: BNP  No components found for: CHEM  Troponin I   Date Value Ref Range Status   01/24/2020 3 97 (H) <=0 04 ng/mL Final     Comment:       Siemens Chemistry analyzer 99% cutoff is > 0 04 ng/mL in network labs     o cTnI 99% cutoff is useful only when applied to patients in the clinical setting of myocardial ischemia   o cTnI 99% cutoff should be interpreted in the context of clinical history, ECG findings and possibly cardiac imaging to establish correct diagnosis     o cTnI 99% cutoff may be suggestive but clearly not indicative of a coronary event without the clinical setting of myocardial ischemia      2020 2 48 (H) <=0 04 ng/mL Final     Comment:       Siemens Chemistry analyzer 99% cutoff is > 0 04 ng/mL in network labs     o cTnI 99% cutoff is useful only when applied to patients in the clinical setting of myocardial ischemia   o cTnI 99% cutoff should be interpreted in the context of clinical history, ECG findings and possibly cardiac imaging to establish correct diagnosis  o cTnI 99% cutoff may be suggestive but clearly not indicative of a coronary event without the clinical setting of myocardial ischemia      2020 3 17 (H) <=0 04 ng/mL Final     Comment:       Siemens Chemistry analyzer 99% cutoff is > 0 04 ng/mL in network labs     o cTnI 99% cutoff is useful only when applied to patients in the clinical setting of myocardial ischemia   o cTnI 99% cutoff should be interpreted in the context of clinical history, ECG findings and possibly cardiac imaging to establish correct diagnosis  o cTnI 99% cutoff may be suggestive but clearly not indicative of a coronary event without the clinical setting of myocardial ischemia  Results for orders placed during the hospital encounter of 20   Echo complete with contrast if indicated    Narrative [place] [address] [address] [place] [address]  [phone]    Transthoracic Echocardiogram  2D, M-mode, Doppler, and Color Doppler    Study date:  2020    Patient: Valdo Cool  MR number: [id number]  Account number: [de-identified]  : 1945  Age: 76 years  Gender: Female  Status: Inpatient  Location: Bedside  Height: 64 in  Weight: 116 8 lb  BP: 129/ 67 mmHg    Indications: Non ST elevation MI      Diagnoses: I21 4 - Non-ST elevation (NSTEMI) myocardial infarction    Sonographer:  ERIC Zhao  Primary Physician:  Jamarcus Brennan DO  Referring Physician:  Davi Anand MD  Group:  [facility]  cc:  Carly Molina MD  Interpreting Physician:  Carly Molina MD    SUMMARY    LEFT VENTRICLE:  Systolic function was vigorous  Ejection fraction was estimated to be 70 %  There were no regional wall motion abnormalities  Wall thickness was at the upper limits of normal     RIGHT VENTRICLE:  The size was normal   Systolic function was normal     MITRAL VALVE:  There was mild regurgitation  HISTORY: PRIOR HISTORY: HTN, HLD, syncope  PROCEDURE: The procedure was performed at the bedside  This was a routine study  The transthoracic approach was used  The study included complete 2D imaging, M-mode, complete spectral Doppler, and color Doppler  The heart rate was 66 bpm,  at the start of the study  Echocardiographic views were limited due to poor acoustic window availability  This was a technically difficult study  LEFT VENTRICLE: Size was normal  Systolic function was vigorous  Ejection fraction was estimated to be 70 %  There were no regional wall motion abnormalities  Wall thickness was at the upper limits of normal  DOPPLER: Left ventricular  diastolic function parameters were normal     RIGHT VENTRICLE: The size was normal  Systolic function was normal  Wall thickness was normal     LEFT ATRIUM: Size was normal     RIGHT ATRIUM: Size was normal     MITRAL VALVE: Valve structure was normal  There was normal leaflet separation  DOPPLER: The transmitral velocity was within the normal range  There was no evidence for stenosis  There was mild regurgitation  AORTIC VALVE: The valve was trileaflet  Leaflets exhibited mildly increased thickness, normal cuspal separation, and sclerosis  DOPPLER: Transaortic velocity was within the normal range  There was no evidence for stenosis  There was no  significant regurgitation  TRICUSPID VALVE: The valve structure was normal  There was normal leaflet separation  DOPPLER: The transtricuspid velocity was within the normal range  There was no evidence for stenosis  There was no significant regurgitation   The  tricuspid jet envelope definition was inadequate for estimation of RV systolic pressure  PULMONIC VALVE: Leaflets exhibited normal thickness, no calcification, and normal cuspal separation  DOPPLER: The transpulmonic velocity was within the normal range  There was no significant regurgitation  PERICARDIUM: There was no pericardial effusion  AORTA: The root exhibited normal size  SYSTEMIC VEINS: IVC: The inferior vena cava was normal in size  Respirophasic changes were normal     SYSTEM MEASUREMENT TABLES    2D  %FS: 36 42 %  Ao Diam: 3 15 cm  EDV(Teich): 40 82 ml  EF(Teich): 67 55 %  ESV(Teich): 13 25 ml  IVSd: 1 05 cm  LA Diam: 2 94 cm  LVIDd: 3 2 cm  LVIDs: 2 03 cm  LVPWd: 0 88 cm  SV(Teich): 27 58 ml    CW  AV Env  Ti: 304 5 ms  AV MaxP 2 mmHg  AV VTI: 21 69 cm  AV Vmax: 1 14 m/s  AV Vmean: 0 71 m/s  AV meanP 33 mmHg    MM  TAPSE: 2 46 cm    PW  LVOT Env  Ti: 349 48 ms  LVOT VTI: 19 33 cm  LVOT Vmax: 0 91 m/s  LVOT Vmean: 0 55 m/s  LVOT maxPG: 3 34 mmHg  LVOT meanP 44 mmHg  MV A Fortino: 0 96 m/s  MV Dec [place]: 5 8 m/s2  MV DecT: 180 05 ms  MV E Fortino: 1 04 m/s  MV E/A Ratio: 1 08  MV PHT: 52 21 ms  MVA By PHT: 4 21 cm2    [address] Accredited Echocardiography Laboratory    Prepared and electronically signed by    Zaira Sinclair MD  Signed 2020 19:02:39       No results found for this or any previous visit  This note was completed in part utilizing Frazr direct voice recognition software  Grammatical errors, random word insertion, spelling mistakes, and incomplete sentences may be an occasional consequence of the system secondary to software limitations, ambient noise and hardware issues  At the time of dictation, efforts were made to edit, clarify and /or correct errors  Please read the chart carefully and recognize, using context, where substitutions have occurred    If you have any questions or concerns about the context, text or information contained within the body of this dictation, please contact myself, the provider, for further clarification

## 2020-02-11 ENCOUNTER — PATIENT OUTREACH (OUTPATIENT)
Dept: FAMILY MEDICINE CLINIC | Facility: CLINIC | Age: 75
End: 2020-02-11

## 2020-02-11 ENCOUNTER — LAB (OUTPATIENT)
Dept: LAB | Facility: CLINIC | Age: 75
End: 2020-02-11
Payer: MEDICARE

## 2020-02-11 DIAGNOSIS — I25.119 ATHEROSCLEROSIS OF NATIVE CORONARY ARTERY OF NATIVE HEART WITH ANGINA PECTORIS (HCC): ICD-10-CM

## 2020-02-11 DIAGNOSIS — D50.8 OTHER IRON DEFICIENCY ANEMIA: ICD-10-CM

## 2020-02-11 LAB
ANION GAP SERPL CALCULATED.3IONS-SCNC: 4 MMOL/L (ref 4–13)
BASOPHILS # BLD AUTO: 0.11 THOUSANDS/ΜL (ref 0–0.1)
BASOPHILS NFR BLD AUTO: 1 % (ref 0–1)
BUN SERPL-MCNC: 16 MG/DL (ref 5–25)
CALCIUM SERPL-MCNC: 9.4 MG/DL (ref 8.3–10.1)
CHLORIDE SERPL-SCNC: 106 MMOL/L (ref 100–108)
CO2 SERPL-SCNC: 29 MMOL/L (ref 21–32)
CREAT SERPL-MCNC: 0.55 MG/DL (ref 0.6–1.3)
EOSINOPHIL # BLD AUTO: 0.21 THOUSAND/ΜL (ref 0–0.61)
EOSINOPHIL NFR BLD AUTO: 2 % (ref 0–6)
ERYTHROCYTE [DISTWIDTH] IN BLOOD BY AUTOMATED COUNT: 14.3 % (ref 11.6–15.1)
GFR SERPL CREATININE-BSD FRML MDRD: 93 ML/MIN/1.73SQ M
GLUCOSE P FAST SERPL-MCNC: 88 MG/DL (ref 65–99)
HCT VFR BLD AUTO: 37 % (ref 34.8–46.1)
HGB BLD-MCNC: 11.4 G/DL (ref 11.5–15.4)
IMM GRANULOCYTES # BLD AUTO: 0.03 THOUSAND/UL (ref 0–0.2)
IMM GRANULOCYTES NFR BLD AUTO: 0 % (ref 0–2)
LYMPHOCYTES # BLD AUTO: 1.18 THOUSANDS/ΜL (ref 0.6–4.47)
LYMPHOCYTES NFR BLD AUTO: 13 % (ref 14–44)
MCH RBC QN AUTO: 29.4 PG (ref 26.8–34.3)
MCHC RBC AUTO-ENTMCNC: 30.8 G/DL (ref 31.4–37.4)
MCV RBC AUTO: 95 FL (ref 82–98)
MONOCYTES # BLD AUTO: 0.58 THOUSAND/ΜL (ref 0.17–1.22)
MONOCYTES NFR BLD AUTO: 6 % (ref 4–12)
NEUTROPHILS # BLD AUTO: 6.9 THOUSANDS/ΜL (ref 1.85–7.62)
NEUTS SEG NFR BLD AUTO: 78 % (ref 43–75)
NRBC BLD AUTO-RTO: 0 /100 WBCS
PLATELET # BLD AUTO: 356 THOUSANDS/UL (ref 149–390)
PMV BLD AUTO: 12.6 FL (ref 8.9–12.7)
POTASSIUM SERPL-SCNC: 4.6 MMOL/L (ref 3.5–5.3)
RBC # BLD AUTO: 3.88 MILLION/UL (ref 3.81–5.12)
SODIUM SERPL-SCNC: 139 MMOL/L (ref 136–145)
WBC # BLD AUTO: 9.01 THOUSAND/UL (ref 4.31–10.16)

## 2020-02-11 PROCEDURE — 36415 COLL VENOUS BLD VENIPUNCTURE: CPT

## 2020-02-11 PROCEDURE — 80048 BASIC METABOLIC PNL TOTAL CA: CPT

## 2020-02-11 PROCEDURE — 85025 COMPLETE CBC W/AUTO DIFF WBC: CPT

## 2020-02-12 ENCOUNTER — TELEPHONE (OUTPATIENT)
Dept: CARDIAC SURGERY | Facility: CLINIC | Age: 75
End: 2020-02-12

## 2020-02-12 ENCOUNTER — PATIENT OUTREACH (OUTPATIENT)
Dept: FAMILY MEDICINE CLINIC | Facility: CLINIC | Age: 75
End: 2020-02-12

## 2020-02-12 NOTE — TELEPHONE ENCOUNTER
Call placed to patient to follow-up after discharge from hospital, post-op  Spoke to: Patient   Procedure & Date: CABGx3: 01/27/20  Surgeon: Ronen Grady wt: 115 lb(01/27)  D/C wt: 106 lb(02/04)   Current wt: 108 8 lb(02/12)    Fever/chills: No     Lightheadedness/dizziness: No     Angina: No     SOB: Only with activity     Pain: No    Edema: No    Incision: Clean/dry/intact, no s/s of infection  Chest tube site infected being treated with keflex per cardiology NP    GI: BM stable, no GI issues     Activity: Walking with no complaints     Follow-up APPTs: PCP: 02/06/2020  Cardiology: 02/10/2020  CT surgery: 02/26/2020    Comments: Patient states she is doing well at this time, chest tube site draining improved being treated with keflex by cardio NP  Has no other questions/concerns  Education:  1  Daily AM weight, call for weight gain of 2 lbs or more in 24 hours  2  Take frequent short walks, increase activity as tolerated  3  Maintain sternal precautions: No strenuous activity; no lifting more than 10 lbs;  no driving  4  Continue to use Incentive Spirometer frequently throughout the day  5  Shower daily; Cleanse incisions with antibacterial soap and water  6  No ointments, powder or lotions on surgical incisions  7   Call [facility] office with questions or concerns

## 2020-02-12 NOTE — RESULT ENCOUNTER NOTE
Please let the patient know that their bloodwork was normal - Her chemistry panel was normal, and her CBC shows still a very mild anemia, but slowly improving  Thanks     Dr Jolanta Bills

## 2020-02-13 ENCOUNTER — TELEPHONE (OUTPATIENT)
Dept: CARDIOLOGY CLINIC | Facility: CLINIC | Age: 75
End: 2020-02-13

## 2020-02-13 ENCOUNTER — PATIENT OUTREACH (OUTPATIENT)
Dept: FAMILY MEDICINE CLINIC | Facility: CLINIC | Age: 75
End: 2020-02-13

## 2020-02-13 NOTE — TELEPHONE ENCOUNTER
Spoke with patient to inform her she only needs to report 3 lbs in a day or 5lbs in a week  Patient stated she understood and would call back if there was any changes in her weight gain

## 2020-02-13 NOTE — TELEPHONE ENCOUNTER
Please advise she only needs to report a weight gain of 3 lbs in one day or 5 lbs in a week   Thank you

## 2020-02-13 NOTE — TELEPHONE ENCOUNTER
Patient called to report 1LB per day weight gain over the past 2 days    She states she was advised to call with any weight gain since being removed from Lasix      Please contact patient to discuss

## 2020-02-13 NOTE — PROGRESS NOTES
Outpatient Care Management Note:    Re: Pt returned care managers call  Provided Bundled information to patient and she agrees to participate  Letter and business card mailed to patient  Presently receiving home health visits from [facility]  Next skilled nurse visit anticipated for today  Pt has scale in home, weighs self daily and records  She reports her baseline weight [id number]  She has been off Lasix since 2/10/20  Today she reports her weight as 109lbs , she was able to teach back to CM when to call Cardiologist   She has a call placed into office and awaiting a call back  She has all medication in the home  Denies shortness of breath  She did state she had a black bean burger last evening and after she ate it she noticed the high sodium content  We discussed label reading and pt reports she will read labels prior to eating  Has good support network from her daughter and friends  She is eager to begin Cardiac Rehab on 3/3/20  Pt verbalized that she wants to "tell other women" to report her symptoms that she experienced prior to her heart attack  We discussed support groups available and encouraged her to contact the [employer]  Discussed the [facility] Acute MI patient and family education booklet  Pt expressed interest, will have family member stop by PCP office today to   Family and friends assisting with transportation to appointments until she is cleared to drive by CT surgeon, next appointment 2/26/20  Denies use of DME in home  Agrees to next outreach in one week

## 2020-02-13 NOTE — TELEPHONE ENCOUNTER
Received a call from [facility] reporting +1 edema in b/l LE  Reproted she not not have any edema at last home visit  Out patient care manager from PCP office and [facility] discussed/educated sodium intake today  Pt was not reading labels as directed  Just assumed not applying salt to food was all she needed to do  For example, pt had a burrito/bean burger for dinner  Pt is willing to monitor intake more closely, and asking if she could take one or two doses of lasix to get her back on track     [facility]#[phone]

## 2020-02-20 ENCOUNTER — PATIENT OUTREACH (OUTPATIENT)
Dept: FAMILY MEDICINE CLINIC | Facility: CLINIC | Age: 75
End: 2020-02-20

## 2020-02-25 ENCOUNTER — APPOINTMENT (OUTPATIENT)
Dept: LAB | Facility: CLINIC | Age: 75
End: 2020-02-25
Payer: MEDICARE

## 2020-02-25 DIAGNOSIS — I25.10 CORONARY ARTERY DISEASE INVOLVING NATIVE HEART WITHOUT ANGINA PECTORIS, UNSPECIFIED VESSEL OR LESION TYPE: ICD-10-CM

## 2020-02-25 DIAGNOSIS — E78.5 HYPERLIPIDEMIA, UNSPECIFIED HYPERLIPIDEMIA TYPE: ICD-10-CM

## 2020-02-25 LAB
CHOLEST SERPL-MCNC: 157 MG/DL (ref 50–200)
HDLC SERPL-MCNC: 50 MG/DL
LDLC SERPL CALC-MCNC: 85 MG/DL (ref 0–100)
NONHDLC SERPL-MCNC: 107 MG/DL
TRIGL SERPL-MCNC: 109 MG/DL

## 2020-02-25 PROCEDURE — 36415 COLL VENOUS BLD VENIPUNCTURE: CPT

## 2020-02-25 PROCEDURE — 80061 LIPID PANEL: CPT

## 2020-02-26 ENCOUNTER — OFFICE VISIT (OUTPATIENT)
Dept: CARDIAC SURGERY | Facility: CLINIC | Age: 75
End: 2020-02-26

## 2020-02-26 VITALS
SYSTOLIC BLOOD PRESSURE: 130 MMHG | TEMPERATURE: 97.1 F | HEART RATE: 62 BPM | DIASTOLIC BLOOD PRESSURE: 62 MMHG | HEIGHT: 65 IN | BODY MASS INDEX: 18.66 KG/M2 | OXYGEN SATURATION: 97 % | WEIGHT: 112 LBS | RESPIRATION RATE: 14 BRPM

## 2020-02-26 DIAGNOSIS — I25.10 CORONARY ARTERY DISEASE INVOLVING NATIVE CORONARY ARTERY OF NATIVE HEART WITHOUT ANGINA PECTORIS: ICD-10-CM

## 2020-02-26 DIAGNOSIS — Z48.89 ENCOUNTER FOR POST SURGICAL WOUND CHECK: Primary | ICD-10-CM

## 2020-02-26 PROCEDURE — 1111F DSCHRG MED/CURRENT MED MERGE: CPT | Performed by: PHYSICIAN ASSISTANT

## 2020-02-26 PROCEDURE — 4040F PNEUMOC VAC/ADMIN/RCVD: CPT | Performed by: PHYSICIAN ASSISTANT

## 2020-02-26 PROCEDURE — 99024 POSTOP FOLLOW-UP VISIT: CPT | Performed by: PHYSICIAN ASSISTANT

## 2020-02-26 PROCEDURE — 3078F DIAST BP <80 MM HG: CPT | Performed by: PHYSICIAN ASSISTANT

## 2020-02-26 PROCEDURE — 3075F SYST BP GE 130 - 139MM HG: CPT | Performed by: PHYSICIAN ASSISTANT

## 2020-02-26 PROCEDURE — 1160F RVW MEDS BY RX/DR IN RCRD: CPT | Performed by: PHYSICIAN ASSISTANT

## 2020-02-26 PROCEDURE — 3008F BODY MASS INDEX DOCD: CPT | Performed by: PHYSICIAN ASSISTANT

## 2020-02-26 NOTE — PROGRESS NOTES
Procedure: S/P coronary artery bypass grafting x 3, performed on 1/27/20    History: Colton Craft is a 76y o  year old female who presents to our office today for routine follow up care from coronary artery bypass grafting  Postoperatively, there was concern for chylothorax  She was placed on a low-fat diet for several days  She was then given a trial of high fat food and resumed on a regular diet without issue  Her CTs were removed and she was then discharged home  Since her discharge home, she reports she has been doing very well  She was placed on Keflex x 7 days 2/10/20 by Cardiology for a CT site infection  She reports her left CT site was draining and erythematous  She completed her course of antibiotics and it has resolved  She denies fever, chills, SOB, CP, palpitations or syncope  She has been ambulating approximately 2 miles daily  Her appetite is good and she is sleeping well  She has her initial appointment with cardiac rehab scheduled for March 2nd  Vital Signs:   Vitals:    02/26/20 1600 02/26/20 1603   BP: 130/66 130/62   BP Location: Left arm Right arm   Cuff Size: Adult Adult   Pulse: 62    Resp: 14    Temp: (!) 97 1 °F (36 2 °C)    TempSrc: Oral    SpO2: 97%    Weight: 50 8 kg (112 lb)    Height: 5' 5" (1 651 m)        Home Medications:   Prior to Admission medications    Medication Sig Start Date End Date Taking? Authorizing Provider   acetaminophen (TYLENOL) 325 mg tablet Take 1-2 tabs q6h PRN mild fever/pain 2/4/20  Yes Alfredito Golden PA-C   aspirin (ECOTRIN) 325 mg EC tablet Take 325 mg by mouth daily 2/4/20  Yes Historical Provider, MD   atorvastatin (LIPITOR) 80 mg tablet Take 1 tablet (80 mg total) by mouth daily with dinner 2/4/20  Yes Alfredito Golden PA-C   Calcium Carb-Cholecalciferol (CALCIUM 600 + D) 600-200 MG-UNIT TABS Calcium 600 + D TABS  TAKE 1 TABLET DAILY     Refills: 0    Active   Yes Historical Provider, MD   cholecalciferol (VITAMIN D3) 1,000 units tablet Take 1,000 Units by mouth daily   Yes Historical Provider, MD   isosorbide mononitrate (IMDUR) 30 mg 24 hr tablet Take 1 tablet (30 mg total) by mouth daily Take for 30 days then stop 2/5/20  Yes Alix Pierre PA-C   metoprolol tartrate (LOPRESSOR) 25 mg tablet Take 0 5 tablets (12 5 mg total) by mouth every 12 (twelve) hours 2/4/20  Yes Alix Pierre PA-C   pantoprazole (PROTONIX) 20 mg tablet Take 1 tablet (20 mg total) by mouth daily 2/4/20 3/5/20 Yes Alix Pierre PA-C   polyethylene glycol (MIRALAX) 17 g packet Take 17 g by mouth daily IF PACKETS UNAVAILABLE, MAY TAKE OTC EQUIVALENT  USED AS DIRECTED  2/4/20  Yes Alix Pierre PA-C   bisacodyl (DULCOLAX) 5 mg EC tablet Take 2 tablets (10 mg total) by mouth daily as needed for constipation  Patient not taking: Reported on 2/10/2020 2/4/20 2/26/20  Alix Pierre PA-C   oxyCODONE (ROXICODONE) 5 mg immediate release tablet Take 1-2 pills PO q6h as needed for mild to moderate pain  Ongoing therapy for postoperative pain  Patient not taking: Reported on 2/10/2020 2/4/20 2/26/20  Alix Pierre PA-C       Physical Exam:    HEENT/NECK:  PERRLA  No jugular venous distention  Cardiac: Regular rate and rhythm and No murmurs/rubs/gallops  Pulmonary:  Breath sounds clear bilaterally and No rales/rhonchi/wheezes  Abdomen:  Non-tender, Non-distended and Normal bowel sounds  Incisions: Sternum is stable  Incision is clean, dry, and intact  and Saphenectomy incison is clean, dry, and intact  Extremities: Extremities warm/dry, Radial pulses 2+ bilaterally and No edema B/L  Neuro: Alert and oriented X 3  Sensation is grossly intact  No focal deficits  Skin: Warm/Dry, without rashes or lesions  Lab Results:               Invalid input(s): LABGLOM      No results found for: HGBA1C  Lab Results   Component Value Date    TROPONINI 3 97 (H) 01/24/2020         Assessment: Coronary artery disease   S/P coronary artery bypass grafting;    Plan:     Vito Hewitt continues to recover well following coronary artery bypass grafting  To date they have made progressive improvements physical rehabilitation  At this point I have cleared them to begin outpatient cardiac rehabilitation and have encouraged them to to do so  Daphene Patches has also been cleared to resume driving at this point  I asked that them do so in progressive increments  I did remind them of their ongoing lifting restrictions of 25 pounds for an additional 2 months  Daphene Patches has already been evaluated by their primary care physician cardiologist for ongoing medical care  At this point I have not scheduled them for routine followup care with our office  I have advised them to call with any new concerns that may arise  Daphene Patches was comfortable with our recommendations and their questions were answered to their satisfaction  The patient was previously referred to gastroenterology for consideration of routine colonoscopy screening of all patients aged 54-65 by her PCP       Tanvi Ewing  02/26/20

## 2020-02-26 NOTE — LETTER
February 26, 2020     Joaquim Diaz, [facility] [id number] [address]  [address] [address]    Patient: Colton Craft   YOB: 1945   Date of Visit: 2/26/2020       Dear Dr Sheila Angel:    Thank you for referring Colton Craft to me for evaluation  Below are my notes for this consultation  If you have questions, please do not hesitate to call me  I look forward to following your patient along with you  Sincerely,        Mila Carlton,         CC: No Recipients  Taylor Gleason [place]  2/26/2020  4:48 PM  Attested  Procedure: S/P coronary artery bypass grafting x 3, performed on 1/27/20    History: Colton Craft is a 76y o  year old female who presents to our office today for routine follow up care from coronary artery bypass grafting  Postoperatively, there was concern for chylothorax  She was placed on a low-fat diet for several days  She was then given a trial of high fat food and resumed on a regular diet without issue  Her CTs were removed and she was then discharged home  Since her discharge home, she reports she has been doing very well  She was placed on Keflex x 7 days 2/10/20 by Cardiology for a CT site infection  She reports her left CT site was draining and erythematous  She completed her course of antibiotics and it has resolved  She denies fever, chills, SOB, CP, palpitations or syncope  She has been ambulating approximately 2 miles daily  Her appetite is good and she is sleeping well  She has her initial appointment with cardiac rehab scheduled for March 2nd  Vital Signs:   Vitals:    02/26/20 1600 02/26/20 1603   BP: 130/66 130/62   BP Location: Left arm Right arm   Cuff Size: Adult Adult   Pulse: 62    Resp: 14    Temp: (!) 97 1 °F (36 2 °C)    TempSrc: Oral    SpO2: 97%    Weight: 50 8 kg (112 lb)    Height: 5' 5" (1 651 m)        Home Medications:   Prior to Admission medications    Medication Sig Start Date End Date Taking?  Authorizing Provider   acetaminophen (TYLENOL) 325 mg tablet Take 1-2 tabs q6h PRN mild fever/pain 2/4/20  Yes Piyush Meeks PA-C   aspirin (ECOTRIN) 325 mg EC tablet Take 325 mg by mouth daily 2/4/20  Yes Historical Provider, MD   atorvastatin (LIPITOR) 80 mg tablet Take 1 tablet (80 mg total) by mouth daily with dinner 2/4/20  Yes Piyush Meeks PA-C   Calcium Carb-Cholecalciferol (CALCIUM 600 + D) 600-200 MG-UNIT TABS Calcium 600 + D TABS  TAKE 1 TABLET DAILY  Refills: 0    Active   Yes Historical Provider, MD   cholecalciferol (VITAMIN D3) 1,000 units tablet Take 1,000 Units by mouth daily   Yes Historical Provider, MD   isosorbide mononitrate (IMDUR) 30 mg 24 hr tablet Take 1 tablet (30 mg total) by mouth daily Take for 30 days then stop 2/5/20  Yes Piyush Meeks PA-C   metoprolol tartrate (LOPRESSOR) 25 mg tablet Take 0 5 tablets (12 5 mg total) by mouth every 12 (twelve) hours 2/4/20  Yes Piyush Meeks PA-C   pantoprazole (PROTONIX) 20 mg tablet Take 1 tablet (20 mg total) by mouth daily 2/4/20 3/5/20 Yes Piyush Meeks PA-C   polyethylene glycol (MIRALAX) 17 g packet Take 17 g by mouth daily IF PACKETS UNAVAILABLE, MAY TAKE OTC EQUIVALENT  USED AS DIRECTED  2/4/20  Yes Piyush Meeks PA-C   bisacodyl (DULCOLAX) 5 mg EC tablet Take 2 tablets (10 mg total) by mouth daily as needed for constipation  Patient not taking: Reported on 2/10/2020 2/4/20 2/26/20  Piyush Meeks PA-C   oxyCODONE (ROXICODONE) 5 mg immediate release tablet Take 1-2 pills PO q6h as needed for mild to moderate pain  Ongoing therapy for postoperative pain  Patient not taking: Reported on 2/10/2020 2/4/20 2/26/20  Piyush Meeks PA-C       Physical Exam:    HEENT/NECK:  PERRLA  No jugular venous distention  Cardiac: Regular rate and rhythm and No murmurs/rubs/gallops  Pulmonary:  Breath sounds clear bilaterally and No rales/rhonchi/wheezes  Abdomen:  Non-tender, Non-distended and Normal bowel sounds  Incisions: Sternum is stable  Incision is clean, dry, and intact    and Saphenectomy incison is clean, dry, and intact  Extremities: Extremities warm/dry, Radial pulses 2+ bilaterally and No edema B/L  Neuro: Alert and oriented X 3  Sensation is grossly intact  No focal deficits  Skin: Warm/Dry, without rashes or lesions  Lab Results:               Invalid input(s): LABGLOM      No results found for: HGBA1C  Lab Results   Component Value Date    TROPONINI 3 97 (H) 01/24/2020         Assessment: Coronary artery disease  S/P coronary artery bypass grafting;    Plan:     Jignesh Dean continues to recover well following coronary artery bypass grafting  To date they have made progressive improvements physical rehabilitation  At this point I have cleared them to begin outpatient cardiac rehabilitation and have encouraged them to to do so  Jignesh Dean has also been cleared to resume driving at this point  I asked that them do so in progressive increments  I did remind them of their ongoing lifting restrictions of 25 pounds for an additional 2 months  Jignesh Dean has already been evaluated by their primary care physician cardiologist for ongoing medical care  At this point I have not scheduled them for routine followup care with our office  I have advised them to call with any new concerns that may arise  Jignesh Dean was comfortable with our recommendations and their questions were answered to their satisfaction  The patient was previously referred to gastroenterology for consideration of routine colonoscopy screening of all patients aged 54-65 by her PCP    Tanvi Mahmood  02/26/20  Attestation signed by Abner Lima DO at 2/26/2020  4:48 PM:  The patient was seen and examined, and I agree with the midlevel's history, physical exam, assessment and plan with the following additions:    Ms Everardo Lr was back in the office today after coronary artery bypass grafting  We reviewed her surgery with her  I recommended outpatient cardiac rehab    We also reviewed her medications and the importance of staying on high-dose statins in the future  After cardiac rehab she is released with no further restrictions

## 2020-02-27 ENCOUNTER — PATIENT OUTREACH (OUTPATIENT)
Dept: FAMILY MEDICINE CLINIC | Facility: CLINIC | Age: 75
End: 2020-02-27

## 2020-02-27 NOTE — PROGRESS NOTES
Outpatient Care Management Note:    Re: Pt reports she is "doing well"  Was seen by CT surgery yesterday and is "cleared to drive"  Pt to start cardiac rehab on Monday and is "looking forward to returning to her full activity level"  Continues to weigh self daily, weight reported on home scale to be between 106-108lbs  Denies chest pain or shortness of breath  All incisions and drain sites well healed  Agrees to next outreach in three weeks

## 2020-03-02 ENCOUNTER — CLINICAL SUPPORT (OUTPATIENT)
Dept: CARDIAC REHAB | Facility: CLINIC | Age: 75
End: 2020-03-02
Payer: MEDICARE

## 2020-03-02 DIAGNOSIS — Z95.1 S/P CABG X 3: ICD-10-CM

## 2020-03-02 PROCEDURE — 93797 PHYS/QHP OP CAR RHAB WO ECG: CPT

## 2020-03-02 NOTE — PROGRESS NOTES
CARDIAC REHAB ASSESSMENT    Today's date: 2020  Patient name: Shannan Christian     : 1945       MRN: [id number]  PCP: Da Gleason DO  Referring Physician: Wendy Gonzales DO  Cardiologist: Nancy Machado MD   Surgeon: Wendy Gonzales DO   Dx:   Encounter Diagnosis   Name Primary?  S/P CABG x 3        Date of onset: 2020  Cultural needs: None     Height:    Wt Readings from Last 1 Encounters:   20 50 8 kg (112 lb)      Weight:   Ht Readings from Last 1 Encounters:   20 5' 5" (1 651 m)     Medical History:   Past Medical History:   Diagnosis Date    Effusion of left knee     Last assessed - 9/10/15    Hyperlipidemia     Hypertension     Tick bite     Last assessed - 17         Physical Limitations: Arthritis, R knee aches when running only     Risk Factors   Cholesterol: Yes  Smoking: Former user - casually smoked in 20's, less then 1day   HTN: Yes  DM: No  Obesity: No   Inactivity: No  Stress:  perceived  stress: 4/10   Stressors:  passed away 3 years ago, feeling alone    Goals for Stress Management: relaxation and socialization     Family History:  Family History   Problem Relation Age of Onset    Coronary artery disease Mother     Arthritis Mother     Other Mother         Cardiac Disorder     Transient ischemic attack Mother     Heart attack Father     Sudden death Father         SCD       Allergies: Patient has no known allergies    ETOH:   Social History     Substance and Sexual Activity   Alcohol Use Yes    Comment: 1 wine nightly         Current Medications:   Current Outpatient Medications   Medication Sig Dispense Refill    acetaminophen (TYLENOL) 325 mg tablet Take 1-2 tabs q6h PRN mild fever/pain 90 tablet 0    aspirin (ECOTRIN) 325 mg EC tablet Take 325 mg by mouth daily      atorvastatin (LIPITOR) 80 mg tablet Take 1 tablet (80 mg total) by mouth daily with dinner 90 tablet 0    Calcium Carb-Cholecalciferol (CALCIUM 600 + D) 600-200 MG-UNIT TABS Calcium 600 + D TABS  TAKE 1 TABLET DAILY  Refills: 0    Active      cholecalciferol (VITAMIN D3) 1,000 units tablet Take 1,000 Units by mouth daily      isosorbide mononitrate (IMDUR) 30 mg 24 hr tablet Take 1 tablet (30 mg total) by mouth daily Take for 30 days then stop 30 tablet 0    metoprolol tartrate (LOPRESSOR) 25 mg tablet Take 0 5 tablets (12 5 mg total) by mouth every 12 (twelve) hours 180 tablet 0    pantoprazole (PROTONIX) 20 mg tablet Take 1 tablet (20 mg total) by mouth daily 30 tablet 0    polyethylene glycol (MIRALAX) 17 g packet Take 17 g by mouth daily IF PACKETS UNAVAILABLE, MAY TAKE OTC EQUIVALENT  USED AS DIRECTED  510 g 0     No current facility-administered medications for this visit  Functional Status Prior to Diagnosis for Treatment   Occupation: retired  Recreation:    ADLs: Capable of performing light to moderate ADLs  [place]: able to perform self-care resumed driving  Exercise: Yoga, walking   Other: None     Current Functional Status  Occupation: retired  Recreation:     ADLs:Capable of performing light ADLs only resumed all ADLs within sternal precautions  [place]: able to perform self-care resumed driving  Exercise: walking 20 min 2x/day   Other: None     Patient Specific Goals:  Walk a 5k by memorial day, get back to yoga     Short Term Program Goals: dietary modifications increased strength improved energy/stamina with ADLs exercise 120-150 mins/wk improved BG control return to work    Long Term Goals: increased maximal walking duration  increased intial training workload  Improved Duke Activity Status score  Improved functional capacity  Improved lipid profile  Reduced body fat%  Reduced waist circumference  Reduced stress  improved Rate Your Plate Score    Ability to reach goals/rehabilitation potential:  Excellent    Projected return to function: 12 weeks  Objective tests: sub-max TM ETT      Nutritional Reviewed details of Rate your Plate  Discussed key elements of heart healthy eating  Reviewed patient goals for dietary modifications and their clinical implications  Reviewed most recent lipid profile  Goals for dietary modification: low fat dairy   reduced fat cheese  increase whole grains  increase fruits and vegetables  improved snack choices  reduce sweets/frozen desserts      Emotional/Social  Patient reports he/she is coping well with good social support and denies depression or anxiety    SOCIAL SUPPORT NETWORK    Marital status:     Rate 1-5:    Marriage: 5   Family: 5   Financial: 5   Relationships: 5   Spirituality: 5   Intellectual: 5      Domestic Violence Screening: No    Comments: Felt nauseous early in the morning 1/24/2020, pt passed out at home and her friends were able to help her  Ended up with an NSTEMI  Pt ended up with a CABG x 3 on 1/27/2020

## 2020-03-02 NOTE — PROGRESS NOTES
Cardiac Rehabilitation Plan of Care   Care Plan        Today's date: 3/2/2020   Visits: 1  Patient name: Satish Sebastian      : 1945  Age: 76 y o  MRN: [id number]  Referring Physician: Martha Larkin DO  Cardiologist: Yeyo Fink MD   Provider: [place]  Clinician: Cady Dacosta MS, [facility], [facility]    Dx:   Encounter Diagnosis   Name Primary?  S/P CABG x 3      Date of onset: 2020      SUMMARY OF PROGRESS:  This is Bella's initial eval for cardiac rehab  She is attending CR for a recent CABG performed on 2020  She was first placed in the hospital due to an NSTEMI on 2020  She was nauseous at the time of arrival  Cath showed 4 blockages, 1 was not bypassed due to size of vessel  Pt states she is feeling well and has great healing at site of incision  She has minimal tenderness and is following her 25lb weight restriction  Today Annmarie Silver completed a submax ETT at which she walked for 7 min at a max METs of 4 3 at which termination of test was met 30bpm+ resting  Annmarie Silver had no cardiac complications during testing and normal hemodynamic response after a drop in BP upon start of test  This may have been due to her nerves prior to testing  On telemetry she was NSR with occ PACs during exercise  Annmarie Silver states she is currently not doing much exercise other then 20min of talking 2x/wk with her dog or around the neighborhood when it is nice out  Annmarie Silver hopes to increase her overall exercise to 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 as well as have an increase in energy  Annmarie Silver states she is following a heart healthy diet and is hoping to continue to make little adjustments where needed  She has cut back on her sweets and cheese which she indulged in regularly prior to her surgery  Annmarie Silver states she does have some added stress with living alone since losing her  3years ago  She denies any depression or anxiety and states she has excellent family support from her daughters   Annmarie Silver will begin exercise on 3/6/2020  Medication compliance: Yes   Comments: None   Fall Risk: Low   Comments: None     EKG changes: NSR with occ PACs       EXERCISE ASSESSMENT and PLAN    Current Exercise Program in Rehab:       Frequency: 3-4 days/week        Minutes: 30-35         METS: 3-4            HR:    RPE: 4-6         Modalities: Treadmill, Airdyne bike, UBE and Lifecycle      Exercise Progression 30 Day Goals :    Frequency: 3 days/week   Minutes: 35-45   METS: 3 5-4 5   HR:     RPE: 4-6   Modalities: Treadmill, Airdyne bike, UBE, Lifecycle and Elliptical    Strength training: Will be added following at least 8 weeks post surgery and 8-10 monitored sessions   Modalities: Leg Press, Chest Press, Pull Downs, Arm Curl and Seated Row    Progressing: In Progress    Home Exercise: Type: walking , Frequency: 5days/week, Duration: 2 sessoins of 20 mins    Goals: 10% improvement in functional capacity, Reduced Dyspnea with physical activity  0-1/10, improved DASI score by 10%, Increase in peak CR METs by 40%, Resume ADLs with increased strength and Exercise 5 days/wk, >150mins/wk  Education: Benefit of exercise for CAD risk factors, home exercise guidelines, signs and sxs and RPE scale   Plan:home exercise 30+ mins 2 days opposite CR and Education class: Risk Factors for Heart Disease  Readiness to change: Preparation:  (Getting ready to change)       NUTRITION ASSESSMENT AND PLAN    Weight control:    Starting weight: 111 lbs    Current weight:     Waist circumference:    Startin 5 in    Current:    Diabetes: N/A  Lipid management: Discussed diet and lipid management and Last lipid profile 2020  Chol 157    HDL 50  LDL 85  Goals:decreased body fat% <25%    and increase muscle mass   Education: heart healthy eating  low sodium diet  hydration  diet and lipid management  healthy choices while dining out  portion control  Progressing: In Progress  Plan: Education class: Reading Food Labels, Education Class: Heart Healthy Eating, Increase PUFA and MUFA, Decrease SFA, Increase whole grains, increase fruits/vegs, eliminate processed meats, reduce red meat 1x/wk, swtich to low fat dairy and Reduce added sugars <25g/day  Readiness to change: Preparation:  (Getting ready to change)       PSYCHOSOCIAL ASSESSMENT AND PLAN    Emotional:  Depression assessment:  PHQ-9 = 1-4 = Minimal Depression            Anxiety measure:  HELDER-7 = 0-4  = Not anxious  Self-reported stress level: 3   Social support: Excellent  Goals:  Physical Fitness in [facility] Score < 3, Daily Activity in [facility] Score < 3, Increased interest in doing things, improved sleep and increased energy  Education: signs/sxs of depression, benefits of positive support system, coping mechanisms and depression and CAD  Progressing: In Progress  Plan: Class: Stress and Your Health, Class: Relaxation and Practice relaxation techniques  Readiness to change: Preparation:  (Getting ready to change)       OTHER CORE COMPONENTS     Tobacco:   Social History     Tobacco Use   Smoking Status Never Smoker   Smokeless Tobacco Never Used       Tobacco Use Intervention: Referral to tobacco expert:   Brief counseling by cardiac rehab professional  Date: 3/2/2020    Blood pressure:    Restin/70   Exercise: 124/60 - drop in BP may be due to nerves prior to testing     Goals: consistent BP < 130/80, reduced dietary sodium <2300mg, consistent exercise >150 mins/wk, medication compliance and Abstain from smoking  Education:  understanding HTN and CAD, low sodium diet and HTN, Education class:  Common Heart Medications and Education class: Understanding Heart Disease  Progressing: In Progress  Plan: Class: Understanding Heart Disease and Class: Common Heart Medications  Readiness to change: Preparation:  (Getting ready to change)

## 2020-03-03 ENCOUNTER — APPOINTMENT (OUTPATIENT)
Dept: CARDIAC REHAB | Facility: CLINIC | Age: 75
End: 2020-03-03
Payer: MEDICARE

## 2020-03-05 ENCOUNTER — APPOINTMENT (OUTPATIENT)
Dept: CARDIAC REHAB | Facility: CLINIC | Age: 75
End: 2020-03-05
Payer: MEDICARE

## 2020-03-06 ENCOUNTER — CLINICAL SUPPORT (OUTPATIENT)
Dept: CARDIAC REHAB | Facility: CLINIC | Age: 75
End: 2020-03-06
Payer: MEDICARE

## 2020-03-06 DIAGNOSIS — Z95.1 S/P CABG X 3: ICD-10-CM

## 2020-03-06 PROCEDURE — 93798 PHYS/QHP OP CAR RHAB W/ECG: CPT

## 2020-03-07 ENCOUNTER — CLINICAL SUPPORT (OUTPATIENT)
Dept: CARDIAC REHAB | Facility: CLINIC | Age: 75
End: 2020-03-07
Payer: MEDICARE

## 2020-03-07 DIAGNOSIS — Z95.1 S/P CABG X 3: ICD-10-CM

## 2020-03-07 PROCEDURE — 93798 PHYS/QHP OP CAR RHAB W/ECG: CPT

## 2020-03-10 ENCOUNTER — CLINICAL SUPPORT (OUTPATIENT)
Dept: CARDIAC REHAB | Facility: CLINIC | Age: 75
End: 2020-03-10
Payer: MEDICARE

## 2020-03-10 DIAGNOSIS — Z95.1 S/P CABG X 3: ICD-10-CM

## 2020-03-10 PROCEDURE — 93798 PHYS/QHP OP CAR RHAB W/ECG: CPT

## 2020-03-12 ENCOUNTER — CLINICAL SUPPORT (OUTPATIENT)
Dept: CARDIAC REHAB | Facility: CLINIC | Age: 75
End: 2020-03-12
Payer: MEDICARE

## 2020-03-12 DIAGNOSIS — Z95.1 S/P CABG X 3: ICD-10-CM

## 2020-03-12 PROCEDURE — 93798 PHYS/QHP OP CAR RHAB W/ECG: CPT

## 2020-03-13 ENCOUNTER — CLINICAL SUPPORT (OUTPATIENT)
Dept: CARDIAC REHAB | Facility: CLINIC | Age: 75
End: 2020-03-13
Payer: MEDICARE

## 2020-03-13 DIAGNOSIS — Z95.1 S/P CABG X 3: ICD-10-CM

## 2020-03-13 PROCEDURE — 93798 PHYS/QHP OP CAR RHAB W/ECG: CPT

## 2020-03-17 ENCOUNTER — APPOINTMENT (OUTPATIENT)
Dept: CARDIAC REHAB | Facility: CLINIC | Age: 75
End: 2020-03-17
Payer: MEDICARE

## 2020-03-19 ENCOUNTER — APPOINTMENT (OUTPATIENT)
Dept: CARDIAC REHAB | Facility: CLINIC | Age: 75
End: 2020-03-19
Payer: MEDICARE

## 2020-03-20 ENCOUNTER — APPOINTMENT (OUTPATIENT)
Dept: CARDIAC REHAB | Facility: CLINIC | Age: 75
End: 2020-03-20
Payer: MEDICARE

## 2020-03-20 ENCOUNTER — PATIENT OUTREACH (OUTPATIENT)
Dept: FAMILY MEDICINE CLINIC | Facility: CLINIC | Age: 75
End: 2020-03-20

## 2020-03-24 ENCOUNTER — APPOINTMENT (OUTPATIENT)
Dept: CARDIAC REHAB | Facility: CLINIC | Age: 75
End: 2020-03-24
Payer: MEDICARE

## 2020-03-26 ENCOUNTER — APPOINTMENT (OUTPATIENT)
Dept: CARDIAC REHAB | Facility: CLINIC | Age: 75
End: 2020-03-26
Payer: MEDICARE

## 2020-03-27 ENCOUNTER — APPOINTMENT (OUTPATIENT)
Dept: CARDIAC REHAB | Facility: CLINIC | Age: 75
End: 2020-03-27
Payer: MEDICARE

## 2020-03-27 NOTE — PROGRESS NOTES
Cardiac Rehabilitation Plan of Care   30 day        Today's date: 3/27/2020   Visits: 6  Patient name: Jose E Vergara      : 1945  Age: 76 y o  MRN: [id number]  Referring Physician: Sally Agustin PA-C  Cardiologist: Angela Velez MD   Provider: [facility]   Clinician: Gabby Miller, MS, CEP     Dx:   Encounter Diagnosis   Name Primary?  S/P CABG x 3      Date of onset: 2020      SUMMARY OF PROGRESS:  Today is Bella's 30 day note for cardiac rehab and she has completed a total of 6 session so far  Emi Carrillo is going to be placed on a medical hold due to the increasing concern of COVID-19 and will continue with rehab once everything is settled  She is attending CR for a recent CABG performed on 2020  She was first placed in the hospital due to an NSTEMI on 2020  Emi Carrillo states she is feeling well and has great healing at site of incision  She has minimal tenderness and is following her 25lb weight restriction  Emi Carrillo has progressed to 40-45 minutes of cardio at 2 8-4 5 METs  Emi Carrillo is going to begin weight lifting at session 10, which will be 2 months after her surgery  Emi Carrillo reports to not have any cardiac complication during exercise  In one of Bella's sessions she did become lightheaded after doing the bicycle and was encouraged to remain hydrated while exercising  Emi Carrillo has normal hemodynamic responses to exercise and her telemetry read NSR with occ PACs  Emi Carrillo reports to be walking on opposite days of rehab going about 30-40 minutes and continue to do her yoga 2x/wk  Emi Carrillo states she is following a heart healthy diet and is hoping to continue to make little adjustments where needed  She has cut back on her sweets and cheese which she indulged in regularly prior to her surgery  Emi Carrillo states she does have some added stress with living alone since losing her  3years ago  She denies any depression or anxiety and states she has excellent family support from her daughters  Kenny Alfredo will continue to progress on her current program in cardiac rehab as tolerable  Medication compliance: Yes   Comments: None   Fall Risk: Low   Comments: None     EKG changes: NSR with occ PACs       EXERCISE ASSESSMENT and PLAN    Current Exercise Program in Rehab:       Frequency: 4 days/week        Minutes: 40-45        METS: 2 8-4 5            HR:    RPE: 4-6         Modalities: Treadmill, Airdyne bike, UBE and Lifecycle      Exercise Progression 30 Day Goals :    Frequency: 5 days/week   Minutes: 45   METS: 4 5 - 5   HR:     RPE: 4-6   Modalities: Treadmill, Airdyne bike, Lifecycle and Elliptical    Strength training:   Will be added following at least 8 weeks post surgery and 8-10 monitored sessions   Modalities: Leg Press, Chest Press, Pull Downs, Arm Curl and Seated Row    Progressing:  Yes has met her goal and will continue to progress on her current program     Home Exercise: Type: walking and yoga , Frequency: 5days/week, Duration: 2 sessoins of 20 mins    Goals: 10% improvement in functional capacity, Reduced Dyspnea with physical activity  0-1/10, improved DASI score by 10%, Increase in peak CR METs by 40%, Resume ADLs with increased strength and Exercise 5 days/wk, >150mins/wk  Education: Benefit of exercise for CAD risk factors, home exercise guidelines, signs and sxs and RPE scale   Plan:home exercise 30+ mins 2 days opposite CR and Education class: Risk Factors for Heart Disease  Readiness to change: Action:  (Changing behavior)      NUTRITION ASSESSMENT AND PLAN    Weight control:    Starting weight: 111 lbs    Current weight: 112 lbs  Waist circumference:    Startin 5 in    Current:    Diabetes: N/A  Lipid management: Discussed diet and lipid management and Last lipid profile 2020  Chol 157    HDL 50  LDL 85  Goals:decreased body fat% <25%    and increase muscle mass   Education: heart healthy eating  low sodium diet  hydration  diet and lipid management  healthy choices while dining out  portion control  Progressing: Yes has met her goal and is trying to make appropriate changes where they are needed     Plan: Education class: Reading Food Labels, Education Class: Heart Healthy Eating, Increase PUFA and MUFA, Decrease SFA, Increase whole grains, increase fruits/vegs, eliminate processed meats, reduce red meat 1x/wk, swtich to low fat dairy and Reduce added sugars <25g/day  Readiness to change: Action:  (Changing behavior)      PSYCHOSOCIAL ASSESSMENT AND PLAN    Emotional:  Depression assessment:  PHQ-9 = 1-4 = Minimal Depression            Anxiety measure:  HELDER-7 = 0-4  = Not anxious  Self-reported stress level: 3   Social support: Excellent  Goals:  Physical Fitness in [facility] Score < 3, Daily Activity in [facility] Score < 3, Increased interest in doing things, improved sleep and increased energy  Education: signs/sxs of depression, benefits of positive support system, coping mechanisms and depression and CAD  Progressing: Yes has met her goal and denies any depression or anxiety and is learning how to cope with losing her   Plan: Class: Stress and Your Health, Class: Relaxation and Practice relaxation techniques  Readiness to change: Action:  (Changing behavior)      OTHER CORE COMPONENTS     Tobacco:   Social History     Tobacco Use   Smoking Status Never Smoker   Smokeless Tobacco Never Used       Tobacco Use Intervention: Referral to tobacco expert:   Brief counseling by cardiac rehab professional  Date: 3/2/2020    Blood pressure:    Restin/66 - 142/80    Exercise: 114/70 - 160/60     Goals: consistent BP < 130/80, reduced dietary sodium <2300mg, consistent exercise >150 mins/wk, medication compliance and Abstain from smoking  Education:  understanding HTN and CAD, low sodium diet and HTN, Education class:  Common Heart Medications and Education class: Understanding Heart Disease  Progressing: Yes has met her goal and her BP well controlled   Plan: Class: Understanding Heart Disease and Class: Common Heart Medications  Readiness to change: Action:  (Changing behavior)

## 2020-03-31 ENCOUNTER — APPOINTMENT (OUTPATIENT)
Dept: CARDIAC REHAB | Facility: CLINIC | Age: 75
End: 2020-03-31
Payer: MEDICARE

## 2020-04-02 ENCOUNTER — APPOINTMENT (OUTPATIENT)
Dept: CARDIAC REHAB | Facility: CLINIC | Age: 75
End: 2020-04-02
Payer: MEDICARE

## 2020-04-02 ENCOUNTER — TELEMEDICINE (OUTPATIENT)
Dept: CARDIAC REHAB | Facility: CLINIC | Age: 75
End: 2020-04-02
Payer: MEDICARE

## 2020-04-02 DIAGNOSIS — Z95.1 S/P CABG X 3: ICD-10-CM

## 2020-04-02 PROCEDURE — G2062 QUAL NONMD EST PT 11-20M: HCPCS

## 2020-04-03 ENCOUNTER — APPOINTMENT (OUTPATIENT)
Dept: CARDIAC REHAB | Facility: CLINIC | Age: 75
End: 2020-04-03
Payer: MEDICARE

## 2020-04-07 ENCOUNTER — APPOINTMENT (OUTPATIENT)
Dept: CARDIAC REHAB | Facility: CLINIC | Age: 75
End: 2020-04-07
Payer: MEDICARE

## 2020-04-08 ENCOUNTER — TELEPHONE (OUTPATIENT)
Dept: CARDIOLOGY CLINIC | Facility: CLINIC | Age: 75
End: 2020-04-08

## 2020-04-08 DIAGNOSIS — Z95.1 S/P CABG X 3: ICD-10-CM

## 2020-04-08 RX ORDER — ATORVASTATIN CALCIUM 80 MG/1
80 TABLET, FILM COATED ORAL
Qty: 90 TABLET | Refills: 3 | OUTPATIENT
Start: 2020-04-08

## 2020-04-08 RX ORDER — ASPIRIN 325 MG
325 TABLET, DELAYED RELEASE (ENTERIC COATED) ORAL DAILY
Qty: 30 TABLET | Refills: 11 | OUTPATIENT
Start: 2020-04-08

## 2020-04-09 ENCOUNTER — APPOINTMENT (OUTPATIENT)
Dept: CARDIAC REHAB | Facility: CLINIC | Age: 75
End: 2020-04-09
Payer: MEDICARE

## 2020-04-10 ENCOUNTER — TELEMEDICINE (OUTPATIENT)
Dept: CARDIAC REHAB | Facility: CLINIC | Age: 75
End: 2020-04-10
Payer: MEDICARE

## 2020-04-10 ENCOUNTER — APPOINTMENT (OUTPATIENT)
Dept: CARDIAC REHAB | Facility: CLINIC | Age: 75
End: 2020-04-10
Payer: MEDICARE

## 2020-04-10 ENCOUNTER — TRANSCRIBE ORDERS (OUTPATIENT)
Dept: ADMINISTRATIVE | Facility: HOSPITAL | Age: 75
End: 2020-04-10

## 2020-04-10 ENCOUNTER — TELEPHONE (OUTPATIENT)
Dept: GASTROENTEROLOGY | Facility: AMBULARY SURGERY CENTER | Age: 75
End: 2020-04-10

## 2020-04-10 ENCOUNTER — TELEMEDICINE (OUTPATIENT)
Dept: CARDIOLOGY CLINIC | Facility: CLINIC | Age: 75
End: 2020-04-10
Payer: MEDICARE

## 2020-04-10 VITALS
DIASTOLIC BLOOD PRESSURE: 75 MMHG | WEIGHT: 108 LBS | HEART RATE: 68 BPM | BODY MASS INDEX: 17.97 KG/M2 | SYSTOLIC BLOOD PRESSURE: 135 MMHG

## 2020-04-10 DIAGNOSIS — Z95.1 S/P CABG X 3: ICD-10-CM

## 2020-04-10 DIAGNOSIS — I25.119 ATHEROSCLEROSIS OF NATIVE CORONARY ARTERY OF NATIVE HEART WITH ANGINA PECTORIS (HCC): Primary | ICD-10-CM

## 2020-04-10 DIAGNOSIS — Z12.31 SCREENING MAMMOGRAM, ENCOUNTER FOR: Primary | ICD-10-CM

## 2020-04-10 DIAGNOSIS — I10 ESSENTIAL HYPERTENSION: ICD-10-CM

## 2020-04-10 DIAGNOSIS — E78.00 PURE HYPERCHOLESTEROLEMIA: ICD-10-CM

## 2020-04-10 PROCEDURE — G2061 QUAL NONMD EST PT 5-10M: HCPCS

## 2020-04-10 PROCEDURE — 99443 PR PHYS/QHP TELEPHONE EVALUATION 21-30 MIN: CPT | Performed by: INTERNAL MEDICINE

## 2020-04-10 RX ORDER — ASPIRIN 81 MG/1
81 TABLET ORAL DAILY
Start: 2020-04-10

## 2020-04-10 RX ORDER — ATORVASTATIN CALCIUM 80 MG/1
80 TABLET, FILM COATED ORAL
Qty: 90 TABLET | Refills: 3 | Status: SHIPPED | OUTPATIENT
Start: 2020-04-10 | End: 2020-04-23 | Stop reason: SDUPTHER

## 2020-04-14 ENCOUNTER — TELEMEDICINE (OUTPATIENT)
Dept: GASTROENTEROLOGY | Facility: AMBULARY SURGERY CENTER | Age: 75
End: 2020-04-14
Payer: MEDICARE

## 2020-04-14 ENCOUNTER — APPOINTMENT (OUTPATIENT)
Dept: CARDIAC REHAB | Facility: CLINIC | Age: 75
End: 2020-04-14
Payer: MEDICARE

## 2020-04-14 DIAGNOSIS — Z12.11 SCREENING FOR COLON CANCER: Primary | ICD-10-CM

## 2020-04-14 DIAGNOSIS — Z86.010 HISTORY OF COLON POLYPS: ICD-10-CM

## 2020-04-14 PROBLEM — Z86.0100 HISTORY OF COLON POLYPS: Status: ACTIVE | Noted: 2020-04-14

## 2020-04-14 PROCEDURE — 99212 OFFICE O/P EST SF 10 MIN: CPT | Performed by: INTERNAL MEDICINE

## 2020-04-16 ENCOUNTER — APPOINTMENT (OUTPATIENT)
Dept: CARDIAC REHAB | Facility: CLINIC | Age: 75
End: 2020-04-16
Payer: MEDICARE

## 2020-04-16 ENCOUNTER — TELEPHONE (OUTPATIENT)
Dept: CARDIOLOGY CLINIC | Facility: CLINIC | Age: 75
End: 2020-04-16

## 2020-04-16 DIAGNOSIS — I10 ESSENTIAL HYPERTENSION: Primary | ICD-10-CM

## 2020-04-16 RX ORDER — LOSARTAN POTASSIUM 25 MG/1
25 TABLET ORAL DAILY
Qty: 90 TABLET | Refills: 1 | Status: SHIPPED | OUTPATIENT
Start: 2020-04-16 | End: 2020-05-20 | Stop reason: DRUGHIGH

## 2020-04-17 ENCOUNTER — PATIENT OUTREACH (OUTPATIENT)
Dept: FAMILY MEDICINE CLINIC | Facility: CLINIC | Age: 75
End: 2020-04-17

## 2020-04-17 ENCOUNTER — TELEMEDICINE (OUTPATIENT)
Dept: CARDIAC REHAB | Facility: CLINIC | Age: 75
End: 2020-04-17
Payer: MEDICARE

## 2020-04-17 ENCOUNTER — APPOINTMENT (OUTPATIENT)
Dept: CARDIAC REHAB | Facility: CLINIC | Age: 75
End: 2020-04-17
Payer: MEDICARE

## 2020-04-17 DIAGNOSIS — Z95.1 S/P CABG X 3: ICD-10-CM

## 2020-04-17 PROCEDURE — G2061 QUAL NONMD EST PT 5-10M: HCPCS

## 2020-04-21 ENCOUNTER — APPOINTMENT (OUTPATIENT)
Dept: CARDIAC REHAB | Facility: CLINIC | Age: 75
End: 2020-04-21
Payer: MEDICARE

## 2020-04-23 ENCOUNTER — APPOINTMENT (OUTPATIENT)
Dept: CARDIAC REHAB | Facility: CLINIC | Age: 75
End: 2020-04-23
Payer: MEDICARE

## 2020-04-23 DIAGNOSIS — Z95.1 S/P CABG X 3: ICD-10-CM

## 2020-04-23 RX ORDER — ATORVASTATIN CALCIUM 80 MG/1
80 TABLET, FILM COATED ORAL
Qty: 90 TABLET | Refills: 3 | Status: SHIPPED | OUTPATIENT
Start: 2020-04-23 | End: 2020-08-28

## 2020-04-24 ENCOUNTER — APPOINTMENT (OUTPATIENT)
Dept: CARDIAC REHAB | Facility: CLINIC | Age: 75
End: 2020-04-24
Payer: MEDICARE

## 2020-04-24 ENCOUNTER — TELEMEDICINE (OUTPATIENT)
Dept: CARDIAC REHAB | Facility: CLINIC | Age: 75
End: 2020-04-24
Payer: MEDICARE

## 2020-04-24 DIAGNOSIS — Z95.1 S/P CABG X 3: ICD-10-CM

## 2020-04-24 PROCEDURE — G2061 QUAL NONMD EST PT 5-10M: HCPCS

## 2020-04-28 ENCOUNTER — APPOINTMENT (OUTPATIENT)
Dept: CARDIAC REHAB | Facility: CLINIC | Age: 75
End: 2020-04-28
Payer: MEDICARE

## 2020-04-28 ENCOUNTER — TELEPHONE (OUTPATIENT)
Dept: GASTROENTEROLOGY | Facility: AMBULARY SURGERY CENTER | Age: 75
End: 2020-04-28

## 2020-04-30 ENCOUNTER — TELEPHONE (OUTPATIENT)
Dept: FAMILY MEDICINE CLINIC | Facility: CLINIC | Age: 75
End: 2020-04-30

## 2020-04-30 ENCOUNTER — PATIENT OUTREACH (OUTPATIENT)
Dept: FAMILY MEDICINE CLINIC | Facility: CLINIC | Age: 75
End: 2020-04-30

## 2020-04-30 ENCOUNTER — APPOINTMENT (OUTPATIENT)
Dept: CARDIAC REHAB | Facility: CLINIC | Age: 75
End: 2020-04-30
Payer: MEDICARE

## 2020-05-01 ENCOUNTER — APPOINTMENT (OUTPATIENT)
Dept: CARDIAC REHAB | Facility: CLINIC | Age: 75
End: 2020-05-01
Payer: MEDICARE

## 2020-05-04 ENCOUNTER — PATIENT OUTREACH (OUTPATIENT)
Dept: FAMILY MEDICINE CLINIC | Facility: CLINIC | Age: 75
End: 2020-05-04

## 2020-05-05 ENCOUNTER — APPOINTMENT (OUTPATIENT)
Dept: CARDIAC REHAB | Facility: CLINIC | Age: 75
End: 2020-05-05
Payer: MEDICARE

## 2020-05-07 ENCOUNTER — APPOINTMENT (OUTPATIENT)
Dept: CARDIAC REHAB | Facility: CLINIC | Age: 75
End: 2020-05-07
Payer: MEDICARE

## 2020-05-08 ENCOUNTER — APPOINTMENT (OUTPATIENT)
Dept: CARDIAC REHAB | Facility: CLINIC | Age: 75
End: 2020-05-08
Payer: MEDICARE

## 2020-05-12 ENCOUNTER — APPOINTMENT (OUTPATIENT)
Dept: CARDIAC REHAB | Facility: CLINIC | Age: 75
End: 2020-05-12
Payer: MEDICARE

## 2020-05-14 ENCOUNTER — APPOINTMENT (OUTPATIENT)
Dept: CARDIAC REHAB | Facility: CLINIC | Age: 75
End: 2020-05-14
Payer: MEDICARE

## 2020-05-15 ENCOUNTER — APPOINTMENT (OUTPATIENT)
Dept: CARDIAC REHAB | Facility: CLINIC | Age: 75
End: 2020-05-15
Payer: MEDICARE

## 2020-05-18 ENCOUNTER — APPOINTMENT (OUTPATIENT)
Dept: LAB | Facility: CLINIC | Age: 75
End: 2020-05-18
Payer: MEDICARE

## 2020-05-18 ENCOUNTER — TELEPHONE (OUTPATIENT)
Dept: CARDIOLOGY CLINIC | Facility: CLINIC | Age: 75
End: 2020-05-18

## 2020-05-18 DIAGNOSIS — Z13.1 SCREENING FOR DIABETES MELLITUS: ICD-10-CM

## 2020-05-18 DIAGNOSIS — E78.5 HYPERLIPIDEMIA, UNSPECIFIED HYPERLIPIDEMIA TYPE: ICD-10-CM

## 2020-05-18 LAB
ALBUMIN SERPL BCP-MCNC: 4 G/DL (ref 3.5–5)
ALP SERPL-CCNC: 61 U/L (ref 46–116)
ALT SERPL W P-5'-P-CCNC: 43 U/L (ref 12–78)
ANION GAP SERPL CALCULATED.3IONS-SCNC: 3 MMOL/L (ref 4–13)
AST SERPL W P-5'-P-CCNC: 43 U/L (ref 5–45)
BILIRUB SERPL-MCNC: 0.78 MG/DL (ref 0.2–1)
BUN SERPL-MCNC: 19 MG/DL (ref 5–25)
CALCIUM SERPL-MCNC: 9 MG/DL (ref 8.3–10.1)
CHLORIDE SERPL-SCNC: 106 MMOL/L (ref 100–108)
CHOLEST SERPL-MCNC: 179 MG/DL (ref 50–200)
CO2 SERPL-SCNC: 29 MMOL/L (ref 21–32)
CREAT SERPL-MCNC: 0.68 MG/DL (ref 0.6–1.3)
GFR SERPL CREATININE-BSD FRML MDRD: 86 ML/MIN/1.73SQ M
GLUCOSE P FAST SERPL-MCNC: 80 MG/DL (ref 65–99)
HDLC SERPL-MCNC: 67 MG/DL
LDLC SERPL CALC-MCNC: 93 MG/DL (ref 0–100)
LDLC SERPL DIRECT ASSAY-MCNC: 88 MG/DL (ref 0–100)
POTASSIUM SERPL-SCNC: 4.5 MMOL/L (ref 3.5–5.3)
PROT SERPL-MCNC: 7.1 G/DL (ref 6.4–8.2)
SODIUM SERPL-SCNC: 138 MMOL/L (ref 136–145)
TRIGL SERPL-MCNC: 95 MG/DL

## 2020-05-18 PROCEDURE — 36415 COLL VENOUS BLD VENIPUNCTURE: CPT

## 2020-05-18 PROCEDURE — 80053 COMPREHEN METABOLIC PANEL: CPT

## 2020-05-18 PROCEDURE — 83721 ASSAY OF BLOOD LIPOPROTEIN: CPT | Performed by: INTERNAL MEDICINE

## 2020-05-18 PROCEDURE — 80061 LIPID PANEL: CPT

## 2020-05-19 ENCOUNTER — APPOINTMENT (OUTPATIENT)
Dept: CARDIAC REHAB | Facility: CLINIC | Age: 75
End: 2020-05-19
Payer: MEDICARE

## 2020-05-20 ENCOUNTER — OFFICE VISIT (OUTPATIENT)
Dept: FAMILY MEDICINE CLINIC | Facility: CLINIC | Age: 75
End: 2020-05-20
Payer: MEDICARE

## 2020-05-20 VITALS
RESPIRATION RATE: 16 BRPM | DIASTOLIC BLOOD PRESSURE: 70 MMHG | HEART RATE: 53 BPM | TEMPERATURE: 98.4 F | SYSTOLIC BLOOD PRESSURE: 144 MMHG | OXYGEN SATURATION: 98 % | WEIGHT: 111.2 LBS | BODY MASS INDEX: 18.5 KG/M2

## 2020-05-20 DIAGNOSIS — I25.119 ATHEROSCLEROSIS OF NATIVE CORONARY ARTERY OF NATIVE HEART WITH ANGINA PECTORIS (HCC): Primary | ICD-10-CM

## 2020-05-20 DIAGNOSIS — Z95.1 S/P CABG X 3: ICD-10-CM

## 2020-05-20 DIAGNOSIS — E55.9 VITAMIN D DEFICIENCY: ICD-10-CM

## 2020-05-20 DIAGNOSIS — D50.8 OTHER IRON DEFICIENCY ANEMIA: ICD-10-CM

## 2020-05-20 DIAGNOSIS — D69.6 THROMBOCYTOPENIA (HCC): ICD-10-CM

## 2020-05-20 DIAGNOSIS — E78.00 PURE HYPERCHOLESTEROLEMIA: ICD-10-CM

## 2020-05-20 DIAGNOSIS — I10 ESSENTIAL HYPERTENSION: ICD-10-CM

## 2020-05-20 PROCEDURE — 99213 OFFICE O/P EST LOW 20 MIN: CPT | Performed by: FAMILY MEDICINE

## 2020-05-20 PROCEDURE — 1036F TOBACCO NON-USER: CPT | Performed by: FAMILY MEDICINE

## 2020-05-20 PROCEDURE — 3078F DIAST BP <80 MM HG: CPT | Performed by: FAMILY MEDICINE

## 2020-05-20 PROCEDURE — 3077F SYST BP >= 140 MM HG: CPT | Performed by: FAMILY MEDICINE

## 2020-05-20 PROCEDURE — 4040F PNEUMOC VAC/ADMIN/RCVD: CPT | Performed by: FAMILY MEDICINE

## 2020-05-20 PROCEDURE — 1160F RVW MEDS BY RX/DR IN RCRD: CPT | Performed by: FAMILY MEDICINE

## 2020-05-20 RX ORDER — LOSARTAN POTASSIUM 50 MG/1
50 TABLET ORAL DAILY
Qty: 30 TABLET | Refills: 5 | Status: SHIPPED | OUTPATIENT
Start: 2020-05-20 | End: 2020-11-20

## 2020-05-20 NOTE — PROGRESS NOTES
Raquel Canales      Dear Dr Vinay Wilson,     Thank you for referring your patient to our cardiac rehabilitation program  The patient has completed 6  visits  However, rehab is being discontinued at this time due to:    Voluntary Dropout:  The patient has chosen to quit due to being able to exercise on her own and after talking to Dr Chintan Gardner believe she is okay to do this  Please refer to the note below to see her progress in cardiac rehab  Attached are her final outcomes for cardiac rehab  She was unable to completed her final testing but did extremely well in rehab for the short time she was here  Please contact us at [phone] if you have any questions about this patents case or if/when it is appropriate to re-start the patients rehab program at a later date  Thank you for your continued support of cardiac rehabilitation  Sincerely,      Amee Bhakta MS, CEP  Exercise Physiologist

## 2020-05-21 ENCOUNTER — APPOINTMENT (OUTPATIENT)
Dept: CARDIAC REHAB | Facility: CLINIC | Age: 75
End: 2020-05-21
Payer: MEDICARE

## 2020-05-22 ENCOUNTER — APPOINTMENT (OUTPATIENT)
Dept: CARDIAC REHAB | Facility: CLINIC | Age: 75
End: 2020-05-22
Payer: MEDICARE

## 2020-06-05 ENCOUNTER — ANESTHESIA EVENT (OUTPATIENT)
Dept: GASTROENTEROLOGY | Facility: AMBULARY SURGERY CENTER | Age: 75
End: 2020-06-05

## 2020-06-09 DIAGNOSIS — Z11.59 SCREENING FOR VIRAL DISEASE: ICD-10-CM

## 2020-06-09 PROCEDURE — U0003 INFECTIOUS AGENT DETECTION BY NUCLEIC ACID (DNA OR RNA); SEVERE ACUTE RESPIRATORY SYNDROME CORONAVIRUS 2 (SARS-COV-2) (CORONAVIRUS DISEASE [COVID-19]), AMPLIFIED PROBE TECHNIQUE, MAKING USE OF HIGH THROUGHPUT TECHNOLOGIES AS DESCRIBED BY CMS-2020-01-R: HCPCS

## 2020-06-10 LAB — SARS-COV-2 RNA SPEC QL NAA+PROBE: NOT DETECTED

## 2020-06-15 ENCOUNTER — HOSPITAL ENCOUNTER (OUTPATIENT)
Dept: GASTROENTEROLOGY | Facility: AMBULARY SURGERY CENTER | Age: 75
Setting detail: OUTPATIENT SURGERY
Discharge: HOME | End: 2020-06-15
Attending: INTERNAL MEDICINE | Admitting: INTERNAL MEDICINE
Payer: MEDICARE

## 2020-06-15 ENCOUNTER — ANESTHESIA (OUTPATIENT)
Dept: GASTROENTEROLOGY | Facility: AMBULARY SURGERY CENTER | Age: 75
End: 2020-06-15

## 2020-06-15 VITALS
OXYGEN SATURATION: 100 % | TEMPERATURE: 97.3 F | SYSTOLIC BLOOD PRESSURE: 157 MMHG | DIASTOLIC BLOOD PRESSURE: 74 MMHG | RESPIRATION RATE: 18 BRPM | WEIGHT: 110 LBS | BODY MASS INDEX: 18.33 KG/M2 | HEIGHT: 65 IN | HEART RATE: 55 BPM

## 2020-06-15 DIAGNOSIS — Z86.010 HISTORY OF COLON POLYPS: ICD-10-CM

## 2020-06-15 DIAGNOSIS — Z12.11 SCREENING FOR COLON CANCER: ICD-10-CM

## 2020-06-15 PROCEDURE — 88305 TISSUE EXAM BY PATHOLOGIST: CPT | Performed by: PATHOLOGY

## 2020-06-15 PROCEDURE — 45380 COLONOSCOPY AND BIOPSY: CPT | Performed by: INTERNAL MEDICINE

## 2020-06-15 RX ORDER — LIDOCAINE HYDROCHLORIDE 10 MG/ML
0.5 INJECTION, SOLUTION EPIDURAL; INFILTRATION; INTRACAUDAL; PERINEURAL ONCE AS NEEDED
Status: DISCONTINUED | OUTPATIENT
Start: 2020-06-15 | End: 2020-06-19 | Stop reason: HOSPADM

## 2020-06-15 RX ORDER — LIDOCAINE HYDROCHLORIDE 10 MG/ML
INJECTION, SOLUTION EPIDURAL; INFILTRATION; INTRACAUDAL; PERINEURAL AS NEEDED
Status: DISCONTINUED | OUTPATIENT
Start: 2020-06-15 | End: 2020-06-15 | Stop reason: SURG

## 2020-06-15 RX ORDER — SODIUM CHLORIDE, SODIUM LACTATE, POTASSIUM CHLORIDE, CALCIUM CHLORIDE 600; 310; 30; 20 MG/100ML; MG/100ML; MG/100ML; MG/100ML
125 INJECTION, SOLUTION INTRAVENOUS CONTINUOUS
Status: DISCONTINUED | OUTPATIENT
Start: 2020-06-15 | End: 2020-06-19 | Stop reason: HOSPADM

## 2020-06-15 RX ORDER — PROPOFOL 10 MG/ML
INJECTION, EMULSION INTRAVENOUS AS NEEDED
Status: DISCONTINUED | OUTPATIENT
Start: 2020-06-15 | End: 2020-06-15 | Stop reason: SURG

## 2020-06-15 RX ADMIN — PROPOFOL 50 MG: 10 INJECTION, EMULSION INTRAVENOUS at 08:20

## 2020-06-15 RX ADMIN — LIDOCAINE HYDROCHLORIDE 50 MG: 10 INJECTION, SOLUTION EPIDURAL; INFILTRATION; INTRACAUDAL; PERINEURAL at 08:17

## 2020-06-15 RX ADMIN — PROPOFOL 20 MG: 10 INJECTION, EMULSION INTRAVENOUS at 08:23

## 2020-06-15 RX ADMIN — SODIUM CHLORIDE, SODIUM LACTATE, POTASSIUM CHLORIDE, AND CALCIUM CHLORIDE 125 ML/HR: .6; .31; .03; .02 INJECTION, SOLUTION INTRAVENOUS at 07:45

## 2020-06-15 RX ADMIN — PROPOFOL 100 MG: 10 INJECTION, EMULSION INTRAVENOUS at 08:17

## 2020-06-15 RX ADMIN — PROPOFOL 30 MG: 10 INJECTION, EMULSION INTRAVENOUS at 08:32

## 2020-06-25 ENCOUNTER — TELEPHONE (OUTPATIENT)
Dept: GASTROENTEROLOGY | Facility: AMBULARY SURGERY CENTER | Age: 75
End: 2020-06-25

## 2020-07-27 DIAGNOSIS — Z95.1 S/P CABG X 3: ICD-10-CM

## 2020-08-13 ENCOUNTER — HOSPITAL ENCOUNTER (OUTPATIENT)
Dept: MAMMOGRAPHY | Facility: HOSPITAL | Age: 75
Discharge: HOME | End: 2020-08-13
Attending: FAMILY MEDICINE
Payer: MEDICARE

## 2020-08-13 VITALS — HEIGHT: 65 IN | WEIGHT: 110 LBS | BODY MASS INDEX: 18.33 KG/M2

## 2020-08-13 DIAGNOSIS — Z12.31 ENCOUNTER FOR SCREENING MAMMOGRAM FOR BREAST CANCER: ICD-10-CM

## 2020-08-13 DIAGNOSIS — Z12.31 SCREENING MAMMOGRAM, ENCOUNTER FOR: ICD-10-CM

## 2020-08-13 PROCEDURE — 77067 SCR MAMMO BI INCL CAD: CPT

## 2020-08-13 PROCEDURE — 77063 BREAST TOMOSYNTHESIS BI: CPT

## 2020-08-27 PROBLEM — I25.118 CORONARY ARTERY DISEASE OF NATIVE ARTERY OF NATIVE HEART WITH STABLE ANGINA PECTORIS (HCC): Status: ACTIVE | Noted: 2020-01-24

## 2020-08-28 ENCOUNTER — OFFICE VISIT (OUTPATIENT)
Dept: CARDIOLOGY CLINIC | Facility: CLINIC | Age: 75
End: 2020-08-28
Payer: MEDICARE

## 2020-08-28 VITALS
BODY MASS INDEX: 18.06 KG/M2 | WEIGHT: 108.4 LBS | SYSTOLIC BLOOD PRESSURE: 122 MMHG | HEART RATE: 65 BPM | OXYGEN SATURATION: 100 % | DIASTOLIC BLOOD PRESSURE: 68 MMHG | HEIGHT: 65 IN

## 2020-08-28 DIAGNOSIS — I10 ESSENTIAL HYPERTENSION: ICD-10-CM

## 2020-08-28 DIAGNOSIS — E78.2 MIXED HYPERLIPIDEMIA: ICD-10-CM

## 2020-08-28 DIAGNOSIS — I25.118 CORONARY ARTERY DISEASE OF NATIVE ARTERY OF NATIVE HEART WITH STABLE ANGINA PECTORIS (HCC): Primary | ICD-10-CM

## 2020-08-28 DIAGNOSIS — Z95.1 S/P CABG X 3: ICD-10-CM

## 2020-08-28 PROCEDURE — 1036F TOBACCO NON-USER: CPT | Performed by: INTERNAL MEDICINE

## 2020-08-28 PROCEDURE — 3008F BODY MASS INDEX DOCD: CPT | Performed by: INTERNAL MEDICINE

## 2020-08-28 PROCEDURE — 4040F PNEUMOC VAC/ADMIN/RCVD: CPT | Performed by: INTERNAL MEDICINE

## 2020-08-28 PROCEDURE — 3078F DIAST BP <80 MM HG: CPT | Performed by: INTERNAL MEDICINE

## 2020-08-28 PROCEDURE — 1160F RVW MEDS BY RX/DR IN RCRD: CPT | Performed by: INTERNAL MEDICINE

## 2020-08-28 PROCEDURE — 99214 OFFICE O/P EST MOD 30 MIN: CPT | Performed by: INTERNAL MEDICINE

## 2020-08-28 PROCEDURE — 3074F SYST BP LT 130 MM HG: CPT | Performed by: INTERNAL MEDICINE

## 2020-08-28 RX ORDER — ROSUVASTATIN CALCIUM 40 MG/1
40 TABLET, COATED ORAL DAILY
Qty: 30 TABLET | Refills: 11 | Status: SHIPPED | OUTPATIENT
Start: 2020-08-28 | End: 2021-08-20

## 2020-08-28 NOTE — PROGRESS NOTES
Cardiology Follow Up    Van Julien  1945  [id number]  [address]  [place] [address]  [address] [address] [phone]    1  Coronary artery disease of native artery of native heart with stable angina pectoris (HCC)  rosuvastatin (CRESTOR) 40 MG tablet   2  Essential hypertension     3  S/P CABG x 3     4  Mixed hyperlipidemia  Lipid panel    LDL cholesterol, direct       Discussion/Summary:    - CAD: She had an NSTEMI in Jan 2020  s/p 3V CABG with small LIMA to LAD, vein grafts to OM and Ramus  Preserved LV function  No angina currently, she was briefly on Imdur post surgery, but this was stopped, and can remain off this for now  BP is controlled  Reviewed her lipid panel, and LDL is above goal while on 80mg atorvastatin  She is nervous about medications, but discussed with her  Will change to Crestor 40, if still elevated, add Zetia  Continue with lifestyle modification     - HTN: BP remains controlled with current regimen  She was on Dyazide prior to her surgery, but doing well without currently     - HL: As above, change from atorvastatin to Crestor  Repeat lipid panel prior to PCP visit  Will add Zetia if still not at goal       Previous History:  Admitted to the [facility] with a non ST elevation in January, 2020  She had a cardiac catheterization with calcified vessels and multivessel CAD  She ultimately did underwent bypass surgery with 3 grafts  LIMA to the LAD, vein grafts to OM and Ramus  Her EF is preserved  She was started on appropriate medical therapy  She was kept on Imdur for 30 days after her bypass because the size of the LIMA was small and felt to be somewhat spasmodic      Interval history:  Returns to the office today for regular follow-up  From a cardiac standpoint, she continues to do well  She denies any symptoms of chest pain or shortness of breath    She is only walking occasionally, but denies any symptoms or limitations from this  Blood pressure overall has been controlled  Incisions have healed well  Blood work was done in May her for primary care physician, but her LDL remained above 80  She is compliant with her medications, her diet overall is pretty healthy  She has tried to cut out ice cream and cheeses  Her 8 year old mother recently moved in with her less than 2 weeks ago after being in the hospital being ill  This has caused fair amount of stress for her, but she does have aides who come to the house  Problem List     Mixed hyperlipidemia    Coronary artery disease of native artery of native heart with stable angina pectoris [facility])    Overview Signed 1/24/2020  4:00 PM by Saintclair Sar     Added automatically from request for surgery [id number]         Arthritis    Cervical myofascial pain syndrome    Cervical radiculopathy    Cervicogenic headache    Cervico-occipital neuralgia    Depression    Essential hypertension    Osteopenia of spine    Spasmodic torticollis    Laceration of occipital scalp    Other secondary scoliosis, lumbar region    NSTEMI (non-ST elevation myocardial infarction) ([facility] [address] )    Syncope    S/P CABG x 3    Anemia    Thrombocytopenia (HCC)    Hyperchloremia    Chylothorax    Screening for colon cancer    Overview Signed 4/14/2020  8:34 AM by Ana M Siddiqui MD     Pt referred to GI for colon cancer screening           History of colon polyps        Past Medical History:   Diagnosis Date    Colon polyp     Coronary artery disease     Effusion of left knee     Last assessed - 9/10/15    History of transfusion     Hyperlipidemia     Hypertension     Myocardial infarction ([facility] [address] )     Tick bite     Last assessed - 4/21/17     Social History     Tobacco Use    Smoking status: Never Smoker    Smokeless tobacco: Never Used   Substance Use Topics    Alcohol use: Yes     Comment: 1 wine nightly    Drug use: No      Family History   Problem Relation Age of Onset    Coronary artery disease Mother     Arthritis Mother     Other Mother         Cardiac Disorder     Transient ischemic attack Mother     Heart attack Father    Ca Stewart Sudden death Father         SCD     Past Surgical History:   Procedure Laterality Date    APPENDECTOMY      COLONOSCOPY      [place] CABG, ARTERY-VEIN, FOUR N/A 1/27/2020    Procedure: CORONARY ARTERY BYPASS GRAFT (CABG) x3 VESSELS, LIMA TO LAD, AND SVG TO PLB & OM;  Surgeon: Jan Moran DO;  [facility];  Service: Cardiac Surgery    [place] ECHO TRANSESOPHAG R-T 2D W/PRB IMG ACQUISJ I&R N/A 1/27/2020    Procedure: TRANSESOPHAGEAL ECHOCARDIOGRAM (GET); Surgeon: Jan Moran DO;  [facility];  Service: Cardiac Surgery    [place] ENDOSCOPY W/VIDEO-ASST VEIN HARVEST,CABG Left 1/27/2020    Procedure: HARVEST VEIN ENDOSCOPIC ([address]); Surgeon: Jan Moran DO;  [facility];  Service: Cardiac Surgery    TONSILLECTOMY      Last assessed - 4/20/17    TUBAL LIGATION      Last assessed - 4/20/17    WISDOM TOOTH EXTRACTION      Last assessed - 4/20/17       Current Outpatient Medications:     aspirin (ECOTRIN LOW STRENGTH) 81 mg EC tablet, Take 1 tablet (81 mg total) by mouth daily, Disp: , Rfl:     Calcium Carb-Cholecalciferol (CALCIUM 600 + D) 600-200 MG-UNIT TABS, Calcium 600 + D TABS TAKE 1 TABLET DAILY    Refills: 0  Active, Disp: , Rfl:     cholecalciferol (VITAMIN D3) 1,000 units tablet, Take 1,000 Units by mouth daily, Disp: , Rfl:     losartan (Cozaar) 50 mg tablet, Take 1 tablet (50 mg total) by mouth daily, Disp: 30 tablet, Rfl: 5    metoprolol tartrate (LOPRESSOR) 25 mg tablet, Take 0 5 tablets (12 5 mg total) by mouth every 12 (twelve) hours, Disp: 90 tablet, Rfl: 1    rosuvastatin (CRESTOR) 40 MG tablet, Take 1 tablet (40 mg total) by mouth daily, Disp: 30 tablet, Rfl: 11  No Known Allergies    Vitals:    08/28/20 0922   BP: 122/68   BP Location: Left arm   Patient Position: Sitting Cuff Size: Standard   Pulse: 65   SpO2: 100%   Weight: 49 2 kg (108 lb 6 4 oz)   Height: 5' 5" (1 651 m)     Vitals:    08/28/20 0922   Weight: 49 2 kg (108 lb 6 4 oz)      Height: 5' 5" (165 1 cm)   Body mass index is 18 04 kg/m²  Physical Exam:  GENERAL: Alert, well appearing, and in no distress  HEENT:  PERRL, EOMI, no scleral icterus, no conjunctival pallor  NECK:  Supple, No elevated JVP, no thyromegaly, no carotid bruits  HEART:  Regular rate and rhythm, normal S1/S2, no S3/S4, no murmur or rub  CHEST: Well healed sternal scar  LUNGS:  Clear to auscultation bilaterally  ABDOMEN:  Soft, non-tender, positive bowel sounds, no rebound or guarding  EXTREMITIES:  No edema  VASCULAR:  Normal pedal pulses   NEURO: No focal deficits,  SKIN: Normal without suspicious lesions on exposed skin    ROS:  Except as noted in HPI, is otherwise reviewed in detail and a 12 point review of systems is negative  Labs:  Lab Results   Component Value Date     03/03/2015    K 4 5 05/18/2020     05/18/2020    CREATININE 0 68 05/18/2020    BUN 19 05/18/2020    CO2 29 05/18/2020    ALT 43 05/18/2020    AST 43 05/18/2020    INR 1 01 01/24/2020    GLUF 80 05/18/2020    WBC 9 01 02/11/2020    HGB 11 4 (L) 02/11/2020    HCT 37 0 02/11/2020     02/11/2020       No results found for: CHOL  Lab Results   Component Value Date    LDLCALC 93 05/18/2020    LDLCALC 85 02/25/2020    [address] 01/25/2020     Lab Results   Component Value Date    HDL 67 05/18/2020    HDL 50 02/25/2020    HDL 79 01/25/2020     Lab Results   Component Value Date    TRIG 95 05/18/2020    TRIG 109 02/25/2020    TRIG 87 01/25/2020       Testing:  Cardiac Cath 1/24/2020:  CORONARY CIRCULATION:  LAD: The vessel was medium sized  The proximal and mid LAD is heavily calcified with diffuse disease of 70-80%  Circumflex: The vessel was medium sized and heavily calcified  Ostial circumflex: There was a discrete 85 % stenosis  Mid circumflex:  There was a discrete 85 % stenosis  1st obtuse marginal: The vessel was small to medium sized  There was a tubular 50 % stenosis  2nd obtuse marginal: The vessel was small to medium sized  There was a discrete 85 % stenosis  RCA: The vessel was medium sized  Dominant  There is heavy calcification in the proximal, mid, and distal vessel  There are multiple significant lesions ( 70-90% ) seen throughout this entire area  Echo 1/2020:  LEFT VENTRICLE:  Systolic function was vigorous  Ejection fraction was estimated to be 70 %  There were no regional wall motion abnormalities  Wall thickness was at the upper limits of normal      RIGHT VENTRICLE:  The size was normal   Systolic function was normal      MITRAL VALVE:  There was mild regurgitation

## 2020-10-06 ENCOUNTER — HOSPITAL ENCOUNTER (EMERGENCY)
Facility: HOSPITAL | Age: 75
Discharge: HOME | End: 2020-10-06
Attending: EMERGENCY MEDICINE
Payer: MEDICARE

## 2020-10-06 ENCOUNTER — TELEPHONE (OUTPATIENT)
Dept: FAMILY MEDICINE CLINIC | Facility: CLINIC | Age: 75
End: 2020-10-06

## 2020-10-06 VITALS
BODY MASS INDEX: 18.16 KG/M2 | SYSTOLIC BLOOD PRESSURE: 142 MMHG | RESPIRATION RATE: 18 BRPM | TEMPERATURE: 97.5 F | HEART RATE: 64 BPM | DIASTOLIC BLOOD PRESSURE: 64 MMHG | WEIGHT: 109.13 LBS | OXYGEN SATURATION: 96 %

## 2020-10-06 DIAGNOSIS — R11.2 NAUSEA & VOMITING: ICD-10-CM

## 2020-10-06 DIAGNOSIS — R55 NEAR SYNCOPE: Primary | ICD-10-CM

## 2020-10-06 DIAGNOSIS — Z20.828 EXPOSURE TO SARS-ASSOCIATED CORONAVIRUS: ICD-10-CM

## 2020-10-06 DIAGNOSIS — R25.2 LEG CRAMPS: ICD-10-CM

## 2020-10-06 DIAGNOSIS — Z95.1 S/P CABG X 3: ICD-10-CM

## 2020-10-06 DIAGNOSIS — Z20.828 EXPOSURE TO SARS-ASSOCIATED CORONAVIRUS: Primary | ICD-10-CM

## 2020-10-06 DIAGNOSIS — Z86.79 HISTORY OF CORONARY ARTERY DISEASE: ICD-10-CM

## 2020-10-06 LAB
ALBUMIN SERPL BCP-MCNC: 3.9 G/DL (ref 3.5–5)
ALP SERPL-CCNC: 56 U/L (ref 46–116)
ALT SERPL W P-5'-P-CCNC: 51 U/L (ref 12–78)
ANION GAP SERPL CALCULATED.3IONS-SCNC: 6 MMOL/L (ref 4–13)
AST SERPL W P-5'-P-CCNC: 50 U/L (ref 5–45)
ATRIAL RATE: 62 BPM
ATRIAL RATE: 64 BPM
BASOPHILS # BLD AUTO: 0.07 THOUSANDS/ΜL (ref 0–0.1)
BASOPHILS NFR BLD AUTO: 1 % (ref 0–1)
BILIRUB SERPL-MCNC: 0.3 MG/DL (ref 0.2–1)
BUN SERPL-MCNC: 26 MG/DL (ref 5–25)
CALCIUM SERPL-MCNC: 9 MG/DL (ref 8.3–10.1)
CHLORIDE SERPL-SCNC: 105 MMOL/L (ref 100–108)
CK SERPL-CCNC: 130 U/L (ref 26–192)
CO2 SERPL-SCNC: 30 MMOL/L (ref 21–32)
CREAT SERPL-MCNC: 0.67 MG/DL (ref 0.6–1.3)
EOSINOPHIL # BLD AUTO: 0.12 THOUSAND/ΜL (ref 0–0.61)
EOSINOPHIL NFR BLD AUTO: 2 % (ref 0–6)
ERYTHROCYTE [DISTWIDTH] IN BLOOD BY AUTOMATED COUNT: 13.2 % (ref 11.6–15.1)
GFR SERPL CREATININE-BSD FRML MDRD: 86 ML/MIN/1.73SQ M
GLUCOSE SERPL-MCNC: 97 MG/DL (ref 65–140)
HCT VFR BLD AUTO: 39 % (ref 34.8–46.1)
HGB BLD-MCNC: 12.7 G/DL (ref 11.5–15.4)
IMM GRANULOCYTES # BLD AUTO: 0.03 THOUSAND/UL (ref 0–0.2)
IMM GRANULOCYTES NFR BLD AUTO: 1 % (ref 0–2)
LYMPHOCYTES # BLD AUTO: 1.23 THOUSANDS/ΜL (ref 0.6–4.47)
LYMPHOCYTES NFR BLD AUTO: 20 % (ref 14–44)
MAGNESIUM SERPL-MCNC: 2.3 MG/DL (ref 1.6–2.6)
MCH RBC QN AUTO: 32.1 PG (ref 26.8–34.3)
MCHC RBC AUTO-ENTMCNC: 32.6 G/DL (ref 31.4–37.4)
MCV RBC AUTO: 99 FL (ref 82–98)
MONOCYTES # BLD AUTO: 0.51 THOUSAND/ΜL (ref 0.17–1.22)
MONOCYTES NFR BLD AUTO: 8 % (ref 4–12)
NEUTROPHILS # BLD AUTO: 4.34 THOUSANDS/ΜL (ref 1.85–7.62)
NEUTS SEG NFR BLD AUTO: 68 % (ref 43–75)
NRBC BLD AUTO-RTO: 0 /100 WBCS
P AXIS: 76 DEGREES
P AXIS: 79 DEGREES
PLATELET # BLD AUTO: 215 THOUSANDS/UL (ref 149–390)
PMV BLD AUTO: 11.4 FL (ref 8.9–12.7)
POTASSIUM SERPL-SCNC: 3.5 MMOL/L (ref 3.5–5.3)
PR INTERVAL: 176 MS
PR INTERVAL: 180 MS
PROT SERPL-MCNC: 6.8 G/DL (ref 6.4–8.2)
QRS AXIS: 77 DEGREES
QRS AXIS: 87 DEGREES
QRSD INTERVAL: 70 MS
QRSD INTERVAL: 82 MS
QT INTERVAL: 398 MS
QT INTERVAL: 400 MS
QTC INTERVAL: 406 MS
QTC INTERVAL: 410 MS
RBC # BLD AUTO: 3.96 MILLION/UL (ref 3.81–5.12)
SODIUM SERPL-SCNC: 141 MMOL/L (ref 136–145)
T WAVE AXIS: 77 DEGREES
T WAVE AXIS: 83 DEGREES
TROPONIN I SERPL-MCNC: <0.02 NG/ML
TROPONIN I SERPL-MCNC: <0.02 NG/ML
VENTRICULAR RATE: 62 BPM
VENTRICULAR RATE: 64 BPM
WBC # BLD AUTO: 6.3 THOUSAND/UL (ref 4.31–10.16)

## 2020-10-06 PROCEDURE — 93010 ELECTROCARDIOGRAM REPORT: CPT | Performed by: INTERNAL MEDICINE

## 2020-10-06 PROCEDURE — 82550 ASSAY OF CK (CPK): CPT | Performed by: EMERGENCY MEDICINE

## 2020-10-06 PROCEDURE — 85025 COMPLETE CBC W/AUTO DIFF WBC: CPT | Performed by: EMERGENCY MEDICINE

## 2020-10-06 PROCEDURE — 93005 ELECTROCARDIOGRAM TRACING: CPT

## 2020-10-06 PROCEDURE — 83735 ASSAY OF MAGNESIUM: CPT | Performed by: EMERGENCY MEDICINE

## 2020-10-06 PROCEDURE — U0003 INFECTIOUS AGENT DETECTION BY NUCLEIC ACID (DNA OR RNA); SEVERE ACUTE RESPIRATORY SYNDROME CORONAVIRUS 2 (SARS-COV-2) (CORONAVIRUS DISEASE [COVID-19]), AMPLIFIED PROBE TECHNIQUE, MAKING USE OF HIGH THROUGHPUT TECHNOLOGIES AS DESCRIBED BY CMS-2020-01-R: HCPCS | Performed by: FAMILY MEDICINE

## 2020-10-06 PROCEDURE — 84484 ASSAY OF TROPONIN QUANT: CPT | Performed by: EMERGENCY MEDICINE

## 2020-10-06 PROCEDURE — 80053 COMPREHEN METABOLIC PANEL: CPT | Performed by: EMERGENCY MEDICINE

## 2020-10-06 PROCEDURE — 99285 EMERGENCY DEPT VISIT HI MDM: CPT | Performed by: EMERGENCY MEDICINE

## 2020-10-06 PROCEDURE — 96374 THER/PROPH/DIAG INJ IV PUSH: CPT

## 2020-10-06 PROCEDURE — 99284 EMERGENCY DEPT VISIT MOD MDM: CPT

## 2020-10-06 PROCEDURE — 36415 COLL VENOUS BLD VENIPUNCTURE: CPT | Performed by: EMERGENCY MEDICINE

## 2020-10-06 PROCEDURE — 96361 HYDRATE IV INFUSION ADD-ON: CPT

## 2020-10-06 RX ORDER — ONDANSETRON 2 MG/ML
4 INJECTION INTRAMUSCULAR; INTRAVENOUS ONCE
Status: COMPLETED | OUTPATIENT
Start: 2020-10-06 | End: 2020-10-06

## 2020-10-06 RX ADMIN — SODIUM CHLORIDE 1000 ML: 0.9 INJECTION, SOLUTION INTRAVENOUS at 02:54

## 2020-10-06 RX ADMIN — ONDANSETRON 4 MG: 2 INJECTION INTRAMUSCULAR; INTRAVENOUS at 02:27

## 2020-10-07 LAB — SARS-COV-2 RNA SPEC QL NAA+PROBE: NOT DETECTED

## 2020-10-14 ENCOUNTER — OFFICE VISIT (OUTPATIENT)
Dept: CARDIOLOGY CLINIC | Facility: CLINIC | Age: 75
End: 2020-10-14
Payer: MEDICARE

## 2020-10-14 VITALS
DIASTOLIC BLOOD PRESSURE: 58 MMHG | BODY MASS INDEX: 18.18 KG/M2 | HEIGHT: 65 IN | HEART RATE: 62 BPM | SYSTOLIC BLOOD PRESSURE: 118 MMHG | OXYGEN SATURATION: 98 % | WEIGHT: 109.1 LBS

## 2020-10-14 DIAGNOSIS — I10 ESSENTIAL HYPERTENSION: ICD-10-CM

## 2020-10-14 DIAGNOSIS — Z95.1 S/P CABG X 3: ICD-10-CM

## 2020-10-14 DIAGNOSIS — E78.2 MIXED HYPERLIPIDEMIA: ICD-10-CM

## 2020-10-14 DIAGNOSIS — R55 NEAR SYNCOPE: Primary | ICD-10-CM

## 2020-10-14 DIAGNOSIS — I25.118 CORONARY ARTERY DISEASE OF NATIVE ARTERY OF NATIVE HEART WITH STABLE ANGINA PECTORIS (HCC): ICD-10-CM

## 2020-10-14 PROCEDURE — 0296T PR EXT ECG > 48HR TO 21 DAY RCRD W/CONECT INTL RCRD: CPT | Performed by: NURSE PRACTITIONER

## 2020-10-14 PROCEDURE — 99214 OFFICE O/P EST MOD 30 MIN: CPT | Performed by: NURSE PRACTITIONER

## 2020-10-14 RX ORDER — MAGNESIUM 30 MG
30 TABLET ORAL 2 TIMES DAILY
COMMUNITY
End: 2021-12-16 | Stop reason: ALTCHOICE

## 2020-10-20 ENCOUNTER — HOSPITAL ENCOUNTER (OUTPATIENT)
Dept: NON INVASIVE DIAGNOSTICS | Facility: CLINIC | Age: 75
Discharge: HOME | End: 2020-10-20

## 2020-10-20 ENCOUNTER — TELEPHONE (OUTPATIENT)
Dept: CARDIOLOGY CLINIC | Facility: CLINIC | Age: 75
End: 2020-10-20

## 2020-10-20 DIAGNOSIS — R55 NEAR SYNCOPE: ICD-10-CM

## 2020-10-20 DIAGNOSIS — Z95.1 S/P CABG X 3: ICD-10-CM

## 2020-10-20 DIAGNOSIS — I25.118 CORONARY ARTERY DISEASE OF NATIVE ARTERY OF NATIVE HEART WITH STABLE ANGINA PECTORIS (HCC): ICD-10-CM

## 2020-11-04 ENCOUNTER — TELEPHONE (OUTPATIENT)
Dept: CARDIOLOGY CLINIC | Facility: CLINIC | Age: 75
End: 2020-11-04

## 2020-11-04 ENCOUNTER — HOSPITAL ENCOUNTER (OUTPATIENT)
Dept: NON INVASIVE DIAGNOSTICS | Facility: CLINIC | Age: 75
Discharge: HOME | End: 2020-11-04
Payer: MEDICARE

## 2020-11-04 LAB
CHEST PAIN STATEMENT: NORMAL
MAX DIASTOLIC BP: 80 MMHG
MAX HEART RATE: 131 BPM
MAX PREDICTED HEART RATE: 145 BPM
MAX. SYSTOLIC BP: 170 MMHG
PROTOCOL NAME: NORMAL
REASON FOR TERMINATION: NORMAL
TARGET HR FORMULA: NORMAL
TEST INDICATION: NORMAL
TIME IN EXERCISE PHASE: NORMAL

## 2020-11-04 PROCEDURE — 93018 CV STRESS TEST I&R ONLY: CPT | Performed by: INTERNAL MEDICINE

## 2020-11-04 PROCEDURE — 93016 CV STRESS TEST SUPVJ ONLY: CPT | Performed by: INTERNAL MEDICINE

## 2020-11-04 PROCEDURE — G1004 CDSM NDSC: HCPCS

## 2020-11-04 PROCEDURE — 78452 HT MUSCLE IMAGE SPECT MULT: CPT

## 2020-11-04 PROCEDURE — A9502 TC99M TETROFOSMIN: HCPCS

## 2020-11-04 PROCEDURE — 78452 HT MUSCLE IMAGE SPECT MULT: CPT | Performed by: INTERNAL MEDICINE

## 2020-11-04 PROCEDURE — 93017 CV STRESS TEST TRACING ONLY: CPT

## 2020-11-06 ENCOUNTER — OFFICE VISIT (OUTPATIENT)
Dept: CARDIOLOGY CLINIC | Facility: CLINIC | Age: 75
End: 2020-11-06
Payer: MEDICARE

## 2020-11-06 VITALS
WEIGHT: 109 LBS | HEIGHT: 65 IN | HEART RATE: 67 BPM | BODY MASS INDEX: 18.16 KG/M2 | OXYGEN SATURATION: 98 % | SYSTOLIC BLOOD PRESSURE: 120 MMHG | DIASTOLIC BLOOD PRESSURE: 58 MMHG

## 2020-11-06 DIAGNOSIS — R55 SYNCOPE, UNSPECIFIED SYNCOPE TYPE: ICD-10-CM

## 2020-11-06 DIAGNOSIS — Z95.1 S/P CABG X 3: ICD-10-CM

## 2020-11-06 DIAGNOSIS — I25.118 CORONARY ARTERY DISEASE OF NATIVE ARTERY OF NATIVE HEART WITH STABLE ANGINA PECTORIS (HCC): Primary | ICD-10-CM

## 2020-11-06 DIAGNOSIS — I10 ESSENTIAL HYPERTENSION: ICD-10-CM

## 2020-11-06 PROCEDURE — 99214 OFFICE O/P EST MOD 30 MIN: CPT | Performed by: INTERNAL MEDICINE

## 2020-11-12 ENCOUNTER — CLINICAL SUPPORT (OUTPATIENT)
Dept: CARDIOLOGY CLINIC | Facility: CLINIC | Age: 75
End: 2020-11-12
Payer: MEDICARE

## 2020-11-12 DIAGNOSIS — R55 SYNCOPE, UNSPECIFIED SYNCOPE TYPE: Primary | ICD-10-CM

## 2020-11-12 PROCEDURE — 0296T PR EXT ECG > 48HR TO 21 DAY RCRD W/CONECT INTL RCRD: CPT | Performed by: INTERNAL MEDICINE

## 2020-11-17 ENCOUNTER — LAB (OUTPATIENT)
Dept: LAB | Facility: CLINIC | Age: 75
End: 2020-11-17
Payer: MEDICARE

## 2020-11-17 ENCOUNTER — TRANSCRIBE ORDERS (OUTPATIENT)
Dept: LAB | Facility: CLINIC | Age: 75
End: 2020-11-17

## 2020-11-17 DIAGNOSIS — D69.6 THROMBOCYTOPENIA (HCC): ICD-10-CM

## 2020-11-17 DIAGNOSIS — E78.00 PURE HYPERCHOLESTEROLEMIA: ICD-10-CM

## 2020-11-17 DIAGNOSIS — D50.8 OTHER IRON DEFICIENCY ANEMIA: ICD-10-CM

## 2020-11-17 DIAGNOSIS — E55.9 VITAMIN D DEFICIENCY: ICD-10-CM

## 2020-11-17 LAB
25(OH)D3 SERPL-MCNC: 34.9 NG/ML (ref 30–100)
ALBUMIN SERPL BCP-MCNC: 3.9 G/DL (ref 3.5–5)
ALP SERPL-CCNC: 58 U/L (ref 46–116)
ALT SERPL W P-5'-P-CCNC: 51 U/L (ref 12–78)
ANION GAP SERPL CALCULATED.3IONS-SCNC: 4 MMOL/L (ref 4–13)
AST SERPL W P-5'-P-CCNC: 48 U/L (ref 5–45)
BASOPHILS # BLD AUTO: 0.06 THOUSANDS/ΜL (ref 0–0.1)
BASOPHILS NFR BLD AUTO: 1 % (ref 0–1)
BILIRUB SERPL-MCNC: 0.56 MG/DL (ref 0.2–1)
BUN SERPL-MCNC: 19 MG/DL (ref 5–25)
CALCIUM SERPL-MCNC: 9.2 MG/DL (ref 8.3–10.1)
CHLORIDE SERPL-SCNC: 105 MMOL/L (ref 100–108)
CHOLEST SERPL-MCNC: 155 MG/DL (ref 50–200)
CO2 SERPL-SCNC: 30 MMOL/L (ref 21–32)
CREAT SERPL-MCNC: 0.7 MG/DL (ref 0.6–1.3)
EOSINOPHIL # BLD AUTO: 0.11 THOUSAND/ΜL (ref 0–0.61)
EOSINOPHIL NFR BLD AUTO: 2 % (ref 0–6)
ERYTHROCYTE [DISTWIDTH] IN BLOOD BY AUTOMATED COUNT: 13 % (ref 11.6–15.1)
GFR SERPL CREATININE-BSD FRML MDRD: 85 ML/MIN/1.73SQ M
GLUCOSE P FAST SERPL-MCNC: 94 MG/DL (ref 65–99)
HCT VFR BLD AUTO: 39.9 % (ref 34.8–46.1)
HDLC SERPL-MCNC: 69 MG/DL
HGB BLD-MCNC: 13 G/DL (ref 11.5–15.4)
IMM GRANULOCYTES # BLD AUTO: 0.02 THOUSAND/UL (ref 0–0.2)
IMM GRANULOCYTES NFR BLD AUTO: 0 % (ref 0–2)
LDLC SERPL CALC-MCNC: 71 MG/DL (ref 0–100)
LDLC SERPL DIRECT ASSAY-MCNC: 77 MG/DL (ref 0–100)
LYMPHOCYTES # BLD AUTO: 1.24 THOUSANDS/ΜL (ref 0.6–4.47)
LYMPHOCYTES NFR BLD AUTO: 24 % (ref 14–44)
MCH RBC QN AUTO: 32.3 PG (ref 26.8–34.3)
MCHC RBC AUTO-ENTMCNC: 32.6 G/DL (ref 31.4–37.4)
MCV RBC AUTO: 99 FL (ref 82–98)
MONOCYTES # BLD AUTO: 0.42 THOUSAND/ΜL (ref 0.17–1.22)
MONOCYTES NFR BLD AUTO: 8 % (ref 4–12)
NEUTROPHILS # BLD AUTO: 3.4 THOUSANDS/ΜL (ref 1.85–7.62)
NEUTS SEG NFR BLD AUTO: 65 % (ref 43–75)
NRBC BLD AUTO-RTO: 0 /100 WBCS
PLATELET # BLD AUTO: 178 THOUSANDS/UL (ref 149–390)
PMV BLD AUTO: 12.1 FL (ref 8.9–12.7)
POTASSIUM SERPL-SCNC: 4.2 MMOL/L (ref 3.5–5.3)
PROT SERPL-MCNC: 7 G/DL (ref 6.4–8.2)
RBC # BLD AUTO: 4.03 MILLION/UL (ref 3.81–5.12)
SODIUM SERPL-SCNC: 139 MMOL/L (ref 136–145)
TRIGL SERPL-MCNC: 73 MG/DL
WBC # BLD AUTO: 5.25 THOUSAND/UL (ref 4.31–10.16)

## 2020-11-17 PROCEDURE — 80061 LIPID PANEL: CPT

## 2020-11-17 PROCEDURE — 82306 VITAMIN D 25 HYDROXY: CPT

## 2020-11-17 PROCEDURE — 83721 ASSAY OF BLOOD LIPOPROTEIN: CPT | Performed by: INTERNAL MEDICINE

## 2020-11-17 PROCEDURE — 80053 COMPREHEN METABOLIC PANEL: CPT

## 2020-11-17 PROCEDURE — 85025 COMPLETE CBC W/AUTO DIFF WBC: CPT

## 2020-11-17 PROCEDURE — 36415 COLL VENOUS BLD VENIPUNCTURE: CPT

## 2020-11-19 ENCOUNTER — TELEMEDICINE (OUTPATIENT)
Dept: FAMILY MEDICINE CLINIC | Facility: CLINIC | Age: 75
End: 2020-11-19
Payer: MEDICARE

## 2020-11-19 ENCOUNTER — CLINICAL SUPPORT (OUTPATIENT)
Dept: CARDIOLOGY CLINIC | Facility: CLINIC | Age: 75
End: 2020-11-19

## 2020-11-19 VITALS
SYSTOLIC BLOOD PRESSURE: 162 MMHG | BODY MASS INDEX: 17.97 KG/M2 | HEART RATE: 68 BPM | TEMPERATURE: 97 F | WEIGHT: 108 LBS | DIASTOLIC BLOOD PRESSURE: 77 MMHG

## 2020-11-19 DIAGNOSIS — D50.8 OTHER IRON DEFICIENCY ANEMIA: ICD-10-CM

## 2020-11-19 DIAGNOSIS — Z95.1 S/P CABG X 3: ICD-10-CM

## 2020-11-19 DIAGNOSIS — Z13.6 SCREENING FOR CARDIOVASCULAR CONDITION: ICD-10-CM

## 2020-11-19 DIAGNOSIS — I25.118 CORONARY ARTERY DISEASE OF NATIVE ARTERY OF NATIVE HEART WITH STABLE ANGINA PECTORIS (HCC): Primary | ICD-10-CM

## 2020-11-19 DIAGNOSIS — Z00.00 MEDICARE ANNUAL WELLNESS VISIT, SUBSEQUENT: ICD-10-CM

## 2020-11-19 DIAGNOSIS — Z13.1 SCREENING FOR DIABETES MELLITUS: ICD-10-CM

## 2020-11-19 DIAGNOSIS — E78.2 MIXED HYPERLIPIDEMIA: ICD-10-CM

## 2020-11-19 DIAGNOSIS — I10 ESSENTIAL HYPERTENSION: ICD-10-CM

## 2020-11-19 DIAGNOSIS — Z11.59 NEED FOR HEPATITIS C SCREENING TEST: ICD-10-CM

## 2020-11-19 DIAGNOSIS — R55 NEAR SYNCOPE: ICD-10-CM

## 2020-11-19 DIAGNOSIS — E55.9 VITAMIN D DEFICIENCY: ICD-10-CM

## 2020-11-19 DIAGNOSIS — I25.118 CORONARY ARTERY DISEASE OF NATIVE ARTERY OF NATIVE HEART WITH STABLE ANGINA PECTORIS (HCC): ICD-10-CM

## 2020-11-19 PROCEDURE — 99214 OFFICE O/P EST MOD 30 MIN: CPT | Performed by: FAMILY MEDICINE

## 2020-11-19 PROCEDURE — G0439 PPPS, SUBSEQ VISIT: HCPCS | Performed by: FAMILY MEDICINE

## 2020-11-20 ENCOUNTER — TELEPHONE (OUTPATIENT)
Dept: CARDIOLOGY CLINIC | Facility: CLINIC | Age: 75
End: 2020-11-20

## 2020-11-20 DIAGNOSIS — I10 ESSENTIAL HYPERTENSION: ICD-10-CM

## 2020-11-20 RX ORDER — LOSARTAN POTASSIUM 50 MG/1
TABLET ORAL
Qty: 30 TABLET | Refills: 5 | Status: SHIPPED | OUTPATIENT
Start: 2020-11-20 | End: 2020-11-23

## 2020-11-21 DIAGNOSIS — I10 ESSENTIAL HYPERTENSION: ICD-10-CM

## 2020-11-23 RX ORDER — LOSARTAN POTASSIUM 50 MG/1
TABLET ORAL
Qty: 30 TABLET | Refills: 5 | Status: SHIPPED | OUTPATIENT
Start: 2020-11-23 | End: 2021-03-08 | Stop reason: SDUPTHER

## 2020-12-16 ENCOUNTER — TELEPHONE (OUTPATIENT)
Dept: FAMILY MEDICINE CLINIC | Facility: CLINIC | Age: 75
End: 2020-12-16

## 2020-12-16 ENCOUNTER — OFFICE VISIT (OUTPATIENT)
Dept: URGENT CARE | Facility: CLINIC | Age: 75
End: 2020-12-16
Payer: MEDICARE

## 2020-12-16 VITALS — TEMPERATURE: 97.5 F | HEART RATE: 58 BPM | OXYGEN SATURATION: 100 %

## 2020-12-16 DIAGNOSIS — Z20.828 EXPOSURE TO SARS-ASSOCIATED CORONAVIRUS: Primary | ICD-10-CM

## 2020-12-16 PROCEDURE — U0003 INFECTIOUS AGENT DETECTION BY NUCLEIC ACID (DNA OR RNA); SEVERE ACUTE RESPIRATORY SYNDROME CORONAVIRUS 2 (SARS-COV-2) (CORONAVIRUS DISEASE [COVID-19]), AMPLIFIED PROBE TECHNIQUE, MAKING USE OF HIGH THROUGHPUT TECHNOLOGIES AS DESCRIBED BY CMS-2020-01-R: HCPCS | Performed by: NURSE PRACTITIONER

## 2020-12-16 PROCEDURE — 99213 OFFICE O/P EST LOW 20 MIN: CPT | Performed by: NURSE PRACTITIONER

## 2020-12-16 PROCEDURE — G0463 HOSPITAL OUTPT CLINIC VISIT: HCPCS | Performed by: NURSE PRACTITIONER

## 2020-12-18 DIAGNOSIS — Z20.822 CLOSE EXPOSURE TO COVID-19 VIRUS: Primary | ICD-10-CM

## 2020-12-19 LAB — SARS-COV-2 RNA SPEC QL NAA+PROBE: NOT DETECTED

## 2021-01-07 DIAGNOSIS — Z95.1 S/P CABG X 3: ICD-10-CM

## 2021-01-12 ENCOUNTER — TELEPHONE (OUTPATIENT)
Dept: CARDIOLOGY CLINIC | Facility: CLINIC | Age: 76
End: 2021-01-12

## 2021-01-12 DIAGNOSIS — M54.50 CHRONIC BILATERAL LOW BACK PAIN WITHOUT SCIATICA: ICD-10-CM

## 2021-01-12 DIAGNOSIS — M41.56 OTHER SECONDARY SCOLIOSIS, LUMBAR REGION: Primary | ICD-10-CM

## 2021-01-12 DIAGNOSIS — G89.29 CHRONIC BILATERAL LOW BACK PAIN WITHOUT SCIATICA: ICD-10-CM

## 2021-01-12 NOTE — TELEPHONE ENCOUNTER
Patient left message on triage line that her bp has been elevated  Her reading was 150/68 and 174/79  She was able to relax and her bp came down a little  She would like to schedule an appointment with Dr Rain Medina and would take a virtual appointment

## 2021-01-13 ENCOUNTER — TELEPHONE (OUTPATIENT)
Dept: FAMILY MEDICINE CLINIC | Facility: CLINIC | Age: 76
End: 2021-01-13

## 2021-01-15 ENCOUNTER — IMMUNIZATIONS (OUTPATIENT)
Dept: FAMILY MEDICINE CLINIC | Facility: HOSPITAL | Age: 76
End: 2021-01-15

## 2021-01-15 DIAGNOSIS — Z23 ENCOUNTER FOR IMMUNIZATION: Primary | ICD-10-CM

## 2021-01-15 PROCEDURE — 91300 SARS-COV-2 / COVID-19 MRNA VACCINE (PFIZER-BIONTECH) 30 MCG: CPT

## 2021-01-15 PROCEDURE — 0001A SARS-COV-2 / COVID-19 MRNA VACCINE (PFIZER-BIONTECH) 30 MCG: CPT

## 2021-01-18 ENCOUNTER — APPOINTMENT (OUTPATIENT)
Dept: PHYSICAL THERAPY | Facility: CLINIC | Age: 76
End: 2021-01-18
Payer: MEDICARE

## 2021-01-19 ENCOUNTER — EVALUATION (OUTPATIENT)
Dept: PHYSICAL THERAPY | Facility: CLINIC | Age: 76
End: 2021-01-19
Payer: MEDICARE

## 2021-01-19 DIAGNOSIS — M54.50 LOW BACK PAIN WITHOUT SCIATICA, UNSPECIFIED BACK PAIN LATERALITY, UNSPECIFIED CHRONICITY: ICD-10-CM

## 2021-01-19 DIAGNOSIS — M41.56 OTHER SECONDARY SCOLIOSIS, LUMBAR REGION: Primary | ICD-10-CM

## 2021-01-19 PROCEDURE — 97110 THERAPEUTIC EXERCISES: CPT | Performed by: PHYSICAL THERAPIST

## 2021-01-19 PROCEDURE — 97161 PT EVAL LOW COMPLEX 20 MIN: CPT | Performed by: PHYSICAL THERAPIST

## 2021-01-19 NOTE — PROGRESS NOTES
PT Evaluation     Today's date: 2021  Patient name: Shashank Erickson  : 1945  MRN: [id number]  Referring provider: Unique Plaza MD  Dx:   Encounter Diagnosis     ICD-10-CM    1  Other secondary scoliosis, lumbar region  M41 56 PT plan of care cert/re-cert   2  Low back pain without sciatica, unspecified back pain laterality, unspecified chronicity  M54 5 PT plan of care cert/re-cert       Start Time: 930  Stop Time: 1015  Total time in clinic (min): 45 minutes    Assessment  Assessment details: Patient is a 76 y o  female who presents with s/s consistent with low back strain and chronic postural dysfunction  She has a notable R scoliosis with L elevated iliac crest which is likely causing further strain when pt attempts to perform lifting or tranfers especially when helping elderly mother 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  Very significant hip weakness B; more so on L hip flexor and R gluteal group especially R glute max and medius  This are all greatly contributing to pt's overall low back strain/pain with fatigue at end of day  Discussed risks, benefits, and alternatives to treatment, and answered all patient questions to patient satisfaction  Initiated HEP including self correction of R lateral shift (educated pt that this would not be anatomically corrected but encouraging improved alignment may help muscles better activate)  She appeared to understand all information given today  Impairments: trunk stiffness, postural dysfunction, L QL strain, core weakness, hip weakness  Patient would benefit from skilled PT services to address impairments stated above  These deficits are limiting patient's ability to perform all of her lifting duties when helping her mother and getting up in the morning  Patient appears motivated, agrees with the POC, and is a good candidate for skilled PT at this time   Thank you for the referral     Impairments: abnormal gait, abnormal muscle firing, abnormal or restricted ROM, abnormal movement, activity intolerance, impaired physical strength, lacks appropriate home exercise program, pain with function, weight-bearing intolerance, poor posture  and poor body mechanics    Symptom irritability: lowUnderstanding of Dx/Px/POC: good   Prognosis: good    Goals  Impairment Goals:   - In 6 weeks, pt will report no greater than a 1-2/10 or less with all ADLs  - In 6 weeks, pt will improve spine and hip A/PROM to [facility] and with 0/10 pain in all planes  - In 6 weeks, pt will improve core and hip strength to a 4 to 4+/5 or better to facilitate functional transfers and strengthening for caregiver duties  - In 6 weeks, pt will demonstrate improved ability to contract TA without valsalva or compensation       Functional Goals:   - By DC, pt will score a 75% or better on FOTO to demonstrate improved functional level   - By DC, pt will be able to demonstrate safe and proper lifting mechanics   - By DC, pt will demonstrate IND and compliance with HEP for improved carryover at home        Plan  Patient would benefit from: skilled physical therapy  Planned modality interventions: cryotherapy, TENS and unattended electrical stimulation  Planned therapy interventions: abdominal trunk stabilization, joint mobilization, manual therapy, activity modification, neuromuscular re-education, body mechanics training, postural training, patient education, strengthening, stretching, therapeutic activities, therapeutic exercise, flexibility and functional ROM exercises  Frequency: 2x week  Duration in weeks: 6  Plan of Care beginning date: 1/19/2021  Plan of Care expiration date: 3/5/2021  Treatment plan discussed with: patient        Subjective Evaluation    History of Present Illness  Mechanism of injury: Pt states that she only has pain at the end of the day and in the morning  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 which does cause pt increased pain by the end of the day   Pt states that she is concerned about her L hip being elevated and feeling like she is leaning over  Pt does have some troubling lifting her mother and helping her with her ADLs  Denies any numbness or tingling  Pt feels she needs some exercises to counter-act her posture getting worse and feels she needs to get stronger again             Not a recurrent problem   Quality of life: good    Pain  Current pain ratin  At best pain ratin  At worst pain ratin  Quality: dull ache and tight  Aggravating factors: lifting, overhead activity, walking and stair climbing  Progression: no change    Social Support  Steps to enter house: yes  Stairs in house: yes     Employment status: not working  Patient Goals  Patient goals for therapy: increased strength, independence with ADLs/IADLs and decreased pain  Patient goal: increase strength and learn exercises to help self-manage        Objective     Concurrent Complaints  Negative for night pain, disturbed sleep, bladder dysfunction, bowel dysfunction and saddle (S4) numbness    Palpation     Additional Palpation Details  TTP: spinous process and L QL region    Neurological Testing     Sensation     Lumbar   Left   Intact: light touch    Right   Intact: light touch    Strength/Myotome Testing     Left Hip   Planes of Motion   Flexion: 3-  External rotation: 3-  Internal rotation: 3-    Right Hip   Planes of Motion   Flexion: 3  External rotation: 3  Internal rotation: 3    Left Knee   Extension: 4+    Right Knee   Extension: 4+    Left Ankle/Foot   Dorsiflexion: 5  Plantar flexion: 3    Right Ankle/Foot   Dorsiflexion: 5  Plantar flexion: 3             Diagnosis: chronic low back strain    Precautions: none   Primary goals: get stronger and get exercise program    Asterisks next to exercises = provided for patient HEP   Manual Therapy                                                Exercise Diary         Self correction of lateral shift against wall 10x10'' w/ cues       bridges 2x10 clamshells 2x10                                                                                                       Ther Act                                        Modalities

## 2021-01-22 ENCOUNTER — OFFICE VISIT (OUTPATIENT)
Dept: PHYSICAL THERAPY | Facility: CLINIC | Age: 76
End: 2021-01-22
Payer: MEDICARE

## 2021-01-22 DIAGNOSIS — M41.56 OTHER SECONDARY SCOLIOSIS, LUMBAR REGION: Primary | ICD-10-CM

## 2021-01-22 DIAGNOSIS — M54.50 LOW BACK PAIN WITHOUT SCIATICA, UNSPECIFIED BACK PAIN LATERALITY, UNSPECIFIED CHRONICITY: ICD-10-CM

## 2021-01-22 PROCEDURE — 97112 NEUROMUSCULAR REEDUCATION: CPT | Performed by: PHYSICAL THERAPIST

## 2021-01-22 PROCEDURE — 97140 MANUAL THERAPY 1/> REGIONS: CPT | Performed by: PHYSICAL THERAPIST

## 2021-01-22 PROCEDURE — 97110 THERAPEUTIC EXERCISES: CPT | Performed by: PHYSICAL THERAPIST

## 2021-01-22 NOTE — PROGRESS NOTES
Daily Note     Today's date: 2021  Patient name: Isiah Pisano  : 1945  MRN: [id number]  Referring provider: Mark Rutherford MD  Dx:   Encounter Diagnosis     ICD-10-CM    1  Other secondary scoliosis, lumbar region  M41 56    2  Low back pain without sciatica, unspecified back pain laterality, unspecified chronicity  M54 5                   Subjective: I tried doing the clamshells and bridges at home      Objective: See treatment diary below      Assessment: Tolerated treatment well today  Focused on decreasing compression on L side of spine with LAD and L QL distraction and release  Pt was challenged by clakristi when she was not able to ROT trunk for substitution patterns  Introduced pt to TA activation and control exercises in which she appeared to favor and understand as she has been incorporating some of these concepts with yoga previously  Cont skilled PT  Plan: Continue per plan of care        Diagnosis: chronic low back strain    Precautions: none   Primary goals: get stronger and get exercise program    Asterisks next to exercises = provided for patient HEP   Manual Therapy       LAD  WJB:   Supine;  R sidelying to L LE;       Hip PROM  WJB: L hip ER                              Exercise Diary         bike  Recumbent bike, L1 2 5', L0 2 5'      Self correction of lateral shift against wall 10x10'' w/ cues Will review NV      bridges 2x10 2x10 w/ cues      clamshells 2x10 2x10 w/ cues      TA iso   2x10 5'' holds w/ cues      TA alt march  15x ea leg      TA knee fall outs  15x ea leg                                      Education   WJB: HEP, cues, positioning                                      Ther Act                                        Modalities

## 2021-01-25 ENCOUNTER — OFFICE VISIT (OUTPATIENT)
Dept: PHYSICAL THERAPY | Facility: CLINIC | Age: 76
End: 2021-01-25
Payer: MEDICARE

## 2021-01-25 DIAGNOSIS — M41.56 OTHER SECONDARY SCOLIOSIS, LUMBAR REGION: Primary | ICD-10-CM

## 2021-01-25 DIAGNOSIS — M54.50 LOW BACK PAIN WITHOUT SCIATICA, UNSPECIFIED BACK PAIN LATERALITY, UNSPECIFIED CHRONICITY: ICD-10-CM

## 2021-01-25 PROCEDURE — 97110 THERAPEUTIC EXERCISES: CPT | Performed by: PHYSICAL THERAPIST

## 2021-01-25 PROCEDURE — 97140 MANUAL THERAPY 1/> REGIONS: CPT | Performed by: PHYSICAL THERAPIST

## 2021-01-25 PROCEDURE — 97112 NEUROMUSCULAR REEDUCATION: CPT | Performed by: PHYSICAL THERAPIST

## 2021-01-25 NOTE — PROGRESS NOTES
Daily Note     Today's date: 2021  Patient name: Isabella Torres  : 1945  MRN: [id number]  Referring provider: Sharyne Mcardle, MD  Dx:   Encounter Diagnosis     ICD-10-CM    1  Other secondary scoliosis, lumbar region  M41 56    2  Low back pain without sciatica, unspecified back pain laterality, unspecified chronicity  M54 5                   Subjective: I am feeling okay today I was trying the exercises at home      Objective: See treatment diary below      Assessment: Tolerated treatment well today  Revisited and reviewed all core exercises given last session and tcs and vcs for all throughout in which pt appeared to tolerate well; pt still requires cues for breathing t/o  Added prone hip ext and TA heel slides as well as prayer stretch to focus on more L side  Cont skilled PT  Plan: Continue per plan of care        Diagnosis: chronic low back strain    Precautions: none   Primary goals: get stronger and get exercise program    Asterisks next to exercises = provided for patient HEP   Manual Therapy      LAD  WJB:   Supine;  R sidelying to L LE;  WJB: prone     Hip PROM  WJB: L hip ER      QL release  WJB WJB                     Exercise Diary         bike  Recumbent bike, L1 2 5', L0 2 5' Resume NV     Self correction of lateral shift against wall 10x10'' w/ cues Will review NV      bridges 2x10 2x10 w/ cues 3x10     clamshells 2x10 2x10 w/ cues x25     TA iso   2x10 5'' holds w/ cues 2x10 5'' holds     TA alt march  15x ea leg 20x ea      TA knee fall outs  15x ea leg 20x ea     TA heel slides   12x ea      Prone hip ext   x15 ea leg     qped rocking with stretch    4x20'' bias to stretch L              Education   WJB: HEP, cues, positioning WJB                                     Ther Act                                        Modalities

## 2021-01-29 ENCOUNTER — OFFICE VISIT (OUTPATIENT)
Dept: PHYSICAL THERAPY | Facility: CLINIC | Age: 76
End: 2021-01-29
Payer: MEDICARE

## 2021-01-29 DIAGNOSIS — M41.56 OTHER SECONDARY SCOLIOSIS, LUMBAR REGION: Primary | ICD-10-CM

## 2021-01-29 DIAGNOSIS — M54.50 LOW BACK PAIN WITHOUT SCIATICA, UNSPECIFIED BACK PAIN LATERALITY, UNSPECIFIED CHRONICITY: ICD-10-CM

## 2021-01-29 PROCEDURE — 97140 MANUAL THERAPY 1/> REGIONS: CPT | Performed by: PHYSICAL THERAPIST

## 2021-01-29 PROCEDURE — 97110 THERAPEUTIC EXERCISES: CPT | Performed by: PHYSICAL THERAPIST

## 2021-01-29 PROCEDURE — 97112 NEUROMUSCULAR REEDUCATION: CPT | Performed by: PHYSICAL THERAPIST

## 2021-01-29 NOTE — PROGRESS NOTES
Daily Note     Today's date: 2021  Patient name: Stephanie Peoples  : 1945  MRN: [id number]  Referring provider: Chiquis Christian MD  Dx:   Encounter Diagnosis     ICD-10-CM    1  Other secondary scoliosis, lumbar region  M41 56    2  Low back pain without sciatica, unspecified back pain laterality, unspecified chronicity  M54 5                   Subjective: I have been feeling pretty good the past few days     Objective: See treatment diary below      Assessment: Tolerated treatment well today  Pt continues to require ongoing vcs and tcs for core control t/o mat exercises  Note continued weakness more so on L hip and decreased ability to stabilize on L side of her spine with poor prone hip extension  Pt did appear more stiff t/o hamstrings today therefore focused more on stretching out this area to help take load off of her back  Pt feels like she is adequately challenged by her current program; feels more challenge when really isolating out glute and core activation  Cont skilled PT  Plan: Continue per plan of care        Diagnosis: chronic low back strain    Precautions: none   Primary goals: get stronger and get exercise program    Asterisks next to exercises = provided for patient HEP   Manual Therapy     LAD  WJB:   Supine;  R sidelying to L LE;  WJB: prone WJB: L LE prone and sidelying     Hip PROM  WJB: L hip ER  WJB: L prone hip ER w/ stab    QL release  WJB WJB                     Exercise Diary         bike  Recumbent bike, L1 2 5', L0 2 5' Resume NV 5' upright    Self correction of lateral shift against wall 10x10'' w/ cues Will review NV      bridges 2x10 2x10 w/ cues 3x10 2x10 RTB t/o     clamshells 2x10 2x10 w/ cues x25 x20 RTB ea leg    TA iso   2x10 5'' holds w/ cues 2x10 5'' holds     TA alt march  15x ea leg 20x ea      TA knee fall outs  15x ea leg 20x ea     sidelying hip abd    15x ea leg    Supine hamstring st    3x30'' ea leg    TA heel slides   12x ea  Dead bugs:   12x ea side    Prone hip ext   x15 ea leg 2x10 w/ cues t/o    qped rocking with stretch    4x20'' bias to stretch L              Education   WJB: HEP, cues, positioning WJB WJB                                    Ther Act                                        Modalities

## 2021-02-02 ENCOUNTER — APPOINTMENT (OUTPATIENT)
Dept: PHYSICAL THERAPY | Facility: CLINIC | Age: 76
End: 2021-02-02
Payer: MEDICARE

## 2021-02-04 ENCOUNTER — APPOINTMENT (OUTPATIENT)
Dept: PHYSICAL THERAPY | Facility: CLINIC | Age: 76
End: 2021-02-04
Payer: MEDICARE

## 2021-02-04 ENCOUNTER — IMMUNIZATIONS (OUTPATIENT)
Dept: FAMILY MEDICINE CLINIC | Facility: HOSPITAL | Age: 76
End: 2021-02-04

## 2021-02-04 DIAGNOSIS — Z23 ENCOUNTER FOR IMMUNIZATION: Primary | ICD-10-CM

## 2021-02-04 PROCEDURE — 91300 SARS-COV-2 / COVID-19 MRNA VACCINE (PFIZER-BIONTECH) 30 MCG: CPT

## 2021-02-04 PROCEDURE — 0002A SARS-COV-2 / COVID-19 MRNA VACCINE (PFIZER-BIONTECH) 30 MCG: CPT

## 2021-02-05 ENCOUNTER — APPOINTMENT (OUTPATIENT)
Dept: PHYSICAL THERAPY | Facility: CLINIC | Age: 76
End: 2021-02-05
Payer: MEDICARE

## 2021-02-05 ENCOUNTER — OFFICE VISIT (OUTPATIENT)
Dept: PHYSICAL THERAPY | Facility: CLINIC | Age: 76
End: 2021-02-05
Payer: MEDICARE

## 2021-02-05 DIAGNOSIS — M54.50 LOW BACK PAIN WITHOUT SCIATICA, UNSPECIFIED BACK PAIN LATERALITY, UNSPECIFIED CHRONICITY: ICD-10-CM

## 2021-02-05 DIAGNOSIS — M41.56 OTHER SECONDARY SCOLIOSIS, LUMBAR REGION: Primary | ICD-10-CM

## 2021-02-05 PROCEDURE — 97110 THERAPEUTIC EXERCISES: CPT | Performed by: PHYSICAL THERAPIST

## 2021-02-05 PROCEDURE — 97112 NEUROMUSCULAR REEDUCATION: CPT | Performed by: PHYSICAL THERAPIST

## 2021-02-05 NOTE — PROGRESS NOTES
Daily Note     Today's date: 2021  Patient name: Sally Maynard  : 1945  MRN: [id number]  Referring provider: Elis Bean MD  Dx:   Encounter Diagnosis     ICD-10-CM    1  Other secondary scoliosis, lumbar region  M41 56    2  Low back pain without sciatica, unspecified back pain laterality, unspecified chronicity  M54 5                   Subjective: My back was a little more sore following all of the snow removal; pt states that her back has not been bothering her as much at night       Objective: See treatment diary below      Assessment: Tolerated treatment well today  Cont to emphasize increased glute activation especially on L side as pt still demonstrates increased weakness due to scoliosis, pt used visual feedback to help correct alignment of spine  SLS on L appeared moderate difficult for pt will continue to focus on this for future sessions  Cont skilled PT  Plan: Continue per plan of care        Diagnosis: chronic low back strain    Precautions: none   Primary goals: get stronger and get exercise program    Asterisks next to exercises = provided for patient HEP   Manual Therapy    LAD  WJB:   Supine;  R sidelying to L LE;  WJB: prone WJB: L LE prone and sidelying     Hip PROM  WJB: L hip ER  WJB: L prone hip ER w/ stab    QL release  WJB WJB                     Exercise Diary         bike  Recumbent bike, L1 2 5', L0 2 5' Resume NV 5' upright 5' upright    Self correction of lateral shift against wall 10x10'' w/ cues Will review NV   20x5'' R shoulder against wall    bridges 2x10 2x10 w/ cues 3x10 2x10 RTB t/o     clamshells 2x10 2x10 w/ cues x25 x20 RTB ea leg    TA iso   2x10 5'' holds w/ cues 2x10 5'' holds     TA alt march  15x ea leg 20x ea      TA knee fall outs  15x ea leg 20x ea     TA SLR     15x ea leg   sidelying hip abd    15x ea leg 15x ea leg   Supine hamstring st    3x30'' ea leg 3x30'' ea leg   TA heel slides   12x ea  Dead bugs:   12x ea side Prone hip ext   x15 ea leg 2x10 w/ cues t/o 2x10 w/ cues    qped rocking with stretch    4x20'' bias to stretch L      Standing hip abd     2x10    R/L SLS     6x10''                                   Education   WJB: HEP, cues, positioning WJB WJB WJB                                   Ther Act                                        Modalities

## 2021-02-09 ENCOUNTER — OFFICE VISIT (OUTPATIENT)
Dept: PHYSICAL THERAPY | Facility: CLINIC | Age: 76
End: 2021-02-09
Payer: MEDICARE

## 2021-02-09 DIAGNOSIS — M54.50 LOW BACK PAIN WITHOUT SCIATICA, UNSPECIFIED BACK PAIN LATERALITY, UNSPECIFIED CHRONICITY: ICD-10-CM

## 2021-02-09 DIAGNOSIS — M41.56 OTHER SECONDARY SCOLIOSIS, LUMBAR REGION: Primary | ICD-10-CM

## 2021-02-09 PROCEDURE — 97110 THERAPEUTIC EXERCISES: CPT | Performed by: PHYSICAL THERAPIST

## 2021-02-09 PROCEDURE — 97112 NEUROMUSCULAR REEDUCATION: CPT | Performed by: PHYSICAL THERAPIST

## 2021-02-09 NOTE — PROGRESS NOTES
Daily Note     Today's date: 2021  Patient name: Carly Burnett  : 1945  MRN: [id number]  Referring provider: Sarah Sue MD  Dx:   Encounter Diagnosis     ICD-10-CM    1  Other secondary scoliosis, lumbar region  M41 56    2  Low back pain without sciatica, unspecified back pain laterality, unspecified chronicity  M54 5                   Subjective: My back was a little more sore following all of the snow removal; pt states that her back has not been bothering her as much at night       Objective: See treatment diary below      Assessment: Tolerated treatment well today  Increased emphasis on glute medius strength with SLS and eccentric toe taps in which pt was moderately challenged by this with the sequencing and proper L activation  Pt does have to work more so to maintain herself in proper hip alignment and trunk control  Cont skilled PT  Plan: Continue per plan of care        Diagnosis: chronic low back strain    Precautions: none   Primary goals: get stronger and get exercise program    Asterisks next to exercises = provided for patient HEP   Manual Therapy    LAD  WJB:   Supine;  R sidelying to L LE;  WJB: prone WJB: L LE prone and sidelying     Hip PROM  WJB: L hip ER  WJB: L prone hip ER w/ stab    QL release  WJB WJB                     Exercise Diary         bike 5' R today Recumbent bike, L1 2 5', L0 2 5' Resume NV 5' upright 5' upright    Self correction of lateral shift against wall  Will review NV   20x5'' R shoulder against wall    bridges 12x B bridge w/ march  2x10 w/ cues 3x10 2x10 RTB t/o     clamshells 2x10 level II ea leg 2x10 w/ cues x25 x20 RTB ea leg    TA iso   2x10 5'' holds w/ cues 2x10 5'' holds     TA alt march  15x ea leg 20x ea      TA knee fall outs  15x ea leg 20x ea     TA SLR     15x ea leg   sidelying hip abd    15x ea leg 15x ea leg   Supine hamstring st 3x30'' ea leg   3x30'' ea leg 3x30'' ea leg   TA heel slides   12x ea  Dead bugs: 12x ea side    Prone hip ext 2x10 ea leg  x15 ea leg 2x10 w/ cues t/o 2x10 w/ cues    qped rocking with stretch    4x20'' bias to stretch L      Standing hip abd 2x10 ea leg     2x10    R/L SLS 6x10'' L only     6x10''           Toe taps 2'' step 12x L stance only                        Education  WJB WJB: HEP, cues, positioning WJB WJB WJB                                   Ther Act                                        Modalities

## 2021-02-11 ENCOUNTER — APPOINTMENT (OUTPATIENT)
Dept: PHYSICAL THERAPY | Facility: CLINIC | Age: 76
End: 2021-02-11
Payer: MEDICARE

## 2021-02-12 ENCOUNTER — OFFICE VISIT (OUTPATIENT)
Dept: PHYSICAL THERAPY | Facility: CLINIC | Age: 76
End: 2021-02-12
Payer: MEDICARE

## 2021-02-12 DIAGNOSIS — M41.56 OTHER SECONDARY SCOLIOSIS, LUMBAR REGION: Primary | ICD-10-CM

## 2021-02-12 DIAGNOSIS — M54.50 LOW BACK PAIN WITHOUT SCIATICA, UNSPECIFIED BACK PAIN LATERALITY, UNSPECIFIED CHRONICITY: ICD-10-CM

## 2021-02-12 PROCEDURE — 97110 THERAPEUTIC EXERCISES: CPT | Performed by: PHYSICAL THERAPIST

## 2021-02-12 PROCEDURE — 97112 NEUROMUSCULAR REEDUCATION: CPT | Performed by: PHYSICAL THERAPIST

## 2021-02-12 NOTE — PROGRESS NOTES
Daily Note     Today's date: 2021  Patient name: Candis Clifford  : 1945  MRN: [id number]  Referring provider: Jakub Arambula MD  Dx:   Encounter Diagnosis     ICD-10-CM    1  Other secondary scoliosis, lumbar region  M41 56    2  Low back pain without sciatica, unspecified back pain laterality, unspecified chronicity  M54 5                   Subjective: My back was a little more sore following all of the snow removal; pt states that her back has not been bothering her as much at night       Objective: See treatment diary below      Assessment: Tolerated treatment well today  Ongoing progression of glute group progression of stability and control on L side as she still demonstrates weakness and poor endurance  Less therapist corrections with cues when standing on L LE  Added squats and pt required cues t/o and will continue to emphasize this in future sessions  Cont skilled PT  Plan: Continue per plan of care        Diagnosis: chronic low back strain    Precautions: none   Primary goals: get stronger and get exercise program    Asterisks next to exercises = provided for patient HEP   Manual Therapy    LAD   WJB: prone WJB: L LE prone and sidelying     Hip PROM    WJB: L prone hip ER w/ stab    QL release   WJB                     Exercise Diary         bike 5' R today 5' upright  Resume NV 5' upright 5' upright    Self correction of lateral shift against wall     20x5'' R shoulder against wall    bridges 12x B bridge / march   3x10 2x10 RTB t/o     clamshells 2x10 level II ea leg  x25 x20 RTB ea leg    TA iso    2x10 5'' holds     TA alt march   20x ea      TA knee fall outs   20x ea     TA SLR     15x ea leg   sidelying hip abd  2x10 ea leg  15x ea leg 15x ea leg   Supine hamstring st 3x30'' ea leg 3x30'' ea leg   3x30'' ea leg 3x30'' ea leg   TA heel slides   12x ea  Dead bugs:   12x ea side    Prone hip ext 2x10 ea leg Prone hip ext knee flex 2x10 x15 ea leg 2x10 w/ cues t/o 2x10 w/ cues    qped rocking with stretch    4x20'' bias to stretch L      Standing hip abd 2x10 ea leg  2x10 ea leg on airex pad   2x10    R/L SLS 6x10'' L only  6x10'' L only    6x10''           Toe taps 2'' step 12x L stance only        Step ups  LSU: 2x10 ea leg biodex      squats  2x10 w/ cues      Education  WJB WJB:  WJB WJB WJB                                   Ther Act                                        Modalities

## 2021-02-16 ENCOUNTER — APPOINTMENT (OUTPATIENT)
Dept: PHYSICAL THERAPY | Facility: CLINIC | Age: 76
End: 2021-02-16
Payer: MEDICARE

## 2021-02-17 ENCOUNTER — OFFICE VISIT (OUTPATIENT)
Dept: PHYSICAL THERAPY | Facility: CLINIC | Age: 76
End: 2021-02-17
Payer: MEDICARE

## 2021-02-17 DIAGNOSIS — M41.56 OTHER SECONDARY SCOLIOSIS, LUMBAR REGION: Primary | ICD-10-CM

## 2021-02-17 DIAGNOSIS — M54.50 LOW BACK PAIN WITHOUT SCIATICA, UNSPECIFIED BACK PAIN LATERALITY, UNSPECIFIED CHRONICITY: ICD-10-CM

## 2021-02-17 PROCEDURE — 97110 THERAPEUTIC EXERCISES: CPT | Performed by: PHYSICAL THERAPIST

## 2021-02-17 PROCEDURE — 97112 NEUROMUSCULAR REEDUCATION: CPT | Performed by: PHYSICAL THERAPIST

## 2021-02-17 NOTE — PROGRESS NOTES
Daily Note     Today's date: 2021  Patient name: Vito Hewitt  : 1945  MRN: [id number]  Referring provider: Amber Marin MD  Dx:   Encounter Diagnosis     ICD-10-CM    1  Other secondary scoliosis, lumbar region  M41 56    2  Low back pain without sciatica, unspecified back pain laterality, unspecified chronicity  M54 5                   Subjective: I continue to perform the stretches at home, everything has been going well     Objective: See treatment diary below      Assessment:  Pt continues to tolerate therapy sessions well  Cues given for sidelying hip abduction in which pt was challenged by as she was compensating with hip flexion and trunk ROT substitution  Pt appeared especially more fatigued with toe taps both lateral and posterior with notable increase in L medial femoral ROT needing constant cues to recorrect positioning  Added postural stability exercises for scapular activation in which pt tolerated well  Cont skilled PT  Plan: Continue per plan of care        Diagnosis: chronic low back strain    Precautions: none   Primary goals: get stronger and get exercise program    Asterisks next to exercises = provided for patient HEP   Manual Therapy    LAD    WJB: L LE prone and sidelying     Hip PROM    WJB: L prone hip ER w/ stab    QL release                        Exercise Diary         bike 5' R today 5' upright  5' upright  5' upright 5' upright    Self correction of lateral shift against wall     20x5'' R shoulder against wall    bridges 12x B bridge / march   2x10 single leg 2x10 RTB t/o     clamshells 2x10 level II ea leg   x20 RTB ea leg    TA iso         TA alt march        TA knee fall outs        TA SLR     15x ea leg   sidelying hip abd  2x10 ea leg 2x10 ea w/ cues 15x ea leg 15x ea leg   Supine hamstring st 3x30'' ea leg 3x30'' ea leg  3x30'' ea leg 3x30'' ea leg 3x30'' ea leg   TA heel slides    Dead bugs:   12x ea side    Prone hip ext 2x10 ea leg Prone hip ext knee flex 2x10  2x10 w/ cues t/o 2x10 w/ cues    qped rocking with stretch         Standing hip abd 2x10 ea leg  2x10 ea leg on airex pad 2x10 ea leg  2x10    R/L SLS 6x10'' L only  6x10'' L only    6x10''   so rows/ext   2x10 rows 10#  2x10 b shld ext 8#     Toe taps 2'' step 12x L stance only   4'' lateral and posterior 2x10 w/ cues      Step ups  LSU: 2x10 ea leg biodex      squats  2x10 w/ cues      Education  WJB WJB:  WJB WJB WJB                                   Ther Act                                        Modalities

## 2021-02-18 ENCOUNTER — APPOINTMENT (OUTPATIENT)
Dept: PHYSICAL THERAPY | Facility: CLINIC | Age: 76
End: 2021-02-18
Payer: MEDICARE

## 2021-02-19 ENCOUNTER — OFFICE VISIT (OUTPATIENT)
Dept: PHYSICAL THERAPY | Facility: CLINIC | Age: 76
End: 2021-02-19
Payer: MEDICARE

## 2021-02-19 DIAGNOSIS — M41.56 OTHER SECONDARY SCOLIOSIS, LUMBAR REGION: Primary | ICD-10-CM

## 2021-02-19 DIAGNOSIS — M54.50 LOW BACK PAIN WITHOUT SCIATICA, UNSPECIFIED BACK PAIN LATERALITY, UNSPECIFIED CHRONICITY: ICD-10-CM

## 2021-02-19 PROCEDURE — 97110 THERAPEUTIC EXERCISES: CPT | Performed by: PHYSICAL THERAPIST

## 2021-02-19 PROCEDURE — 97112 NEUROMUSCULAR REEDUCATION: CPT | Performed by: PHYSICAL THERAPIST

## 2021-02-19 NOTE — PROGRESS NOTES
Daily Note     Today's date: 2021  Patient name: Gia Romano  : 1945  MRN: [id number]  Referring provider: Channing Miller MD  Dx:   Encounter Diagnosis     ICD-10-CM    1  Other secondary scoliosis, lumbar region  M41 56    2  Low back pain without sciatica, unspecified back pain laterality, unspecified chronicity  M54 5                   Subjective: I continue to perform the stretches at home, everything has been going well     Objective: See treatment diary below      Assessment:  Pt continues to tolerate therapy sessions well  Cont to emphasize eccentric step downs in variety of planes with most challenge with lateral with L LE as pt still demonstrate moderate postural instability and lateral shift t/o performance of this  Most fatigue noted with glute medius and overall core activation and trunk stabilizers on L side  Cont skilled PT  Plan: Continue per plan of care        Diagnosis: chronic low back strain    Precautions: none   Primary goals: get stronger and get exercise program    Asterisks next to exercises = provided for patient HEP   Manual Therapy  2   LAD        Hip PROM        QL release                        Exercise Diary         bike 5' R today 5' upright  5' upright  5' upright 5' upright    Self correction of lateral shift against wall     20x5'' R shoulder against wall    bridges 12x B bridge / march   2x10 single leg 2x10 single leg; and pball knee ext bridge 2x10    clamshells 2x10 level II ea leg       TA iso         TA alt march        TA knee fall outs        TA SLR     15x ea leg   sidelying hip abd  2x10 ea leg 2x10 ea w/ cues  15x ea leg   Supine hamstring st 3x30'' ea leg 3x30'' ea leg  3x30'' ea leg 3x30'' ea leg 3x30'' ea leg   TA heel slides        Prone hip ext 2x10 ea leg Prone hip ext knee flex 2x10   2x10 w/ cues    qped rocking with stretch         Standing hip abd 2x10 ea leg  2x10 ea leg on airex pad 2x10 ea leg  2x10    R/L SLS 6x10'' L only  6x10'' L only    6x10''   so rows/ext   2x10 rows 10#  2x10 b shld ext 8#     Toe taps 2'' step 12x L stance only   4'' lateral and posterior 2x10 w/ cues  2'' lateral and posterior 2x10 w/ cues;2'' fwd 2x10 all with cues t/o      Step ups  LSU: 2x10 ea leg biodex  LSU: 2x10 6''     squats  2x10 w/ cues      Education  WJB WJB:  WJB WJB, FOTO WJB                                   Ther Act                                        Modalities

## 2021-02-23 ENCOUNTER — EVALUATION (OUTPATIENT)
Dept: PHYSICAL THERAPY | Facility: CLINIC | Age: 76
End: 2021-02-23
Payer: MEDICARE

## 2021-02-23 DIAGNOSIS — M41.56 OTHER SECONDARY SCOLIOSIS, LUMBAR REGION: Primary | ICD-10-CM

## 2021-02-23 DIAGNOSIS — M54.50 LOW BACK PAIN WITHOUT SCIATICA, UNSPECIFIED BACK PAIN LATERALITY, UNSPECIFIED CHRONICITY: ICD-10-CM

## 2021-02-23 PROCEDURE — 97110 THERAPEUTIC EXERCISES: CPT | Performed by: PHYSICAL THERAPIST

## 2021-02-23 PROCEDURE — 97112 NEUROMUSCULAR REEDUCATION: CPT | Performed by: PHYSICAL THERAPIST

## 2021-02-23 NOTE — PROGRESS NOTES
PT Re-Evaluation     Today's date: 2021  Patient name: Jerrod Jenkins  : 1945  MRN: [id number]  Referring provider: Isis Oneill MD  Dx:   Encounter Diagnosis     ICD-10-CM    1  Other secondary scoliosis, lumbar region  M41 56    2  Low back pain without sciatica, unspecified back pain laterality, unspecified chronicity  M54 5                   Assessment  Assessment details: Patient is a 76 y o  female who presents with s/s consistent with low back strain and chronic postural dysfunction  She has a notable R scoliosis with L elevated iliac crest which is likely causing further strain when pt attempts to perform lifting or tranfers especially when helping elderly mother 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  Pt has been progressing with all of her long term goals with greatest improvements with overall postural and body awareness as well as strength and core activation  She feels that she is much more comfortable with doing exercises to help get her stronger and keep her more stretched out  Continues to demonstrate most weakness with L gluteal complex especially with hip extension and hip abduction at this time which will continue to be an emphasis going forward  Due to pt's notable R scoliosis her L core and glutes do likely have impaired length tension relationship which further causes weakness and strain  Due to progress, pt will reduce freq to 1x a week for another 3-4 weeks  Cont skilled PT    Impairments: abnormal gait, abnormal muscle firing, abnormal or restricted ROM, abnormal movement, activity intolerance, impaired physical strength, lacks appropriate home exercise program, pain with function, weight-bearing intolerance, poor posture  and poor body mechanics    Symptom irritability: lowUnderstanding of Dx/Px/POC: good   Prognosis: good    Goals  Impairment Goals:   - In 6 weeks, pt will report no greater than a 1-2/10 or less with all ADLs; near MET  - In 6 weeks, pt will improve spine and hip A/PROM to [facility] and with 0/10 pain in all planes; near MET  - In 6 weeks, pt will improve core and hip strength to a 4 to 4+/5 or better to facilitate functional transfers and strengthening for caregiver duties; PROGRESSING  - In 6 weeks, pt will demonstrate improved ability to contract TA without valsalva or compensation; PROGRESSING      Functional Goals:   - By DC, pt will score a 75% or better on FOTO to demonstrate improved functional level; near MET  - By DC, pt will be able to demonstrate safe and proper lifting mechanics; PROGRESSING  - By DC, pt will demonstrate IND and compliance with HEP for improved carryover at home; [address]  Patient would benefit from: skilled physical therapy  Planned modality interventions: cryotherapy, TENS and unattended electrical stimulation  Planned therapy interventions: abdominal trunk stabilization, joint mobilization, manual therapy, activity modification, neuromuscular re-education, body mechanics training, postural training, patient education, strengthening, stretching, therapeutic activities, therapeutic exercise, flexibility and functional ROM exercises  Frequency: 2x week  Duration in weeks: 4  Plan of Care beginning date: 2/23/2021  Plan of Care expiration date: 3/25/2021  Treatment plan discussed with: patient        Subjective Evaluation    History of Present Illness  Mechanism of injury: Pt states that she only has pain at the end of the day and in the morning  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 which does cause pt increased pain by the end of the day  Pt states that she is concerned about her L hip being elevated and feeling like she is leaning over  Pt does have some troubling lifting her mother and helping her with her ADLs  Denies any numbness or tingling  Pt feels she needs some exercises to counter-act her posture getting worse and feels she needs to get stronger again       Re-Eval: Pt states that she wasn't ever in pain to begin with but in terms of knowledge and comfort with exercises she feels like she is about 85% better at this time          Not a recurrent problem   Quality of life: good    Pain  Current pain ratin  At best pain ratin  At worst pain ratin  Quality: dull ache and tight  Aggravating factors: lifting, overhead activity, walking and stair climbing  Progression: no change    Social Support  Steps to enter house: yes  Stairs in house: yes     Employment status: not working  Patient Goals  Patient goals for therapy: increased strength, independence with ADLs/IADLs and decreased pain  Patient goal: increase strength and learn exercises to help self-manage        Objective     Concurrent Complaints  Negative for night pain, disturbed sleep, bladder dysfunction, bowel dysfunction and saddle (S4) numbness    Palpation     Additional Palpation Details  TTP: spinous process and L QL region    Neurological Testing     Sensation     Lumbar   Left   Intact: light touch    Right   Intact: light touch    Active Range of Motion     Additional Active Range of Motion Details  Flexion: 85%   Ext: 85%  SB: WFL   ROT: R 85%, L 60% due to scolosis     Strength/Myotome Testing     Left Hip   Planes of Motion   Flexion: 4-  Extension: 4-  Abduction: 4-  External rotation: 3  Internal rotation: 4    Right Hip   Planes of Motion   Flexion: 4  Extension: 4  Abduction: 4  External rotation: 3  Internal rotation: 4    Left Knee   Extension: 5    Right Knee   Extension: 5    Left Ankle/Foot   Dorsiflexion: 5  Plantar flexion: 4    Right Ankle/Foot   Dorsiflexion: 5  Plantar flexion: 4               Diagnosis: chronic low back strain    Precautions: none   Primary goals: get stronger and get exercise program    Asterisks next to exercises = provided for patient HEP   Manual Therapy    LAD        Hip PROM        QL release        Re-Eval     WJB           Exercise Diary         bike 5' R today 5' upright  5' upright  5' upright 5' upright    Self correction of lateral shift against wall     Review of exercises on HEP 2/23   bridges 12x B bridge w/ march   2x10 single leg 2x10 single leg; and pball knee ext bridge 2x10    clamshells 2x10 level II ea leg       TA iso         TA alt march        TA knee fall outs        TA SLR        sidelying hip abd  2x10 ea leg 2x10 ea w/ cues     Supine hamstring st 3x30'' ea leg 3x30'' ea leg  3x30'' ea leg 3x30'' ea leg    TA heel slides        Prone hip ext 2x10 ea leg Prone hip ext knee flex 2x10   Low plank hold w/ hip ext 8x   qped rocking with stretch         Standing hip abd 2x10 ea leg  2x10 ea leg on airex pad 2x10 ea leg     R/L SLS 6x10'' L only  6x10'' L only       so rows/ext   2x10 rows 10#  2x10 b shld ext 8#     Toe taps 2'' step 12x L stance only   4'' lateral and posterior 2x10 w/ cues  2'' lateral and posterior 2x10 w/ cues;2'' fwd 2x10 all with cues t/o      Step ups  LSU: 2x10 ea leg biodex  LSU: 2x10 6''     squats  2x10 w/ cues      Education  WJB WJB:  WJB WJB, FOTO WJB: updated POC and HEP   Side planks     4x10'' ea side   Seated hip ER     15x 5'' ea side                   Ther Act                                        Modalities

## 2021-02-25 ENCOUNTER — APPOINTMENT (OUTPATIENT)
Dept: PHYSICAL THERAPY | Facility: CLINIC | Age: 76
End: 2021-02-25
Payer: MEDICARE

## 2021-03-02 ENCOUNTER — OFFICE VISIT (OUTPATIENT)
Dept: PHYSICAL THERAPY | Facility: CLINIC | Age: 76
End: 2021-03-02
Payer: MEDICARE

## 2021-03-02 DIAGNOSIS — M54.50 LOW BACK PAIN WITHOUT SCIATICA, UNSPECIFIED BACK PAIN LATERALITY, UNSPECIFIED CHRONICITY: ICD-10-CM

## 2021-03-02 DIAGNOSIS — M41.56 OTHER SECONDARY SCOLIOSIS, LUMBAR REGION: Primary | ICD-10-CM

## 2021-03-02 PROCEDURE — 97112 NEUROMUSCULAR REEDUCATION: CPT | Performed by: PHYSICAL THERAPIST

## 2021-03-02 PROCEDURE — 97110 THERAPEUTIC EXERCISES: CPT | Performed by: PHYSICAL THERAPIST

## 2021-03-02 NOTE — PROGRESS NOTES
Daily Note     Today's date: 3/2/2021  Patient name: Eva Carlos  : 1945  MRN: [id number]  Referring provider: Diana Mccarthy MD  Dx:   Encounter Diagnosis     ICD-10-CM    1  Other secondary scoliosis, lumbar region  M41 56    2  Low back pain without sciatica, unspecified back pain laterality, unspecified chronicity  M54 5                   Subjective: I need to review some of my home exercises       Objective: See treatment diary below      Assessment: Tolerated treatment well today; reviewed previous exercises including planks with hip extension in which pt was pulling more from L side and straining therefore held on exercise for now and told pt to just perform plank and prone hip ext as separate exercises  Added qped hip ext for glute and spinal stability in which pt tolerated well but was challenged by and shifting more so on L LE when stabilizing on that side  Cont skilled PT  Plan: Continue per plan of care        Diagnosis: chronic low back strain    Precautions: none   Primary goals: get stronger and get exercise program    Asterisks next to exercises = provided for patient HEP   Manual Therapy 3/2 2/12 2/17 2/19 2/23   LAD        Hip PROM        QL release        Re-Eval     WJB           Exercise Diary         bike 5' R today 5' upright  5' upright  5' upright 5' upright    Self correction of lateral shift against wall     Review of exercises on HEP    bridges   2x10 single leg 2x10 single leg; and pball knee ext bridge 2x10    clamshells 2x10 level II ea leg w/ cues       TA iso         TA alt march        TA knee fall outs        TA SLR        sidelying hip abd  2x10 ea leg 2x10 ea w/ cues     Supine hamstring st 3x30'' ea leg 3x30'' ea leg  3x30'' ea leg 3x30'' ea leg    TA heel slides        Prone hip ext 2x10 ea leg knee flexed Prone hip ext knee flex 2x10   Low plank hold w/ hip ext 8x   qped rocking with stretch  10x10''       Standing hip abd  2x10 ea leg on airex pad 2x10 ea leg     R/L SLS  6x10'' L only       so rows/ext Ext: 10# 2x10  2x10 rows 10#  2x10 b shld ext 8#     Toe taps   4'' lateral and posterior 2x10 w/ cues  2'' lateral and posterior 2x10 w/ cues;2'' fwd 2x10 all with cues t/o      Step ups  LSU: 2x10 ea leg biodex  LSU: 2x10 6''     squats  2x10 w/ cues      Education  WJB WJB:  WJB WJB, FOTO WJB: updated POC and HEP   Side planks     4x10'' ea side   Seated hip ER Seated 10x ea leg, supine 10x ea leg    15x 5'' ea side   qped hip ext 15x ea leg       Prone hip ext w/ arm lift  20x ea side       Ther Act                                        Modalities

## 2021-03-03 ENCOUNTER — TELEPHONE (OUTPATIENT)
Dept: CARDIOLOGY CLINIC | Facility: CLINIC | Age: 76
End: 2021-03-03

## 2021-03-03 NOTE — TELEPHONE ENCOUNTER
Patient has an upcoming appt with LEANN Saldaña and is asking if she should be having blood work prior to that appt  If so can we reach out and let her know if he wants this done

## 2021-03-08 ENCOUNTER — OFFICE VISIT (OUTPATIENT)
Dept: CARDIOLOGY CLINIC | Facility: CLINIC | Age: 76
End: 2021-03-08
Payer: MEDICARE

## 2021-03-08 VITALS
BODY MASS INDEX: 17.83 KG/M2 | SYSTOLIC BLOOD PRESSURE: 142 MMHG | DIASTOLIC BLOOD PRESSURE: 84 MMHG | HEIGHT: 65 IN | WEIGHT: 107 LBS | HEART RATE: 64 BPM

## 2021-03-08 DIAGNOSIS — E78.2 MIXED HYPERLIPIDEMIA: ICD-10-CM

## 2021-03-08 DIAGNOSIS — I25.118 CORONARY ARTERY DISEASE OF NATIVE ARTERY OF NATIVE HEART WITH STABLE ANGINA PECTORIS (HCC): Primary | ICD-10-CM

## 2021-03-08 DIAGNOSIS — I10 ESSENTIAL HYPERTENSION: ICD-10-CM

## 2021-03-08 DIAGNOSIS — Z95.1 S/P CABG X 3: ICD-10-CM

## 2021-03-08 PROCEDURE — 99214 OFFICE O/P EST MOD 30 MIN: CPT | Performed by: INTERNAL MEDICINE

## 2021-03-08 RX ORDER — LOSARTAN POTASSIUM 100 MG/1
100 TABLET ORAL DAILY
Qty: 30 TABLET | Refills: 11 | Status: SHIPPED | OUTPATIENT
Start: 2021-03-08 | End: 2022-02-28

## 2021-03-08 NOTE — PROGRESS NOTES
Cardiology Follow Up    Petar Canales  1945  [id number]  [address] [facility] [place]  [address]  [facility] [address]  [address]  [address] [address] [phone]    1  Coronary artery disease of native artery of native heart with stable angina pectoris ([address] )     2  S/P CABG x 3     3  Mixed hyperlipidemia     4  Essential hypertension  losartan (COZAAR) 100 MG tablet    BP still a little high at this time - will increase her Losartan to 50mg QD  Discussion/Summary:    CAD - prior CABG  Normal nuclear stress 10/2020  BP elevated, increase losartan to 100mg  She was previously on dyazide  Continue 40mg Crestor  It's a little more expensive, but did lower her LDL more than atorvastatin  Otherwise if she needs to go back to atorva, then would add zetia in addition  Previous History:  Admitted to the [facility] with a non ST elevation in January, 2020  She had a cardiac catheterization with calcified vessels and multivessel CAD  She ultimately did underwent bypass surgery with 3 grafts  LIMA to the LAD, vein grafts to OM and Ramus  Her EF is preserved  She was started on appropriate medical therapy  She was kept on Imdur for 30 days after her bypass because the size of the LIMA was small and felt to be somewhat spasmodic, but subsequently stopped  ER visit in 10/2020 for a near syncopal episode and some symptoms which she felt were similar to her MI  Subsequent stress test unremarkable      Interval history:  Returns for regular follow up  BP at home has been running high, she correlated her cuff to the office, and her home cuff was about 10 points higher  She is otherwise feeling ok  No recurrence of symptoms like the ones that brought her to the ER last fall      Problem List     Mixed hyperlipidemia    Coronary artery disease of native artery of native heart with stable angina pectoris ([facility] [address] )    Overview Signed 1/24/2020  4:00 PM by Booker Velez     Added automatically from request for surgery [id number]         Arthritis    Cervical myofascial pain syndrome    Cervical radiculopathy    Cervicogenic headache    Cervico-occipital neuralgia    Depression    Essential hypertension    Osteopenia of spine    Spasmodic torticollis    Laceration of occipital scalp    Other secondary scoliosis, lumbar region    NSTEMI (non-ST elevation myocardial infarction) (HCC)    Syncope    S/P CABG x 3    Anemia    Thrombocytopenia (HCC)    Hyperchloremia    Chylothorax    Screening for colon cancer    Overview Signed 4/14/2020  8:34 AM by Brett Chaney MD     Pt referred to GI for colon cancer screening  History of colon polyps        Past Medical History:   Diagnosis Date    Colon polyp     Coronary artery disease     Effusion of left knee     Last assessed - 9/10/15    History of transfusion     Hyperlipidemia     Hypertension     Myocardial infarction ([address] )     Tick bite     Last assessed - 4/21/17     Social History     Tobacco Use    Smoking status: Never Smoker    Smokeless tobacco: Never Used   Substance Use Topics    Alcohol use: Yes     Comment: 1 wine nightly    Drug use: No      Family History   Problem Relation Age of Onset    Coronary artery disease Mother     Arthritis Mother     Other Mother         Cardiac Disorder     Transient ischemic attack Mother     Heart attack Father    [facility] Sudden death Father         SCD     Past Surgical History:   Procedure Laterality Date    APPENDECTOMY      COLONOSCOPY      DE CABG, ARTERY-VEIN, FOUR N/A 1/27/2020    Procedure: CORONARY ARTERY BYPASS GRAFT (CABG) x3 VESSELS, LIMA TO LAD, AND SVG TO PLB & OM;  Surgeon: Yaron Sifuentes DO;  [facility]: [facility];  Service: Cardiac Surgery    DE ECHO TRANSESOPHAG R-T 2D W/PRB IMG ACQUISJ I&R N/A 1/27/2020    Procedure: TRANSESOPHAGEAL ECHOCARDIOGRAM (GET);   Surgeon: Yaron Sifuentes DO;  [facility];  Service: Cardiac Surgery  CO ENDOSCOPY W/VIDEO-ASST VEIN HARVEST,CABG Left 1/27/2020    Procedure: HARVEST VEIN ENDOSCOPIC (EVH); Surgeon: Gregoria Glover DO;  Location[facility];  Service: Cardiac Surgery    TONSILLECTOMY      Last assessed - 4/20/17    TUBAL LIGATION      Last assessed - 4/20/17    WISDOM TOOTH EXTRACTION      Last assessed - 4/20/17       Current Outpatient Medications:     aspirin (ECOTRIN LOW STRENGTH) 81 mg EC tablet, Take 1 tablet (81 mg total) by mouth daily, Disp: , Rfl:     Calcium Carb-Cholecalciferol (CALCIUM 600 + D) 600-200 MG-UNIT TABS, Calcium 600 + D TABS TAKE 1 TABLET DAILY  Refills: 0  Active, Disp: , Rfl:     cholecalciferol (VITAMIN D3) 1,000 units tablet, Take 1,000 Units by mouth daily, Disp: , Rfl:     losartan (COZAAR) 100 MG tablet, Take 1 tablet (100 mg total) by mouth daily, Disp: 30 tablet, Rfl: 11    magnesium 30 MG tablet, Take 30 mg by mouth 2 (two) times a day , Disp: , Rfl:     metoprolol tartrate (LOPRESSOR) 25 mg tablet, TAKE 1/2 TABLET BY MOUTH EVERY 12 HOURS, Disp: 90 tablet, Rfl: 1    rosuvastatin (CRESTOR) 40 MG tablet, Take 1 tablet (40 mg total) by mouth daily, Disp: 30 tablet, Rfl: 11  No Known Allergies    Vitals:    03/08/21 1341   BP: 142/84   BP Location: Left arm   Patient Position: Sitting   Cuff Size: Adult   Pulse: 64   Weight: 48 5 kg (107 lb)   Height: 5' 5" (1 651 m)     Vitals:    03/08/21 1341   Weight: 48 5 kg (107 lb)      Height: 5' 5" (165 1 cm)   Body mass index is 17 81 kg/m²      Physical Exam:  GEN: Julious Hint appears well, alert and oriented x 3, pleasant and cooperative   HEENT: pupils equal, round, and reactive to light; extraocular muscles intact  NECK: supple, no carotid bruits   HEART: regular rhythm, normal S1 and S2, no murmurs, clicks, gallops or rubs   LUNGS: clear to auscultation bilaterally; no wheezes, rales, or rhonchi   ABDOMEN: normal bowel sounds, soft, no tenderness, no distention  EXTREMITIES: peripheral pulses normal; no clubbing, cyanosis, or edema  NEURO: no focal findings   SKIN: normal without suspicious lesions on exposed skin    ROS:  Positive for shortness of breath with incline  Except as noted in HPI, is otherwise reviewed in detail and a 12 point review of systems is negative  ROS reviewed and is unchanged    Labs:  Lab Results   Component Value Date     03/03/2015    K 4 2 11/17/2020     11/17/2020    CREATININE 0 70 11/17/2020    BUN 19 11/17/2020    CO2 30 11/17/2020    ALT 51 11/17/2020    AST 48 (H) 11/17/2020    INR 1 01 01/24/2020    GLUF 94 11/17/2020    WBC 5 25 11/17/2020    HGB 13 0 11/17/2020    HCT 39 9 11/17/2020     11/17/2020     No results found for: CHOL  Lab Results   Component Value Date    LDLCALC 71 11/17/2020    [address] 93 05/18/2020    [address] 85 02/25/2020     Lab Results   Component Value Date    HDL 69 11/17/2020    HDL 67 05/18/2020    HDL 50 02/25/2020     Lab Results   Component Value Date    TRIG 73 11/17/2020    TRIG 95 05/18/2020    TRIG 109 02/25/2020     Testing:  Stress Test:  SUMMARY:  -  Rest ECG: Normal sinus rhythm  Normal baseline ECG  -  Stress results: Duration of exercise was 8 min and 0 sec  Maximal work rate was 10 1 METs  Target heart rate was achieved  There was resting hypertension with an appropriate blood pressure response to stress  There was no chest pain  during stress  -  ECG conclusions: The stress ECG was normal  Arrhythmia during stress: isolated atrial premature beats  -  Perfusion imaging: There were no perfusion defects   -  Gated SPECT: The calculated left ventricular ejection fraction was 82 %  Left ventricular ejection fraction was within normal limits by visual estimate  There was no left ventricular regional abnormality      IMPRESSIONS: Normal study after maximal exercise without reproduction of symptoms  Myocardial perfusion imaging was normal at rest and with stress   Left ventricular systolic function was normal     Cardiac Cath 1/24/2020:  CORONARY CIRCULATION:  LAD: The vessel was medium sized  The proximal and mid LAD is heavily calcified with diffuse disease of 70-80%  Circumflex: The vessel was medium sized and heavily calcified  Ostial circumflex: There was a discrete 85 % stenosis  Mid circumflex: There was a discrete 85 % stenosis  1st obtuse marginal: The vessel was small to medium sized  There was a tubular 50 % stenosis  2nd obtuse marginal: The vessel was small to medium sized  There was a discrete 85 % stenosis  RCA: The vessel was medium sized  Dominant  There is heavy calcification in the proximal, mid, and distal vessel  There are multiple significant lesions ( 70-90% ) seen throughout this entire area  Echo 1/2020:  LEFT VENTRICLE:  Systolic function was vigorous  Ejection fraction was estimated to be 70 %  There were no regional wall motion abnormalities  Wall thickness was at the upper limits of normal      RIGHT VENTRICLE:  The size was normal   Systolic function was normal      MITRAL VALVE:  There was mild regurgitation

## 2021-03-09 ENCOUNTER — OFFICE VISIT (OUTPATIENT)
Dept: PHYSICAL THERAPY | Facility: CLINIC | Age: 76
End: 2021-03-09
Payer: MEDICARE

## 2021-03-09 DIAGNOSIS — M54.50 LOW BACK PAIN WITHOUT SCIATICA, UNSPECIFIED BACK PAIN LATERALITY, UNSPECIFIED CHRONICITY: ICD-10-CM

## 2021-03-09 DIAGNOSIS — M41.56 OTHER SECONDARY SCOLIOSIS, LUMBAR REGION: Primary | ICD-10-CM

## 2021-03-09 PROCEDURE — 97110 THERAPEUTIC EXERCISES: CPT | Performed by: PHYSICAL THERAPIST

## 2021-03-09 NOTE — PROGRESS NOTES
Daily Note     Today's date: 3/9/2021  Patient name: Satish Sebastian  : 1945  MRN: [id number]  Referring provider: Ba Stiles MD  Dx:   Encounter Diagnosis     ICD-10-CM    1  Other secondary scoliosis, lumbar region  M41 56    2  Low back pain without sciatica, unspecified back pain laterality, unspecified chronicity  M54 5                   Subjective: Nothing really new      Objective: See treatment diary below      Assessment: Tolerated treatment well today; spent the entire session reviewing and condensing HEP in order to make exercises more concise and organized for pt in which she appeared to understand  Likely DC NV  Cont skilled PT for one more session  Plan: Continue per plan of care        Diagnosis: chronic low back strain    Precautions: none   Primary goals: get stronger and get exercise program    Asterisks next to exercises = provided for patient HEP   Manual Therapy 3/2 3/9 2/17 2/19 2/23   LAD        Hip PROM        QL release        Re-Eval     WJB           Exercise Diary         bike 5' R today 5' upright  5' upright  5' upright 5' upright    Self correction of lateral shift against wall  Review of all exercises on DC HEP   Review of exercises on HEP    bridges   2x10 single leg 2x10 single leg; and pball knee ext bridge 2x10    clamshells 2x10 level II ea leg w/ cues       TA iso         TA alt march        TA knee fall outs        TA SLR        sidelying hip abd   2x10 ea w/ cues     Supine hamstring st 3x30'' ea leg  3x30'' ea leg 3x30'' ea leg    TA heel slides        Prone hip ext 2x10 ea leg knee flexed    Low plank hold w/ hip ext 8x   qped rocking with stretch  10x10''       Standing hip abd   2x10 ea leg     R/L SLS        so rows/ext Ext: 10# 2x10  2x10 rows 10#  2x10 b shld ext 8#     Toe taps   4'' lateral and posterior 2x10 w/ cues  2'' lateral and posterior 2x10 w/ cues;2'' fwd 2x10 all with cues t/o      Step ups    LSU: 2x10 6''     squats        Education WJB WJB: review of exercises on HEP with freq and duration, intensity  WJB WJB, FOTO WJB: updated POC and HEP   Side planks     4x10'' ea side   Seated hip ER Seated 10x ea leg, supine 10x ea leg    15x 5'' ea side   qped hip ext 15x ea leg       Prone hip ext w/ arm lift  20x ea side       Ther Act                                        Modalities

## 2021-03-16 ENCOUNTER — OFFICE VISIT (OUTPATIENT)
Dept: PHYSICAL THERAPY | Facility: CLINIC | Age: 76
End: 2021-03-16
Payer: MEDICARE

## 2021-03-16 DIAGNOSIS — M41.56 OTHER SECONDARY SCOLIOSIS, LUMBAR REGION: Primary | ICD-10-CM

## 2021-03-16 DIAGNOSIS — M54.50 LOW BACK PAIN WITHOUT SCIATICA, UNSPECIFIED BACK PAIN LATERALITY, UNSPECIFIED CHRONICITY: ICD-10-CM

## 2021-03-16 PROCEDURE — 97110 THERAPEUTIC EXERCISES: CPT | Performed by: PHYSICAL THERAPIST

## 2021-03-16 NOTE — PROGRESS NOTES
Discharge Note     Today's date: 3/16/2021  Patient name: Serenity Macias  : 1945  MRN: [id number]  Referring provider: Digna Kaiser MD  Dx:   Encounter Diagnosis     ICD-10-CM    1  Other secondary scoliosis, lumbar region  M41 56    2  Low back pain without sciatica, unspecified back pain laterality, unspecified chronicity  M54 5                   Subjective: I came up with a good way to keep track of my exercises       Objective: See treatment diary below      Assessment: Tolerated treatment well today; reviewed exercises for DC HEP in which pt appeared to understand and plans on performing at home consistently  Due to pt's progress and completion of all long term goals, she is no longer in need of skilled PT at this time  DC to HEP        Plan: DC to HEP     Diagnosis: chronic low back strain    Precautions: none   Primary goals: get stronger and get exercise program    Asterisks next to exercises = provided for patient HEP   Manual Therapy 3/2 3/9 3/16 2/19 2/23   LAD        Hip PROM        QL release        Re-Eval     WJB           Exercise Diary         bike 5' R today 5' upright  Review of exercises on DC HEP 5' upright 5' upright    Self correction of lateral shift against wall  Review of all exercises on DC HEP   Review of exercises on HEP    bridges    2x10 single leg; and pball knee ext bridge 2x10    clamshells 2x10 level II ea leg w/ cues       TA iso         TA alt march        TA knee fall outs        TA SLR        sidelying hip abd        Supine hamstring st 3x30'' ea leg   3x30'' ea leg    TA heel slides        Prone hip ext 2x10 ea leg knee flexed    Low plank hold w/ hip ext 8x   qped rocking with stretch  10x10''       Standing hip abd        R/L SLS        so rows/ext Ext: 10# 2x10       Toe taps    2'' lateral and posterior 2x10 w/ cues;2'' fwd 2x10 all with cues t/o      Step ups    LSU: 2x10 6''     squats        Education  WJB WJB: review of exercises on HEP with freq and duration, intensity  WJB: HEP and FOTO WJB, FOTO WJB: updated POC and HEP   Side planks     4x10'' ea side   Seated hip ER Seated 10x ea leg, supine 10x ea leg    15x 5'' ea side   qped hip ext 15x ea leg       Prone hip ext w/ arm lift  20x ea side       Ther Act                                        Modalities

## 2021-03-23 ENCOUNTER — APPOINTMENT (OUTPATIENT)
Dept: PHYSICAL THERAPY | Facility: CLINIC | Age: 76
End: 2021-03-23
Payer: MEDICARE

## 2021-03-30 ENCOUNTER — APPOINTMENT (OUTPATIENT)
Dept: PHYSICAL THERAPY | Facility: CLINIC | Age: 76
End: 2021-03-30
Payer: MEDICARE

## 2021-05-18 ENCOUNTER — TRANSCRIBE ORDERS (OUTPATIENT)
Dept: LAB | Facility: CLINIC | Age: 76
End: 2021-05-18

## 2021-05-18 ENCOUNTER — APPOINTMENT (OUTPATIENT)
Dept: LAB | Facility: CLINIC | Age: 76
End: 2021-05-18
Payer: MEDICARE

## 2021-05-18 DIAGNOSIS — D50.8 OTHER IRON DEFICIENCY ANEMIA: ICD-10-CM

## 2021-05-18 DIAGNOSIS — Z13.6 SCREENING FOR CARDIOVASCULAR CONDITION: ICD-10-CM

## 2021-05-18 DIAGNOSIS — Z11.59 NEED FOR HEPATITIS C SCREENING TEST: ICD-10-CM

## 2021-05-18 DIAGNOSIS — E87.5 HYPERKALEMIA: Primary | ICD-10-CM

## 2021-05-18 DIAGNOSIS — E78.2 MIXED HYPERLIPIDEMIA: ICD-10-CM

## 2021-05-18 DIAGNOSIS — Z13.1 SCREENING FOR DIABETES MELLITUS: ICD-10-CM

## 2021-05-18 LAB
ALBUMIN SERPL BCP-MCNC: 4 G/DL (ref 3.5–5)
ALP SERPL-CCNC: 55 U/L (ref 46–116)
ALT SERPL W P-5'-P-CCNC: 46 U/L (ref 12–78)
ANION GAP SERPL CALCULATED.3IONS-SCNC: 3 MMOL/L (ref 4–13)
AST SERPL W P-5'-P-CCNC: 47 U/L (ref 5–45)
BASOPHILS # BLD AUTO: 0.08 THOUSANDS/ΜL (ref 0–0.1)
BASOPHILS NFR BLD AUTO: 1 % (ref 0–1)
BILIRUB SERPL-MCNC: 0.44 MG/DL (ref 0.2–1)
BUN SERPL-MCNC: 21 MG/DL (ref 5–25)
CALCIUM SERPL-MCNC: 9.6 MG/DL (ref 8.3–10.1)
CHLORIDE SERPL-SCNC: 105 MMOL/L (ref 100–108)
CHOLEST SERPL-MCNC: 165 MG/DL (ref 50–200)
CO2 SERPL-SCNC: 29 MMOL/L (ref 21–32)
CREAT SERPL-MCNC: 0.72 MG/DL (ref 0.6–1.3)
EOSINOPHIL # BLD AUTO: 0.11 THOUSAND/ΜL (ref 0–0.61)
EOSINOPHIL NFR BLD AUTO: 2 % (ref 0–6)
ERYTHROCYTE [DISTWIDTH] IN BLOOD BY AUTOMATED COUNT: 13.5 % (ref 11.6–15.1)
GFR SERPL CREATININE-BSD FRML MDRD: 82 ML/MIN/1.73SQ M
GLUCOSE P FAST SERPL-MCNC: 100 MG/DL (ref 65–99)
HCT VFR BLD AUTO: 42.2 % (ref 34.8–46.1)
HCV AB SER QL: NORMAL
HDLC SERPL-MCNC: 71 MG/DL
HGB BLD-MCNC: 13.4 G/DL (ref 11.5–15.4)
IMM GRANULOCYTES # BLD AUTO: 0.02 THOUSAND/UL (ref 0–0.2)
IMM GRANULOCYTES NFR BLD AUTO: 0 % (ref 0–2)
LDLC SERPL CALC-MCNC: 81 MG/DL (ref 0–100)
LYMPHOCYTES # BLD AUTO: 1.4 THOUSANDS/ΜL (ref 0.6–4.47)
LYMPHOCYTES NFR BLD AUTO: 23 % (ref 14–44)
MCH RBC QN AUTO: 31.6 PG (ref 26.8–34.3)
MCHC RBC AUTO-ENTMCNC: 31.8 G/DL (ref 31.4–37.4)
MCV RBC AUTO: 100 FL (ref 82–98)
MONOCYTES # BLD AUTO: 0.47 THOUSAND/ΜL (ref 0.17–1.22)
MONOCYTES NFR BLD AUTO: 8 % (ref 4–12)
NEUTROPHILS # BLD AUTO: 3.98 THOUSANDS/ΜL (ref 1.85–7.62)
NEUTS SEG NFR BLD AUTO: 66 % (ref 43–75)
NRBC BLD AUTO-RTO: 0 /100 WBCS
PLATELET # BLD AUTO: 213 THOUSANDS/UL (ref 149–390)
PMV BLD AUTO: 12.1 FL (ref 8.9–12.7)
POTASSIUM SERPL-SCNC: 5.8 MMOL/L (ref 3.5–5.3)
PROT SERPL-MCNC: 7.1 G/DL (ref 6.4–8.2)
RBC # BLD AUTO: 4.24 MILLION/UL (ref 3.81–5.12)
SODIUM SERPL-SCNC: 137 MMOL/L (ref 136–145)
TRIGL SERPL-MCNC: 66 MG/DL
WBC # BLD AUTO: 6.06 THOUSAND/UL (ref 4.31–10.16)

## 2021-05-18 PROCEDURE — 85025 COMPLETE CBC W/AUTO DIFF WBC: CPT

## 2021-05-18 PROCEDURE — 36415 COLL VENOUS BLD VENIPUNCTURE: CPT

## 2021-05-18 PROCEDURE — 86803 HEPATITIS C AB TEST: CPT

## 2021-05-18 PROCEDURE — 80053 COMPREHEN METABOLIC PANEL: CPT

## 2021-05-18 PROCEDURE — 80061 LIPID PANEL: CPT

## 2021-05-19 ENCOUNTER — APPOINTMENT (OUTPATIENT)
Dept: LAB | Facility: CLINIC | Age: 76
End: 2021-05-19
Payer: MEDICARE

## 2021-05-19 DIAGNOSIS — E87.5 HYPERKALEMIA: ICD-10-CM

## 2021-05-19 LAB
ANION GAP SERPL CALCULATED.3IONS-SCNC: 4 MMOL/L (ref 4–13)
BUN SERPL-MCNC: 22 MG/DL (ref 5–25)
CALCIUM SERPL-MCNC: 9.3 MG/DL (ref 8.3–10.1)
CHLORIDE SERPL-SCNC: 105 MMOL/L (ref 100–108)
CO2 SERPL-SCNC: 31 MMOL/L (ref 21–32)
CREAT SERPL-MCNC: 0.58 MG/DL (ref 0.6–1.3)
GFR SERPL CREATININE-BSD FRML MDRD: 90 ML/MIN/1.73SQ M
GLUCOSE SERPL-MCNC: 77 MG/DL (ref 65–140)
POTASSIUM SERPL-SCNC: 4.6 MMOL/L (ref 3.5–5.3)
SODIUM SERPL-SCNC: 140 MMOL/L (ref 136–145)

## 2021-05-19 PROCEDURE — 80048 BASIC METABOLIC PNL TOTAL CA: CPT

## 2021-05-19 PROCEDURE — 36415 COLL VENOUS BLD VENIPUNCTURE: CPT

## 2021-05-20 ENCOUNTER — OFFICE VISIT (OUTPATIENT)
Dept: FAMILY MEDICINE CLINIC | Facility: CLINIC | Age: 76
End: 2021-05-20
Payer: MEDICARE

## 2021-05-20 VITALS
HEART RATE: 62 BPM | BODY MASS INDEX: 18.76 KG/M2 | HEIGHT: 65 IN | RESPIRATION RATE: 16 BRPM | SYSTOLIC BLOOD PRESSURE: 126 MMHG | WEIGHT: 112.6 LBS | OXYGEN SATURATION: 97 % | TEMPERATURE: 99.2 F | DIASTOLIC BLOOD PRESSURE: 66 MMHG

## 2021-05-20 DIAGNOSIS — D50.8 OTHER IRON DEFICIENCY ANEMIA: ICD-10-CM

## 2021-05-20 DIAGNOSIS — F41.8 SITUATIONAL ANXIETY: ICD-10-CM

## 2021-05-20 DIAGNOSIS — Z13.1 SCREENING FOR DIABETES MELLITUS: ICD-10-CM

## 2021-05-20 DIAGNOSIS — I10 ESSENTIAL HYPERTENSION: ICD-10-CM

## 2021-05-20 DIAGNOSIS — I25.118 CORONARY ARTERY DISEASE OF NATIVE ARTERY OF NATIVE HEART WITH STABLE ANGINA PECTORIS (HCC): Primary | ICD-10-CM

## 2021-05-20 DIAGNOSIS — E78.2 MIXED HYPERLIPIDEMIA: ICD-10-CM

## 2021-05-20 DIAGNOSIS — D69.6 THROMBOCYTOPENIA (HCC): ICD-10-CM

## 2021-05-20 PROCEDURE — 99214 OFFICE O/P EST MOD 30 MIN: CPT | Performed by: FAMILY MEDICINE

## 2021-05-20 NOTE — PROGRESS NOTES
FAMILY PRACTICE OFFICE VISIT       NAME: Pastor Holliday  AGE: 76 y o  SEX: female       : 1945        MRN: [id number]    DATE: 2021  TIME: 12:36 PM    Assessment and Plan   1  Coronary artery disease of native artery of native heart with stable angina pectoris [facility])  Comments:  Condition is stable - pt on ASA, Rosuvastatin, Metoprolol, Losartan  Continues f/u with Cardiology  2  Essential hypertension  Comments:  Controlled on present management  Continue a lower salt diet, and remain active  If BP's remain higher in the AM's, she could shift taking Losartan to the PM     3  Mixed hyperlipidemia  Comments:  Controlled with Rosuvastatin  Orders:  -     Comprehensive metabolic panel; Future  -     Lipid Panel with Direct LDL reflex; Future    4  Other iron deficiency anemia  Comments:  Hx of -> checking a CBC with [facility] to be done, prior to next OV  Orders:  -     CBC and differential; Future    5  Thrombocytopenia ([address] )  Comments:  As above  Orders:  -     CBC and differential; Future    6  Situational anxiety  Comments:  Multiple stressors right now - pt desires counseling; was referred to [facility]  Orders:  -     Ambulatory referral to [facility]; Future    7  Screening for diabetes mellitus  -     Comprehensive metabolic panel; Future         There are no Patient Instructions on file for this visit  Chief Complaint     Chief Complaint   Patient presents with    Follow-up       History of Present Illness   Pastor Holliday is a 76y o -year-old female who presents in f/u today  Recent labs reviewed - Gluc 100, HDL 71, LDL 81, repeat K+ normal at 4 6  F/u is UTD with Cardiology  More stressors - mother moved in with her (now 8 years old)  SBPs running higher in the 140 - 150s in the AM's - better during the day  Review of Systems   Review of Systems   Constitutional: Negative for activity change  Respiratory: Negative for shortness of breath      Cardiovascular: Negative for chest pain and leg swelling  Psychiatric/Behavioral: The patient is nervous/anxious  Active Problem List     Patient Active Problem List   Diagnosis    Arthritis    Cervical myofascial pain syndrome    Cervical radiculopathy    Cervicogenic headache    Cervico-occipital neuralgia    Depression    Mixed hyperlipidemia    Essential hypertension    Osteopenia of spine    Spasmodic torticollis    Laceration of occipital scalp    Other secondary scoliosis, lumbar region    NSTEMI (non-ST elevation myocardial infarction) ([address] )    Syncope    Coronary artery disease of native artery of native heart with stable angina pectoris (HCC)    S/P CABG x 3    Anemia    Thrombocytopenia (HCC)    Hyperchloremia    Chylothorax    Screening for colon cancer    History of colon polyps         Past Medical History:  Past Medical History:   Diagnosis Date    Colon polyp     Coronary artery disease     Effusion of left knee     Last assessed - 9/10/15    History of transfusion     Hyperlipidemia     Hypertension     [facility]     Tick bite     Last assessed - 4/21/17       Past Surgical History:  Past Surgical History:   Procedure Laterality Date    APPENDECTOMY      COLONOSCOPY      [place] CABG, ARTERY-VEIN, FOUR N/A 1/27/2020    Procedure: CORONARY ARTERY BYPASS GRAFT (CABG) x3 VESSELS, LIMA TO LAD, AND SVG TO PLB & OM;  Surgeon: Eduard Blancas DO;  [facility];  Service: Cardiac Surgery    [place] ECHO TRANSESOPHAG R-T 2D W/PRB IMG ACQUISJ I&R N/A 1/27/2020    Procedure: TRANSESOPHAGEAL ECHOCARDIOGRAM (GET); Surgeon: Eduard Blancas DO;  [facility];  Service: Cardiac Surgery    [place] ENDOSCOPY W/VIDEO-ASST VEIN HARVEST,CABG Left 1/27/2020    Procedure: HARVEST VEIN ENDOSCOPIC ([address]);   Surgeon: Eduard Blancas DO;  [facility];  Service: Cardiac Surgery    TONSILLECTOMY      Last assessed - 4/20/17    TUBAL LIGATION      Last assessed - 4/20/17  WISDOM TOOTH EXTRACTION      Last assessed - 4/20/17       Family History:  Family History   Problem Relation Age of Onset    Coronary artery disease Mother     Arthritis Mother     Other Mother         Cardiac Disorder     Transient ischemic attack Mother     Heart attack Father    Gibran Pert Sudden death Father         SCD       Social History:  Social History     Socioeconomic History    Marital status:      Spouse name: Not on file    Number of children: 3    Years of education: Post Graduate    Highest education level: Not on file   Occupational History    Occupation:       Comment: P/T    Occupation: Retired     Comment: RN   Social Needs    Financial resource strain: Not on file    Food insecurity     Worry: Not on file     Inability: Not on file   [employer] needs     Medical: Not on file     Non-medical: Not on file   Tobacco Use    Smoking status: Never Smoker    Smokeless tobacco: Never Used   Substance and Sexual Activity    Alcohol use: Yes     Comment: 1 wine nightly    Drug use: No    Sexual activity: Not on file   Lifestyle    Physical activity     Days per week: Not on file     Minutes per session: Not on file    Stress: Not on file   Relationships    Social connections     Talks on phone: Not on file     Gets together: Not on file     Attends Yazdanism service: Not on file     Active member of club or organization: Not on file     Attends meetings of clubs or organizations: Not on file     Relationship status: Not on file    Intimate partner violence     Fear of current or ex partner: Not on file     Emotionally abused: Not on file     Physically abused: Not on file     Forced sexual activity: Not on file   Other Topics Concern    Not on file   Social History Narrative    Living alone       Objective     Vitals:    05/20/21 1034   BP: 126/66   Pulse: 62   Resp: 16   Temp: 99 2 °F (37 3 °C)   SpO2: 97%     Wt Readings from Last 3 Encounters:   05/20/21 51 1 kg (112 lb 9 6 oz)   03/08/21 48 5 kg (107 lb)   11/19/20 49 kg (108 lb)       Physical Exam  Vitals signs and nursing note reviewed  Constitutional:       General: She is not in acute distress  Appearance: Normal appearance  She is not ill-appearing, toxic-appearing or diaphoretic  HENT:      Head: Normocephalic and atraumatic  Eyes:      General: No scleral icterus  Conjunctiva/sclera: Conjunctivae normal    Neck:      Musculoskeletal: Normal range of motion and neck supple  No neck rigidity or muscular tenderness  Cardiovascular:      Rate and Rhythm: Normal rate and regular rhythm  Heart sounds: Normal heart sounds  No murmur  No friction rub  No gallop  Pulmonary:      Effort: Pulmonary effort is normal  No respiratory distress  Breath sounds: Normal breath sounds  No stridor  No wheezing or rhonchi  Lymphadenopathy:      Cervical: No cervical adenopathy  Neurological:      Mental Status: She is alert and oriented to person, place, and time  Psychiatric:         Mood and Affect: Mood normal          Behavior: Behavior normal          Thought Content:  Thought content normal          Judgment: Judgment normal          Pertinent Laboratory/Diagnostic Studies:  Lab Results   Component Value Date    GLUCOSE 138 01/27/2020    BUN 22 05/19/2021    CREATININE 0 58 (L) 05/19/2021    CALCIUM 9 3 05/19/2021     03/03/2015    K 4 6 05/19/2021    CO2 31 05/19/2021     05/19/2021     Lab Results   Component Value Date    ALT 46 05/18/2021    AST 47 (H) 05/18/2021    ALKPHOS 55 05/18/2021    BILITOT 0 2 03/03/2015       Lab Results   Component Value Date    WBC 6 06 05/18/2021    HGB 13 4 05/18/2021    HCT 42 2 05/18/2021     (H) 05/18/2021     05/18/2021       No results found for: TSH    No results found for: CHOL  Lab Results   Component Value Date    TRIG 66 05/18/2021     Lab Results   Component Value Date    HDL 71 05/18/2021     Lab Results   Component Value Date    LDLCALC 81 05/18/2021     No results found for: HGBA1C    Results for orders placed or performed in visit on 45/30/19   Basic metabolic panel   Result Value Ref Range    Sodium 140 136 - 145 mmol/L    Potassium 4 6 3 5 - 5 3 mmol/L    Chloride 105 100 - 108 mmol/L    CO2 31 21 - 32 mmol/L    ANION GAP 4 4 - 13 mmol/L    BUN 22 5 - 25 mg/dL    Creatinine 0 58 (L) 0 60 - 1 30 mg/dL    Glucose 77 65 - 140 mg/dL    Calcium 9 3 8 3 - 10 1 mg/dL    eGFR 90 ml/min/1 73sq m       Orders Placed This Encounter   Procedures    Comprehensive metabolic panel    Lipid Panel with Direct LDL reflex    CBC and differential    [facility]ulatory referral to [facility] [address]:  No Known Allergies    Current Medications     Current Outpatient Medications   Medication Sig Dispense Refill    aspirin (ECOTRIN LOW STRENGTH) 81 mg EC tablet Take 1 tablet (81 mg total) by mouth daily      Calcium Carb-Cholecalciferol (CALCIUM 600 + D) 600-200 MG-UNIT TABS Calcium 600 + D TABS  TAKE 1 TABLET DAILY  Refills: 0    Active      cholecalciferol (VITAMIN D3) 1,000 units tablet Take 1,000 Units by mouth daily      losartan (COZAAR) 100 MG tablet Take 1 tablet (100 mg total) by mouth daily 30 tablet 11    magnesium 30 MG tablet Take 30 mg by mouth 2 (two) times a day       metoprolol tartrate (LOPRESSOR) 25 mg tablet TAKE 1/2 TABLET BY MOUTH EVERY 12 HOURS 90 tablet 1    rosuvastatin (CRESTOR) 40 MG tablet Take 1 tablet (40 mg total) by mouth daily 30 tablet 11     No current facility-administered medications for this visit            Health Maintenance     Health Maintenance   Topic Date Due    PT PLAN OF CARE  03/25/2021    Medicare Annual Wellness Visit (AWV)  11/19/2021    Depression Remission PHQ  11/19/2021    Fall Risk  01/19/2022    BMI: Adult  05/20/2022    Colonoscopy Surveillance  06/15/2025    DTaP,Tdap,and Td Vaccines (3 - Td) 09/01/2028    Colorectal Cancer Screening  06/15/2030    Hepatitis C Screening  Completed    Pneumococcal Vaccine: 65+ Years  Completed    Influenza Vaccine  Completed    COVID-19 Vaccine  Completed    HIB Vaccine  Aged Out    Hepatitis B Vaccine  Aged Out    IPV Vaccine  Aged Out    Hepatitis A Vaccine  Aged Out    Meningococcal ACWY Vaccine  Aged Out    HPV Vaccine  Aged Out     Immunization History   Administered Date(s) Administered    Hep A, adult 11/19/2015    INFLUENZA 10/01/2012, 10/10/2013, 10/10/2013, 10/01/2014, 10/02/2014, 10/01/2015, 10/16/2015, 10/01/2017, 10/20/2017, 11/06/2019, 10/15/2020    Influenza Quadrivalent Preservative Free 3 years and older IM 10/01/2016, 10/20/2017    Influenza, high dose seasonal 0 7 mL 11/21/2018    Pneumococcal Conjugate 13-Valent 05/21/2019    Pneumococcal Polysaccharide PPV23 05/02/2011    SARS-CoV-2 / COVID-19 mRNA IM (Pfizer-BioNTech) 01/15/2021, 02/04/2021    Tdap 11/19/2015, 09/01/2018    Typhoid Live, oral 11/19/2015    Typhoid, Unspecified 11/19/2015    Zoster 07/11/2018    Zoster Vaccine Recombinant 11/27/2018          Sylvester Tsang DO

## 2021-05-24 ENCOUNTER — TELEPHONE (OUTPATIENT)
Dept: PSYCHIATRY | Facility: CLINIC | Age: 76
End: 2021-05-24

## 2021-06-22 ENCOUNTER — SOCIAL WORK (OUTPATIENT)
Dept: BEHAVIORAL/MENTAL HEALTH CLINIC | Facility: CLINIC | Age: 76
End: 2021-06-22
Payer: MEDICARE

## 2021-06-22 DIAGNOSIS — F41.8 SITUATIONAL ANXIETY: Primary | ICD-10-CM

## 2021-06-22 DIAGNOSIS — Z95.1 S/P CABG X 3: ICD-10-CM

## 2021-06-22 PROCEDURE — 90834 PSYTX W PT 45 MINUTES: CPT | Performed by: SOCIAL WORKER

## 2021-06-22 NOTE — PATIENT INSTRUCTIONS
Processed stressors and their influence on her mood- normalization, huma;idation and supportive therapy provided  Reinforced need to cultivate additional time to reconnect with social supports/activities, creative outlets and interests including yoga and meditation which have been beneficial in past  Current struggles consistent with care giver stress and reinforced need to be proactive in addressing and making investment to her own emotional health  Provided my direct contact information

## 2021-06-22 NOTE — PSYCH
Assessment/Plan: Manage sadness and anxiety     There are no diagnoses linked to this encounter  Subjective: Ranjana Streeter continues to grieve sudden loss of  5 years ago  Had been managing this better as she has been going to individual and group therapy and cultivating more time with family and friends  However, since her 8 year old mother has moved in with her and she has become her primary caregiver, these activities and social contact has stopped  Feels more anxious and increasingly sad  This has radha another life change with her and one that has been difficult to manage  Denies depressive symptoms other than feelings of sadness  Also, feels some guilt for struggling as she has  Sleep has been affected as mother is often up during the night  Has two aides that come in to help  Needs to develop time away from mother's care whether it be in or outside of home to try and offset this stress  No SI  Has very good support system  Patient ID: Chioma Manjarrez is a 76 y o  female  Met with Ranjana Streeter for 60 minutes from 3:00PM-4:00PM       Review of Systems   Psychiatric/Behavioral: The patient is nervous/anxious  Objective: Ranjana Streeter presents as anxious and overwhelmed  Tearful at points during session  She is verbal, cooperative, well oriented and engaged during session  Physical Exam  Psychiatric:         Attention and Perception: Attention and perception normal          Mood and Affect: Mood is anxious  Affect is tearful  Speech: Speech normal          Behavior: Behavior normal  Behavior is cooperative  Thought Content:  Thought content normal          Cognition and Memory: Cognition and memory normal          Judgment: Judgment normal

## 2021-07-05 ENCOUNTER — OFFICE VISIT (OUTPATIENT)
Dept: URGENT CARE | Facility: CLINIC | Age: 76
End: 2021-07-05
Payer: MEDICARE

## 2021-07-05 VITALS
HEIGHT: 65 IN | SYSTOLIC BLOOD PRESSURE: 110 MMHG | BODY MASS INDEX: 18.49 KG/M2 | HEART RATE: 56 BPM | RESPIRATION RATE: 16 BRPM | DIASTOLIC BLOOD PRESSURE: 55 MMHG | WEIGHT: 111 LBS | TEMPERATURE: 97.2 F

## 2021-07-05 DIAGNOSIS — H60.331 ACUTE SWIMMER'S EAR OF RIGHT SIDE: ICD-10-CM

## 2021-07-05 DIAGNOSIS — H61.23 BILATERAL IMPACTED CERUMEN: Primary | ICD-10-CM

## 2021-07-05 PROCEDURE — 69210 REMOVE IMPACTED EAR WAX UNI: CPT | Performed by: PHYSICIAN ASSISTANT

## 2021-07-05 PROCEDURE — 99213 OFFICE O/P EST LOW 20 MIN: CPT | Performed by: PHYSICIAN ASSISTANT

## 2021-07-05 PROCEDURE — G0463 HOSPITAL OUTPT CLINIC VISIT: HCPCS | Performed by: PHYSICIAN ASSISTANT

## 2021-07-05 RX ORDER — NEOMYCIN SULFATE, POLYMYXIN B SULFATE AND HYDROCORTISONE 10; 3.5; 1 MG/ML; MG/ML; [USP'U]/ML
4 SUSPENSION/ DROPS AURICULAR (OTIC) EVERY 8 HOURS SCHEDULED
Qty: 10 ML | Refills: 0 | Status: SHIPPED | OUTPATIENT
Start: 2021-07-05 | End: 2021-12-16 | Stop reason: ALTCHOICE

## 2021-07-05 NOTE — PATIENT INSTRUCTIONS
Cerumen Impaction   WHAT YOU NEED TO KNOW:   Cerumen impaction is the blockage of the outer ear canal by tightly packed cerumen (earwax)  It is generally treated with procedures such as flushing or suctioning the ear canal or the use of instruments to remove the impaction  DISCHARGE INSTRUCTIONS:   Medicines:  · Ear drops: These are used to soften the wax in your ear  Wax softening ear drops may be bought without a prescription  Ask your healthcare provider how often you should use this medicine  Read the instructions carefully before you use the ear drops  Do the following when you put in ear drops:     ? Warm the drops by holding the bottle in your hands for a few minutes  Cold ear drops may make you dizzy  ? Lie down with the affected ear toward the ceiling  You may also stand with your head tilted to one side  ? Pull your ear lobe up and back, and place the correct number of drops into the ear  ? Keep your ear facing up for 5 to 10 minutes so the drops coat the outer ear canal      ? Gently clean the outer part of the ear with a cotton swab  Do not  place the cotton swab or anything inside your ear canal  This increases the risk of damaging your eardrum  · Take your medicine as directed  Contact your healthcare provider if you think your medicine is not helping or if you have side effects  Tell him of her if you are allergic to any medicine  Keep a list of the medicines, vitamins, and herbs you take  Include the amounts, and when and why you take them  Bring the list or the pill bottles to follow-up visits  Carry your medicine list with you in case of an emergency  Follow up with your healthcare provider as directed:  Write down your questions so you remember to ask them during your visits  Contact your healthcare provider if:   · You have a fever  · You have trouble hearing or ringing in your ear  · You have questions about your condition or care      Return to the emergency department if:   · You feel dizzy  · You have discharge or blood coming out of your ear  · Your ear pain does not go away or gets worse  © Copyright [employer] 2018 Information is for End User's use only and may not be sold, redistributed or otherwise used for commercial purposes  All illustrations and images included in CareNotes® are the copyrighted property of A D A M [employer] Inc  or [facility] Cheli Rehman   The above information is an  only  It is not intended as medical advice for individual conditions or treatments  Talk to your doctor, nurse or pharmacist before following any medical regimen to see if it is safe and effective for you

## 2021-07-05 NOTE — PROGRESS NOTES
[facility]        NAME: Sarah Richards is a 76 y o  female  : 1945    MRN: [id number]  DATE: 2021  TIME: 11:39 AM    Assessment and Plan   Bilateral impacted cerumen [H61 23]  1  Bilateral impacted cerumen     2  Acute swimmer's ear of right side  neomycin-polymyxin-hydrocortisone (CORTISPORIN) 0 35%-10,000 units/mL-1% otic suspension         Patient Instructions     Patient Instructions     Cerumen Impaction   WHAT YOU NEED TO KNOW:   Cerumen impaction is the blockage of the outer ear canal by tightly packed cerumen (earwax)  It is generally treated with procedures such as flushing or suctioning the ear canal or the use of instruments to remove the impaction  DISCHARGE INSTRUCTIONS:   Medicines:  · Ear drops: These are used to soften the wax in your ear  Wax softening ear drops may be bought without a prescription  Ask your healthcare provider how often you should use this medicine  Read the instructions carefully before you use the ear drops  Do the following when you put in ear drops:     ? Warm the drops by holding the bottle in your hands for a few minutes  Cold ear drops may make you dizzy  ? Lie down with the affected ear toward the ceiling  You may also stand with your head tilted to one side  ? Pull your ear lobe up and back, and place the correct number of drops into the ear  ? Keep your ear facing up for 5 to 10 minutes so the drops coat the outer ear canal      ? Gently clean the outer part of the ear with a cotton swab  Do not  place the cotton swab or anything inside your ear canal  This increases the risk of damaging your eardrum  · Take your medicine as directed  Contact your healthcare provider if you think your medicine is not helping or if you have side effects  Tell him of her if you are allergic to any medicine  Keep a list of the medicines, vitamins, and herbs you take  Include the amounts, and when and why you take them   Bring the list or the pill bottles to follow-up visits  Carry your medicine list with you in case of an emergency  Follow up with your healthcare provider as directed:  Write down your questions so you remember to ask them during your visits  Contact your healthcare provider if:   · You have a fever  · You have trouble hearing or ringing in your ear  · You have questions about your condition or care  Return to the emergency department if:   · You feel dizzy  · You have discharge or blood coming out of your ear  · Your ear pain does not go away or gets worse  © Copyright [employer] 2018 Information is for End User's use only and may not be sold, redistributed or otherwise used for commercial purposes  All illustrations and images included in CareNotes® are the copyrighted property of A D A M , Inc  or [facility] Cheli Rehman   The above information is an  only  It is not intended as medical advice for individual conditions or treatments  Talk to your doctor, nurse or pharmacist before following any medical regimen to see if it is safe and effective for you  Follow up with PCP in 3-5 days  Proceed to  ER if symptoms worsen  Chief Complaint     Chief Complaint   Patient presents with    Earache     R ear started last night  Took Advil  Better today  History of Present Illness       75 y/o female presents for pain and clogged sensation in her right ear x1 day  Pt notes she has been swimming recently and her ear plugs always fall out of her right ear  She denies fever, chills, body aches, sore throat, headache, cough, dizziness, SOB or CP  Review of Systems   Review of Systems   Constitutional: Negative for chills, fatigue and fever  HENT: Positive for ear pain  Negative for congestion, ear discharge, sinus pressure, sinus pain and sore throat  Respiratory: Negative for chest tightness and shortness of breath  Cardiovascular: Negative for chest pain and palpitations  Neurological: Negative for headaches  Current Medications       Current Outpatient Medications:     aspirin (ECOTRIN LOW STRENGTH) 81 mg EC tablet, Take 1 tablet (81 mg total) by mouth daily, Disp: , Rfl:     Calcium Carb-Cholecalciferol (CALCIUM 600 + D) 600-200 MG-UNIT TABS, Calcium 600 + D TABS TAKE 1 TABLET DAILY    Refills: 0  Active, Disp: , Rfl:     cholecalciferol (VITAMIN D3) 1,000 units tablet, Take 1,000 Units by mouth daily, Disp: , Rfl:     losartan (COZAAR) 100 MG tablet, Take 1 tablet (100 mg total) by mouth daily, Disp: 30 tablet, Rfl: 11    metoprolol tartrate (LOPRESSOR) 25 mg tablet, TAKE ONE-HALF TABLET BY MOUTH EVERY 12 HOURS, Disp: 90 tablet, Rfl: 1    rosuvastatin (CRESTOR) 40 MG tablet, Take 1 tablet (40 mg total) by mouth daily, Disp: 30 tablet, Rfl: 11    magnesium 30 MG tablet, Take 30 mg by mouth 2 (two) times a day  (Patient not taking: Reported on 7/5/2021), Disp: , Rfl:     neomycin-polymyxin-hydrocortisone (CORTISPORIN) 0 35%-10,000 units/mL-1% otic suspension, Administer 4 drops to the right ear every 8 (eight) hours, Disp: 10 mL, Rfl: 0    Current Allergies     Allergies as of 07/05/2021    (No Known Allergies)            The following portions of the patient's history were reviewed and updated as appropriate: allergies, current medications, past family history, past medical history, past social history, past surgical history and problem list      Past Medical History:   Diagnosis Date    Colon polyp     Coronary artery disease     Effusion of left knee     Last assessed - 9/10/15    History of transfusion     Hyperlipidemia     Hypertension     Myocardial infarction ([facility] [address] )     Tick bite     Last assessed - 4/21/17       Past Surgical History:   Procedure Laterality Date    APPENDECTOMY      COLONOSCOPY      AR CABG, ARTERY-VEIN, FOUR N/A 1/27/2020    Procedure: CORONARY ARTERY BYPASS GRAFT (CABG) x3 VESSELS, LIMA TO LAD, AND SVG TO PLB & OM;  Surgeon: Matthew Ruiz DO;  [facility];  Service: Cardiac Surgery    MN ECHO TRANSESOPHAG R-T 2D W/PRB IMG ACQUISJ I&R N/A 1/27/2020    Procedure: TRANSESOPHAGEAL ECHOCARDIOGRAM (GET); Surgeon: Matthew Ruiz DO;  [facility];  Service: Cardiac Surgery    MN ENDOSCOPY W/VIDEO-ASST VEIN HARVEST,CABG Left 1/27/2020    Procedure: HARVEST VEIN ENDOSCOPIC ([address]); Surgeon: Matthew Ruiz DO;  [facility];  Service: Cardiac Surgery    TONSILLECTOMY      Last assessed - 4/20/17    TUBAL LIGATION      Last assessed - 4/20/17    WISDOM TOOTH EXTRACTION      Last assessed - 4/20/17       Family History   Problem Relation Age of Onset    Coronary artery disease Mother     Arthritis Mother     Other Mother         Cardiac Disorder     Transient ischemic attack Mother     Heart attack Father     Sudden death Father         SCD         Medications have been verified  Objective   /55 (Patient Position: Sitting)   Pulse 56   Temp (!) 97 2 °F (36 2 °C) (Temporal)   Resp 16   Ht 5' 5" (1 651 m)   Wt 50 3 kg (111 lb)   BMI 18 47 kg/m²          Physical Exam     Physical Exam  Vitals and nursing note reviewed  Constitutional:       Appearance: Normal appearance  HENT:      Head: Normocephalic and atraumatic  Right Ear: Decreased hearing noted  Tenderness present  There is impacted cerumen  Left Ear: Decreased hearing noted  No tenderness  There is impacted cerumen  Mouth/Throat:      Mouth: Mucous membranes are moist       Pharynx: No posterior oropharyngeal erythema  Eyes:      Extraocular Movements: Extraocular movements intact  Pupils: Pupils are equal, round, and reactive to light  Cardiovascular:      Rate and Rhythm: Normal rate and regular rhythm  Pulses: Normal pulses  Heart sounds: Normal heart sounds  Pulmonary:      Effort: Pulmonary effort is normal       Breath sounds: Normal breath sounds     Neurological:      Mental Status: She is alert and oriented to person, place, and time  Ear cerumen removal    Date/Time: 7/5/2021 6:13 PM  Performed by: Roland Alpers, PA-C  Authorized by: Roland Alpers, PA-C   Universal Protocol:  Consent: Verbal consent obtained  Consent given by: patient      Patient location:  Clinic  Procedure details:     Location:  L ear and R ear    Procedure type: irrigation with instrumentation      Instrumentation: curette      Approach:  External  Post-procedure details:     Complication:  Macerated skin (Dizziness)    Hearing quality:  Normal    Patient tolerance of procedure:   Tolerated well, no immediate complications

## 2021-08-20 DIAGNOSIS — I25.118 CORONARY ARTERY DISEASE OF NATIVE ARTERY OF NATIVE HEART WITH STABLE ANGINA PECTORIS (HCC): ICD-10-CM

## 2021-08-20 RX ORDER — ROSUVASTATIN CALCIUM 40 MG/1
TABLET, COATED ORAL
Qty: 30 TABLET | Refills: 11 | Status: SHIPPED | OUTPATIENT
Start: 2021-08-20 | End: 2022-06-02 | Stop reason: SDUPTHER

## 2021-09-30 ENCOUNTER — IMMUNIZATIONS (OUTPATIENT)
Dept: FAMILY MEDICINE CLINIC | Facility: HOSPITAL | Age: 76
End: 2021-09-30

## 2021-09-30 DIAGNOSIS — Z23 ENCOUNTER FOR IMMUNIZATION: Primary | ICD-10-CM

## 2021-09-30 PROCEDURE — 91300 SARS-COV-2 / COVID-19 MRNA VACCINE (PFIZER-BIONTECH) 30 MCG: CPT

## 2021-09-30 PROCEDURE — 0001A SARS-COV-2 / COVID-19 MRNA VACCINE (PFIZER-BIONTECH) 30 MCG: CPT

## 2021-10-06 ENCOUNTER — TELEPHONE (OUTPATIENT)
Dept: FAMILY MEDICINE CLINIC | Facility: CLINIC | Age: 76
End: 2021-10-06

## 2021-11-04 ENCOUNTER — APPOINTMENT (OUTPATIENT)
Dept: LAB | Facility: CLINIC | Age: 76
End: 2021-11-04
Payer: MEDICARE

## 2021-11-04 DIAGNOSIS — D69.6 THROMBOCYTOPENIA (HCC): ICD-10-CM

## 2021-11-04 DIAGNOSIS — E78.2 MIXED HYPERLIPIDEMIA: ICD-10-CM

## 2021-11-04 DIAGNOSIS — Z13.1 SCREENING FOR DIABETES MELLITUS: ICD-10-CM

## 2021-11-04 DIAGNOSIS — D50.8 OTHER IRON DEFICIENCY ANEMIA: ICD-10-CM
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
## 2021-11-04 LAB
ALBUMIN SERPL BCP-MCNC: 3.7 G/DL (ref 3.5–5)
ALP SERPL-CCNC: 54 U/L (ref 46–116)
ALT SERPL W P-5'-P-CCNC: 37 U/L (ref 12–78)
ANION GAP SERPL CALCULATED.3IONS-SCNC: 2 MMOL/L (ref 4–13)
AST SERPL W P-5'-P-CCNC: 43 U/L (ref 5–45)
BASOPHILS # BLD AUTO: 0.09 THOUSANDS/ΜL (ref 0–0.1)
BASOPHILS NFR BLD AUTO: 2 % (ref 0–1)
BILIRUB SERPL-MCNC: 0.56 MG/DL (ref 0.2–1)
BUN SERPL-MCNC: 20 MG/DL (ref 5–25)
CALCIUM SERPL-MCNC: 9.5 MG/DL (ref 8.3–10.1)
CHLORIDE SERPL-SCNC: 106 MMOL/L (ref 100–108)
CHOLEST SERPL-MCNC: 158 MG/DL (ref 50–200)
CO2 SERPL-SCNC: 28 MMOL/L (ref 21–32)
CREAT SERPL-MCNC: 0.73 MG/DL (ref 0.6–1.3)
EOSINOPHIL # BLD AUTO: 0.19 THOUSAND/ΜL (ref 0–0.61)
EOSINOPHIL NFR BLD AUTO: 3 % (ref 0–6)
ERYTHROCYTE [DISTWIDTH] IN BLOOD BY AUTOMATED COUNT: 13.2 % (ref 11.6–15.1)
GFR SERPL CREATININE-BSD FRML MDRD: 80 ML/MIN/1.73SQ M
GLUCOSE P FAST SERPL-MCNC: 96 MG/DL (ref 65–99)
HCT VFR BLD AUTO: 38 % (ref 34.8–46.1)
HDLC SERPL-MCNC: 76 MG/DL
HGB BLD-MCNC: 12.1 G/DL (ref 11.5–15.4)
IMM GRANULOCYTES # BLD AUTO: 0.02 THOUSAND/UL (ref 0–0.2)
IMM GRANULOCYTES NFR BLD AUTO: 0 % (ref 0–2)
LDLC SERPL CALC-MCNC: 72 MG/DL (ref 0–100)
LYMPHOCYTES # BLD AUTO: 1.36 THOUSANDS/ΜL (ref 0.6–4.47)
LYMPHOCYTES NFR BLD AUTO: 24 % (ref 14–44)
MCH RBC QN AUTO: 31.8 PG (ref 26.8–34.3)
MCHC RBC AUTO-ENTMCNC: 31.8 G/DL (ref 31.4–37.4)
MCV RBC AUTO: 100 FL (ref 82–98)
MONOCYTES # BLD AUTO: 0.53 THOUSAND/ΜL (ref 0.17–1.22)
MONOCYTES NFR BLD AUTO: 9 % (ref 4–12)
NEUTROPHILS # BLD AUTO: 3.58 THOUSANDS/ΜL (ref 1.85–7.62)
NEUTS SEG NFR BLD AUTO: 62 % (ref 43–75)
NRBC BLD AUTO-RTO: 0 /100 WBCS
PLATELET # BLD AUTO: 239 THOUSANDS/UL (ref 149–390)
PMV BLD AUTO: 12.1 FL (ref 8.9–12.7)
POTASSIUM SERPL-SCNC: 4.2 MMOL/L (ref 3.5–5.3)
PROT SERPL-MCNC: 7 G/DL (ref 6.4–8.2)
RBC # BLD AUTO: 3.8 MILLION/UL (ref 3.81–5.12)
SODIUM SERPL-SCNC: 136 MMOL/L (ref 136–145)
TRIGL SERPL-MCNC: 52 MG/DL
WBC # BLD AUTO: 5.77 THOUSAND/UL (ref 4.31–10.16)

## 2021-11-04 PROCEDURE — 36415 COLL VENOUS BLD VENIPUNCTURE: CPT

## 2021-11-04 PROCEDURE — 80061 LIPID PANEL: CPT

## 2021-11-04 PROCEDURE — 86618 LYME DISEASE ANTIBODY: CPT

## 2021-11-04 PROCEDURE — 85025 COMPLETE CBC W/AUTO DIFF WBC: CPT

## 2021-11-04 PROCEDURE — 80053 COMPREHEN METABOLIC PANEL: CPT

## 2021-11-06 LAB — B BURGDOR IGG+IGM SER-ACNC: 30

## 2021-11-10 ENCOUNTER — OFFICE VISIT (OUTPATIENT)
Dept: CARDIOLOGY CLINIC | Facility: CLINIC | Age: 76
End: 2021-11-10
Payer: MEDICARE

## 2021-11-10 VITALS
WEIGHT: 110.8 LBS | HEIGHT: 65 IN | DIASTOLIC BLOOD PRESSURE: 58 MMHG | BODY MASS INDEX: 18.46 KG/M2 | OXYGEN SATURATION: 98 % | HEART RATE: 62 BPM | SYSTOLIC BLOOD PRESSURE: 130 MMHG

## 2021-11-10 DIAGNOSIS — I10 ESSENTIAL HYPERTENSION: ICD-10-CM

## 2021-11-10 DIAGNOSIS — I25.118 CORONARY ARTERY DISEASE OF NATIVE ARTERY OF NATIVE HEART WITH STABLE ANGINA PECTORIS (HCC): Primary | ICD-10-CM

## 2021-11-10 DIAGNOSIS — Z95.1 S/P CABG X 3: ICD-10-CM

## 2021-11-10 PROBLEM — I21.4 NSTEMI (NON-ST ELEVATION MYOCARDIAL INFARCTION) (HCC): Status: RESOLVED | Noted: 2020-01-24 | Resolved: 2021-11-10

## 2021-11-10 PROCEDURE — 93000 ELECTROCARDIOGRAM COMPLETE: CPT | Performed by: INTERNAL MEDICINE

## 2021-11-10 PROCEDURE — 99214 OFFICE O/P EST MOD 30 MIN: CPT | Performed by: INTERNAL MEDICINE

## 2021-11-10 RX ORDER — LANOLIN ALCOHOL/MO/W.PET/CERES
3 CREAM (GRAM) TOPICAL
COMMUNITY

## 2021-11-29 ENCOUNTER — TELEPHONE (OUTPATIENT)
Dept: CARDIOLOGY CLINIC | Facility: CLINIC | Age: 76
End: 2021-11-29

## 2021-12-02 ENCOUNTER — OFFICE VISIT (OUTPATIENT)
Dept: CARDIOLOGY CLINIC | Facility: CLINIC | Age: 76
End: 2021-12-02
Payer: MEDICARE

## 2021-12-02 VITALS
OXYGEN SATURATION: 92 % | SYSTOLIC BLOOD PRESSURE: 154 MMHG | HEART RATE: 66 BPM | DIASTOLIC BLOOD PRESSURE: 72 MMHG | HEIGHT: 65 IN | WEIGHT: 110 LBS | BODY MASS INDEX: 18.33 KG/M2

## 2021-12-02 DIAGNOSIS — R00.2 PALPITATIONS: Primary | ICD-10-CM

## 2021-12-02 DIAGNOSIS — Z95.1 S/P CABG X 3: ICD-10-CM

## 2021-12-02 DIAGNOSIS — I25.118 CORONARY ARTERY DISEASE OF NATIVE ARTERY OF NATIVE HEART WITH STABLE ANGINA PECTORIS (HCC): ICD-10-CM

## 2021-12-02 DIAGNOSIS — E78.2 MIXED HYPERLIPIDEMIA: ICD-10-CM

## 2021-12-02 DIAGNOSIS — I48.0 PAF (PAROXYSMAL ATRIAL FIBRILLATION) (HCC): ICD-10-CM

## 2021-12-02 DIAGNOSIS — I10 ESSENTIAL HYPERTENSION: ICD-10-CM

## 2021-12-02 PROCEDURE — 93000 ELECTROCARDIOGRAM COMPLETE: CPT | Performed by: NURSE PRACTITIONER

## 2021-12-02 PROCEDURE — 99214 OFFICE O/P EST MOD 30 MIN: CPT | Performed by: NURSE PRACTITIONER

## 2021-12-15 ENCOUNTER — APPOINTMENT (OUTPATIENT)
Dept: LAB | Facility: CLINIC | Age: 76
End: 2021-12-15
Payer: MEDICARE

## 2021-12-15 DIAGNOSIS — R00.2 PALPITATIONS: ICD-10-CM

## 2021-12-15 LAB
ERYTHROCYTE [DISTWIDTH] IN BLOOD BY AUTOMATED COUNT: 13.2 % (ref 11.6–15.1)
HCT VFR BLD AUTO: 33.5 % (ref 34.8–46.1)
HGB BLD-MCNC: 10.8 G/DL (ref 11.5–15.4)
MCH RBC QN AUTO: 31.2 PG (ref 26.8–34.3)
MCHC RBC AUTO-ENTMCNC: 32.2 G/DL (ref 31.4–37.4)
MCV RBC AUTO: 97 FL (ref 82–98)
PLATELET # BLD AUTO: 261 THOUSANDS/UL (ref 149–390)
PMV BLD AUTO: 11.8 FL (ref 8.9–12.7)
RBC # BLD AUTO: 3.46 MILLION/UL (ref 3.81–5.12)
WBC # BLD AUTO: 7.16 THOUSAND/UL (ref 4.31–10.16)

## 2021-12-15 PROCEDURE — 85027 COMPLETE CBC AUTOMATED: CPT

## 2021-12-15 PROCEDURE — 36415 COLL VENOUS BLD VENIPUNCTURE: CPT

## 2021-12-16 ENCOUNTER — OFFICE VISIT (OUTPATIENT)
Dept: FAMILY MEDICINE CLINIC | Facility: CLINIC | Age: 76
End: 2021-12-16
Payer: MEDICARE

## 2021-12-16 VITALS
SYSTOLIC BLOOD PRESSURE: 90 MMHG | RESPIRATION RATE: 14 BRPM | TEMPERATURE: 98.8 F | OXYGEN SATURATION: 98 % | BODY MASS INDEX: 18.88 KG/M2 | HEART RATE: 60 BPM | HEIGHT: 64 IN | WEIGHT: 110.6 LBS | DIASTOLIC BLOOD PRESSURE: 64 MMHG

## 2021-12-16 DIAGNOSIS — I25.118 CORONARY ARTERY DISEASE OF NATIVE ARTERY OF NATIVE HEART WITH STABLE ANGINA PECTORIS (HCC): Primary | ICD-10-CM

## 2021-12-16 DIAGNOSIS — I48.0 PAF (PAROXYSMAL ATRIAL FIBRILLATION) (HCC): ICD-10-CM

## 2021-12-16 DIAGNOSIS — D50.8 OTHER IRON DEFICIENCY ANEMIA: ICD-10-CM

## 2021-12-16 DIAGNOSIS — E78.2 MIXED HYPERLIPIDEMIA: ICD-10-CM

## 2021-12-16 DIAGNOSIS — R53.83 FATIGUE, UNSPECIFIED TYPE: ICD-10-CM

## 2021-12-16 DIAGNOSIS — Z00.00 MEDICARE ANNUAL WELLNESS VISIT, SUBSEQUENT: ICD-10-CM

## 2021-12-16 PROCEDURE — 1123F ACP DISCUSS/DSCN MKR DOCD: CPT | Performed by: FAMILY MEDICINE

## 2021-12-16 PROCEDURE — 99213 OFFICE O/P EST LOW 20 MIN: CPT | Performed by: FAMILY MEDICINE

## 2021-12-16 PROCEDURE — G0439 PPPS, SUBSEQ VISIT: HCPCS | Performed by: FAMILY MEDICINE

## 2021-12-20 ENCOUNTER — CLINICAL SUPPORT (OUTPATIENT)
Dept: CARDIOLOGY CLINIC | Facility: CLINIC | Age: 76
End: 2021-12-20
Payer: MEDICARE

## 2021-12-20 DIAGNOSIS — R00.2 PALPITATIONS: ICD-10-CM

## 2021-12-20 PROCEDURE — 93244 EXT ECG>48HR<7D REV&INTERPJ: CPT | Performed by: INTERNAL MEDICINE

## 2021-12-25 DIAGNOSIS — Z95.1 S/P CABG X 3: ICD-10-CM

## 2021-12-31 ENCOUNTER — NURSE TRIAGE (OUTPATIENT)
Dept: OTHER | Facility: OTHER | Age: 76
End: 2021-12-31

## 2021-12-31 DIAGNOSIS — B34.9 VIRAL ILLNESS: Primary | ICD-10-CM

## 2022-01-04 PROCEDURE — U0003 INFECTIOUS AGENT DETECTION BY NUCLEIC ACID (DNA OR RNA); SEVERE ACUTE RESPIRATORY SYNDROME CORONAVIRUS 2 (SARS-COV-2) (CORONAVIRUS DISEASE [COVID-19]), AMPLIFIED PROBE TECHNIQUE, MAKING USE OF HIGH THROUGHPUT TECHNOLOGIES AS DESCRIBED BY CMS-2020-01-R: HCPCS | Performed by: FAMILY MEDICINE

## 2022-01-04 PROCEDURE — U0005 INFEC AGEN DETEC AMPLI PROBE: HCPCS | Performed by: FAMILY MEDICINE

## 2022-01-10 ENCOUNTER — LAB (OUTPATIENT)
Dept: LAB | Facility: CLINIC | Age: 77
End: 2022-01-10
Payer: MEDICARE

## 2022-01-10 DIAGNOSIS — R53.83 FATIGUE, UNSPECIFIED TYPE: ICD-10-CM

## 2022-01-10 DIAGNOSIS — D64.9 ANEMIA, UNSPECIFIED TYPE: ICD-10-CM

## 2022-01-10 LAB
ERYTHROCYTE [DISTWIDTH] IN BLOOD BY AUTOMATED COUNT: 13.4 % (ref 11.6–15.1)
HCT VFR BLD AUTO: 37.5 % (ref 34.8–46.1)
HGB BLD-MCNC: 11.9 G/DL (ref 11.5–15.4)
MCH RBC QN AUTO: 30.3 PG (ref 26.8–34.3)
MCHC RBC AUTO-ENTMCNC: 31.7 G/DL (ref 31.4–37.4)
MCV RBC AUTO: 95 FL (ref 82–98)
PLATELET # BLD AUTO: 259 THOUSANDS/UL (ref 149–390)
PMV BLD AUTO: 12.1 FL (ref 8.9–12.7)
RBC # BLD AUTO: 3.93 MILLION/UL (ref 3.81–5.12)
TSH SERPL DL<=0.05 MIU/L-ACNC: 0.93 UIU/ML (ref 0.36–3.74)
WBC # BLD AUTO: 6.96 THOUSAND/UL (ref 4.31–10.16)

## 2022-01-10 PROCEDURE — 84443 ASSAY THYROID STIM HORMONE: CPT

## 2022-01-10 PROCEDURE — 85027 COMPLETE CBC AUTOMATED: CPT

## 2022-01-10 PROCEDURE — 36415 COLL VENOUS BLD VENIPUNCTURE: CPT

## 2022-01-15 NOTE — RESULT ENCOUNTER NOTE
Please let the patient know that their bloodwork was normal - both the CBC done, and her thyroid levels  Thanks     Dr Rick Leon

## 2022-01-20 ENCOUNTER — HOSPITAL ENCOUNTER (OUTPATIENT)
Dept: NON INVASIVE DIAGNOSTICS | Facility: CLINIC | Age: 77
Discharge: HOME | End: 2022-01-20
Payer: MEDICARE

## 2022-01-20 ENCOUNTER — HOSPITAL ENCOUNTER (OUTPATIENT)
Dept: MAMMOGRAPHY | Facility: HOSPITAL | Age: 77
End: 2022-01-20
Attending: FAMILY MEDICINE
Payer: MEDICARE

## 2022-01-20 VITALS
BODY MASS INDEX: 18.78 KG/M2 | HEIGHT: 64 IN | DIASTOLIC BLOOD PRESSURE: 64 MMHG | HEART RATE: 60 BPM | SYSTOLIC BLOOD PRESSURE: 90 MMHG | WEIGHT: 110 LBS

## 2022-01-20 DIAGNOSIS — R00.2 PALPITATIONS: ICD-10-CM

## 2022-01-20 DIAGNOSIS — I10 ESSENTIAL HYPERTENSION: ICD-10-CM

## 2022-01-20 DIAGNOSIS — I25.118 CORONARY ARTERY DISEASE OF NATIVE ARTERY OF NATIVE HEART WITH STABLE ANGINA PECTORIS (HCC): ICD-10-CM

## 2022-01-20 LAB
AORTIC ROOT: 3.2 CM
APICAL FOUR CHAMBER EJECTION FRACTION: 80 %
ASCENDING AORTA: 3 CM
E WAVE DECELERATION TIME: 416 MS
FRACTIONAL SHORTENING: 39 % (ref 28–44)
INTERVENTRICULAR SEPTUM IN DIASTOLE (PARASTERNAL SHORT AXIS VIEW): 0.9 CM
LEFT ATRIUM AREA SYSTOLE SINGLE PLANE A4C: 8.8 CM2
LEFT ATRIUM SIZE: 3.5 CM
LEFT INTERNAL DIMENSION IN SYSTOLE: 2.2 CM (ref 2.1–4)
LEFT VENTRICULAR INTERNAL DIMENSION IN DIASTOLE: 3.6 CM (ref 3.63–5.41)
LEFT VENTRICULAR POSTERIOR WALL IN END DIASTOLE: 0.9 CM
LEFT VENTRICULAR STROKE VOLUME: 39 ML
MV E'TISSUE VEL-SEP: 10 CM/S
MV PEAK A VEL: 0.82 M/S
MV PEAK E VEL: 111 CM/S
MV STENOSIS PRESSURE HALF TIME: 0 MS
RIGHT ATRIUM AREA SYSTOLE A4C: 9.7 CM2
RIGHT VENTRICLE ID DIMENSION: 2.9 CM
SL CV LV EF: 60
SL CV PED ECHO LEFT VENTRICLE DIASTOLIC VOLUME (MOD BIPLANE) 2D: 56 ML
SL CV PED ECHO LEFT VENTRICLE SYSTOLIC VOLUME (MOD BIPLANE) 2D: 17 ML
TR MAX PG: 29 MMHG
TRICUSPID VALVE PEAK REGURGITATION VELOCITY: 2.69 M/S
Z-SCORE OF LEFT VENTRICULAR DIMENSION IN END SYSTOLE: -2.08

## 2022-01-20 PROCEDURE — 93306 TTE W/DOPPLER COMPLETE: CPT

## 2022-01-20 PROCEDURE — 93306 TTE W/DOPPLER COMPLETE: CPT | Performed by: INTERNAL MEDICINE

## 2022-01-24 ENCOUNTER — OFFICE VISIT (OUTPATIENT)
Dept: CARDIOLOGY CLINIC | Facility: CLINIC | Age: 77
End: 2022-01-24
Payer: MEDICARE

## 2022-01-24 VITALS
HEIGHT: 64 IN | OXYGEN SATURATION: 98 % | WEIGHT: 113.6 LBS | BODY MASS INDEX: 19.39 KG/M2 | DIASTOLIC BLOOD PRESSURE: 62 MMHG | HEART RATE: 60 BPM | SYSTOLIC BLOOD PRESSURE: 130 MMHG

## 2022-01-24 DIAGNOSIS — R94.31 ABNORMAL EKG: Primary | ICD-10-CM

## 2022-01-24 DIAGNOSIS — I10 ESSENTIAL HYPERTENSION: ICD-10-CM

## 2022-01-24 DIAGNOSIS — Z95.1 S/P CABG X 3: ICD-10-CM

## 2022-01-24 DIAGNOSIS — I25.118 CORONARY ARTERY DISEASE OF NATIVE ARTERY OF NATIVE HEART WITH STABLE ANGINA PECTORIS (HCC): ICD-10-CM

## 2022-01-24 DIAGNOSIS — R55 SYNCOPE, UNSPECIFIED SYNCOPE TYPE: ICD-10-CM

## 2022-01-24 PROCEDURE — 99214 OFFICE O/P EST MOD 30 MIN: CPT | Performed by: INTERNAL MEDICINE

## 2022-01-24 NOTE — PROGRESS NOTES
Cardiology Follow Up    Jagdish Alonso  1945  [id number]  [address] [facility]  [address]  [address]  [address] [address] [phone]    1  Abnormal EKG     2  Coronary artery disease of native artery of native heart with stable angina pectoris ([address] )     3  Essential hypertension     4  S/P CABG x 3     5  Syncope, unspecified syncope type         Discussion/Summary:    71-year-old female with coronary artery disease and prior CAB G  She currently denies any angina  The recent issue was concern for atrial fibrillation  It was identified symptoms of palpitations and her Apple watch indicated she may have AFib  We correlated this with a Zio patch, and while the Apple watch was indicating AFib, the Zio patch confirm that this was sinus rhythm with PACs  While she certainly could develop atrial fibrillation in the future given her underlying heart disease, I have seen no evidence of this so far  She can discontinue the Eliquis  Remain on aspirin  Heart rate is slightly low at baseline, but would continue the metoprolol  Her symptoms of PACs are not particularly bothersome, she just wanted to make sure that this was not AFib i e  Risk for stroke  I offered her discussion with other potential options in the future including a loop recorder if we had more concerns for arrhythmia down the line  Her blood pressure overall is controlled particularly when she comes office visits  Her home cuff reads a few points higher  She was previously on hydrochlorothiazide, but is not eating this since her bypass  Follow up in 3 months with EKG  Previous History:  Admitted to the [facility] with a non ST elevation in January, 2020  She had a cardiac catheterization with calcified vessels and multivessel CAD  She ultimately did underwent bypass surgery with 3 grafts  LIMA to the LAD, vein grafts to OM and Ramus    Her EF is preserved  She was started on appropriate medical therapy  She was kept on Imdur for 30 days after her bypass because the size of the LIMA was small and felt to be somewhat spasmodic, but subsequently stopped  ER visit in 10/2020 for a near syncopal episode and some symptoms which she felt were similar to her MI  Subsequent stress test unremarkable  Was feeling palpitations, and got an alert from her Apple watch saying there was atrial fibrillation  She started on Eliquis, and additional testing was done  Does not appear that this was truly AFib      Interval history:    Shortly after her last visit with me, she returned to the office because she was feeling palpitations and because her Apple watch indicated that she may be in atrial fibrillation  She was seen by the nurse practitioner  She was started on Eliquis, and his Zio Patch was arranged  She also had an echo ordered an comes today to follow-up on the results  I reviewed the EKGs on her phone  These to me, seem to be more sinus rhythm with PACs  Thankfully, we were able to capture episodes simultaneously with her watch and this Zio patch  This confirms that there was no atrial fibrillation present  This occurred on December 4th in the 9th  Her echo was normal   EF is preserved  No significant valvular disease, and atrial size was normal also to some extent arguing against any atrial fibrillation  She has not had any major bleeding, but does not appear that she really needs Eliquis      Problem List     Mixed hyperlipidemia    Coronary artery disease of native artery of native heart with stable angina pectoris [facility])    Overview Signed 1/24/2020  4:00 PM by Pop Blanco     Added automatically from request for surgery [id number]         Arthritis    Cervical myofascial pain syndrome    Cervical radiculopathy    Cervicogenic headache    Cervico-occipital neuralgia    Depression    Essential hypertension    Osteopenia of spine    Spasmodic torticollis    Laceration of occipital scalp    Other secondary scoliosis, lumbar region    NSTEMI (non-ST elevation myocardial infarction) (HCC)    Syncope    S/P CABG x 3    Anemia    Thrombocytopenia (HCC)    Hyperchloremia    Chylothorax    Screening for colon cancer    Overview Signed 4/14/2020  8:34 AM by Silva Villareal MD     Pt referred to GI for colon cancer screening  History of colon polyps        Past Medical History:   Diagnosis Date    Colon polyp     Coronary artery disease     Effusion of left knee     Last assessed - 9/10/15    History of transfusion     Hyperlipidemia     Hypertension     Myocardial infarction ([address] )     Tick bite     Last assessed - 4/21/17     Social History     Tobacco Use    Smoking status: Never Smoker    Smokeless tobacco: Never Used   Vaping Use    Vaping Use: Never used   Substance Use Topics    Alcohol use: Yes     Comment: 1 wine nightly    Drug use: No      Family History   Problem Relation Age of Onset    Coronary artery disease Mother     Arthritis Mother     Other Mother         Cardiac Disorder     Transient ischemic attack Mother     Heart attack Father    [facility] Sudden death Father         SCD     Past Surgical History:   Procedure Laterality Date    APPENDECTOMY      COLONOSCOPY      TN CABG, ARTERY-VEIN, FOUR N/A 1/27/2020    Procedure: CORONARY ARTERY BYPASS GRAFT (CABG) x3 VESSELS, LIMA TO LAD, AND SVG TO PLB & OM;  Surgeon: Eduard Blancas DO;  [facility];  Service: Cardiac Surgery    TN ECHO TRANSESOPHAG R-T 2D W/PRB IMG ACQUISJ I&R N/A 1/27/2020    Procedure: TRANSESOPHAGEAL ECHOCARDIOGRAM (GET); Surgeon: Eduard Blancas DO;  [facility];  Service: Cardiac Surgery    TN ENDOSCOPY W/VIDEO-ASST VEIN HARVEST,CABG Left 1/27/2020    Procedure: HARVEST VEIN ENDOSCOPIC ([address]);   Surgeon: Eduard Blancas DO;  [facility];  Service: Cardiac Surgery    TONSILLECTOMY      Last assessed - 4/20/17    TUBAL LIGATION      Last assessed - 4/20/17    WISDOM TOOTH EXTRACTION      Last assessed - 4/20/17       Current Outpatient Medications:     aspirin (ECOTRIN LOW STRENGTH) 81 mg EC tablet, Take 1 tablet (81 mg total) by mouth daily, Disp: , Rfl:     Calcium Carb-Cholecalciferol (CALCIUM 600 + D) 600-200 MG-UNIT TABS, Calcium 600 + D TABS TAKE 1 TABLET DAILY  Refills: 0  Active, Disp: , Rfl:     cholecalciferol (VITAMIN D3) 1,000 units tablet, Take 1,000 Units by mouth daily, Disp: , Rfl:     losartan (COZAAR) 100 MG tablet, Take 1 tablet (100 mg total) by mouth daily, Disp: 30 tablet, Rfl: 11    melatonin 3 mg, Take 3 mg by mouth daily at bedtime, Disp: , Rfl:     metoprolol tartrate (LOPRESSOR) 25 mg tablet, TAKE ONE-HALF TABLET BY MOUTH EVERY 12 HOURS, Disp: 90 tablet, Rfl: 1    rosuvastatin (CRESTOR) 40 MG tablet, TAKE ONE TABLET BY MOUTH EVERY DAY, Disp: 30 tablet, Rfl: 11  No Known Allergies    Vitals:    01/24/22 1052   BP: 130/62   BP Location: Left arm   Patient Position: Sitting   Cuff Size: Standard   Pulse: 60   SpO2: 98%   Weight: 51 5 kg (113 lb 9 6 oz)   Height: 5' 4" (1 626 m)     Vitals:    01/24/22 1052   Weight: 51 5 kg (113 lb 9 6 oz)      Height: 5' 4" (162 6 cm)   Body mass index is 19 5 kg/m²  Physical Exam:  GEN: Jagdish Alonso appears well, alert and oriented x 3, pleasant and cooperative   HEENT: pupils equal, round, and reactive to light; extraocular muscles intact  NECK: supple, no carotid bruits   HEART: regular rhythm, normal S1 and S2, no murmurs, clicks, gallops or rubs   LUNGS: clear to auscultation bilaterally; no wheezes, rales, or rhonchi   ABDOMEN: normal bowel sounds, soft, no tenderness, no distention  EXTREMITIES: peripheral pulses normal; no clubbing, cyanosis, or edema  NEURO: no focal findings   SKIN: normal without suspicious lesions on exposed skin    ROS:  Positive for shortness of breath with incline  Palpitations    Except as noted in HPI, is otherwise reviewed in detail and a 12 point review of systems is negative  ROS reviewed and is unchanged    Labs:  Lab Results   Component Value Date     03/03/2015    K 4 2 11/04/2021     11/04/2021    CREATININE 0 73 11/04/2021    BUN 20 11/04/2021    CO2 28 11/04/2021    ALT 37 11/04/2021    AST 43 11/04/2021    INR 1 01 01/24/2020    GLUF 96 11/04/2021    WBC 6 96 01/10/2022    HGB 11 9 01/10/2022    HCT 37 5 01/10/2022     01/10/2022     No results found for: CHOL  Lab Results   Component Value Date    LDLCALC 72 11/04/2021    [address] 81 05/18/2021    LDLCALC 71 11/17/2020     Lab Results   Component Value Date    HDL 76 11/04/2021    HDL 71 05/18/2021    HDL 69 11/17/2020     Lab Results   Component Value Date    TRIG 52 11/04/2021    TRIG 66 05/18/2021    TRIG 73 11/17/2020     Testing:  Echo 1/20/22:  Left Ventricle: Left ventricular cavity size is normal  The left ventricular ejection fraction is 60%  Systolic function is normal  Wall motion is normal  Diastolic function is normal     Aortic Valve: There is mild regurgitation    Mitral Valve: There is mild regurgitation    Tricuspid Valve: There is mild regurgitation  Zio patch 12/2021:  Patient had a min HR of 47 bpm, max HR of 184 bpm, and avg HR of 69  bpm  Predominant underlying rhythm was Sinus Rhythm  13  Supraventricular Tachycardia runs occurred, the run with the fastest  interval lasting 14 beats with a max rate of 184 bpm, the longest lasting  20 beats with an avg rate of 123 bpm  Supraventricular Tachycardia was  detected within +/- 45 seconds of symptomatic patient event(s)  Isolated  SVEs were rare (<1 0%), SVE Couplets were rare (<1 0%), and SVE  Triplets were rare (<1 0%)  Isolated VEs were rare (<1 0%), and no VE  Couplets or VE Triplets were present      Agree with above  Brief SVT only, no afib  Only one episode of palpitations correlated with any significant arrhythmia (frequent PACs/brief SVT)   Remaining PSVT was asymptomatic and other symptoms of palpitations were sinus rhythm  Stress Test 11/4/2020:  SUMMARY:  -  Rest ECG: Normal sinus rhythm  Normal baseline ECG  -  Stress results: Duration of exercise was 8 min and 0 sec  Maximal work rate was 10 1 METs  Target heart rate was achieved  There was resting hypertension with an appropriate blood pressure response to stress  There was no chest pain  during stress  -  ECG conclusions: The stress ECG was normal  Arrhythmia during stress: isolated atrial premature beats  -  Perfusion imaging: There were no perfusion defects   -  Gated SPECT: The calculated left ventricular ejection fraction was 82 %  Left ventricular ejection fraction was within normal limits by visual estimate  There was no left ventricular regional abnormality      IMPRESSIONS: Normal study after maximal exercise without reproduction of symptoms  Myocardial perfusion imaging was normal at rest and with stress  Left ventricular systolic function was normal     Cardiac Cath 1/24/2020:  CORONARY CIRCULATION:  LAD: The vessel was medium sized  The proximal and mid LAD is heavily calcified with diffuse disease of 70-80%  Circumflex: The vessel was medium sized and heavily calcified  Ostial circumflex: There was a discrete 85 % stenosis  Mid circumflex: There was a discrete 85 % stenosis  1st obtuse marginal: The vessel was small to medium sized  There was a tubular 50 % stenosis  2nd obtuse marginal: The vessel was small to medium sized  There was a discrete 85 % stenosis  RCA: The vessel was medium sized  Dominant  There is heavy calcification in the proximal, mid, and distal vessel  There are multiple significant lesions ( 70-90% ) seen throughout this entire area  Echo 1/2020:  LEFT VENTRICLE:  Systolic function was vigorous  Ejection fraction was estimated to be 70 %  There were no regional wall motion abnormalities    Wall thickness was at the upper limits of normal      RIGHT VENTRICLE:  The size was normal   Systolic function was normal      MITRAL VALVE:  There was mild regurgitation

## 2022-02-27 DIAGNOSIS — I10 ESSENTIAL HYPERTENSION: ICD-10-CM

## 2022-02-28 RX ORDER — LOSARTAN POTASSIUM 100 MG/1
TABLET ORAL
Qty: 30 TABLET | Refills: 11 | Status: SHIPPED | OUTPATIENT
Start: 2022-02-28

## 2022-04-02 ENCOUNTER — HOSPITAL ENCOUNTER (OUTPATIENT)
Dept: MAMMOGRAPHY | Facility: HOSPITAL | Age: 77
Discharge: HOME | End: 2022-04-02
Attending: FAMILY MEDICINE
Payer: MEDICARE

## 2022-04-02 DIAGNOSIS — Z12.31 ENCOUNTER FOR SCREENING MAMMOGRAM FOR MALIGNANT NEOPLASM OF BREAST: ICD-10-CM

## 2022-04-02 PROCEDURE — 77067 SCR MAMMO BI INCL CAD: CPT

## 2022-04-02 PROCEDURE — 77063 BREAST TOMOSYNTHESIS BI: CPT

## 2022-05-03 ENCOUNTER — TELEPHONE (OUTPATIENT)
Dept: CARDIOLOGY CLINIC | Facility: CLINIC | Age: 77
End: 2022-05-03

## 2022-05-03 DIAGNOSIS — I25.118 CORONARY ARTERY DISEASE OF NATIVE ARTERY OF NATIVE HEART WITH STABLE ANGINA PECTORIS (HCC): Primary | ICD-10-CM

## 2022-05-03 NOTE — TELEPHONE ENCOUNTER
----- Message from Clint Presser sent at 5/3/2022  9:13 AM EDT -----  Regarding: lab work  Good Morning,    Pt is scheduled in June, pt was wondering should she complete labs before appt?     Thanks,  Zaida Holbrook

## 2022-05-05 ENCOUNTER — TELEPHONE (OUTPATIENT)
Dept: OTHER | Facility: OTHER | Age: 77
End: 2022-05-05

## 2022-05-05 NOTE — TELEPHONE ENCOUNTER
Data is very mixed, and a pt can go either way  It is fine for her to get this tonight  The worry is that if a person gets vaccinated now, while the numbers are still lower, that 6+ months from now, if there is another peak, will this 2nd booster still help? It is unclear  There is absolutely nothing wrong with getting a 2nd booster at this time though /   Thanks    Bakari

## 2022-05-05 NOTE — TELEPHONE ENCOUNTER
Patient has an appointment for a 2nd COVID booster this evening at [facility]; she would like to know if Dr Castellanos Her thinks she should have this done today or wait  She was told by others that their doctors told them they should wait until Covid becomes rampant in our area and not to have booster at this time

## 2022-05-10 ENCOUNTER — TELEPHONE (OUTPATIENT)
Dept: OTHER | Facility: OTHER | Age: 77
End: 2022-05-10

## 2022-05-10 NOTE — TELEPHONE ENCOUNTER
Pt  Called for an appointment with Dr Rick Leon  She fell yesterday flat on her back and hurt her left hip and left side of her back   She will be in on 5/11 at 10am

## 2022-05-11 ENCOUNTER — OFFICE VISIT (OUTPATIENT)
Dept: FAMILY MEDICINE CLINIC | Facility: CLINIC | Age: 77
End: 2022-05-11
Payer: MEDICARE

## 2022-05-11 ENCOUNTER — HOSPITAL ENCOUNTER (OUTPATIENT)
Dept: RADIOLOGY | Facility: HOSPITAL | Age: 77
Discharge: HOME | End: 2022-05-11
Attending: FAMILY MEDICINE
Payer: MEDICARE

## 2022-05-11 VITALS
HEART RATE: 63 BPM | TEMPERATURE: 98.4 F | WEIGHT: 111.8 LBS | DIASTOLIC BLOOD PRESSURE: 62 MMHG | BODY MASS INDEX: 19.09 KG/M2 | OXYGEN SATURATION: 100 % | HEIGHT: 64 IN | RESPIRATION RATE: 14 BRPM | SYSTOLIC BLOOD PRESSURE: 100 MMHG

## 2022-05-11 DIAGNOSIS — M54.50 ACUTE BILATERAL LOW BACK PAIN WITHOUT SCIATICA: Primary | ICD-10-CM

## 2022-05-11 DIAGNOSIS — M54.50 ACUTE BILATERAL LOW BACK PAIN WITHOUT SCIATICA: ICD-10-CM

## 2022-05-11 PROCEDURE — 99213 OFFICE O/P EST LOW 20 MIN: CPT | Performed by: FAMILY MEDICINE

## 2022-05-11 PROCEDURE — 72100 X-RAY EXAM L-S SPINE 2/3 VWS: CPT

## 2022-05-11 RX ORDER — METHOCARBAMOL 500 MG/1
500 TABLET, FILM COATED ORAL 3 TIMES DAILY PRN
Qty: 40 TABLET | Refills: 1 | Status: SHIPPED | OUTPATIENT
Start: 2022-05-11

## 2022-05-11 NOTE — PROGRESS NOTES
FAMILY PRACTICE OFFICE VISIT       NAME: Jignesh Dean  AGE: 68 y o  SEX: female       : 1945        MRN: [id number]    DATE: 2022  TIME: 10:28 AM    Assessment and Plan   1  Acute bilateral low back pain without sciatica  Comments:  Pt stable  Treat with heat to the region, OTC NSAIDs PRN, stretching, Methocarbamol PRN muscle spasm  Xrays as a precaution  Orders:  -     XR spine lumbar 2 or 3 views injury; Future; Expected date: 2022  -     methocarbamol (ROBAXIN) 500 mg tablet; Take 1 tablet (500 mg total) by mouth as needed in the morning and 1 tablet (500 mg total) as needed at noon and 1 tablet (500 mg total) as needed in the evening for muscle spasms  There are no Patient Instructions on file for this visit  Chief Complaint     Chief Complaint   Patient presents with    Back Pain     Patient being seen for back pain x 3 days  S/p fall       History of Present Illness   Jignesh Dean is a 68y o -year-old female who presents with the c/o 2 days of back pain - was trying to separate her dog from another, tripped, and then fell on the left side of her lower back  No head trauma; no LOC  Possible sciatica to anterior left lower leg, mostly at night  No loss of bowel / bladder control  She tried to go to the ER, but was extremely busy, and she left 22  Taking OTC Advil  Hx of back issues prior; no hx of back surgery  Review of Systems   Review of Systems   Constitutional: Negative for activity change and fever  Musculoskeletal: Positive for back pain  Neurological:        No loss of bowel / bladder control         Active Problem List     Patient Active Problem List   Diagnosis    Arthritis    Cervical myofascial pain syndrome    Cervical radiculopathy    Cervicogenic headache    Cervico-occipital neuralgia    Depression    Mixed hyperlipidemia    Essential hypertension    Osteopenia of spine    Spasmodic torticollis    Laceration of occipital scalp    Other secondary scoliosis, lumbar region    Syncope    Coronary artery disease of native artery of native heart with stable angina pectoris (HCC)    S/P CABG x 3    Anemia    Thrombocytopenia (HCC)    Hyperchloremia    Chylothorax    Screening for colon cancer    History of colon polyps         Past Medical History:  Past Medical History:   Diagnosis Date    Colon polyp     Coronary artery disease     Effusion of left knee     Last assessed - 9/10/15    History of transfusion     Hyperlipidemia     Hypertension     Myocardial infarction ([facility] [address] )     Tick bite     Last assessed - 4/21/17       Past Surgical History:  Past Surgical History:   Procedure Laterality Date    APPENDECTOMY      COLONOSCOPY      CA CABG, ARTERY-VEIN, FOUR N/A 1/27/2020    Procedure: CORONARY ARTERY BYPASS GRAFT (CABG) x3 VESSELS, LIMA TO LAD, AND SVG TO PLB & OM;  Surgeon: Choco Arias, DO;  [facility]: [facility];  Service: Cardiac Surgery    CA ECHO TRANSESOPHAG R-T 2D W/PRB IMG ACQUISJ I&R N/A 1/27/2020    Procedure: TRANSESOPHAGEAL ECHOCARDIOGRAM (GET); Surgeon: Choco Arias, DO;  [facility];  Service: Cardiac Surgery    CA ENDOSCOPY W/VIDEO-ASST VEIN HARVEST,CABG Left 1/27/2020    Procedure: HARVEST VEIN ENDOSCOPIC ([address]); Surgeon: Choco Arias, DO;  [facility];  Service: Cardiac Surgery    TONSILLECTOMY      Last assessed - 4/20/17    TUBAL LIGATION      Last assessed - 4/20/17    WISDOM TOOTH EXTRACTION      Last assessed - 4/20/17       Family History:  Family History   Problem Relation Age of Onset    Coronary artery disease Mother     Arthritis Mother     Other Mother         Cardiac Disorder     Transient ischemic attack Mother     Heart attack Father     Sudden death Father         SCD    Cancer Daughter 52        kidney       Social History:  Social History     Socioeconomic History    Marital status:       Spouse name: Not on file    Number of children: 3  Years of education: Post Graduate    Highest education level: Not on file   Occupational History    Occupation:      Comment: P/T    Occupation: Retired     Comment: RN   Tobacco Use    Smoking status: Never Smoker    Smokeless tobacco: Never Used   Vaping Use    Vaping Use: Never used   Substance and Sexual Activity    Alcohol use: Yes     Comment: 1 wine nightly    Drug use: No    Sexual activity: Not on file   Other Topics Concern    Not on file   Social History Narrative    Living alone     Social Determinants of Health     Financial Resource Strain: Not on file   Food Insecurity: Not on file   Transportation Needs: Not on file   Physical Activity: Not on file   Stress: Not on file   Social Connections: Not on file   Intimate Partner Violence: Not on file   Housing Stability: Not on file       Objective     Vitals:    05/11/22 1007   BP: 100/62   Pulse: 63   Resp: 14   Temp: 98 4 °F (36 9 °C)   SpO2: 100%     Wt Readings from Last 3 Encounters:   05/11/22 50 7 kg (111 lb 12 8 oz)   01/24/22 51 5 kg (113 lb 9 6 oz)   01/20/22 49 9 kg (110 lb)       Physical Exam  Vitals and nursing note reviewed  Constitutional:       General: She is not in acute distress  Appearance: Normal appearance  She is not toxic-appearing or diaphoretic  HENT:      Head: Normocephalic and atraumatic  Eyes:      General: No scleral icterus  Conjunctiva/sclera: Conjunctivae normal    Musculoskeletal:      Right hip: Normal range of motion  Normal strength  Left hip: Normal range of motion  Normal strength  Legs:    Neurological:      Mental Status: She is alert and oriented to person, place, and time  Sensory: Sensation is intact  No sensory deficit  Motor: Motor function is intact  No weakness  Deep Tendon Reflexes:      Reflex Scores:       Patellar reflexes are 2+ on the right side and 2+ on the left side         Achilles reflexes are 2+ on the right side and 2+ on the left side  Comments: MS +5/5 bilateral lower extremities  Negative SLR bilaterally  Psychiatric:         Mood and Affect: Mood normal          Behavior: Behavior normal          Thought Content:  Thought content normal          Judgment: Judgment normal          Pertinent Laboratory/Diagnostic Studies:  Lab Results   Component Value Date    GLUCOSE 138 01/27/2020    BUN 20 11/04/2021    CREATININE 0 73 11/04/2021    CALCIUM 9 5 11/04/2021     03/03/2015    K 4 2 11/04/2021    CO2 28 11/04/2021     11/04/2021     Lab Results   Component Value Date    ALT 37 11/04/2021    AST 43 11/04/2021    ALKPHOS 54 11/04/2021    BILITOT 0 2 03/03/2015       Lab Results   Component Value Date    WBC 6 96 01/10/2022    HGB 11 9 01/10/2022    HCT 37 5 01/10/2022    MCV 95 01/10/2022     01/10/2022       No results found for: TSH    No results found for: CHOL  Lab Results   Component Value Date    TRIG 52 11/04/2021     Lab Results   Component Value Date    HDL 76 11/04/2021     Lab Results   Component Value Date    LDLCALC 72 11/04/2021     No results found for: HGBA1C    Results for orders placed or performed during the hospital encounter of 01/20/22   Echo complete w/ contrast if indicated   Result Value Ref Range    A4C EF 80 %    LVIDd 3 60 3 63 - 5 41 cm    LVIDS 2 20 2 1 - 4 0 cm    IVSd 0 90 cm    LVPWd 0 90 cm    FS 39 28 - 44 %    MV E' Tissue Velocity Septal 10 cm/s    E wave deceleration time 416 ms    MV Peak E Fortino 111 cm/s    MV Peak A Fortino 0 82 m/s    RVID d 2 9 cm    LA size 3 5 cm    NIGEL A4C 8 8 cm2    RAA A4C 9 7 cm2    MV stenosis pressure 1/2 time 0 ms    Triscuspid Valve Regurgitation Peak Gradient 29 0 mmHg    Ao root 3 20 cm    Asc Ao 3 cm    Tricuspid valve peak regurgitation velocity 2 69 m/s    Left ventricular stroke volume (2D) 39 00 mL    LEFT VENTRICLE SYSTOLIC VOLUME (MOD BIPLANE) 2D 17 mL    LV DIASTOLIC VOLUME (MOD BIPLANE) 2D 56 mL    ZLVIDS -2 08     LV EF 60        Orders Placed This Encounter   Procedures    XR spine lumbar 2 or 3 views injury       ALLERGIES:  No Known Allergies    Current Medications     Current Outpatient Medications   Medication Sig Dispense Refill    aspirin (ECOTRIN LOW STRENGTH) 81 mg EC tablet Take 1 tablet (81 mg total) by mouth daily      Calcium Carb-Cholecalciferol 600-200 MG-UNIT TABS Calcium 600 + D TABS  TAKE 1 TABLET DAILY  Refills: 0    Active      cholecalciferol (VITAMIN D3) 1,000 units tablet Take 1,000 Units by mouth daily      losartan (COZAAR) 100 MG tablet TAKE ONE TABLET BY MOUTH EVERY DAY 30 tablet 11    melatonin 3 mg Take 3 mg by mouth daily at bedtime      methocarbamol (ROBAXIN) 500 mg tablet Take 1 tablet (500 mg total) by mouth as needed in the morning and 1 tablet (500 mg total) as needed at noon and 1 tablet (500 mg total) as needed in the evening for muscle spasms  40 tablet 1    metoprolol tartrate (LOPRESSOR) 25 mg tablet TAKE ONE-HALF TABLET BY MOUTH EVERY 12 HOURS 90 tablet 1    rosuvastatin (CRESTOR) 40 MG tablet TAKE ONE TABLET BY MOUTH EVERY DAY 30 tablet 11     No current facility-administered medications for this visit           Health Maintenance     Health Maintenance   Topic Date Due    DTaP,Tdap,and Td Vaccines (1 - Tdap) 09/01/2018    PT PLAN OF CARE  03/25/2021    COVID-19 Vaccine (4 - Booster for Pfizer series) 01/30/2022    Fall Risk  12/16/2022    Medicare Annual Wellness Visit (AWV)  12/16/2022    BMI: Adult  01/24/2023    Breast Cancer Screening: Mammogram  04/02/2024    Colorectal Cancer Screening  06/15/2025    Hepatitis C Screening  Completed    Osteoporosis Screening  Completed    Pneumococcal Vaccine: 65+ Years  Completed    Influenza Vaccine  Completed    HIB Vaccine  Aged Out    Hepatitis B Vaccine  Aged Out    IPV Vaccine  Aged Out    Hepatitis A Vaccine  Aged Out    Meningococcal ACWY Vaccine  Aged Out    HPV Vaccine  Aged [employer] History   Administered Date(s) Administered    COVID-19 PFIZER VACCINE 0 3 ML IM 01/15/2021, 02/04/2021, 09/30/2021    Hep A, adult 11/19/2015    INFLUENZA 10/01/2012, 10/10/2013, 10/10/2013, 10/01/2014, 10/01/2014, 10/01/2015, 10/16/2015, 10/01/2017, 10/20/2017, 11/06/2019, 10/15/2020, 11/10/2021    Influenza Quadrivalent Preservative Free 3 years and older IM 10/01/2016, 10/20/2017    Influenza, high dose seasonal 0 7 mL 11/21/2018    Pneumococcal Conjugate 13-Valent 05/21/2019    Pneumococcal Polysaccharide PPV23 05/02/2011    Tdap 11/19/2015, 09/01/2018    Typhoid Live, oral 11/19/2015    Typhoid, Unspecified 11/19/2015    Zoster 07/11/2018    Zoster Vaccine Recombinant 11/27/2018          Sylvester Tsang DO

## 2022-05-16 ENCOUNTER — PATIENT MESSAGE (OUTPATIENT)
Dept: FAMILY MEDICINE CLINIC | Facility: CLINIC | Age: 77
End: 2022-05-16

## 2022-05-16 NOTE — RESULT ENCOUNTER NOTE
Please let the patient know that their lumbar xrays showed a lot of degenerative/arthritic changes, some scoliosis, but no acute findings like fractures, etc     Thanks     Dr Vega

## 2022-05-18 ENCOUNTER — OFFICE VISIT (OUTPATIENT)
Dept: FAMILY MEDICINE CLINIC | Facility: CLINIC | Age: 77
End: 2022-05-18
Payer: MEDICARE

## 2022-05-18 VITALS
OXYGEN SATURATION: 100 % | HEIGHT: 64 IN | RESPIRATION RATE: 12 BRPM | TEMPERATURE: 97 F | WEIGHT: 110.6 LBS | HEART RATE: 55 BPM | BODY MASS INDEX: 18.88 KG/M2 | DIASTOLIC BLOOD PRESSURE: 82 MMHG | SYSTOLIC BLOOD PRESSURE: 128 MMHG

## 2022-05-18 DIAGNOSIS — M54.50 ACUTE BILATERAL LOW BACK PAIN WITHOUT SCIATICA: Primary | ICD-10-CM

## 2022-05-18 PROCEDURE — 99213 OFFICE O/P EST LOW 20 MIN: CPT | Performed by: NURSE PRACTITIONER

## 2022-05-18 RX ORDER — METHYLPREDNISOLONE 4 MG/1
TABLET ORAL
Qty: 21 EACH | Refills: 0 | Status: SHIPPED | OUTPATIENT
Start: 2022-05-18 | End: 2022-06-20 | Stop reason: ALTCHOICE

## 2022-05-18 NOTE — PROGRESS NOTES
Assessment/Plan:    Acute bilateral low back pain without sciatica  2/2 fall 5/9 while walking her dog, pain continues with prn robaxin, ibuprofen  Neg SLR bilaterally, strength is intact  Will start pt on medrol dose pack and refer to PT for treatment  Pt should call with concerns  Diagnoses and all orders for this visit:    Acute bilateral low back pain without sciatica  -     methylPREDNISolone 4 MG tablet therapy pack; Use as directed on package  -     Ambulatory Referral to Physical Therapy; Future          Subjective:      Patient ID: Jovita Fowler is a 68 y o  female  Pt is a 69 yo female here for evaluation of back pain  PMH includes CAD, HTN, cervical radiculopathy, depression, HLD, CABG x 3, anemia, scoliosis  She reports a fall 5/9 while walking her dog, she landed on her left low back  Denies bruising or swelling  She reports pain bilateral low back L>R, at times pain radiates down the L leg  She does have hx of progressive weakness in the LLE, denies new onset of weakness, numbness, or tingling  Now loss of control of bowel or bladder  Pain increases with prolonged sitting and standing  She can lay comfortably on her back with her knees bent  She was seen with pcp 5/11, xr of spine performed showing no acute fx  She was prescribed robaxin, which she does not use often  She is using ibuprofen as needed  The following portions of the patient's history were reviewed and updated as appropriate: allergies, current medications, past family history, past medical history, past social history, past surgical history and problem list     Review of Systems   Constitutional: Negative for activity change and fatigue  Respiratory: Negative for shortness of breath  Cardiovascular: Negative for chest pain, palpitations and leg swelling  Genitourinary: Negative for enuresis  Musculoskeletal: Positive for back pain  Negative for arthralgias, joint swelling and myalgias     Skin: Negative for color change, rash and wound  Neurological: Positive for weakness (LLE baseline)  Negative for dizziness, numbness and headaches  Hematological: Negative for adenopathy  Objective:      /82 (BP Location: Left arm, Patient Position: Sitting, Cuff Size: Standard)   Pulse 55   Temp (!) 97 °F (36 1 °C) (Tympanic)   Resp 12   Ht 5' 4" (1 626 m)   Wt 50 2 kg (110 lb 9 6 oz)   SpO2 100%   BMI 18 98 kg/m²          Physical Exam  Vitals reviewed  Constitutional:       General: She is awake  She is not in acute distress  Appearance: Normal appearance  She is well-developed, well-groomed and normal weight  She is not ill-appearing  Cardiovascular:      Rate and Rhythm: Normal rate and regular rhythm  Pulses: Normal pulses  Heart sounds: Normal heart sounds  No murmur heard  Pulmonary:      Effort: Pulmonary effort is normal       Breath sounds: Normal breath sounds  Musculoskeletal:         General: No tenderness  Normal range of motion  Cervical back: Full passive range of motion without pain and normal range of motion  No rigidity  No muscular tenderness  Normal range of motion  Lumbar back: No swelling, deformity or tenderness  Negative right straight leg raise test and negative left straight leg raise test       Right lower leg: No edema  Left lower leg: No edema  Skin:     General: Skin is warm and dry  Findings: No erythema  Neurological:      Mental Status: She is alert and oriented to person, place, and time  Motor: Motor function is intact  Deep Tendon Reflexes: Reflexes are normal and symmetric  Psychiatric:         Attention and Perception: Attention normal          Mood and Affect: Mood normal          Speech: Speech normal          Behavior: Behavior normal  Behavior is cooperative  Thought Content:  Thought content normal          Cognition and Memory: Cognition normal          Judgment: Judgment normal

## 2022-05-18 NOTE — ASSESSMENT & PLAN NOTE
2/2 fall 5/9 while walking her dog, pain continues with prn robaxin, ibuprofen  Neg SLR bilaterally, strength is intact  Will start pt on medrol dose pack and refer to PT for treatment  Pt should call with concerns

## 2022-05-27 ENCOUNTER — TELEPHONE (OUTPATIENT)
Dept: CARDIOLOGY CLINIC | Facility: CLINIC | Age: 77
End: 2022-05-27

## 2022-05-27 NOTE — TELEPHONE ENCOUNTER
Called pt to remind her to have labwork collected before f/u appt with Dr Ag Perez on 6/2/22  Pt understood

## 2022-05-31 ENCOUNTER — APPOINTMENT (OUTPATIENT)
Dept: LAB | Facility: CLINIC | Age: 77
End: 2022-05-31
Payer: MEDICARE

## 2022-05-31 DIAGNOSIS — M54.50 CHRONIC BILATERAL LOW BACK PAIN, UNSPECIFIED WHETHER SCIATICA PRESENT: Primary | ICD-10-CM

## 2022-05-31 DIAGNOSIS — G89.29 CHRONIC BILATERAL LOW BACK PAIN, UNSPECIFIED WHETHER SCIATICA PRESENT: Primary | ICD-10-CM

## 2022-05-31 LAB
ALBUMIN SERPL BCP-MCNC: 3.6 G/DL (ref 3.5–5)
ALP SERPL-CCNC: 67 U/L (ref 46–116)
ALT SERPL W P-5'-P-CCNC: 42 U/L (ref 12–78)
ANION GAP SERPL CALCULATED.3IONS-SCNC: 2 MMOL/L (ref 4–13)
AST SERPL W P-5'-P-CCNC: 37 U/L (ref 5–45)
BASOPHILS # BLD AUTO: 0.09 THOUSANDS/ΜL (ref 0–0.1)
BASOPHILS NFR BLD AUTO: 1 % (ref 0–1)
BILIRUB SERPL-MCNC: 0.63 MG/DL (ref 0.2–1)
BUN SERPL-MCNC: 18 MG/DL (ref 5–25)
CALCIUM SERPL-MCNC: 9.5 MG/DL (ref 8.3–10.1)
CHLORIDE SERPL-SCNC: 107 MMOL/L (ref 100–108)
CHOLEST SERPL-MCNC: 159 MG/DL
CO2 SERPL-SCNC: 29 MMOL/L (ref 21–32)
CREAT SERPL-MCNC: 0.67 MG/DL (ref 0.6–1.3)
EOSINOPHIL # BLD AUTO: 0.18 THOUSAND/ΜL (ref 0–0.61)
EOSINOPHIL NFR BLD AUTO: 2 % (ref 0–6)
ERYTHROCYTE [DISTWIDTH] IN BLOOD BY AUTOMATED COUNT: 15.8 % (ref 11.6–15.1)
GFR SERPL CREATININE-BSD FRML MDRD: 85 ML/MIN/1.73SQ M
GLUCOSE P FAST SERPL-MCNC: 84 MG/DL (ref 65–99)
HCT VFR BLD AUTO: 41.3 % (ref 34.8–46.1)
HDLC SERPL-MCNC: 65 MG/DL
HGB BLD-MCNC: 13.1 G/DL (ref 11.5–15.4)
IMM GRANULOCYTES # BLD AUTO: 0.05 THOUSAND/UL (ref 0–0.2)
IMM GRANULOCYTES NFR BLD AUTO: 1 % (ref 0–2)
LDLC SERPL CALC-MCNC: 68 MG/DL (ref 0–100)
LDLC SERPL DIRECT ASSAY-MCNC: 73 MG/DL (ref 0–100)
LYMPHOCYTES # BLD AUTO: 1.62 THOUSANDS/ΜL (ref 0.6–4.47)
LYMPHOCYTES NFR BLD AUTO: 20 % (ref 14–44)
MCH RBC QN AUTO: 31.3 PG (ref 26.8–34.3)
MCHC RBC AUTO-ENTMCNC: 31.7 G/DL (ref 31.4–37.4)
MCV RBC AUTO: 99 FL (ref 82–98)
MONOCYTES # BLD AUTO: 0.68 THOUSAND/ΜL (ref 0.17–1.22)
MONOCYTES NFR BLD AUTO: 9 % (ref 4–12)
NEUTROPHILS # BLD AUTO: 5.39 THOUSANDS/ΜL (ref 1.85–7.62)
NEUTS SEG NFR BLD AUTO: 67 % (ref 43–75)
NONHDLC SERPL-MCNC: 94 MG/DL
NRBC BLD AUTO-RTO: 0 /100 WBCS
PLATELET # BLD AUTO: 251 THOUSANDS/UL (ref 149–390)
PMV BLD AUTO: 11.8 FL (ref 8.9–12.7)
POTASSIUM SERPL-SCNC: 4 MMOL/L (ref 3.5–5.3)
PROT SERPL-MCNC: 6.9 G/DL (ref 6.4–8.2)
RBC # BLD AUTO: 4.18 MILLION/UL (ref 3.81–5.12)
SODIUM SERPL-SCNC: 138 MMOL/L (ref 136–145)
TRIGL SERPL-MCNC: 128 MG/DL
WBC # BLD AUTO: 8.01 THOUSAND/UL (ref 4.31–10.16)

## 2022-05-31 PROCEDURE — 80053 COMPREHEN METABOLIC PANEL: CPT | Performed by: INTERNAL MEDICINE

## 2022-05-31 PROCEDURE — 80061 LIPID PANEL: CPT | Performed by: INTERNAL MEDICINE

## 2022-05-31 PROCEDURE — 36415 COLL VENOUS BLD VENIPUNCTURE: CPT | Performed by: INTERNAL MEDICINE

## 2022-05-31 PROCEDURE — 85025 COMPLETE CBC W/AUTO DIFF WBC: CPT | Performed by: INTERNAL MEDICINE

## 2022-05-31 PROCEDURE — 83721 ASSAY OF BLOOD LIPOPROTEIN: CPT | Performed by: INTERNAL MEDICINE

## 2022-06-01 ENCOUNTER — HOSPITAL ENCOUNTER (OUTPATIENT)
Dept: RADIOLOGY | Facility: HOSPITAL | Age: 77
Discharge: HOME | End: 2022-06-01
Payer: MEDICARE

## 2022-06-01 DIAGNOSIS — G89.29 CHRONIC BILATERAL LOW BACK PAIN, UNSPECIFIED WHETHER SCIATICA PRESENT: ICD-10-CM

## 2022-06-01 DIAGNOSIS — M54.50 CHRONIC BILATERAL LOW BACK PAIN, UNSPECIFIED WHETHER SCIATICA PRESENT: ICD-10-CM

## 2022-06-01 PROCEDURE — 72148 MRI LUMBAR SPINE W/O DYE: CPT

## 2022-06-01 PROCEDURE — G1004 CDSM NDSC: HCPCS

## 2022-06-02 ENCOUNTER — OFFICE VISIT (OUTPATIENT)
Dept: CARDIOLOGY CLINIC | Facility: CLINIC | Age: 77
End: 2022-06-02
Payer: MEDICARE

## 2022-06-02 VITALS
WEIGHT: 110 LBS | OXYGEN SATURATION: 99 % | BODY MASS INDEX: 18.78 KG/M2 | HEART RATE: 65 BPM | SYSTOLIC BLOOD PRESSURE: 110 MMHG | DIASTOLIC BLOOD PRESSURE: 58 MMHG | HEIGHT: 64 IN

## 2022-06-02 DIAGNOSIS — G89.29 CHRONIC RIGHT-SIDED LOW BACK PAIN WITH RIGHT-SIDED SCIATICA: Primary | ICD-10-CM

## 2022-06-02 DIAGNOSIS — Z95.1 S/P CABG X 3: ICD-10-CM

## 2022-06-02 DIAGNOSIS — M54.41 CHRONIC RIGHT-SIDED LOW BACK PAIN WITH RIGHT-SIDED SCIATICA: Primary | ICD-10-CM

## 2022-06-02 DIAGNOSIS — I10 ESSENTIAL HYPERTENSION: ICD-10-CM

## 2022-06-02 DIAGNOSIS — I49.1 PAC (PREMATURE ATRIAL CONTRACTION): ICD-10-CM

## 2022-06-02 DIAGNOSIS — E78.2 MIXED HYPERLIPIDEMIA: ICD-10-CM

## 2022-06-02 DIAGNOSIS — I25.118 CORONARY ARTERY DISEASE OF NATIVE ARTERY OF NATIVE HEART WITH STABLE ANGINA PECTORIS (HCC): Primary | ICD-10-CM

## 2022-06-02 PROCEDURE — 99214 OFFICE O/P EST MOD 30 MIN: CPT | Performed by: INTERNAL MEDICINE

## 2022-06-02 RX ORDER — KETOCONAZOLE 20 MG/G
CREAM TOPICAL
COMMUNITY
Start: 2022-05-24

## 2022-06-02 RX ORDER — ROSUVASTATIN CALCIUM 40 MG/1
40 TABLET, COATED ORAL DAILY
Qty: 90 TABLET | Refills: 3 | Status: SHIPPED | OUTPATIENT
Start: 2022-06-02 | End: 2022-06-23 | Stop reason: SDUPTHER

## 2022-06-02 NOTE — PROGRESS NOTES
Cardiology Follow Up    Carly Burnett  1945  [id number]  [facility] [place] [id number] [phone]    1  Coronary artery disease of native artery of native heart with stable angina pectoris (HCC)  rosuvastatin (CRESTOR) 40 MG tablet   2  Essential hypertension     3  S/P CABG x 3     4  Mixed hyperlipidemia     5  PAC (premature atrial contraction)         Discussion/Summary:    Coronary disease with prior CABG  She continues to do very well without any angina  She is on aspirin alone at this point  Blood pressure controlled with losartan and metoprolol  There was concern previously for AFib which was detected on her Apple watch, but this was eventually found not to be the case  She has mechanical fall and back pain currently  She is going to start physical therapy  Lipid panel and other blood work is very well controlled  No medication changes are recommended at this time  She is doing better with Crestor than she did with the atorvastatin previously  However, it is a little more expensive  I showed her the [employer] website, and this seems to be more affordable so she will look into this  Can follow-up with me in 1 year  Previous History:  Non ST elevation in January, 2020  She had a cardiac catheterization with calcified vessels and multivessel CAD  She ultimately did underwent bypass surgery with 3 grafts  LIMA to the LAD, vein grafts to OM and Ramus  Her EF is preserved  She was started on appropriate medical therapy  She was kept on Imdur for 30 days after her bypass because the size of the LIMA was small and felt to be somewhat spasmodic, but subsequently stopped  ER visit in 10/2020 for a near syncopal episode and some symptoms which she felt were similar to her MI  Subsequent stress test unremarkable      Was feeling palpitations, and got an alert from her Apple watch saying there was atrial fibrillation  She started on Eliquis, and additional testing was done  Does not appear that this was truly AFib when I correlated what was noted on the watch with a zio patch      Interval history:  Cardiac standpoint has been doing very well  She is no longer feeling palpitations, likely because she is not really just concerned about these anymore after the testing we did with the Zio patch  Blood pressures been controlled  Occasionally high when she checks at home, but is typically very well controlled in the office  Stable with her medications  She had a mechanical fall when she was walking her dog the other day  She took a Medrol Brian but this did not help  She has been using Advil and Tylenol as needed  She knows to try to minimize the Advil  She is going to start physical therapy soon  Blood work was done just a few days ago, and everything seems to be well controlled  Only concerned she is having is that the price of the Crestor seems to be higher than atorvastatin she was on previously  Problem List     Mixed hyperlipidemia    Coronary artery disease of native artery of native heart with stable angina pectoris [facility])    Overview Signed 1/24/2020  4:00 PM by Sue Chester     Added automatically from request for surgery [id number]         Arthritis    Cervical myofascial pain syndrome    Cervical radiculopathy    Cervicogenic headache    Cervico-occipital neuralgia    Depression    Essential hypertension    Osteopenia of spine    Spasmodic torticollis    Laceration of occipital scalp    Other secondary scoliosis, lumbar region    NSTEMI (non-ST elevation myocardial infarction) ([facility] [address] )    Syncope    S/P CABG x 3    Anemia    Thrombocytopenia (HCC)    Hyperchloremia    Chylothorax    Screening for colon cancer    Overview Signed 4/14/2020  8:34 AM by Shanta Paredes MD     Pt referred to GI for colon cancer screening           History of colon polyps        Past Medical History:   Diagnosis Date    Colon polyp     Coronary artery disease     Effusion of left knee     Last assessed - 9/10/15    History of transfusion     Hyperlipidemia     Hypertension     Myocardial infarction ([facility] [address] )     Tick bite     Last assessed - 4/21/17     Social History     Tobacco Use    Smoking status: Never Smoker    Smokeless tobacco: Never Used   Vaping Use    Vaping Use: Never used   Substance Use Topics    Alcohol use: Yes     Comment: 1 wine nightly    Drug use: No      Family History   Problem Relation Age of Onset    Coronary artery disease Mother     Arthritis Mother     Other Mother         Cardiac Disorder     Transient ischemic attack Mother     Heart attack Father     Sudden death Father         SCD    Cancer Daughter 52        kidney     Past Surgical History:   Procedure Laterality Date    APPENDECTOMY      COLONOSCOPY      [place] CABG, ARTERY-VEIN, FOUR N/A 1/27/2020    Procedure: CORONARY ARTERY BYPASS GRAFT (CABG) x3 VESSELS, LIMA TO LAD, AND SVG TO PLB & OM;  Surgeon: Choco Arias, DO;  [facility];  Service: Cardiac Surgery    [place] ECHO TRANSESOPHAG R-T 2D W/PRB IMG ACQUISJ I&R N/A 1/27/2020    Procedure: TRANSESOPHAGEAL ECHOCARDIOGRAM (GET); Surgeon: Choco Arias DO;  [facility];  Service: Cardiac Surgery    [place] ENDOSCOPY W/VIDEO-ASST VEIN HARVEST,CABG Left 1/27/2020    Procedure: HARVEST VEIN ENDOSCOPIC ([address]); Surgeon: Choco Arias DO;  [facility];  Service: Cardiac Surgery    TONSILLECTOMY      Last assessed - 4/20/17    TUBAL LIGATION      Last assessed - 4/20/17    WISDOM TOOTH EXTRACTION      Last assessed - 4/20/17       Current Outpatient Medications:     aspirin (ECOTRIN LOW STRENGTH) 81 mg EC tablet, Take 1 tablet (81 mg total) by mouth daily, Disp: , Rfl:     Calcium Carb-Cholecalciferol 600-200 MG-UNIT TABS, Calcium 600 + D TABS TAKE 1 TABLET DAILY    Refills: 0  Active, Disp: , Rfl:     cholecalciferol (VITAMIN D3) 1,000 units tablet, Take 1,000 Units by mouth daily, Disp: , Rfl:     losartan (COZAAR) 100 MG tablet, TAKE ONE TABLET BY MOUTH EVERY DAY, Disp: 30 tablet, Rfl: 11    melatonin 3 mg, Take 3 mg by mouth daily at bedtime, Disp: , Rfl:     methocarbamol (ROBAXIN) 500 mg tablet, Take 1 tablet (500 mg total) by mouth as needed in the morning and 1 tablet (500 mg total) as needed at noon and 1 tablet (500 mg total) as needed in the evening for muscle spasms  , Disp: 40 tablet, Rfl: 1    metoprolol tartrate (LOPRESSOR) 25 mg tablet, TAKE ONE-HALF TABLET BY MOUTH EVERY 12 HOURS, Disp: 90 tablet, Rfl: 1    rosuvastatin (CRESTOR) 40 MG tablet, Take 1 tablet (40 mg total) by mouth daily, Disp: 90 tablet, Rfl: 3    ketoconazole (NIZORAL) 2 % cream, APPLY TO AFFECTED AREA(S) ONCE DAILY (Patient not taking: Reported on 6/2/2022), Disp: , Rfl:     methylPREDNISolone 4 MG tablet therapy pack, Use as directed on package (Patient not taking: Reported on 6/2/2022), Disp: 21 each, Rfl: 0  No Known Allergies    Vitals:    06/02/22 1441   BP: 110/58   BP Location: Right arm   Patient Position: Sitting   Cuff Size: Standard   Pulse: 65   SpO2: 99%   Weight: 49 9 kg (110 lb)   Height: 5' 4" (1 626 m)     Vitals:    06/02/22 1441   Weight: 49 9 kg (110 lb)      Height: 5' 4" (162 6 cm)   Body mass index is 18 88 kg/m²  Physical Exam:  GEN: Yvon Mansfield appears well, alert and oriented x 3, pleasant and cooperative   HEENT: pupils equal, round, and reactive to light; extraocular muscles intact  NECK: supple, no carotid bruits   HEART: regular rhythm, normal S1 and S2, no murmurs, clicks, gallops or rubs   LUNGS: clear to auscultation bilaterally; no wheezes, rales, or rhonchi   ABDOMEN: normal bowel sounds, soft, no tenderness, no distention  EXTREMITIES: peripheral pulses normal; no clubbing, cyanosis, or edema  NEURO: no focal findings   SKIN: normal without suspicious lesions on exposed skin    ROS:  Positive for shortness of breath with incline  Palpitations  Except as noted in HPI, is otherwise reviewed in detail and a 12 point review of systems is negative  ROS reviewed and is unchanged    Labs:  Lab Results   Component Value Date     03/03/2015    K 4 0 05/31/2022     05/31/2022    CREATININE 0 67 05/31/2022    BUN 18 05/31/2022    CO2 29 05/31/2022    ALT 42 05/31/2022    AST 37 05/31/2022    INR 1 01 01/24/2020    GLUF 84 05/31/2022    WBC 8 01 05/31/2022    HGB 13 1 05/31/2022    HCT 41 3 05/31/2022     05/31/2022     No results found for: CHOL  Lab Results   Component Value Date    LDLCALC 68 05/31/2022    LDLCALC 72 11/04/2021    LDLCALC 81 05/18/2021     Lab Results   Component Value Date    HDL 65 05/31/2022    HDL 76 11/04/2021    HDL 71 05/18/2021     Lab Results   Component Value Date    TRIG 128 05/31/2022    TRIG 52 11/04/2021    TRIG 66 05/18/2021     Testing:  Echo 1/20/22:  Left Ventricle: Left ventricular cavity size is normal  The left ventricular ejection fraction is 60%  Systolic function is normal  Wall motion is normal  Diastolic function is normal     Aortic Valve: There is mild regurgitation    Mitral Valve: There is mild regurgitation    Tricuspid Valve: There is mild regurgitation  Zio patch 12/2021:  Patient had a min HR of 47 bpm, max HR of 184 bpm, and avg HR of 69  bpm  Predominant underlying rhythm was Sinus Rhythm  13  Supraventricular Tachycardia runs occurred, the run with the fastest  interval lasting 14 beats with a max rate of 184 bpm, the longest lasting  20 beats with an avg rate of 123 bpm  Supraventricular Tachycardia was  detected within +/- 45 seconds of symptomatic patient event(s)  Isolated  SVEs were rare (<1 0%), SVE Couplets were rare (<1 0%), and SVE  Triplets were rare (<1 0%)  Isolated VEs were rare (<1 0%), and no VE  Couplets or VE Triplets were present      Agree with above  Brief SVT only, no afib   Only one episode of palpitations correlated with any significant arrhythmia (frequent PACs/brief SVT)  Remaining PSVT was asymptomatic and other symptoms of palpitations were sinus rhythm  Stress Test 11/4/2020:  SUMMARY:  -  Rest ECG: Normal sinus rhythm  Normal baseline ECG  -  Stress results: Duration of exercise was 8 min and 0 sec  Maximal work rate was 10 1 METs  Target heart rate was achieved  There was resting hypertension with an appropriate blood pressure response to stress  There was no chest pain  during stress  -  ECG conclusions: The stress ECG was normal  Arrhythmia during stress: isolated atrial premature beats  -  Perfusion imaging: There were no perfusion defects   -  Gated SPECT: The calculated left ventricular ejection fraction was 82 %  Left ventricular ejection fraction was within normal limits by visual estimate  There was no left ventricular regional abnormality      IMPRESSIONS: Normal study after maximal exercise without reproduction of symptoms  Myocardial perfusion imaging was normal at rest and with stress  Left ventricular systolic function was normal     Cardiac Cath 1/24/2020:  CORONARY CIRCULATION:  LAD: The vessel was medium sized  The proximal and mid LAD is heavily calcified with diffuse disease of 70-80%  Circumflex: The vessel was medium sized and heavily calcified  Ostial circumflex: There was a discrete 85 % stenosis  Mid circumflex: There was a discrete 85 % stenosis  1st obtuse marginal: The vessel was small to medium sized  There was a tubular 50 % stenosis  2nd obtuse marginal: The vessel was small to medium sized  There was a discrete 85 % stenosis  RCA: The vessel was medium sized  Dominant  There is heavy calcification in the proximal, mid, and distal vessel  There are multiple significant lesions ( 70-90% ) seen throughout this entire area  Echo 1/2020:  LEFT VENTRICLE:  Systolic function was vigorous  Ejection fraction was estimated to be 70 %  There were no regional wall motion abnormalities    Wall thickness was at the upper limits of normal      RIGHT VENTRICLE:  The size was normal   Systolic function was normal      MITRAL VALVE:  There was mild regurgitation

## 2022-06-06 ENCOUNTER — EVALUATION (OUTPATIENT)
Dept: PHYSICAL THERAPY | Facility: CLINIC | Age: 77
End: 2022-06-06
Payer: MEDICARE

## 2022-06-06 DIAGNOSIS — M54.50 ACUTE BILATERAL LOW BACK PAIN WITHOUT SCIATICA: Primary | ICD-10-CM

## 2022-06-06 PROCEDURE — 97162 PT EVAL MOD COMPLEX 30 MIN: CPT | Performed by: PHYSICAL THERAPIST

## 2022-06-06 PROCEDURE — 97110 THERAPEUTIC EXERCISES: CPT | Performed by: PHYSICAL THERAPIST

## 2022-06-06 NOTE — PROGRESS NOTES
PT Evaluation     Today's date: 2022  Patient name: Colton Craft  : 1945  MRN: [id number]  Referring provider: RACHEL Bell  Dx:   Encounter Diagnosis     ICD-10-CM    1  Acute bilateral low back pain without sciatica  M54 50                   Assessment  Assessment details: Patient presents with signs and symptoms consistent with referring diagnosis  She presents with mechanical LBP  A Batsheva Lumbar Spine Assessment was performed with patient's symptoms most consistent with a posterior derangement (L sided/above knee) with a treatment principle of lumbar extension  Positive prognostic indicators include:  Improvement seen today, directional preference established  Negative prognostic indicators include: worsening prior to today; acute on chronic  She presents with: pain, decreased: ROM and  functional capacity  She requires skilled PT to address these deficits and restore maximal functional capacity  Thank you for this pleasant referral        Impairments: abnormal or restricted ROM, activity intolerance, lacks appropriate home exercise program and pain with function  Understanding of Dx/Px/POC: good   Prognosis: good    Goals  ST-6 weeks  1  Patient to be independent with HEP  2   Decrease pain at least 2 subjective levels  LT-12 weeks  1    Patient to voice comfort with self management of condition  2   75% or > decreased pain  3   75% or > decreased functional deficits  4   Normalize AROM of all deficit planes  7   Patient to voice understanding of activities/positions to avoid  8   Centralize symptoms        Plan  Patient would benefit from: skilled PT  Referral necessary: No  Planned modality interventions: cryotherapy  Planned therapy interventions: IADL retraining, joint mobilization, manual therapy, motor coordination training, neuromuscular re-education, patient education, postural training, self care, strengthening, stretching, therapeutic activities, therapeutic exercise, home exercise program, flexibility, ADL training, balance and body mechanics training  Frequency: 2x week  Duration in weeks: 6  Treatment plan discussed with: patient        Subjective Evaluation    History of Present Illness  Onset date: 22  Mechanism of injury: Chief Complaint: LBP    History:  Patient fell while walking her dog on 22 when dogs were engaging with each other  She fell onto her back and had an onset of pain thereafter  She was seen in the ER a week later but left as it was busy  She was seen by her PCP  She had x-rays which were negative  Pain remained so she was issued a steroid dose pack and PT was ordered  She still had not improved so she was referred to PT and Pain Management and had an MRI  She notes a prior history of R lumbar aching prior to the fall  She had therapy last year with improvement  She notes R LE weakness prior to the fall that pre-existed for a few years   Symptoms have been worse recently  She notes increased pain with coughing  She denies paresthesias or change in bowel/bladder function  She is a retired nurse  She cares for her home/yard (lives alone) and is a grandmother  PMH: MI '21, CABG x 3      Aggravating factors: Standing prolonged ( > few minutes), sitting prolonged (  > 10 minutes), bending,     Relieving factors: sidelying, meds    Functional Deficits: gardening (rising from the ground, bending), cleaning/housework   Yoga stretches    Patient Goals: have less pain, be able to do activiites I did before    Quality of life: poor    Pain  At best pain ratin  At worst pain rating: 10  Location: Low back, L hip and anterior thigh          Objective     Postural Observations  Seated posture: fair  Standing posture: fair  Correction of posture: makes symptoms worse        Active Range of Motion     Lumbar   Flexion:  with pain Restriction level: moderate  Extension:  Restriction level: maximal    Joint Play Hypomobile: L2, L3, L4, L5 and S1   Mechanical Assessment    Cervical      Thoracic      Lumbar    Standing flexion: repeated movements   Pain intensity: worse  Pain level: increased  Standing extension: repeated movements  Pain location: peripheralized  Pain intensity: worse  Lying extension: repeated movements  Pain intensity: better  Pain level: decreased    General Comments:      Lumbar Comments  (-) Slump  (-) Neuromotor Exam  (-) Hip Screen             Precautions: Hx MI      Manuals 6/6            Prone Lx PA Mobs Gr 2-4                                                    Neuro Re-Ed             Sitting: Lx Roll             "Back" account             Bending mechanics             Lifting Mechanics                                                    Ther Ex             HEP             EIL             EIL pt OP             Sust Ext             (EIS)             (EIS belt OP)                                       Ther Activity                                       Gait Training                                       Modalities

## 2022-06-09 ENCOUNTER — OFFICE VISIT (OUTPATIENT)
Dept: PHYSICAL THERAPY | Facility: CLINIC | Age: 77
End: 2022-06-09
Payer: MEDICARE

## 2022-06-09 DIAGNOSIS — M54.50 ACUTE BILATERAL LOW BACK PAIN WITHOUT SCIATICA: Primary | ICD-10-CM

## 2022-06-09 PROCEDURE — 97140 MANUAL THERAPY 1/> REGIONS: CPT | Performed by: PHYSICAL THERAPIST

## 2022-06-09 PROCEDURE — 97110 THERAPEUTIC EXERCISES: CPT | Performed by: PHYSICAL THERAPIST

## 2022-06-09 NOTE — PROGRESS NOTES
Daily Note     Today's date: 2022  Patient name: Elmarie Collet  : 1945  MRN: [id number]  Referring provider: RACHEL Chambers  Dx:   Encounter Diagnosis     ICD-10-CM    1  Acute bilateral low back pain without sciatica  M54 50                   Subjective: Pt initiated HEP, notes EIL is challenging      Objective: See treatment diary below  Discussed options for HEP: Sust ext and Qped ext vs EIL      Assessment: Tolerated treatment well  Patient had basically reoslved symtpoms post tx  Plan: Continue per plan of care        Precautions: Hx MI      Manuals            Prone Lx PA Mobs Gr 2-4  8'                                                  Neuro Re-Ed             Sitting: Lx Roll             "Back" account             Bending mechanics             Lifting Mechanics                                                    Ther Ex             HEP             EIL  10           EIL pt OP             Sust Ext  8           (EIS)  20           (EIS belt OP)  20           Qped Lx Ex  20           Standing Hip Ext  B 20           Ther Activity                                       Gait Training                                       Modalities

## 2022-06-13 ENCOUNTER — OFFICE VISIT (OUTPATIENT)
Dept: PHYSICAL THERAPY | Facility: CLINIC | Age: 77
End: 2022-06-13
Payer: MEDICARE

## 2022-06-13 DIAGNOSIS — M54.50 ACUTE BILATERAL LOW BACK PAIN WITHOUT SCIATICA: Primary | ICD-10-CM

## 2022-06-13 PROCEDURE — 97140 MANUAL THERAPY 1/> REGIONS: CPT | Performed by: PHYSICAL THERAPIST

## 2022-06-13 PROCEDURE — 97110 THERAPEUTIC EXERCISES: CPT | Performed by: PHYSICAL THERAPIST

## 2022-06-13 NOTE — PROGRESS NOTES
Daily Note     Today's date: 2022  Patient name: Jignesh Dean  : 1945  MRN: [id number]  Referring provider: RACHEL Simpson  Dx: No diagnosis found  Subjective: Pt generally feeling much better but had increased pain over the weekend  Prone extension exercises very helpful but transfers in/out of position challenging for her      Objective: See treatment diary below      Assessment: Tolerated treatment well  Patient responding to extension based POC but better improvement with unweighted extension vs standing      Plan: Continue per plan of care        Precautions: Hx MI      Manuals           Prone Lx PA Mobs Gr 2-4  8' 8                                                 Neuro Re-Ed             Sitting: Lx Roll             "Back" account             Bending mechanics             Lifting Mechanics                                                    Ther Ex             HEP             EIL  10 10          EIL pt OP             Sust Ext  8 8          (EIS)  20 20          (EIS belt OP)  20 20          Qped Lx Ex  20           Standing Hip Ext  B 20           Ther Activity                                       Gait Training                                       Modalities

## 2022-06-16 ENCOUNTER — OFFICE VISIT (OUTPATIENT)
Dept: PHYSICAL THERAPY | Facility: CLINIC | Age: 77
End: 2022-06-16
Payer: MEDICARE

## 2022-06-16 DIAGNOSIS — M54.50 ACUTE BILATERAL LOW BACK PAIN WITHOUT SCIATICA: Primary | ICD-10-CM

## 2022-06-16 PROCEDURE — 97140 MANUAL THERAPY 1/> REGIONS: CPT | Performed by: PHYSICAL THERAPIST

## 2022-06-16 PROCEDURE — 97110 THERAPEUTIC EXERCISES: CPT | Performed by: PHYSICAL THERAPIST

## 2022-06-16 NOTE — PROGRESS NOTES
Daily Note     Today's date: 2022  Patient name: Cuong Navarrete  : 1945  MRN: [id number]  Referring provider: RACHEL Barrera  Dx:   Encounter Diagnosis     ICD-10-CM    1  Acute bilateral low back pain without sciatica  M54 50                   Subjective: ~50% overall improvement since beginning PT    Objective: See treatment diary below      Assessment: Tolerated treatment well  Patient continues to respond to extension based POC      Plan: Continue per plan of care        Precautions: Hx MI      Manuals          Prone Lx PA Mobs Gr 2-4  8' 8 8                                                Neuro Re-Ed             Sitting: Lx Roll             "Back" account             Bending mechanics             Lifting Mechanics                                                    Ther Ex             HEP             EIL  10 10          EIL pt OP             Sust Ext  8 8          (EIS)  20 20 20         (EIS belt OP)  20 20 20         Qped Lx Ex  20  20         Standing Hip Ext  B 20  B 20         Ther Activity                                       Gait Training                                       Modalities

## 2022-06-20 ENCOUNTER — OFFICE VISIT (OUTPATIENT)
Dept: FAMILY MEDICINE CLINIC | Facility: CLINIC | Age: 77
End: 2022-06-20
Payer: MEDICARE

## 2022-06-20 ENCOUNTER — OFFICE VISIT (OUTPATIENT)
Dept: PHYSICAL THERAPY | Facility: CLINIC | Age: 77
End: 2022-06-20
Payer: MEDICARE

## 2022-06-20 VITALS
BODY MASS INDEX: 19.53 KG/M2 | SYSTOLIC BLOOD PRESSURE: 122 MMHG | OXYGEN SATURATION: 100 % | DIASTOLIC BLOOD PRESSURE: 70 MMHG | TEMPERATURE: 98.5 F | RESPIRATION RATE: 14 BRPM | HEIGHT: 64 IN | HEART RATE: 66 BPM | WEIGHT: 114.4 LBS

## 2022-06-20 DIAGNOSIS — E55.9 VITAMIN D DEFICIENCY: ICD-10-CM

## 2022-06-20 DIAGNOSIS — M54.50 ACUTE BILATERAL LOW BACK PAIN WITHOUT SCIATICA: Primary | ICD-10-CM

## 2022-06-20 DIAGNOSIS — Z13.1 SCREENING FOR DIABETES MELLITUS: ICD-10-CM

## 2022-06-20 DIAGNOSIS — E78.2 MIXED HYPERLIPIDEMIA: ICD-10-CM

## 2022-06-20 DIAGNOSIS — D50.8 OTHER IRON DEFICIENCY ANEMIA: ICD-10-CM

## 2022-06-20 DIAGNOSIS — I25.118 CORONARY ARTERY DISEASE OF NATIVE ARTERY OF NATIVE HEART WITH STABLE ANGINA PECTORIS (HCC): ICD-10-CM

## 2022-06-20 PROCEDURE — 99213 OFFICE O/P EST LOW 20 MIN: CPT | Performed by: FAMILY MEDICINE

## 2022-06-20 PROCEDURE — 97110 THERAPEUTIC EXERCISES: CPT | Performed by: PHYSICAL THERAPIST

## 2022-06-20 PROCEDURE — 97140 MANUAL THERAPY 1/> REGIONS: CPT | Performed by: PHYSICAL THERAPIST

## 2022-06-20 NOTE — PROGRESS NOTES
Daily Note     Today's date: 2022  Patient name: Britta Browning  : 1945  MRN: [id number]  Referring provider: RACHEL Montes De Oca  Dx:   Encounter Diagnosis     ICD-10-CM    1  Acute bilateral low back pain without sciatica  M54 50                   Subjective: Back continues to feel better- ~60% (or more) improvement  General soreness/pain today but nothing sharp      Objective: See treatment diary below      Assessment: Tolerated treatment well  Patient would benefit from continued PT  Patietn resolving derangement with lumbar extension      Plan: Continue per plan of care        Precautions: Hx MI      Manuals         Prone Lx PA Mobs Gr 2-4  8' 8 8 8'                                               Neuro Re-Ed             Sitting: Lx Roll             "Back" account             Bending mechanics             Lifting Mechanics                                                    Ther Ex             HEP             EIL  10 10          EIL pt OP             Sust Ext  8 8          (EIS)  20 20 20 20        (EIS belt OP)  20 20 20 20        Qped Lx Ex  20  20 20         Standing Hip Ext  B 20  B 20 B 20        Ther Activity                                       Gait Training                                       Modalities

## 2022-06-20 NOTE — PROGRESS NOTES
FAMILY PRACTICE OFFICE VISIT       NAME: Vito Hewitt  AGE: 68 y o  SEX: female       : 1945        MRN: [id number]    DATE: 2022  TIME: 1:29 PM    Assessment and Plan   1  Acute bilateral low back pain without sciatica  Comments:  Venkat Boles is stable on exam - overall, her back has improved  Continues to work with PT  Given findings on her MRI, did recommend she sees Pain Mgmnt as planned  2  Coronary artery disease of native artery of native heart with stable angina pectoris ([address] )  Comments:  Stable; on Rosuvastatin, Metoprolol, ASA, and Losartan  Continues f/u with Cardiology  3  Mixed hyperlipidemia  Comments:  Controlled with Rosuvastatin  Orders:  -     Lipid Panel with Direct LDL reflex; Future    4  Other iron deficiency anemia  Comments:  Hx of - repeating FBW prior to next OV, with CBC incl  Orders:  -     CBC and differential; Future    5  Screening for diabetes mellitus  -     Comprehensive metabolic panel; Future    6  Vitamin D deficiency  Comments:  Hx of - checking Vit D level with next labs  Orders:  -     Vitamin D 25 hydroxy; Future         There are no Patient Instructions on file for this visit  Chief Complaint     Chief Complaint   Patient presents with    Follow-up     Patient being seen for 6 month follow up        History of Present Illness   Vito Hewitt is a 68y o -year-old female who presents in f/u  Working with PT regularly for her lower back; we discussed her recent MRI  Slowly getting a little better  Will be seeing Pain Management next month  Trying to make an appt with Neurosurgery as well  Continues f/u with Cardiology as well  Recent labs discussed  Review of Systems   Review of Systems   Constitutional: Negative for activity change  Respiratory: Negative for shortness of breath  Cardiovascular: Negative for chest pain  Musculoskeletal: Positive for back pain         Active Problem List     Patient Active Problem List   Diagnosis    Arthritis    Cervical myofascial pain syndrome    Cervical radiculopathy    Cervicogenic headache    Cervico-occipital neuralgia    Depression    Mixed hyperlipidemia    Essential hypertension    Osteopenia of spine    Spasmodic torticollis    Laceration of occipital scalp    Other secondary scoliosis, lumbar region    Syncope    Coronary artery disease of native artery of native heart with stable angina pectoris (HCC)    S/P CABG x 3    Anemia    Thrombocytopenia (HCC)    Hyperchloremia    Chylothorax    Screening for colon cancer    History of colon polyps    Acute bilateral low back pain without sciatica    PAC (premature atrial contraction)         Past Medical History:  Past Medical History:   Diagnosis Date    Colon polyp     Coronary artery disease     Effusion of left knee     Last assessed - 9/10/15    History of transfusion     Hyperlipidemia     Hypertension     Myocardial infarction [address] [address] )     Tick bite     Last assessed - 4/21/17       Past Surgical History:  Past Surgical History:   Procedure Laterality Date    APPENDECTOMY      COLONOSCOPY      ID CABG, ARTERY-VEIN, FOUR N/A 1/27/2020    Procedure: CORONARY ARTERY BYPASS GRAFT (CABG) x3 VESSELS, LIMA TO LAD, AND SVG TO PLB & OM;  Surgeon: Quinn Qureshi DO;  [facility];  Service: Cardiac Surgery    ID ECHO TRANSESOPHAG R-T 2D W/PRB IMG ACQUISJ I&R N/A 1/27/2020    Procedure: TRANSESOPHAGEAL ECHOCARDIOGRAM (GET); Surgeon: Quinn Qureshi DO;  [facility];  Service: Cardiac Surgery    ID ENDOSCOPY W/VIDEO-ASST VEIN HARVEST,CABG Left 1/27/2020    Procedure: HARVEST VEIN ENDOSCOPIC ([address]);   Surgeon: Quinn Qureshi DO;  [facility];  Service: Cardiac Surgery    TONSILLECTOMY      Last assessed - 4/20/17    TUBAL LIGATION      Last assessed - 4/20/17    WISDOM TOOTH EXTRACTION      Last assessed - 4/20/17       Family History:  Family History   Problem Relation Age of Onset    Coronary artery disease Mother     Arthritis Mother     Other Mother         Cardiac Disorder     Transient ischemic attack Mother     Heart attack Father    Allie Watt Sudden death Father         SCD    Cancer Daughter 52        kidney       Social History:  Social History     Socioeconomic History    Marital status:      Spouse name: Not on file    Number of children: 3    Years of education: Post Graduate    Highest education level: Not on file   Occupational History    Occupation:      Comment: P/T    Occupation: Retired     Comment: RN   Tobacco Use    Smoking status: Never Smoker    Smokeless tobacco: Never Used   Vaping Use    Vaping Use: Never used   Substance and Sexual Activity    Alcohol use: Yes     Comment: 1 wine nightly    Drug use: No    Sexual activity: Not on file   Other Topics Concern    Not on file   Social History Narrative    Living alone     Social Determinants of Health     Financial Resource Strain: Not on file   Food Insecurity: Not on file   Transportation Needs: Not on file   Physical Activity: Not on file   Stress: Not on file   Social Connections: Not on file   Intimate Partner Violence: Not on file   Housing Stability: Not on file       Objective     Vitals:    06/20/22 1042   BP: 122/70   Pulse: 66   Resp: 14   Temp: 98 5 °F (36 9 °C)   SpO2: 100%     Wt Readings from Last 3 Encounters:   06/20/22 51 9 kg (114 lb 6 4 oz)   06/02/22 49 9 kg (110 lb)   05/18/22 50 2 kg (110 lb 9 6 oz)       Physical Exam  Vitals and nursing note reviewed  Constitutional:       General: She is not in acute distress  Appearance: Normal appearance  She is not ill-appearing, toxic-appearing or diaphoretic  HENT:      Head: Normocephalic and atraumatic  Eyes:      General: No scleral icterus  Conjunctiva/sclera: Conjunctivae normal    Cardiovascular:      Rate and Rhythm: Normal rate and regular rhythm  Heart sounds: Normal heart sounds  No murmur heard      No friction rub  No gallop  Pulmonary:      Effort: Pulmonary effort is normal  No respiratory distress  Breath sounds: Normal breath sounds  No stridor  No wheezing, rhonchi or rales  Musculoskeletal:      Cervical back: Normal range of motion and neck supple  No rigidity or tenderness  Back:    Lymphadenopathy:      Cervical: No cervical adenopathy  Neurological:      Mental Status: She is alert and oriented to person, place, and time  Psychiatric:         Mood and Affect: Mood normal          Behavior: Behavior normal          Thought Content:  Thought content normal          Judgment: Judgment normal          Pertinent Laboratory/Diagnostic Studies:  Lab Results   Component Value Date    GLUCOSE 138 01/27/2020    BUN 18 05/31/2022    CREATININE 0 67 05/31/2022    CALCIUM 9 5 05/31/2022     03/03/2015    K 4 0 05/31/2022    CO2 29 05/31/2022     05/31/2022     Lab Results   Component Value Date    ALT 42 05/31/2022    AST 37 05/31/2022    ALKPHOS 67 05/31/2022    BILITOT 0 2 03/03/2015       Lab Results   Component Value Date    WBC 8 01 05/31/2022    HGB 13 1 05/31/2022    HCT 41 3 05/31/2022    MCV 99 (H) 05/31/2022     05/31/2022       No results found for: TSH    No results found for: CHOL  Lab Results   Component Value Date    TRIG 128 05/31/2022     Lab Results   Component Value Date    HDL 65 05/31/2022     Lab Results   Component Value Date    LDLCALC 68 05/31/2022     No results found for: HGBA1C    Results for orders placed or performed in visit on 05/03/22   CBC and differential   Result Value Ref Range    WBC 8 01 4 31 - 10 16 Thousand/uL    RBC 4 18 3 81 - 5 12 Million/uL    Hemoglobin 13 1 11 5 - 15 4 g/dL    Hematocrit 41 3 34 8 - 46 1 %    MCV 99 (H) 82 - 98 fL    MCH 31 3 26 8 - 34 3 pg    MCHC 31 7 31 4 - 37 4 g/dL    RDW 15 8 (H) 11 6 - 15 1 %    MPV 11 8 8 9 - 12 7 fL    Platelets 783 140 - 570 Thousands/uL    nRBC 0 /100 WBCs    Neutrophils Relative 67 43 - 75 %    Immat GRANS % 1 0 - 2 %    Lymphocytes Relative 20 14 - 44 %    Monocytes Relative 9 4 - 12 %    Eosinophils Relative 2 0 - 6 %    Basophils Relative 1 0 - 1 %    Neutrophils Absolute 5 39 1 85 - 7 62 Thousands/µL    Immature Grans Absolute 0 05 0 00 - 0 20 Thousand/uL    Lymphocytes Absolute 1 62 0 60 - 4 47 Thousands/µL    Monocytes Absolute 0 68 0 17 - 1 22 Thousand/µL    Eosinophils Absolute 0 18 0 00 - 0 61 Thousand/µL    Basophils Absolute 0 09 0 00 - 0 10 Thousands/µL   Comprehensive metabolic panel   Result Value Ref Range    Sodium 138 136 - 145 mmol/L    Potassium 4 0 3 5 - 5 3 mmol/L    Chloride 107 100 - 108 mmol/L    CO2 29 21 - 32 mmol/L    ANION GAP 2 (L) 4 - 13 mmol/L    BUN 18 5 - 25 mg/dL    Creatinine 0 67 0 60 - 1 30 mg/dL    Glucose, Fasting 84 65 - 99 mg/dL    Calcium 9 5 8 3 - 10 1 mg/dL    AST 37 5 - 45 U/L    ALT 42 12 - 78 U/L    Alkaline Phosphatase 67 46 - 116 U/L    Total Protein 6 9 6 4 - 8 2 g/dL    Albumin 3 6 3 5 - 5 0 g/dL    Total Bilirubin 0 63 0 20 - 1 00 mg/dL    eGFR 85 ml/min/1 73sq m   Lipid panel   Result Value Ref Range    Cholesterol 159 See Comment mg/dL    Triglycerides 128 See Comment mg/dL    HDL, Direct 65 >=50 mg/dL    LDL Calculated 68 0 - 100 mg/dL    Non-HDL-Chol (CHOL-HDL) 94 mg/dl   LDL cholesterol, direct   Result Value Ref Range    LDL Direct 73 0 - 100 mg/dl       Orders Placed This Encounter   Procedures    Comprehensive metabolic panel    Lipid Panel with Direct LDL reflex    CBC and differential    Vitamin D 25 hydroxy       ALLERGIES:  No Known Allergies    Current Medications     Current Outpatient Medications   Medication Sig Dispense Refill    aspirin (ECOTRIN LOW STRENGTH) 81 mg EC tablet Take 1 tablet (81 mg total) by mouth daily      cholecalciferol (VITAMIN D3) 1,000 units tablet Take 1,000 Units by mouth daily      losartan (COZAAR) 100 MG tablet TAKE ONE TABLET BY MOUTH EVERY DAY 30 tablet 11    melatonin 3 mg Take 3 mg by mouth daily at bedtime      metoprolol tartrate (LOPRESSOR) 25 mg tablet TAKE ONE-HALF TABLET BY MOUTH EVERY 12 HOURS 90 tablet 1    rosuvastatin (CRESTOR) 40 MG tablet Take 1 tablet (40 mg total) by mouth daily 90 tablet 3    Calcium Carb-Cholecalciferol 600-200 MG-UNIT TABS Calcium 600 + D TABS  TAKE 1 TABLET DAILY  Refills: 0    Active      ketoconazole (NIZORAL) 2 % cream APPLY TO AFFECTED AREA(S) ONCE DAILY (Patient not taking: No sig reported)      methocarbamol (ROBAXIN) 500 mg tablet Take 1 tablet (500 mg total) by mouth as needed in the morning and 1 tablet (500 mg total) as needed at noon and 1 tablet (500 mg total) as needed in the evening for muscle spasms  (Patient not taking: Reported on 6/20/2022) 40 tablet 1     No current facility-administered medications for this visit           Health Maintenance     Health Maintenance   Topic Date Due    Falls: Plan of Care  Never done    COVID-19 Vaccine (4 - Booster for Sifuentes Peter series) 01/30/2022    PT PLAN OF CARE  07/06/2022    Medicare Annual Wellness Visit (AWV)  12/16/2022    Fall Risk  06/06/2023    BMI: Adult  06/20/2023    Breast Cancer Screening: Mammogram  04/02/2024    Colorectal Cancer Screening  06/15/2025    DTaP,Tdap,and Td Vaccines (3 - Td or Tdap) 09/01/2028    Hepatitis C Screening  Completed    Osteoporosis Screening  Completed    Pneumococcal Vaccine: 65+ Years  Completed    Influenza Vaccine  Completed    HIB Vaccine  Aged Out    Hepatitis B Vaccine  Aged Out    IPV Vaccine  Aged Out    Hepatitis A Vaccine  Aged Out    Meningococcal ACWY Vaccine  Aged Out    HPV Vaccine  Aged Out     Immunization History   Administered Date(s) Administered    COVID-19 PFIZER VACCINE 0 3 ML IM 01/15/2021, 02/04/2021, 09/30/2021    Hep A, adult 11/19/2015    INFLUENZA 10/01/2012, 10/10/2013, 10/10/2013, 10/01/2014, 10/01/2014, 10/01/2015, 10/16/2015, 10/01/2017, 10/20/2017, 11/06/2019, 10/15/2020, 11/10/2021    Influenza Quadrivalent Preservative Free 3 years and older IM 10/01/2016, 10/20/2017    Influenza, high dose seasonal 0 7 mL 11/21/2018    Pneumococcal Conjugate 13-Valent 05/21/2019    Pneumococcal Polysaccharide PPV23 05/02/2011    Tdap 11/19/2015, 09/01/2018    Typhoid Live, oral 11/19/2015    Typhoid, Unspecified 11/19/2015    Zoster 07/11/2018    Zoster Vaccine Recombinant 11/27/2018          Sylvester Tsang DO

## 2022-06-23 ENCOUNTER — OFFICE VISIT (OUTPATIENT)
Dept: PHYSICAL THERAPY | Facility: CLINIC | Age: 77
End: 2022-06-23
Payer: MEDICARE

## 2022-06-23 DIAGNOSIS — I25.118 CORONARY ARTERY DISEASE OF NATIVE ARTERY OF NATIVE HEART WITH STABLE ANGINA PECTORIS (HCC): ICD-10-CM

## 2022-06-23 DIAGNOSIS — M54.50 ACUTE BILATERAL LOW BACK PAIN WITHOUT SCIATICA: Primary | ICD-10-CM

## 2022-06-23 PROCEDURE — 97140 MANUAL THERAPY 1/> REGIONS: CPT | Performed by: PHYSICAL THERAPIST

## 2022-06-23 PROCEDURE — 97161 PT EVAL LOW COMPLEX 20 MIN: CPT | Performed by: PHYSICAL THERAPIST

## 2022-06-23 PROCEDURE — 97110 THERAPEUTIC EXERCISES: CPT | Performed by: PHYSICAL THERAPIST

## 2022-06-23 RX ORDER — ROSUVASTATIN CALCIUM 40 MG/1
40 TABLET, COATED ORAL DAILY
Qty: 90 TABLET | Refills: 3 | Status: SHIPPED | OUTPATIENT
Start: 2022-06-23

## 2022-06-23 NOTE — PROGRESS NOTES
PT Evaluation     Today's date: 2022  Patient name: Candis Clifford  : 1945  MRN: [id number]  Referring provider: RACHEL Aguilar  Dx:   Encounter Diagnosis     ICD-10-CM    1  Acute bilateral low back pain without sciatica  M[age]                   Assessment  Assessment details: Patient presents with signs and symptoms consistent with a L sided cervical posterior derangement   Her symptoms were reduced with repeated cervical retraction  Positive prognostic indicators include:  Improvement seen today, directional preference established  Negative prognostic indicators include: worsening prior to today; She presents with: pain, decreased: ROM and  functional capacity  She requires skilled PT to address these deficits and restore maximal functional capacity  She is currently being seen for LBP which is progressing well  She will likely be DC from this next week, but has a new c/o neck pain  Please consider signing this POC if you concur with her receiving PT for her neck pain  Thank you! Impairments: abnormal or restricted ROM, activity intolerance, lacks appropriate home exercise program and pain with function  Understanding of Dx/Px/POC: good   Prognosis: good    Goals  ST-6 weeks  1  Patient to be independent with HEP  2   Decrease pain at least 2 subjective levels  LT-12 weeks  1    Patient to voice comfort with self management of condition  2   75% or > decreased pain  3   75% or > decreased functional deficits  4   Normalize AROM of all deficit planes  7   Patient to voice understanding of activities/positions to avoid  8   Centralize symptoms        Plan  Patient would benefit from: skilled PT  Referral necessary: No  Planned modality interventions: cryotherapy  Planned therapy interventions: IADL retraining, joint mobilization, manual therapy, motor coordination training, neuromuscular re-education, patient education, postural training, self care, strengthening, stretching, therapeutic activities, therapeutic exercise, home exercise program, flexibility, ADL training, balance and body mechanics training  Frequency: 2x week  Duration in weeks: 6  Treatment plan discussed with: patient        Subjective Evaluation    History of Present Illness  Onset date: Neck: 1 week ago  Mechanism of injury: Chief Complaint: LBP, L sided neck pain    Patient notes insidious onset of L sided neck pain beginning about a year ago  She denies imaging or prior treatment  Symptoms have been worsening  PMH: MI '24, CABG x 3      Aggravating factors:  At night (sleeping), L Rotation, neck flexion,     Relieving factors: heat, advil    Functional Deficits: sleeping    Patient Goals: have less pain, be able to do activiites I did before    Quality of life: poor    Pain  At best pain ratin  At worst pain rating: 10  Location: L neck          Objective     Postural Observations  Seated posture: fair  Standing posture: fair  Correction of posture: makes symptoms worse        Active Range of Motion   Cervical/Thoracic Spine       Cervical    Subcranial retraction:   Restriction level: moderate  Flexion:  with pain Restriction level: moderate  Extension:  with pain Restriction level: moderate  Left lateral flexion:  with pain Restriction level: moderate  Right lateral flexion:  Restriction level minimal  Left rotation:  with pain Restriction level: moderate  Right rotation:  Restriction level: minimal    Lumbar   Flexion:  with pain Restriction level: moderate  Extension:  Restriction level: maximal    Joint Play     Hypomobile: L2, L3, L4, L5 and S1   Mechanical Assessment    Cervical    Seated retraction: repeated movements   Pain intensity: better  Pain level: decreased  Seated right sidebend: repeated movements  Pain location: no change    Thoracic      Lumbar    Standing flexion: repeated movements   Pain intensity: worse  Pain level: increased  Standing extension: repeated movements  Pain location: peripheralized  Pain intensity: worse  Lying extension: repeated movements  Pain intensity: better  Pain level: decreased    General Comments:      Lumbar Comments  LUMBAR MEASURES NOT RETESTED TODAY-CERVICAL EVALUATION    (-) Slump  (-) Neuromotor Exam  (-) Hip Screen    Cervical/Thoracic Comments  (-) UQS  (-) Neurmotor Exam             Precautions: Hx MI         Manuals 6/6 6/9 6/13 6/16 6/20 6/23       Prone Lx PA Mobs Gr 2-4  8' 8 8 8' 8                                 Cx IIE      C       Neuro Re-Ed             Sitting: Lx Roll      C       "Back" account      C       Bending mechanics      C       Lifting Mechanics                                                    Ther Ex             HEP             EIL  10 10          EIL pt OP             Sust Ext  8 8          (EIS)  20 20 20 20 20       (EIS belt OP)  20 20 20 20 20       Qped Lx Ex  20  20 20  20       Standing Hip Ext  B 20  B 20 B 20 B 20                                                                        Ther Activity                                       Gait Training                                       Modalities

## 2022-06-30 ENCOUNTER — OFFICE VISIT (OUTPATIENT)
Dept: PHYSICAL THERAPY | Facility: CLINIC | Age: 77
End: 2022-06-30
Payer: MEDICARE

## 2022-06-30 DIAGNOSIS — M54.50 ACUTE BILATERAL LOW BACK PAIN WITHOUT SCIATICA: Primary | ICD-10-CM

## 2022-06-30 PROCEDURE — 97140 MANUAL THERAPY 1/> REGIONS: CPT | Performed by: PHYSICAL THERAPIST

## 2022-06-30 PROCEDURE — 97110 THERAPEUTIC EXERCISES: CPT | Performed by: PHYSICAL THERAPIST

## 2022-06-30 NOTE — PROGRESS NOTES
PT ReEvaluation and Discharge    Today's date: 2022  Patient name: Tommy Jones  : 1945  MRN: [id number]  Referring provider: RACHEL Santos  Dx:   Encounter Diagnosis     ICD-10-CM    1  Acute bilateral low back pain without sciatica  M54 50                   Assessment  Assessment details: Patient has improved in all areas  She still has some deficits with soreness/pain and limited higher level function, but wishes to self DC   She will be seen only as needed moving forward nad has been educated in a HEP  Thank you for this pleasant referral     Impairments: abnormal or restricted ROM, activity intolerance and pain with function  Understanding of Dx/Px/POC: good   Prognosis: good    Goals  ST-6 weeks  1  Patient to be independent with HEP - Met  2  Decrease pain at least 2 subjective levels  - Met    LT-12 weeks  1    Patient to voice comfort with self management of condition - Met  2   75% or > decreased pain  - Met  3   75% or > decreased functional deficits  - Met  4  Normalize AROM of all deficit planes  - Partially Met  7  Patient to voice understanding of activities/positions to avoid  - Met  8  Centralize symptoms - Met      Plan  Plan details: Pt to return only as needed  Patient would benefit from: skilled PT  Referral necessary: No  Planned modality interventions: cryotherapy  Planned therapy interventions: IADL retraining, joint mobilization, manual therapy, motor coordination training, neuromuscular re-education, patient education, postural training, self care, strengthening, stretching, therapeutic activities, therapeutic exercise, home exercise program, flexibility, ADL training, balance and body mechanics training  Frequency: 1x week  Duration in weeks: 6  Treatment plan discussed with: patient        Subjective Evaluation    History of Present Illness  Onset date: 22  Mechanism of injury: Patients feels ~80% improvement    She remains limited in lifting heavier items   She feels ready to self DC to HEP  She has also not yet returned to yoga      Patient Goals: have less pain, be able to do activiites I did before    Quality of life: poor    Pain  At best pain ratin  At worst pain rating: 10  Location: Low back, L hip and anterior thigh          Objective     Postural Observations  Seated posture: fair  Standing posture: fair  Correction of posture: makes symptoms worse        Active Range of Motion     Lumbar   Flexion:  with pain Restriction level: minimal  Extension:  Restriction level: moderate    Joint Play     Hypomobile: L2, L3, L4, L5 and S1   Mechanical Assessment    Cervical      Thoracic      Lumbar    Standing flexion: repeated movements   Pain intensity: worse  Pain level: increased  Standing extension: repeated movements  Pain location: peripheralized  Pain intensity: worse  Lying extension: repeated movements  Pain intensity: better  Pain level: abolished    General Comments:      Lumbar Comments  (-) Slump  (-) Neuromotor Exam  (-) Hip Screen             Precautions: Hx MI         Manuals        Prone Lx PA Mobs Gr 2-4  8' 8 8 8' 8                                 Cx IIE      C       Neuro Re-Ed             Sitting: Lx Roll      C       "Back" account      C       Bending mechanics      C       Lifting Mechanics                          Cx Retractoin      20x                    Ther Ex             HEP             EIL  10 10          EIL pt OP             Sust Ext  8 8          (EIS)  20 20 20 20 20       (EIS belt OP)  20 20 20 20 20       Qped Lx Ex  20  20 20  20       Standing Hip Ext  B 20  B 20 B 20 B 20                                                                        Ther Activity                                       Gait Training                                       Modalities

## 2022-07-05 DIAGNOSIS — Z95.1 S/P CABG X 3: ICD-10-CM

## 2022-07-20 ENCOUNTER — CONSULT (OUTPATIENT)
Dept: PAIN MEDICINE | Facility: CLINIC | Age: 77
End: 2022-07-20
Payer: MEDICARE

## 2022-07-20 ENCOUNTER — TELEPHONE (OUTPATIENT)
Dept: PAIN MEDICINE | Facility: CLINIC | Age: 77
End: 2022-07-20

## 2022-07-20 VITALS
BODY MASS INDEX: 18.44 KG/M2 | HEIGHT: 64 IN | DIASTOLIC BLOOD PRESSURE: 70 MMHG | SYSTOLIC BLOOD PRESSURE: 124 MMHG | HEART RATE: 54 BPM | RESPIRATION RATE: 14 BRPM | WEIGHT: 108 LBS

## 2022-07-20 DIAGNOSIS — M51.14 INTERVERTEBRAL DISC DISORDER WITH RADICULOPATHY OF THORACIC REGION: ICD-10-CM

## 2022-07-20 DIAGNOSIS — G89.29 CHRONIC RIGHT-SIDED LOW BACK PAIN WITH RIGHT-SIDED SCIATICA: ICD-10-CM

## 2022-07-20 DIAGNOSIS — M16.12 PRIMARY OSTEOARTHRITIS OF LEFT HIP: Primary | ICD-10-CM

## 2022-07-20 DIAGNOSIS — M54.41 CHRONIC RIGHT-SIDED LOW BACK PAIN WITH RIGHT-SIDED SCIATICA: ICD-10-CM

## 2022-07-20 PROCEDURE — 99204 OFFICE O/P NEW MOD 45 MIN: CPT | Performed by: ANESTHESIOLOGY

## 2022-07-20 NOTE — TELEPHONE ENCOUNTER
LM for patient to call and confirm her appt for her injection with Dr Pau Torres 7/26/22  With an arrival time of 9am and to give her some instructions

## 2022-07-20 NOTE — PROGRESS NOTES
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        Plan  The patient's symptoms, history/physical are consistent with pain that is multifactorial in origin but predominantly the results of her underlying left hip osteoarthritis as well as a likely thoracic nerve root irritation which is causing the right mid back and rib pain  The patient has left hip pain with positive GEENA testing  At this time, I discussed performing a left hip intra-articular steroid injection that would be both diagnostic and therapeutic  She would like to proceed and will be scheduled for an upcoming Tuesday or Thursday under fluoroscopic guidance  In addition, I will order an MRI of the thoracic spine to assess for the right mid back and rib pain  She was advised that she may continue with exercises learned at physical therapy but limit it so that she does not overly aggravate her back  Complete risks and benefits including bleeding, infection, tissue reaction, nerve injury and allergic reaction were discussed  The approach was demonstrated using models and literature was provided  Verbal and written consent was obtained  My impressions and treatment recommendations were discussed in detail with the patient who verbalized understanding and had no further questions  Discharge instructions were provided  I personally saw and examined the patient and I agree with the above discussed plan of care  Orders Placed This Encounter   Procedures    [place] spine and pain procedure     Standing Status:   Future     Standing Expiration Date:   7/20/2026     Order Specific Question:   Reason for Exam:     Answer:   Left Hip Intra-articular Steroid Injection     Order Specific Question:   Anticoagulant hold needed?      Answer:   No    MRI thoracic spine wo contrast     Standing Status:   Future Standing Expiration Date:   7/20/2026     Scheduling Instructions: There is no preparation for this test  Please leave your jewelry and valuables at home, wedding rings are the exception  Please bring your physician order, insurance cards, a form of photo ID and a list of your medications with you  Arrive 15 minutes prior to your appointment time in order to       register  Please bring any prior CT or MRI studies of this area that were not performed at a [facility]  To schedule this appointment, please contact Central Scheduling at [phone]  Order Specific Question:   What is the patient's sedation requirement? Answer:   No Sedation     No orders of the defined types were placed in this encounter  History of Present Illness    Van Julien is a 68 y o  female referred by Dr Patrice Cee for lower back pain right-sided rib and left-sided leg pain that has been present for 2 months following a fall on 05/09/2022  Pain is moderate rated 7/10 on a numeric pain scale and felt constantly  Symptoms are described to be dull/aching and gripping in the left hip and buttock area as well as the right lower rib and mid back area  Pain symptoms are aggravated standing, bending, sitting, walking, exercise  Treatment history has included physical therapy which has provided moderate relief  Use of Tylenol and ibuprofen provide a little relief  Steroids provided no relief  I have personally reviewed and/or updated the patient's past medical history, past surgical history, family history, social history, current medications, allergies, and vital signs today  Review of Systems   Constitutional: Negative for fever and unexpected weight change  HENT: Negative for trouble swallowing  Eyes: Negative for visual disturbance  Respiratory: Negative for shortness of breath and wheezing  Cardiovascular: Negative for chest pain and palpitations     Gastrointestinal: Negative for constipation, diarrhea, nausea and vomiting  Endocrine: Negative for cold intolerance, heat intolerance and polydipsia  Genitourinary: Negative for difficulty urinating and frequency  Musculoskeletal: Positive for back pain  Negative for arthralgias, gait problem, joint swelling and myalgias  LLE Pain   Skin: Negative for rash  Neurological: Negative for dizziness, seizures, syncope, weakness and headaches  Hematological: Does not bruise/bleed easily  Psychiatric/Behavioral: Negative for dysphoric mood  All other systems reviewed and are negative        Patient Active Problem List   Diagnosis    Arthritis    Cervical myofascial pain syndrome    Cervical radiculopathy    Cervicogenic headache    Cervico-occipital neuralgia    Depression    Mixed hyperlipidemia    Essential hypertension    Osteopenia of spine    Spasmodic torticollis    Laceration of occipital scalp    Other secondary scoliosis, lumbar region    Syncope    Coronary artery disease of native artery of native heart with stable angina pectoris (HCC)    S/P CABG x 3    Anemia    Thrombocytopenia (HCC)    Hyperchloremia    Chylothorax    Screening for colon cancer    History of colon polyps    Acute bilateral low back pain without sciatica    PAC (premature atrial contraction)       Past Medical History:   Diagnosis Date    Colon polyp     Coronary artery disease     Effusion of left knee     Last assessed - 9/10/15    History of transfusion     Hyperlipidemia     Hypertension     Myocardial infarction ([facility] [address] )     Tick bite     Last assessed - 4/21/17       Past Surgical History:   Procedure Laterality Date    APPENDECTOMY      COLONOSCOPY      [place] CABG, ARTERY-VEIN, FOUR N/A 1/27/2020    Procedure: CORONARY ARTERY BYPASS GRAFT (CABG) x3 VESSELS, LIMA TO LAD, AND SVG TO PLB & OM;  Surgeon: Glenys Smallwood DO;  [facility];  Service: Cardiac Surgery    [place] ECHO TRANSESOPHAG R-T 2D W/PRB IMG ANDREEA I&R N/A 1/27/2020    Procedure: TRANSESOPHAGEAL ECHOCARDIOGRAM (GET); Surgeon: Glenys Smallwood DO;  [facility];  Service: Cardiac Surgery    [place] ENDOSCOPY W/VIDEO-ASST VEIN HARVEST,CABG Left 1/27/2020    Procedure: HARVEST VEIN ENDOSCOPIC ([address]); Surgeon: Glenys Smallwood DO;  [facility];  Service: Cardiac Surgery    TONSILLECTOMY      Last assessed - 4/20/17    TUBAL LIGATION      Last assessed - 4/20/17    WISDOM TOOTH EXTRACTION      Last assessed - 4/20/17       Family History   Problem Relation Age of Onset    Coronary artery disease Mother     Arthritis Mother     Other Mother         Cardiac Disorder     Transient ischemic attack Mother     Heart attack Father     Sudden death Father         SCD    Cancer Daughter 52        kidney       Social History     Occupational History    Occupation:      Comment: P/T    Occupation: Retired     Comment: RN   Tobacco Use    Smoking status: Never Smoker    Smokeless tobacco: Never Used   Vaping Use    Vaping Use: Never used   Substance and Sexual Activity    Alcohol use: Yes     Comment: 1 wine nightly    Drug use: No    Sexual activity: Not on file       Current Outpatient Medications on File Prior to Visit   Medication Sig    aspirin (ECOTRIN LOW STRENGTH) 81 mg EC tablet Take 1 tablet (81 mg total) by mouth daily    Calcium Carb-Cholecalciferol 600-200 MG-UNIT TABS Calcium 600 + D TABS  TAKE 1 TABLET DAILY     Refills: 0    Active    cholecalciferol (VITAMIN D3) 1,000 units tablet Take 1,000 Units by mouth daily    losartan (COZAAR) 100 MG tablet TAKE ONE TABLET BY MOUTH EVERY DAY    melatonin 3 mg Take 3 mg by mouth daily at bedtime    metoprolol tartrate (LOPRESSOR) 25 mg tablet TAKE ONE-HALF TABLET BY MOUTH EVERY 12 HOURS    rosuvastatin (CRESTOR) 40 MG tablet Take 1 tablet (40 mg total) by mouth daily    ketoconazole (NIZORAL) 2 % cream APPLY TO AFFECTED AREA(S) ONCE DAILY (Patient not taking: No sig reported)    methocarbamol (ROBAXIN) 500 mg tablet Take 1 tablet (500 mg total) by mouth as needed in the morning and 1 tablet (500 mg total) as needed at noon and 1 tablet (500 mg total) as needed in the evening for muscle spasms  (Patient not taking: No sig reported)     No current facility-administered medications on file prior to visit  No Known Allergies    Physical Exam    /70   Pulse (!) 54   Resp 14   Ht 5' 4" (1 626 m)   Wt 49 kg (108 lb)   BMI 18 54 kg/m²     Constitutional: normal, well developed, well nourished, alert, in no distress and non-toxic and no overt pain behavior    Eyes: anicteric  HEENT: grossly intact  Neck: supple, symmetric, trachea midline and no masses   Pulmonary:even and unlabored  Cardiovascular:No edema or pitting edema present  Skin:Normal without rashes or lesions and well hydrated  Psychiatric:Mood and affect appropriate  Neurologic:Cranial Nerves II-XII grossly intact  Musculoskeletal:normal     Lumbar Spine Exam  Appearance:  Scoliosis  Palpation/Tenderness:  no tenderness or spasm  Range of Motion:  Flexion:  Minimally limited  with pain  Extension:  Minimally limited  with pain  Rotation - Left:  No limitation  without pain  Rotation - Right:  No limitation  without pain  Motor Strength:  Left hip flexion:  5/5  Left hip extension:  5/5  Right hip flexion:  5/5  Right hip extension:  5/5  Left knee flexion:  5/5  Left knee extension:  5/5  Right knee flexion:  5/5  Right knee extension:  5/5  Left foot dorsiflexion:  5/5  Left foot plantar flexion:  5/5  Right foot dorsiflexion:  5/5  Right foot plantar flexion:  5/5  Reflexes:  Left Patellar:  2+   Right Patellar:  2+   Left Achilles:  2+   Right Achilles:  2+   Special Tests:  Left Straight Leg Test:  negative  Right Straight Leg Test:  negative   Left GEENA: positive  Right GEENA: negative    Imaging    MRI LUMBAR SPINE WITHOUT CONTRAST (6/1/2022)     INDICATION: M54 50: Low back pain, unspecified  G89 29: Other chronic pain  hx of CAD / osteropenia / scoliosis, with ongoing, bilateral lower back pain  Thanks  Nolan Blanco DO; Low back pain, symptoms persist with > 6wks conservative treatment      COMPARISON:  Lumbar spine radiograph May 11, 2022  CT chest without contrast April 21, 2016      TECHNIQUE:  Sagittal T1, sagittal T2, sagittal inversion recovery, axial T1 and axial T2, coronal T2     IMAGE QUALITY:  Diagnostic     FINDINGS:     VERTEBRAL BODIES:  There are 5 lumbar type vertebral bodies  Mild S-shaped scoliosis of thoracolumbar spine (rightward curvature of thoracic spine and leftward curvature of lumbar spine), unchanged  Grade 1 anterolisthesis L5-S1, unchanged      Type I Modic endplate changes T43-U66  Mixed type I/II Modic endplate change at V8-K8, L3-L4, and L5-S1  Otherwise, normal bone marrow signal      SACRUM:  Normal signal within the sacrum  No evidence of insufficiency or stress fracture      DISTAL CORD AND CONUS:  Normal size and signal within the distal cord and conus      PARASPINAL SOFT TISSUES:  Paraspinal soft tissues are unremarkable      LOWER THORACIC DISC SPACES:  Mild noncompressive lower thoracic degenerative change      LUMBAR DISC SPACES:  Multilevel osteophytes, degenerative endplate changes, disc height loss, and facet arthropathy      L1-L2: Diffuse disc bulge  Mild canal stenosis  Mild left foraminal narrowing      L2-L3: Diffuse disc bulge  Facet arthropathy and ligamentum flavum thickening  Mild canal stenosis  Mild bilateral foraminal narrowing      L3-L4: Diffuse disc bulge  Hypertrophic facet arthropathy with ligamentum flavum thickening  Mild canal stenosis  Moderate right and mild left foraminal narrowing      L4-L5: Diffuse disc bulge  Hypertrophic facet arthropathy with ligamentum flavum thickening  Mild canal stenosis    Mild right foraminal narrowing      L5-S1: Diffuse disc bulge, small right foraminal disc protrusion compressing right L5 exiting nerve  Hypertrophic facet arthropathy with ligamentum flavum thickening  Mild canal stenosis    Severe right and mild left foraminal narrowing

## 2022-07-21 ENCOUNTER — HOSPITAL ENCOUNTER (OUTPATIENT)
Dept: MRI IMAGING | Facility: HOSPITAL | Age: 77
Discharge: HOME | End: 2022-07-21
Payer: MEDICARE

## 2022-07-21 DIAGNOSIS — M51.14 INTERVERTEBRAL DISC DISORDER WITH RADICULOPATHY OF THORACIC REGION: ICD-10-CM

## 2022-07-21 PROCEDURE — G1004 CDSM NDSC: HCPCS

## 2022-07-21 PROCEDURE — 72146 MRI CHEST SPINE W/O DYE: CPT

## 2022-07-25 NOTE — TELEPHONE ENCOUNTER
Patient would like results of MRI and where is injection exactly going to be in the hip joint? In light of MRI should she still be doing PT or should she change body part?     Please advise    Thank you     [phone]

## 2022-07-25 NOTE — TELEPHONE ENCOUNTER
S/w pt, reviewed results  Pt said her pain is an annoying nagging pain that worsens with prolonged sitting or standing, and alleviated by laying  Pt said her pain is in her low back and occ  Under her R rib  Pt said she feels like she can never stand up straight and struggles with her core, which is why she thought PT might help  Pt said she can discuss this further tomorrow at her hip injection

## 2022-07-25 NOTE — TELEPHONE ENCOUNTER
Pls review message from pt and advise  I will gladly explain about the hip inj when I call her but would like to give her advise from you about the PT and MRI results

## 2022-07-26 ENCOUNTER — TELEPHONE (OUTPATIENT)
Dept: RADIOLOGY | Facility: CLINIC | Age: 77
End: 2022-07-26

## 2022-07-26 ENCOUNTER — HOSPITAL ENCOUNTER (OUTPATIENT)
Dept: RADIOLOGY | Facility: CLINIC | Age: 77
Discharge: HOME | End: 2022-07-26
Payer: MEDICARE

## 2022-07-26 VITALS
HEART RATE: 48 BPM | SYSTOLIC BLOOD PRESSURE: 150 MMHG | OXYGEN SATURATION: 100 % | DIASTOLIC BLOOD PRESSURE: 68 MMHG | RESPIRATION RATE: 20 BRPM | TEMPERATURE: 98.1 F

## 2022-07-26 DIAGNOSIS — M80.80XA LOCALIZED OSTEOPOROSIS WITH CURRENT PATHOLOGICAL FRACTURE, INITIAL ENCOUNTER: ICD-10-CM

## 2022-07-26 DIAGNOSIS — M16.12 PRIMARY OSTEOARTHRITIS OF LEFT HIP: ICD-10-CM

## 2022-07-26 DIAGNOSIS — S22.080A CLOSED WEDGE COMPRESSION FRACTURE OF T11 VERTEBRA, INITIAL ENCOUNTER (HCC): Primary | ICD-10-CM

## 2022-07-26 PROCEDURE — 20610 DRAIN/INJ JOINT/BURSA W/O US: CPT | Performed by: ANESTHESIOLOGY

## 2022-07-26 PROCEDURE — 77002 NEEDLE LOCALIZATION BY XRAY: CPT | Performed by: ANESTHESIOLOGY

## 2022-07-26 PROCEDURE — A9585 GADOBUTROL INJECTION: HCPCS | Performed by: ANESTHESIOLOGY

## 2022-07-26 PROCEDURE — 77002 NEEDLE LOCALIZATION BY XRAY: CPT

## 2022-07-26 RX ORDER — 0.9 % SODIUM CHLORIDE 0.9 %
2 VIAL (ML) INJECTION ONCE
Status: COMPLETED | OUTPATIENT
Start: 2022-07-26 | End: 2022-07-26

## 2022-07-26 RX ORDER — METHYLPREDNISOLONE ACETATE 80 MG/ML
80 INJECTION, SUSPENSION INTRA-ARTICULAR; INTRALESIONAL; INTRAMUSCULAR; PARENTERAL; SOFT TISSUE ONCE
Status: COMPLETED | OUTPATIENT
Start: 2022-07-26 | End: 2022-07-26

## 2022-07-26 RX ORDER — BUPIVACAINE HCL/PF 2.5 MG/ML
3 VIAL (ML) INJECTION ONCE
Status: COMPLETED | OUTPATIENT
Start: 2022-07-26 | End: 2022-07-26

## 2022-07-26 RX ADMIN — Medication 2 ML: at 09:39

## 2022-07-26 RX ADMIN — BUPIVACAINE HYDROCHLORIDE 3 ML: 2.5 INJECTION, SOLUTION EPIDURAL; INFILTRATION; INTRACAUDAL at 09:40

## 2022-07-26 RX ADMIN — GADOBUTROL 1 ML: 604.72 INJECTION INTRAVENOUS at 09:40

## 2022-07-26 RX ADMIN — SODIUM CHLORIDE 2 ML: 9 INJECTION INTRAMUSCULAR; INTRAVENOUS; SUBCUTANEOUS at 09:39

## 2022-07-26 RX ADMIN — METHYLPREDNISOLONE ACETATE 80 MG: 80 INJECTION, SUSPENSION INTRA-ARTICULAR; INTRALESIONAL; INTRAMUSCULAR; PARENTERAL; SOFT TISSUE at 09:40

## 2022-07-26 NOTE — TELEPHONE ENCOUNTER
Patient wondering if she could call the people who are going to call her regarding back brace she is really anxious       Please advise

## 2022-07-26 NOTE — H&P
History of Present Illness: The patient is a 68 y o  female who presents with complaints of left hip pain is here today for left hip intra-articular steroid injection    Patient Active Problem List   Diagnosis    Arthritis    Cervical myofascial pain syndrome    Cervical radiculopathy    Cervicogenic headache    Cervico-occipital neuralgia    Depression    Mixed hyperlipidemia    Essential hypertension    Osteopenia of spine    Spasmodic torticollis    Laceration of occipital scalp    Other secondary scoliosis, lumbar region    Syncope    Coronary artery disease of native artery of native heart with stable angina pectoris (HCC)    S/P CABG x 3    Anemia    Thrombocytopenia (HCC)    Hyperchloremia    Chylothorax    Screening for colon cancer    History of colon polyps    Acute bilateral low back pain without sciatica    PAC (premature atrial contraction)       Past Medical History:   Diagnosis Date    Colon polyp     Coronary artery disease     Effusion of left knee     Last assessed - 9/10/15    History of transfusion     Hyperlipidemia     Hypertension     Myocardial infarction ([facility] [address] )     Tick bite     Last assessed - 4/21/17       Past Surgical History:   Procedure Laterality Date    APPENDECTOMY      COLONOSCOPY      [place] CABG, ARTERY-VEIN, FOUR N/A 1/27/2020    Procedure: CORONARY ARTERY BYPASS GRAFT (CABG) x3 VESSELS, LIMA TO LAD, AND SVG TO PLB & OM;  Surgeon: Adilene Ferreira DO;  [facility];  Service: Cardiac Surgery    [place] ECHO TRANSESOPHAG R-T 2D W/PRB IMG ACQUISJ I&R N/A 1/27/2020    Procedure: TRANSESOPHAGEAL ECHOCARDIOGRAM (GET); Surgeon: Adilene Ferreira DO;  [facility];  Service: Cardiac Surgery    [place] ENDOSCOPY W/VIDEO-ASST VEIN HARVEST,CABG Left 1/27/2020    Procedure: HARVEST VEIN ENDOSCOPIC ([address]);   Surgeon: Adilene Ferreira DO;  [facility];  Service: Cardiac Surgery    TONSILLECTOMY      Last assessed - 4/20/17    TUBAL LIGATION Last assessed - 4/20/17    WISDOM TOOTH EXTRACTION      Last assessed - 4/20/17         Current Outpatient Medications:     aspirin (ECOTRIN LOW STRENGTH) 81 mg EC tablet, Take 1 tablet (81 mg total) by mouth daily, Disp: , Rfl:     Calcium Carb-Cholecalciferol 600-200 MG-UNIT TABS, Calcium 600 + D TABS TAKE 1 TABLET DAILY  Refills: 0  Active, Disp: , Rfl:     cholecalciferol (VITAMIN D3) 1,000 units tablet, Take 1,000 Units by mouth daily, Disp: , Rfl:     ketoconazole (NIZORAL) 2 % cream, APPLY TO AFFECTED AREA(S) ONCE DAILY (Patient not taking: No sig reported), Disp: , Rfl:     losartan (COZAAR) 100 MG tablet, TAKE ONE TABLET BY MOUTH EVERY DAY, Disp: 30 tablet, Rfl: 11    melatonin 3 mg, Take 3 mg by mouth daily at bedtime, Disp: , Rfl:     methocarbamol (ROBAXIN) 500 mg tablet, Take 1 tablet (500 mg total) by mouth as needed in the morning and 1 tablet (500 mg total) as needed at noon and 1 tablet (500 mg total) as needed in the evening for muscle spasms   (Patient not taking: No sig reported), Disp: 40 tablet, Rfl: 1    metoprolol tartrate (LOPRESSOR) 25 mg tablet, TAKE ONE-HALF TABLET BY MOUTH EVERY 12 HOURS, Disp: 90 tablet, Rfl: 1    rosuvastatin (CRESTOR) 40 MG tablet, Take 1 tablet (40 mg total) by mouth daily, Disp: 90 tablet, Rfl: 3    Current Facility-Administered Medications:     bupivacaine (PF) (MARCAINE) 0 25 % injection 3 mL, 3 mL, Intra-articular, Once, Kenneth Sims MD    Gadobutrol injection (SINGLE-DOSE) SOLN 1 mL, 1 mL, Intra-articular, Once, Kenneth Sims MD    lidocaine (PF) (XYLOCAINE-MPF) 2 % injection 2 mL, 2 mL, Infiltration, Once, Kennteh Sims MD    methylPREDNISolone acetate (DEPO-MEDROL) injection 80 mg, 80 mg, Intra-articular, Once, Kenneth Sims MD    sodium chloride (PF) 0 9 % injection 2 mL, 2 mL, Infiltration, Once, Kenneth Sims MD    No Known Allergies    Physical Exam:   Vitals:    07/26/22 0918   BP: 144/64   Pulse: 55   Resp: 20   Temp: 98 1 °F (36 7 °C)   SpO2: 99%     General: Awake, Alert, Oriented x 3, Mood and affect appropriate  Respiratory: Respirations even and unlabored  Cardiovascular: Peripheral pulses intact; no edema  Musculoskeletal Exam:  Left hip pain    ASA Score: 3    Patient/Chart Verification  Patient ID Verified: Verbal  Consents Confirmed: To be obtained in the Pre-Procedure area  Interval H&P(within 24 hr) Complete (required for Outpatients and Surgery Admit only): To be obtained in the Pre-Procedure area  Allergies Reviewed: Yes  Anticoag/NSAID held?: NA  Currently on antibiotics?: No    Assessment:   1   Primary osteoarthritis of left hip        Plan: Left Hip Intra-articular Steroid Injection

## 2022-07-26 NOTE — TELEPHONE ENCOUNTER
Patient's thoracic MRI was reviewed with the patient showing the T11 compression fracture  At this time, I will order a TLSO back brace to help reduce pain from the T11 compression fracture to restrict motion of the trunk and facilitate healing

## 2022-07-26 NOTE — DISCHARGE INSTR - LAB
Steroid Joint Injection   WHAT YOU NEED TO KNOW:   A steroid joint injection is a procedure to inject steroid medicine into a joint  Steroid medicine decreases pain and inflammation  The injection may also contain an anesthetic (numbing medicine) to decrease pain  It may be done to treat conditions such as arthritis, gout, or carpal tunnel syndrome  The injections may be given in your knee, ankle, shoulder, elbow, wrist, ankle or sacroiliac joint  Do not apply heat to any area that is numb  If you have discomfort or soreness at the injection site, you may apply ice today, 20 minutes on and 20 minutes off  Tomorrow you may use ice or warm, moist heat  Do not apply ice or heat directly to the skin  You may have an increase or change in the discomfort for 36-48 hours after your treatment  Apply ice and continue with any pain medicine you have been prescribed  Do not do anything strenuous today  You may shower, but no tub baths or hot tubs today  You may resume your normal activities tomorrow, but do not overdo it  Resume normal activities slowly when you are feeling better  If you experience redness, drainage or swelling at the injection site, or if you develop a fever above 100 degrees, please call [facility] at [phone] or go to the Emergency Room  Continue to take all routine medicines prescribed by your primary care physician unless otherwise instructed by our staff  Most blood thinners should be started again according to your regularly scheduled dosing  If you have any questions, please give our office a call  As no general anesthesia was used in today's procedure, you should not experience any side effects related to anesthesia  If you have a problem specifically related to your procedure, please call our office at [phone]  Problems not related to your procedure should be directed to your primary care physician

## 2022-07-26 NOTE — TELEPHONE ENCOUNTER
I have placed orders for patient to get new DEXA scan, referral to see Dr Loc Rosenberg of Neurosurgery for possible kyphoplasty (current appt with Dr Dayton Salazar should be cancelled), and referral for endocrinology regarding osteoporosis

## 2022-07-27 NOTE — TELEPHONE ENCOUNTER
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

## 2022-07-27 NOTE — TELEPHONE ENCOUNTER
Pt called in to check on the status of the back brace and also how much can she lift  Please be advised thank you    Pt can be reached @   [phone]

## 2022-07-27 NOTE — TELEPHONE ENCOUNTER
Called Juan Damon( [phone]) who is the  for back braces  Martinez Clear states that he did not receive the request because the fax number used was for the company and not the rep  Paperwork faxed to direct number given([phone]) by Martinez Clear  Martinez Clear will reach out to the patient

## 2022-08-01 ENCOUNTER — CONSULT (OUTPATIENT)
Dept: ENDOCRINOLOGY | Facility: CLINIC | Age: 77
End: 2022-08-01
Payer: MEDICARE

## 2022-08-01 VITALS
SYSTOLIC BLOOD PRESSURE: 116 MMHG | HEART RATE: 64 BPM | BODY MASS INDEX: 18.19 KG/M2 | DIASTOLIC BLOOD PRESSURE: 60 MMHG | WEIGHT: 106 LBS

## 2022-08-01 DIAGNOSIS — M80.80XA LOCALIZED OSTEOPOROSIS WITH CURRENT PATHOLOGICAL FRACTURE, INITIAL ENCOUNTER: ICD-10-CM

## 2022-08-01 DIAGNOSIS — S22.080A CLOSED WEDGE COMPRESSION FRACTURE OF T11 VERTEBRA, INITIAL ENCOUNTER (HCC): ICD-10-CM

## 2022-08-01 PROCEDURE — 99204 OFFICE O/P NEW MOD 45 MIN: CPT | Performed by: INTERNAL MEDICINE

## 2022-08-01 NOTE — PROGRESS NOTES
No chief complaint on file  Referring Provider  Md Dago Duncan 5  [address] [facility] [address]  [facility] [place],  [address] [facility]     History of Present Illness:   Chioma Manjarrez is a 68 y o  female with osteopenia/porosis seen in consultation at the request of Dr Carline Lehman (Pain medicine) for evaluation of Osteoporosis given recent T11 compression fracture on spine MRI (07/22/22)          HPI:   Onset (age at diagnosis)   History of fragility fractures (low impact/vertebral/hip, etc vs trauma related)  date -- site  Hardware in hip or spine/spinal surgery (kyphoplasty/vertebroplasty)   Severe DJD   History of HRT use     Medications used in past:   Bisphosphonates   Jesenia/abaloparitide   Denosumab   Estrogens  Sclerostin inhibitors  SERMS      Risk Factors:   Early menopause (either premature, or early TAHBSO without HRT)   Family history of osteoporosis   Rheumatoid arthritis   Glucocorticoid use   Smoking   Alcohol   Dietary Calcium intake (Quantify milk, cheese, yogurt, etc)   Calcium/Vitamin D supplementation (including MVI)   Weight bearing exercises   DXA  (2018) showed Osteopenia  Last one, improvement     Fhx, none bone     Patient Active Problem List   Diagnosis    Arthritis    Cervical myofascial pain syndrome    Cervical radiculopathy    Cervicogenic headache    Cervico-occipital neuralgia    Depression    Mixed hyperlipidemia    Essential hypertension    Osteopenia of spine    Spasmodic torticollis    Laceration of occipital scalp    Other secondary scoliosis, lumbar region    Syncope    Coronary artery disease of native artery of native heart with stable angina pectoris (HCC)    S/P CABG x 3    Anemia    Thrombocytopenia (HCC)    Hyperchloremia    Chylothorax    Screening for colon cancer    History of colon polyps    Acute bilateral low back pain without sciatica    PAC (premature atrial contraction)    Primary osteoarthritis of left hip    Closed wedge compression fracture of T11 vertebra [facility])      Past Medical History:   Diagnosis Date    Colon polyp     Coronary artery disease     Effusion of left knee     Last assessed - 9/10/15    History of transfusion     Hyperlipidemia     Hypertension     Myocardial infarction ([address] )     Tick bite     Last assessed - 4/21/17      Past Surgical History:   Procedure Laterality Date    APPENDECTOMY      COLONOSCOPY      SD CABG, ARTERY-VEIN, FOUR N/A 1/27/2020    Procedure: CORONARY ARTERY BYPASS GRAFT (CABG) x3 VESSELS, LIMA TO LAD, AND SVG TO PLB & OM;  Surgeon: Sarah Osorio DO;  [facility];  Service: Cardiac Surgery    SD ECHO TRANSESOPHAG R-T 2D W/PRB IMG ACQUISJ I&R N/A 1/27/2020    Procedure: TRANSESOPHAGEAL ECHOCARDIOGRAM (GET); Surgeon: Sarah Osorio DO;  [facility];  Service: Cardiac Surgery    SD ENDOSCOPY W/VIDEO-ASST VEIN HARVEST,CABG Left 1/27/2020    Procedure: HARVEST VEIN ENDOSCOPIC ([address]); Surgeon: Sarah Osorio DO;  [facility];  Service: Cardiac Surgery    TONSILLECTOMY      Last assessed - 4/20/17    TUBAL LIGATION      Last assessed - 4/20/17    WISDOM TOOTH EXTRACTION      Last assessed - 4/20/17      Family History   Problem Relation Age of Onset    Coronary artery disease Mother     Arthritis Mother     Other Mother         Cardiac Disorder     Transient ischemic attack Mother     Heart attack Father    Shameka Nine Sudden death Father         SCD    Cancer Daughter 52        kidney     Social History     Tobacco Use    Smoking status: Never Smoker    Smokeless tobacco: Never Used   Substance Use Topics    Alcohol use: Yes     Comment: 1 wine nightly     No Known Allergies      Current Outpatient Medications:     aspirin (ECOTRIN LOW STRENGTH) 81 mg EC tablet, Take 1 tablet (81 mg total) by mouth daily, Disp: , Rfl:     Calcium Carb-Cholecalciferol 600-200 MG-UNIT TABS, Calcium 600 + D TABS TAKE 1 TABLET DAILY    Refills: 0  Active, Disp: , Rfl:     cholecalciferol (VITAMIN D3) 1,000 units tablet, Take 1,000 Units by mouth daily, Disp: , Rfl:     ketoconazole (NIZORAL) 2 % cream, APPLY TO AFFECTED AREA(S) ONCE DAILY (Patient not taking: No sig reported), Disp: , Rfl:     losartan (COZAAR) 100 MG tablet, TAKE ONE TABLET BY MOUTH EVERY DAY, Disp: 30 tablet, Rfl: 11    melatonin 3 mg, Take 3 mg by mouth daily at bedtime, Disp: , Rfl:     methocarbamol (ROBAXIN) 500 mg tablet, Take 1 tablet (500 mg total) by mouth as needed in the morning and 1 tablet (500 mg total) as needed at noon and 1 tablet (500 mg total) as needed in the evening for muscle spasms  (Patient not taking: No sig reported), Disp: 40 tablet, Rfl: 1    metoprolol tartrate (LOPRESSOR) 25 mg tablet, TAKE ONE-HALF TABLET BY MOUTH EVERY 12 HOURS, Disp: 90 tablet, Rfl: 1    rosuvastatin (CRESTOR) 40 MG tablet, Take 1 tablet (40 mg total) by mouth daily, Disp: 90 tablet, Rfl: 3  Review of Systems    Physical Exam:  There is no height or weight on file to calculate BMI  There were no vitals taken for this visit     Wt Readings from Last 3 Encounters:   07/21/22 49 kg (108 lb)   07/20/22 49 kg (108 lb)   06/20/22 51 9 kg (114 lb 6 4 oz)       GEN: NAD  E/n/m nl facies, hearing intact bilat, tongue midline, lips nl  Eyes: no stare or proptosis, nl lids and conjunctiva, EOMI  Neck: trachea midline, thyroid NT to palpation, nl in size, no nodules or neck masses noted, no cervical LAD  CV; heart reg rate s1s2 nl, no m/r/g appreciated, no ROSLYN  Resp: CTAB, good effort  Ab+BS  Neuro: no tremor, 2+ DTRs in BUE  MS: no c/c in digits, moves all 4 ext, nl muscle bulk, gait nl  Skin: warm and dry, no palmar erythema  Ext: no c/c in digits, no edema bilaterally, 2+ DP and PT pulses bilat, no breaks in skin/ulcers on feet, sensation intact to monofilament testing on plantar surfaces bilat  Psych: nl mood and affect, no gross lapses in memory    DATA:  Labs:       Lab Results   Component Value Date    FREET4 0 98 10/23/2016     Lab Results Component Value Date    SODIUM 138 05/31/2022    K 4 0 05/31/2022     05/31/2022    CO2 29 05/31/2022    BUN 18 05/31/2022    CREATININE 0 67 05/31/2022    GLUC 77 05/19/2021    CALCIUM 9 5 05/31/2022      Lab Results   Component Value Date    CALCIUM 9 5 05/31/2022      No results found for: LABPROT   25-oh D      Radiology    02/16/2018       Impression:  No diagnosis found  Plan:    There are no diagnoses linked to this encounter  There are no diagnoses linked to this encounter  PLAN   1  Osteoporosis: Check TSH, PTH, corrected Ca, Vitamin D  Review DXA--look and document for least significant change (not just T scores)  Review Medications, side effects, benefits, duration   on calcium/vit D supplementation   on avoiding risk (low lying rugs, handrails, bathtubs, high beds, etc)   Exercise 30-60mins of weight bearing exercise at least x per week  Diet  calcium    2  Vitamin D deficiency" ergocalciferol [id number] weeklyx weeks, daily cholecalciferol 800/1000/2000    Discussed with the patient and all questioned fully answered  She will call me if any problems arise

## 2022-08-01 NOTE — PROGRESS NOTES
Consultation - Jagdish Alonso 68 y o  female MRN: [id number]    Unit/Bed#:  Encounter: [id number]      Assessment/Plan     Assessment: This is a 68y o -year-old female with a history of osteopenia (previously on fosamax x 5yrs) presents for consult for osteoporosis referred by PCP  S/p recent fall in 05/2022 with MRI significant for compression fracture of T11 and T12 on multilevel degenerative changes  Various treatment options for osteoporosis were discussed and patient is in agreement with the plan  Plan:  - follow-up results of DEXA scan  - continue vitamin-D 1000 mg daily, continue current calcium/vitamin-D supplement 600-200 (patient takes adequate nutritional calcium daily)  - PTH ordered  - core strengthening exercises encouraged/referral to exercise program given to patient today    Consults    CC:osteoporosis    History of Present Illness     HPI: Jagdish Alonso is a 68y o  year old female consult for osteoporosis referred by her PCP  She had a recent fall (lost balance) while walking her dog in 05/2022, MRI done was significant for compression fracture of T11 and T12 and multilevel degenerative changes  DEXA scan 2018 suggestive of osteopenia with FRAX score 1 8%  She reports that she was previously on Fosamax (for 5 years)  but self discontinued about 10 years ago(due to concern of midshaft fracture)  Patient is postmenopausal and has a family history of osteoporosis (sister), denies use of tobacco products, occasionally drinks alcohol and smokes marijuana, she denies use of steroids or early menopause    Patient has a past medical history of CAD s/p CABG, depression, hypertension, hyperlipidemia and osteopenia  We discussed importance of core strengthening exercises, DEXA scan and treatment option for osteoporosis and she verbalized understanding       Review of Systems 10 point review of system unremarkable except that listed in my HPI above    Historical Information   Past Medical History: Diagnosis Date    Colon polyp     Coronary artery disease     Effusion of left knee     Last assessed - 9/10/15    History of transfusion     Hyperlipidemia     Hypertension     Myocardial infarction ([facility] [address] )     Tick bite     Last assessed - 4/21/17     Past Surgical History:   Procedure Laterality Date    APPENDECTOMY      COLONOSCOPY      LA CABG, ARTERY-VEIN, FOUR N/A 1/27/2020    Procedure: CORONARY ARTERY BYPASS GRAFT (CABG) x3 VESSELS, LIMA TO LAD, AND SVG TO PLB & OM;  Surgeon: Jovanni Natarajan DO;  [facility];  Service: Cardiac Surgery    LA ECHO TRANSESOPHAG R-T 2D W/PRB IMG ACQUISJ I&R N/A 1/27/2020    Procedure: TRANSESOPHAGEAL ECHOCARDIOGRAM (GET); Surgeon: Jovanni Natarajan DO;  [facility];  Service: Cardiac Surgery    LA ENDOSCOPY W/VIDEO-ASST VEIN HARVEST,CABG Left 1/27/2020    Procedure: HARVEST VEIN ENDOSCOPIC ([address]);   Surgeon: Jovanni Natarajan DO;  [facility];  Service: Cardiac Surgery    TONSILLECTOMY      Last assessed - 4/20/17    TUBAL LIGATION      Last assessed - 4/20/17    WISDOM TOOTH EXTRACTION      Last assessed - 4/20/17     Social History   Social History     Substance and Sexual Activity   Alcohol Use Yes    Comment: 1 wine nightly     Social History     Substance and Sexual Activity   Drug Use No     Social History     Tobacco Use   Smoking Status Never Smoker   Smokeless Tobacco Never Used     Family History:   Family History   Problem Relation Age of Onset    Coronary artery disease Mother     Arthritis Mother     Other Mother         Cardiac Disorder     Transient ischemic attack Mother     Heart attack Father     Sudden death Father         SCD    Cancer Daughter 52        kidney       Meds/Allergies   Current Outpatient Medications   Medication Sig Dispense Refill    aspirin (ECOTRIN LOW STRENGTH) 81 mg EC tablet Take 1 tablet (81 mg total) by mouth daily      Calcium Carb-Cholecalciferol 600-200 MG-UNIT TABS Calcium 600 + D TABS  TAKE 1 TABLET DAILY  Refills: 0    Active      cholecalciferol (VITAMIN D3) 1,000 units tablet Take 1,000 Units by mouth daily      losartan (COZAAR) 100 MG tablet TAKE ONE TABLET BY MOUTH EVERY DAY 30 tablet 11    melatonin 3 mg Take 3 mg by mouth daily at bedtime      metoprolol tartrate (LOPRESSOR) 25 mg tablet TAKE ONE-HALF TABLET BY MOUTH EVERY 12 HOURS 90 tablet 1    rosuvastatin (CRESTOR) 40 MG tablet Take 1 tablet (40 mg total) by mouth daily 90 tablet 3    ketoconazole (NIZORAL) 2 % cream APPLY TO AFFECTED AREA(S) ONCE DAILY (Patient not taking: No sig reported)      methocarbamol (ROBAXIN) 500 mg tablet Take 1 tablet (500 mg total) by mouth as needed in the morning and 1 tablet (500 mg total) as needed at noon and 1 tablet (500 mg total) as needed in the evening for muscle spasms  (Patient not taking: No sig reported) 40 tablet 1     No current facility-administered medications for this visit  No Known Allergies    Objective   Vitals: Blood pressure 116/60, pulse 64, weight 48 1 kg (106 lb)  [unfilled]  Invasive Devices  Report    Peripheral Intravenous Line  Duration           Peripheral IV 10/06/20 Left Antecubital 664 days    Peripheral IV 10/06/20 Left;Ventral (anterior) Forearm 664 days                Physical Exam  Vitals reviewed  HENT:      Head: Normocephalic and atraumatic  Mouth/Throat:      Mouth: Mucous membranes are moist    Eyes:      General: No scleral icterus  Extraocular Movements: Extraocular movements intact  Cardiovascular:      Rate and Rhythm: Normal rate and regular rhythm  Pulses: Normal pulses  Heart sounds: Normal heart sounds  Pulmonary:      Effort: Pulmonary effort is normal       Breath sounds: Normal breath sounds  Abdominal:      General: Abdomen is flat  Bowel sounds are normal       Palpations: Abdomen is soft  Musculoskeletal:         General: Deformity present  No swelling or tenderness  Normal range of motion  Cervical back: Normal range of motion and neck supple  Right lower leg: No edema  Left lower leg: No edema  Comments: + scoliosis   Skin:     General: Skin is dry  Capillary Refill: Capillary refill takes less than 2 seconds  Neurological:      General: No focal deficit present  Mental Status: She is alert and oriented to person, place, and time  Psychiatric:         Mood and Affect: Mood normal          Behavior: Behavior normal          The history was obtained from the review of the chart, patient  Lab Results:       Lab Results   Component Value Date    WBC 8 01 05/31/2022    HGB 13 1 05/31/2022    HCT 41 3 05/31/2022    MCV 99 (H) 05/31/2022     05/31/2022     Lab Results   Component Value Date/Time    BUN 18 05/31/2022 09:12 AM    BUN 18 03/03/2015 12:29 AM     03/03/2015 12:29 AM    K 4 0 05/31/2022 09:12 AM    K 3 5 (L) 03/03/2015 12:29 AM     05/31/2022 09:12 AM     03/03/2015 12:29 AM    CO2 29 05/31/2022 09:12 AM    CO2 26 01/27/2020 03:48 PM    CREATININE 0 67 05/31/2022 09:12 AM    CREATININE 0 45 (L) 03/03/2015 12:29 AM    AST 37 05/31/2022 09:12 AM    AST 26 03/03/2015 12:29 AM    ALT 42 05/31/2022 09:12 AM    ALT 22 03/03/2015 12:29 AM    ALB 3 6 05/31/2022 09:12 AM    ALB 3 5 03/03/2015 12:29 AM     No results for input(s): CHOL, HDL, LDL, TRIG, VLDL in the last 72 hours  No results found for: Bo Ceja  POC Glucose (mg/dl)   Date Value   02/03/2020 181 (H)   02/03/2020 97       Imaging Studies: I have personally reviewed pertinent reports  Portions of the record may have been created with voice recognition software

## 2022-08-02 ENCOUNTER — TELEPHONE (OUTPATIENT)
Dept: PAIN MEDICINE | Facility: CLINIC | Age: 77
End: 2022-08-02

## 2022-08-02 NOTE — TELEPHONE ENCOUNTER
Had improvement for 1-2 days but back to how she was      Relief: 25%  Pain level 6, when moving around

## 2022-08-05 ENCOUNTER — OFFICE VISIT (OUTPATIENT)
Dept: NEUROSURGERY | Facility: CLINIC | Age: 77
End: 2022-08-05
Payer: MEDICARE

## 2022-08-05 VITALS
BODY MASS INDEX: 18.27 KG/M2 | RESPIRATION RATE: 16 BRPM | HEIGHT: 64 IN | DIASTOLIC BLOOD PRESSURE: 56 MMHG | HEART RATE: 57 BPM | TEMPERATURE: 98 F | WEIGHT: 107 LBS | SYSTOLIC BLOOD PRESSURE: 108 MMHG

## 2022-08-05 DIAGNOSIS — G89.4 CHRONIC PAIN SYNDROME: ICD-10-CM

## 2022-08-05 DIAGNOSIS — M47.26 OSTEOARTHRITIS OF SPINE WITH RADICULOPATHY, LUMBAR REGION: ICD-10-CM

## 2022-08-05 DIAGNOSIS — S22.080A CLOSED WEDGE COMPRESSION FRACTURE OF T12 VERTEBRA, INITIAL ENCOUNTER (HCC): ICD-10-CM

## 2022-08-05 DIAGNOSIS — S22.080A CLOSED WEDGE COMPRESSION FRACTURE OF T11 VERTEBRA, INITIAL ENCOUNTER (HCC): Primary | ICD-10-CM

## 2022-08-05 DIAGNOSIS — M41.55 OTHER SECONDARY SCOLIOSIS, THORACOLUMBAR REGION: ICD-10-CM

## 2022-08-05 PROCEDURE — 99204 OFFICE O/P NEW MOD 45 MIN: CPT | Performed by: RADIOLOGY

## 2022-08-05 NOTE — PATIENT INSTRUCTIONS
Instructions for Continued care   Will wear brace for approximately 6-12 weeks, starting date  July 21  serial x-rays at every visit to assess healing of lumbar fracture   Continue TLSO brace when OOB or HOB > 45 degrees, standing or walking  Can remove brace when I bed    Pain control as per OTC medications use as needed for pain, acetaminophen 500 mg po every 4 hours or 1000 mg (2 tabs) every 8 hours  --tale as you currently reported   No driving while wearing brace  do not remove to drive until directed by neurosurgery  Thoracic and lumbar spine precautions to prevent fracture progression no bending at the waist , no lifting greater than 10 LBS , a carton of shana is 10 lbs , do not carry a purse or bag greater than 10 - 20 lbs  No pushing , pulling  twisting or turning  Follow-up in 4 weeks with lumbar spine x-ray or sooner if symptoms worsen   Contact neurosurgery immediately or go to closest ER If develop any worsening uncontrolled back or leg pain, leg weakness, paresthesia, bowel or bladder issues, ambulatory dysfunction  Contact neurosurgery with additional questions or concerns

## 2022-08-05 NOTE — PROGRESS NOTES
Assessment/Plan:    Closed wedge compression fracture of T11 vertebra (HCC)  · As addressed in HPI  · Prersents 12 weeks and 4 days post fall   · Assessment of both compression and lumbar spine issues  · Improved thoracic and right rib pain since physical therapy , no longer right scapular pain  · TLSO brace ordered by Dr Malvin Olea 7/26/22 after abnormal MRI Thoracic findings for compression FX  · NO palpable tenderness in mid to lower thoracic  · Reports significant improvement in pain since onset,  3/10 in back/mid to lower thoracic pain persist, denies numbness and tingling , subjective weakness left leg,   · Has consulted with ENDO , prior DXA 2018 osteopenia  Now with compression Fx = osteoporosis continue vitamin-D 1000 mg daily, continue current calcium/vitamin-D supplement 600-200   Labs ordered   · No difficulty with urination , no bowel incontinence ,   · Feels occasional unsteady balance, has left hip pain , scoliosis on imagining , physical assessment findings with left elevated iliac crest , right pelvis lower  · May 9 date patient reported she fell August 1 was 12 weeks since incident  MRI completed w/Dx of compression fracture 7/22/22, Dr Hogan Certain ordered TLSO brace 7/26/22, patient reported she did not start TLSO brace until yesterday  MRI 7/22 marrow edema and fracture line present suggesting  Fracture is recent or evolving T11 ans T12 findings since prior lumbar spine MRI June 1 22   Slight further loss of T 11  Imagining   · 7/22/22 MRI Thoracic spine Evolving compression deformities are seen at T11 inferior endplate and V88 superior endplate with slight further loss of height at T11 since the prior study  There is no retropulsion  No significant central or foraminal narrowing  Workstation performed:   · Discussed with Dr Sean Blancas --no surgical intervention w/ Kyphoplasty , pain symptoms improved 2/2 compression fractures  Need to reactivate cancelled consult with Dr Leonard Collazo  Complaints today more associated with chronic pain syndrome back with radiculopathy sciolosis , refer to MRI lumbar spine  · MRI Lumbar spine 6/1/22 Multilevel degenerative changes of lumbar spine with unchanged S-shaped thoracolumbar scoliosis, unchanged grade 1 anterolisthesis L5-S1, varying degrees of canal stenosis (multilevel mild) and foraminal narrowing (severe right L5-S1), as detailed above  Plan    · Reviewed imagining with patient    · Explained conservative management as per protocol, no neurosurgical intervention ,  wear brace for approximately 6-12 weeks, serial xray at specified  intervals, Explained TLSO use , wear when OOB w/ HOB > 45 degrees, standing or walking  Can remove brace when in bed  Reinforced pain management recommendations use acetaminophen 500 mg po every 4 hours or 1000 mg (2 tabs) every 8 hours, is taking prn  · No driving while wearing brace  do not remove to drive until directed by neurosurgery  She disputed this restriction, Is advised if she drive with or without brace this is her independent decision  · Explained thoracic spine precautions to prevent fracture progression Avoid bending, lifting greater than 10 -20 lbs, pushing , pulling, twisting   · Follow-up in 4 weeks with thoracic spine x-ray or sooner if symptoms worsen   · APPT snpx with Dr Fantasma Fletcher degenerative changed in [facility] 139 spine mri , mild S-shaped scoliosis of thoracolumbar spine (rightward curvature of thoracic spine and leftward curvature of lumbar spine)--chronic back pain complaints with left sided sciatica   · Contact neurosurgery immediately or go to closest ER If develop any worsening uncontrolled back or leg pain, leg weakness, paresthesia, bowel or bladder issues, ambulatory dysfunction     · Contact neurosurgery with additional questions or concerns  · Instructions for continued care documented in AVS    · At next visit assess starting TLSO brace wean, today patient 12 weeks post fall unknown exact date of compression fx MRI   Amanuel Manifold Amanuel Manifold Closed wedge compression fracture of T12 vertebra (HCC)  · As addressed in HPI  · As addressed in compression fracture closed T 11    PLAN  · As addressed in T11 compression fracture  Other secondary scoliosis, thoracolumbar region  · As addressed in HPI  · As addressed in T 11 compression fractre    Chronic pain syndrome  · As addressed in HPI  · As addressed in T 11 compression fractre      Osteoarthritis of spine with radiculopathy, lumbar region    · As addressed in HPI  · As addressed in T 11 compression fractre                 Closed wedge compression fracture of T11 vertebra, initial encounter ([facility] [address] )  -     Ambulatory referral to Neurosurgery      Subjective: Referral from pain management compression fracture  Patient ID: Maria A Velez is a 68 y o  female PMH CABG/MI 2021, HTN, CAD,palpations, anxiety, Raynaud's diseasescoliosis- thoracolumbar,     HPI   Fall May 9 2022 walking dog  Her dog she got into altercation with another dog, she continued holding the leash then fell backward landing flat on back  Had immediate onset of pain that worsened over the next few weeks with and did not resolve  As pain progressed started with pain underneath right ribs  thet progressed up into right scapula  She also reports pain in left hip with distribution down left lower extremity  Has pain under right rib and left  Leg  She scheduled an appointment with her PCP  PCP ordered Xray and MRI lumbar spine and simultaneously ordered referrals to PT and PM  Dr Nick Farfan --did not immediately attend  5/11/2021 x-ray for acute on chronic  bilateral low back pain   At onset ----10/10, wanted to scream  hadSevere pain with movement   Pain under ribs is not gone but much better     Characterization of symptoms reports pain as sharp and down left leg when chaning position lying or sitting to standing  Sharp shhtig instant the leaves when start walking, no numbness or tingling   Nagging pain in lower back for the past year, pain and weakness in the left leg, left hip pain  Multimodal treatments   · PT __Started physical therapy 6/6/2022 , attended about about 5 weeks 2 x per week  Achieved efficacy for relief low back about 75 % , but no improvement in back  Has persist low back pain, under right side rib no pain up into right scapula, stopped after a couple of weeks  Documented PT visits starting in January 2021 attended 2 x per week and documented through 3/2022  for DX of secondary scoliosis , chronic low back pain without sciatica, chronic postural dysfunction  As per excerpt for PT note 1/19/2021 notable R scoliosis with L elevated iliac crest which is likely causing further strain when pt attempts to perform lifting or tranfers especially when helping elderly mother  Weak hip and thigh muscles contributing to overall low back pain/strain with fatigue at end of day  · Orthopedics 7/13/22[facility] Dr Pamella Bai  left  hip and leg pain, degenerative lumbar disc , spinal stenosis lumbar spine left lumbar radiculopathy  Left hip increased pain with repetative bending and laying at night in low back and radiates into lateral hip, no groin pain increased  7/13/22 left hip xray Moderate degenerative changes with moderate loss of the joint space The asymptomatic right hip has slightly more narrowing than the symptomatic left hip her pain is most likely secondary to her lumbar pathology that is radiating into her lateral hip region  · PM, DR Swetha Valiente 7/20/22 consult positive GEENA ,left hip osteoarthritis, left hip intraarticular injection diagnostic and therapeutic (completed 7/20/22)   Thoracic nerve root irritation  Ordered MRI thoracic was complaining of right sided mid back  And rib pain  7/26/2022 PM telephoned patient MRI T/S w/ T 11 compression fracture  TLSO ordered  DXA orders  Referral to [address] [place] [address] Dr Carolin Shaw for possible kyphoplasty  Cancel current appointment w/ Dr Alberta Vale   Referral endo for osteoporosis  · Endocrine for osteopososis  8/1/2022 , prior HX of osteopenic on DXA 2018 ,previously  treated with alendronate but not in 10 years   No current osteoporosis RX  Richardvaishali Horta Evolving T11 and T12 compression fx  Is treating with CA and Vitamin D and dietary intake high CA foods  · Medicinal ---Medrol dose pack  5/18/2022  without relief      Social --81 YO mother resided in a [facility] , she recently provide physical care for her  Presents 12 weeks and 4 days post fall for initial neurosurgery visit     Dr Tracey Deal and  I have spent 60 minutes with the patient today in which greater than 50% of this time was spent in assessment, examination, impressions, reviewing imagining and recommendations for care  All questions were answered to his/her satisfaction, and contact information provided in the event additional questions arise  Patient acknowledged an understanding and agreement with plan  REVIEW OF SYSTEMS  Review of Systems   Constitutional: Negative  HENT: Negative  Eyes: Negative  Respiratory: Negative  Cardiovascular: Negative  Gastrointestinal: Negative  Endocrine: Negative  Genitourinary: Negative  Musculoskeletal: Positive for back pain (and some left leg pain when standing up, down to knee)  Negative for gait problem  Wearing brace   Skin: Negative  Allergic/Immunologic: Negative  Neurological: Positive for weakness (left leg)  Negative for dizziness, light-headedness and numbness  Some unsteady balance, occasional   Hematological: Bruises/bleeds easily (ASA 81)  Psychiatric/Behavioral:        Some memory difficulty, forgetfulness         Meds/Allergies     Current Outpatient Medications   Medication Sig Dispense Refill    aspirin (ECOTRIN LOW STRENGTH) 81 mg EC tablet Take 1 tablet (81 mg total) by mouth daily      Calcium Carb-Cholecalciferol 600-200 MG-UNIT TABS Calcium 600 + D TABS  TAKE 1 TABLET DAILY     Refills: 0    Active      cholecalciferol (VITAMIN D3) 1,000 units tablet Take 1,000 Units by mouth daily      losartan (COZAAR) 100 MG tablet TAKE ONE TABLET BY MOUTH EVERY DAY 30 tablet 11    melatonin 3 mg Take 3 mg by mouth daily at bedtime      metoprolol tartrate (LOPRESSOR) 25 mg tablet TAKE ONE-HALF TABLET BY MOUTH EVERY 12 HOURS 90 tablet 1    rosuvastatin (CRESTOR) 40 MG tablet Take 1 tablet (40 mg total) by mouth daily 90 tablet 3    ketoconazole (NIZORAL) 2 % cream APPLY TO AFFECTED AREA(S) ONCE DAILY (Patient not taking: No sig reported)      methocarbamol (ROBAXIN) 500 mg tablet Take 1 tablet (500 mg total) by mouth as needed in the morning and 1 tablet (500 mg total) as needed at noon and 1 tablet (500 mg total) as needed in the evening for muscle spasms  (Patient not taking: No sig reported) 40 tablet 1     No current facility-administered medications for this visit  No Known Allergies    PAST HISTORY    Past Medical History:   Diagnosis Date    Colon polyp     Coronary artery disease     Effusion of left knee     Last assessed - 9/10/15    History of transfusion     Hyperlipidemia     Hypertension     Myocardial infarction ([facility] [address] )     Tick bite     Last assessed - 4/21/17       Past Surgical History:   Procedure Laterality Date    APPENDECTOMY      COLONOSCOPY      DE CABG, ARTERY-VEIN, FOUR N/A 1/27/2020    Procedure: CORONARY ARTERY BYPASS GRAFT (CABG) x3 VESSELS, LIMA TO LAD, AND SVG TO PLB & OM;  Surgeon: Teresa Sanchez DO;  [facility];  Service: Cardiac Surgery    DE ECHO TRANSESOPHAG R-T 2D W/PRB IMG ACQUISJ I&R N/A 1/27/2020    Procedure: TRANSESOPHAGEAL ECHOCARDIOGRAM (GET); Surgeon: Teresa Sanchez DO;  [facility];  Service: Cardiac Surgery    DE ENDOSCOPY W/VIDEO-ASST VEIN HARVEST,CABG Left 1/27/2020    Procedure: HARVEST VEIN ENDOSCOPIC ([address]);   Surgeon: Teresa Sanchez DO;  [facility];  Service: Cardiac Surgery    TONSILLECTOMY      Last assessed - 4/20/17    TUBAL LIGATION      Last assessed - 4/20/17    WISDOM TOOTH EXTRACTION      Last assessed - 4/20/17       Social History     Tobacco Use    Smoking status: Never Smoker    Smokeless tobacco: Never Used   Vaping Use    Vaping Use: Never used   Substance Use Topics    Alcohol use: Yes     Comment: 1 wine nightly    Drug use: No       Family History   Problem Relation Age of Onset    Coronary artery disease Mother     Arthritis Mother     Other Mother         Cardiac Disorder     Transient ischemic attack Mother     Heart attack Father     Sudden death Father         SCD    Cancer Daughter 52        kidney       The following portions of the patient's history were reviewed and updated as appropriate: allergies, current medications, past family history, past medical history, past social history, past surgical history and problem list       EXAM    Vitals:Blood pressure 108/56, pulse 57, temperature 98 °F (36 7 °C), temperature source Tympanic, resp  rate 16, height 5' 4" (1 626 m), weight 48 5 kg (107 lb)  ,Body mass index is 18 37 kg/m²  Physical Exam  Vitals and nursing note reviewed  Constitutional:       General: She is not in acute distress  Appearance: Normal appearance  She is not ill-appearing, toxic-appearing or diaphoretic  HENT:      Head: Normocephalic and atraumatic  Pulmonary:      Effort: Pulmonary effort is normal  No respiratory distress  Musculoskeletal:         General: Deformity: thoracolumbar spine curvature, pelvis tilt  Right lower leg: No edema  Left lower leg: No edema  Comments: Limitation lumbar ROM scoliosis  Weak hip flexors -pain left hip   Skin:     General: Skin is warm and dry  Neurological:      Mental Status: She is alert and oriented to person, place, and time  Motor: No weakness        Gait: Gait abnormal and tandem walk abnormal       Deep Tendon Reflexes:      Reflex Scores:       Patellar reflexes are 2+ on the right side and 2+ on the left side  Achilles reflexes are 1+ on the right side and 1+ on the left side  Psychiatric:         Mood and Affect: Mood normal          Behavior: Behavior normal          Neurologic Exam     Mental Status   Oriented to person, place, and time  Level of consciousness: alert    Motor Exam   Right leg tone: normal  Left leg tone: normal    Strength   Right iliopsoas: 4/5  Left iliopsoas: 4/5  Right quadriceps: 5/5  Left quadriceps: 5/5  Right hamstrin/5  Left hamstrin/5  Right glutei: 4/5  Left glutei: 4/5  Right anterior tibial: 5/5  Left anterior tibial: 5/5  Right gastroc: 5/5  Left gastroc: 5/5    Sensory Exam   Light touch normal      Gait, Coordination, and Reflexes     Gait  Gait: (antalgic pelvis tilt on left elevated on right)    Coordination   Tandem walking coordination: abnormal    Reflexes   Right patellar: 2+  Left patellar: 2+  Right achilles: 1+  Left achilles: 1+  Right ankle clonus: absent  Left pendular knee jerk: absent      Imaging Studies  MRI thoracic spine wo contrast Result Date: 2022  Narrative: MRI THORACIC SPINE WITHOUT CONTRAST INDICATION: M51 14: Intervertebral disc disorders with radiculopathy, thoracic region  COMPARISON:  None  TECHNIQUE:  Sagittal T1, sagittal T2, sagittal inversion recovery, axial T2,  axial 2D MERGE  IMAGE QUALITY: Diagnostic  FINDINGS: ALIGNMENT: There is straightening of the thoracic kyphosis  MARROW SIGNAL:  There is compression deformity involving the inferior endplate of F77 with marrow edema present and fracture line present suggesting that this fracture is recent or evolving  Similar finding the superior endplate of S05  Slight further loss of height noted at T11 since the prior lumbar MRI of 2022  THORACIC CORD: Normal signal within the thoracic cord  PARAVERTEBRAL SOFT TISSUES:  Normal  THORACIC DEGENERATIVE CHANGE: No disc herniation, canal stenosis or foraminal narrowing  No degenerative changes  Impression: Evolving compression deformities are seen at T11 inferior endplate and R56 superior endplate with slight further loss of height at T11 since the prior study  There is no retropulsion  No significant central or foraminal narrowing  MRI LUMBAR SPINE WITHOUT CONTRAST 6/1/22       COMPARISON:  Lumbar spine radiograph May 11, 2022  CT chest without contrast April 21, 2016      TECHNIQUE:  Sagittal T1, sagittal T2, sagittal inversion recovery, axial T1 and axial T2, coronal T2     IMAGE QUALITY:  Diagnostic     FINDINGS:     VERTEBRAL BODIES:  There are 5 lumbar type vertebral bodies  Mild S-shaped scoliosis of thoracolumbar spine (rightward curvature of thoracic spine and leftward curvature of lumbar spine), unchanged  Grade 1 anterolisthesis L5-S1, unchanged      Type I Modic endplate changes I49-M75  Mixed type I/II Modic endplate change at N4-G8, L3-L4, and L5-S1  Otherwise, normal bone marrow signal      SACRUM:  Normal signal within the sacrum  No evidence of insufficiency or stress fracture      DISTAL CORD AND CONUS:  Normal size and signal within the distal cord and conus      PARASPINAL SOFT TISSUES:  Paraspinal soft tissues are unremarkable      LOWER THORACIC DISC SPACES:  Mild noncompressive lower thoracic degenerative change      LUMBAR DISC SPACES:  Multilevel osteophytes, degenerative endplate changes, disc height loss, and facet arthropathy      L1-L2: Diffuse disc bulge  Mild canal stenosis  Mild left foraminal narrowing      L2-L3: Diffuse disc bulge  Facet arthropathy and ligamentum flavum thickening  Mild canal stenosis  Mild bilateral foraminal narrowing      L3-L4: Diffuse disc bulge  Hypertrophic facet arthropathy with ligamentum flavum thickening  Mild canal stenosis  Moderate right and mild left foraminal narrowing    L4-L5: Diffuse disc bulge  Hypertrophic facet arthropathy with ligamentum flavum thickening  Mild canal stenosis    Mild right foraminal narrowing    L5-S1: Diffuse disc bulge, small right foraminal disc protrusion compressing right L5 exiting nerve  Hypertrophic facet arthropathy with ligamentum flavum thickening  Mild canal stenosis  Severe right and mild left foraminal narrowing       IMPRESSION:   Multilevel degenerative changes of lumbar spine with unchanged S-shaped thoracolumbar scoliosis, unchanged grade 1 anterolisthesis L5-S1, varying degrees of canal stenosis (multilevel mild) and foraminal narrowing (severe right L5-S1), as detailed above      FL spine and pain procedure Result Date: 7/26/2022  Narrative: Pre-procedure Diagnosis: 1  Primary osteoarthritis of left hip  Post-procedure Diagnosis: 1  Primary osteoarthritis of left hip  Operation Title(s):  1  Left hip intra-articular steroid injection     2  Intraoperative fluoroscopy Attending Surgeon:   Jaelyn Mckoy,    Left hip xray   Impression: Moderate degenerative changes with moderate loss of the joint   space  There is Mild osteophyte formation and subchondral sclerosis  No   fractures or dislocation   The asymptomatic right hip has slightly more   narrowing than the symptomatic left hip   This report is limited to   orthopaedic interpretation  I have personally reviewed pertinent reports     and I have personally reviewed pertinent films in PACS

## 2022-08-06 PROBLEM — S22.080A COMPRESSION FRACTURE OF T12 VERTEBRA (HCC): Status: ACTIVE | Noted: 2022-08-06

## 2022-08-06 PROBLEM — S22.080A CLOSED WEDGE COMPRESSION FRACTURE OF T12 VERTEBRA (HCC): Status: ACTIVE | Noted: 2022-08-06

## 2022-08-06 PROBLEM — M47.26 OSTEOARTHRITIS OF SPINE WITH RADICULOPATHY, LUMBAR REGION: Status: ACTIVE | Noted: 2022-08-06

## 2022-08-06 PROBLEM — M41.55 OTHER SECONDARY SCOLIOSIS, THORACOLUMBAR REGION: Status: ACTIVE | Noted: 2019-01-16

## 2022-08-06 PROBLEM — G89.4 CHRONIC PAIN SYNDROME: Status: ACTIVE | Noted: 2022-08-06

## 2022-08-06 NOTE — ASSESSMENT & PLAN NOTE
· As addressed in HPI  · As addressed in compression fracture closed T 11    PLAN  · As addressed in T11 compression fracture

## 2022-08-06 NOTE — ASSESSMENT & PLAN NOTE
As addressed in HPI  As addressed in compression fracture closed T 11    PLAN  As addressed in T11 compression fracture

## 2022-08-06 NOTE — ASSESSMENT & PLAN NOTE
· As addressed in HPI  · Prersents 12 weeks and 4 days post fall   · Assessment of both compression and lumbar spine issues  · Improved thoracic and right rib pain since physical therapy , no longer right scapular pain  · TLSO brace ordered by Dr Valentina Fox 7/26/22 after abnormal MRI Thoracic findings for compression FX  · NO palpable tenderness in mid to lower thoracic  · Reports significant improvement in pain since onset,  3/10 in back/mid to lower thoracic pain persist, denies numbness and tingling , subjective weakness left leg,   · Has consulted with ENDO , prior DXA 2018 osteopenia  Now with compression Fx = osteoporosis continue vitamin-D 1000 mg daily, continue current calcium/vitamin-D supplement 600-200   Labs ordered   · No difficulty with urination , no bowel incontinence ,   · Feels occasional unsteady balance, has left hip pain , scoliosis on imagining , physical assessment findings with left elevated iliac crest , right pelvis lower  · May 9 date patient reported she fell August 1 was 12 weeks since incident  MRI completed w/Dx of compression fracture 7/22/22, Dr Maile Brennan ordered TLSO brace 7/26/22, patient reported she did not start TLSO brace until yesterday  MRI 7/22 marrow edema and fracture line present suggesting  Fracture is recent or evolving T11 ans T12 findings since prior lumbar spine MRI June 1 22   Slight further loss of T 11  Imagining   · 7/22/22 MRI Thoracic spine Evolving compression deformities are seen at T11 inferior endplate and C61 superior endplate with slight further loss of height at T11 since the prior study  There is no retropulsion  No significant central or foraminal narrowing  Workstation performed:   · Discussed with Dr Dorado  --no surgical intervention w/ Kyphoplasty , pain symptoms improved 2/2 compression fractures  Need to reactivate cancelled consult with Dr Danie Jaeger   Complaints today more associated with chronic pain syndrome back with radiculopathy sciolosis , refer to MRI lumbar spine  · MRI Lumbar spine 6/1/22 Multilevel degenerative changes of lumbar spine with unchanged S-shaped thoracolumbar scoliosis, unchanged grade 1 anterolisthesis L5-S1, varying degrees of canal stenosis (multilevel mild) and foraminal narrowing (severe right L5-S1), as detailed above  Plan    · Reviewed imagining with patient    · Explained conservative management as per protocol, no neurosurgical intervention ,  wear brace for approximately 6-12 weeks, serial xray at specified  intervals, Explained TLSO use , wear when OOB w/ HOB > 45 degrees, standing or walking  Can remove brace when in bed  Reinforced pain management recommendations use acetaminophen 500 mg po every 4 hours or 1000 mg (2 tabs) every 8 hours, is taking prn  · No driving while wearing brace  do not remove to drive until directed by neurosurgery  She disputed this restriction, Is advised if she drive with or without brace this is her independent decision  · Explained thoracic spine precautions to prevent fracture progression Avoid bending, lifting greater than 10 -20 lbs, pushing , pulling, twisting   · Follow-up in 4 weeks with thoracic spine x-ray or sooner if symptoms worsen   · APPT snpx with Dr Carballo Mail degenerative changed in [facility] 139 spine mri , mild S-shaped scoliosis of thoracolumbar spine (rightward curvature of thoracic spine and leftward curvature of lumbar spine)--chronic back pain complaints with left sided sciatica   · Contact neurosurgery immediately or go to closest ER If develop any worsening uncontrolled back or leg pain, leg weakness, paresthesia, bowel or bladder issues, ambulatory dysfunction  · Contact neurosurgery with additional questions or concerns  · Instructions for continued care documented in AVS    · At next visit assess starting TLSO brace wean, today patient 12 weeks post fall unknown exact date of compression fx MRI   [facility]

## 2022-08-10 ENCOUNTER — APPOINTMENT (OUTPATIENT)
Dept: LAB | Facility: CLINIC | Age: 77
End: 2022-08-10
Payer: MEDICARE

## 2022-08-10 DIAGNOSIS — S22.080A CLOSED WEDGE COMPRESSION FRACTURE OF T11 VERTEBRA, INITIAL ENCOUNTER (HCC): ICD-10-CM

## 2022-08-10 DIAGNOSIS — M80.80XA LOCALIZED OSTEOPOROSIS WITH CURRENT PATHOLOGICAL FRACTURE, INITIAL ENCOUNTER: ICD-10-CM

## 2022-08-10 LAB — PTH-INTACT SERPL-MCNC: 34.5 PG/ML (ref 18.4–80.1)

## 2022-08-10 PROCEDURE — 36415 COLL VENOUS BLD VENIPUNCTURE: CPT

## 2022-08-10 PROCEDURE — 83970 ASSAY OF PARATHORMONE: CPT

## 2022-08-18 ENCOUNTER — OFFICE VISIT (OUTPATIENT)
Dept: FAMILY MEDICINE CLINIC | Facility: CLINIC | Age: 77
End: 2022-08-18
Payer: MEDICARE

## 2022-08-18 VITALS
HEART RATE: 65 BPM | OXYGEN SATURATION: 99 % | BODY MASS INDEX: 18.2 KG/M2 | SYSTOLIC BLOOD PRESSURE: 118 MMHG | DIASTOLIC BLOOD PRESSURE: 80 MMHG | WEIGHT: 106.6 LBS | TEMPERATURE: 97.1 F | HEIGHT: 64 IN | RESPIRATION RATE: 14 BRPM

## 2022-08-18 DIAGNOSIS — S22.080D CLOSED WEDGE COMPRESSION FRACTURE OF T11 VERTEBRA WITH ROUTINE HEALING, SUBSEQUENT ENCOUNTER: ICD-10-CM

## 2022-08-18 DIAGNOSIS — I10 ESSENTIAL HYPERTENSION: ICD-10-CM

## 2022-08-18 DIAGNOSIS — S22.080D CLOSED WEDGE COMPRESSION FRACTURE OF T12 VERTEBRA WITH ROUTINE HEALING, SUBSEQUENT ENCOUNTER: ICD-10-CM

## 2022-08-18 DIAGNOSIS — I25.118 CORONARY ARTERY DISEASE OF NATIVE ARTERY OF NATIVE HEART WITH STABLE ANGINA PECTORIS (HCC): ICD-10-CM

## 2022-08-18 DIAGNOSIS — R41.3 MEMORY CHANGES: Primary | ICD-10-CM

## 2022-08-18 DIAGNOSIS — D50.8 OTHER IRON DEFICIENCY ANEMIA: ICD-10-CM

## 2022-08-18 DIAGNOSIS — E53.8 B12 DEFICIENCY: ICD-10-CM

## 2022-08-18 DIAGNOSIS — R53.83 FATIGUE, UNSPECIFIED TYPE: ICD-10-CM

## 2022-08-18 PROCEDURE — 99214 OFFICE O/P EST MOD 30 MIN: CPT | Performed by: FAMILY MEDICINE

## 2022-08-18 NOTE — TELEPHONE ENCOUNTER
RN s/w pt and she reports 25% improvement with the Lt hip and leg  She has intermittent achy pain of the left hip and down the side of the left leg to the knee  Pain occurs when going from lying to sitting, sitting to lying and during the night, pain level 5/10  Pt is happy that she even got a little bit of relief  Pt said her back pain that she wears the back brace for is 50 % improved  Pt said she has an appt with Dr Macey Arevalo on 9/1/22

## 2022-08-18 NOTE — PROGRESS NOTES
FAMILY PRACTICE OFFICE VISIT       NAME: Micky Gillis  AGE: 68 y o  SEX: female       : 1945        MRN: [id number]    DATE: 2022  TIME: 10:58 AM    Assessment and Plan   1  Memory changes  Comments:  Pt stable; MOCA today reassuring  Will still pursue MRI of the Brain, and additional BW  Orders:  -     MRI brain for memory loss; Future; Expected date: 2022  -     TSH, 3rd generation with Free T4 reflex; Future  -     RPR; Future  -     Vitamin B12; Future  -     CBC and differential; Future  -     Basic metabolic panel; Future    2  Closed wedge compression fracture of T11 vertebra with routine healing, subsequent encounter  Comments:  Stable; pt feeling better slowly  Is now seeing Neurosurgery, Pain Management, and Endocrinology (t/c restarting Bisphosphonate, Prolia, etc)  3  Closed wedge compression fracture of T12 vertebra with routine healing, subsequent encounter  Comments:  As above  4  Coronary artery disease of native artery of native heart with stable angina pectoris ([facility] [address] )  Comments:  Stable; on Metoprolol, Rosuvastatin, and ASA  Continues f/u with Cardiology  5  Essential hypertension  Comments:  Controlled on present management  6  Fatigue, unspecified type  -     TSH, 3rd generation with Free T4 reflex; Future  -     RPR; Future  -     Vitamin B12; Future  -     CBC and differential; Future  -     Basic metabolic panel; Future    7  B12 deficiency  -     Vitamin B12; Future    8  Other iron deficiency anemia  -     CBC and differential; Future         There are no Patient Instructions on file for this visit  Chief Complaint     Chief Complaint   Patient presents with    Memory Loss     Patient being seen for memory testing        History of Present Illness   Micky Gillis is a 68y o -year-old female who presents today to discuss issues with memory  Had recent concerns about short term memory  Has been noticing over the last couple years        MOCA Assessment today: 28 out of 30  Pt is a college graduate  Ananth Gaspar has had issues with her back -> Pain Management had Ananth Gaspar have MRI of the thoracic pain, showing 2 additional compression fractures  Seeing Endo now, and then saw Neurosurgery - not candidate for kyphoplasty  Improving - in a brace  Review of Systems   Review of Systems   Constitutional: Negative for activity change  Respiratory: Negative for shortness of breath  Cardiovascular: Negative for chest pain  Musculoskeletal: Positive for back pain  Neurological: Negative for speech difficulty, weakness and headaches  Memory loss         Active Problem List     Patient Active Problem List   Diagnosis    Arthritis    Cervical myofascial pain syndrome    Cervical radiculopathy    Cervicogenic headache    Cervico-occipital neuralgia    Depression    Mixed hyperlipidemia    Essential hypertension    Osteopenia of spine    Spasmodic torticollis    Laceration of occipital scalp    Other secondary scoliosis, thoracolumbar region    Syncope    Coronary artery disease of native artery of native heart with stable angina pectoris (HCC)    S/P CABG x 3    Anemia    Thrombocytopenia (HCC)    Hyperchloremia    Chylothorax    Screening for colon cancer    History of colon polyps    Acute bilateral low back pain without sciatica    PAC (premature atrial contraction)    Primary osteoarthritis of left hip    Closed wedge compression fracture of T11 vertebra (HCC)    Closed wedge compression fracture of T12 vertebra (HCC)    Chronic pain syndrome    Osteoarthritis of spine with radiculopathy, lumbar region         Past Medical History:  Past Medical History:   Diagnosis Date    Colon polyp     Coronary artery disease     Effusion of left knee     Last assessed - 9/10/15    History of transfusion     Hyperlipidemia     Hypertension     Myocardial infarction ([address] )     Tick bite     Last assessed - 4/21/17       Past Surgical History:  Past Surgical History:   Procedure Laterality Date    APPENDECTOMY      COLONOSCOPY      AL CABG, ARTERY-VEIN, FOUR N/A 1/27/2020    Procedure: CORONARY ARTERY BYPASS GRAFT (CABG) x3 VESSELS, LIMA TO LAD, AND SVG TO PLB & OM;  Surgeon: Drew Man DO;  [facility];  Service: Cardiac Surgery    AL ECHO TRANSESOPHAG R-T 2D W/PRB IMG ACQUISJ I&R N/A 1/27/2020    Procedure: TRANSESOPHAGEAL ECHOCARDIOGRAM (GET); Surgeon: Drew Man DO;  [facility];  Service: Cardiac Surgery    AL ENDOSCOPY W/VIDEO-ASST VEIN HARVEST,CABG Left 1/27/2020    Procedure: HARVEST VEIN ENDOSCOPIC ([id number]); Surgeon: Drew Man DO;  [facility];  Service: Cardiac Surgery    TONSILLECTOMY      Last assessed - 4/20/17    TUBAL LIGATION      Last assessed - 4/20/17    WISDOM TOOTH EXTRACTION      Last assessed - 4/20/17       Family History:  Family History   Problem Relation Age of Onset    Coronary artery disease Mother     Arthritis Mother     Other Mother         Cardiac Disorder     Transient ischemic attack Mother     Heart attack Father     Sudden death Father         SCD    Cancer Daughter 52        kidney       Social History:  Social History     Socioeconomic History    Marital status:      Spouse name: Not on file    Number of children: 3    Years of education: Post Graduate    Highest education level: Not on file   Occupational History    Occupation:       Comment: P/T    Occupation: Retired     Comment: RN   Tobacco Use    Smoking status: Never Smoker    Smokeless tobacco: Never Used   Vaping Use    Vaping Use: Never used   Substance and Sexual Activity    Alcohol use: Yes     Comment: 1 wine nightly    Drug use: No    Sexual activity: Not on file   Other Topics Concern    Not on file   Social History Narrative    Living alone     Social Determinants of Health     Financial Resource Strain: Not on file   Food Insecurity: Not on file Transportation Needs: Not on file   Physical Activity: Not on file   Stress: Not on file   Social Connections: Not on file   Intimate Partner Violence: Not on file   Housing Stability: Not on file       Objective     Vitals:    08/18/22 0837   BP: 118/80   Pulse: 65   Resp: 14   Temp: (!) 97 1 °F (36 2 °C)   SpO2: 99%     Wt Readings from Last 3 Encounters:   08/18/22 48 4 kg (106 lb 9 6 oz)   08/05/22 48 5 kg (107 lb)   08/01/22 48 1 kg (106 lb)       Physical Exam  Vitals and nursing note reviewed  Constitutional:       General: She is not in acute distress  Appearance: Normal appearance  She is not ill-appearing, toxic-appearing or diaphoretic  Comments: Pt with back brace in place  HENT:      Head: Normocephalic and atraumatic  Eyes:      General: No scleral icterus  Conjunctiva/sclera: Conjunctivae normal    Neurological:      Mental Status: She is alert and oriented to person, place, and time  Psychiatric:         Mood and Affect: Mood normal          Behavior: Behavior normal          Thought Content:  Thought content normal          Judgment: Judgment normal          Pertinent Laboratory/Diagnostic Studies:  Lab Results   Component Value Date    GLUCOSE 138 01/27/2020    BUN 18 05/31/2022    CREATININE 0 67 05/31/2022    CALCIUM 9 5 05/31/2022     03/03/2015    K 4 0 05/31/2022    CO2 29 05/31/2022     05/31/2022     Lab Results   Component Value Date    ALT 42 05/31/2022    AST 37 05/31/2022    ALKPHOS 67 05/31/2022    BILITOT 0 2 03/03/2015       Lab Results   Component Value Date    WBC 8 01 05/31/2022    HGB 13 1 05/31/2022    HCT 41 3 05/31/2022    MCV 99 (H) 05/31/2022     05/31/2022       No results found for: TSH    No results found for: CHOL  Lab Results   Component Value Date    TRIG 128 05/31/2022     Lab Results   Component Value Date    HDL 65 05/31/2022     Lab Results   Component Value Date    LDLCALC 68 05/31/2022     No results found for: HGBA1C    Results for orders placed or performed in visit on 08/10/22   PTH, intact- Lab Collect   Result Value Ref Range    PTH 34 5 18 4 - 80 1 pg/mL       Orders Placed This Encounter   Procedures    MRI brain for memory loss    TSH, 3rd generation with Free T4 reflex    RPR    Vitamin B12    CBC and differential    Basic metabolic panel       ALLERGIES:  No Known Allergies    Current Medications     Current Outpatient Medications   Medication Sig Dispense Refill    aspirin (ECOTRIN LOW STRENGTH) 81 mg EC tablet Take 1 tablet (81 mg total) by mouth daily      Calcium Carb-Cholecalciferol 600-200 MG-UNIT TABS Calcium 600 + D TABS  TAKE 1 TABLET DAILY  Refills: 0    Active      cholecalciferol (VITAMIN D3) 1,000 units tablet Take 1,000 Units by mouth daily      losartan (COZAAR) 100 MG tablet TAKE ONE TABLET BY MOUTH EVERY DAY 30 tablet 11    melatonin 3 mg Take 3 mg by mouth daily at bedtime      metoprolol tartrate (LOPRESSOR) 25 mg tablet TAKE ONE-HALF TABLET BY MOUTH EVERY 12 HOURS 90 tablet 1    rosuvastatin (CRESTOR) 40 MG tablet Take 1 tablet (40 mg total) by mouth daily 90 tablet 3    ketoconazole (NIZORAL) 2 % cream APPLY TO AFFECTED AREA(S) ONCE DAILY (Patient not taking: No sig reported)      methocarbamol (ROBAXIN) 500 mg tablet Take 1 tablet (500 mg total) by mouth as needed in the morning and 1 tablet (500 mg total) as needed at noon and 1 tablet (500 mg total) as needed in the evening for muscle spasms  (Patient not taking: No sig reported) 40 tablet 1     No current facility-administered medications for this visit           Health Maintenance     Health Maintenance   Topic Date Due    Falls: Plan of Care  Never done    BMI: Followup Plan  11/19/2021    COVID-19 Vaccine (4 - Booster for Pfizer series) 01/30/2022    Influenza Vaccine (1) 09/01/2022    Urinary Incontinence Screening  12/16/2022    Medicare Annual Wellness Visit (AWV)  12/16/2022    Fall Risk  06/23/2023    BMI: Adult  08/18/2023    Breast Cancer Screening: Mammogram  04/02/2024    Colorectal Cancer Screening  06/15/2025    Hepatitis C Screening  Completed    Osteoporosis Screening  Completed    Pneumococcal Vaccine: 65+ Years  Completed    HIB Vaccine  Aged Out    Hepatitis B Vaccine  Aged Out    IPV Vaccine  Aged Out    Hepatitis A Vaccine  Aged Out    Meningococcal ACWY Vaccine  Aged Out    HPV Vaccine  Aged Out     Immunization History   Administered Date(s) Administered    COVID-19 PFIZER VACCINE 0 3 ML IM 01/15/2021, 02/04/2021, 09/30/2021    Hep A, adult 11/19/2015    INFLUENZA 10/01/2012, 10/10/2013, 10/10/2013, 10/01/2014, 10/01/2014, 10/01/2015, 10/16/2015, 10/01/2017, 10/20/2017, 11/06/2019, 10/15/2020, 11/10/2021    Influenza Quadrivalent Preservative Free 3 years and older IM 10/01/2016, 10/20/2017    Influenza, high dose seasonal 0 7 mL 11/21/2018    Pneumococcal Conjugate 13-Valent 05/21/2019    Pneumococcal Polysaccharide PPV23 05/02/2011    Tdap 11/19/2015, 09/01/2018    Typhoid Live, oral 11/19/2015    Typhoid, Unspecified 11/19/2015    Zoster 07/11/2018    Zoster Vaccine Recombinant 11/27/2018          Anais Spray, DO

## 2022-08-25 ENCOUNTER — HOSPITAL ENCOUNTER (OUTPATIENT)
Dept: RADIOLOGY | Facility: HOSPITAL | Age: 77
Discharge: HOME | End: 2022-08-25
Payer: MEDICARE

## 2022-08-25 DIAGNOSIS — M80.80XA LOCALIZED OSTEOPOROSIS WITH CURRENT PATHOLOGICAL FRACTURE, INITIAL ENCOUNTER: ICD-10-CM

## 2022-08-25 DIAGNOSIS — S22.080A CLOSED WEDGE COMPRESSION FRACTURE OF T11 VERTEBRA, INITIAL ENCOUNTER (HCC): ICD-10-CM

## 2022-08-25 PROCEDURE — 77080 DXA BONE DENSITY AXIAL: CPT

## 2022-08-29 ENCOUNTER — HOSPITAL ENCOUNTER (OUTPATIENT)
Dept: RADIOLOGY | Facility: HOSPITAL | Age: 77
Discharge: HOME | End: 2022-08-29
Payer: MEDICARE

## 2022-08-29 DIAGNOSIS — S22.080A CLOSED WEDGE COMPRESSION FRACTURE OF T11 VERTEBRA, INITIAL ENCOUNTER (HCC): ICD-10-CM

## 2022-08-29 PROCEDURE — 72072 X-RAY EXAM THORAC SPINE 3VWS: CPT

## 2022-09-01 ENCOUNTER — OFFICE VISIT (OUTPATIENT)
Dept: NEUROSURGERY | Facility: CLINIC | Age: 77
End: 2022-09-01
Payer: MEDICARE

## 2022-09-01 ENCOUNTER — APPOINTMENT (OUTPATIENT)
Dept: LAB | Facility: CLINIC | Age: 77
End: 2022-09-01
Payer: MEDICARE

## 2022-09-01 ENCOUNTER — OFFICE VISIT (OUTPATIENT)
Dept: ENDOCRINOLOGY | Facility: CLINIC | Age: 77
End: 2022-09-01
Payer: MEDICARE

## 2022-09-01 VITALS
SYSTOLIC BLOOD PRESSURE: 142 MMHG | HEIGHT: 64 IN | OXYGEN SATURATION: 99 % | DIASTOLIC BLOOD PRESSURE: 86 MMHG | HEART RATE: 54 BPM | WEIGHT: 108 LBS | RESPIRATION RATE: 16 BRPM | BODY MASS INDEX: 18.44 KG/M2 | TEMPERATURE: 97.8 F

## 2022-09-01 VITALS — SYSTOLIC BLOOD PRESSURE: 118 MMHG | DIASTOLIC BLOOD PRESSURE: 64 MMHG | HEART RATE: 60 BPM

## 2022-09-01 DIAGNOSIS — S22.080A CLOSED WEDGE COMPRESSION FRACTURE OF T11 VERTEBRA, INITIAL ENCOUNTER (HCC): ICD-10-CM

## 2022-09-01 DIAGNOSIS — M41.55 OTHER SECONDARY SCOLIOSIS, THORACOLUMBAR REGION: ICD-10-CM

## 2022-09-01 DIAGNOSIS — M41.9 SCOLIOSIS OF THORACOLUMBAR SPINE: ICD-10-CM

## 2022-09-01 DIAGNOSIS — E55.9 VITAMIN D DEFICIENCY: ICD-10-CM

## 2022-09-01 DIAGNOSIS — S22.080A CLOSED WEDGE COMPRESSION FRACTURE OF T11 VERTEBRA, INITIAL ENCOUNTER (HCC): Primary | ICD-10-CM

## 2022-09-01 DIAGNOSIS — M80.80XA LOCALIZED OSTEOPOROSIS WITH CURRENT PATHOLOGICAL FRACTURE, INITIAL ENCOUNTER: ICD-10-CM

## 2022-09-01 DIAGNOSIS — S22.080A CLOSED WEDGE COMPRESSION FRACTURE OF T12 VERTEBRA, INITIAL ENCOUNTER (HCC): ICD-10-CM

## 2022-09-01 LAB — 25(OH)D3 SERPL-MCNC: 36.5 NG/ML (ref 30–100)

## 2022-09-01 PROCEDURE — 82784 ASSAY IGA/IGD/IGG/IGM EACH: CPT

## 2022-09-01 PROCEDURE — 86364 TISS TRNSGLTMNASE EA IG CLAS: CPT

## 2022-09-01 PROCEDURE — 86231 EMA EACH IG CLASS: CPT

## 2022-09-01 PROCEDURE — 82306 VITAMIN D 25 HYDROXY: CPT

## 2022-09-01 PROCEDURE — 99214 OFFICE O/P EST MOD 30 MIN: CPT | Performed by: NURSE PRACTITIONER

## 2022-09-01 PROCEDURE — 84165 PROTEIN E-PHORESIS SERUM: CPT

## 2022-09-01 PROCEDURE — 36415 COLL VENOUS BLD VENIPUNCTURE: CPT

## 2022-09-01 PROCEDURE — 99214 OFFICE O/P EST MOD 30 MIN: CPT | Performed by: INTERNAL MEDICINE

## 2022-09-01 PROCEDURE — 86258 DGP ANTIBODY EACH IG CLASS: CPT

## 2022-09-01 PROCEDURE — 99204 OFFICE O/P NEW MOD 45 MIN: CPT | Performed by: INTERNAL MEDICINE

## 2022-09-01 NOTE — ASSESSMENT & PLAN NOTE
As addressed in HPI   · Arrived wearing TLSO brace   · She presents now 6 weeks post MRI imagining that diagnosed compression fracture done on 7/22/21   Dr Breanne Musa ordered TLSO brace  7/26/22  · Patient started wearing  TLSO brace  8/5/22, day of neuro surgrey appointment   · No palpable tenderness in thoracic area  · No thoracic radiculopathy  · Endo planning to star Prolia as per patient   She is currently treating with  Calcium carbonate and vitamin D  DXA 8/25/22 osteoprnia  · Pain is not related to compression fracture area is related to thoracolumbar scoliosis/ Left hip pain w/ recent pain management injection  · Denies bowel or bladder dysfunction , no gait instability   Denies falls  · She reports a sense of losing musculature tome in spine and core since wearing brace  Imagining  8/31/22-XR spine thoracic vw- Moderate inferior endplate compression deformity at T11, unchanged No acute osseous abnormality  Multilevel degenerative thoracic spondylosis There is no displacement of the paraspinal line  7/21/22 MRI thoracic spine wo contrast  There is compression deformity involving the inferior endplate of O00 with marrow edema present and fracture line present suggesting that this fracture is recent or evolving  Similar finding the superior endplate of D13  Slight further   loss of height noted at T11 since the prior lumbar MRI of June 1, 2022        Plan   Dr Niko Limon met with patient   · He concurs pain is not from the compression fracture, pain is from thoracolumbar scoliosis  · Need to start PT for scoliosis  · TLSO brace indicated for compression fracture but imposes negatively of core and back musculature/soleus strengthening, brace causes back muscles to weaken  · PT ordered   Patient will decide when she will start , wear TLSO during therapy, remove when not in therapy    · RTO in 4 weeks w/ imagining in no significant deterioration in wedge compression fracture will start brace wean on a less stringent schedule as she will start decreasing wearing now  · Ordered Xray thoracolumbar spine , complete 1-2 days prior next schedule visit in 4 weeks , 10 weeks post dx injury

## 2022-09-01 NOTE — PROGRESS NOTES
Assessment/Plan:    Closed wedge compression fracture of T12 vertebra (HCC)  · As addressed in HPI  · As addressed in T11 Compression fracture  Other secondary scoliosis, thoracolumbar region  · As addressed in hPI   · Current home exercise regimen consist of resistance band  q od Walk 1 5- 2 miles per day       IMAGINING  11/27/2018 X-ray entire spine scoliosis   Dextroscoliosis noted of the thoracolumbar spine with Rader angle of 20 7 degrees as measured from the superior endplate of A43 to the inferior endplate of L3  Scattered degenerative spurring is noted along the lumbar spine  There is approximately 6 to 7 mm of left superior pelvic tilt  Calcified granuloma is noted in the right midlung    IMPRESSION:   1   Dextroscoliosis noted of the thoracolumbar spine with a Rader angle of 20 7 degrees  2  Left superior pelvic tilt  Plan   · As per discussion Dr Macey Arevalo had with patient  · thoracolumbar scoliosis is causing current pain complaints, Dr Lew Sport advised patient of the same during last visit       · Start physical therapy as soon as possible , referral to  physical therapy as per protocol for scoliosis  Evaluate and Treat ---Evaluate and Treat scoliosis thoracolumbar spine --need specific therapy for core muscle strengthening, strengthening soleus muscle and all back muscle  Need to strengthen muscles to diminish insult brace is imposing of  Associated musculature that aids in reducing spine discomfort for scoliosis  PATIENT SHOULD WEAR TLSO BRACE DURING THERAPY  · Patient will decide when she is ready to start physical therapy  She is now 6 weeks status post MRI imagining that diagnosed compression fracture on July 22 2022           Closed wedge compression fracture of T11 vertebra ([address] )  As addressed in HPI   · Arrived wearing TLSO brace   · She presents now 6 weeks post MRI imagining that diagnosed compression fracture done on 7/22/21   Dr Fredis Staton ordered TLSO brace  7/26/22     · Patient started wearing  TLSO brace  8/5/22, day of neuro surgrey appointment   · No palpable tenderness in thoracic area  · No thoracic radiculopathy  · Endo planning to star [facility] as per patient   She is currently treating with  Calcium carbonate and vitamin D  DXA 8/25/22 osteoprnia  · Pain is not related to compression fracture area is related to thoracolumbar scoliosis/ Left hip pain w/ recent pain management injection  · Denies bowel or bladder dysfunction , no gait instability   Denies falls  · She reports a sense of losing musculature tome in spine and core since wearing brace  Imagining  8/31/22-XR spine thoracic vw- Moderate inferior endplate compression deformity at T11, unchanged No acute osseous abnormality  Multilevel degenerative thoracic spondylosis There is no displacement of the paraspinal line  7/21/22 MRI thoracic spine wo contrast  There is compression deformity involving the inferior endplate of M57 with marrow edema present and fracture line present suggesting that this fracture is recent or evolving  Similar finding the superior endplate of V10  Slight further   loss of height noted at T11 since the prior lumbar MRI of June 1, 2022        Plan   Dr Macey Arevalo met with patient   · He concurs pain is not from the compression fracture, pain is from thoracolumbar scoliosis  · Need to start PT for scoliosis  · TLSO brace indicated for compression fracture but imposes negatively of core and back musculature/soleus strengthening, brace causes back muscles to weaken  · PT ordered   Patient will decide when she will start , wear TLSO during therapy, remove when not in therapy  · RTO in 4 weeks w/ imagining in no significant deterioration in wedge compression fracture will start brace wean on a less stringent schedule as she will start decreasing wearing now  · Ordered Xray thoracolumbar spine , complete 1-2 days prior next schedule visit in 4 weeks , 10 weeks post dx injury  Subjective: f/u visit compression fracture  and thoracolumbar  scoliosis    Patient ID: Sheryle Latina is a 68 y o  female     HPI   Fall May 9 2022 walking dog  Her dog she got into altercation with another dog, she continued holding the leash then fell backward landing flat on back  Had immediate onset of pain that worsened over the next few weeks with and did not resolve  As pain progressed started with pain underneath right ribs  thet progressed up into right scapula  She also reports pain in left hip with distribution down left lower extremity  Has pain under right rib and left  Leg  She scheduled an appointment with her PCP  PCP ordered Xray and MRI lumbar spine and simultaneously ordered referrals to PT and PM  Dr Ga Hill --did not immediately attend  5/11/2021 x-ray for acute on chronic  bilateral low back pain   At onset ----10/10, wanted to scream  hadSevere pain with movement   Pain under ribs is not gone but much better      Characterization of symptoms reports pain as sharp and down left leg when chaning position lying or sitting to standing  Sharp shhtig instant the leaves when start walking, no numbness or tingling  Nagging pain in lower back for the past year, pain and weakness in the left leg, left hip pain      Multimodal treatments   · PT __Started physical therapy 6/6/2022 , attended about about 5 weeks 2 x per week  Achieved efficacy for relief low back about 75 % , but no improvement in back  Has persist low back pain, under right side rib no pain up into right scapula, stopped after a couple of weeks  Documented PT visits starting in January 2021 attended 2 x per week and documented through 3/2022  for DX of secondary scoliosis , chronic low back pain without sciatica, chronic postural dysfunction   As per excerpt for PT note 1/19/2021 notable R scoliosis with L elevated iliac crest which is likely causing further strain when pt attempts to perform lifting or tranfers especially when helping elderly mother  Weak hip and thigh muscles contributing to overall low back pain/strain with fatigue at end of day  · Orthopedics 7/13/22[facility] Dr Meri Mendoza  left  hip and leg pain, degenerative lumbar disc , spinal stenosis lumbar spine left lumbar radiculopathy  Left hip increased pain with repetative bending and laying at night in low back and radiates into lateral hip, no groin pain increased  7/13/22 left hip xray Moderate degenerative changes with moderate loss of the joint space The asymptomatic right hip has slightly more narrowing than the symptomatic left hip her pain is most likely secondary to her lumbar pathology that is radiating into her lateral hip region  · PM, DR Mai Favors 7/20/22 consult positive GEENA ,left hip osteoarthritis, left hip intraarticular injection diagnostic and therapeutic (completed 7/20/22)   Thoracic nerve root irritation  Ordered MRI thoracic was complaining of right sided mid back  And rib pain  7/26/2022 PM telephoned patient MRI T/S w/ T 11 compression fracture  TLSO ordered  DXA orders  Referral to [address] [place] [address] Dr Tam Lamar for possible kyphoplasty  Cancel current appointment w/ Dr Edmond Langley  Referral endo for osteoporosis  Interventional RX 7/26/2022 Left hip intra-articular steroid injection for primary osteoarthritis left hip, reported 25 % efficacy  · Endocrine for osteopososis  8/1/2022 , prior HX of osteopenic on DXA 2018 ,previously  treated with alendronate but not in 10 years   No current osteoporosis RX  Amanuel Manifold Evolving T11 and T12 compression fx  Is treating with CA and Vitamin D and dietary intake high CA foods     · Medicinal ---Medrol dose pack  5/18/2022  without relief        Social --79 YO mother resided in a [facility] , she recently provide physical care for her       Presents 12 weeks and 4 days post fall for initial neurosurgery visit     Dr Edmond Langley and  I have spent 30 minutes with the patient today in which greater than 50% of this time was spent in assessment, examination, impressions, reviewing imagining and recommendations for care  All questions were answered to his/her satisfaction, and contact information provided in the event additional questions arise  Patient acknowledged an understanding and agreement with plan           REVIEW OF SYSTEMS  Review of Systems   Constitutional: Positive for activity change (Decreases EOD)  HENT: Negative  Eyes: Negative  Respiratory: Negative  Cardiovascular: Negative  Negative for leg swelling  Gastrointestinal: Negative  Negative for constipation, nausea and vomiting  Endocrine: Negative  Genitourinary: Negative  Negative for frequency and urgency  Musculoskeletal: Positive for arthralgias (right shoulder blade (worse EOD)) and back pain (Presents in back brace  Constant (Daily and worse EOD) b/l lower back and radiates only into her left leg )  Negative for gait problem, joint swelling and myalgias  Presents in back brace  Pain is currently 3/10 in b/l lower back and right shoulder blade  Patient describes pain as tolerable  PT - last session June 2022 (lower back) -- somewhat helpful  PM - yes  INJ (1 session) - Left hip (July 26, 2022) - not helpful    No previous back Sx  No recent fall   Skin: Negative  Allergic/Immunologic: Negative  Neurological: Positive for weakness (left leg)  Negative for dizziness, light-headedness, numbness and headaches  Some unsteady balance, occasional   Hematological: Bruises/bleeds easily (ASA 81)  Psychiatric/Behavioral: Positive for sleep disturbance (due to b/l legs aching)  Some memory difficulty, forgetfulness         Meds/Allergies     Current Outpatient Medications   Medication Sig Dispense Refill    aspirin (ECOTRIN LOW STRENGTH) 81 mg EC tablet Take 1 tablet (81 mg total) by mouth daily      Calcium Carb-Cholecalciferol 600-200 MG-UNIT TABS Calcium 600 + D TABS  TAKE 1 TABLET DAILY     Refills: 0    Active      ketoconazole (NIZORAL) 2 % cream APPLY TO AFFECTED AREA(S) ONCE DAILY      losartan (COZAAR) 100 MG tablet TAKE ONE TABLET BY MOUTH EVERY DAY 30 tablet 11    melatonin 3 mg Take 3 mg by mouth daily at bedtime      metoprolol tartrate (LOPRESSOR) 25 mg tablet TAKE ONE-HALF TABLET BY MOUTH EVERY 12 HOURS 90 tablet 1    rosuvastatin (CRESTOR) 40 MG tablet Take 1 tablet (40 mg total) by mouth daily 90 tablet 3    VITAMIN D, CHOLECALCIFEROL, PO Take 2,600 Units/day by mouth      methocarbamol (ROBAXIN) 500 mg tablet Take 1 tablet (500 mg total) by mouth as needed in the morning and 1 tablet (500 mg total) as needed at noon and 1 tablet (500 mg total) as needed in the evening for muscle spasms  (Patient not taking: No sig reported) 40 tablet 1     No current facility-administered medications for this visit  No Known Allergies    PAST HISTORY    Past Medical History:   Diagnosis Date    Colon polyp     Coronary artery disease     Effusion of left knee     Last assessed - 9/10/15    History of transfusion     Hyperlipidemia     Hypertension     Myocardial infarction [address] [address] )     Tick bite     Last assessed - 4/21/17       Past Surgical History:   Procedure Laterality Date    APPENDECTOMY      COLONOSCOPY      AR CABG, ARTERY-VEIN, FOUR N/A 1/27/2020    Procedure: CORONARY ARTERY BYPASS GRAFT (CABG) x3 VESSELS, LIMA TO LAD, AND SVG TO PLB & OM;  Surgeon: Abigail Rodriguez DO;  [facility];  Service: Cardiac Surgery    AR ECHO TRANSESOPHAG R-T 2D W/PRB IMG ACQUISJ I&R N/A 1/27/2020    Procedure: TRANSESOPHAGEAL ECHOCARDIOGRAM (GET); Surgeon: Abigail Rodriguez DO;  [facility];  Service: Cardiac Surgery    AR ENDOSCOPY W/VIDEO-ASST VEIN HARVEST,CABG Left 1/27/2020    Procedure: HARVEST VEIN ENDOSCOPIC ([id number]);   Surgeon: Abigail Rodriguez DO;  [facility];  Service: Cardiac Surgery    TONSILLECTOMY      Last assessed - 4/20/17    TUBAL LIGATION      Last assessed - 4/20/17    GABRIELLA TOOTH EXTRACTION      Last assessed - 4/20/17       Social History     Tobacco Use    Smoking status: Never Smoker    Smokeless tobacco: Never Used   Vaping Use    Vaping Use: Never used   Substance Use Topics    Alcohol use: Yes     Comment: 1 wine nightly    Drug use: No       Family History   Problem Relation Age of Onset    Coronary artery disease Mother     Arthritis Mother     Other Mother         Cardiac Disorder     Transient ischemic attack Mother     Heart attack Father     Sudden death Father         SCD    Cancer Daughter 52        kidney       The following portions of the patient's history were reviewed and updated as appropriate: allergies, current medications, past family history, past medical history, past social history, past surgical history and problem list       EXAM    Vitals:Blood pressure 142/86, pulse (!) 54, temperature 97 8 °F (36 6 °C), temperature source Probe, resp  rate 16, height 5' 4" (1 626 m), weight 49 kg (108 lb), SpO2 99 %  ,Body mass index is 18 54 kg/m²  Physical Exam    Neurologic Exam    Imaging Studies  XR spine thoracic 3 vw  8/31/2022  Narrative: THORACIC SPINE INDICATION:   S22 080A: Wedge compression fracture of T11-T12 vertebra, initial encounter for closed fracture  COMPARISON:  7/21/2022 MRI VIEWS:  XR SPINE THORACIC 3 VW Images: 2 FINDINGS: Persistent moderate inferior endplate compression fracture at T11, unchanged Thoracic vertebral alignment is within normal limits  Multilevel degenerative thoracic spondylosis There is no displacement of the paraspinal line  The pedicles appear intact  Status post CABG     Impression: Moderate inferior endplate compression deformity at T11, unchanged No acute osseous abnormality  Workstation performed: [id number]     DXA bone density spine hip and pelvis    Result Date: 8/25/2022  Narrative: DXA SCAN CLINICAL HISTORY:  70-year-old postmenopausal female   OTHER RISK FACTORS:  History of fracture resulting from minor injury  Current cigarette smoker  PHARMACOLOGIC THERAPY FOR OSTEOPOROSIS:  None currently  TECHNIQUE: Bone densitometry was performed using a [employer]   bone densitometer  Regions of interest appear properly placed  COMPARISON: 2/16/2018  RESULTS: LUMBAR SPINE Level: L1, L4   (L2, L3 vertebrae excluded from analysis due to local structural abnormalities or artifact) : BMD:  1 077  gm/cm2 T-score: -0 8 These values may be artifactually elevated due to the presence of scoliosis with spondylosis  LEFT TOTAL HIP: BMD: 0 839  gm/cm2 T-score: -1 3 LEFT FEMORAL NECK: BMD: 0 819  gm/cm2 T-score: -1 6 RIGHT TOTAL HIP: BMD:  0 811  gm/cm2 T-score: -1 6 RIGHT FEMORAL NECK: BMD:  0 766  gm/cm2 T score: -2 0 LEFT  FOREARM:  33% RADIUS BMD:  0 725  gm/cm2  T-score: -1 7     Impression: 1  Low bone mass (osteopenia)  2   Since a DXA study from 2/16/2018, there has been: A  STATISTICALLY SIGNIFICANT DECREASE in bone mineral density of  0 116 g/cm2 (9 7)% in the lumbar spine  A  STATISTICALLY SIGNIFICANT DECREASE in bone mineral density of  0 040 g/cm2 (4 6)% in the hips  3   The 10 year risk of hip fracture is 7 0% with the 10 year risk of major osteoporotic fracture being 17 5% as calculated by the [facility]/WHO fracture risk assessment tool (FRAX)  4   The current NOF guidelines recommend treating patients with a T-score of -2 5 or less in the lumbar spine or hips, or in post-menopausal women and men over the age of 48 with low bone mass (osteopenia) and a FRAX 10 year risk score of >3% for hip fracture and/or >20% for major osteoporotic fracture  5   The NOF recommends follow-up DXA in 1-2 years after initiating therapy for osteoporosis and every 2 years thereafter  More frequent evaluation is appropriate for patients with conditions associated with rapid bone loss, such as glucocorticoid therapy   The interval between DXA screenings may be longer for individuals without major risk factors and initial T-score in the normal or upper low bone mass range  The FRAX algorithm has certain limitations: -FRAX has not been validated in patients currently or previously treated with pharmacotherapy for osteoporosis  In such patients, clinical judgment must be exercised in interpreting FRAX scores  -Prior hip, vertebral and humeral fragility fractures appear to confer greater risk of subsequent fracture than fractures at other sites (this is especially true for individuals with severe vertebral fractures), but quantification of this incremental risk is not possible with FRAX  -FRAX underestimates fracture risk in patients with history of multiple fragility fractures  -FRAX may underestimate fracture risk in patients with history of frequent falls  -It is not appropriate to use FRAX to monitor treatment response  WHO CLASSIFICATION: Normal (a T-score of -1 0 or higher) Low bone mineral density (a T-score of less than -1 0 but higher than -2 5) Osteoporosis (a T-score of -2 5 or less) Severe osteoporosis (a T-score of -2 5 or less with a fragility fracture) LEAST SIGNIFICANT CHANGE AT 95% C I: Lumbar spine: 0 036 gm/cm2 (3 2%)  Total hip: 0 018 gm/cm2 (2 0%)  Forearm: 0 024 gm/cm2 (3 2%)  Workstation performed: [id number]       I have personally reviewed pertinent reports     and I have personally reviewed pertinent films in PACS

## 2022-09-01 NOTE — ASSESSMENT & PLAN NOTE
· As addressed in hPI   · Current home exercise regimen consist of resistance band  q od Walk 1 5- 2 miles per day       IMAGINING  11/27/2018 X-ray entire spine scoliosis   Dextroscoliosis noted of the thoracolumbar spine with Rader angle of 20 7 degrees as measured from the superior endplate of K68 to the inferior endplate of L3  Scattered degenerative spurring is noted along the lumbar spine  There is approximately 6 to 7 mm of left superior pelvic tilt  Calcified granuloma is noted in the right midlung    IMPRESSION:   1   Dextroscoliosis noted of the thoracolumbar spine with a Rader angle of 20 7 degrees  2  Left superior pelvic tilt  Plan   · As per discussion Dr Fantasma Fletcher had with patient  · thoracolumbar scoliosis is causing current pain complaints, Dr More Urbina advised patient of the same during last visit       · Start physical therapy as soon as possible , referral to  physical therapy as per protocol for scoliosis  Evaluate and Treat ---Evaluate and Treat scoliosis thoracolumbar spine --need specific therapy for core muscle strengthening, strengthening soleus muscle and all back muscle  Need to strengthen muscles to diminish insult brace is imposing of  Associated musculature that aids in reducing spine discomfort for scoliosis  PATIENT SHOULD WEAR TLSO BRACE DURING THERAPY  · Patient will decide when she is ready to start physical therapy   She is now 6 weeks status post MRI imagining that diagnosed compression fracture on July 22 2022

## 2022-09-01 NOTE — PROGRESS NOTES
Maggie Harley 68 y o  female MRN: [id number]    Encounter: [id number]      Assessment/Plan     Assessment: This is a 68y o -year-old female with osteoporosis     Plan:    Diagnoses and all orders for this visit:    Closed wedge compression fracture of T11 vertebra, initial encounter ([facility] )    Based on history of which compression fracture of T11 vertebra, patient meets the criteria for medical management  As patient gives history of coronary artery disease,will not consider evenity, and patient does not prefer to take injections on daily basis, such as teriparatide  Discussed the option of Prolia injection every 6 months, as well as Reclast once a year  Discussed the pros and cons of medical management  Patient is agreeable to take Prolia injection every 6 months  Will fill out the paperwork once the blood work is done  Will order following blood work to rule out other secondary causes    Discussed importance of taking calcium and vitamin-D supplementation  Discussed the importance of fall precautions  -     Vitamin D 25 hydroxy; Future  -     Basic metabolic panel; Future  -     Vitamin D 25 hydroxy; Future    Localized osteoporosis with current pathological fracture, initial encounter  Will obtain following blood work to rule out secondary causes  -     Vitamin D 25 hydroxy; Future  -     Protein electrophoresis, serum Lab Collect; Future  -     Celiac Disease Antibody Profile; Future  -     Basic metabolic panel; Future  -     Vitamin D 25 hydroxy; Future    Vitamin D deficiency  Continue current dose of vitamin-D supplementation  Obtain vitamin-D level  -     Basic metabolic panel; Future  -     Vitamin D 25 hydroxy;  Future        CC:     Osteoporosis     History of Present Illness     HPI:    Maggie Harley is 68year old woman with medical history of compression fracture of T11 vertebral, hypertension, hyperlipidemia was referred here for evaluation of osteoporosis management    Patient had DEXA scan done in August 2022, which showed L1-L4 T-score-0 8, left total hip T-score-1 3, left femoral neck T-score-1 6, right total hip T-score -1 6  Left radius T-score -1 7  Says scan is suggestive of osteopenia  There is a statistically significant decrease in bone mineral density 9 7% in lumbar spine and 4 6% in hips  Patient is very active, and denies any history of other fractures previously  She denies history of premature menopause  She denies family history of fractures or osteoporosis    She takes vitamin D3,   She takes calcium carbonate 600 mg once a day, and vitamin D 3, 2800 IU daily        Latest Reference Range & Units 05/31/22 09:12   Sodium 136 - 145 mmol/L 138   Potassium 3 5 - 5 3 mmol/L 4 0   Chloride 100 - 108 mmol/L 107   CO2 21 - 32 mmol/L 29   Anion Gap 4 - 13 mmol/L 2 (L)   BUN 5 - 25 mg/dL 18   Creatinine 0 60 - 1 30 mg/dL 0 67   GLUCOSE FASTING 65 - 99 mg/dL 84   Calcium 8 3 - 10 1 mg/dL 9 5   AST 5 - 45 U/L 37   ALT 12 - 78 U/L 42   Alkaline Phosphatase 46 - 116 U/L 67   Total Protein 6 4 - 8 2 g/dL 6 9   Albumin 3 5 - 5 0 g/dL 3 6   TOTAL BILIRUBIN 0 20 - 1 00 mg/dL 0 63   eGFR ml/min/1 73sq m 85   Cholesterol See Comment mg/dL 159   Triglycerides See Comment mg/dL 128   HDL >=50 mg/dL 65   Non-HDL Cholesterol mg/dl 94   LDL Calculated 0 - 100 mg/dL 68   LDL CHOLESTEROL DIRECT 0 - 100 mg/dl 73   (L): Data is abnormally low   Latest Reference Range & Units 08/10/22 12:33   PARATHYROID HORMONE 18 4 - 80 1 pg/mL 34 5      Latest Reference Range & Units 01/10/22 14:26   TSH 3RD GENERATON 0 358 - 3 740 uIU/mL 0 926     Review of Systems   Constitutional: Positive for activity change (Because of lumbar fracture)  Negative for diaphoresis, fatigue, fever and unexpected weight change  HENT: Negative  Eyes: Negative for visual disturbance  Respiratory: Negative for cough, chest tightness and shortness of breath  Cardiovascular: Negative for chest pain, palpitations and leg swelling  Gastrointestinal: Negative for abdominal pain, blood in stool, constipation, diarrhea, nausea and vomiting  Endocrine: Negative for cold intolerance, heat intolerance, polydipsia, polyphagia and polyuria  Genitourinary: Negative for dysuria, enuresis, frequency and urgency  Musculoskeletal: Positive for arthralgias, back pain and myalgias  Skin: Negative for pallor, rash and wound  Allergic/Immunologic: Negative  Neurological: Negative for dizziness, tremors, weakness and numbness  Hematological: Negative  Psychiatric/Behavioral: Negative  Historical Information   Past Medical History:   Diagnosis Date    Colon polyp     Coronary artery disease     Effusion of left knee     Last assessed - 9/10/15    History of transfusion     Hyperlipidemia     Hypertension     [facility]ocardial infarction ([facility] )     Tick bite     Last assessed - 4/21/17     Past Surgical History:   Procedure Laterality Date    APPENDECTOMY      COLONOSCOPY      CO CABG, ARTERY-VEIN, FOUR N/A 1/27/2020    Procedure: CORONARY ARTERY BYPASS GRAFT (CABG) x3 VESSELS, LIMA TO LAD, AND SVG TO PLB & OM;  Surgeon: Drew Man DO;  [facility];  Service: Cardiac Surgery    CO ECHO TRANSESOPHAG R-T 2D W/PRB IMG ACQUISJ I&R N/A 1/27/2020    Procedure: TRANSESOPHAGEAL ECHOCARDIOGRAM (GET); Surgeon: Drew Man DO;  [facility];  Service: Cardiac Surgery    CO ENDOSCOPY W/VIDEO-ASST VEIN HARVEST,CABG Left 1/27/2020    Procedure: HARVEST VEIN ENDOSCOPIC ([address]);   Surgeon: Drew Man DO;  [facility];  Service: Cardiac Surgery    TONSILLECTOMY      Last assessed - 4/20/17    TUBAL LIGATION      Last assessed - 4/20/17    WISDOM TOOTH EXTRACTION      Last assessed - 4/20/17     Social History   Social History     Substance and Sexual Activity   Alcohol Use Yes    Comment: 1 wine nightly     Social History     Substance and Sexual Activity   Drug Use No     Social History     Tobacco Use   Smoking Status Never Smoker   Smokeless Tobacco Never Used     Family History:   Family History   Problem Relation Age of Onset    Coronary artery disease Mother     Arthritis Mother     Other Mother         Cardiac Disorder     Transient ischemic attack Mother     Heart attack Father     Sudden death Father         SCD    Cancer Daughter 52        kidney       Meds/Allergies   Current Outpatient Medications   Medication Sig Dispense Refill    aspirin (ECOTRIN LOW STRENGTH) 81 mg EC tablet Take 1 tablet (81 mg total) by mouth daily      Calcium Carb-Cholecalciferol 600-200 MG-UNIT TABS Calcium 600 + D TABS  TAKE 1 TABLET DAILY  Refills: 0    Active      ketoconazole (NIZORAL) 2 % cream APPLY TO AFFECTED AREA(S) ONCE DAILY      losartan (COZAAR) 100 MG tablet TAKE ONE TABLET BY MOUTH EVERY DAY 30 tablet 11    melatonin 3 mg Take 3 mg by mouth daily at bedtime      methocarbamol (ROBAXIN) 500 mg tablet Take 1 tablet (500 mg total) by mouth as needed in the morning and 1 tablet (500 mg total) as needed at noon and 1 tablet (500 mg total) as needed in the evening for muscle spasms  (Patient not taking: No sig reported) 40 tablet 1    metoprolol tartrate (LOPRESSOR) 25 mg tablet TAKE ONE-HALF TABLET BY MOUTH EVERY 12 HOURS 90 tablet 1    rosuvastatin (CRESTOR) 40 MG tablet Take 1 tablet (40 mg total) by mouth daily 90 tablet 3    VITAMIN D, CHOLECALCIFEROL, PO Take 2,600 Units/day by mouth       No current facility-administered medications for this visit  No Known Allergies    Objective   Vitals: Blood pressure 118/64, pulse 60  Physical Exam  Vitals reviewed  Constitutional:       General: She is not in acute distress  Appearance: Normal appearance  She is not ill-appearing  HENT:      Head: Normocephalic and atraumatic  Nose: Nose normal    Eyes:      Extraocular Movements: Extraocular movements intact        Conjunctiva/sclera: Conjunctivae normal    Cardiovascular:      Rate and Rhythm: Normal rate and regular rhythm  Pulses: Normal pulses  Heart sounds: Normal heart sounds  Pulmonary:      Effort: Pulmonary effort is normal  No respiratory distress  Breath sounds: Normal breath sounds  Musculoskeletal:         General: Normal range of motion  Cervical back: Normal range of motion and neck supple  Right lower leg: No edema  Left lower leg: No edema  Skin:     General: Skin is warm  Neurological:      General: No focal deficit present  Mental Status: She is alert and oriented to person, place, and time  Psychiatric:         Mood and Affect: Mood normal          Behavior: Behavior normal          The history was obtained from the review of the chart, patient  Lab Results:   Lab Results   Component Value Date/Time    Potassium 4 0 05/31/2022 09:12 AM    Potassium 4 2 11/04/2021 09:34 AM    Chloride 107 05/31/2022 09:12 AM    Chloride 106 11/04/2021 09:34 AM    CO2 29 05/31/2022 09:12 AM    CO2 28 11/04/2021 09:34 AM    BUN 18 05/31/2022 09:12 AM    BUN 20 11/04/2021 09:34 AM    Creatinine 0 67 05/31/2022 09:12 AM    Creatinine 0 73 11/04/2021 09:34 AM    Glucose, Fasting 84 05/31/2022 09:12 AM    Glucose, Fasting 96 11/04/2021 09:34 AM    Calcium 9 5 05/31/2022 09:12 AM    Calcium 9 5 11/04/2021 09:34 AM    eGFR 85 05/31/2022 09:12 AM    eGFR 80 11/04/2021 09:34 AM    TSH 3RD GENERATON 0 926 01/10/2022 02:26 PM    PTH 34 5 08/10/2022 12:33 PM    Vit D, 25-Hydroxy 36 5 09/01/2022 09:02 AM         Imaging Studies:         Results for orders placed during the hospital encounter of 08/25/22    DXA bone density spine hip and pelvis    Impression  1  Low bone mass (osteopenia)  2   Since a DXA study from 2/16/2018, there has been:  A  STATISTICALLY SIGNIFICANT DECREASE in bone mineral density of  0 116 g/cm2 (9 7)% in the lumbar spine  A  STATISTICALLY SIGNIFICANT DECREASE in bone mineral density of  0 040 g/cm2 (4 6)% in the hips        3  The 10 year risk of hip fracture is 7 0% with the 10 year risk of major osteoporotic fracture being 17 5% as calculated by the [facility]/WHO fracture risk assessment tool (FRAX)  4   The current NOF guidelines recommend treating patients with a T-score of -2 5 or less in the lumbar spine or hips, or in post-menopausal women and men over the age of 48 with low bone mass (osteopenia) and a FRAX 10 year risk score of >3% for hip  fracture and/or >20% for major osteoporotic fracture  5   The NOF recommends follow-up DXA in 1-2 years after initiating therapy for osteoporosis and every 2 years thereafter  More frequent evaluation is appropriate for patients with conditions associated with rapid bone loss, such as glucocorticoid  therapy  The interval between DXA screenings may be longer for individuals without major risk factors and initial T-score in the normal or upper low bone mass range  The FRAX algorithm has certain limitations:  -FRAX has not been validated in patients currently or previously treated with pharmacotherapy for osteoporosis  In such patients, clinical judgment must be exercised in interpreting FRAX scores  -Prior hip, vertebral and humeral fragility fractures appear to confer greater risk of subsequent fracture than fractures at other sites (this is especially true for individuals with severe vertebral fractures), but quantification of this incremental  risk is not possible with FRAX  -FRAX underestimates fracture risk in patients with history of multiple fragility fractures  -FRAX may underestimate fracture risk in patients with history of frequent falls   -It is not appropriate to use FRAX to monitor treatment response        WHO CLASSIFICATION:  Normal (a T-score of -1 0 or higher)  Low bone mineral density (a T-score of less than -1 0 but higher than -2 5)  Osteoporosis (a T-score of -2 5 or less)  Severe osteoporosis (a T-score of -2 5 or less with a fragility fracture)    LEAST SIGNIFICANT CHANGE AT 95% C I:  Lumbar spine: 0 036 gm/cm2 (3 2%)  Total hip: 0 018 gm/cm2 (2 0%)  Forearm: 0 024 gm/cm2 (3 2%)  Workstation performed: [id number]            I have personally reviewed pertinent reports  Portions of the record may have been created with voice recognition software  Occasional wrong word or "sound a like" substitutions may have occurred due to the inherent limitations of voice recognition software  Read the chart carefully and recognize, using context, where substitutions have occurred

## 2022-09-02 ENCOUNTER — TELEPHONE (OUTPATIENT)
Dept: ENDOCRINOLOGY | Facility: CLINIC | Age: 77
End: 2022-09-02

## 2022-09-02 LAB
ENDOMYSIUM IGA SER QL: NEGATIVE
GLIADIN PEPTIDE IGA SER-ACNC: 5 UNITS (ref 0–19)
GLIADIN PEPTIDE IGG SER-ACNC: 2 UNITS (ref 0–19)
IGA SERPL-MCNC: 194 MG/DL (ref 64–422)
TTG IGA SER-ACNC: <2 U/ML (ref 0–3)
TTG IGG SER-ACNC: <2 U/ML (ref 0–5)

## 2022-09-02 NOTE — TELEPHONE ENCOUNTER
Please fill out the paperwork for Prolia injection every 6 months, once it is approved please make appointment for Prolia injection  Liliana Llamas MD
Submitted to my gen  Waiting on reply 
English

## 2022-09-03 LAB
ALBUMIN SERPL ELPH-MCNC: 4.31 G/DL (ref 3.5–5)
ALBUMIN SERPL ELPH-MCNC: 66.3 % (ref 52–65)
ALPHA1 GLOB SERPL ELPH-MCNC: 0.27 G/DL (ref 0.1–0.4)
ALPHA1 GLOB SERPL ELPH-MCNC: 4.2 % (ref 2.5–5)
ALPHA2 GLOB SERPL ELPH-MCNC: 0.75 G/DL (ref 0.4–1.2)
ALPHA2 GLOB SERPL ELPH-MCNC: 11.5 % (ref 7–13)
BETA GLOB ABNORMAL SERPL ELPH-MCNC: 0.36 G/DL (ref 0.4–0.8)
BETA1 GLOB SERPL ELPH-MCNC: 5.6 % (ref 5–13)
BETA2 GLOB SERPL ELPH-MCNC: 4.4 % (ref 2–8)
BETA2+GAMMA GLOB SERPL ELPH-MCNC: 0.29 G/DL (ref 0.2–0.5)
GAMMA GLOB ABNORMAL SERPL ELPH-MCNC: 0.52 G/DL (ref 0.5–1.6)
GAMMA GLOB SERPL ELPH-MCNC: 8 % (ref 12–22)
IGG/ALB SER: 1.97 {RATIO} (ref 1.1–1.8)
PROT PATTERN SERPL ELPH-IMP: ABNORMAL
PROT SERPL-MCNC: 6.5 G/DL (ref 6.4–8.2)

## 2022-09-03 PROCEDURE — 84165 PROTEIN E-PHORESIS SERUM: CPT | Performed by: PATHOLOGY

## 2022-09-07 ENCOUNTER — TELEPHONE (OUTPATIENT)
Dept: ENDOCRINOLOGY | Facility: CLINIC | Age: 77
End: 2022-09-07

## 2022-09-08 ENCOUNTER — EVALUATION (OUTPATIENT)
Dept: PHYSICAL THERAPY | Facility: CLINIC | Age: 77
End: 2022-09-08
Payer: MEDICARE

## 2022-09-08 DIAGNOSIS — S22.080A CLOSED WEDGE COMPRESSION FRACTURE OF T12 VERTEBRA, INITIAL ENCOUNTER (HCC): ICD-10-CM

## 2022-09-08 DIAGNOSIS — S22.080A CLOSED WEDGE COMPRESSION FRACTURE OF T11 VERTEBRA, INITIAL ENCOUNTER (HCC): ICD-10-CM

## 2022-09-08 DIAGNOSIS — M41.9 SCOLIOSIS OF THORACOLUMBAR SPINE: ICD-10-CM

## 2022-09-08 DIAGNOSIS — M41.35 THORACOGENIC SCOLIOSIS OF THORACOLUMBAR REGION: ICD-10-CM

## 2022-09-08 PROCEDURE — 97162 PT EVAL MOD COMPLEX 30 MIN: CPT

## 2022-09-08 NOTE — PROGRESS NOTES
PT Evaluation     Today's date: 2022  Patient name: Shaunna Buerger  : 1945  MRN: [id number]  Referring provider: RACHEL Mac  Dx:   Encounter Diagnosis     ICD-10-CM    1  Closed wedge compression fracture of T11 vertebra, initial encounter [facility])  S22 080A Ambulatory Referral to Physical Therapy   2  Closed wedge compression fracture of T12 vertebra, initial encounter [facility])  S22 080A Ambulatory Referral to Physical Therapy   3  Scoliosis of thoracolumbar spine  M41 [id number] Ambulatory Referral to Physical Therapy                  Assessment  Assessment details: Shaunna Buerger is a pleasant 68 y o  female presents with signs and symptoms consistent with:   Closed wedge compression fracture of T11 vertebra, initial encounter ([facility] )  Closed wedge compression fracture of T12 vertebra, initial encounter ([facility] )  Scoliosis of thoracolumbar spine    Joanne Miner presents with motor control deficits in core and glutes, as well as decreased ROM in lumbar and thoracic spine  Patients subjective and objective reports align with PT diagnosis of poor motor control secondary to scolosis  At this time patient responded to education, and HEP and is negative for any red flags  Patent will benefit from skilled physical therapy at this time to address deficits to improve ADLs/IADLs and return to PLOF  Patient verbalized understanding of POC, HEP, and return demonstrated HEP  All questions were answered to patients satisfaction  Etiologic factors include recent fracture, recent fall, poor mechanics, scoliosis  Prognosis is good given HEP compliance and attendance to physical therapy 2x a week  Positive prognostic indicators include motivation to improve  Negative prognostic indicators include compression fracture, medical history  Please contact me if you have any questions or recommendations  Thank you for the referral and the opportunity to share in Joanne Sargent's care                Impairments: abnormal muscle firing, abnormal or restricted ROM, activity intolerance, impaired physical strength, lacks appropriate home exercise program, pain with function, poor posture  and poor body mechanics  Understanding of Dx/Px/POC: good   Prognosis: good    Goals  Impairment Goals 4-6 weeks  In order to improve and maximize function patient will be able to     - Decrease pain intensity to <4/10  - Improve lumbar AROM to >75% throughout  - Increase hip strength to 5/5 throughout  - improve core, glute and multifidi stabilization as seen by decreased pelvic obliquity with exercises  Functional Goals 6-8 weeks  In order to improve and maximize function patient will be able to     - Return to Prior Level of Function with no greater than 2/10 pain   - Increase Functional Status Measure (FOTO) to: >60  - Be independent and compliant with HEP  - Tolerate sitting and or standing without increased pain/compensation/difficulty for at least 30 minutes  - Perform sit to stand without increased pain/compensation/difficulty   - Demonstrate bend forward without increased pain/compensation/difficulty   - Demonstrate proper lifting mechanics and lift >40 pounds with no pain and good core and glute activation   - Ambulate with proper mechanics without compensations in pain  Plan  Patient would benefit from: skilled physical therapy  Planned modality interventions: cryotherapy, thermotherapy: hydrocollator packs and TENS  Planned therapy interventions: home exercise program, graded exercise, functional ROM exercises, flexibility, body mechanics training, postural training, patient education, therapeutic activities, therapeutic exercise, manual therapy, joint mobilization and neuromuscular re-education  Frequency: 2x week  Duration in weeks: 8  Treatment plan discussed with: patient and PCP        Subjective Evaluation    History of Present Illness  Mechanism of injury: Patient presents to PT today for back pain   She notes that she has pain in her left leg goes down to her knee, and she feels like her left leg is weaker than the other side  She notes that she has scoliosis and she feels like she is toppling over  She notes that they were happening before she fractured her vertebrae  She fell walking the dog 22, she ignored it and then had an Xray and MRI  She was doing PT after, and she was getting continual pain her back and was xrayed at the end of July with a thoracic fracture  She is wearing a TSLO brace, and wearing it all intermittently  She felt like her posture was suffering, and she feels weaker  Difficulty with: Carrying heavy, being on her feet >2 hours, posture, getting through the day, being active, keeping independent, walking >1 5 miles   Pain  Current pain ratin  At best pain ratin  At worst pain ratin  Location: right side around the back, lower back  Quality: dull ache and burning  Relieving factors: medications  Aggravating factors: walking, standing and stair climbing  Progression: no change    Treatments  Previous treatment: physical therapy  Patient Goals  Patient goals for therapy: decreased pain and independence with ADLs/IADLs  Patient goal: strengthening, stand up straight, walk >3 miles, lifting heavy         Objective     Concurrent Complaints  Negative for night pain and disturbed sleep    Postural Observations  Seated posture: fair  Standing posture: fair  Correction of posture: has no consistent effect        Palpation     Additional Palpation Details  Tenderness along R side around fx site, tightness along fx site       Tenderness     Additional Tenderness Details  T11, T12 TTP  Along fx site    Neurological Testing     Sensation     Lumbar   Left   Intact: light touch    Right   Intact: light touch    Reflexes   Left   Patellar (L4): trace (1+)  Achilles (S1): trace (1+)    Right   Patellar (L4): normal (2+)  Achilles (S1): normal (2+)    Active Range of Motion   Cervical/Thoracic Spine       Thoracic    Flexion: Restriction level: moderate  Extension:  Restriction level: moderate  Left lateral flexion:  Restriction level: moderate  Right lateral flexion:  Restriction level: moderate  Left rotation:  Restriction level: moderate  Right rotation:  Restriction level: moderate    Lumbar   Flexion:  Restriction level: moderate  Extension:  Restriction level: moderate  Left lateral flexion:  Restriction level: moderate  Right lateral flexion:  Restriction level: moderate  Left rotation:  Restriction level: moderate  Right rotation:  Restriction level: moderate    Strength/Myotome Testing     Left Hip   Planes of Motion   Flexion: 4-  Extension: 4- (compensations with lumbar spine)  Abduction: 4-  External rotation: 4-  Internal rotation: 4-    Right Hip   Planes of Motion   Flexion: 4  Extension: 4 (compensations with lumbar spine)  Abduction: 4-  External rotation: 4-  Internal rotation: 4-    Left Knee   Flexion: 4  Extension: 4    Right Knee   Flexion: 4  Extension: 4    Left Ankle/Foot   Dorsiflexion: 5  Plantar flexion: 5    Right Ankle/Foot   Plantar flexion: 5    Additional Strength Details  Bridge with march pelvic obliquity noted, unable to go through full ROM  -Poor bed mobility noted difficulty getting in and out of bed  Muscle Activation   Patient able to activate left transverse abdominals, left multifidus, right transverse abdominals and right multifidus  Tests     Lumbar     Left   Negative crossed SLR and passive SLR  Right   Negative crossed SLR and passive SLR  Left Hip   Negative GEENA and FADIR  Right Hip   Negative GEENA and FADIR       General Comments:    Lower quarter screen   Hips: unremarkable  Knees: unremarkable  Foot/ankle: unremarkable    Hip Comments   Weakness on L side  Decreased reflexes on L side             Diagnosis: Scoliosis of thoracolumbar spine, poor motor control   Precautions: See chart, healing compression fx at T11-T12   Goals: Walk >3 miles, improve core and glute strength   Manual Therapy 9/8/22       STM paraspinals                                        Exercise Diary         Therapeutic Exercise        Bike        LTR        Piriformis st                                        Neuromuscular Re-education        TA sets 20x5"       Glute sets 20x5"       Pelvic tilts                                                 Therapeutic Activities        Education Stabilization (ext vs  Int ) POC moving forward                Modalities

## 2022-09-12 ENCOUNTER — CLINICAL SUPPORT (OUTPATIENT)
Dept: ENDOCRINOLOGY | Facility: CLINIC | Age: 77
End: 2022-09-12
Payer: MEDICARE

## 2022-09-12 DIAGNOSIS — M85.88 OSTEOPENIA OF SPINE: Primary | ICD-10-CM

## 2022-09-12 PROCEDURE — 96372 THER/PROPH/DIAG INJ SC/IM: CPT | Performed by: INTERNAL MEDICINE

## 2022-09-12 PROCEDURE — 99211 OFF/OP EST MAY X REQ PHY/QHP: CPT | Performed by: INTERNAL MEDICINE

## 2022-09-13 ENCOUNTER — OFFICE VISIT (OUTPATIENT)
Dept: PHYSICAL THERAPY | Facility: CLINIC | Age: 77
End: 2022-09-13
Payer: MEDICARE

## 2022-09-13 DIAGNOSIS — S22.080A CLOSED WEDGE COMPRESSION FRACTURE OF T12 VERTEBRA, INITIAL ENCOUNTER (HCC): ICD-10-CM

## 2022-09-13 DIAGNOSIS — S22.080A CLOSED WEDGE COMPRESSION FRACTURE OF T11 VERTEBRA, INITIAL ENCOUNTER (HCC): Primary | ICD-10-CM

## 2022-09-13 PROCEDURE — 97112 NEUROMUSCULAR REEDUCATION: CPT

## 2022-09-13 PROCEDURE — 97140 MANUAL THERAPY 1/> REGIONS: CPT

## 2022-09-13 PROCEDURE — 97110 THERAPEUTIC EXERCISES: CPT

## 2022-09-13 NOTE — PROGRESS NOTES
Daily Note     Today's date: 2022  Patient name: Ezequiel Hernandez  : 1945  MRN: [id number]  Referring provider: RACHEL Maharaj  Dx:   Encounter Diagnosis     ICD-10-CM    1  Closed wedge compression fracture of T11 vertebra, [facility] ([facility] )  S22 080A    2  Closed wedge compression fracture of T12 vertebra, initial encounter ([facility] )  S22 080A                   Subjective: Patient noted that she had increased pain today  She needed to have to take a tylenol  Objective: See treatment diary below      Assessment: Tolerated treatment well  Patient had good response to gentle mobility, and she was challenged with core activation today especially with biofeedback  She was challenged with activating of left glute  Patient demonstrated fatigue post treatment, exhibited good technique with therapeutic exercises and would benefit from continued PT      Plan: Continue per plan of care  Progress treatment as tolerated         Diagnosis: Scoliosis of thoracolumbar spine, poor motor control   Precautions: See chart, healing compression fx at T11-T12   Goals: Walk >3 miles, improve core and glute strength   Manual Therapy 22      STM paraspinals  SP                                      Exercise Diary         Therapeutic Exercise        Bike  5'      LTR  20x5"      Piriformis st        DKC  10x10"                              Neuromuscular Re-education        TA sets 20x5" 20x5"      Glute sets 20x5"       Pelvic tilts   20x5"      Multifidi NMR        Biofeedback   20mg - 40 mg 20x                              Therapeutic Activities        Education Stabilization (ext vs  Int ) POC moving forward                Modalities

## 2022-09-15 ENCOUNTER — OFFICE VISIT (OUTPATIENT)
Dept: PHYSICAL THERAPY | Facility: CLINIC | Age: 77
End: 2022-09-15
Payer: MEDICARE

## 2022-09-15 DIAGNOSIS — S22.080A CLOSED WEDGE COMPRESSION FRACTURE OF T11 VERTEBRA, INITIAL ENCOUNTER (HCC): Primary | ICD-10-CM

## 2022-09-15 DIAGNOSIS — S22.080A CLOSED WEDGE COMPRESSION FRACTURE OF T12 VERTEBRA, INITIAL ENCOUNTER (HCC): ICD-10-CM

## 2022-09-15 DIAGNOSIS — M41.35 THORACOGENIC SCOLIOSIS OF THORACOLUMBAR REGION: ICD-10-CM

## 2022-09-15 PROCEDURE — 97112 NEUROMUSCULAR REEDUCATION: CPT

## 2022-09-15 PROCEDURE — 97140 MANUAL THERAPY 1/> REGIONS: CPT

## 2022-09-15 PROCEDURE — 97110 THERAPEUTIC EXERCISES: CPT

## 2022-09-15 NOTE — PROGRESS NOTES
Daily Note     Today's date: 9/15/2022  Patient name: Catarina Bowling  : 1945  MRN: [id number]  Referring provider: RACHEL Kwong  Dx:   Encounter Diagnosis     ICD-10-CM    1  Closed wedge compression fracture of T11 vertebra, initial encounter ([facility] [address] [facility] )  S22 080A    2  Closed wedge compression fracture of T12 vertebra, initial encounter ([facility] )  S22 080A    3  Thoracogenic scoliosis of thoracolumbar region  M41 35                   Subjective: Patient noted that she had felt good after PT last session but she was getting sore towards the end of the day  Objective: See treatment diary below      Assessment: Tolerated treatment well  Patient was significantly challenged with bridges for proper muscle activation  She needed verbal and tactile cueing in order to prevent compensations in back  As she did she did compensate with hamstring and needed rest breaks due to increased cramping in hamstring  She demonstrated good form with clamshells  Patient demonstrated fatigue post treatment, exhibited good technique with therapeutic exercises and would benefit from continued PT      Plan: Continue per plan of care  Progress treatment as tolerated         Diagnosis: Scoliosis of thoracolumbar spine, poor motor control   Precautions: See chart, healing compression fx at T11-T12   Goals: Walk >3 miles, improve core and glute strength   Manual Therapy 9/8/22 9/13 9/15     STM paraspinals  SP SP                                     Exercise Diary         Therapeutic Exercise        Bike  5' 5'      LTR  20x5" 20x5"     Piriformis st        [address]  10x10" 20x5"     Sciatic n glides   10x                     Neuromuscular Re-education        TA sets 20x5" 20x5" 20x5"     Glute sets 20x5"  Bridges 10x     Pelvic tilts   20x5"      Multifidi NMR        Biofeedback   20mg - 40 mg 20x      Clamshells   2x10 simone                      Therapeutic Activities        Education Stabilization (ext vs  Int ) POC moving forward Modalities

## 2022-09-19 ENCOUNTER — OFFICE VISIT (OUTPATIENT)
Dept: PHYSICAL THERAPY | Facility: CLINIC | Age: 77
End: 2022-09-19
Payer: MEDICARE

## 2022-09-19 DIAGNOSIS — M41.35 THORACOGENIC SCOLIOSIS OF THORACOLUMBAR REGION: ICD-10-CM

## 2022-09-19 DIAGNOSIS — S22.080A CLOSED WEDGE COMPRESSION FRACTURE OF T12 VERTEBRA, INITIAL ENCOUNTER (HCC): ICD-10-CM

## 2022-09-19 DIAGNOSIS — S22.080A CLOSED WEDGE COMPRESSION FRACTURE OF T11 VERTEBRA, INITIAL ENCOUNTER (HCC): Primary | ICD-10-CM

## 2022-09-19 PROCEDURE — 97140 MANUAL THERAPY 1/> REGIONS: CPT

## 2022-09-19 PROCEDURE — 97112 NEUROMUSCULAR REEDUCATION: CPT

## 2022-09-19 PROCEDURE — 97110 THERAPEUTIC EXERCISES: CPT

## 2022-09-19 NOTE — PROGRESS NOTES
Daily Note     Today's date: 2022  Patient name: Iva Macias  : 1945  MRN: [id number]  Referring provider: RACHEL Montez  Dx:   Encounter Diagnosis     ICD-10-CM    1  Closed wedge compression fracture of T11 vertebra, initial encounter ([facility] )  S22 080A    2  Thoracogenic scoliosis of thoracolumbar region  M41 35    3  Closed wedge compression fracture of T12 vertebra, initial encounter ([facility] )  S22 080A                   Subjective: Patient noted that she felt stiff today  Objective: See treatment diary below      Assessment: Tolerated treatment well  Patient needed cues for proper multifidi activation  She did well with tactile cueing, however she displayed challenge with multifidi activation and increased fatigue in the muscle post session  She demonstrated good response the open books, and displayed good response to treatment  She continues to remain challenged with bridges  Patient demonstrated fatigue post treatment, exhibited good technique with therapeutic exercises and would benefit from continued PT      Plan: Continue per plan of care  Progress treatment as tolerated         Diagnosis: Scoliosis of thoracolumbar spine, poor motor control   Precautions: See chart, healing compression fx at T11-T12   Goals: Walk >3 miles, improve core and glute strength   Manual Therapy 9/8/22 9/13 9/15 9/19    STM paraspinals  SP SP SP                                     Exercise Diary         Therapeutic Exercise        Bike  5' 5'  2/2 UBE    LTR  20x5" 20x5"     Piriformis st        [address]  10x10" 20x5" 20x5"    Sciatic n glides   10x 10x    Open books    15x5"            Neuromuscular Re-education        TA sets 20x5" 20x5" 20x5" 20x5"    Glute sets 20x5"  Bridges 10x Bridges 20x     Pelvic tilts   20x5"      Multifidi NMR    10x10"  Each step 5"     Biofeedback   20mg - 40 mg 20x      Clamshells   2x10 simone                      Therapeutic Activities        Education Stabilization (ext vs  Int ) POC moving forward                Modalities

## 2022-09-20 ENCOUNTER — OFFICE VISIT (OUTPATIENT)
Dept: PAIN MEDICINE | Facility: CLINIC | Age: 77
End: 2022-09-20
Payer: MEDICARE

## 2022-09-20 VITALS
HEART RATE: 61 BPM | HEIGHT: 64 IN | RESPIRATION RATE: 14 BRPM | BODY MASS INDEX: 18.95 KG/M2 | WEIGHT: 111 LBS | SYSTOLIC BLOOD PRESSURE: 120 MMHG | DIASTOLIC BLOOD PRESSURE: 59 MMHG

## 2022-09-20 DIAGNOSIS — G89.4 CHRONIC PAIN SYNDROME: Primary | ICD-10-CM

## 2022-09-20 DIAGNOSIS — M51.14 INTERVERTEBRAL DISC DISORDER WITH RADICULOPATHY OF THORACIC REGION: ICD-10-CM

## 2022-09-20 DIAGNOSIS — M47.26 OSTEOARTHRITIS OF SPINE WITH RADICULOPATHY, LUMBAR REGION: ICD-10-CM

## 2022-09-20 DIAGNOSIS — M16.12 PRIMARY OSTEOARTHRITIS OF LEFT HIP: ICD-10-CM

## 2022-09-20 PROCEDURE — 99214 OFFICE O/P EST MOD 30 MIN: CPT

## 2022-09-20 NOTE — PROGRESS NOTES
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        Plan:  The patient is a 55-year-old female with a history of chronic pain secondary to low back pain, thoracic radiculopathy and primary osteoarthritis of left hip who presents to the office with improved bilateral low back pain and pain that radiates into the left thigh  At this time I instructed patient we can hold off on any interventions and procedures as her pain is improved since her last office visit  I instructed patient to continue her physical therapy as she may continue to receive relief of her low back pain  I did instruct patient to call our office back if her symptoms are to worsen  My impressions and treatment recommendations were discussed in detail with the patient who verbalized understanding and had no further questions  Discharge instructions were provided  I personally saw and examined the patient and I agree with the above discussed plan of care  No orders of the defined types were placed in this encounter  No orders of the defined types were placed in this encounter  History of Present Illness:  Micky Gillis is a 68 y o  female with a history of chronic pain secondary to low back pain, thoracic radiculopathy and primary osteoarthritis of left hip  She was last seen on 07/26/2022 where she underwent a left intra-articular hip injection which provided her no relief of her left hip pain  She presents to the office with improved bilateral low back pain and intermittent pain into the left thigh  She states she started physical therapy approximately 2 weeks ago and has noticed improvement in her low back pain  She states her pain is better since the last office visit and intermittent but worse in the morning and evening    She rates the quality of her pain as burning, dull/aching and is currently rating it a 5/10 on a numeric scale  Current pain medications include Tylenol and Advil which she takes as needed and provide her moderate relief  I have personally reviewed and/or updated the patient's past medical history, past surgical history, family history, social history, current medications, allergies, and vital signs today  Review of Systems   Respiratory: Negative for shortness of breath  Cardiovascular: Negative for chest pain  Gastrointestinal: Negative for constipation, diarrhea, nausea and vomiting  Musculoskeletal: Positive for back pain and joint swelling  Negative for arthralgias, gait problem and myalgias  LLE Pain   Skin: Negative for rash  Neurological: Negative for dizziness, seizures and weakness  All other systems reviewed and are negative        Patient Active Problem List   Diagnosis    Arthritis    Cervical myofascial pain syndrome    Cervical radiculopathy    Cervicogenic headache    Cervico-occipital neuralgia    Depression    Mixed hyperlipidemia    Essential hypertension    Osteopenia of spine    Spasmodic torticollis    Laceration of occipital scalp    Other secondary scoliosis, thoracolumbar region    Syncope    Coronary artery disease of native artery of native heart with stable angina pectoris (HCC)    S/P CABG x 3    Anemia    Thrombocytopenia (HCC)    Hyperchloremia    Chylothorax    Screening for colon cancer    History of colon polyps    Acute bilateral low back pain without sciatica    PAC (premature atrial contraction)    Primary osteoarthritis of left hip    Closed wedge compression fracture of T11 vertebra (HCC)    Closed wedge compression fracture of T12 vertebra (HCC)    Chronic pain syndrome    Osteoarthritis of spine with radiculopathy, lumbar region       Past Medical History:   Diagnosis Date    Colon polyp     Coronary artery disease     Effusion of left knee Last assessed - 9/10/15    History of transfusion     Hyperlipidemia     Hypertension     Myocardial infarction [facility])     Tick bite     Last assessed - 4/21/17       Past Surgical History:   Procedure Laterality Date    APPENDECTOMY      COLONOSCOPY      NE CABG, ARTERY-VEIN, FOUR N/A 1/27/2020    Procedure: CORONARY ARTERY BYPASS GRAFT (CABG) x3 VESSELS, LIMA TO LAD, AND SVG TO PLB & OM;  Surgeon: Aruna Herrera DO;  [facility]: [facility];  Service: Cardiac Surgery    NE ECHO TRANSESOPHAG R-T 2D W/PRB IMG ACQUISJ I&R N/A 1/27/2020    Procedure: TRANSESOPHAGEAL ECHOCARDIOGRAM (GET); Surgeon: Aruna Herrera DO;  [facility];  Service: Cardiac Surgery    NE ENDOSCOPY W/VIDEO-ASST VEIN HARVEST,CABG Left 1/27/2020    Procedure: HARVEST VEIN ENDOSCOPIC ([address]); Surgeon: Aruna Herrera DO;  [facility];  Service: Cardiac Surgery    TONSILLECTOMY      Last assessed - 4/20/17    TUBAL LIGATION      Last assessed - 4/20/17    WISDOM TOOTH EXTRACTION      Last assessed - 4/20/17       Family History   Problem Relation Age of Onset    Coronary artery disease Mother     Arthritis Mother     Other Mother         Cardiac Disorder     Transient ischemic attack Mother     Heart attack Father     Sudden death Father         SCD    Cancer Daughter 52        kidney       Social History     Occupational History    Occupation:       Comment: P/T    Occupation: Retired     Comment: RN   Tobacco Use    Smoking status: Never Smoker    Smokeless tobacco: Never Used   Vaping Use    Vaping Use: Never used   Substance and Sexual Activity    Alcohol use: Yes     Comment: 1 wine nightly    Drug use: No    Sexual activity: Not on file       Current Outpatient Medications on File Prior to Visit   Medication Sig    aspirin (ECOTRIN LOW STRENGTH) 81 mg EC tablet Take 1 tablet (81 mg total) by mouth daily    Calcium Carb-Cholecalciferol 600-200 MG-UNIT TABS Calcium 600 + D TABS  TAKE 1 TABLET DAILY  Refills: 0    Active    ketoconazole (NIZORAL) 2 % cream APPLY TO AFFECTED AREA(S) ONCE DAILY    losartan (COZAAR) 100 MG tablet TAKE ONE TABLET BY MOUTH EVERY DAY    melatonin 3 mg Take 3 mg by mouth daily at bedtime    metoprolol tartrate (LOPRESSOR) 25 mg tablet TAKE ONE-HALF TABLET BY MOUTH EVERY 12 HOURS    rosuvastatin (CRESTOR) 40 MG tablet Take 1 tablet (40 mg total) by mouth daily    VITAMIN D, CHOLECALCIFEROL, PO Take 2,600 Units/day by mouth    methocarbamol (ROBAXIN) 500 mg tablet Take 1 tablet (500 mg total) by mouth as needed in the morning and 1 tablet (500 mg total) as needed at noon and 1 tablet (500 mg total) as needed in the evening for muscle spasms  (Patient not taking: No sig reported)     No current facility-administered medications on file prior to visit  No Known Allergies    Physical Exam:    /59   Pulse 61   Resp 14   Ht 5' 4" (1 626 m)   Wt 50 3 kg (111 lb)   BMI 19 05 kg/m²     Constitutional:normal, well developed, well nourished, alert, in no distress and non-toxic and no overt pain behavior    Eyes:anicteric  HEENT:grossly intact  Neck:supple, symmetric, trachea midline and no masses   Pulmonary:even and unlabored  Cardiovascular:No edema or pitting edema present  Skin:Normal without rashes or lesions and well hydrated  Psychiatric:Mood and affect appropriate  Neurologic:Cranial Nerves II-XII grossly intact  Musculoskeletal:antalgic     Lumbar Spine Exam    Appearance:  Normal lordosis  Palpation/Tenderness:  no tenderness or spasm  Sensory:  no sensory deficits noted  Range of Motion:  Flexion:  Minimally limited  with pain  Extension:  Minimally limited  with pain  Motor Strength:  Left hip flexion:  5/5  Right hip flexion:  5/5  Left knee flexion:  5/5  Left knee extension:  5/5  Right knee flexion:  5/5  Right knee extension:  5/5  Left foot dorsiflexion:  5/5  Left foot plantar flexion:  5/5  Right foot dorsiflexion:  5/5  Right foot plantar flexion:  5/5  Special Tests:  Left Straight Leg Test:  negative  Right Straight Leg Test:  negative  Left Loc's Maneuver:  negative  Right Loc's Maneuver:  negative      Imaging

## 2022-09-21 ENCOUNTER — OFFICE VISIT (OUTPATIENT)
Dept: PHYSICAL THERAPY | Facility: CLINIC | Age: 77
End: 2022-09-21
Payer: MEDICARE

## 2022-09-21 DIAGNOSIS — M41.35 THORACOGENIC SCOLIOSIS OF THORACOLUMBAR REGION: ICD-10-CM

## 2022-09-21 DIAGNOSIS — S22.080A CLOSED WEDGE COMPRESSION FRACTURE OF T11 VERTEBRA, INITIAL ENCOUNTER (HCC): ICD-10-CM

## 2022-09-21 DIAGNOSIS — S22.080A CLOSED WEDGE COMPRESSION FRACTURE OF T12 VERTEBRA, INITIAL ENCOUNTER (HCC): Primary | ICD-10-CM

## 2022-09-21 PROCEDURE — 97112 NEUROMUSCULAR REEDUCATION: CPT

## 2022-09-21 PROCEDURE — 97140 MANUAL THERAPY 1/> REGIONS: CPT

## 2022-09-21 PROCEDURE — 97110 THERAPEUTIC EXERCISES: CPT

## 2022-09-21 NOTE — PROGRESS NOTES
Daily Note     Today's date: 2022  Patient name: Fuad Mensah  : 1945  MRN: [id number]  Referring provider: RACHEL Dumont  Dx:   Encounter Diagnosis     ICD-10-CM    1  Closed wedge compression fracture of T12 vertebra, initial encounter ([facility] )  S22 080A    2  Thoracogenic scoliosis of thoracolumbar region  M41 35    3  Closed wedge compression fracture of T11 vertebra, initial encounter ([facility] )  S22 080A                   Subjective: Patient noted that she felt stiff today  Objective: See treatment diary below      Assessment: Tolerated treatment well  Patient was challenged with addition of TA sets with marches, as she needed cues to maintain core with transitions  She had good tolerance to gentle ball roll outs with emphasis on hip hinge instead of lumbar flexion  She was cued for gentle motion to not work into end range  Patient will continue to benefit from core engagement and activation in further sessions  Patient demonstrated fatigue post treatment, exhibited good technique with therapeutic exercises and would benefit from continued PT      Plan: Continue per plan of care  Progress treatment as tolerated         Diagnosis: Scoliosis of thoracolumbar spine, poor motor control   Precautions: See chart, healing compression fx at T11-T12   Goals: Walk >3 miles, improve core and glute strength   Manual Therapy 9/8/22 9/13 9/15 9/19 9/21   STM paraspinals  SP SP SP  SP                                    Exercise Diary         Therapeutic Exercise        Bike  5' 5'  2/2 UBE 5' bike    LTR  20x5" 20x5"  205x"   Ball rolls     QL focused with hip hinge 10x5" ea   DKC  10x10" 20x5" 20x5" 20x5"   Sciatic n glides   10x 10x    Open books    15x5"            Neuromuscular Re-education        TA sets 20x5" 20x5" 20x5" 20x5" 20x5" with march   Glute sets 20x5"  Bridges 10x Bridges 20x  Bridges 20x   Pelvic tilts   20x5"      Multifidi NMR    10x10"  Each step 5"  10x10"   Biofeedback   20mg - 40 mg 20x Clatracyls   2x10 simone                      Therapeutic Activities        Education Stabilization (ext vs  Int ) POC moving forward                Modalities

## 2022-09-22 ENCOUNTER — TELEPHONE (OUTPATIENT)
Dept: GERIATRICS | Age: 77
End: 2022-09-22

## 2022-09-28 ENCOUNTER — APPOINTMENT (OUTPATIENT)
Dept: PHYSICAL THERAPY | Facility: CLINIC | Age: 77
End: 2022-09-28
Payer: MEDICARE

## 2022-09-28 ENCOUNTER — HOSPITAL ENCOUNTER (OUTPATIENT)
Dept: RADIOLOGY | Facility: HOSPITAL | Age: 77
Discharge: HOME | End: 2022-09-28
Payer: MEDICARE

## 2022-09-28 ENCOUNTER — OFFICE VISIT (OUTPATIENT)
Dept: PHYSICAL THERAPY | Facility: CLINIC | Age: 77
End: 2022-09-28
Payer: MEDICARE

## 2022-09-28 DIAGNOSIS — S22.080A CLOSED WEDGE COMPRESSION FRACTURE OF T11 VERTEBRA, INITIAL ENCOUNTER (HCC): ICD-10-CM

## 2022-09-28 DIAGNOSIS — M41.35 THORACOGENIC SCOLIOSIS OF THORACOLUMBAR REGION: ICD-10-CM

## 2022-09-28 DIAGNOSIS — S22.080A CLOSED WEDGE COMPRESSION FRACTURE OF T12 VERTEBRA, INITIAL ENCOUNTER (HCC): Primary | ICD-10-CM

## 2022-09-28 DIAGNOSIS — M41.9 SCOLIOSIS OF THORACOLUMBAR SPINE: ICD-10-CM

## 2022-09-28 DIAGNOSIS — S22.080A CLOSED WEDGE COMPRESSION FRACTURE OF T12 VERTEBRA, INITIAL ENCOUNTER (HCC): ICD-10-CM

## 2022-09-28 PROCEDURE — 72080 X-RAY EXAM THORACOLMB 2/> VW: CPT

## 2022-09-28 PROCEDURE — 97140 MANUAL THERAPY 1/> REGIONS: CPT

## 2022-09-28 PROCEDURE — 97110 THERAPEUTIC EXERCISES: CPT

## 2022-09-28 PROCEDURE — 97112 NEUROMUSCULAR REEDUCATION: CPT

## 2022-09-28 NOTE — PROGRESS NOTES
Daily Note     Today's date: 2022  Patient name: Pastor Holliday  : 1945  MRN: [id number]  Referring provider: RACHEL Romo  Dx:   Encounter Diagnosis     ICD-10-CM    1  Closed wedge compression [facility] of T12 vertebra, [facility] )  S22 080A    2  Thoracogenic scoliosis of thoracolumbar region  M41 35    3  Closed wedge compression fracture of T11 vertebra, initial encounter ([facility] )  S22 080A                   Subjective: Patient noted that the long car ride was challenging and stiff but she was able to maintain her HEP while away  Objective: See treatment diary below      Assessment: Tolerated treatment well  She was able to progress to standing isometrics holds of the core with minimal cueing  She demonstrated challenge with wobble board especially M-L due weakness in the core, in which she needed cueing  She was unable to hold for 2 minutes without catching herself  She had increased challenge with glute bridges with march with pelvic obliquity noted but improved with cues for stable pelvis  She does compensate with her low back with those exercises  Patient demonstrated fatigue post treatment, exhibited good technique with therapeutic exercises and would benefit from continued PT      Plan: Continue per plan of care  Progress treatment as tolerated         Diagnosis: Scoliosis of thoracolumbar spine, poor motor control   Precautions: See chart, healing compression fx at T11-T12   Goals: Walk >3 miles, improve core and glute strength   Manual Therapy 9/28 9/13 9/15 9/19 9/21   STM paraspinals  SP SP SP  SP                                    Exercise Diary         Therapeutic Exercise        Bike 5' 5' 5'  2/2 UBE 5' bike    LTR  20x5" 20x5"  205x"   Ball rolls     QL focused with hip hinge 10x5" ea   DKC  10x10" 20x5" 20x5" 20x5"   Sciatic n glides 10x  10x 10x    Open books    15x5"            Neuromuscular Re-education        TA sets 20x5" 20x5" 20x5" 20x5" 20x5" with march   Glute sets Bridge with march 10x  Bridges 10x Bridges 20x  Bridges 20x   Pelvic tilts   20x5"      Multifidi NMR 10x10"    Column press 10x10"   10x10"  Each step 5"  10x10"   Biofeedback   20mg - 40 mg 20x      Clamshells   2x10 simone      Palof press BTB 20x ea       Captain babak 10"x4       Wobble board 2' ea       Therapeutic Activities        Education                Modalities

## 2022-09-29 ENCOUNTER — OFFICE VISIT (OUTPATIENT)
Dept: NEUROSURGERY | Facility: CLINIC | Age: 77
End: 2022-09-29
Payer: MEDICARE

## 2022-09-29 ENCOUNTER — TELEPHONE (OUTPATIENT)
Dept: GERIATRICS | Age: 77
End: 2022-09-29

## 2022-09-29 ENCOUNTER — APPOINTMENT (OUTPATIENT)
Dept: PHYSICAL THERAPY | Facility: CLINIC | Age: 77
End: 2022-09-29
Payer: MEDICARE

## 2022-09-29 VITALS
HEIGHT: 64 IN | BODY MASS INDEX: 18.44 KG/M2 | HEART RATE: 67 BPM | RESPIRATION RATE: 16 BRPM | WEIGHT: 108 LBS | TEMPERATURE: 98.2 F | DIASTOLIC BLOOD PRESSURE: 47 MMHG | SYSTOLIC BLOOD PRESSURE: 119 MMHG

## 2022-09-29 DIAGNOSIS — S22.080A CLOSED WEDGE COMPRESSION FRACTURE OF T11 VERTEBRA, INITIAL ENCOUNTER (HCC): Primary | ICD-10-CM

## 2022-09-29 DIAGNOSIS — S22.080A CLOSED WEDGE COMPRESSION FRACTURE OF T12 VERTEBRA, INITIAL ENCOUNTER (HCC): ICD-10-CM

## 2022-09-29 PROCEDURE — 99213 OFFICE O/P EST LOW 20 MIN: CPT | Performed by: NURSE PRACTITIONER

## 2022-09-29 NOTE — TELEPHONE ENCOUNTER
[facility]abel Gómezo [facility]  [address]    [phone]    Telephone Intake: Geriatric Assessment     Referral source: self     Caller who is scheduling/relationship to pt: patientStephanie phone number: [phone]    Reason for referral: Patient concerns  regarding memory concerns, behavior changes/concerns and home safety concerns  If there are behavioral concerns, is the pt prescribed medications to manage these? no   If so, how many? None   Has the patient ever had an inpatient psychiatric hospitalization? No  What is the goal of the visit? intimal assessment, diagnosis     Has the patient been seen by a Neurologist or Geriatrician? No   If yes, is this appointment for a second opinion? No  Has the patient ever been diagnosed with dementia? No      Preferred language? English  Highest education level? Masters  Does the patient wear glasses? Yes   Does the patient use hearing aids? No     Is there a living will/healthcare POA in place/If so, who? Yes , daughters - Ani Franks, Sayda Cruz    Does the pt/caregiver have access for a virtual visit (computer/smart phone with audio/video)? Yes     Caller was informed: Please make sure the pt is accompanied by someone who knows them well / caregiver / family member to participate in this appointment  Who will accompany the pt (name and relationship)? sister Burgess    Phone number of person accompanying pt: [phone]  Office packet mailed out to: [address] [place] [phone]    Added to wait list for sooner appointments?  Yes     NOTE FOR : Please route to provider for chart review prior to scheduling and let the caller know that this phone intake will be reviewed IF -   Pt was recently hospitalized   Pt is prescribed medications for behavior management or has a history of psychiatric hospitalization   Pt plans to attend alone

## 2022-09-29 NOTE — ASSESSMENT & PLAN NOTE
· As addressed in HPI  · Independently started weaning from TLSO brace after prior visit    · Physical therapy started shortly after last visit , duration about 3 weeks for strengthening core and low back/lumbar muscles   · Reported she started Prolia  Imagining   · XR spine thoracolumbar 2 vw Result Date: 9/28/2022Previous noted T11 anteroinferior wedge compression fracture is unchanged  L2 chronic anteroinferior left lateral wedge deformity noted  There is no displacement of the paraspinal line      Plan   · RTO prn   · If additional questions or concerns call the office

## 2022-09-29 NOTE — PROGRESS NOTES
Assessment/Plan:    Closed wedge compression fracture of T12 vertebra (HCC)  As addressed in T 11 compression fracture     Closed wedge compression fracture of T11 vertebra (HCC)  · As addressed in HPI  · Independently started weaning from TLSO brace after prior visit    · Physical therapy started shortly after last visit , duration about 3 weeks for strengthening core and low back/lumbar muscles   · Reported she started [employer]  Imagining   · XR spine thoracolumbar 2 vw Result Date: 9/28/2022Previous noted T11 anteroinferior wedge compression fracture is unchanged  L2 chronic anteroinferior left lateral wedge deformity noted  There is no displacement of the paraspinal line  Plan   · RTO prn   · If additional questions or concerns call the office            Subjective: f u appointment compression fracture       Patient ID: Hansa Wilder is a 68 y o  female     HPI   Fall May 9 2022 walking dog  [place] dog she got into altercation with another dog, she continued holding the leash then fell backward landing flat on back  Had immediate onset of pain that worsened over the next few weeks with and did not resolve  As pain progressed started with pain underneath right ribs  thet progressed up into right scapula  She also reports pain in left hip with distribution down left lower extremity  Has pain under right rib and left  Leg    She scheduled an appointment with her PCP    PCP ordered Xray and MRI lumbar spine and simultaneously ordered referrals to PT and PM  w/Dr Eufemia Emmanuel --did not immediately attend     5/11/2021 x-ray for acute on chronic  bilateral low back pain   At onset ----10/10, wanted to scream  Had Severe pain with movement   Pain under ribs is not gone but much better      Characterization of symptoms reports pain as sharp and down left leg when chaning position lying or sitting to standing   Sharp shooting instant the ceases when she starts walking, no numbness or tingling  Nagging pain in lower back for the past year, pain and weakness in the left leg, left hip pain      Multimodal treatments   · PT __Started physical therapy 6/6/2022 , attended about about 5 weeks 2 x per week  Achieved efficacy for relief low back about 75 % , but no improvement in back  Has persist low back pain, under right side rib no pain up into right scapula, stopped after a couple of weeks  Documented PT visits starting in January 2021 attended 2 x per week and documented through 3/2022  for DX of secondary scoliosis , chronic low back pain without sciatica, chronic postural dysfunction  As per excerpt for PT note 1/19/2021 notable R scoliosis with L elevated iliac crest which is likely causing further strain when pt attempts to perform lifting or tranfers especially when helping elderly mother  Weak hip and thigh muscles contributing to overall low back pain/strain with fatigue at end of day  · Orthopedics 7/13/22[facility] Dr Rivera Peralta  hip and leg pain, degenerative lumbar disc , spinal stenosis lumbar spine left lumbar radiculopathy  Left hip increased pain with repetative bending and laying at night in low back and radiates into lateral hip, no groin pain increased  7/13/22 left hip xray Moderate degenerative changes with moderate loss of the joint space The asymptomatic right hip has slightly more narrowing than the symptomatic left hip her pain is most likely secondary to her lumbar pathology that is radiating into her lateral hip region  · PM, DR Halima Raza 7/20/22 consult positive GEENA ,left hip osteoarthritis, left hip intraarticular injection diagnostic and therapeutic (completed 7/20/22)   Thoracic nerve root irritation  Ordered MRI thoracic was complaining of right sided mid back  And rib pain  7/26/2022 PM telephoned patient MRI T/S w/ T 11 compression fracture  TLSO ordered  DXA orders  Referral to [address] [place] [address] Dr Tarcey Deal for possible kyphoplasty  Cancel current appointment w/ Dr Ada Delvalle  Referral endo for osteoporosis  Interventional RX 7/26/2022 Left hip intra-articular steroid injection for primary osteoarthritis left hip, reported 25 % efficacy      · Endocrine for osteopososis  8/1/2022 , prior HX of osteopenic on DXA 2018 ,previously  treated with alendronate but not in 10 years   No current osteoporosis RX  Prabha Magdalenows Evolving T11 and T12 compression fx   Is treating with CA and Vitamin D and dietary intake high CA foods  · Medicinal ---Medrol dose pack  5/18/2022  without relief        Social --79 YO mother resided in a [facility] , she recently provide physical care for her       Initial neurosurgery visit post fall was 12 weeks and 4 days after fall     I have spent 20 minutes with the patient today in which greater than 50% of this time was spent in assessment, examination, impressions, reviewing imagining and recommendations for care  All questions were answered to his/her satisfaction, and contact information provided in the event additional questions arise  Patient acknowledged an understanding and agreement with plan            REVIEW OF SYSTEMS  Review of Systems   Constitutional: Positive for activity change (Decreases EOD)  HENT: Negative  Eyes: Negative  Respiratory: Negative  Cardiovascular: Negative  Negative for leg swelling  Gastrointestinal: Negative  Negative for constipation, nausea and vomiting  Endocrine: Negative  Genitourinary: Negative  Negative for frequency and urgency  Musculoskeletal: Positive for back pain (Constant (Daily and worse EOD) b/l lower back and radiates only into her left leg)  Negative for arthralgias, gait problem, joint swelling and myalgias  PT for scoliosis - 2x weekly, 9/28/22  PM - 9/20/22  TLSO Brace sparingly as directed    No previous back Sx  No recent fall   Skin: Negative  Allergic/Immunologic: Negative  Neurological: Positive for weakness (left leg)  Negative for dizziness, light-headedness, numbness and headaches          Some unsteady balance, occasional Hematological: Bruises/bleeds easily (ASA 81)  Psychiatric/Behavioral: Negative  Negative for sleep disturbance (managed with tylenol)  Some memory difficulty, forgetfulness         Meds/Allergies     Current Outpatient Medications   Medication Sig Dispense Refill    aspirin (ECOTRIN LOW STRENGTH) 81 mg EC tablet Take 1 tablet (81 mg total) by mouth daily      Calcium Carb-Cholecalciferol 600-200 MG-UNIT TABS Calcium 600 + D TABS  TAKE 1 TABLET DAILY  Refills: 0    Active      denosumab (PROLIA) 60 mg/mL Inject 60 mg under the skin once      losartan (COZAAR) 100 MG tablet TAKE ONE TABLET BY MOUTH EVERY DAY 30 tablet 11    melatonin 3 mg Take 3 mg by mouth daily at bedtime      metoprolol tartrate (LOPRESSOR) 25 mg tablet TAKE ONE-HALF TABLET BY MOUTH EVERY 12 HOURS 90 tablet 1    rosuvastatin (CRESTOR) 40 MG tablet Take 1 tablet (40 mg total) by mouth daily 90 tablet 3    VITAMIN D, CHOLECALCIFEROL, PO Take 2,600 Units/day by mouth       No current facility-administered medications for this visit  No Known Allergies    PAST HISTORY    Past Medical History:   Diagnosis Date    Colon polyp     Coronary artery disease     Effusion of left knee     Last assessed - 9/10/15    History of transfusion     Hyperlipidemia     Hypertension     Myocardial infarction ([facility] [address] )     Tick bite     Last assessed - 4/21/17       Past Surgical History:   Procedure Laterality Date    APPENDECTOMY      COLONOSCOPY      WA CABG, ARTERY-VEIN, FOUR N/A 1/27/2020    Procedure: CORONARY ARTERY BYPASS GRAFT (CABG) x3 VESSELS, LIMA TO LAD, AND SVG TO PLB & OM;  Surgeon: Chicho Acosta DO;  [facility];  Service: Cardiac Surgery    WA ECHO TRANSESOPHAG R-T 2D W/PRB IMG ACQUISJ I&R N/A 1/27/2020    Procedure: TRANSESOPHAGEAL ECHOCARDIOGRAM (GET);   Surgeon: Chicho Acosta DO;  [facility];  Service: Cardiac Surgery    WA ENDOSCOPY W/VIDEO-ASST VEIN HARVEST,CABG Left 1/27/2020 Procedure: HARVEST VEIN ENDOSCOPIC ([facility]); Surgeon: Brett Kraft DO;  [facility];  Service: Cardiac Surgery    TONSILLECTOMY      Last assessed - 4/20/17    TUBAL LIGATION      Last assessed - 4/20/17    WISDOM TOOTH EXTRACTION      Last assessed - 4/20/17       Social History     Tobacco Use    Smoking status: Never Smoker    Smokeless tobacco: Never Used   Vaping Use    Vaping Use: Never used   Substance Use Topics    Alcohol use: Yes     Comment: 1 wine nightly    Drug use: No       Family History   Problem Relation Age of Onset    Coronary artery disease Mother     Arthritis Mother     Other Mother         Cardiac Disorder     Transient ischemic attack Mother     Heart attack Father     Sudden death Father         SCD    Cancer Daughter 52        kidney       The following portions of the patient's history were reviewed and updated as appropriate: allergies, current medications, past family history, past medical history, past social history, past surgical history and problem list       EXAM    Vitals:Blood pressure (!) 119/47, pulse 67, temperature 98 2 °F (36 8 °C), temperature source Probe, resp  rate 16, height 5' 4" (1 626 m), weight 49 kg (108 lb)  ,Body mass index is 18 54 kg/m²  Physical Exam  Vitals and nursing note reviewed  Constitutional:       General: She is not in acute distress  Appearance: She is normal weight  She is not ill-appearing, toxic-appearing or diaphoretic  Eyes:      General: No scleral icterus  Right eye: No discharge  Left eye: No discharge  Extraocular Movements: Extraocular movements intact  Pulmonary:      Effort: Pulmonary effort is normal  No respiratory distress  Musculoskeletal:      Right lower leg: No edema  Left lower leg: No edema  Skin:     General: Skin is warm and dry  Neurological:      General: No focal deficit present  Mental Status: She is alert and oriented to person, place, and time  Sensory: No sensory deficit  Motor: No weakness  Coordination: Coordination normal       Gait: Gait is intact  Gait normal       Deep Tendon Reflexes: Strength normal    Psychiatric:         Mood and Affect: Mood normal          Behavior: Behavior normal          Neurologic Exam     Mental Status   Oriented to person, place, and time  Level of consciousness: alert    Motor Exam     Strength   Strength 5/5 throughout  Sensory Exam   Light touch normal      Gait, Coordination, and Reflexes     Gait  Gait: normal (leaning posture /elevated pelvis on right )      Imaging Studies  XR spine thoracolumbar 2 vw Result Date: 9/28/2022  Narrative: THORACOLUMBAR SPINE INDICATION:   S22 080A: Wedge compression fracture of T11-T12 vertebra, initial encounter for closed fracture M41 9: Scoliosis, unspecified  COMPARISON: 8/29/2022 11/27/2018 VIEWS:  XR SPINE THORACOLUMBAR 2 VW FINDINGS: Previous noted T11 anteroinferior wedge compression fracture is unchanged  L2 chronic anteroinferior left lateral wedge deformity noted  There is no new acute fracture nor pathologic bone lesion  Thoracic vertebral alignment and scoliosis are unchanged  No significant degenerative changes  There is no displacement of the paraspinal line  The pedicles appear intact  Impression: No change T11 inferior endplate compression fracture Workstation performed: [id number]       I have personally reviewed pertinent reports     and I have personally reviewed pertinent films in PACS

## 2022-09-29 NOTE — PATIENT INSTRUCTIONS
Contact neurosurgery immediately or go to closest ER If develop any worsening uncontrolled back or leg pain, leg weakness,paresthesia, bowel or bladder issues, ambulatory dysfunction

## 2022-09-30 ENCOUNTER — TELEPHONE (OUTPATIENT)
Dept: ENDOCRINOLOGY | Facility: CLINIC | Age: 77
End: 2022-09-30

## 2022-09-30 ENCOUNTER — OFFICE VISIT (OUTPATIENT)
Dept: PHYSICAL THERAPY | Facility: CLINIC | Age: 77
End: 2022-09-30
Payer: MEDICARE

## 2022-09-30 DIAGNOSIS — S22.080A CLOSED WEDGE COMPRESSION FRACTURE OF T12 VERTEBRA, INITIAL ENCOUNTER (HCC): ICD-10-CM

## 2022-09-30 DIAGNOSIS — M41.35 THORACOGENIC SCOLIOSIS OF THORACOLUMBAR REGION: Primary | ICD-10-CM

## 2022-09-30 DIAGNOSIS — S22.080A CLOSED WEDGE COMPRESSION FRACTURE OF T11 VERTEBRA, INITIAL ENCOUNTER (HCC): ICD-10-CM

## 2022-09-30 PROCEDURE — 97110 THERAPEUTIC EXERCISES: CPT

## 2022-09-30 PROCEDURE — 97112 NEUROMUSCULAR REEDUCATION: CPT

## 2022-09-30 PROCEDURE — 97140 MANUAL THERAPY 1/> REGIONS: CPT

## 2022-09-30 NOTE — PROGRESS NOTES
Daily Note     Today's date: 2022  Patient name: Chioma Manjarrez  : 1945  MRN: [id number]  Referring provider: RACHEL Rios  Dx:   Encounter Diagnosis     ICD-10-CM    1  Thoracogenic scoliosis of thoracolumbar region  M41 35    2  Closed wedge compression fracture of T12 vertebra, [facility] ([facility] )  S22 080A    3  Closed wedge compression fracture of T11 vertebra, initial encounter ([facility])  S22 080A                   Subjective: Patient noted that she feels stiff at the evening  Objective: See treatment diary below      Assessment: Tolerated treatment well  She was challenged with pball sitting, and needed close guarding but able to maintan upright posture  Patient was able to progress towards more functional core  She still fatigues quickly with pelvic obliquity noted  Patient demonstrated fatigue post treatment, exhibited good technique with therapeutic exercises and would benefit from continued PT      Plan: Continue per plan of care  Progress treatment as tolerated         Diagnosis: Scoliosis of thoracolumbar spine, poor motor control   Precautions: See chart, healing compression fx at T11-T12   Goals: Walk >3 miles, improve core and glute strength   Manual Therapy 9/28 9/30 9/15 9/19 9/21   STM paraspinals  SP SP SP  SP                                    Exercise Diary         Therapeutic Exercise        Bike 5' 5' 5'  2/2 UBE 5' bike    LTR  20x5" 20x5"  205x"   Ball rolls  QL focused with hip hinge 10x5" ea   QL focused with hip hinge 10x5" ea   DKC   20x5" 20x5" 20x5"   Sciatic n glides 10x  10x 10x    Open books    15x5"            Neuromuscular Re-education        TA sets 20x5" 20x5" 20x5" 20x5" 20x5" with march   Glute sets Bridge with march 10x Bridge with march 20x Bridges 10x Bridges 20x  Bridges 20x   March on ball  20x      Pelvic tilts   On PBALL 20x      Multifidi NMR 10x10"    Column press 10x10"   10x10"  Each step 5"  10x10"   Pball ABC  1x through      Clamshells   2x10 simone      [facility] press BTB 20x Smiley hilliard 10"x4       Wobble board 2' ea 2' ea      Therapeutic Activities        Education                Modalities

## 2022-09-30 NOTE — TELEPHONE ENCOUNTER
Please inform patient that her vitamin-D is normal, she should continue current dose of vitamin-D 3 supplementation    She should start taking calcium supplement, Tums 600 mg daily with food    Jovanni Nguyễn

## 2022-10-03 ENCOUNTER — OFFICE VISIT (OUTPATIENT)
Dept: PHYSICAL THERAPY | Facility: CLINIC | Age: 77
End: 2022-10-03
Payer: MEDICARE

## 2022-10-03 DIAGNOSIS — S22.080A CLOSED WEDGE COMPRESSION FRACTURE OF T11 VERTEBRA, INITIAL ENCOUNTER (HCC): ICD-10-CM

## 2022-10-03 DIAGNOSIS — M41.35 THORACOGENIC SCOLIOSIS OF THORACOLUMBAR REGION: Primary | ICD-10-CM

## 2022-10-03 DIAGNOSIS — S22.080A CLOSED WEDGE COMPRESSION FRACTURE OF T12 VERTEBRA, INITIAL ENCOUNTER (HCC): ICD-10-CM

## 2022-10-03 PROCEDURE — 97530 THERAPEUTIC ACTIVITIES: CPT

## 2022-10-03 PROCEDURE — 97140 MANUAL THERAPY 1/> REGIONS: CPT

## 2022-10-03 PROCEDURE — 97112 NEUROMUSCULAR REEDUCATION: CPT

## 2022-10-03 NOTE — PROGRESS NOTES
Daily Note     Today's date: 10/3/2022  Patient name: Nell Joshi  : 1945  MRN: [id number]  Referring provider: RACHEL Carroll  Dx:   Encounter Diagnosis     ICD-10-CM    1  Thoracogenic scoliosis of thoracolumbar region  M41 35    2  Closed wedge compression fracture of T12 vertebra, initial encounter ([facility] )  S22 080A    3  Closed wedge compression fracture of T11 vertebra, initial encounter ([facility])  S22 080A                   Subjective: Patient noted that she feels stiff at the evening  Objective: See treatment diary below      Assessment: Tolerated treatment well  She had good tolerance to pball sitting with core activation today  She needed less cueing for core activation  Patient was educated on pain science and given an opportunity to ask questions  She had good response and hand outs were give  Patient continues to respond well with improved core endurance  Patient demonstrated fatigue post treatment, exhibited good technique with therapeutic exercises and would benefit from continued PT      Plan: Continue per plan of care  Progress treatment as tolerated         Diagnosis: Scoliosis of thoracolumbar spine, poor motor control   Precautions: See chart, healing compression fx at T11-T12   Goals: Walk >3 miles, improve core and glute strength   Manual Therapy 9/28 9/30 10/3 9/19 9/21   STM paraspinals  SP SP SP  SP                                    Exercise Diary         Therapeutic Exercise        Bike 5' 5' 5'  2/2 UBE 5' bike    LTR  20x5"   205x"   Ball rolls  QL focused with hip hinge 10x5" ea QL focused with hip hinge 10x5" ea  QL focused with hip hinge 10x5" ea   DKC    20x5" 20x5"   Sciatic n glides 10x   10x    Open books    15x5"            Neuromuscular Re-education        TA sets 20x5" 20x5"  20x5" 20x5" with march   Glute sets Bridge with march 10x Bridge with march 20x  Bridges 20x  Bridges 20x   March on ball  20x 20x     Pelvic tilts   On PBALL 20x      Multifidi NMR 10x10"    Column press 10x10"   10x10"  Each step 5"  10x10"   Pball ABC  1x through 2x through     [id number] press BTB 20x Doneta Felty morgan 10"x4       Wobble board 2' ea 2' ea 2' ea     Therapeutic Activities        Education   Pain science             Modalities

## 2022-10-05 ENCOUNTER — EVALUATION (OUTPATIENT)
Dept: PHYSICAL THERAPY | Facility: CLINIC | Age: 77
End: 2022-10-05
Payer: MEDICARE

## 2022-10-05 DIAGNOSIS — M41.35 THORACOGENIC SCOLIOSIS OF THORACOLUMBAR REGION: Primary | ICD-10-CM

## 2022-10-05 DIAGNOSIS — S22.080A CLOSED WEDGE COMPRESSION FRACTURE OF T11 VERTEBRA, INITIAL ENCOUNTER (HCC): ICD-10-CM

## 2022-10-05 DIAGNOSIS — S22.080A CLOSED WEDGE COMPRESSION FRACTURE OF T12 VERTEBRA, INITIAL ENCOUNTER (HCC): ICD-10-CM

## 2022-10-05 PROCEDURE — 97110 THERAPEUTIC EXERCISES: CPT

## 2022-10-05 PROCEDURE — 97140 MANUAL THERAPY 1/> REGIONS: CPT

## 2022-10-05 PROCEDURE — 97112 NEUROMUSCULAR REEDUCATION: CPT

## 2022-10-05 NOTE — PROGRESS NOTES
PT Re-Evaluation     Today's date: 10/5/2022  Patient name: Iva Macias  : 1945  MRN: [id number]  Referring provider: RACHEL Montez  Dx:   Encounter Diagnosis     ICD-10-CM    1  Thoracogenic scoliosis of thoracolumbar region  M41 35    2  Closed wedge compression fracture of T12 vertebra, initial encounter ([facility] [place] [facility] )  S22 080A    3  Closed wedge compression fracture of T11 vertebra, initial encounter [facility])  S22 080A                   Assessment  Assessment details: RE 10/5/22 Khadra Jacobsen has demonstrated improved progress with PT thus far  She has been complaint with PT and HEP and has demonstrated improvement in functional abilities  She is  Not as reliant on the TLSO brace, and is able to demonstrate internal stabilization with core exercises  She still demonstrates compensations with functional  Movements with pelvis due to weakness in core  She will benefit from continued PT in order to demonstrate progression of core with more functional exercises to progress back to functional activities without pain  Mehdi Blackburn is a pleasant 68 y o  female presents with signs and symptoms consistent with:   Closed wedge compression fracture of T11 vertebra, initial encounter ([facility] [place] [facility] )  Closed wedge compression fracture of T12 vertebra, initial encounter ([facility] [place] [facility] )  Scoliosis of thoracolumbar spine    Khadra Jacobsen presents with motor control deficits in core and glutes, as well as decreased ROM in lumbar and thoracic spine  Patients subjective and objective reports align with PT diagnosis of poor motor control secondary to scolosis  At this time patient responded to education, and HEP and is negative for any red flags  Patent will benefit from skilled physical therapy at this time to address deficits to improve ADLs/IADLs and return to PLOF  Patient verbalized understanding of POC, HEP, and return demonstrated HEP  All questions were answered to patients satisfaction       Etiologic factors include recent fracture, recent fall, poor mechanics, scoliosis  Prognosis is good given HEP compliance and attendance to physical therapy 2x a week  Positive prognostic indicators include motivation to improve  Negative prognostic indicators include compression fracture, medical history  Please contact me if you have any questions or recommendations  Thank you for the referral and the opportunity to share in Poly Sargent's care  Impairments: abnormal muscle firing, abnormal or restricted ROM, activity intolerance, impaired physical strength, lacks appropriate home exercise program, pain with function, poor posture  and poor body mechanics  Understanding of Dx/Px/POC: good   Prognosis: good    Goals  Impairment Goals 4-6 weeks Progressing 10/5/22  In order to improve and maximize function patient will be able to     - Decrease pain intensity to <4/10  - Improve lumbar AROM to >75% throughout MET  - Increase hip strength to 5/5 throughout  - improve core, glute and multifidi stabilization as seen by decreased pelvic obliquity with exercises  Functional Goals 6-8 weeks Progressing 10/5/22  In order to improve and maximize function patient will be able to     - Return to Prior Level of Function with no greater than 2/10 pain   - Increase Functional Status Measure (FOTO) to: >60  - Be independent and compliant with HEP MET  - Tolerate sitting and or standing without increased pain/compensation/difficulty for at least 30 minutes MET  - Perform sit to stand without increased pain/compensation/difficulty MET  - Demonstrate bend forward without increased pain/compensation/difficulty MET  - Demonstrate proper lifting mechanics and lift >40 pounds with no pain and good core and glute activation   - Ambulate with proper mechanics without compensations in pain       New Goals:  - Walk > 3 miles without increase in pain afterwards  -Go through the whole day without having to lay down throughout the day due to back pain  - Swim >10 laps without increase in pain afterwards  Plan  Patient would benefit from: skilled physical therapy  Planned modality interventions: cryotherapy, thermotherapy: hydrocollator packs and TENS  Planned therapy interventions: home exercise program, graded exercise, functional ROM exercises, flexibility, body mechanics training, postural training, patient education, therapeutic activities, therapeutic exercise, manual therapy, joint mobilization and neuromuscular re-education  Frequency: 2x week  Duration in weeks: 8  Treatment plan discussed with: patient and PCP        Subjective Evaluation    History of Present Illness  Mechanism of injury: RE 10/5/22 Patient presents to PT today for back pain  She notes that she has started on Prolia in which she had gotten a couple weeks ago for osteoporosis  Patient notes 50% improvement since starting her therapy  She notes that she is more aware of her posture as she goes through her day  She notes she can get on her feet for more than 2 hours, and getting up out of bed has improved as well  She notes that she is able to go longer through out the day before she has to lay down  She is not as reliant on the back brace  She notes that she has not had as much pain in her leg either  Difficulty with: walking >1 mile  IE:Patient presents to PT today for back pain  She notes that she has pain in her left leg goes down to her knee, and she feels like her left leg is weaker than the other side  She notes that she has scoliosis and she feels like she is toppling over  She notes that they were happening before she fractured her vertebrae  She fell walking the dog 5/9/22, she ignored it and then had an Xray and MRI  She was doing PT after, and she was getting continual pain her back and was xrayed at the end of July with a thoracic fracture  She is wearing a TSLO brace, and wearing it all intermittently  She felt like her posture was suffering, and she feels weaker      Difficulty with: Carrying heavy, being on her feet >2 hours, posture, getting through the day, being active, keeping independent, walking >1 5 miles   Pain  Current pain ratin  At best pain ratin  At worst pain ratin  Location: right side around the back (not as much radiating), lower back  Quality: dull ache and burning  Relieving factors: medications  Aggravating factors: walking, standing and stair climbing  Progression: no change    Treatments  Previous treatment: physical therapy  Patient Goals  Patient goals for therapy: decreased pain and independence with ADLs/IADLs  Patient goal: strengthening, stand up straight, walk >3 miles, lifting heavy  (progressing )        Objective     Concurrent Complaints  Negative for night pain and disturbed sleep    Postural Observations  Seated posture: fair  Standing posture: fair  Correction of posture: has no consistent effect        Palpation     Additional Palpation Details  Tenderness along R side around fx site, tightness along fx site       Tenderness     Additional Tenderness Details  T11, T12 TTP  Along fx site    Neurological Testing     Sensation     Lumbar   Left   Intact: light touch    Right   Intact: light touch    Reflexes   Left   Patellar (L4): trace (1+)  Achilles (S1): trace (1+)    Right   Patellar (L4): normal (2+)  Achilles (S1): normal (2+)    Active Range of Motion   Cervical/Thoracic Spine       Thoracic    Flexion:  Restriction level: minimal  Extension:  Restriction level: minimal  Left lateral flexion:  Restriction level: moderate  Right lateral flexion:  Restriction level: minimal  Left rotation:  Restriction level: minimal  Right rotation:  Restriction level: minimal    Lumbar   Flexion:  Restriction level: moderate  Extension:  Restriction level: minimal  Left lateral flexion:  Restriction level: minimal  Right lateral flexion:  Restriction level: minimal  Left rotation:  Restriction level: minimal  Right rotation:  Restriction level: minimal    Strength/Myotome Testing     Left Hip   Planes of Motion   Flexion: 4  Extension: 4 (compensations with lumbar spine)  Abduction: 4-  External rotation: 4  Internal rotation: 4    Right Hip   Planes of Motion   Flexion: 4 (compensation with pelvic obliqutiy)  Extension: 4 (compensations with lumbar spine)  Abduction: 4-  External rotation: 4  Internal rotation: 4    Left Knee   Flexion: 4  Extension: 4    Right Knee   Flexion: 4  Extension: 4    Left Ankle/Foot   Dorsiflexion: 5  Plantar flexion: 5    Right Ankle/Foot   Plantar flexion: 5    Additional Strength Details  Bridge with march: slight pelvic obliquity noted  -Bed mobility improved    Muscle Activation   Patient able to activate left transverse abdominals, left multifidus, right transverse abdominals and right multifidus  Tests     Lumbar     Left   Negative crossed SLR and passive SLR  Right   Negative crossed SLR and passive SLR  Left Hip   Negative GEENA and FADIR  Right Hip   Negative GEENA and FADIR       General Comments:    Lower quarter screen   Hips: unremarkable  Knees: unremarkable  Foot/ankle: unremarkable    Hip Comments   Weakness on L side  Decreased reflexes on L side             Diagnosis: Scoliosis of thoracolumbar spine, poor motor control   Precautions: See chart, healing compression fx at T11-T12   Goals: Walk >3 miles, improve core and glute strength   Manual Therapy 9/28 9/30 10/3 10/5 9/21   STM paraspinals  SP SP SP  SP                            Re-Eval    SP    Exercise Diary         Therapeutic Exercise        Bike 5' 5' 5'  5' 5' bike    LTR  20x5"   205x"   Ball rolls  QL focused with hip hinge 10x5" ea QL focused with hip hinge 10x5" ea  QL focused with hip hinge 10x5" ea   DKC     20x5"   Sciatic n glides 10x       Open books                Neuromuscular Re-education        TA sets 20x5" 20x5"  20x5" 20x5" with march   Glute sets Bridge with march 10x Bridge with march 20x   Bridges 20x   March on ball  20x 20x     Pelvic tilts   On PBALL 20x      Multifidi NMR 10x10"    Column press 10x10"    10x10"   Pball ABC  1x through 2x through     [facility]        Palof press BTB 20x ea       Captain babak 10"x4       Shoulder taps    Mod plant 20x    Wobble board 2' ea 2' ea 2' ea     Therapeutic Activities        Education   Pain science             Modalities

## 2022-10-10 ENCOUNTER — OFFICE VISIT (OUTPATIENT)
Dept: PHYSICAL THERAPY | Facility: CLINIC | Age: 77
End: 2022-10-10
Payer: MEDICARE

## 2022-10-10 DIAGNOSIS — S22.080A CLOSED WEDGE COMPRESSION FRACTURE OF T11 VERTEBRA, INITIAL ENCOUNTER (HCC): ICD-10-CM

## 2022-10-10 DIAGNOSIS — S22.080A CLOSED WEDGE COMPRESSION FRACTURE OF T12 VERTEBRA, INITIAL ENCOUNTER (HCC): ICD-10-CM

## 2022-10-10 DIAGNOSIS — M41.35 THORACOGENIC SCOLIOSIS OF THORACOLUMBAR REGION: Primary | ICD-10-CM

## 2022-10-10 PROCEDURE — 97110 THERAPEUTIC EXERCISES: CPT

## 2022-10-10 PROCEDURE — 97112 NEUROMUSCULAR REEDUCATION: CPT

## 2022-10-10 NOTE — PROGRESS NOTES
Daily Note     Today's date: 10/10/2022  Patient name: Melba Abarca  : 1945  MRN: [id number]  Referring provider: RACHEL Orellana  Dx:   Encounter Diagnosis     ICD-10-CM    1  Thoracogenic scoliosis of thoracolumbar region  M41 35    2  Closed wedge compression fracture of T12 vertebra, [facility] )  S22 080A    3  Closed wedge compression fracture of T11 vertebra, initial encounter ([facility])  S22 080A                   Subjective: Patient noted that she has been running around all day and had to lay down for 15 mins  Objective: See treatment diary below      Assessment: Tolerated treatment well  Patient was able to be progressed with higher level core exercises today  She had mild challenge with plank table taps, but when progressed to chair she had increased challenge with good core activation  She noted slight pelvic obliquity with weight shift from upper extremity  She noted challenge with progression to [place]  Patient demonstrated fatigue post treatment, exhibited good technique with therapeutic exercises and would benefit from continued PT      Plan: Continue per plan of care  Progress treatment as tolerated         Diagnosis: Scoliosis of thoracolumbar spine, poor motor control   Precautions: See chart, healing compression fx at T11-T12   Goals: Walk >3 miles, improve core and glute strength   Manual Therapy 9/28 9/30 10/3 10/5 10/10   STM paraspinals  SP SP SP  SP                            Re-Eval    SP    Exercise Diary         Therapeutic Exercise        Bike 5' 5' 5'  5' 5' bike    LTR  20x5"      Ball rolls  QL focused with hip hinge 10x5" ea QL focused with hip hinge 10x5" ea     DKC        Sciatic n glides 10x       Open books        Leg press      60# with core activation 20x   Neuromuscular Re-education        TA sets 20x5" 20x5"  20x5"    Glute sets Bridge with march 10x Bridge with march 20x      March on ball  20x 20x     Pelvic tilts   On PBALL 20x      Multifidi NMR 10x10"    Column press 10x10"       Pball ABC  1x through 2x through     Clamshells        Palof press BTB 20x ea    so 7# 20x   Captain babak 10"x4       Shoulder taps    Mod plant 20x Mod plant 20x    Mod plank      30"x4   Wobble board 2' ea 2' ea 2' ea     Therapeutic Activities        Education   Pain science     xwalks     2 laps YTB   Modalities

## 2022-10-14 ENCOUNTER — OFFICE VISIT (OUTPATIENT)
Dept: PHYSICAL THERAPY | Facility: CLINIC | Age: 77
End: 2022-10-14
Payer: MEDICARE

## 2022-10-14 DIAGNOSIS — S22.080A CLOSED WEDGE COMPRESSION FRACTURE OF T12 VERTEBRA, INITIAL ENCOUNTER (HCC): ICD-10-CM

## 2022-10-14 DIAGNOSIS — S22.080A CLOSED WEDGE COMPRESSION FRACTURE OF T11 VERTEBRA, INITIAL ENCOUNTER (HCC): ICD-10-CM

## 2022-10-14 DIAGNOSIS — M41.35 THORACOGENIC SCOLIOSIS OF THORACOLUMBAR REGION: Primary | ICD-10-CM

## 2022-10-14 PROCEDURE — 97530 THERAPEUTIC ACTIVITIES: CPT

## 2022-10-14 PROCEDURE — 97110 THERAPEUTIC EXERCISES: CPT

## 2022-10-14 PROCEDURE — 97112 NEUROMUSCULAR REEDUCATION: CPT

## 2022-10-14 NOTE — PROGRESS NOTES
Daily Note     Today's date: 10/14/2022  Patient name: Jagdish Alonso  : 1945  MRN: [id number]  Referring provider: RACHEL Fonseca  Dx:   Encounter Diagnosis     ICD-10-CM    1  Thoracogenic scoliosis of thoracolumbar region  M41 35    2  Closed wedge compression fracture of T12 vertebra, initial encounter ([facility] )  S22 080A    3  Closed wedge compression fracture of T11 vertebra, initial encounter ([facility] )  S22 080A                   Subjective: Patient notes that she went swimming and had no issues  Objective: See treatment diary below      Assessment: Tolerated treatment well  Patient continues to be challenged with with outlined program with moderate fatigue noted at the end of the session, as she needed increased rest breaks  She needed rest breaks for [employer] press as that was performed at the end of the session  She needed close guarding for bosu step ups more so due to balance then strength, however had increased challenge in L leg due to weaker side  She is progressing well  Slight pelvic obliquity noted Patient demonstrated fatigue post treatment, exhibited good technique with therapeutic exercises and would benefit from continued PT      Plan: Continue per plan of care  Progress treatment as tolerated         Diagnosis: Scoliosis of thoracolumbar spine, poor motor control   Precautions: See chart, healing compression fx at T11-T12   Goals: Walk >3 miles, improve core and glute strength   Manual Therapy 10/14 9/30 10/3 10/5 10/10   STM paraspinals  SP SP SP  SP                            Re-Eval    SP    Exercise Diary         Therapeutic Exercise        Bike 5' 5' 5'  5' 5' bike    LTR  20x5"      Ball rolls Hip hinges 10x5" ea  QL focused with hip hinge 10x5" ea QL focused with hip hinge 10x5" ea     QL stretch 10x10"       Sciatic n glides        Open books        Leg press      60# with core activation 20x   Neuromuscular Re-education        TA sets  20x5"  20x5"    Glute sets  Bridge with march 20x      March on ball  20x 20x     Pelvic tilts   On PBALL 20x      Multifidi NMR        Pball ABC Standing 1x RMB 1x through 2x through     ClaSpacedeck        Palof press Paty 7# 20x    paty 7# 20x   Captferoz hilliard        Shoulder taps 24" 2x10   Mod plant 20x Mod plant 20x    Mod plank      30"x4   Wobble board 2' ea 2' ea 2' ea     Therapeutic Activities        Education   Pain science     Step ups Onto bosu 15x ea       xwalks RTB 2 laps    2 laps YTB   Modalities

## 2022-10-18 ENCOUNTER — OFFICE VISIT (OUTPATIENT)
Dept: PHYSICAL THERAPY | Facility: CLINIC | Age: 77
End: 2022-10-18
Payer: MEDICARE

## 2022-10-18 DIAGNOSIS — M41.35 THORACOGENIC SCOLIOSIS OF THORACOLUMBAR REGION: Primary | ICD-10-CM

## 2022-10-18 DIAGNOSIS — S22.080A CLOSED WEDGE COMPRESSION FRACTURE OF T12 VERTEBRA, INITIAL ENCOUNTER (HCC): ICD-10-CM

## 2022-10-18 DIAGNOSIS — S22.080A CLOSED WEDGE COMPRESSION FRACTURE OF T11 VERTEBRA, INITIAL ENCOUNTER (HCC): ICD-10-CM

## 2022-10-18 PROCEDURE — 97110 THERAPEUTIC EXERCISES: CPT

## 2022-10-18 PROCEDURE — 97530 THERAPEUTIC ACTIVITIES: CPT

## 2022-10-18 PROCEDURE — 97112 NEUROMUSCULAR REEDUCATION: CPT

## 2022-10-18 NOTE — PROGRESS NOTES
Daily Note     Today's date: 10/18/2022  Patient name: Darshana Herr  : 1945  MRN: [id number]  Referring provider: RACHEL Knight  Dx:   Encounter Diagnosis     ICD-10-CM    1  Thoracogenic scoliosis of thoracolumbar region  M41 35    2  Closed wedge compression fracture of T12 vertebra, initial encounter ([facility] )  S22 080A    3  Closed wedge compression fracture of T11 vertebra, initial encounter ([facility] )  S22 080A                   Subjective: Patient notes that she went swimming and had no issues  Objective: See treatment diary below      Assessment: Tolerated treatment well  Patient challenged with farmers carriers with the moderate weight  She noted valgus on leg press but improved with cueing  Noted challenge with rotational core but was able to complete exercise  She continues to be challenged with more functional core, but she had good tolerance to xwalks today  Patient demonstrated fatigue post treatment, exhibited good technique with therapeutic exercises and would benefit from continued PT      Plan: Continue per plan of care  Progress treatment as tolerated         Diagnosis: Scoliosis of thoracolumbar spine, poor motor control   Precautions: See chart, healing compression fx at T11-T12   Goals: Walk >3 miles, improve core and glute strength   Manual Therapy 10/14 10/18 10/3 10/5 10/10   STM paraspinals  SP SP SP  SP                            Re-Eval    SP    Exercise Diary         Therapeutic Exercise        Bike 5' 5' 5'  5' 5' bike    LTR        Ball rolls Hip hinges 10x5" ea  Hip hinges 10x5" ea QL focused with hip hinge 10x5" ea     QL stretch 10x10"       Sciatic n glides        Open books        Leg press   70# with core activation 20x   60# with core activation 20x   Neuromuscular Re-education        TA sets    20x5"    Glute sets        March on ball   20x     Pelvic tilts         Multifidi NMR        Pball ABC Standing 1x RMB  2x through     ClaTabTalef press [place] 7# 20x so 7# 20x   Standing KB passes   20x ea direction      Captferoz hilliard        Shoulder taps 24" 2x10   Mod plant 20x Mod plant 20x    Rotational core  Blaze pod taps 2 tables 30'x4      Mod plank   Blaze pods 30"x4 24" high low   30"x4   Wobble board 2' ea  2' ea     Therapeutic Activities        Education   Pain science     Step ups Onto bosu 15x ea       [place] carries  10# 2 laps      xwalks RTB 2 laps RTB 2 laps   2 laps YTB   Modalities

## 2022-10-20 ENCOUNTER — OFFICE VISIT (OUTPATIENT)
Dept: PHYSICAL THERAPY | Facility: CLINIC | Age: 77
End: 2022-10-20
Payer: MEDICARE

## 2022-10-20 DIAGNOSIS — S22.080A CLOSED WEDGE COMPRESSION FRACTURE OF T11 VERTEBRA, INITIAL ENCOUNTER (HCC): ICD-10-CM

## 2022-10-20 DIAGNOSIS — M41.35 THORACOGENIC SCOLIOSIS OF THORACOLUMBAR REGION: ICD-10-CM

## 2022-10-20 DIAGNOSIS — S22.080A CLOSED WEDGE COMPRESSION FRACTURE OF T12 VERTEBRA, INITIAL ENCOUNTER (HCC): Primary | ICD-10-CM

## 2022-10-20 PROCEDURE — 97112 NEUROMUSCULAR REEDUCATION: CPT

## 2022-10-20 PROCEDURE — 97110 THERAPEUTIC EXERCISES: CPT

## 2022-10-20 NOTE — PROGRESS NOTES
Daily Note     Today's date: 10/20/2022  Patient name: Stevenson Lao  : 1945  MRN: [id number]  Referring provider: RACHEL Robles  Dx:   Encounter Diagnosis     ICD-10-CM    1  Closed wedge compression fracture of T12 vertebra, initial encounter ([facility] )  S22 080A    2  Thoracogenic scoliosis of thoracolumbar region  M41 35    3  Closed wedge compression fracture of T11 vertebra, initial encounter ([facility] )  S22 080A                   Subjective: Patient notes that she was able to swim half a mile today          Objective: See treatment diary below      Assessment: Tolerated treatment well  Able to be progressed to green theraband for xwalks with moderate challenge  She had moderate challenge with multifidi step outs with increased rest breaks and cues for stabilization especially when fatigued  She was introduced to proper hip hinges for good form with lifting mechanics  Patient demonstrated fatigue post treatment, exhibited good technique with therapeutic exercises and would benefit from continued PT      Plan: Continue per plan of care  Progress treatment as tolerated         Diagnosis: Scoliosis of thoracolumbar spine, poor motor control   Precautions: See chart, healing compression fx at T11-T12   Goals: Walk >3 miles, improve core and glute strength   Manual Therapy 10/14 10/18 10/20 10/5 10/10   STM paraspinals  SP SP SP  SP                            Re-Eval    SP    Exercise Diary         Therapeutic Exercise        Bike 5' 5' 5'  5' 5' bike    LTR        Ball rolls Hip hinges 10x5" ea  Hip hinges 10x5" ea QL focused with hip hinge 20x5" ea     QL stretch 10x10"       Sciatic n gliaries        Open books        Leg press   70# with core activation 20x 70# with core activation 45x  60# with core activation 20x   Neuromuscular Re-education        TA sets    20x5"    Glute sets        March on ball        Hip hinges   20x with cane     Multifidi NMR        Pball ABC Standing 1x RMB       [facility] Palof press [place] 7# 20x  Paty 6# step outs 3x  paty 7# 20x   Standing KB passes   20x ea direction      Captferoz hilliard        Shoulder taps 24" 2x10   Mod plant 20x Mod plant 20x    Rotational core  Blaze pod taps 2 tables 30'x4      Mod plank   Blaze pods 30"x4 24" high low   30"x4   Wobble board 2' ea  2' ea     Therapeutic Activities        Education        Step ups Onto bosu 15x ea  On biodex with 5# 20x ea foot      [place] carries  10# 2 laps 10# 2 laps     xwalks RTB 2 laps RTB 2 laps GTB 2 laps   2 laps YTB   Modalities

## 2022-10-25 ENCOUNTER — OFFICE VISIT (OUTPATIENT)
Dept: PHYSICAL THERAPY | Facility: CLINIC | Age: 77
End: 2022-10-25
Payer: MEDICARE

## 2022-10-25 DIAGNOSIS — M41.35 THORACOGENIC SCOLIOSIS OF THORACOLUMBAR REGION: ICD-10-CM

## 2022-10-25 DIAGNOSIS — S22.080A CLOSED WEDGE COMPRESSION FRACTURE OF T11 VERTEBRA, INITIAL ENCOUNTER (HCC): ICD-10-CM

## 2022-10-25 DIAGNOSIS — S22.080A CLOSED WEDGE COMPRESSION FRACTURE OF T12 VERTEBRA, INITIAL ENCOUNTER (HCC): Primary | ICD-10-CM

## 2022-10-25 PROCEDURE — 97112 NEUROMUSCULAR REEDUCATION: CPT

## 2022-10-25 PROCEDURE — 97110 THERAPEUTIC EXERCISES: CPT

## 2022-10-25 PROCEDURE — 97530 THERAPEUTIC ACTIVITIES: CPT

## 2022-10-28 ENCOUNTER — OFFICE VISIT (OUTPATIENT)
Dept: PHYSICAL THERAPY | Facility: CLINIC | Age: 77
End: 2022-10-28
Payer: MEDICARE

## 2022-10-28 DIAGNOSIS — S22.080A CLOSED WEDGE COMPRESSION FRACTURE OF T12 VERTEBRA, INITIAL ENCOUNTER (HCC): Primary | ICD-10-CM

## 2022-10-28 DIAGNOSIS — M41.35 THORACOGENIC SCOLIOSIS OF THORACOLUMBAR REGION: ICD-10-CM

## 2022-10-28 DIAGNOSIS — S22.080A CLOSED WEDGE COMPRESSION FRACTURE OF T11 VERTEBRA, INITIAL ENCOUNTER (HCC): ICD-10-CM

## 2022-10-28 PROCEDURE — 97112 NEUROMUSCULAR REEDUCATION: CPT

## 2022-10-28 PROCEDURE — 97530 THERAPEUTIC ACTIVITIES: CPT

## 2022-10-28 PROCEDURE — 97110 THERAPEUTIC EXERCISES: CPT

## 2022-10-28 NOTE — PROGRESS NOTES
Daily Note     Today's date: 10/28/2022  Patient name: Jose Manuel Vazquez  : 1945  MRN: [id number]  Referring provider: RACHEL Brink  Dx:   Encounter Diagnosis     ICD-10-CM    1  Closed wedge compression fracture of T12 vertebra, initial encounter ([facility] )  S22 080A    2  Thoracogenic scoliosis of thoracolumbar region  M41 35    3  Closed wedge compression fracture of T11 vertebra, initial encounter ([facility] )  S22 080A                   Subjective: Patient notes that she did go to yoga but was sore afterwards and needed to lay down half way through  Objective: See treatment diary below      Assessment: Tolerated treatment well  Patient continues to demonstrate good response to core activation with less pelvis compensations  Patient did need slight cueing for scapular activation for postural exercises  Patient was educated on return to activity and tolerance to exercises  She demonstrated good balance with performing postural exercises on foam   Patient demonstrated fatigue post treatment, exhibited good technique with therapeutic exercises and would benefit from continued PT      Plan: Continue per plan of care  Progress treatment as tolerated         Diagnosis: Scoliosis of thoracolumbar spine, poor motor control   Precautions: See chart, healing compression fx at T11-T12   Goals: Walk >3 miles, improve core and glute strength   Manual Therapy 10/14 10/18 10/20 10/25 10/28   STM paraspinals  SP SP SP  SP                            Re-Eval    SP    Exercise Diary         Therapeutic Exercise        Bike 5' 5' 5'  5' 5' bike    LTR        Ball rolls Hip hinges 10x5" ea  Hip hinges 10x5" ea QL focused with hip hinge 20x5" ea 20x5" 20x5"   QL stretch 10x10"   10x10"    Sciatic n [place]        Open books        Leg press   70# with core activation 20x 70# with core activation 45x  80# with core activation 20x   Neuromuscular Re-education        TA sets        D2     GTB 2x10 foam   Split stance rows/LPD    12# 2x10 ea 13# 2x15 on foam   Hip hinges   20x with cane 20x with 5#    Multifidi NMR        Pball ABC Standing 1x RMB       Clamshells        Palof press Paty 7# 20x  [place] 6# step outs 3x paty 10# 20x     Standing KB passes   20x ea direction      Captain hilliard        Shoulder taps 24" 2x10    Wall clocks at 24" box   Rotational core  Blaze pod taps 2 tables 30'x4      Mod plank   Blaze pods 30"x4 24" high low  Plank on wobble board 30"x4 side to side 24' Bosu hole in one 30"x3   Wobble board 2' ea  2' ea     Therapeutic Activities        Education     Return to activity, getting up off the floor   Step ups Onto bosu 15x ea  On biodex with 5# 20x ea foot      [place] carries  10# 2 laps 10# 2 laps 15# 2 laps    xwalks RTB 2 laps RTB 2 laps GTB 2 laps      Modalities

## 2022-10-31 ENCOUNTER — OFFICE VISIT (OUTPATIENT)
Dept: PHYSICAL THERAPY | Facility: CLINIC | Age: 77
End: 2022-10-31

## 2022-10-31 ENCOUNTER — TELEPHONE (OUTPATIENT)
Dept: PSYCHIATRY | Facility: CLINIC | Age: 77
End: 2022-10-31

## 2022-10-31 DIAGNOSIS — M41.35 THORACOGENIC SCOLIOSIS OF THORACOLUMBAR REGION: ICD-10-CM

## 2022-10-31 DIAGNOSIS — S22.080A CLOSED WEDGE COMPRESSION FRACTURE OF T11 VERTEBRA, INITIAL ENCOUNTER (HCC): ICD-10-CM

## 2022-10-31 DIAGNOSIS — S22.080A CLOSED WEDGE COMPRESSION FRACTURE OF T12 VERTEBRA, INITIAL ENCOUNTER (HCC): Primary | ICD-10-CM

## 2022-10-31 NOTE — TELEPHONE ENCOUNTER
Called patient off talk therapy wait list to see if patient was still interested in services  Patient is no longer interested   Removed from wait list

## 2022-11-03 ENCOUNTER — EVALUATION (OUTPATIENT)
Dept: PHYSICAL THERAPY | Facility: CLINIC | Age: 77
End: 2022-11-03

## 2022-11-03 DIAGNOSIS — M41.35 THORACOGENIC SCOLIOSIS OF THORACOLUMBAR REGION: ICD-10-CM

## 2022-11-03 DIAGNOSIS — S22.080A CLOSED WEDGE COMPRESSION FRACTURE OF T12 VERTEBRA, INITIAL ENCOUNTER (HCC): Primary | ICD-10-CM

## 2022-11-03 DIAGNOSIS — S22.080A CLOSED WEDGE COMPRESSION FRACTURE OF T11 VERTEBRA, INITIAL ENCOUNTER (HCC): ICD-10-CM

## 2022-11-03 NOTE — PROGRESS NOTES
PT Re-Evaluation     Today's date: 11/3/2022  Patient name: Moni Watts  : 1945  MRN: [id number]  Referring provider: RACHEL Mcclure  Dx:   Encounter Diagnosis     ICD-10-CM    1  Closed wedge compression fracture of T12 vertebra, initial encounter ([facility] 75 )  S22 080A    2  Closed wedge compression fracture of T11 vertebra, initial encounter ([place] [address] )  S22 080A    3  Thoracogenic scoliosis of thoracolumbar region  M41 35                   Assessment  Assessment details: RE 11/3/22 Napoleon Haney continues to demonstrate improvement in PT thus far  She demonstrates improvement in stabilizing muscles and overall function as she is working her way back into returning to functional exercises  She does need some minor postural cues throughout session which she will benefit from one more week to reinforce proper posture with good cues to transition to HEP to continue to improve on her own  She is making good progress towards her goals  After next week she will be discharged from skilled PT with an I HEP  RE 10/5/22 Napoleon Haney has demonstrated improved progress with PT thus far  She has been complaint with PT and HEP and has demonstrated improvement in functional abilities  She is  Not as reliant on the TLSO brace, and is able to demonstrate internal stabilization with core exercises  She still demonstrates compensations with functional  Movements with pelvis due to weakness in core  She will benefit from continued PT in order to demonstrate progression of core with more functional exercises to progress back to functional activities without pain      Peyman Jaramillo is a pleasant 68 y o  female presents with signs and symptoms consistent with:   Closed wedge compression fracture of T11 vertebra, initial encounter ([facility] 75 )  Closed wedge compression fracture of T12 vertebra, initial encounter ([place] [address] )  Scoliosis of thoracolumbar spine    Napoleon Haney presents with motor control deficits in core and glutes, as well as decreased ROM in lumbar and thoracic spine  Patients subjective and objective reports align with PT diagnosis of poor motor control secondary to scolosis  At this time patient responded to education, and HEP and is negative for any red flags  Patent will benefit from skilled physical therapy at this time to address deficits to improve ADLs/IADLs and return to PLOF  Patient verbalized understanding of POC, HEP, and return demonstrated HEP  All questions were answered to patients satisfaction  Etiologic factors include recent fracture, recent fall, poor mechanics, scoliosis  Prognosis is good given HEP compliance and attendance to physical therapy 2x a week  Positive prognostic indicators include motivation to improve  Negative prognostic indicators include compression fracture, medical history  Please contact me if you have any questions or recommendations  Thank you for the referral and the opportunity to share in STAFFANSTORP Arie's care  Impairments: abnormal muscle firing, abnormal or restricted ROM, activity intolerance, impaired physical strength, lacks appropriate home exercise program, pain with function, poor posture  and poor body mechanics  Understanding of Dx/Px/POC: good   Prognosis: good    Goals  Impairment Goals 4-6 weeks Progressing 11/3/22  In order to improve and maximize function patient will be able to     - Decrease pain intensity to <4/10  - Improve lumbar AROM to >75% throughout MET  - Increase hip strength to 5/5 throughout  - improve core, glute and multifidi stabilization as seen by decreased pelvic obliquity with exercises  Functional Goals 6-8 weeks Progressing 11/3/22  In order to improve and maximize function patient will be able to     - Return to Prior Level of Function with no greater than 2/10 pain   - Increase Functional Status Measure (FOTO) to: >60  - Be independent and compliant with HEP MET  - Tolerate sitting and or standing without increased pain/compensation/difficulty for at least 30 minutes MET  - Perform sit to stand without increased pain/compensation/difficulty MET  - Demonstrate bend forward without increased pain/compensation/difficulty MET  - Demonstrate proper lifting mechanics and lift >40 pounds with no pain and good core and glute activation   - Ambulate with proper mechanics without compensations in pain  New Goals:  - Walk > 3 miles without increase in pain afterwards partially met  -Go through the whole day without having to lay down throughout the day due to back pain partially MET  - Swim >10 laps without increase in pain afterwards  MET         Plan  Patient would benefit from: skilled physical therapy  Planned modality interventions: cryotherapy, thermotherapy: hydrocollator packs and TENS  Planned therapy interventions: functional ROM exercises, flexibility, body mechanics training, postural training, patient education, therapeutic activities, therapeutic exercise, manual therapy, joint mobilization, neuromuscular re-education, home exercise program and graded exercise  Frequency: 2x week  Duration in weeks: 1  Treatment plan discussed with: patient and PCP        Subjective Evaluation    History of Present Illness  Mechanism of injury: RE 11/3/22 Patient presents to PT today for re-eval of back pain  She notes that she 80% improvement since starting PT  She notes that she is getting back into swimming and walking further  She notes getting back to yoga is still challenging  She does not use the back brace  She notes that she does not have to lay as often or as long  She notes that the leg pain feels better  RE 10/5/22 Patient presents to PT today for back pain  She notes that she has started on Prolia in which she had gotten a couple weeks ago for osteoporosis  Patient notes 50% improvement since starting her therapy  She notes that she is more aware of her posture as she goes through her day    She notes she can get on her feet for more than 2 hours, and getting up out of bed has improved as well  She notes that she is able to go longer through out the day before she has to lay down  She is not as reliant on the back brace  She notes that she has not had as much pain in her leg either  Difficulty with: walking >1 mile    IE:Patient presents to PT today for back pain  She notes that she has pain in her left leg goes down to her knee, and she feels like her left leg is weaker than the other side  She notes that she has scoliosis and she feels like she is toppling over  She notes that they were happening before she fractured her vertebrae  She fell walking the dog 22, she ignored it and then had an Xray and MRI  She was doing PT after, and she was getting continual pain her back and was xrayed at the end of July with a thoracic fracture  She is wearing a TSLO brace, and wearing it all intermittently  She felt like her posture was suffering, and she feels weaker  Difficulty with: Carrying heavy, being on her feet >2 hours, posture, getting through the day, being active, keeping independent, walking >1 5 miles   Pain  Current pain ratin  At best pain ratin  At worst pain ratin  Location: right side around the back (not as much radiating), lower back  Quality: dull ache  Relieving factors: medications  Aggravating factors: stair climbing and walking  Progression: no change    Treatments  Previous treatment: physical therapy  Patient Goals  Patient goals for therapy: decreased pain and independence with ADLs/IADLs  Patient goal: strengthening, stand up straight, walk >3 miles, lifting heavy  (progressing 80%)        Objective     Concurrent Complaints  Negative for night pain and disturbed sleep    Postural Observations  Seated posture: fair  Standing posture: fair  Correction of posture: has no consistent effect        Palpation     Additional Palpation Details  Tenderness along R side around fx site, tightness along fx site       Tenderness     Additional Tenderness Details  T11, T12 TTP  Along fx site    Neurological Testing     Sensation     Lumbar   Left   Intact: light touch    Right   Intact: light touch    Reflexes   Left   Patellar (L4): trace (1+)  Achilles (S1): trace (1+)    Right   Patellar (L4): normal (2+)  Achilles (S1): normal (2+)    Active Range of Motion   Cervical/Thoracic Spine       Thoracic    Flexion:  Restriction level: minimal  Extension:  Restriction level: minimal  Left lateral flexion:  Restriction level: minimal  Right lateral flexion:  Restriction level: minimal  Left rotation:  Restriction level: minimal  Right rotation:  Restriction level: minimal    Lumbar   Flexion:  Restriction level: minimal  Left lateral flexion:  Restriction level: minimal  Right lateral flexion:  Restriction level: minimal  Left rotation:  Restriction level: minimal  Right rotation:  Restriction level: minimal    Strength/Myotome Testing     Left Hip   Planes of Motion   Flexion: 4  Extension: 4 (compensations with lumbar spine)  Abduction: 4-  External rotation: 4  Internal rotation: 4    Right Hip   Planes of Motion   Flexion: 4 (compensation with pelvic obliqutiy)  Extension: 4 (compensations with lumbar spine)  Abduction: 4-  External rotation: 4  Internal rotation: 4    Left Knee   Flexion: 4  Extension: 4    Right Knee   Flexion: 4  Extension: 4    Left Ankle/Foot   Dorsiflexion: 5  Plantar flexion: 5    Right Ankle/Foot   Plantar flexion: 5    Additional Strength Details  Bridge with march: slight pelvic obliquity noted  -Bed mobility improved    Muscle Activation   Patient able to activate left transverse abdominals, left multifidus, right transverse abdominals and right multifidus  Tests     Lumbar     Left   Negative crossed SLR and passive SLR  Right   Negative crossed SLR and passive SLR  Left Hip   Negative GEENA and FADIR  Right Hip   Negative GEENA and FADIR       General Comments:    Lower quarter screen   Hips: unremarkable  Knees: unremarkable  Foot/ankle: unremarkable    Hip Comments   Weakness on L side  Decreased reflexes on L side             Diagnosis: Scoliosis of thoracolumbar spine, poor motor control   Precautions: See chart, healing compression fx at T11-T12   Goals: Walk >3 miles, improve core and glute strength   Manual Therapy 10/31 11/3 10/20 10/25 10/28   STM paraspinals   SP SP  SP                            Re-Eval  SP  SP    Exercise Diary         Therapeutic Exercise        Bike UBE 3/3 L3 5' 5'  5' 5' bike    LTR        Ball rolls   QL focused with hip hinge 20x5" ea 20x5" 20x5"   QL stretch    10x10"    Sciatic n glides        Open books        Leg press    70# with core activation 45x  80# with core activation 20x   Neuromuscular Re-education        TA sets        D2     GTB 2x10 foam   Split stance rows/LPD TRX rows 2x10   12# 2x10 ea 13# 2x15 on foam   Hip hinges   20x with cane 20x with 5#    Multifidi NMR        Pball ABC        Clamshells        Palof press   [place] 6# step outs 3x so 10# 20x     Standing KB passes         Plank walks  mirror       Shoulder taps Serratus TRX push ups    Wall clocks at 24" box   Rotational core        Mod plank  KB pull through 3x6 18"    Plank on wobble board 30"x4 side to side 24' Bosu hole in one 30"x3   Wobble board 2' e  2' ea     Therapeutic Activities        Education  SP return to activity avoiding running      Return to activity, getting up off the floor   Step ups   On biodex with 5# 20x ea foot      [place] carries OH carries 2 lap 5#  10# 2 laps 15# 2 laps    xwalks GTB 2 laps   GTB 2 laps      Modalities

## 2022-11-08 ENCOUNTER — OFFICE VISIT (OUTPATIENT)
Dept: PHYSICAL THERAPY | Facility: CLINIC | Age: 77
End: 2022-11-08

## 2022-11-08 DIAGNOSIS — S22.080A CLOSED WEDGE COMPRESSION FRACTURE OF T12 VERTEBRA, INITIAL ENCOUNTER (HCC): Primary | ICD-10-CM

## 2022-11-08 DIAGNOSIS — S22.080A CLOSED WEDGE COMPRESSION FRACTURE OF T11 VERTEBRA, INITIAL ENCOUNTER (HCC): ICD-10-CM

## 2022-11-08 DIAGNOSIS — M41.35 THORACOGENIC SCOLIOSIS OF THORACOLUMBAR REGION: ICD-10-CM

## 2022-11-08 NOTE — PROGRESS NOTES
Daily Note     Today's date: 2022  Patient name: Yani Melendez  : 1945  MRN: [id number]  Referring provider: RACHEL Vail  Dx:   Encounter Diagnosis     ICD-10-CM    1  Closed wedge [facility] T12 vertebra[facility]  S22 080A    2  Thoracogenic scoliosis of thoracolumbar region  M41 35    3  Closed wedge compression fracture of T11 vertebra, initial encounter ([facility] 75 )  S22 080A                   Subjective: Patient notes she wants to make sure her form is good with exercises  Objective: See treatment diary below      Assessment: Tolerated treatment well  Reviewed HEP for home to go through form  She had good form with minimal cueing for scapular activation  Exercises printed out and will look to see if there is carry over next visit  She had moderate challenge with exercises given and educated on progression vs  Regressions  Patient demonstrated fatigue post treatment, exhibited good technique with therapeutic exercises and would benefit from continued PT      Plan: Continue per plan of care  Progress treatment as tolerated         Diagnosis: Scoliosis of thoracolumbar spine, poor motor control   Precautions: See chart, healing compression fx at T11-T12   Goals: Walk >3 miles, improve core and glute strength   Manual Therapy 10/31 11/3 11/8 10/25 10/28   STM paraspinals    SP  SP                            Re-Eval  SP  SP    Exercise Diary         Therapeutic Exercise        Bike UBE 3/3 L3 5' 5'  5' 5' bike    LTR        Ball rolls    20x5" 20x5"   QL stretch    10x10"    Sciatic n glides        Open books        Leg press      80# with core activation 20x   Neuromuscular Re-education        TA sets        D2     GTB 2x10 foam   Split stance rows/LPD TRX rows 2x10  BTB 30x  12# 2x10 ea 13# 2x15 on foam   Hip hinges    20x with 5#    Multifidi NMR        Pball ABC        Clamshells        Palof press   BTB with step outs 20x so 10# 20x     Standing KB passes Plank walks  mirror       Shoulder taps Serratus TRX push ups  GTB 5x   Wall clocks at 24" box   Rotational core        Mod plank  KB pull through 3x6 18"     Bosu hole in one 30"x3   Wobble board 2' e       Therapeutic Activities        Education  SP return to activity avoiding running      Return to activity, getting up off the floor   Step ups        [place] carries OH carries 2 lap 5#   15# 2 laps    xwalks GTB 2 laps   GTB 2 laps      Modalities

## 2022-11-11 ENCOUNTER — OFFICE VISIT (OUTPATIENT)
Dept: PHYSICAL THERAPY | Facility: CLINIC | Age: 77
End: 2022-11-11

## 2022-11-11 DIAGNOSIS — S22.080A CLOSED WEDGE COMPRESSION FRACTURE OF T11 VERTEBRA, INITIAL ENCOUNTER (HCC): ICD-10-CM

## 2022-11-11 DIAGNOSIS — M41.35 THORACOGENIC SCOLIOSIS OF THORACOLUMBAR REGION: ICD-10-CM

## 2022-11-11 DIAGNOSIS — S22.080A CLOSED WEDGE COMPRESSION FRACTURE OF T12 VERTEBRA, INITIAL ENCOUNTER (HCC): Primary | ICD-10-CM

## 2022-11-11 NOTE — PROGRESS NOTES
Daily Note     Today's date: 2022  Patient name: Yani Melendez  : 1945  MRN: [id number]  Referring provider: RACHEL Vail  Dx:   Encounter Diagnosis     ICD-10-CM    1  Closed wedge compression fracture of T12 vertebra, [facility] ([facility] )  S22 080A    2  Thoracogenic scoliosis of thoracolumbar region  M41 35    3  Closed wedge compression fracture of T11 vertebra, initial encounter ([facility])  S22 080A                   Subjective: Patient notes she had some questions about her PT exercises      Objective: See treatment diary below      Assessment: Tolerated treatment well  Reviewed patient questions with HEP and educated on process  Patient has made good progress with PT and has met functional PT goals at this time  Patient will be discharged from skilled PT services and will continue to make progress with I HEP  Patients HEP was reviewed in order to ensure compliance and success at home, in which exercise bands and printouts were given  We will be available if the patient needs physical therapy in the future  Patient was given an opportunity to ask questions  All questions were answered to patients satisfaction  Patient demonstrated fatigue post treatment, exhibited good technique with therapeutic exercises and would benefit from continued PT      Plan: Continue per plan of care  Progress treatment as tolerated         Diagnosis: Scoliosis of thoracolumbar spine, poor motor control   Precautions: See chart, healing compression fx at T11-T12   Goals: Walk >3 miles, improve core and glute strength   Manual Therapy 10/31 11/3 11/8 11/11 10/28   STM paraspinals    SP  SP                            Re-Eval  SP  SP    Exercise Diary         Therapeutic Exercise        Bike UBE 3/3 L3 5' 5'  5' 5' bike    LTR        Ball rolls     20x5"   QL stretch        Sciatic n glides        Open books        Therex review    25 mins    Leg press      80# with core activation 20x   Neuromuscular Re-education TA sets        D2     GTB 2x10 foam   Split stance rows/LPD TRX rows 2x10  BTB 30x   13# 2x15 on foam   Hip hinges        Multifidi NMR        Pball ABC        Clamshells        Palof press   BTB with step outs 20x GTB 1x ABC    Standing KB passes         Plank walks  mirror       Shoulder taps Serratus TRX push ups  GTB 5x   Wall clocks at 24" box   Rotational core        Mod plank  KB pull through 3x6 18"     Bosu hole in one 30"x3   Wobble board 2' e       Therapeutic Activities        Education  SP return to activity avoiding running     Avoiding end range, return to PT Return to activity, getting up off the floor   Step ups        [place] carries OH carries 2 lap 5#   15# 2 laps    xwalks GTB 2 laps   GTB 2 laps      Modalities

## 2022-12-10 DIAGNOSIS — Z95.1 S/P CABG X 3: ICD-10-CM

## 2023-01-03 ENCOUNTER — APPOINTMENT (OUTPATIENT)
Dept: LAB | Facility: CLINIC | Age: 78
End: 2023-01-03

## 2023-01-03 DIAGNOSIS — D50.8 OTHER IRON DEFICIENCY ANEMIA: ICD-10-CM

## 2023-01-03 DIAGNOSIS — E55.9 VITAMIN D DEFICIENCY: ICD-10-CM

## 2023-01-03 DIAGNOSIS — R41.3 MEMORY CHANGES: ICD-10-CM

## 2023-01-03 DIAGNOSIS — Z13.1 SCREENING FOR DIABETES MELLITUS: ICD-10-CM

## 2023-01-03 DIAGNOSIS — R53.83 FATIGUE, UNSPECIFIED TYPE: ICD-10-CM

## 2023-01-03 DIAGNOSIS — E53.8 B12 DEFICIENCY: ICD-10-CM

## 2023-01-03 DIAGNOSIS — E78.2 MIXED HYPERLIPIDEMIA: ICD-10-CM

## 2023-01-03 LAB
25(OH)D3 SERPL-MCNC: 32.1 NG/ML (ref 30–100)
ALBUMIN SERPL BCP-MCNC: 3.9 G/DL (ref 3.5–5)
ALP SERPL-CCNC: 49 U/L (ref 46–116)
ALT SERPL W P-5'-P-CCNC: 38 U/L (ref 12–78)
ANION GAP SERPL CALCULATED.3IONS-SCNC: 9 MMOL/L (ref 4–13)
AST SERPL W P-5'-P-CCNC: 42 U/L (ref 5–45)
BASOPHILS # BLD AUTO: 0.07 THOUSANDS/ÂΜL (ref 0–0.1)
BASOPHILS NFR BLD AUTO: 1 % (ref 0–1)
BILIRUB SERPL-MCNC: 0.46 MG/DL (ref 0.2–1)
BUN SERPL-MCNC: 21 MG/DL (ref 5–25)
CALCIUM SERPL-MCNC: 9.3 MG/DL (ref 8.3–10.1)
CHLORIDE SERPL-SCNC: 104 MMOL/L (ref 96–108)
CHOLEST SERPL-MCNC: 161 MG/DL
CO2 SERPL-SCNC: 25 MMOL/L (ref 21–32)
CREAT SERPL-MCNC: 0.66 MG/DL (ref 0.6–1.3)
EOSINOPHIL # BLD AUTO: 0.1 THOUSAND/ÂΜL (ref 0–0.61)
EOSINOPHIL NFR BLD AUTO: 2 % (ref 0–6)
ERYTHROCYTE [DISTWIDTH] IN BLOOD BY AUTOMATED COUNT: 14 % (ref 11.6–15.1)
GFR SERPL CREATININE-BSD FRML MDRD: 85 ML/MIN/1.73SQ M
GLUCOSE P FAST SERPL-MCNC: 90 MG/DL (ref 65–99)
HCT VFR BLD AUTO: 40.5 % (ref 34.8–46.1)
HDLC SERPL-MCNC: 66 MG/DL
HGB BLD-MCNC: 13.1 G/DL (ref 11.5–15.4)
IMM GRANULOCYTES # BLD AUTO: 0.02 THOUSAND/UL (ref 0–0.2)
IMM GRANULOCYTES NFR BLD AUTO: 0 % (ref 0–2)
LDLC SERPL CALC-MCNC: 77 MG/DL (ref 0–100)
LYMPHOCYTES # BLD AUTO: 1.38 THOUSANDS/ÂΜL (ref 0.6–4.47)
LYMPHOCYTES NFR BLD AUTO: 25 % (ref 14–44)
MCH RBC QN AUTO: 32.5 PG (ref 26.8–34.3)
MCHC RBC AUTO-ENTMCNC: 32.3 G/DL (ref 31.4–37.4)
MCV RBC AUTO: 101 FL (ref 82–98)
MONOCYTES # BLD AUTO: 0.51 THOUSAND/ÂΜL (ref 0.17–1.22)
MONOCYTES NFR BLD AUTO: 9 % (ref 4–12)
NEUTROPHILS # BLD AUTO: 3.35 THOUSANDS/ÂΜL (ref 1.85–7.62)
NEUTS SEG NFR BLD AUTO: 63 % (ref 43–75)
NRBC BLD AUTO-RTO: 0 /100 WBCS
PLATELET # BLD AUTO: 220 THOUSANDS/UL (ref 149–390)
PMV BLD AUTO: 12 FL (ref 8.9–12.7)
POTASSIUM SERPL-SCNC: 4.3 MMOL/L (ref 3.5–5.3)
PROT SERPL-MCNC: 7 G/DL (ref 6.4–8.4)
RBC # BLD AUTO: 4.03 MILLION/UL (ref 3.81–5.12)
RPR SER QL: NORMAL
SODIUM SERPL-SCNC: 138 MMOL/L (ref 135–147)
TRIGL SERPL-MCNC: 88 MG/DL
TSH SERPL DL<=0.05 MIU/L-ACNC: 0.96 UIU/ML (ref 0.45–4.5)
VIT B12 SERPL-MCNC: 457 PG/ML (ref 100–900)
WBC # BLD AUTO: 5.43 THOUSAND/UL (ref 4.31–10.16)

## 2023-01-09 ENCOUNTER — OFFICE VISIT (OUTPATIENT)
Dept: FAMILY MEDICINE CLINIC | Facility: CLINIC | Age: 78
End: 2023-01-09

## 2023-01-09 VITALS
HEIGHT: 63 IN | HEART RATE: 54 BPM | TEMPERATURE: 97.4 F | BODY MASS INDEX: 19.63 KG/M2 | SYSTOLIC BLOOD PRESSURE: 132 MMHG | WEIGHT: 110.8 LBS | RESPIRATION RATE: 14 BRPM | OXYGEN SATURATION: 97 % | DIASTOLIC BLOOD PRESSURE: 84 MMHG

## 2023-01-09 DIAGNOSIS — D50.8 OTHER IRON DEFICIENCY ANEMIA: ICD-10-CM

## 2023-01-09 DIAGNOSIS — D69.6 THROMBOCYTOPENIA (HCC): ICD-10-CM

## 2023-01-09 DIAGNOSIS — Z00.00 MEDICARE ANNUAL WELLNESS VISIT, SUBSEQUENT: ICD-10-CM

## 2023-01-09 DIAGNOSIS — I49.1 PAC (PREMATURE ATRIAL CONTRACTION): ICD-10-CM

## 2023-01-09 DIAGNOSIS — E55.9 VITAMIN D DEFICIENCY: ICD-10-CM

## 2023-01-09 DIAGNOSIS — Z13.1 SCREENING FOR DIABETES MELLITUS: ICD-10-CM

## 2023-01-09 DIAGNOSIS — E78.00 PURE HYPERCHOLESTEROLEMIA: ICD-10-CM

## 2023-01-09 DIAGNOSIS — I25.118 CORONARY ARTERY DISEASE OF NATIVE ARTERY OF NATIVE HEART WITH STABLE ANGINA PECTORIS (HCC): Primary | ICD-10-CM

## 2023-01-09 DIAGNOSIS — I10 ESSENTIAL HYPERTENSION: ICD-10-CM

## 2023-01-09 RX ORDER — SENNOSIDES 8.6 MG
650 CAPSULE ORAL
COMMUNITY

## 2023-01-09 NOTE — PATIENT INSTRUCTIONS
Medicare Preventive Visit Patient Instructions  Thank you for completing your Welcome to Medicare Visit or Medicare Annual Wellness Visit today  Your next wellness visit will be due in one year (1/10/2024)  The screening/preventive services that you may require over the next 5-10 years are detailed below  Some tests may not apply to you based off risk factors and/or age  Screening tests ordered at today's visit but not completed yet may show as past due  Also, please note that scanned in results may not display below  Preventive Screenings:  Service Recommendations Previous Testing/Comments   Colorectal Cancer Screening  * Colonoscopy    * Fecal Occult Blood Test (FOBT)/Fecal Immunochemical Test (FIT)  * Fecal DNA/Cologuard Test  * Flexible Sigmoidoscopy Age: 39-70 years old   Colonoscopy: every 10 years (may be performed more frequently if at higher risk)  OR  FOBT/FIT: every 1 year  OR  Cologuard: every 3 years  OR  Sigmoidoscopy: every 5 years  Screening may be recommended earlier than age 39 if at higher risk for colorectal cancer  Also, an individualized decision between you and your healthcare provider will decide whether screening between the ages of 74-80 would be appropriate  Colonoscopy: 06/15/2020  FOBT/FIT: Not on file  Cologuard: Not on file  Sigmoidoscopy: Not on file    Screening Current     Breast Cancer Screening Age: 36 years old  Frequency: every 1-2 years  Not required if history of left and right mastectomy Mammogram: 04/02/2022    Screening Current   Cervical Cancer Screening Between the ages of 21-29, pap smear recommended once every 3 years  Between the ages of 33-67, can perform pap smear with HPV co-testing every 5 years     Recommendations may differ for women with a history of total hysterectomy, cervical cancer, or abnormal pap smears in past  Pap Smear: Not on file    Screening Not Indicated   Hepatitis C Screening Once for adults born between 1945 and 1965  More frequently in patients at high risk for Hepatitis C Hep C Antibody: 05/18/2021    Screening Current   Diabetes Screening 1-2 times per year if you're at risk for diabetes or have pre-diabetes Fasting glucose: 90 mg/dL (1/3/2023)  A1C: No results in last 5 years (No results in last 5 years)  Screening Current   Cholesterol Screening Once every 5 years if you don't have a lipid disorder  May order more often based on risk factors  Lipid panel: 01/03/2023    Screening Not Indicated  History Lipid Disorder     Other Preventive Screenings Covered by Medicare:  1  Abdominal Aortic Aneurysm (AAA) Screening: covered once if your at risk  You're considered to be at risk if you have a family history of AAA  2  Lung Cancer Screening: covers low dose CT scan once per year if you meet all of the following conditions: (1) Age 50-69; (2) No signs or symptoms of lung cancer; (3) Current smoker or have quit smoking within the last 15 years; (4) You have a tobacco smoking history of at least 20 pack years (packs per day multiplied by number of years you smoked); (5) You get a written order from a healthcare provider  3  Glaucoma Screening: covered annually if you're considered high risk: (1) You have diabetes OR (2) Family history of glaucoma OR (3)  aged 48 and older OR (3)  American aged 72 and older  3  Osteoporosis Screening: covered every 2 years if you meet one of the following conditions: (1) You're estrogen deficient and at risk for osteoporosis based off medical history and other findings; (2) Have a vertebral abnormality; (3) On glucocorticoid therapy for more than 3 months; (4) Have primary hyperparathyroidism; (5) On osteoporosis medications and need to assess response to drug therapy  · Last bone density test (DXA Scan): 08/25/2022   5  HIV Screening: covered annually if you're between the age of 15-65  Also covered annually if you are younger than 13 and older than 72 with risk factors for HIV infection  For pregnant patients, it is covered up to 3 times per pregnancy  Immunizations:  Immunization Recommendations   Influenza Vaccine Annual influenza vaccination during flu season is recommended for all persons aged >= 6 months who do not have contraindications   Pneumococcal Vaccine   * Pneumococcal conjugate vaccine = PCV13 (Prevnar 13), PCV15 (Vaxneuvance), PCV20 (Prevnar 20)  * Pneumococcal polysaccharide vaccine = PPSV23 (Pneumovax) Adults 25-60 years old: 1-3 doses may be recommended based on certain risk factors  Adults 72 years old: 1-2 doses may be recommended based off what pneumonia vaccine you previously received   Hepatitis B Vaccine 3 dose series if at intermediate or high risk (ex: diabetes, end stage renal disease, liver disease)   Tetanus (Td) Vaccine - COST NOT COVERED BY MEDICARE PART B Following completion of primary series, a booster dose should be given every 10 years to maintain immunity against tetanus  Td may also be given as tetanus wound prophylaxis  Tdap Vaccine - COST NOT COVERED BY MEDICARE PART B Recommended at least once for all adults  For pregnant patients, recommended with each pregnancy  Shingles Vaccine (Shingrix) - COST NOT COVERED BY MEDICARE PART B  2 shot series recommended in those aged 48 and above     Health Maintenance Due:      Topic Date Due   • Breast Cancer Screening: Mammogram  04/02/2024   • Colorectal Cancer Screening  06/15/2025   • Hepatitis C Screening  Completed     Immunizations Due:      Topic Date Due   • Hepatitis B Vaccine (1 of 3 - 3-dose series) Never done   • COVID-19 Vaccine (4 - Booster for Pfizer series) 11/25/2021   • Influenza Vaccine (1) 09/01/2022     Advance Directives   What are advance directives? Advance directives are legal documents that state your wishes and plans for medical care  These plans are made ahead of time in case you lose your ability to make decisions for yourself   Advance directives can apply to any medical decision, such as the treatments you want, and if you want to donate organs  What are the types of advance directives? There are many types of advance directives, and each state has rules about how to use them  You may choose a combination of any of the following:  · Living will: This is a written record of the treatment you want  You can also choose which treatments you do not want, which to limit, and which to stop at a certain time  This includes surgery, medicine, IV fluid, and tube feedings  · Durable power of  for healthcare [facility]): This is a written record that states who you want to make healthcare choices for you when you are unable to make them for yourself  This person, called a proxy, is usually a family member or a friend  You may choose more than 1 proxy  · Do not resuscitate (DNR) order:  A DNR order is used in case your heart stops beating or you stop breathing  It is a request not to have certain forms of treatment, such as CPR  A DNR order may be included in other types of advance directives  · Medical directive: This covers the care that you want if you are in a coma, near death, or unable to make decisions for yourself  You can list the treatments you want for each condition  Treatment may include pain medicine, surgery, blood transfusions, dialysis, IV or tube feedings, and a ventilator (breathing machine)  · Values history: This document has questions about your views, beliefs, and how you feel and think about life  This information can help others choose the care that you would choose  Why are advance directives important? An advance directive helps you control your care  Although spoken wishes may be used, it is better to have your wishes written down  Spoken wishes can be misunderstood, or not followed  Treatments may be given even if you do not want them  An advance directive may make it easier for your family to make difficult choices about your care     Fall Prevention    Fall prevention  includes ways to make your home and other areas safer  It also includes ways you can move more carefully to prevent a fall  Health conditions that cause changes in your blood pressure, vision, or muscle strength and coordination may increase your risk for falls  Medicines may also increase your risk for falls if they make you dizzy, weak, or sleepy  Fall prevention tips:   · Stand or sit up slowly  · Use assistive devices as directed  · Wear shoes that fit well and have soles that   · Wear a personal alarm  · Stay active  · Manage your medical conditions  Home Safety Tips:  · Add items to prevent falls in the bathroom  · Keep paths clear  · Install bright lights in your home  · Keep items you use often on shelves within reach  · Paint or place reflective tape on the edges of your stairs  Urinary Incontinence   Urinary incontinence (UI)  is when you lose control of your bladder  UI develops because your bladder cannot store or empty urine properly  The 3 most common types of UI are stress incontinence, urge incontinence, or both  Medicines:   · May be given to help strengthen your bladder control  Report any side effects of medication to your healthcare provider  Do pelvic muscle exercises often:  Your pelvic muscles help you stop urinating  Squeeze these muscles tight for 5 seconds, then relax for 5 seconds  Gradually work up to squeezing for 10 seconds  Do 3 sets of 15 repetitions a day, or as directed  This will help strengthen your pelvic muscles and improve bladder control  Train your bladder:  Go to the bathroom at set times, such as every 2 hours, even if you do not feel the urge to go  You can also try to hold your urine when you feel the urge to go  For example, hold your urine for 5 minutes when you feel the urge to go  As that becomes easier, hold your urine for 10 minutes  Self-care:   · Keep a UI record    Write down how often you leak urine and how much you leak  Make a note of what you were doing when you leaked urine  · Drink liquids as directed  You may need to limit the amount of liquid you drink to help control your urine leakage  Do not drink any liquid right before you go to bed  Limit or do not have drinks that contain caffeine or alcohol  · Prevent constipation  Eat a variety of high-fiber foods  Good examples are high-fiber cereals, beans, vegetables, and whole-grain breads  Walking is the best way to trigger your intestines to have a bowel movement  · Exercise regularly and maintain a healthy weight  Weight loss and exercise will decrease pressure on your bladder and help you control your leakage  · Use a catheter as directed  to help empty your bladder  A catheter is a tiny, plastic tube that is put into your bladder to drain your urine  · Go to behavior therapy as directed  Behavior therapy may be used to help you learn to control your urge to urinate  Alcohol Use and Your Health    Drinking too much can harm your health  Excessive alcohol use leads to about 88,000 death in the [place] each year, and shortens the life of those who diet by almost 30 years  Further, excessive drinking cost the economy $249 billion in 2010  Most excessive drinkers are not alcohol dependent  Excessive alcohol use has immediate effects that increase the risk of many harmful health conditions  These are most often the result of binge drinking  Over time, excessive alcohol use can lead to the development of chronic diseases and other series health problems  What is considered a "drink"? Excessive alcohol use includes:  · Binge Drinking: For women, 4 or more drinks consumed on one occasion  For men, 5 or more drinks consumed on one occasion  · Heavy Drinking: For women, 8 or more drinks per week   For men, 15 or more drinks per week  · Any alcohol used by pregnant women  · Any alcohol used by those under the age of 21 years    If you choose to drink, do so in moderation:  · Do not drink at all if you are under the age of 24, or if you are or may be pregnant, or have health problems that could be made worse by drinking  · For women, up to 1 drink per day  · For men, up to 2 drinks a day    No one should begin drinking or drink more frequently based on potential health benefits    Short-Term Health Risks:  · Injuries: motor vehicle crashes, falls, drownings, burns  · Violence: homicide, suicide, sexual assault, intimate partner violence  · Alcohol poisoning  · Reproductive health: risky sexual behaviors, unintended prengnacy, sexually transmitted diseases, miscarriage, stillbirth, fetal alcohol syndrome    Long-Term Health Risks:  · Chronic diseases: high blood pressure, heart disease, stroke, liver disease, digestive problems  · Cancers: breast, mouth and throat, liver, colon  · Learning and memory problems: dementia, poor school performance  · Mental health: depression, anxiety, insomnia  · Social problems: lost productivity, family problems, unemployment  · Alcohol dependence    For support and more information:  · Substance Abuse and [facility] , [address] [place]  Web Address: https://[employer]Reca!/    · Alcoholics Anonymous        Web Address: http://Meiyou/    https://www cdc gov/alcohol/fact-sheets/alcohol-use htm     © Copyright [employer] 2018 Information is for End User's use only and may not be sold, redistributed or otherwise used for commercial purposes   All illustrations and images included in CareNotes® are the copyrighted property of [employer]  or [address] [place] [address]

## 2023-01-09 NOTE — PROGRESS NOTES
Assessment and Plan:     Problem List Items Addressed This Visit        Cardiovascular and Mediastinum    Essential hypertension    Coronary artery disease of native artery of native heart with stable angina pectoris ([facility] [address] ) - Primary    PAC (premature atrial contraction)       Hematopoietic and Hemostatic    Thrombocytopenia (HCC)    Relevant Orders    CBC and differential       Other    Anemia    Relevant Orders    CBC and differential   Other Visit Diagnoses     Medicare annual wellness visit, subsequent        Immunizations and colon cancer screening are UTD  Pure hypercholesterolemia        Stable; on Atorvastatin  Relevant Orders    Lipid Panel with Direct LDL reflex    Vitamin D deficiency        Stable; on an OTC Vit D3 supplement - discussed  Screening for diabetes mellitus        Relevant Orders    Comprehensive metabolic panel          Falls Plan of Care: balance, strength, and gait training instructions were provided  Pt is back doing Yoga again  Preventive health issues were discussed with patient, and age appropriate screening tests were ordered as noted in patient's After Visit Summary  Personalized health advice and appropriate referrals for health education or preventive services given if needed, as noted in patient's After Visit Summary  History of Present Illness:     Patient presents for a Medicare Wellness Visit    AWV Visit  Has worked with Pain Management / PT / Neurosurgery for her back - pt is not interested in surgical intervention  Overall, pain is manageable; still walking for exercise  Doing Yoga  Seeing Cardiology - Dr German Kaiser  Colonoscopy in 01/2020  Mammogram done in 04/2022  Labs reviewed from 12/2022  Sees Endocrinology for hx of osteoporosis  Immunizations are UTD - had a Bivalent COV-19 vaccine and Flu Vaccine       Patient Care Team:  Pretty Miles DO as PCP - General (Family Medicine)  FRANDY Ho DO Branson Medici, MD Letitia Ahumada, RACHEL Guillen, DO     Review of Systems:     Review of Systems   Constitutional: Negative for activity change  Respiratory: Negative for shortness of breath  Cardiovascular: Negative for chest pain  Gastrointestinal: Negative for abdominal pain and blood in stool  Genitourinary: Negative for vaginal bleeding  No new breast lumps on self-examination  Musculoskeletal: Positive for back pain  Psychiatric/Behavioral: Negative for dysphoric mood  The patient is not nervous/anxious           Problem List:     Patient Active Problem List   Diagnosis   • Arthritis   • Cervical myofascial pain syndrome   • Cervical radiculopathy   • Cervicogenic headache   • Cervico-occipital neuralgia   • Depression   • Mixed hyperlipidemia   • Essential hypertension   • Osteopenia of spine   • Spasmodic torticollis   • Laceration of occipital scalp   • Other secondary scoliosis, thoracolumbar region   • Syncope   • Coronary artery disease of native artery of native heart with stable angina pectoris ([facility])   • S/P CABG x 3   • Anemia   • Thrombocytopenia ([facility])   • Hyperchloremia   • Chylothorax   • Screening for colon cancer   • History of colon polyps   • Acute bilateral low back pain without sciatica   • PAC (premature atrial contraction)   • Primary osteoarthritis of left hip   • Closed wedge compression fracture of T11 vertebra (HCC)   • Closed wedge compression fracture of T12 vertebra (HCC)   • Chronic pain syndrome   • Osteoarthritis of spine with radiculopathy, lumbar region      Past Medical and Surgical History:     Past Medical History:   Diagnosis Date   • Colon polyp    • Coronary artery disease    • Effusion of left knee     Last assessed - 9/10/15   • History of transfusion    • Hyperlipidemia    • Hypertension    • Myocardial infarction [facility])    • Tick bite     Last assessed - 4/21/17     Past Surgical History:   Procedure Laterality Date   • APPENDECTOMY     • COLONOSCOPY     • MD CORONARY ARTERY BYP W/VEIN & ARTERY GRAFT 4 VEIN N/A 1/27/2020    Procedure: CORONARY ARTERY BYPASS GRAFT (CABG) x3 VESSELS, LIMA TO LAD, AND SVG TO PLB & OM;  Surgeon: Mynor Pabon DO;  Location: [facility];  Service: Cardiac Surgery   • [place] ECHO TRANSESOPHAG R-T 2D W/PRB IMG ACQUISJ I&R N/A 1/27/2020    Procedure: TRANSESOPHAGEAL ECHOCARDIOGRAM (GET); Surgeon: Mynor Pabon DO;  Location: [facility];  Service: Cardiac Surgery   • [place] NDSC SURG W/VIDEO-ASSISTED HARVEST VEIN CABG Left 1/27/2020    Procedure: HARVEST VEIN ENDOSCOPIC ([id number]); Surgeon: Mynor Pabon DO;  Location: [facility];  Service: Cardiac Surgery   • TONSILLECTOMY      Last assessed - 4/20/17   • TUBAL LIGATION      Last assessed - 4/20/17   • WISDOM TOOTH EXTRACTION      Last assessed - 4/20/17      Family History:     Family History   Problem Relation Age of Onset   • Coronary artery disease Mother    • Arthritis Mother    • Other Mother         Cardiac Disorder    • Transient ischemic attack Mother    • Heart attack Father    • Sudden death Father         SCD   • Cancer Daughter 52        kidney      Social History:     Social History     Socioeconomic History   • Marital status:      Spouse name: None   • Number of children: 3   • Years of education: Post Graduate   • Highest education level: None   Occupational History   • Occupation:       Comment: P/T   • Occupation: Retired     Comment: RN   Tobacco Use   • Smoking status: Never   • Smokeless tobacco: Never   Vaping Use   • Vaping Use: Never used   Substance and Sexual Activity   • Alcohol use: Yes     Comment: 1 wine nightly   • Drug use: No   • Sexual activity: None   Other Topics Concern   • None   Social History Narrative    Living alone     Social Determinants of Health     Financial Resource Strain: Low Risk    • Difficulty of Paying Living Expenses: Not hard at all   Food Insecurity: Not on file   Transportation Needs: No Transportation Needs   • Lack of Transportation (Medical): No   • Lack of Transportation (Non-Medical): No   Physical Activity: Not on file   Stress: Not on file   Social Connections: Not on file   Intimate Partner Violence: Not on file   Housing Stability: Not on file      Medications and Allergies:     Current Outpatient Medications   Medication Sig Dispense Refill   • acetaminophen (TYLENOL) 650 mg CR tablet Take 650 mg by mouth daily at bedtime     • aspirin (ECOTRIN LOW STRENGTH) 81 mg EC tablet Take 1 tablet (81 mg total) by mouth daily     • Calcium Carb-Cholecalciferol 600-200 MG-UNIT TABS Calcium 600 + D TABS  TAKE 1 TABLET DAILY  Refills: 0    Active     • denosumab (PROLIA) 60 mg/mL Inject 60 mg under the skin once     • losartan (COZAAR) 100 MG tablet TAKE ONE TABLET BY MOUTH EVERY DAY 30 tablet 11   • melatonin 3 mg Take 3 mg by mouth daily at bedtime     • metoprolol tartrate (LOPRESSOR) 25 mg tablet TAKE ONE-HALF TABLET BY MOUTH EVERY 12 HOURS 90 tablet 1   • rosuvastatin (CRESTOR) 40 MG tablet Take 1 tablet (40 mg total) by mouth daily 90 tablet 3   • VITAMIN D, CHOLECALCIFEROL, PO Take 2,600 Units/day by mouth       No current facility-administered medications for this visit       No Known Allergies   Immunizations:     Immunization History   Administered Date(s) Administered   • COVID-19 PFIZER VACCINE 0 3 ML IM 01/15/2021, 02/04/2021, 09/30/2021   • Hep A, adult 11/19/2015   • INFLUENZA 10/01/2012, 10/10/2013, 10/10/2013, 10/01/2014, 10/01/2014, 10/01/2015, 10/16/2015, 10/01/2017, 10/20/2017, 11/06/2019, 10/15/2020, 11/10/2021   • Influenza Quadrivalent Preservative Free 3 years and older IM 10/01/2016, 10/20/2017   • Influenza, high dose seasonal 0 7 mL 11/21/2018   • Pneumococcal Conjugate 13-Valent 05/21/2019   • Pneumococcal Polysaccharide PPV23 05/02/2011   • Tdap 11/19/2015, 09/01/2018   • Typhoid Live, oral 11/19/2015   • Typhoid, Unspecified 11/19/2015   • Zoster 07/11/2018   • Zoster Vaccine Recombinant 11/27/2018 Health Maintenance:         Topic Date Due   • Breast Cancer Screening: Mammogram  04/02/2024   • Colorectal Cancer Screening  06/15/2025   • Hepatitis C Screening  Completed         Topic Date Due   • Hepatitis B Vaccine (1 of 3 - 3-dose series) Never done   • COVID-19 Vaccine (4 - Booster for Pfizer series) 11/25/2021   • Influenza Vaccine (1) 09/01/2022      Medicare Screening Tests and Risk Assessments:     Sherly Puckett is here for her Subsequent Wellness visit  Last Medicare Wellness visit information reviewed, patient interviewed, no change since last AWV  Health Risk Assessment:   Patient rates overall health as very good  Patient feels that their physical health rating is slightly worse  Patient is satisfied with their life  Eyesight was rated as slightly worse  Hearing was rated as same  Patient feels that their emotional and mental health rating is same  Patients states they are never, rarely angry  Patient states they are sometimes unusually tired/fatigued  Pain experienced in the last 7 days has been some  Patient's pain rating has been 5/10  Patient states that she has experienced no weight loss or gain in last 6 months  Fall Risk Screening: In the past year, patient has experienced: history of falling in past year    Number of falls: 1  Injured during fall?: Yes    Feels unsteady when standing or walking?: No    Worried about falling?: Yes      Urinary Incontinence Screening:   Patient has leaked urine accidently in the last six months  Home Safety:  Patient does not have trouble with stairs inside or outside of their home  Patient has working smoke alarms and has working carbon monoxide detector  Home safety hazards include: none  Nutrition:   Current diet is Low Saturated Fat, No Added Salt and Limited junk food  Medications:   Patient is currently taking over-the-counter supplements  OTC medications include: see medication list  Patient is able to manage medications       Activities of Daily Living (ADLs)/Instrumental Activities of Daily Living (IADLs):   Walk and transfer into and out of bed and chair?: Yes  Dress and groom yourself?: Yes    Bathe or shower yourself?: Yes    Feed yourself? Yes  Do your laundry/housekeeping?: Yes  Manage your money, pay your bills and track your expenses?: Yes  Make your own meals?: Yes    Do your own shopping?: Yes    Previous Hospitalizations:   Any hospitalizations or ED visits within the last 12 months?: No      Advance Care Planning:   Living will: Yes    Durable POA for healthcare: Yes    Advanced directive: Yes    Advanced directive counseling given: Yes    End of Life Decisions reviewed with patient: Yes      Cognitive Screening:   Provider or family/friend/caregiver concerned regarding cognition?: No    PREVENTIVE SCREENINGS      Cardiovascular Screening:    General: Screening Not Indicated and History Lipid Disorder      Diabetes Screening:     General: Screening Current      Colorectal Cancer Screening:     General: Screening Current      Breast Cancer Screening:     General: Screening Current      Cervical Cancer Screening:    General: Screening Not Indicated      Osteoporosis Screening:    General: Screening Not Indicated and History Osteoporosis      Lung Cancer Screening:     General: Screening Not Indicated      Hepatitis C Screening:    General: Screening Current    Screening, Brief Intervention, and Referral to Treatment (SBIRT)    Screening      AUDIT-C Screenin) How often did you have a drink containing alcohol in the past year? 4 or more times a week  2) How many drinks did you have on a typical day when you were drinking in the past year?  1 to 2  3) How often did you have 6 or more drinks on one occasion in the past year? never    AUDIT-C Score: 4  Interpretation: Score 3-12 (female): POSITIVE screen for alcohol misuse    Single Item Drug Screening:  How often have you used an illegal drug (including marijuana) or a prescription medication for non-medical reasons in the past year? never    Single Item Drug Screen Score: 0  Interpretation: Negative screen for possible drug use disorder    No results found  Physical Exam:     /84 (BP Location: Left arm, Patient Position: Sitting, Cuff Size: Standard)   Pulse (!) 54   Temp (!) 97 4 °F (36 3 °C) (Tympanic)   Resp 14   Ht 5' 2 95" (1 599 m)   Wt 50 3 kg (110 lb 12 8 oz)   SpO2 97%   BMI 19 66 kg/m²     Physical Exam  Vitals and nursing note reviewed  Constitutional:       General: She is not in acute distress  Appearance: Normal appearance  She is not ill-appearing, toxic-appearing or diaphoretic  HENT:      Head: Normocephalic and atraumatic  Eyes:      General: No scleral icterus  Conjunctiva/sclera: Conjunctivae normal    Cardiovascular:      Rate and Rhythm: Normal rate and regular rhythm  Heart sounds: Normal heart sounds  No murmur heard  No friction rub  No gallop  Pulmonary:      Effort: Pulmonary effort is normal  No respiratory distress  Breath sounds: Normal breath sounds  No stridor  No wheezing, rhonchi or rales  Abdominal:      General: Abdomen is flat  Bowel sounds are normal  There is no distension  Palpations: Abdomen is soft  There is no mass  Tenderness: There is no abdominal tenderness  There is no guarding or rebound  Musculoskeletal:      Cervical back: Normal range of motion and neck supple  No rigidity or tenderness  Lymphadenopathy:      Cervical: No cervical adenopathy  Neurological:      Mental Status: She is alert and oriented to person, place, and time  Psychiatric:         Mood and Affect: Mood normal          Behavior: Behavior normal          Thought Content: Thought content normal          Judgment: Judgment normal           Jeremy Galo was seen today for medicare wellness visit      Diagnoses and all orders for this visit:    Coronary artery disease of native artery of native heart with stable angina pectoris ([address] )  Comments:  Pt stable on exam - condition stable  Pt on Metoprolol, Atorvastatin, ASA  Sees Cardiology  PAC (premature atrial contraction)  Comments:  As above  Medicare annual wellness visit, subsequent  Comments:  Immunizations and colon cancer screening are UTD  Essential hypertension  Comments:  BP controlled on present management  Pure hypercholesterolemia  Comments:  Stable; on Atorvastatin  Orders:  -     Lipid Panel with Direct LDL reflex; Future    Thrombocytopenia (HCC)  Comments:  Hx of - continue to follow CBCs  Orders:  -     CBC and differential; Future    Other iron deficiency anemia  Comments:  As above  Orders:  -     CBC and differential; Future    Vitamin D deficiency  Comments:  Stable; on an OTC Vit D3 supplement - discussed  Screening for diabetes mellitus  -     Comprehensive metabolic panel;  Future          [address] Leon DO

## 2023-01-31 NOTE — PROGRESS NOTES
Daily Note     Today's date: 10/31/2022  Patient name: Renay Khan  : 1945  MRN: [id number]  Referring provider: RACHEL Arce  Dx:   Encounter Diagnosis     ICD-10-CM    1  Closed wedge compression fracture of T12 vertebra, initial encounter ([facility] [address] )  S22 080A    2  Closed wedge compression fracture of T11 vertebra, initial encounter ([address] )  S22 080A    3  Thoracogenic scoliosis of thoracolumbar region  [id number]                   Subjective: Patient notes that she has been trying to be conscious of her posture      Objective: See treatment diary below      Assessment: Tolerated treatment well  Patient was able to perform high level core exercises with slight compenations in pelvis, however had slight challenge being limited more with arms the core  She did need cues for scapular engagement in her shoulder blades for postural control, however able to perform higher level tasks with minimal compensation  Patient demonstrated fatigue post treatment, exhibited good technique with therapeutic exercises and would benefit from continued PT      Plan: Continue per plan of care  Progress treatment as tolerated         Diagnosis: Scoliosis of thoracolumbar spine, poor motor control   Precautions: See chart, healing compression fx at T11-T12   Goals: Walk >3 miles, improve core and glute strength   Manual Therapy 10/31 10/18 10/20 10/25 10/28   STM paraspinals  SP SP SP  SP                            Re-Eval    SP    Exercise Diary         Therapeutic Exercise        Bike UBE 3/3 L3 5' 5'  5' 5' bike    LTR        Ball rolls  Hip hinges 10x5" ea QL focused with hip hinge 20x5" ea 20x5" 20x5"   QL stretch    10x10"    Sciatic n chantell        Open books        Leg press   70# with core activation 20x 70# with core activation 45x  80# with core activation 20x   Neuromuscular Re-education        TA sets        D2     GTB 2x10 foam   Split stance rows/LPD TRX rows 2x10   12# 2x10 ea 13# 2x15 on foam   Hip hinges   20x with cane 20x with 5#    Multifidi NMR        Pball ABC        Clamshells        Palof press   [place] 6# step outs 3x so 10# 20x     Standing KB passes   20x ea direction      Plank walks  mirror       Shoulder taps Serratus TRX push ups    Wall clocks at 24" box   Rotational core  Blaze pod taps 2 tables 30'x4      Mod plank  KB pull through 3x6 18"  Blaze pods 30"x4 24" high low  Plank on wobble board 30"x4 side to side 24' Bosu hole in one 30"x3   Wobble board 2' e  2' ea     Therapeutic Activities        Education     Return to activity, getting up off the floor   Step ups   On biodex with 5# 20x ea foot      [place] carries OH carries 2 lap 5# 10# 2 laps 10# 2 laps 15# 2 laps    xwalks GTB 2 laps  RTB 2 laps GTB 2 laps      Modalities Oral Minoxidil Counseling- I discussed with the patient the risks of oral minoxidil including but not limited to shortness of breath, swelling of the feet or ankles, dizziness, lightheadedness, unwanted hair growth and allergic reaction.  The patient verbalized understanding of the proper use and possible adverse effects of oral minoxidil.  All of the patient's questions and concerns were addressed.

## 2023-02-22 ENCOUNTER — TELEMEDICINE (OUTPATIENT)
Dept: FAMILY MEDICINE CLINIC | Facility: CLINIC | Age: 78
End: 2023-02-22

## 2023-02-22 ENCOUNTER — NURSE TRIAGE (OUTPATIENT)
Dept: OTHER | Facility: OTHER | Age: 78
End: 2023-02-22

## 2023-02-22 VITALS — TEMPERATURE: 99.7 F | OXYGEN SATURATION: 97 %

## 2023-02-22 DIAGNOSIS — U07.1 COVID-19: Primary | ICD-10-CM

## 2023-02-22 RX ORDER — GUAIFENESIN AND CODEINE PHOSPHATE 100; 10 MG/5ML; MG/5ML
5 SOLUTION ORAL 3 TIMES DAILY PRN
Qty: 180 ML | Refills: 0 | Status: SHIPPED | OUTPATIENT
Start: 2023-02-22

## 2023-02-22 RX ORDER — NIRMATRELVIR AND RITONAVIR 300-100 MG
3 KIT ORAL 2 TIMES DAILY
Qty: 30 TABLET | Refills: 0 | Status: SHIPPED | OUTPATIENT
Start: 2023-02-22 | End: 2023-02-27

## 2023-02-22 NOTE — TELEPHONE ENCOUNTER
Patient called in to report she tested positive for Covid today; mild symptoms started 2/21 include strong cough, headache, body aches, fever, and fatigue  History of MI in 2020 with bypass surgery  Patient checked oximeter: HR=72 and O2=96%  Patient would like to discuss antiviral medication with PCP  VV scheduled for 3:15 PM today  Please follow up with patient  Reason for Disposition  • HIGH RISK for severe COVID complications (e g , weak immune system, age > 59 years, obesity with BMI > 25, pregnant, chronic lung disease or other chronic medical condition) (Exception: Already seen by PCP and no new or worsening symptoms )    Answer Assessment - Initial Assessment Questions  Were you within 6 feet or less, for up to 15 minutes or more with a person that has a confirmed COVID-19 test? Sick family members with patient at passing of patient's mother on Sunday 2/19/23  What was the date of your exposure? Home positive this morning for Covid 2/22/23  Are you experiencing any symptoms attributed to the virus?  (Assess for SOB, cough, fever, difficulty breathing) Cough, fever (100 0 F forehead), body aches, headaches, sore throat, fatigue  HIGH RISK: Do you have any history heart or lung conditions, weakened immune system, diabetes, Asthma, CHF, HIV, COPD, Chemo, renal failure, sickle cell, etc? MI 2020 with bypass surgery  PREGNANCY: Are you pregnant or did you recently give birth?  Post menopausal  VACCINE: "Have you gotten the COVID-19 vaccine?" If Yes ask: "Which one, how many shots, when did you get it?" Pfizer x 2 shots plus 3 boosters    Protocols used: CORONAVIRUS (COVID-19) DIAGNOSED OR SUSPECTED-ADULT-[place]

## 2023-02-22 NOTE — PROGRESS NOTES
COVID-19 Outpatient Progress Note    Assessment/Plan:    Problem List Items Addressed This Visit    None  Visit Diagnoses     COVID-19    -  Primary    Pt stable  OTC Cough/Cold Preps PRN, rest, & good PO hydration  Start Paxlovid; hold Rosuvastatin x 1 week  Precautions given; self-isolation  Relevant Medications    nirmatrelvir & ritonavir (Paxlovid, 300/100,) tablet therapy pack    guaifenesin-codeine (GUAIFENESIN AC) 100-10 MG/5ML liquid         Disposition:     Patient has asymptomatic or mild COVID-19 infection  Based off CDC guidelines, they were recommended to isolate for 5 days  If they are asymptomatic or symptoms are improving with no fevers in the past 24 hours, isolation may be ended followed by 5 days of wearing a mask when around othes to minimize risk of infecting others  If still have a fever or other symptoms have not improved, continue to isolate until they improve  Regardless of when they end isolation, avoid being around people who are more likely to get very sick from COVID-19 until at least day 11  Patient meets criteria for PAXLOVID and they have been counseled appropriately according to EUA documentation released by the FDA  After discussion, patient agrees to treatment  Snowden Schlein is an investigational medicine used to treat mild-to-moderate COVID-19 in adults and children (15years of age and older weighing at least 80 pounds (40 kg)) with positive results of direct SARS-CoV-2 viral testing, and who are at high risk for progression to severe COVID-19, including hospitalization or death  PAXLOVID is investigational because it is still being studied  There is limited information about the safety and effectiveness of using PAXLOVID to treat people with mild-to-moderate COVID-19      The FDA has authorized the emergency use of PAXLOVID for the treatment of mild-tomoderate COVID-19 in adults and children (15years of age and older weighing at least 80 pounds (40 kg)) with a positive test for the virus that causes COVID-19, and who are at high risk for progression to severe COVID-19, including hospitalization or death, under an EUA  What should I tell my healthcare provider before I take PAXLOVID? Tell your healthcare provider if you:  - Have any allergies  - Have liver or kidney disease  - Are pregnant or plan to become pregnant  - Are breastfeeding a child  - Have any serious illnesses    Tell your healthcare provider about all the medicines you take, including prescription and over-the-counter medicines, vitamins, and herbal supplements  Some medicines may interact with PAXLOVID and may cause serious side effects  Keep a list of your medicines to show your healthcare provider and pharmacist when you get a new medicine  You can ask your healthcare provider or pharmacist for a list of medicines that interact with PAXLOVID  Do not start taking a new medicine without telling your healthcare provider  Your healthcare provider can tell you if it is safe to take PAXLOVID with other medicines  Tell your healthcare provider if you are taking combined hormonal contraceptive  PAXLOVID may affect how your birth control pills work  Females who are able to become pregnant should use another effective alternative form of contraception or an additional barrier method of contraception  Talk to your healthcare provider if you have any questions about contraceptive methods that might be right for you  How do I take PAXLOVID? PAXLOVID consists of 2 medicines: nirmatrelvir and ritonavir  - Take 2 pink tablets of nirmatrelvir with 1 white tablet of ritonavir by mouth 2 times each day (in the morning and in the evening) for 5 days  For each dose, take all 3 tablets at the same time  - If you have kidney disease, talk to your healthcare provider  You may need a different dose  - Swallow the tablets whole  Do not chew, break, or crush the tablets  - Take PAXLOVID with or without food    - Do not stop taking PAXLOVID without talking to your healthcare provider, even if you feel better  - If you miss a dose of PAXLOVID within 8 hours of the time it is usually taken, take it as soon as you remember  If you miss a dose by more than 8 hours, skip the missed dose and take the next dose at your regular time  Do not take 2 doses of PAXLOVID at the same time  - If you take too much PAXLOVID, call your healthcare provider or go to the nearest hospital emergency room right away  - If you are taking a ritonavir- or cobicistat-containing medicine to treat hepatitis C or Human Immunodeficiency Virus (HIV), you should continue to take your medicine as prescribed by your healthcare provider   - Talk to your healthcare provider if you do not feel better or if you feel worse after 5 days  Who should generally not take PAXLOVID? Do not take PAXLOVID if:  You are allergic to nirmatrelvir, ritonavir, or any of the ingredients in PAXLOVID  You are taking any of the following medicines:  - Alfuzosin  - Pethidine, piroxicam, propoxyphene  - Ranolazine  - Amiodarone, dronedarone, flecainide, propafenone, quinidine  - Colchicine  - Lurasidone, pimozide, clozapine  - Dihydroergotamine, ergotamine, methylergonovine  - Lovastatin, simvastatin  - Sildenafil (Revatio®) for pulmonary arterial hypertension (PAH)  - Triazolam, oral midazolam  - Apalutamide  - Carbamazepine, phenobarbital, phenytoin  - Rifampin  - St  Pelon’s Wort (hypericum perforatum)    What are the important possible side effects of PAXLOVID? Possible side effects of PAXLOVID are:  - Liver Problems  Tell your healthcare provider right away if you have any of these signs and symptoms of liver problems: loss of appetite, yellowing of your skin and the whites of eyes (jaundice), dark-colored urine, pale colored stools and itchy skin, stomach area (abdominal) pain  - Resistance to HIV Medicines   If you have untreated HIV infection, PAXLOVID may lead to some HIV medicines not working as well in the future  - Other possible side effects include: altered sense of taste, diarrhea, high blood pressure, or muscle aches    These are not all the possible side effects of PAXLOVID  Not many people have taken PAXLOVID  Serious and unexpected side effects may happen  Justice Karolina is still being studied, so it is possible that all of the risks are not known at this time  What other treatment choices are there? Like Jazmin Arora may allow for the emergency use of other medicines to treat people with COVID-19  Go to https://[employer]/ for information on the emergency use of other medicines that are authorized by FDA to treat people with COVID-19  Your healthcare provider may talk with you about clinical trials for which you may be eligible  It is your choice to be treated or not to be treated with PAXLOVID  Should you decide not to receive it or for your child not to receive it, it will not change your standard medical care  What if I am pregnant or breastfeeding? There is no experience treating pregnant women or breastfeeding mothers with PAXLOVID  For a mother and unborn baby, the benefit of taking PAXLOVID may be greater than the risk from the treatment  If you are pregnant, discuss your options and specific situation with your healthcare provider  It is recommended that you use effective barrier contraception or do not have sexual activity while taking PAXLOVID  If you are breastfeeding, discuss your options and specific situation with your healthcare provider  How do I report side effects with PAXLOVID? Contact your healthcare provider if you have any side effects that bother you or do not go away      Report side effects to FDA MedWatch at www fda gov/medwatch or call 3-485-SEC7964 or you can report side effects to [facility]  at the contact information provided below  Website Fax number Telephone number   [employer] [phone]     How should I store Donna Faith? Store PAXLOVID tablets at room temperature between 68°F to 77°F (20°C to 25°C)  Full fact sheet for patients, parents, and caregivers can be found at: mediafeediaCarol shelley    I have spent 15 minutes directly with the patient  Greater than 50% of this time was spent in counseling/coordination of care regarding: diagnostic results, prognosis, risks and benefits of treatment options, instructions for management, patient and family education, importance of treatment compliance, risk factor reductions and impressions  Encounter provider: Sonya Cade DO     Provider located at: [address] [address]     Recent Visits  No visits were found meeting these conditions  Showing recent visits within past 7 days and meeting all other requirements  Today's Visits  Date Type Provider Dept   02/22/23 Telemedicine Sylvester Vega DO [facility]   Showing today's visits and meeting all other requirements  Future Appointments  No visits were found meeting these conditions  Showing future appointments within next 150 days and meeting all other requirements     This virtual check-in was done via [employer] [address] and patient was informed that this is a secure, HIPAA-compliant platform  She agrees to proceed  Patient agrees to participate in a virtual check in via telephone or video visit instead of presenting to the office to address urgent/immediate medical needs  Patient is aware this is a billable service  She acknowledged consent and understanding of privacy and security of the video platform  The patient has agreed to participate and understands they can discontinue the visit at any time      After connecting through [facility], the patient was identified by name and date of birth  Sheryle Latina was informed that this was a telemedicine visit and that the exam was being conducted confidentially over secure lines  My office door was closed  Sheryle Latina acknowledged consent and understanding of privacy and security of the telemedicine visit  I informed the patient that I have reviewed her record in Epic and presented the opportunity for her to ask any questions regarding the visit today  The patient agreed to participate  Verification of patient location:  Patient is located in the following state in which I hold an active license: [place]    Subjective:   Sheryle Latina is a 68 y o  female who has been screened for COVID-19  Symptom change since last report: unchanged  Patient's symptoms include fever, fatigue, nasal congestion, rhinorrhea, cough, myalgias and headache  Patient denies shortness of breath and diarrhea  - Date of symptom onset: 2/21/2023  - Date of positive COVID-19 test: 2/22/2023  Type of test: Home antigen  Patient with typical symptoms of COVID-19 and they attest that they were positive on home rapid antigen testing  Image of positive result is not able to be uploaded into their chart  COVID-19 vaccination status: Fully vaccinated with booster    Shree Fraga has been staying home and has isolated themselves in her home  She is taking care to not share personal items and is cleaning all surfaces that are touched often, like counters, tabletops, and doorknobs using household cleaning sprays or wipes  She is wearing a mask when she leaves her room  Low grade temps  Overall, symptoms are mild  Was around family this past weekend - mother (79 yo) passed away; I offered my condolences to her an her family  [place] PDMP was checked  Lab Results   Component Value Date    SARSCOV2 NOT DETECTED 01/19/2022       Review of Systems   Constitutional: Positive for fatigue and fever  HENT: Positive for congestion and rhinorrhea  Respiratory: Positive for cough   Negative for shortness of breath  Gastrointestinal: Negative for diarrhea  Musculoskeletal: Positive for myalgias  Neurological: Positive for headaches  Current Outpatient Medications on File Prior to Visit   Medication Sig   • acetaminophen (TYLENOL) 650 mg CR tablet Take 650 mg by mouth daily at bedtime   • aspirin (ECOTRIN LOW STRENGTH) 81 mg EC tablet Take 1 tablet (81 mg total) by mouth daily   • Calcium Carb-Cholecalciferol 600-200 MG-UNIT TABS Calcium 600 + D TABS  TAKE 1 TABLET DAILY  Refills: 0    Active   • denosumab (PROLIA) 60 mg/mL Inject 60 mg under the skin once   • losartan (COZAAR) 100 MG tablet TAKE ONE TABLET BY MOUTH EVERY DAY   • melatonin 3 mg Take 3 mg by mouth daily at bedtime   • metoprolol tartrate (LOPRESSOR) 25 mg tablet TAKE ONE-HALF TABLET BY MOUTH EVERY 12 HOURS   • rosuvastatin (CRESTOR) 40 MG tablet Take 1 tablet (40 mg total) by mouth daily   • VITAMIN D, CHOLECALCIFEROL, PO Take 2,600 Units/day by mouth       Objective:    Temp 99 7 °F (37 6 °C) Comment: Patient reported  SpO2 97% Comment: Patient reported     Physical Exam  Vitals and nursing note reviewed  Constitutional:       General: She is not in acute distress  Appearance: Normal appearance  She is not ill-appearing, toxic-appearing or diaphoretic  HENT:      Head: Normocephalic and atraumatic  Eyes:      General: No scleral icterus  Conjunctiva/sclera: Conjunctivae normal    Pulmonary:      Effort: Pulmonary effort is normal  No respiratory distress  Neurological:      Mental Status: She is alert and oriented to person, place, and time  Psychiatric:         Mood and Affect: Mood normal          Behavior: Behavior normal          Thought Content: Thought content normal          Judgment: Judgment normal           Constantino Barr was seen today for covid-19 and covid-19  Diagnoses and all orders for this visit:    COVID-19  Comments:  Pt stable  OTC Cough/Cold Preps PRN, rest, & good PO hydration   Start Paxlovid; hold Rosuvastatin x 1 week  Precautions given; self-isolation  Orders:  -     nirmatrelvir & ritonavir (Paxlovid, 300/100,) tablet therapy pack; Take 3 tablets by mouth 2 (two) times a day for 5 days Take 2 nirmatrelvir tablets + 1 ritonavir tablet together per dose  -     guaifenesin-codeine (GUAIFENESIN AC) 100-10 MG/5ML liquid;  Take 5 mL by mouth 3 (three) times a day as needed for cough        Sylvester Tsang, DO

## 2023-02-22 NOTE — TELEPHONE ENCOUNTER
Regarding: +Covid, symptoms  ----- Message from Sindy Enrique sent at 2/22/2023  9:26 AM EST -----  "I have a headache, cough, fever of 100, body aches, fatigue; I tested positive for Covid+ this morning "

## 2023-02-24 ENCOUNTER — TELEMEDICINE (OUTPATIENT)
Dept: FAMILY MEDICINE CLINIC | Facility: CLINIC | Age: 78
End: 2023-02-24

## 2023-02-24 VITALS
TEMPERATURE: 96 F | OXYGEN SATURATION: 97 % | HEART RATE: 50 BPM | DIASTOLIC BLOOD PRESSURE: 78 MMHG | SYSTOLIC BLOOD PRESSURE: 137 MMHG

## 2023-02-24 DIAGNOSIS — U07.1 COVID-19: Primary | ICD-10-CM

## 2023-02-24 NOTE — PROGRESS NOTES
COVID-19 Outpatient Progress Note    Assessment/Plan:    Problem List Items Addressed This Visit    None  Visit Diagnoses     [id number]    -  Primary    Pt is doing well, & cleared from self-isolation after 2/26/23  Continue mask-wearing in public, slowly return to activities  Finish Paxlovid  Precautions given  Disposition:     Patient has asymptomatic or mild COVID-19 infection  Based off CDC guidelines, they were recommended to isolate for 5 days  If they are asymptomatic or symptoms are improving with no fevers in the past 24 hours, isolation may be ended followed by 5 days of wearing a mask when around othes to minimize risk of infecting others  If still have a fever or other symptoms have not improved, continue to isolate until they improve  Regardless of when they end isolation, avoid being around people who are more likely to get very sick from COVID-19 until at least day 11  I have spent 25 minutes directly with the patient  Greater than 50% of this time was spent in counseling/coordination of care regarding: prognosis, risks and benefits of treatment options, instructions for management, patient and family education, importance of treatment compliance, risk factor reductions and impressions  Encounter provider: Katie Moses DO     Provider located at: [address] [place] [id number]     Recent Visits  Date Type Provider Dept   02/22/23 Telemedicine Sylvester Leon DO [address] recent visits within past 7 days and meeting all other requirements  Today's Visits  Date Type Provider Dept   02/24/23 Telemedicine DO Lupillo Benitez   Showing today's visits and meeting all other requirements  Future Appointments  No visits were found meeting these conditions    Showing future appointments within next 150 days and meeting all other requirements     This virtual check-in was done via [facility] and patient was informed that this is a secure, HIPAA-compliant platform  She agrees to proceed  Patient agrees to participate in a virtual check in via telephone or video visit instead of presenting to the office to address urgent/immediate medical needs  Patient is aware this is a billable service  She acknowledged consent and understanding of privacy and security of the video platform  The patient has agreed to participate and understands they can discontinue the visit at any time  After connecting through [facility], the patient was identified by name and date of birth  Nell Joshi was informed that this was a telemedicine visit and that the exam was being conducted confidentially over secure lines  My office door was closed  No one else was in the room  Nell Joshi acknowledged consent and understanding of privacy and security of the telemedicine visit  I informed the patient that I have reviewed her record in Epic and presented the opportunity for her to ask any questions regarding the visit today  The patient agreed to participate  Verification of patient location:  Patient is located in the following state in which I hold an active license: [place]    Subjective:   Nell Joshi is a 68 y o  female who has been screened for COVID-19  Symptom change since last report: improving  Patient's symptoms include fatigue  Patient denies fever, congestion, cough, shortness of breath and diarrhea  - Date of symptom onset: 2/21/2023  - Date of positive COVID-19 test: 2/22/2023  Type of test: Home antigen  Home antigen result verified by provider  Will get picture of test uploaded/scanned into patient's chart  COVID-19 vaccination status: Fully vaccinated with booster    Beau Sheikh has been staying home and has isolated themselves in her home  She is taking care to not share personal items and is cleaning all surfaces that are touched often, like counters, tabletops, and doorknobs using household cleaning sprays or wipes  She is wearing a mask when she leaves her room  She is tolerating the Paxlovid well  Lab Results   Component Value Date    SARSCOV2 NOT DETECTED 01/19/2022       Review of Systems   Constitutional: Positive for fatigue  Negative for fever  HENT: Negative for congestion  Respiratory: Negative for cough and shortness of breath  Gastrointestinal: Negative for diarrhea  Current Outpatient Medications on File Prior to Visit   Medication Sig   • acetaminophen (TYLENOL) 650 mg CR tablet Take 650 mg by mouth daily at bedtime   • aspirin (ECOTRIN LOW STRENGTH) 81 mg EC tablet Take 1 tablet (81 mg total) by mouth daily   • Calcium Carb-Cholecalciferol 600-200 MG-UNIT TABS Calcium 600 + D TABS  TAKE 1 TABLET DAILY  Refills: 0    Active   • denosumab (PROLIA) 60 mg/mL Inject 60 mg under the skin once   • guaifenesin-codeine (GUAIFENESIN AC) 100-10 MG/5ML liquid Take 5 mL by mouth 3 (three) times a day as needed for cough   • losartan (COZAAR) 100 MG tablet TAKE ONE TABLET BY MOUTH EVERY DAY   • melatonin 3 mg Take 3 mg by mouth daily at bedtime   • metoprolol tartrate (LOPRESSOR) 25 mg tablet TAKE ONE-HALF TABLET BY MOUTH EVERY 12 HOURS   • nirmatrelvir & ritonavir (Paxlovid, 300/100,) tablet therapy pack Take 3 tablets by mouth 2 (two) times a day for 5 days Take 2 nirmatrelvir tablets + 1 ritonavir tablet together per dose   • rosuvastatin (CRESTOR) 40 MG tablet Take 1 tablet (40 mg total) by mouth daily   • VITAMIN D, CHOLECALCIFEROL, PO Take 2,600 Units/day by mouth       Objective:    /78   Pulse (!) 50   Temp (!) 96 °F (35 6 °C)   SpO2 97%      Physical Exam  Nursing note reviewed  Constitutional:       General: She is not in acute distress  Appearance: Normal appearance  She is not ill-appearing, toxic-appearing or diaphoretic  Comments: Pt with some hoarseness, but is completely non-toxic appearing; she is speaking in full sentences     HENT:      Head: Normocephalic and atraumatic  Eyes:      General: No scleral icterus  Conjunctiva/sclera: Conjunctivae normal    Pulmonary:      Effort: Pulmonary effort is normal  No respiratory distress  Neurological:      Mental Status: She is alert and oriented to person, place, and time  Psychiatric:         Mood and Affect: Mood normal          Behavior: Behavior normal          Thought Content: Thought content normal          Judgment: Judgment normal           Dodieta Richlands was seen today for follow-up and covid-19  Diagnoses and all orders for this visit:    [id number]  Comments:  Pt is doing well, & cleared from self-isolation after 2/26/23  Continue mask-wearing in public, slowly return to activities  Finish Paxlovid  Precautions given          [address], DO

## 2023-03-09 ENCOUNTER — LAB (OUTPATIENT)
Dept: LAB | Facility: CLINIC | Age: 78
End: 2023-03-09

## 2023-03-09 DIAGNOSIS — S22.080A CLOSED WEDGE COMPRESSION FRACTURE OF T11 VERTEBRA, INITIAL ENCOUNTER (HCC): ICD-10-CM

## 2023-03-09 DIAGNOSIS — I10 ESSENTIAL HYPERTENSION: ICD-10-CM

## 2023-03-09 DIAGNOSIS — E55.9 VITAMIN D DEFICIENCY: ICD-10-CM

## 2023-03-09 DIAGNOSIS — M80.80XA LOCALIZED OSTEOPOROSIS WITH CURRENT PATHOLOGICAL FRACTURE, INITIAL ENCOUNTER: ICD-10-CM

## 2023-03-09 LAB
25(OH)D3 SERPL-MCNC: 34.9 NG/ML (ref 30–100)
ANION GAP SERPL CALCULATED.3IONS-SCNC: 2 MMOL/L (ref 4–13)
BUN SERPL-MCNC: 18 MG/DL (ref 5–25)
CALCIUM SERPL-MCNC: 9 MG/DL (ref 8.3–10.1)
CHLORIDE SERPL-SCNC: 105 MMOL/L (ref 96–108)
CO2 SERPL-SCNC: 27 MMOL/L (ref 21–32)
CREAT SERPL-MCNC: 0.62 MG/DL (ref 0.6–1.3)
GFR SERPL CREATININE-BSD FRML MDRD: 87 ML/MIN/1.73SQ M
GLUCOSE P FAST SERPL-MCNC: 96 MG/DL (ref 65–99)
POTASSIUM SERPL-SCNC: 4.3 MMOL/L (ref 3.5–5.3)
SODIUM SERPL-SCNC: 134 MMOL/L (ref 135–147)

## 2023-03-09 RX ORDER — LOSARTAN POTASSIUM 100 MG/1
TABLET ORAL
Qty: 30 TABLET | Refills: 11 | Status: SHIPPED | OUTPATIENT
Start: 2023-03-09

## 2023-03-10 ENCOUNTER — TELEPHONE (OUTPATIENT)
Dept: ENDOCRINOLOGY | Facility: CLINIC | Age: 78
End: 2023-03-10

## 2023-03-15 ENCOUNTER — OFFICE VISIT (OUTPATIENT)
Dept: ENDOCRINOLOGY | Facility: CLINIC | Age: 78
End: 2023-03-15

## 2023-03-15 VITALS
TEMPERATURE: 96 F | DIASTOLIC BLOOD PRESSURE: 80 MMHG | WEIGHT: 115 LBS | SYSTOLIC BLOOD PRESSURE: 118 MMHG | HEIGHT: 63 IN | BODY MASS INDEX: 20.38 KG/M2 | HEART RATE: 60 BPM

## 2023-03-15 DIAGNOSIS — E55.9 VITAMIN D DEFICIENCY: ICD-10-CM

## 2023-03-15 DIAGNOSIS — M81.0 OSTEOPOROSIS, UNSPECIFIED OSTEOPOROSIS TYPE, UNSPECIFIED PATHOLOGICAL FRACTURE PRESENCE: Primary | ICD-10-CM

## 2023-03-15 NOTE — PROGRESS NOTES
Assessment/Plan:    Darylene All came into the [place] [address] [facility] Office today 03/15/23 to receive prolia injection  Verbal consent obtained  Consent given by: patient    patient states patient has been medically healthy with no underlining concerns/complications  Darylene All presents with no symptoms today  All insturctions were reviewed with the patient  If the patient should have any questions/concerns, advised patient to contacted [place] [address] [facility] Office  Subjective:     History provided by: patient    Patient ID: Darylene All is a 68 y o  female      Objective:    Vitals:    03/15/23 1106   BP: 118/80   Pulse: 60   Temp: (!) 96 °F (35 6 °C)   TempSrc: Skin   Weight: 52 2 kg (115 lb)   Height: 5' 3" (1 6 m)       Patient tolerated the injection well without any complications  Injection site/s R arm  Medication was provided by us  Patient signed consent form yes   Patient signed [employer] form yes (If no patient is not a medicare patient)  Patient waited 15 minutes after injection yes (This only applies to patient's receiving first time injection)         Last Visit: 3/10/2023  Next visit:Visit date not found

## 2023-03-15 NOTE — PROGRESS NOTES
Patient Progress Note    CC: osteoporosis     Referring Provider  No referring provider defined for this encounter  History of Present Illness:     Patient is a 49-year-old female here for follow-up of osteoporosis  She has a medical history of vertebral compression fracture of T11  Her DEXA scan done in August 2022 was consistent with osteopenia but her FRAX scores were elevated  She received her first Prolia injection in September 2022 and is due for repeat today  She did tolerate it well  She also takes calcium carbonate 600 mg daily and vitamin D 2600 units daily  No recent falls  She does do weightbearing exercises  Most recent vitamin D is normal at 34 9        Patient Active Problem List   Diagnosis   • Arthritis   • Cervical myofascial pain syndrome   • Cervical radiculopathy   • Cervicogenic headache   • Cervico-occipital neuralgia   • Depression   • Mixed hyperlipidemia   • Essential hypertension   • Osteopenia of spine   • Spasmodic torticollis   • Laceration of occipital scalp   • Other secondary scoliosis, thoracolumbar region   • Syncope   • Coronary artery disease of native artery of native heart with stable angina pectoris (HCC)   • S/P CABG x 3   • Anemia   • Thrombocytopenia ([facility])   • Hyperchloremia   • Chylothorax   • Screening for colon cancer   • History of colon polyps   • Acute bilateral low back pain without sciatica   • PAC (premature atrial contraction)   • Primary osteoarthritis of left hip   • Closed wedge compression fracture of T11 vertebra (HCC)   • Closed wedge compression fracture of T12 vertebra (HCC)   • Chronic pain syndrome   • Osteoarthritis of spine with radiculopathy, lumbar region   • Vitamin D deficiency     Past Medical History:   Diagnosis Date   • Colon polyp    • Coronary artery disease    • Effusion of left knee     Last assessed - 9/10/15   • History of transfusion    • Hyperlipidemia    • Hypertension    • Myocardial infarction ([address] )    • Tick bite Last assessed - 4/21/17      Past Surgical History:   Procedure Laterality Date   • APPENDECTOMY     • COLONOSCOPY     • DE CORONARY ARTERY BYP W/VEIN & ARTERY GRAFT 4 VEIN N/A 1/27/2020    Procedure: CORONARY ARTERY BYPASS GRAFT (CABG) x3 VESSELS, LIMA TO LAD, AND SVG TO PLB & OM;  Surgeon: Allison Cast DO;  [facility];  Service: Cardiac Surgery   • DE ECHO TRANSESOPHAG R-T 2D W/PRB IMG ACQUISJ I&R N/A 1/27/2020    Procedure: TRANSESOPHAGEAL ECHOCARDIOGRAM (GET); Surgeon: Allison Cast DO;  [facility];  Service: Cardiac Surgery   • DE NDSC SURG W/VIDEO-ASSISTED HARVEST VEIN CABG Left 1/27/2020    Procedure: HARVEST VEIN ENDOSCOPIC ([address]); Surgeon: Allison Cast DO;  [facility];  Service: Cardiac Surgery   • TONSILLECTOMY      Last assessed - 4/20/17   • TUBAL LIGATION      Last assessed - 4/20/17   • WISDOM TOOTH EXTRACTION      Last assessed - 4/20/17      Family History   Problem Relation Age of Onset   • Coronary artery disease Mother    • Arthritis Mother    • Other Mother         Cardiac Disorder    • Transient ischemic attack Mother    • Heart attack Father    • Sudden death Father         SCD   • Cancer Daughter 52        kidney     Social History     Tobacco Use   • Smoking status: Never   • Smokeless tobacco: Never   Substance Use Topics   • Alcohol use: Yes     Comment: 1 wine nightly     No Known Allergies  Current Outpatient Medications   Medication Sig Dispense Refill   • aspirin (ECOTRIN LOW STRENGTH) 81 mg EC tablet Take 1 tablet (81 mg total) by mouth daily     • Calcium Carb-Cholecalciferol 600-200 MG-UNIT TABS Calcium 600 + D TABS  TAKE 1 TABLET DAILY     Refills: 0    Active     • denosumab (PROLIA) 60 mg/mL Inject 60 mg under the skin once     • losartan (COZAAR) 100 MG tablet TAKE 1 TABLET BY MOUTH EVERY DAY 30 tablet 11   • melatonin 3 mg Take 3 mg by mouth daily at bedtime     • metoprolol tartrate (LOPRESSOR) 25 mg tablet TAKE ONE-HALF TABLET BY MOUTH EVERY 12 HOURS 90 tablet 1   • rosuvastatin (CRESTOR) 40 MG tablet Take 1 tablet (40 mg total) by mouth daily 90 tablet 3   • VITAMIN D, CHOLECALCIFEROL, PO Take 2,600 Units/day by mouth     • acetaminophen (TYLENOL) 650 mg CR tablet Take 650 mg by mouth daily at bedtime     • guaifenesin-codeine (GUAIFENESIN AC) 100-10 MG/5ML liquid Take 5 mL by mouth 3 (three) times a day as needed for cough (Patient not taking: Reported on 3/15/2023) 180 mL 0     No current facility-administered medications for this visit  Review of Systems   Constitutional: Positive for fatigue  Negative for activity change, appetite change and unexpected weight change  HENT: Negative for trouble swallowing  Eyes: Negative for visual disturbance  Respiratory: Negative for shortness of breath  Cardiovascular: Negative for chest pain and palpitations  Gastrointestinal: Negative for constipation and diarrhea  Endocrine: Negative for polydipsia and polyuria  Musculoskeletal: Positive for arthralgias and back pain  Skin: Negative  Neurological: Negative for numbness  Psychiatric/Behavioral: Negative  Physical Exam:  Body mass index is 20 37 kg/m²  /80   Pulse 60   Temp (!) 96 °F (35 6 °C) (Skin)   Ht 5' 3" (1 6 m)   Wt 52 2 kg (115 lb)   BMI 20 37 kg/m²    Wt Readings from Last 3 Encounters:   03/15/23 52 2 kg (115 lb)   01/09/23 50 3 kg (110 lb 12 8 oz)   09/29/22 49 kg (108 lb)       Physical Exam  Vitals and nursing note reviewed  Constitutional:       Appearance: She is well-developed  HENT:      Head: Normocephalic  Eyes:      General: No scleral icterus  Pupils: Pupils are equal, round, and reactive to light  Neck:      Thyroid: No thyromegaly  Cardiovascular:      Rate and Rhythm: Normal rate and regular rhythm  Pulses:           Radial pulses are 2+ on the right side and 2+ on the left side  Heart sounds: No murmur heard    Pulmonary:      Effort: Pulmonary effort is normal  No respiratory distress  Breath sounds: Normal breath sounds  No wheezing  Musculoskeletal:      Cervical back: Neck supple  Skin:     General: Skin is warm and dry  Neurological:      Mental Status: She is alert  Patient's shoes and socks were not removed  Labs:   No results found for: HGBA1C    Lab Results   Component Value Date    HDL 66 01/03/2023    TRIG 88 01/03/2023       Lab Results   Component Value Date    GLUCOSE 138 01/27/2020    CALCIUM 9 0 03/09/2023     03/03/2015    K 4 3 03/09/2023    CO2 27 03/09/2023     03/09/2023    BUN 18 03/09/2023    CREATININE 0 62 03/09/2023        eGFR   Date Value Ref Range Status   03/09/2023 87 ml/min/1 73sq m Final       Lab Results   Component Value Date    ALT 38 01/03/2023    AST 42 01/03/2023    ALKPHOS 49 01/03/2023    BILITOT 0 2 03/03/2015       Lab Results   Component Value Date    NLA2ENQONNCA 0 961 01/03/2023         Plan:  Diagnoses and all orders for this visit:    Osteoporosis  Continue Prolia injection; received it today   Continue vitamin D and calcium supplementation     Take precautions to avoid falls  Recommended weight bearing exercises as tolerated  -     denosumab (PROLIA) subcutaneous injection 60 mg  -     Basic metabolic panel; Future  -     Vitamin D 25 hydroxy; Future    Vitamin D deficiency  Vitamin D is normal at 34 9  Continue current supplementation  -     Vitamin D 25 hydroxy; Future          Discussed with the patient and all questions fully answered  She will call me if any problems arise      Counseled patient on diagnostic results, prognosis, risk and benefit of treatment options, instruction for management, importance of treatment compliance, risk  factor reduction and impressions      Gianna Lewis PA-C

## 2023-05-25 ENCOUNTER — TELEPHONE (OUTPATIENT)
Dept: FAMILY MEDICINE CLINIC | Facility: CLINIC | Age: 78
End: 2023-05-25

## 2023-05-25 ENCOUNTER — OFFICE VISIT (OUTPATIENT)
Dept: FAMILY MEDICINE CLINIC | Facility: CLINIC | Age: 78
End: 2023-05-25

## 2023-05-25 VITALS
BODY MASS INDEX: 19.88 KG/M2 | TEMPERATURE: 98.2 F | DIASTOLIC BLOOD PRESSURE: 60 MMHG | RESPIRATION RATE: 14 BRPM | SYSTOLIC BLOOD PRESSURE: 120 MMHG | HEIGHT: 63 IN | WEIGHT: 112.2 LBS | HEART RATE: 67 BPM | OXYGEN SATURATION: 98 %

## 2023-05-25 DIAGNOSIS — M79.661 PAIN AND SWELLING OF RIGHT LOWER LEG: Primary | ICD-10-CM

## 2023-05-25 DIAGNOSIS — M79.89 PAIN AND SWELLING OF RIGHT LOWER LEG: Primary | ICD-10-CM

## 2023-05-25 DIAGNOSIS — M17.11 PRIMARY OSTEOARTHRITIS OF RIGHT KNEE: ICD-10-CM

## 2023-05-25 RX ORDER — CEPHALEXIN 500 MG/1
500 CAPSULE ORAL EVERY 8 HOURS SCHEDULED
Qty: 30 CAPSULE | Refills: 0 | Status: SHIPPED | OUTPATIENT
Start: 2023-05-25 | End: 2023-06-04

## 2023-05-25 NOTE — PROGRESS NOTES
FAMILY PRACTICE OFFICE VISIT       NAME: Milli Holland  AGE: 68 y o  SEX: female       : 1945        MRN: [id number]    DATE: 2023  TIME: 3:33 PM    Assessment and Plan   1  Pain and swelling of right lower leg  Comments:  Saw [facility] today, and STAT Doppler US ordered - negative for DVT  Trial of antbx (?cellulitis?), elevate leg, limit salt intake  Re-evaluate  Orders:  -     cephalexin (KEFLEX) 500 mg capsule; Take 1 capsule (500 mg total) by mouth every 8 (eight) hours for 10 days    2  Primary osteoarthritis of right knee  Comments:  Working with Dorian Morris  There are no Patient Instructions on file for this visit  Chief Complaint     Chief Complaint   Patient presents with   • Leg Swelling     Patient being seen for right leg swelling x 2 days  History of Present Illness   Milli Holland is a 68y o -year-old female who presents with 2 days of swelling in the right LE x 2 days  Mostly for the knee down  Sore and tight  Was going to have a injection with [facility] - they deferred, ran a doppler US which was negative for DVT  May have had more salt recently  Review of Systems   Review of Systems   Constitutional: Negative for activity change and fever  No falls  Respiratory: Negative for shortness of breath  Cardiovascular: Negative for chest pain  Musculoskeletal: Positive for arthralgias and myalgias  No trauma to leg         Active Problem List     Patient Active Problem List   Diagnosis   • Arthritis   • Cervical myofascial pain syndrome   • Cervical radiculopathy   • Cervicogenic headache   • Cervico-occipital neuralgia   • Depression   • Mixed hyperlipidemia   • Essential hypertension   • Osteopenia of spine   • Spasmodic torticollis   • Laceration of occipital scalp   • Other secondary scoliosis, thoracolumbar region   • Syncope   • Coronary artery disease of native artery of native heart with stable angina pectoris (HCC)   • S/P CABG x 3   • Anemia   • Thrombocytopenia (HCC)   • Hyperchloremia   • Chylothorax   • Screening for colon cancer   • History of colon polyps   • Acute bilateral low back pain without sciatica   • PAC (premature atrial contraction)   • Primary osteoarthritis of left hip   • Closed wedge compression fracture of T11 vertebra (HCC)   • Closed wedge compression fracture of T12 vertebra (HCC)   • Chronic pain syndrome   • Osteoarthritis of spine with radiculopathy, lumbar region   • Vitamin D deficiency         Past Medical History:  Past Medical History:   Diagnosis Date   • Colon polyp    • Coronary artery disease    • Effusion of left knee     Last assessed - 9/10/15   • History of transfusion    • Hyperlipidemia    • Hypertension    • Myocardial infarction [facility])    • Tick bite     Last assessed - 4/21/17       Past Surgical History:  Past Surgical History:   Procedure Laterality Date   • APPENDECTOMY     • COLONOSCOPY     • AR CORONARY ARTERY BYP W/VEIN & ARTERY GRAFT 4 VEIN N/A 1/27/2020    Procedure: CORONARY ARTERY BYPASS GRAFT (CABG) x3 VESSELS, LIMA TO LAD, AND SVG TO PLB & OM;  Surgeon: Kassy Bender DO;  [facility];  Service: Cardiac Surgery   • AR ECHO TRANSESOPHAG R-T 2D W/PRB IMG ACQUISJ I&R N/A 1/27/2020    Procedure: TRANSESOPHAGEAL ECHOCARDIOGRAM (GET); Surgeon: Kassy Bender DO;  [facility];  Service: Cardiac Surgery   • AR NDSC SURG W/VIDEO-ASSISTED HARVEST VEIN CABG Left 1/27/2020    Procedure: HARVEST VEIN ENDOSCOPIC ([address]);   Surgeon: Kassy Bender DO;  [facility];  Service: Cardiac Surgery   • TONSILLECTOMY      Last assessed - 4/20/17   • TUBAL LIGATION      Last assessed - 4/20/17   • WISDOM TOOTH EXTRACTION      Last assessed - 4/20/17       Family History:  Family History   Problem Relation Age of Onset   • Coronary artery disease Mother    • Arthritis Mother    • Other Mother         Cardiac Disorder    • Transient ischemic attack Mother    • Heart attack Father • Sudden death Father         SCD   • Cancer Daughter 52        kidney       Social History:  Social History     Socioeconomic History   • Marital status:      Spouse name: Not on file   • Number of children: 3   • Years of education: Post Graduate   • Highest education level: Not on file   Occupational History   • Occupation:      Comment: P/T   • Occupation: Retired     Comment: RN   Tobacco Use   • Smoking status: Never   • Smokeless tobacco: Never   Vaping Use   • Vaping Use: Never used   Substance and Sexual Activity   • Alcohol use: Yes     Comment: 1 wine nightly   • Drug use: No   • Sexual activity: Not on file   Other Topics Concern   • Not on file   Social History Narrative    Living alone     Social Determinants of Health     Financial Resource Strain: Low Risk  (1/9/2023)    Overall Financial Resource Strain (CARDIA)    • Difficulty of Paying Living Expenses: Not hard at all   Food Insecurity: Not on file   Transportation Needs: No Transportation Needs (1/9/2023)    PRAPARE - Transportation    • Lack of Transportation (Medical): No    • Lack of Transportation (Non-Medical): No   Physical Activity: Not on file   Stress: Not on file   Social Connections: Not on file   Intimate Partner Violence: Not on file   Housing Stability: Not on file       Objective     Vitals:    05/25/23 1509   BP: 120/60   Pulse: 67   Resp: 14   Temp: 98 2 °F (36 8 °C)   SpO2: 98%     Wt Readings from Last 3 Encounters:   05/25/23 50 9 kg (112 lb 3 2 oz)   03/15/23 52 2 kg (115 lb)   01/09/23 50 3 kg (110 lb 12 8 oz)       Physical Exam  Vitals and nursing note reviewed  Constitutional:       General: She is not in acute distress  Appearance: Normal appearance  She is not ill-appearing, toxic-appearing or diaphoretic  HENT:      Head: Normocephalic and atraumatic  Eyes:      General: No scleral icterus  Conjunctiva/sclera: Conjunctivae normal    Neck:      Vascular: No JVD     Cardiovascular: "Rate and Rhythm: Normal rate and regular rhythm  Heart sounds: Normal heart sounds  No murmur heard  No friction rub  No gallop  Pulmonary:      Effort: Pulmonary effort is normal  No respiratory distress  Breath sounds: Normal breath sounds  No stridor  No wheezing, rhonchi or rales  Musculoskeletal:        Legs:    Neurological:      Mental Status: She is alert and oriented to person, place, and time  Psychiatric:         Mood and Affect: Mood normal          Behavior: Behavior normal          Thought Content:  Thought content normal          Judgment: Judgment normal          Pertinent Laboratory/Diagnostic Studies:  Lab Results   Component Value Date    BUN 18 03/09/2023    CALCIUM 9 0 03/09/2023     03/09/2023    CO2 27 03/09/2023    CREATININE 0 62 03/09/2023    GLUCOSE 138 01/27/2020    K 4 3 03/09/2023     03/03/2015     Lab Results   Component Value Date    ALKPHOS 49 01/03/2023    ALT 38 01/03/2023    AST 42 01/03/2023    BILITOT 0 2 03/03/2015       Lab Results   Component Value Date    HCT 40 5 01/03/2023    HGB 13 1 01/03/2023     (H) 01/03/2023     01/03/2023    WBC 5 43 01/03/2023       No results found for: \"TSH\"    No results found for: \"CHOL\"  Lab Results   Component Value Date    TRIG 88 01/03/2023     Lab Results   Component Value Date    HDL 66 01/03/2023     Lab Results   Component Value Date    LDLCALC 77 01/03/2023     No results found for: \"HGBA1C\"    Results for orders placed or performed in visit on 33/12/56   Basic metabolic panel   Result Value Ref Range    Sodium 134 (L) 135 - 147 mmol/L    Potassium 4 3 3 5 - 5 3 mmol/L    Chloride 105 96 - 108 mmol/L    CO2 27 21 - 32 mmol/L    ANION GAP 2 (L) 4 - 13 mmol/L    BUN 18 5 - 25 mg/dL    Creatinine 0 62 0 60 - 1 30 mg/dL    Glucose, Fasting 96 65 - 99 mg/dL    Calcium 9 0 8 3 - 10 1 mg/dL    eGFR 87 ml/min/1 73sq m   Vitamin D 25 hydroxy   Result Value Ref Range    Vit D, 25-Hydroxy 34 9 30 0 - " 100 0 ng/mL       No orders of the defined types were placed in this encounter  ALLERGIES:  No Known Allergies    Current Medications     Current Outpatient Medications   Medication Sig Dispense Refill   • acetaminophen (TYLENOL) 650 mg CR tablet Take 650 mg by mouth daily as needed for mild pain     • aspirin (ECOTRIN LOW STRENGTH) 81 mg EC tablet Take 1 tablet (81 mg total) by mouth daily     • Calcium Carb-Cholecalciferol 600-200 MG-UNIT TABS Calcium 600 + D TABS  TAKE 1 TABLET DAILY  Refills: 0    Active     • cephalexin (KEFLEX) 500 mg capsule Take 1 capsule (500 mg total) by mouth every 8 (eight) hours for 10 days 30 capsule 0   • denosumab (PROLIA) 60 mg/mL Inject 60 mg under the skin once     • losartan (COZAAR) 100 MG tablet TAKE 1 TABLET BY MOUTH EVERY DAY 30 tablet 11   • melatonin 3 mg Take 3 mg by mouth daily at bedtime     • metoprolol tartrate (LOPRESSOR) 25 mg tablet TAKE ONE-HALF TABLET BY MOUTH EVERY 12 HOURS 90 tablet 1   • rosuvastatin (CRESTOR) 40 MG tablet Take 1 tablet (40 mg total) by mouth daily 90 tablet 3   • VITAMIN D, CHOLECALCIFEROL, PO Take 2,600 Units/day by mouth     • guaifenesin-codeine (GUAIFENESIN AC) 100-10 MG/5ML liquid Take 5 mL by mouth 3 (three) times a day as needed for cough (Patient not taking: Reported on 3/15/2023) 180 mL 0     No current facility-administered medications for this visit           Health Maintenance     Health Maintenance   Topic Date Due   • COVID-19 Vaccine (5 - Pfizer series) 02/03/2023   • Fall Risk  01/09/2024   • Urinary Incontinence Screening  01/09/2024   • Medicare Annual Wellness Visit (AWV)  01/09/2024   • Falls: Plan of Care  01/09/2024   • Breast Cancer Screening: Mammogram  04/02/2024   • BMI: Adult  05/25/2024   • Colorectal Cancer Screening  06/15/2025   • Hepatitis C Screening  Completed   • Osteoporosis Screening  Completed   • Pneumococcal Vaccine: 65+ Years  Completed   • Influenza Vaccine  Completed   • HIB Vaccine  Aged Out • IPV Vaccine  Aged Out   • Hepatitis A Vaccine  Aged Out   • Meningococcal ACWY Vaccine  Aged Out   • HPV Vaccine  Aged Out     Immunization History   Administered Date(s) Administered   • COVID-19 PFIZER VACCINE 0 3 ML IM 01/15/2021, 02/04/2021, 09/30/2021   • COVID-19 Pfizer Vac BIVALENT Darius-sucrose 12 Yr+ IM (BOOSTER ONLY) 10/03/2022   • Hep A, adult 11/19/2015   • INFLUENZA 10/01/2012, 10/10/2013, 10/10/2013, 10/01/2014, 10/01/2014, 10/01/2015, 10/16/2015, 10/01/2017, 10/20/2017, 11/06/2019, 10/15/2020, 11/10/2021, 10/03/2022   • Influenza Quadrivalent Preservative Free 3 years and older IM 10/01/2016, 10/20/2017   • Influenza, high dose seasonal 0 7 mL 11/21/2018   • Pneumococcal Conjugate 13-Valent 05/21/2019   • Pneumococcal Polysaccharide PPV23 05/02/2011   • Tdap 11/19/2015, 09/01/2018   • Typhoid Live, oral 11/19/2015   • Typhoid, Unspecified 11/19/2015   • Zoster 07/11/2018   • Zoster Vaccine Recombinant 11/27/2018          Sylvester Tsang DO

## 2023-05-26 ENCOUNTER — APPOINTMENT (OUTPATIENT)
Dept: LAB | Facility: CLINIC | Age: 78
End: 2023-05-26

## 2023-05-26 DIAGNOSIS — M25.50 ARTHRALGIA, UNSPECIFIED JOINT: Primary | ICD-10-CM

## 2023-05-26 DIAGNOSIS — M25.50 ARTHRALGIA, UNSPECIFIED JOINT: ICD-10-CM

## 2023-05-26 LAB — B BURGDOR IGG+IGM SER-ACNC: <0.2 AI

## 2023-05-31 ENCOUNTER — HOSPITAL ENCOUNTER (OUTPATIENT)
Dept: RADIOLOGY | Age: 78
Discharge: HOME | End: 2023-05-31

## 2023-05-31 DIAGNOSIS — Z12.31 ENCOUNTER FOR SCREENING MAMMOGRAM FOR MALIGNANT NEOPLASM OF BREAST: ICD-10-CM

## 2023-06-01 ENCOUNTER — OFFICE VISIT (OUTPATIENT)
Dept: FAMILY MEDICINE CLINIC | Facility: CLINIC | Age: 78
End: 2023-06-01

## 2023-06-01 VITALS
SYSTOLIC BLOOD PRESSURE: 152 MMHG | OXYGEN SATURATION: 99 % | WEIGHT: 112.6 LBS | HEART RATE: 61 BPM | TEMPERATURE: 96.6 F | DIASTOLIC BLOOD PRESSURE: 70 MMHG | RESPIRATION RATE: 16 BRPM | BODY MASS INDEX: 19.95 KG/M2

## 2023-06-01 DIAGNOSIS — M79.89 PAIN AND SWELLING OF RIGHT LOWER EXTREMITY: Primary | ICD-10-CM

## 2023-06-01 DIAGNOSIS — M79.604 PAIN AND SWELLING OF RIGHT LOWER EXTREMITY: Primary | ICD-10-CM

## 2023-06-01 NOTE — PROGRESS NOTES
FAMILY PRACTICE OFFICE VISIT       NAME: Anup Mendoza  AGE: 68 y o  SEX: female       : 1945        MRN: [id number]    DATE: 2023  TIME: 10:18 AM    Assessment and Plan   1  Pain and swelling of right lower extremity  Comments:  Mihirertod Ontiveros is stable on exam   Suspect more uneven dependent edema  Continue low salt intake, elevate leg, finish Cephalexin  Check Doppler US again  Orders:  -     VAS lower limb venous duplex study, unilateral/limited; Future; Expected date: 2023         There are no Patient Instructions on file for this visit  Chief Complaint     Chief Complaint   Patient presents with   • Follow-up     Swollen leg       History of Present Illness   Anup Mendoza is a 68y o -year-old female who presents in f/u from her appt 23, for localized swelling of the RLE  Her Orthopedist at [facility] had run a negative doppler of the same leg that day  Swelling mildly improved  Lyme Titer was negative  Review of Systems   Review of Systems   Constitutional: Negative for activity change and fever  Respiratory: Negative for shortness of breath  Cardiovascular: Positive for leg swelling  Negative for chest pain         Active Problem List     Patient Active Problem List   Diagnosis   • Arthritis   • Cervical myofascial pain syndrome   • Cervical radiculopathy   • Cervicogenic headache   • Cervico-occipital neuralgia   • Depression   • Mixed hyperlipidemia   • Essential hypertension   • Osteopenia of spine   • Spasmodic torticollis   • Laceration of occipital scalp   • Other secondary scoliosis, thoracolumbar region   • Syncope   • Coronary artery disease of native artery of native heart with stable angina pectoris (HCC)   • S/P CABG x 3   • Anemia   • Thrombocytopenia (HCC)   • Hyperchloremia   • Chylothorax   • Screening for colon cancer   • History of colon polyps   • Acute bilateral low back pain without sciatica   • PAC (premature atrial contraction)   • Primary osteoarthritis of left hip   • Closed wedge compression fracture of T11 vertebra (HCC)   • Closed wedge compression fracture of T12 vertebra (HCC)   • Chronic pain syndrome   • Osteoarthritis of spine with radiculopathy, lumbar region   • Vitamin D deficiency         Past Medical History:  Past Medical History:   Diagnosis Date   • Colon polyp    • Coronary artery disease    • Effusion of left knee     Last assessed - 9/10/15   • History of transfusion    • Hyperlipidemia    • Hypertension    • Myocardial infarction [facility])    • Tick bite     Last assessed - 4/21/17       Past Surgical History:  Past Surgical History:   Procedure Laterality Date   • APPENDECTOMY     • COLONOSCOPY     • SD CORONARY ARTERY BYP W/VEIN & ARTERY GRAFT 4 VEIN N/A 1/27/2020    Procedure: CORONARY ARTERY BYPASS GRAFT (CABG) x3 VESSELS, LIMA TO LAD, AND SVG TO PLB & OM;  Surgeon: Tommy Shah DO;  [facility];  Service: Cardiac Surgery   • SD ECHO TRANSESOPHAG R-T 2D W/PRB IMG ACQUISJ I&R N/A 1/27/2020    Procedure: TRANSESOPHAGEAL ECHOCARDIOGRAM (GET); Surgeon: Tommy Shah DO;  [facility];  Service: Cardiac Surgery   • SD NDSC SURG W/VIDEO-ASSISTED HARVEST VEIN CABG Left 1/27/2020    Procedure: HARVEST VEIN ENDOSCOPIC ([address]); Surgeon: Tommy Shah DO;  [facility];  Service: Cardiac Surgery   • TONSILLECTOMY      Last assessed - 4/20/17   • TUBAL LIGATION      Last assessed - 4/20/17   • WISDOM TOOTH EXTRACTION      Last assessed - 4/20/17       Family History:  Family History   Problem Relation Age of Onset   • Coronary artery disease Mother    • Arthritis Mother    • Other Mother         Cardiac Disorder    • Transient ischemic attack Mother    • Heart attack Father    • Sudden death Father         SCD   • Cancer Daughter 52        kidney   • No Known Problems Paternal Aunt        Social History:  Social History     Socioeconomic History   • Marital status:       Spouse name: Not on file   • Number of children: 3   • Years of education: Post Graduate   • Highest education level: Not on file   Occupational History   • Occupation:      Comment: P/T   • Occupation: Retired     Comment: RN   Tobacco Use   • Smoking status: Never   • Smokeless tobacco: Never   Vaping Use   • Vaping Use: Never used   Substance and Sexual Activity   • Alcohol use: Yes     Comment: 1 wine nightly   • Drug use: No   • Sexual activity: Not on file   Other Topics Concern   • Not on file   Social History Narrative    Living alone     Social Determinants of Health     Financial Resource Strain: Low Risk  (1/9/2023)    Overall Financial Resource Strain (CARDIA)    • Difficulty of Paying Living Expenses: Not hard at all   Food Insecurity: Not on file   Transportation Needs: No Transportation Needs (1/9/2023)    PRAPARE - Transportation    • Lack of Transportation (Medical): No    • Lack of Transportation (Non-Medical): No   Physical Activity: Not on file   Stress: Not on file   Social Connections: Not on file   Intimate Partner Violence: Not on file   Housing Stability: Not on file       Objective     Vitals:    06/01/23 0902   BP: 152/70   Pulse: 61   Resp: 16   Temp: (!) 96 6 °F (35 9 °C)   SpO2: 99%     Wt Readings from Last 3 Encounters:   06/01/23 51 1 kg (112 lb 9 6 oz)   05/25/23 50 9 kg (112 lb 3 2 oz)   03/15/23 52 2 kg (115 lb)       Physical Exam  Vitals and nursing note reviewed  Constitutional:       General: She is not in acute distress  Appearance: Normal appearance  She is not ill-appearing, toxic-appearing or diaphoretic  Musculoskeletal:        Legs:    Neurological:      Mental Status: She is alert and oriented to person, place, and time  Psychiatric:         Mood and Affect: Mood normal          Behavior: Behavior normal          Thought Content:  Thought content normal          Judgment: Judgment normal          Pertinent Laboratory/Diagnostic Studies:  Lab Results   Component Value Date    BUN 18 "03/09/2023    CALCIUM 9 0 03/09/2023     03/09/2023    CO2 27 03/09/2023    CREATININE 0 62 03/09/2023    GLUCOSE 138 01/27/2020    K 4 3 03/09/2023     03/03/2015     Lab Results   Component Value Date    ALKPHOS 49 01/03/2023    ALT 38 01/03/2023    AST 42 01/03/2023    BILITOT 0 2 03/03/2015       Lab Results   Component Value Date    HCT 40 5 01/03/2023    HGB 13 1 01/03/2023     (H) 01/03/2023     01/03/2023    WBC 5 43 01/03/2023       No results found for: \"TSH\"    No results found for: \"CHOL\"  Lab Results   Component Value Date    TRIG 88 01/03/2023     Lab Results   Component Value Date    HDL 66 01/03/2023     Lab Results   Component Value Date    LDLCALC 77 01/03/2023     No results found for: \"HGBA1C\"    Results for orders placed or performed in visit on 05/26/23   Lyme Total Antibody Profile with reflex to WB   Result Value Ref Range    Lyme Total Antibodies <0 2 0 2 - 8 0 AI       No orders of the defined types were placed in this encounter  ALLERGIES:  No Known Allergies    Current Medications     Current Outpatient Medications   Medication Sig Dispense Refill   • acetaminophen (TYLENOL) 650 mg CR tablet Take 650 mg by mouth daily as needed for mild pain     • aspirin (ECOTRIN LOW STRENGTH) 81 mg EC tablet Take 1 tablet (81 mg total) by mouth daily     • Calcium Carb-Cholecalciferol 600-200 MG-UNIT TABS Calcium 600 + D TABS  TAKE 1 TABLET DAILY     Refills: 0    Active     • cephalexin (KEFLEX) 500 mg capsule Take 1 capsule (500 mg total) by mouth every 8 (eight) hours for 10 days 30 capsule 0   • denosumab (PROLIA) 60 mg/mL Inject 60 mg under the skin once     • losartan (COZAAR) 100 MG tablet TAKE 1 TABLET BY MOUTH EVERY DAY 30 tablet 11   • melatonin 3 mg Take 3 mg by mouth daily at bedtime     • metoprolol tartrate (LOPRESSOR) 25 mg tablet TAKE ONE-HALF TABLET BY MOUTH EVERY 12 HOURS 90 tablet 1   • rosuvastatin (CRESTOR) 40 MG tablet Take 1 tablet (40 mg total) by " mouth daily 90 tablet 3   • VITAMIN D, CHOLECALCIFEROL, PO Take 2,600 Units/day by mouth     • guaifenesin-codeine (GUAIFENESIN AC) 100-10 MG/5ML liquid Take 5 mL by mouth 3 (three) times a day as needed for cough (Patient not taking: Reported on 3/15/2023) 180 mL 0     No current facility-administered medications for this visit           Health Maintenance     Health Maintenance   Topic Date Due   • COVID-19 Vaccine (5 - Pfizer series) 02/03/2023   • Fall Risk  01/09/2024   • Urinary Incontinence Screening  01/09/2024   • Medicare Annual Wellness Visit (AWV)  01/09/2024   • Falls: Plan of Care  01/09/2024   • BMI: Adult  06/01/2024   • Breast Cancer Screening: Mammogram  05/31/2025   • Colorectal Cancer Screening  06/15/2025   • Hepatitis C Screening  Completed   • Osteoporosis Screening  Completed   • Pneumococcal Vaccine: 65+ Years  Completed   • Influenza Vaccine  Completed   • HIB Vaccine  Aged Out   • IPV Vaccine  Aged Out   • Hepatitis A Vaccine  Aged Out   • Meningococcal ACWY Vaccine  Aged Out   • HPV Vaccine  Aged Out     Immunization History   Administered Date(s) Administered   • COVID-19 PFIZER VACCINE 0 3 ML IM 01/15/2021, 02/04/2021, 09/30/2021   • COVID-19 Pfizer Vac BIVALENT Darius-sucrose 12 Yr+ IM (BOOSTER ONLY) 10/03/2022   • Hep A, adult 11/19/2015   • INFLUENZA 10/01/2012, 10/10/2013, 10/10/2013, 10/01/2014, 10/01/2014, 10/01/2015, 10/16/2015, 10/01/2017, 10/20/2017, 11/06/2019, 10/15/2020, 11/10/2021, 10/03/2022   • Influenza Quadrivalent Preservative Free 3 years and older IM 10/01/2016, 10/20/2017   • Influenza, high dose seasonal 0 7 mL 11/21/2018   • Pneumococcal Conjugate 13-Valent 05/21/2019   • Pneumococcal Polysaccharide PPV23 05/02/2011   • Tdap 11/19/2015, 09/01/2018   • Typhoid Live, oral 11/19/2015   • Typhoid, Unspecified 11/19/2015   • Zoster 07/11/2018   • Zoster Vaccine Recombinant 11/27/2018          Sylvester Tsang DO

## 2023-06-02 ENCOUNTER — HOSPITAL ENCOUNTER (OUTPATIENT)
Dept: VASCULAR ULTRASOUND | Facility: HOSPITAL | Age: 78
Discharge: HOME | End: 2023-06-02

## 2023-06-02 DIAGNOSIS — M79.89 PAIN AND SWELLING OF RIGHT LOWER EXTREMITY: ICD-10-CM

## 2023-06-02 DIAGNOSIS — M79.604 PAIN AND SWELLING OF RIGHT LOWER EXTREMITY: ICD-10-CM

## 2023-06-05 DIAGNOSIS — M79.604 PAIN AND SWELLING OF RIGHT LOWER EXTREMITY: Primary | ICD-10-CM

## 2023-06-05 DIAGNOSIS — M79.89 PAIN AND SWELLING OF RIGHT LOWER EXTREMITY: Primary | ICD-10-CM

## 2023-06-05 NOTE — PROGRESS NOTES
Assessment/Plan:    Pain and swelling of right lower extremity  49-year-old female with HTN, HLD, CAD s/p CABG x3, osteoarthritis, chronic pain, cervical neuropathy, presents with complaints of RLE edema. - c/o RLE edema knee down starting about 2 weeks ago 5/23/23.   - C/o R knee pain of which she gets injections  - Denies SOB  - biphasic R DP/PT signals  - RLE edema +1    LEV 5/25/23 ([facility]) and 6/2/23 - no evidence of DVT or SVT      Plan:  Conservative medical management with daily use of medical grade compression stockings 20-30 mmHg. On a.m., off in p.m. Frequent ambulation   Leg elevation at rest   Moisturizer and diligent skin care to maintain skin integrity and prevent skin breakdown    Return to office in 3 months for symptom evaluation  Call or return sooner with new or worsening symptoms, questions or concerns       Diagnoses and all orders for this visit:    Pain and swelling of right lower extremity  -     Ambulatory Referral to Vascular Surgery  -     Compression Stocking          Subjective:      Patient ID: Divina Jason is a 68 y.o. female. Pt referred by PCP for pain and swelling of RLE. Pt had R LEV 6/2/23. Pt complains of pain in the R calf. She has swelling in the RLE. HPI    The following portions of the patient's history were reviewed and updated as appropriate: allergies, current medications, past family history, past medical history, past social history, past surgical history and problem list.    Review of Systems   Cardiovascular: Positive for leg swelling. RLE       I have reviewed and made appropriate changes to the review of systems input by the medical assistant. Objective:      /70   Pulse 64   Wt 50.8 kg (112 lb)   SpO2 97%   BMI 19.84 kg/m²          Physical Exam  Vitals reviewed. Constitutional:       General: She is not in acute distress. Appearance: Normal appearance. She is normal weight.    Cardiovascular:      Pulses:           Dorsalis pedis pulses are detected w/ Doppler on the right side and 2+ on the left side. Posterior tibial pulses are detected w/ Doppler on the right side. Comments: R PT/DP biphasic signals  Pulmonary:      Effort: Pulmonary effort is normal. No respiratory distress. Musculoskeletal:         General: Normal range of motion. Right lower leg: Edema present. Left lower leg: No edema. Skin:     General: Skin is warm. Capillary Refill: Capillary refill takes less than 2 seconds. Neurological:      Mental Status: She is alert. Sensory: No sensory deficit. Motor: No weakness. Psychiatric:         Behavior: Behavior normal.         I have spent a total time of 25 minutes on 07/03/23 in caring for this patient including Diagnostic results, Risks and benefits of tx options, Instructions for management, Patient and family education, Importance of tx compliance, Risk factor reductions, Impressions, Counseling / Coordination of care, Documenting in the medical record, Reviewing / ordering tests, medicine, procedures   and Obtaining or reviewing history  .       Vitals:    06/07/23 1507   BP: 130/70   Pulse: 64   SpO2: 97%   Weight: 50.8 kg (112 lb)       Patient Active Problem List   Diagnosis   • Arthritis   • Cervical myofascial pain syndrome   • Cervical radiculopathy   • Cervicogenic headache   • Cervico-occipital neuralgia   • Depression   • Mixed hyperlipidemia   • Essential hypertension   • Osteopenia of spine   • Spasmodic torticollis   • Laceration of occipital scalp   • Other secondary scoliosis, thoracolumbar region   • Syncope   • Coronary artery disease of native artery of native heart with stable angina pectoris (HCC)   • S/P CABG x 3   • Anemia   • Thrombocytopenia (HCC)   • Hyperchloremia   • Chylothorax   • Screening for colon cancer   • History of colon polyps   • Acute bilateral low back pain without sciatica   • PAC (premature atrial contraction)   • Primary osteoarthritis of left hip   • Closed wedge compression fracture of T11 vertebra (HCC)   • Closed wedge compression fracture of T12 vertebra (HCC)   • Chronic pain syndrome   • Osteoarthritis of spine with radiculopathy, lumbar region   • Vitamin D deficiency   • Pain and swelling of right lower extremity       Past Surgical History:   Procedure Laterality Date   • APPENDECTOMY     • COLONOSCOPY     • OR CORONARY ARTERY BYP W/VEIN & ARTERY GRAFT 4 VEIN N/A 1/27/2020    Procedure: CORONARY ARTERY BYPASS GRAFT (CABG) x3 VESSELS, LIMA TO LAD, AND SVG TO PLB & OM;  Surgeon: Anali Lovelace DO;  [facility];  Service: Cardiac Surgery   • OR ECHO TRANSESOPHAG R-T 2D W/PRB IMG ACQUISJ I&R N/A 1/27/2020    Procedure: TRANSESOPHAGEAL ECHOCARDIOGRAM (GET); Surgeon: Anali Lovelace DO;  [facility];  Service: Cardiac Surgery   • OR NDSC SURG W/VIDEO-ASSISTED HARVEST VEIN CABG Left 1/27/2020    Procedure: HARVEST VEIN ENDOSCOPIC ([address]); Surgeon: Anali Lovelace DO;  [facility];  Service: Cardiac Surgery   • TONSILLECTOMY      Last assessed - 4/20/17   • TUBAL LIGATION      Last assessed - 4/20/17   • WISDOM TOOTH EXTRACTION      Last assessed - 4/20/17       Family History   Problem Relation Age of Onset   • Coronary artery disease Mother    • Arthritis Mother    • Other Mother         Cardiac Disorder    • Transient ischemic attack Mother    • Heart attack Father    • Sudden death Father         SCD   • Cancer Daughter 52        kidney   • No Known Problems Paternal Aunt        Social History     Socioeconomic History   • Marital status:      Spouse name: Not on file   • Number of children: 3   • Years of education: Post Graduate   • Highest education level: Not on file   Occupational History   • Occupation:       Comment: P/T   • Occupation: Retired     Comment: RN   Tobacco Use   • Smoking status: Never   • Smokeless tobacco: Never   Vaping Use   • Vaping Use: Never used   Substance and Sexual Activity   • Alcohol use: Yes     Comment: 1 wine nightly   • Drug use: No   • Sexual activity: Not on file   Other Topics Concern   • Not on file   Social History Narrative    Living alone     Social Determinants of Health     Financial Resource Strain: Low Risk  (1/9/2023)    Overall Financial Resource Strain (CARDIA)    • Difficulty of Paying Living Expenses: Not hard at all   Food Insecurity: Not on file   Transportation Needs: No Transportation Needs (1/9/2023)    PRAPARE - Transportation    • Lack of Transportation (Medical): No    • Lack of Transportation (Non-Medical): No   Physical Activity: Not on file   Stress: Not on file   Social Connections: Not on file   Intimate Partner Violence: Not on file   Housing Stability: Not on file       No Known Allergies      Current Outpatient Medications:   •  acetaminophen (TYLENOL) 650 mg CR tablet, Take 650 mg by mouth daily as needed for mild pain, Disp: , Rfl:   •  aspirin (ECOTRIN LOW STRENGTH) 81 mg EC tablet, Take 1 tablet (81 mg total) by mouth daily, Disp: , Rfl:   •  Calcium Carb-Cholecalciferol 600-200 MG-UNIT TABS, Calcium 600 + D TABS TAKE 1 TABLET DAILY.   Refills: 0  Active, Disp: , Rfl:   •  denosumab (PROLIA) 60 mg/mL, Inject 60 mg under the skin once, Disp: , Rfl:   •  melatonin 3 mg, Take 3 mg by mouth daily at bedtime, Disp: , Rfl:   •  VITAMIN D, CHOLECALCIFEROL, PO, Take 2,600 Units/day by mouth, Disp: , Rfl:   •  guaifenesin-codeine (GUAIFENESIN AC) 100-10 MG/5ML liquid, Take 5 mL by mouth 3 (three) times a day as needed for cough (Patient not taking: Reported on 3/15/2023), Disp: 180 mL, Rfl: 0  •  hydrochlorothiazide (HYDRODIURIL) 25 mg tablet, Take 1 tablet (25 mg total) by mouth daily, Disp: 90 tablet, Rfl: 3  •  losartan (COZAAR) 100 MG tablet, TAKE ONE TABLET BY MOUTH EVERY DAY, Disp: 7 tablet, Rfl: 0  •  metoprolol tartrate (LOPRESSOR) 25 mg tablet, TAKE ONE-HALF TABLET BY MOUTH EVERY 12 HOURS, Disp: 7 tablet, Rfl: 0  •  rosuvastatin (CRESTOR) 40 MG tablet, TAKE ONE TABLET BY MOUTH EVERY DAY, Disp: 90 tablet, Rfl: 3

## 2023-06-07 ENCOUNTER — OFFICE VISIT (OUTPATIENT)
Dept: VASCULAR SURGERY | Facility: CLINIC | Age: 78
End: 2023-06-07
Payer: MEDICARE

## 2023-06-07 VITALS
OXYGEN SATURATION: 97 % | DIASTOLIC BLOOD PRESSURE: 70 MMHG | HEART RATE: 64 BPM | WEIGHT: 112 LBS | BODY MASS INDEX: 19.84 KG/M2 | SYSTOLIC BLOOD PRESSURE: 130 MMHG

## 2023-06-07 DIAGNOSIS — M79.89 PAIN AND SWELLING OF RIGHT LOWER EXTREMITY: Primary | ICD-10-CM

## 2023-06-07 DIAGNOSIS — M79.604 PAIN AND SWELLING OF RIGHT LOWER EXTREMITY: Primary | ICD-10-CM

## 2023-06-07 PROCEDURE — 99203 OFFICE O/P NEW LOW 30 MIN: CPT | Performed by: NURSE PRACTITIONER

## 2023-06-07 NOTE — PATIENT INSTRUCTIONS
Conservative medical management with daily use of medical grade compression stockings 20-30 mmHg. On a.m., off in p.m.   Frequent ambulation   Leg elevation at rest   Moisturizer and diligent skin care to maintain skin integrity and prevent skin breakdown    Return to office in 3 months for symptom evaluation  Call or return sooner with new or worsening symptoms, questions or concerns

## 2023-06-12 NOTE — ASSESSMENT & PLAN NOTE
41-year-old female with HTN, HLD, CAD s/p CABG x3, osteoarthritis, chronic pain, cervical neuropathy, presents with complaints of RLE edema. - c/o RLE edema knee down starting about 2 weeks ago 5/23/23.   - C/o R knee pain of which she gets injections  - Denies SOB  - biphasic R DP/PT signals  - RLE edema +1    LEV 5/25/23 ([facility]) and 6/2/23 - no evidence of DVT or SVT      Plan:  Conservative medical management with daily use of medical grade compression stockings 20-30 mmHg. On a.m., off in p.m.   Frequent ambulation   Leg elevation at rest   Moisturizer and diligent skin care to maintain skin integrity and prevent skin breakdown    Return to office in 3 months for symptom evaluation  Call or return sooner with new or worsening symptoms, questions or concerns

## 2023-06-13 ENCOUNTER — OFFICE VISIT (OUTPATIENT)
Dept: CARDIOLOGY CLINIC | Facility: CLINIC | Age: 78
End: 2023-06-13
Payer: MEDICARE

## 2023-06-13 VITALS
OXYGEN SATURATION: 97 % | WEIGHT: 113 LBS | BODY MASS INDEX: 20.02 KG/M2 | TEMPERATURE: 98 F | HEART RATE: 65 BPM | SYSTOLIC BLOOD PRESSURE: 126 MMHG | HEIGHT: 63 IN | DIASTOLIC BLOOD PRESSURE: 60 MMHG

## 2023-06-13 DIAGNOSIS — I49.1 PAC (PREMATURE ATRIAL CONTRACTION): ICD-10-CM

## 2023-06-13 DIAGNOSIS — I10 ESSENTIAL HYPERTENSION: ICD-10-CM

## 2023-06-13 DIAGNOSIS — I25.118 CORONARY ARTERY DISEASE OF NATIVE ARTERY OF NATIVE HEART WITH STABLE ANGINA PECTORIS (HCC): Primary | ICD-10-CM

## 2023-06-13 DIAGNOSIS — Z95.1 S/P CABG X 3: ICD-10-CM

## 2023-06-13 PROCEDURE — 99214 OFFICE O/P EST MOD 30 MIN: CPT | Performed by: INTERNAL MEDICINE

## 2023-06-13 PROCEDURE — 93000 ELECTROCARDIOGRAM COMPLETE: CPT | Performed by: INTERNAL MEDICINE

## 2023-06-13 RX ORDER — LOSARTAN POTASSIUM 100 MG/1
100 TABLET ORAL DAILY
Qty: 90 TABLET | Refills: 3 | Status: SHIPPED | OUTPATIENT
Start: 2023-06-13 | End: 2023-06-14 | Stop reason: SDUPTHER

## 2023-06-13 RX ORDER — ROSUVASTATIN CALCIUM 40 MG/1
40 TABLET, COATED ORAL DAILY
Qty: 90 TABLET | Refills: 3 | Status: SHIPPED | OUTPATIENT
Start: 2023-06-13 | End: 2023-06-20

## 2023-06-13 RX ORDER — HYDROCHLOROTHIAZIDE 25 MG/1
25 TABLET ORAL DAILY
Qty: 90 TABLET | Refills: 3 | Status: SHIPPED | OUTPATIENT
Start: 2023-06-13

## 2023-06-13 NOTE — PROGRESS NOTES
Cardiology Follow Up    Junella Ganser  1945  [id number]  [facility] [address] [id number] [phone]    1  Coronary artery disease of native artery of native heart with stable angina pectoris (HCC)  POCT ECG    rosuvastatin (CRESTOR) 40 MG tablet      2  PAC (premature atrial contraction)        3  Essential hypertension  losartan (COZAAR) 100 MG tablet    hydrochlorothiazide (HYDRODIURIL) 25 mg tablet      4  Essential hypertension  losartan (COZAAR) 100 MG tablet    hydrochlorothiazide (HYDRODIURIL) 25 mg tablet    BP still a little high at this time - will increase her Losartan to 50mg QD  5  S/P CABG x 3  metoprolol tartrate (LOPRESSOR) 25 mg tablet          Discussion/Summary:    CAD with prior CABG  She is stable  Remains on aspirin  Currently without any angina  Lipid panel controlled with 40 mg of Crestor  Blood pressure seems somewhat variable  On her home blood pressure cuff which was just correlated with the readings in the office, she is more often hypertensive  She is on losartan and metoprolol  She had some unilateral lower extremity edema  I will add hydrochlorothiazide  She will continue to monitor her blood pressure at home and if she is getting any lower readings or hypotension she can contact me  Edema: Had Dopplers done which showed no evidence of DVT  This seems more unilateral  No evidence of real congestive heart failure  Hopefully, the hydrochlorothiazide will help  If I need to stop the HCTZ for any reason she can use as needed Lasix  The conservative measures with the compression stocking seems to be helping, as well  Previous History:  Non ST elevation in January, 2020  She had a cardiac catheterization with calcified vessels and multivessel CAD  She ultimately did underwent bypass surgery with 3 grafts  LIMA to the LAD, vein grafts to OM and Ramus  Her EF is preserved    She was started on appropriate medical therapy  She was kept on Imdur for 30 days after her bypass because the size of the LIMA was small and felt to be somewhat spasmodic, but subsequently stopped  ER visit in 10/2020 for a near syncopal episode and some symptoms which she felt were similar to her MI  Subsequent stress test unremarkable  Was feeling palpitations, and got an alert from her Apple watch saying there was atrial fibrillation  She started on Eliquis, and additional testing was done  Does not appear that this was truly AFib when I correlated what was noted on the watch with a zio patch  Seems more PACs      Interval history:    Since last visit she was developing some lower extremity unilateral edema on the right  She had 2 separate Dopplers which did not show any DVT  She is wearing a compression stocking and this has helped somewhat  She has not noticed any change in her breathing denies any PND or orthopnea  She does have elevated blood pressure readings based on her blood pressure cuff at home  This was checked with the office reading and it is similar  She does tend to take it at various times throughout the day but mostly in the morning  We reviewed proper technique for taking this  I reviewed the memory from the machine and her numbers do seem to be quite high regularly and there are no significant hypotensive episodes      Problem List     Mixed hyperlipidemia    Coronary artery disease of native artery of native heart with stable angina pectoris [facility])    Overview Signed 1/24/2020  4:00 PM by Christina Patrick     Added automatically from request for surgery [id number]         Arthritis    Cervical myofascial pain syndrome    Cervical radiculopathy    Cervicogenic headache    Cervico-occipital neuralgia    Depression    Essential hypertension    Osteopenia of spine    Spasmodic torticollis    Laceration of occipital scalp    Other secondary scoliosis, lumbar region    NSTEMI (non-ST elevation myocardial infarction) ([facility] [address] )    Syncope    S/P CABG x 3    Anemia    Thrombocytopenia (HCC)    Hyperchloremia    Chylothorax    Screening for colon cancer    Overview Signed 4/14/2020  8:34 AM by Kira León MD     Pt referred to GI for colon cancer screening  History of colon polyps        Past Medical History:   Diagnosis Date   • Colon polyp    • Coronary artery disease    • Effusion of left knee     Last assessed - 9/10/15   • History of transfusion    • Hyperlipidemia    • Hypertension    • Myocardial infarction [facility])    • Tick bite     Last assessed - 4/21/17     Social History     Tobacco Use   • Smoking status: Never   • Smokeless tobacco: Never   Vaping Use   • Vaping Use: Never used   Substance Use Topics   • Alcohol use: Yes     Comment: 1 wine nightly   • Drug use: No      Family History   Problem Relation Age of Onset   • Coronary artery disease Mother    • Arthritis Mother    • Other Mother         Cardiac Disorder    • Transient ischemic attack Mother    • Heart attack Father    • Sudden death Father         SCD   • Cancer Daughter 52        kidney   • No Known Problems Paternal Aunt      Past Surgical History:   Procedure Laterality Date   • APPENDECTOMY     • COLONOSCOPY     • HI CORONARY ARTERY BYP W/VEIN & ARTERY GRAFT 4 VEIN N/A 1/27/2020    Procedure: CORONARY ARTERY BYPASS GRAFT (CABG) x3 VESSELS, LIMA TO LAD, AND SVG TO PLB & OM;  Surgeon: Michelle Ornelas DO;  [facility];  Service: Cardiac Surgery   • HI ECHO TRANSESOPHAG R-T 2D W/PRB IMG ACQUISJ I&R N/A 1/27/2020    Procedure: TRANSESOPHAGEAL ECHOCARDIOGRAM (GET); Surgeon: Michelle Ornelas DO;  Location[facility];  Service: Cardiac Surgery   • HI NDSC SURG W/VIDEO-ASSISTED HARVEST VEIN CABG Left 1/27/2020    Procedure: HARVEST VEIN ENDOSCOPIC ([address]);   Surgeon: Michelle Ornelas DO;  [facility];  Service: Cardiac Surgery   • TONSILLECTOMY      Last assessed - 4/20/17   • TUBAL LIGATION      Last "assessed - 4/20/17   • WISDOM TOOTH EXTRACTION      Last assessed - 4/20/17       Current Outpatient Medications:   •  hydrochlorothiazide (HYDRODIURIL) 25 mg tablet, Take 1 tablet (25 mg total) by mouth daily, Disp: 90 tablet, Rfl: 3  •  losartan (COZAAR) 100 MG tablet, Take 1 tablet (100 mg total) by mouth daily, Disp: 90 tablet, Rfl: 3  •  metoprolol tartrate (LOPRESSOR) 25 mg tablet, Take 0 5 tablets (12 5 mg total) by mouth every 12 (twelve) hours, Disp: 90 tablet, Rfl: 3  •  rosuvastatin (CRESTOR) 40 MG tablet, Take 1 tablet (40 mg total) by mouth daily, Disp: 90 tablet, Rfl: 3  •  acetaminophen (TYLENOL) 650 mg CR tablet, Take 650 mg by mouth daily as needed for mild pain, Disp: , Rfl:   •  aspirin (ECOTRIN LOW STRENGTH) 81 mg EC tablet, Take 1 tablet (81 mg total) by mouth daily, Disp: , Rfl:   •  Calcium Carb-Cholecalciferol 600-200 MG-UNIT TABS, Calcium 600 + D TABS TAKE 1 TABLET DAILY  Refills: 0  Active, Disp: , Rfl:   •  denosumab (PROLIA) 60 mg/mL, Inject 60 mg under the skin once, Disp: , Rfl:   •  guaifenesin-codeine (GUAIFENESIN AC) 100-10 MG/5ML liquid, Take 5 mL by mouth 3 (three) times a day as needed for cough (Patient not taking: Reported on 3/15/2023), Disp: 180 mL, Rfl: 0  •  melatonin 3 mg, Take 3 mg by mouth daily at bedtime, Disp: , Rfl:   •  VITAMIN D, CHOLECALCIFEROL, PO, Take 2,600 Units/day by mouth, Disp: , Rfl:   No Known Allergies    Vitals:    06/13/23 1604   BP: 126/60   Pulse: 65   Temp: 98 °F (36 7 °C)   SpO2: 97%   Weight: 51 3 kg (113 lb)   Height: 5' 3\" (1 6 m)     Vitals:    06/13/23 1604   Weight: 51 3 kg (113 lb)      Height: 5' 3\" (160 cm)   Body mass index is 20 02 kg/m²      Physical Exam:  GEN: [place] appears well, alert and oriented x 3, pleasant and cooperative   HEENT: pupils equal, round, and reactive to light; extraocular muscles intact  NECK: supple, no carotid bruits   HEART: regular rhythm, normal S1 and S2, no murmurs, clicks, gallops or rubs   LUNGS: " "clear to auscultation bilaterally; no wheezes, rales, or rhonchi   ABDOMEN: normal bowel sounds, soft, no tenderness, no distention  EXTREMITIES: peripheral pulses normal; no clubbing, cyanosis, or edema  NEURO: no focal findings   SKIN: normal without suspicious lesions on exposed skin    ROS:  Positive for shortness of breath with incline  Palpitations  Except as noted in HPI, is otherwise reviewed in detail and a 12 point review of systems is negative  ROS reviewed and is unchanged    Labs:  Lab Results   Component Value Date    ALT 38 01/03/2023    AST 42 01/03/2023    BUN 18 03/09/2023     03/09/2023    CO2 27 03/09/2023    CREATININE 0 62 03/09/2023    GLUF 96 03/09/2023    HCT 40 5 01/03/2023    HGB 13 1 01/03/2023    INR 1 01 01/24/2020    K 4 3 03/09/2023     03/03/2015     01/03/2023    WBC 5 43 01/03/2023     No results found for: \"CHOL\"  Lab Results   Component Value Date    LDLCALC 77 01/03/2023    LDLCALC 68 05/31/2022    LDLCALC 72 11/04/2021     Lab Results   Component Value Date    HDL 66 01/03/2023    HDL 65 05/31/2022    HDL 76 11/04/2021     Lab Results   Component Value Date    TRIG 88 01/03/2023    TRIG 128 05/31/2022    TRIG 52 11/04/2021     Testing:  Echo 1/20/22:  Left Ventricle: Left ventricular cavity size is normal  The left ventricular ejection fraction is 60%  Systolic function is normal  Wall motion is normal  Diastolic function is normal   •  Aortic Valve: There is mild regurgitation  •  Mitral Valve: There is mild regurgitation  •  Tricuspid Valve: There is mild regurgitation  Zio patch 12/2021:  Patient had a min HR of 47 bpm, max HR of 184 bpm, and avg HR of 69  bpm  Predominant underlying rhythm was Sinus Rhythm   13  Supraventricular Tachycardia runs occurred, the run with the fastest  interval lasting 14 beats with a max rate of 184 bpm, the longest lasting  20 beats with an avg rate of 123 bpm  Supraventricular Tachycardia was  detected within +/- 45 " seconds of symptomatic patient event(s)  Isolated  SVEs were rare (<1 0%), SVE Couplets were rare (<1 0%), and SVE  Triplets were rare (<1 0%)  Isolated VEs were rare (<1 0%), and no VE  Couplets or VE Triplets were present      Agree with above  Brief SVT only, no afib  Only one episode of palpitations correlated with any significant arrhythmia (frequent PACs/brief SVT)  Remaining PSVT was asymptomatic and other symptoms of palpitations were sinus rhythm  Stress Test 11/4/2020:  SUMMARY:  -  Rest ECG: Normal sinus rhythm  Normal baseline ECG  -  Stress results: Duration of exercise was 8 min and 0 sec  Maximal work rate was 10 1 METs  Target heart rate was achieved  There was resting hypertension with an appropriate blood pressure response to stress  There was no chest pain  during stress  -  ECG conclusions: The stress ECG was normal  Arrhythmia during stress: isolated atrial premature beats  -  Perfusion imaging: There were no perfusion defects   -  Gated SPECT: The calculated left ventricular ejection fraction was 82 %  Left ventricular ejection fraction was within normal limits by visual estimate  There was no left ventricular regional abnormality      IMPRESSIONS: Normal study after maximal exercise without reproduction of symptoms  Myocardial perfusion imaging was normal at rest and with stress  Left ventricular systolic function was normal     Cardiac Cath 1/24/2020:  CORONARY CIRCULATION:  LAD: The vessel was medium sized  The proximal and mid LAD is heavily calcified with diffuse disease of 70-80%  Circumflex: The vessel was medium sized and heavily calcified  Ostial circumflex: There was a discrete 85 % stenosis  Mid circumflex: There was a discrete 85 % stenosis  1st obtuse marginal: The vessel was small to medium sized  There was a tubular 50 % stenosis  2nd obtuse marginal: The vessel was small to medium sized  There was a discrete 85 % stenosis  RCA: The vessel was medium sized  Dominant  There is heavy calcification in the proximal, mid, and distal vessel  There are multiple significant lesions ( 70-90% ) seen throughout this entire area  Echo 1/2020:  LEFT VENTRICLE:  Systolic function was vigorous  Ejection fraction was estimated to be 70 %  There were no regional wall motion abnormalities  Wall thickness was at the upper limits of normal      RIGHT VENTRICLE:  The size was normal   Systolic function was normal      MITRAL VALVE:  There was mild regurgitation

## 2023-06-14 ENCOUNTER — TELEPHONE (OUTPATIENT)
Dept: VASCULAR SURGERY | Facility: CLINIC | Age: 78
End: 2023-06-14

## 2023-06-14 DIAGNOSIS — Z95.1 S/P CABG X 3: ICD-10-CM

## 2023-06-14 DIAGNOSIS — I10 ESSENTIAL HYPERTENSION: ICD-10-CM

## 2023-06-14 RX ORDER — LOSARTAN POTASSIUM 100 MG/1
100 TABLET ORAL DAILY
Qty: 7 TABLET | Refills: 0 | Status: SHIPPED | OUTPATIENT
Start: 2023-06-14 | End: 2023-06-20

## 2023-06-14 NOTE — TELEPHONE ENCOUNTER
Patient stated that she saw Melissa Beverly on 67/23 for a swollen RLE and she ordered compression stockings  She stated that when she has it on, her legs ache and it is making her knee swollen  She said she went to [facility] and they measured her for the stocking, but she feels it is too tight  She stated that she puts them on in the am when she gets out of the shower  She stated that she is only wearing the stocking on her RLE  Advised her to contact [address] [place] [address] to see if she can get a different size and advised her to wear them on both legs  She was agreeable to same

## 2023-06-19 DIAGNOSIS — I10 ESSENTIAL HYPERTENSION: ICD-10-CM

## 2023-06-19 DIAGNOSIS — Z95.1 S/P CABG X 3: ICD-10-CM

## 2023-06-19 DIAGNOSIS — I25.118 CORONARY ARTERY DISEASE OF NATIVE ARTERY OF NATIVE HEART WITH STABLE ANGINA PECTORIS (HCC): ICD-10-CM

## 2023-06-20 RX ORDER — LOSARTAN POTASSIUM 100 MG/1
TABLET ORAL
Qty: 7 TABLET | Refills: 0 | Status: SHIPPED | OUTPATIENT
Start: 2023-06-20

## 2023-06-20 RX ORDER — ROSUVASTATIN CALCIUM 40 MG/1
TABLET, COATED ORAL
Qty: 90 TABLET | Refills: 3 | Status: SHIPPED | OUTPATIENT
Start: 2023-06-20

## 2023-07-05 ENCOUNTER — APPOINTMENT (OUTPATIENT)
Dept: LAB | Facility: CLINIC | Age: 78
End: 2023-07-05
Payer: MEDICARE

## 2023-07-05 DIAGNOSIS — D69.6 THROMBOCYTOPENIA (HCC): ICD-10-CM

## 2023-07-05 DIAGNOSIS — Z13.1 SCREENING FOR DIABETES MELLITUS: ICD-10-CM

## 2023-07-05 DIAGNOSIS — D50.8 OTHER IRON DEFICIENCY ANEMIA: ICD-10-CM

## 2023-07-05 DIAGNOSIS — E78.00 PURE HYPERCHOLESTEROLEMIA: ICD-10-CM

## 2023-07-05 LAB
ALBUMIN SERPL BCP-MCNC: 3.8 G/DL (ref 3.5–5)
ALP SERPL-CCNC: 71 U/L (ref 46–116)
ALT SERPL W P-5'-P-CCNC: 35 U/L (ref 12–78)
ANION GAP SERPL CALCULATED.3IONS-SCNC: 4 MMOL/L
AST SERPL W P-5'-P-CCNC: 43 U/L (ref 5–45)
BASOPHILS # BLD AUTO: 0.05 THOUSANDS/ÂΜL (ref 0–0.1)
BASOPHILS NFR BLD AUTO: 1 % (ref 0–1)
BILIRUB SERPL-MCNC: 0.39 MG/DL (ref 0.2–1)
BUN SERPL-MCNC: 12 MG/DL (ref 5–25)
CALCIUM SERPL-MCNC: 8.7 MG/DL (ref 8.3–10.1)
CHLORIDE SERPL-SCNC: 95 MMOL/L (ref 96–108)
CHOLEST SERPL-MCNC: 147 MG/DL
CO2 SERPL-SCNC: 28 MMOL/L (ref 21–32)
CREAT SERPL-MCNC: 0.57 MG/DL (ref 0.6–1.3)
EOSINOPHIL # BLD AUTO: 0.08 THOUSAND/ÂΜL (ref 0–0.61)
EOSINOPHIL NFR BLD AUTO: 1 % (ref 0–6)
ERYTHROCYTE [DISTWIDTH] IN BLOOD BY AUTOMATED COUNT: 13.2 % (ref 11.6–15.1)
GFR SERPL CREATININE-BSD FRML MDRD: 89 ML/MIN/1.73SQ M
GLUCOSE P FAST SERPL-MCNC: 97 MG/DL (ref 65–99)
HCT VFR BLD AUTO: 35.6 % (ref 34.8–46.1)
HDLC SERPL-MCNC: 72 MG/DL
HGB BLD-MCNC: 11.9 G/DL (ref 11.5–15.4)
IMM GRANULOCYTES # BLD AUTO: 0.03 THOUSAND/UL (ref 0–0.2)
IMM GRANULOCYTES NFR BLD AUTO: 1 % (ref 0–2)
LDLC SERPL CALC-MCNC: 62 MG/DL (ref 0–100)
LYMPHOCYTES # BLD AUTO: 0.86 THOUSANDS/ÂΜL (ref 0.6–4.47)
LYMPHOCYTES NFR BLD AUTO: 14 % (ref 14–44)
MCH RBC QN AUTO: 31.8 PG (ref 26.8–34.3)
MCHC RBC AUTO-ENTMCNC: 33.4 G/DL (ref 31.4–37.4)
MCV RBC AUTO: 95 FL (ref 82–98)
MONOCYTES # BLD AUTO: 0.56 THOUSAND/ÂΜL (ref 0.17–1.22)
MONOCYTES NFR BLD AUTO: 9 % (ref 4–12)
NEUTROPHILS # BLD AUTO: 4.8 THOUSANDS/ÂΜL (ref 1.85–7.62)
NEUTS SEG NFR BLD AUTO: 74 % (ref 43–75)
NRBC BLD AUTO-RTO: 0 /100 WBCS
PLATELET # BLD AUTO: 235 THOUSANDS/UL (ref 149–390)
PMV BLD AUTO: 11.9 FL (ref 8.9–12.7)
POTASSIUM SERPL-SCNC: 3.9 MMOL/L (ref 3.5–5.3)
PROT SERPL-MCNC: 6.8 G/DL (ref 6.4–8.4)
RBC # BLD AUTO: 3.74 MILLION/UL (ref 3.81–5.12)
SODIUM SERPL-SCNC: 127 MMOL/L (ref 135–147)
TRIGL SERPL-MCNC: 66 MG/DL
WBC # BLD AUTO: 6.38 THOUSAND/UL (ref 4.31–10.16)

## 2023-07-05 PROCEDURE — 85025 COMPLETE CBC W/AUTO DIFF WBC: CPT

## 2023-07-05 PROCEDURE — 80053 COMPREHEN METABOLIC PANEL: CPT

## 2023-07-05 PROCEDURE — 36415 COLL VENOUS BLD VENIPUNCTURE: CPT

## 2023-07-05 PROCEDURE — 80061 LIPID PANEL: CPT

## 2023-07-06 ENCOUNTER — HOSPITAL ENCOUNTER (OUTPATIENT)
Facility: HOSPITAL | Age: 78
Setting detail: OBSERVATION
LOS: 1 days | Discharge: HOME | End: 2023-07-07
Attending: EMERGENCY MEDICINE | Admitting: INTERNAL MEDICINE
Payer: MEDICARE

## 2023-07-06 ENCOUNTER — APPOINTMENT (EMERGENCY)
Dept: CT IMAGING | Facility: HOSPITAL | Age: 78
End: 2023-07-06
Payer: MEDICARE

## 2023-07-06 DIAGNOSIS — Z79.899 MEDICATION MANAGEMENT: ICD-10-CM

## 2023-07-06 DIAGNOSIS — E87.1 HYPONATREMIA: Primary | ICD-10-CM

## 2023-07-06 PROBLEM — R53.1 GENERALIZED WEAKNESS: Status: ACTIVE | Noted: 2023-07-06

## 2023-07-06 LAB
ANION GAP SERPL CALCULATED.3IONS-SCNC: 7 MMOL/L
ATRIAL RATE: 52 BPM
BASOPHILS # BLD AUTO: 0.03 THOUSANDS/ÂΜL (ref 0–0.1)
BASOPHILS NFR BLD AUTO: 1 % (ref 0–1)
BILIRUB UR QL STRIP: NEGATIVE
BUN SERPL-MCNC: 14 MG/DL (ref 5–25)
BUN SERPL-MCNC: 14 MG/DL (ref 5–25)
BUN SERPL-MCNC: 16 MG/DL (ref 5–25)
CALCIUM SERPL-MCNC: 8.5 MG/DL (ref 8.4–10.2)
CALCIUM SERPL-MCNC: 8.7 MG/DL (ref 8.4–10.2)
CALCIUM SERPL-MCNC: 9.1 MG/DL (ref 8.4–10.2)
CARDIAC TROPONIN I PNL SERPL HS: 2 NG/L
CHLORIDE SERPL-SCNC: 93 MMOL/L (ref 96–108)
CHLORIDE SERPL-SCNC: 98 MMOL/L (ref 96–108)
CHLORIDE SERPL-SCNC: 99 MMOL/L (ref 96–108)
CLARITY UR: CLEAR
CO2 SERPL-SCNC: 24 MMOL/L (ref 21–32)
CO2 SERPL-SCNC: 26 MMOL/L (ref 21–32)
CO2 SERPL-SCNC: 27 MMOL/L (ref 21–32)
COLOR UR: NORMAL
CREAT SERPL-MCNC: 0.53 MG/DL (ref 0.6–1.3)
CREAT SERPL-MCNC: 0.6 MG/DL (ref 0.6–1.3)
CREAT SERPL-MCNC: 0.62 MG/DL (ref 0.6–1.3)
CREAT UR-MCNC: 49 MG/DL
EOSINOPHIL # BLD AUTO: 0.04 THOUSAND/ÂΜL (ref 0–0.61)
EOSINOPHIL NFR BLD AUTO: 1 % (ref 0–6)
ERYTHROCYTE [DISTWIDTH] IN BLOOD BY AUTOMATED COUNT: 13.3 % (ref 11.6–15.1)
GFR SERPL CREATININE-BSD FRML MDRD: 87 ML/MIN/1.73SQ M
GFR SERPL CREATININE-BSD FRML MDRD: 88 ML/MIN/1.73SQ M
GFR SERPL CREATININE-BSD FRML MDRD: 91 ML/MIN/1.73SQ M
GLUCOSE SERPL-MCNC: 101 MG/DL (ref 65–140)
GLUCOSE SERPL-MCNC: 91 MG/DL (ref 65–140)
GLUCOSE SERPL-MCNC: 92 MG/DL (ref 65–140)
GLUCOSE UR STRIP-MCNC: NEGATIVE MG/DL
HCT VFR BLD AUTO: 36.6 % (ref 34.8–46.1)
HGB BLD-MCNC: 12.1 G/DL (ref 11.5–15.4)
HGB UR QL STRIP.AUTO: NEGATIVE
IMM GRANULOCYTES # BLD AUTO: 0.02 THOUSAND/UL (ref 0–0.2)
IMM GRANULOCYTES NFR BLD AUTO: 0 % (ref 0–2)
KETONES UR STRIP-MCNC: NEGATIVE MG/DL
LEUKOCYTE ESTERASE UR QL STRIP: NEGATIVE
LYMPHOCYTES # BLD AUTO: 0.46 THOUSANDS/ÂΜL (ref 0.6–4.47)
LYMPHOCYTES NFR BLD AUTO: 7 % (ref 14–44)
MCH RBC QN AUTO: 31.8 PG (ref 26.8–34.3)
MCHC RBC AUTO-ENTMCNC: 33.1 G/DL (ref 31.4–37.4)
MCV RBC AUTO: 96 FL (ref 82–98)
MONOCYTES # BLD AUTO: 0.41 THOUSAND/ÂΜL (ref 0.17–1.22)
MONOCYTES NFR BLD AUTO: 6 % (ref 4–12)
NEUTROPHILS # BLD AUTO: 5.5 THOUSANDS/ÂΜL (ref 1.85–7.62)
NEUTS SEG NFR BLD AUTO: 85 % (ref 43–75)
NITRITE UR QL STRIP: NEGATIVE
NRBC BLD AUTO-RTO: 0 /100 WBCS
OSMOLALITY UR/SERPL-RTO: 270 MMOL/KG (ref 282–298)
OSMOLALITY UR: 270 MMOL/KG
P AXIS: 63 DEGREES
PH UR STRIP.AUTO: 7 [PH]
PLATELET # BLD AUTO: 221 THOUSANDS/UL (ref 149–390)
PLATELET # BLD AUTO: 221 THOUSANDS/UL (ref 149–390)
PMV BLD AUTO: 10.8 FL (ref 8.9–12.7)
PMV BLD AUTO: 11 FL (ref 8.9–12.7)
POTASSIUM SERPL-SCNC: 3.5 MMOL/L (ref 3.5–5.3)
POTASSIUM SERPL-SCNC: 3.6 MMOL/L (ref 3.5–5.3)
POTASSIUM SERPL-SCNC: 3.8 MMOL/L (ref 3.5–5.3)
PR INTERVAL: 202 MS
PROT UR STRIP-MCNC: NEGATIVE MG/DL
QRS AXIS: 70 DEGREES
QRSD INTERVAL: 78 MS
QT INTERVAL: 428 MS
QTC INTERVAL: 398 MS
RBC # BLD AUTO: 3.8 MILLION/UL (ref 3.81–5.12)
SODIUM 24H UR-SCNC: 18 MOL/L
SODIUM SERPL-SCNC: 127 MMOL/L (ref 135–147)
SODIUM SERPL-SCNC: 129 MMOL/L (ref 135–147)
SODIUM SERPL-SCNC: 132 MMOL/L (ref 135–147)
SP GR UR STRIP.AUTO: 1.01 (ref 1–1.03)
T WAVE AXIS: 70 DEGREES
TSH SERPL DL<=0.05 MIU/L-ACNC: 0.95 UIU/ML (ref 0.45–4.5)
UROBILINOGEN UR STRIP-ACNC: <2 MG/DL
VENTRICULAR RATE: 52 BPM
WBC # BLD AUTO: 6.46 THOUSAND/UL (ref 4.31–10.16)

## 2023-07-06 PROCEDURE — 84300 ASSAY OF URINE SODIUM: CPT | Performed by: EMERGENCY MEDICINE

## 2023-07-06 PROCEDURE — 99222 1ST HOSP IP/OBS MODERATE 55: CPT | Performed by: INTERNAL MEDICINE

## 2023-07-06 PROCEDURE — 96360 HYDRATION IV INFUSION INIT: CPT

## 2023-07-06 PROCEDURE — 84443 ASSAY THYROID STIM HORMONE: CPT | Performed by: EMERGENCY MEDICINE

## 2023-07-06 PROCEDURE — 84484 ASSAY OF TROPONIN QUANT: CPT | Performed by: EMERGENCY MEDICINE

## 2023-07-06 PROCEDURE — 93010 ELECTROCARDIOGRAM REPORT: CPT | Performed by: INTERNAL MEDICINE

## 2023-07-06 PROCEDURE — 85025 COMPLETE CBC W/AUTO DIFF WBC: CPT | Performed by: EMERGENCY MEDICINE

## 2023-07-06 PROCEDURE — 36415 COLL VENOUS BLD VENIPUNCTURE: CPT | Performed by: EMERGENCY MEDICINE

## 2023-07-06 PROCEDURE — 80048 BASIC METABOLIC PNL TOTAL CA: CPT | Performed by: EMERGENCY MEDICINE

## 2023-07-06 PROCEDURE — 93005 ELECTROCARDIOGRAM TRACING: CPT

## 2023-07-06 PROCEDURE — 80048 BASIC METABOLIC PNL TOTAL CA: CPT | Performed by: PHYSICIAN ASSISTANT

## 2023-07-06 PROCEDURE — 82570 ASSAY OF URINE CREATININE: CPT | Performed by: EMERGENCY MEDICINE

## 2023-07-06 PROCEDURE — 70450 CT HEAD/BRAIN W/O DYE: CPT

## 2023-07-06 PROCEDURE — 99223 1ST HOSP IP/OBS HIGH 75: CPT | Performed by: INTERNAL MEDICINE

## 2023-07-06 PROCEDURE — 99285 EMERGENCY DEPT VISIT HI MDM: CPT

## 2023-07-06 PROCEDURE — 81003 URINALYSIS AUTO W/O SCOPE: CPT | Performed by: EMERGENCY MEDICINE

## 2023-07-06 PROCEDURE — G1004 CDSM NDSC: HCPCS

## 2023-07-06 PROCEDURE — 85049 AUTOMATED PLATELET COUNT: CPT | Performed by: PHYSICIAN ASSISTANT

## 2023-07-06 PROCEDURE — 83935 ASSAY OF URINE OSMOLALITY: CPT | Performed by: EMERGENCY MEDICINE

## 2023-07-06 PROCEDURE — 83930 ASSAY OF BLOOD OSMOLALITY: CPT | Performed by: EMERGENCY MEDICINE

## 2023-07-06 RX ORDER — LANOLIN ALCOHOL/MO/W.PET/CERES
1 CREAM (GRAM) TOPICAL
Status: DISCONTINUED | OUTPATIENT
Start: 2023-07-07 | End: 2023-07-07 | Stop reason: HOSPADM

## 2023-07-06 RX ORDER — LOSARTAN POTASSIUM 50 MG/1
100 TABLET ORAL DAILY
Status: DISCONTINUED | OUTPATIENT
Start: 2023-07-06 | End: 2023-07-07 | Stop reason: HOSPADM

## 2023-07-06 RX ORDER — ATORVASTATIN CALCIUM 40 MG/1
80 TABLET, FILM COATED ORAL
Status: DISCONTINUED | OUTPATIENT
Start: 2023-07-06 | End: 2023-07-07 | Stop reason: HOSPADM

## 2023-07-06 RX ORDER — LANOLIN ALCOHOL/MO/W.PET/CERES
3 CREAM (GRAM) TOPICAL
Status: DISCONTINUED | OUTPATIENT
Start: 2023-07-06 | End: 2023-07-07 | Stop reason: HOSPADM

## 2023-07-06 RX ORDER — ACETAMINOPHEN 325 MG/1
650 TABLET ORAL EVERY 6 HOURS PRN
Status: DISCONTINUED | OUTPATIENT
Start: 2023-07-06 | End: 2023-07-07 | Stop reason: HOSPADM

## 2023-07-06 RX ORDER — MELATONIN
1000 DAILY
Status: DISCONTINUED | OUTPATIENT
Start: 2023-07-06 | End: 2023-07-07 | Stop reason: HOSPADM

## 2023-07-06 RX ORDER — ENOXAPARIN SODIUM 100 MG/ML
40 INJECTION SUBCUTANEOUS DAILY
Status: DISCONTINUED | OUTPATIENT
Start: 2023-07-07 | End: 2023-07-07 | Stop reason: HOSPADM

## 2023-07-06 RX ADMIN — Medication 3 MG: at 21:20

## 2023-07-06 RX ADMIN — ACETAMINOPHEN 650 MG: 325 TABLET, FILM COATED ORAL at 21:45

## 2023-07-06 RX ADMIN — SODIUM CHLORIDE 500 ML: 0.9 INJECTION, SOLUTION INTRAVENOUS at 10:24

## 2023-07-06 RX ADMIN — Medication 12.5 MG: at 21:20

## 2023-07-06 RX ADMIN — ATORVASTATIN CALCIUM 80 MG: 40 TABLET, FILM COATED ORAL at 15:56

## 2023-07-06 NOTE — DISCHARGE INSTR - AVS FIRST PAGE
Dear Gia Thorne,     It was our pleasure to care for you here at [facility]. It is our hope that we were always able to exceed the expected standards for your care during your stay. You were hospitalized due to low sodium. You were cared for on the 4td floor by Matthias Srivastava PA-C under the service of Panchito Matamoros MD with the [facility] who covers for your primary care physician (PCP), Jayy Lam DO, while you were hospitalized. If you have any questions or concerns related to this hospitalization, you may contact us at [phone]. For follow up as well as any medication refills, we recommend that you follow up with your primary care physician. A registered nurse will reach out to you by phone within a few days after your discharge to answer any additional questions that you may have after going home. However, at this time we provide for you here, the most important instructions / recommendations at discharge:     Notable Medication Adjustments -   Stop hydrochlorothiazide  Testing Required after Discharge -   An MRI of the brain could be pursued non emergently for slightly prominent pituitary as seen on CAT scan of the head   Repeat BMP ---please contact your PCP to request testing orders for any of the testing recommended here **  Important follow up information -   Family doctor  Other Instructions -   For now you should not need to add another blood pressure medicine if your blood pressure continues to remain normal but it should be checked at home  Please review this entire after visit summary as additional general instructions including medication list, appointments, activity, diet, any pertinent wound care, and other additional recommendations from your care team that may be provided for you.       Sincerely,     Matthias Srivastava PA-C

## 2023-07-06 NOTE — CONSULTS
[facility] 68 [facility]: [id number]  Unit/Bed#: ED-40 Encounter: [id number]    ASSESSMENT and PLAN:  Concha Garcia is a 68 y.o. female who was admitted to [address] [place] [address] after presenting with weakness. A renal consultation is requested today for assistance in the management of hyponatremia. 1. Mild hyponatremia with mild symptoms  - Serum sodium 127 on admission at 10 AM, improved to 129 at 12 PM  - Previous serum sodium 134 on 03/09/2023  - Serum osmolality 270 consistent with hypotonic hyponatremia  - Urine osmolality 270 consistent with ADH dependent hyponatremia  - Urine sodium 18 likely suggests volume depletion/RAAS activity  - Hypothyroidism has been ruled out  - Etiology of hyponatremia most likely volume depletion +/- use of hydrochlorothiazide  - Agree with discontinuation of hydrochlorothiazide  - Status post normal saline bolus with improvement in serum sodium and would currently observe off isotonic fluids  - Check BMP every 8 hours  - Goal serum sodium by tomorrow morning 133-135  - Check a.m. cortisol  - Check serum uric acid    2. Hypertension  - Home medications include losartan 100 mg daily, metoprolol 12.5 mg twice daily and hydrochlorothiazide 25 mg daily  - Okay to continue losartan and metoprolol at home doses  - Discontinue hydrochlorothiazide    Discussed with internal medicine team.  After discussion, we agreed that serum sodium has improved with isotonic saline bolus and to hold off on further isotonic fluids today and to trend BMP every 8 hours. HISTORY OF PRESENT ILLNESS:  Requesting Physician: Megan Maier MD  Reason for Consult: Hyponatremia    Concha Garcia is a 68 y.o. female who was admitted to [address] [place] [address] after presenting with weakness. A renal consultation is requested today for assistance in the management of hyponatremia. Patient had 2 days of not feeling well and feeling weak.   Even taking a shower was a big deal per patient. She felt nauseous and was having headaches. She has not had vomiting. She has not had any issues with bowel movements. She was recently started on hydrochlorothiazide. Serum sodium was 127 on admission that is improved to 129 after administration of normal saline bolus. PAST MEDICAL HISTORY:  Past Medical History:   Diagnosis Date   • Colon polyp    • Coronary artery disease    • Effusion of left knee     Last assessed - 9/10/15   • History of transfusion    • Hyperlipidemia    • Hypertension    • Myocardial infarction [facility])    • Tick bite     Last assessed - 4/21/17       PAST SURGICAL HISTORY:  Past Surgical History:   Procedure Laterality Date   • APPENDECTOMY     • COLONOSCOPY     • SC CORONARY ARTERY BYP W/VEIN & ARTERY GRAFT 4 VEIN N/A 1/27/2020    Procedure: CORONARY ARTERY BYPASS GRAFT (CABG) x3 VESSELS, LIMA TO LAD, AND SVG TO PLB & OM;  Surgeon: Paulette Bailon DO;  [facility];  Service: Cardiac Surgery   • SC ECHO TRANSESOPHAG R-T 2D W/PRB IMG ACQUISJ I&R N/A 1/27/2020    Procedure: TRANSESOPHAGEAL ECHOCARDIOGRAM (EGT); Surgeon: Paulette Bailon DO;  Location[facility];  Service: Cardiac Surgery   • SC NDSC SURG W/VIDEO-ASSISTED HARVEST VEIN CABG Left 1/27/2020    Procedure: HARVEST VEIN ENDOSCOPIC ([address]);   Surgeon: Paulette Bailon DO;  Location[facility];  Service: Cardiac Surgery   • TONSILLECTOMY      Last assessed - 4/20/17   • TUBAL LIGATION      Last assessed - 4/20/17   • WISDOM TOOTH EXTRACTION      Last assessed - 4/20/17       ALLERGIES:  No Known Allergies    SOCIAL HISTORY:  Social History     Substance and Sexual Activity   Alcohol Use Yes    Comment: 1 wine nightly     Social History     Substance and Sexual Activity   Drug Use No     Social History     Tobacco Use   Smoking Status Never   Smokeless Tobacco Never       FAMILY HISTORY:  Family History   Problem Relation Age of Onset   • Coronary artery disease Mother    • Arthritis Mother    • Other Mother         Cardiac Disorder    • Transient ischemic attack Mother    • Heart attack Father    • Sudden death Father         SCD   • Cancer Daughter 52        kidney   • No Known Problems Paternal Aunt        MEDICATIONS:  No current facility-administered medications for this encounter. Current Outpatient Medications:   •  acetaminophen (TYLENOL) 650 mg CR tablet, Take 650 mg by mouth daily as needed for mild pain, Disp: , Rfl:   •  aspirin (ECOTRIN LOW STRENGTH) 81 mg EC tablet, Take 1 tablet (81 mg total) by mouth daily, Disp: , Rfl:   •  Calcium Carb-Cholecalciferol 600-200 MG-UNIT TABS, Calcium 600 + D TABS TAKE 1 TABLET DAILY. Refills: 0  Active, Disp: , Rfl:   •  denosumab (PROLIA) 60 mg/mL, Inject 60 mg under the skin once, Disp: , Rfl:   •  guaifenesin-codeine (GUAIFENESIN AC) 100-10 MG/5ML liquid, Take 5 mL by mouth 3 (three) times a day as needed for cough (Patient not taking: Reported on 3/15/2023), Disp: 180 mL, Rfl: 0  •  hydrochlorothiazide (HYDRODIURIL) 25 mg tablet, Take 1 tablet (25 mg total) by mouth daily, Disp: 90 tablet, Rfl: 3  •  losartan (COZAAR) 100 MG tablet, TAKE ONE TABLET BY MOUTH EVERY DAY, Disp: 7 tablet, Rfl: 0  •  melatonin 3 mg, Take 3 mg by mouth daily at bedtime, Disp: , Rfl:   •  metoprolol tartrate (LOPRESSOR) 25 mg tablet, TAKE ONE-HALF TABLET BY MOUTH EVERY 12 HOURS, Disp: 7 tablet, Rfl: 0  •  rosuvastatin (CRESTOR) 40 MG tablet, TAKE ONE TABLET BY MOUTH EVERY DAY, Disp: 90 tablet, Rfl: 3  •  VITAMIN D, CHOLECALCIFEROL, PO, Take 2,600 Units/day by mouth, Disp: , Rfl:     REVIEW OF SYSTEMS:  Review of Systems   Constitutional: Negative for chills and fever. HENT: Negative for ear pain and sore throat. Eyes: Negative for pain and visual disturbance. Respiratory: Negative for cough and shortness of breath. Cardiovascular: Negative for chest pain and palpitations. Gastrointestinal: Negative for abdominal pain and vomiting.    Genitourinary: Negative for dysuria and hematuria. Musculoskeletal: Negative for arthralgias and back pain. Skin: Negative for color change and rash. Neurological: Negative for seizures and syncope. All other systems reviewed and are negative. PHYSICAL EXAM:  Current Weight:    First Weight:    Vitals:    07/06/23 0911 07/06/23 0912 07/06/23 1013 07/06/23 1100   BP: 132/60  121/57 119/61   BP Location: Right arm  Right arm Right arm   Pulse: 58  (!) 52 (!) 53   Resp: 16  16 16   Temp:  (!) 97.4 °F (36.3 °C)     TempSrc:  Oral     SpO2: 99%  100% 100%       Intake/Output Summary (Last 24 hours) at 7/6/2023 1226  Last data filed at 7/6/2023 1124  Gross per 24 hour   Intake 500 ml   Output --   Net 500 ml     Physical Exam  Constitutional:       Appearance: Normal appearance. HENT:      Head: Normocephalic and atraumatic. Mouth/Throat:      Mouth: Mucous membranes are moist.      Pharynx: Oropharynx is clear. Cardiovascular:      Rate and Rhythm: Normal rate and regular rhythm. Pulses: Normal pulses. Heart sounds: Normal heart sounds. Pulmonary:      Effort: Pulmonary effort is normal.      Breath sounds: Normal breath sounds. Abdominal:      General: Bowel sounds are normal.      Palpations: Abdomen is soft. Musculoskeletal:         General: Normal range of motion. Right lower leg: No edema. Left lower leg: No edema. Skin:     General: Skin is warm and dry. Neurological:      General: No focal deficit present. Mental Status: She is alert and oriented to person, place, and time. Mental status is at baseline. Psychiatric:         Mood and Affect: Mood normal.         Behavior: Behavior normal.         Thought Content:  Thought content normal.         Judgment: Judgment normal.       Lab Results:   Results from last 7 days   Lab Units 07/06/23  1154 07/06/23  0959 07/05/23  1027   WBC Thousand/uL  --  6.46 6.38   HEMOGLOBIN g/dL  --  12.1 11.9   HEMATOCRIT %  --  36.6 35.6   PLATELETS Thousands/uL  --  221 235   POTASSIUM mmol/L 3.8 3.5 3.9   CHLORIDE mmol/L 98 93* 95*   CO2 mmol/L 24 27 28   BUN mg/dL 14 16 12   CREATININE mg/dL 0.53* 0.62 0.57*   CALCIUM mg/dL 8.5 8.7 8.7   ALK PHOS U/L  --   --  71   ALT U/L  --   --  35   AST U/L  --   --  43     Portions of the record may have been created with voice recognition software. Occasional wrong word or "sound a like" substitutions may have occurred due to the inherent limitations of voice recognition software. Read the chart carefully and recognize, using context, where substitutions have occurred. If you have any questions, please contact the dictating provider.

## 2023-07-06 NOTE — APP STUDENT NOTE
LAURA STUDENT  Inpatient H&P Exam for TRAINING ONLY  Not Part of Legal Medical Record       H&P Exam - Jewel Mendez 68 y.o. female MRN: [id number]  Unit/Bed#: S -01 Encounter: [id number]    Hyponatremia  Assessment & Plan  · Hyperosmolar hyponatremia  · Patient experiencing weakness, mild headache, and nausea, likely secondary to hyponatremia  · Nephrology consulted, hyponatremia attributed to HCTZ  · Received sodium chloride 0.9% bolus 500 mL   · HCTZ discontinued   · Diet regular with fluid restriction 1500mL  · Trend BMP every 8 hours  · Consider MRI per non-contrast CT scan results "pituitary gland is prominent with a superior convex contour. MRI correlation can be considered to exclude a pituitary lesion."    Essential hypertension  Assessment & Plan  · Continue Cozaar and Lopressor  · Discontinue HCTZ due to hyponatremia  · Observe blood pressures to determine if replacement agent required for essential HTN  · Follow up with PCP post discharge regarding this as well    Anticipated Length of Stay:  Patient will be admitted on an Inpatient basis with an anticipated length of stay of 1 day. Justification for Hospital Stay: hyponatremia    Total Time for Visit, including Counseling / Coordination of Care: 30 minutes. Greater than 50% of this total time spent on direct patient counseling and coordination of care. Chief Complaint:   Patient presented to ED complaining of weakness and fatigue    History of Present Illness:    Jewel Mendez is a 68 y.o. female with a PMH of HTN, HLD, and CAD who presented to the ED with generalized weakness, fatigue, nausea, and mild headache x 3 days. She stated last night her symptoms worsened to the point where she felt "in a brain fog," and even showering was difficult for her. She noted that yesterday she had routine lab work at [facility] where her sodium was found to be 127. She recently started HCTZ on June 13 after seeing her cardiologist as well.  Nephrology was consulted, attributed her hyponatremia to the HCTZ, and gave her 500 mL 0.9% normal saline. Her symptoms have improved since then and she states she feels 50% better. She denies any nausea currently, but states she still has a mild right sided headache and mild fatigue which is improving. IM was consulted for observation. Review of Systems:    Review of Systems   Constitutional: Positive for fatigue. Negative for chills, diaphoresis and fever. HENT: Negative. Respiratory: Negative. Negative for apnea, cough, shortness of breath and wheezing. Cardiovascular: Negative. Negative for chest pain, palpitations and leg swelling. Gastrointestinal: Negative. Negative for abdominal distention, abdominal pain, constipation, diarrhea and nausea. Genitourinary: Negative. Negative for difficulty urinating and dysuria. Musculoskeletal: Negative. Negative for arthralgias, joint swelling and myalgias. Skin: Negative. Negative for color change and rash. Neurological: Positive for headaches (mild, right sided headache ). Negative for dizziness, facial asymmetry, speech difficulty, weakness and light-headedness. Hematological: Negative. Does not bruise/bleed easily. Psychiatric/Behavioral: Negative. Negative for agitation, behavioral problems, confusion and sleep disturbance.        Past Medical and Surgical History:     Past Medical History:   Diagnosis Date   • Colon polyp    • Coronary artery disease    • Effusion of left knee     Last assessed - 9/10/15   • History of transfusion    • Hyperlipidemia    • Hypertension    • Myocardial infarction [facility])    • Tick bite     Last assessed - 4/21/17       Past Surgical History:   Procedure Laterality Date   • APPENDECTOMY     • COLONOSCOPY     • [place] CORONARY ARTERY BYP W/VEIN & ARTERY GRAFT 4 VEIN N/A 1/27/2020    Procedure: CORONARY ARTERY BYPASS GRAFT (CABG) x3 VESSELS, LIMA TO LAD, AND SVG TO PLB & OM;  Surgeon: Yasmin Ugarte DO;  [facility];  Service: Cardiac Surgery   • [place] ECHO TRANSESOPHAG R-T 2D W/PRB IMG ACQUISJ I&R N/A 1/27/2020    Procedure: TRANSESOPHAGEAL ECHOCARDIOGRAM (GET); Surgeon: Sayda Sow DO;  Location: [facility] OR;  Service: Cardiac Surgery   • MD NDSC SURG W/VIDEO-ASSISTED HARVEST VEIN CABG Left 1/27/2020    Procedure: HARVEST VEIN ENDOSCOPIC ([address]); Surgeon: Sayda Sow DO;  Location: [facility] OR;  Service: Cardiac Surgery   • TONSILLECTOMY      Last assessed - 4/20/17   • TUBAL LIGATION      Last assessed - 4/20/17   • WISDOM TOOTH EXTRACTION      Last assessed - 4/20/17       Meds/Allergies:    Prior to Admission medications    Medication Sig Start Date End Date Taking? Authorizing Provider   acetaminophen (TYLENOL) 650 mg CR tablet Take 650 mg by mouth daily as needed for mild pain    Historical Provider, MD   aspirin (ECOTRIN LOW STRENGTH) 81 mg EC tablet Take 1 tablet (81 mg total) by mouth daily 4/10/20   Pollyann Romberg, MD   Calcium Carb-Cholecalciferol 600-200 MG-UNIT TABS Calcium 600 + D TABS  TAKE 1 TABLET DAILY.    Refills: 0    Active    Historical Provider, MD   denosumab (PROLIA) 60 mg/mL Inject 60 mg under the skin once    Historical Provider, MD   guaifenesin-codeine (GUAIFENESIN AC) 100-10 MG/5ML liquid Take 5 mL by mouth 3 (three) times a day as needed for cough  Patient not taking: Reported on 3/15/2023 2/22/23   [address],    hydrochlorothiazide (HYDRODIURIL) 25 mg tablet Take 1 tablet (25 mg total) by mouth daily 6/13/23   Pollyann Romberg, MD   losartan (COZAAR) 100 MG tablet TAKE ONE TABLET BY MOUTH EVERY DAY 6/20/23   Pollyann Romberg, MD   melatonin 3 mg Take 3 mg by mouth daily at bedtime    Historical Provider, MD   metoprolol tartrate (LOPRESSOR) 25 mg tablet TAKE ONE-HALF TABLET BY MOUTH EVERY 12 HOURS 6/20/23   Pollyann Romberg, MD   rosuvastatin (CRESTOR) 40 MG tablet TAKE ONE TABLET BY MOUTH EVERY DAY 6/20/23   Pollyann Romberg, MD   VITAMIN D, CHOLECALCIFEROL, PO Take 2,600 Units/day by mouth Historical Provider, MD     I have reviewed home medications with patient personally. Allergies: No Known Allergies    Social History:     Marital Status:      Social History     Substance and Sexual Activity   Alcohol Use Yes    Comment: 1 wine nightly     Social History     Tobacco Use   Smoking Status Never   Smokeless Tobacco Never     Social History     Substance and Sexual Activity   Drug Use No       Family History:    Family History   Problem Relation Age of Onset   • Coronary artery disease Mother    • Arthritis Mother    • Other Mother         Cardiac Disorder    • Transient ischemic attack Mother    • Heart attack Father    • Sudden death Father         SCD   • Cancer Daughter 52        kidney   • No Known Problems Paternal Aunt        Physical Exam:     Vitals:   Blood Pressure: 118/54 (07/06/23 1303)  Pulse: (!) 54 (07/06/23 1303)  Temperature: 97.8 °F (36.6 °C) (07/06/23 1303)  Temp Source: Oral (07/06/23 0912)  Respirations: 16 (07/06/23 1100)  SpO2: 98 % (07/06/23 1303)    Physical Exam  Constitutional:       General: She is not in acute distress. Appearance: Normal appearance. She is not ill-appearing, toxic-appearing or diaphoretic. HENT:      Head: Normocephalic and atraumatic. Right Ear: External ear normal.      Left Ear: External ear normal.      Nose: Nose normal.      Mouth/Throat:      Mouth: Mucous membranes are moist.      Pharynx: No oropharyngeal exudate or posterior oropharyngeal erythema. Eyes:      General: No scleral icterus. Extraocular Movements: Extraocular movements intact. Conjunctiva/sclera: Conjunctivae normal.      Pupils: Pupils are equal, round, and reactive to light. Cardiovascular:      Rate and Rhythm: Normal rate and regular rhythm. Pulses: Normal pulses. Heart sounds: Normal heart sounds. No murmur heard. No friction rub. No gallop. Pulmonary:      Effort: Pulmonary effort is normal. No respiratory distress.       Breath sounds: Normal breath sounds. No wheezing or rales. Chest:      Chest wall: No tenderness. Abdominal:      General: Abdomen is flat. Bowel sounds are normal. There is no distension. Palpations: Abdomen is soft. Tenderness: There is no abdominal tenderness. Musculoskeletal:         General: No swelling or tenderness. Normal range of motion. Cervical back: Normal range of motion and neck supple. Right lower leg: No edema. Left lower leg: No edema. Skin:     General: Skin is warm and dry. Capillary Refill: Capillary refill takes less than 2 seconds. Coloration: Skin is not jaundiced. Findings: No bruising, erythema, lesion or rash. Neurological:      General: No focal deficit present. Mental Status: She is alert and oriented to person, place, and time. Cranial Nerves: No cranial nerve deficit. Sensory: No sensory deficit. Motor: No weakness. Psychiatric:         Mood and Affect: Mood normal.         Behavior: Behavior normal.       Additional Data:     Lab Results:     Results from last 7 days   Lab Units 07/06/23  0959   WBC Thousand/uL 6.46   HEMOGLOBIN g/dL 12.1   HEMATOCRIT % 36.6   PLATELETS Thousands/uL 221   NEUTROS PCT % 85*   LYMPHS PCT % 7*   MONOS PCT % 6   EOS PCT % 1     Results from last 7 days   Lab Units 07/06/23  1154 07/06/23  0959 07/05/23  1027   SODIUM mmol/L 129*   < > 127*   POTASSIUM mmol/L 3.8   < > 3.9   CHLORIDE mmol/L 98   < > 95*   CO2 mmol/L 24   < > 28   BUN mg/dL 14   < > 12   CREATININE mg/dL 0.53*   < > 0.57*   ANION GAP mmol/L 7   < > 4   CALCIUM mg/dL 8.5   < > 8.7   ALBUMIN g/dL  --   --  3.8   TOTAL BILIRUBIN mg/dL  --   --  0.39   ALK PHOS U/L  --   --  71   ALT U/L  --   --  35   AST U/L  --   --  43   GLUCOSE RANDOM mg/dL 91   < >  --     < > = values in this interval not displayed. Imaging:     CT head without contrast   Final Result by Romana Barkley MD (07/06 7041)      1.   No acute intracranial abnormality. 2.  Pituitary gland is prominent with a superior convex contour. MRI correlation can be considered to exclude a pituitary lesion. Workstation performed: [id number]             EKG, Pathology, and Other Studies Reviewed on Admission    Allscripts / Epic Records Reviewed: Yes    ** Please Note: This note has been constructed using a voice recognition system.  Boyd WHITE

## 2023-07-06 NOTE — H&P
[address] [facility]  H&P  Name: Luh Raza 68 y.o. female I MRN: [id number]  Unit/Bed#: S -01 I Date of Admission: 7/6/2023   Date of Service: 7/6/2023 I Hospital Day: 1      Assessment/Plan   * Hyponatremia  Assessment & Plan  · Patient presented to the hospital with generalized weakness; noted on outpatient labs to be 127 with repeat on arrival 127. Received 500 ml NS with rise in na to 129  · Based on osm studies she is hypo-osmolar   · Likely due to volume depletion and HCTZ (started June 13th)  · Appreciate nephrology input  · Trend BMP every 8 hours. Continue FR. Hold further IVF    Generalized weakness  Assessment & Plan  · Patient presented with generalized weakness, mild headache, nausea and mild confusion. Already noting some improvement  · Symptoms due to hyponatremia. See treatment above    Essential hypertension  Assessment & Plan  · Continue Cozaar and Lopressor  · Discontinue HCTZ due to hyponatremia  · Monitor blood pressure. Doubt she will need a substitute medication at this point but she does mention that as an outpatient she occasionally has situational spikes in her blood pressure with systolics in the 242-394 range    Coronary artery disease of native artery of native heart with stable angina pectoris (HCC)  Assessment & Plan  · Continue BB and statin    Arthritis  Assessment & Plan  · With knee pain, continue tylenol       VTE Pharmacologic Prophylaxis: VTE Score: 3  lovenox  Code Status: Level 1 - Full Code   Discussion with family: Patient declined call to . Anticipated Length of Stay: Patient will be admitted on an observation basis with an anticipated length of stay of less than 2 midnights secondary to mild hyponatremia.     Total Time Spent on Date of Encounter in care of patient: 55 minutes This time was spent on one or more of the following: performing physical exam; counseling and coordination of care; obtaining or reviewing history; documenting in the medical record; reviewing/ordering tests, medications or procedures; communicating with other healthcare professionals and discussing with patient's family/caregivers. Case discussed with my attending    Chief Complaint: Generalized weakness    History of Present Illness:  Rehana Monreal is a 68 y.o. female with a PMH of hypertension and coronary artery disease who presents with generalized weakness and abnormal outpatient labs. Patient was in her usual state of health until the last 2 days when she began having slight headache and nausea as well as significant sense of malaise and brain fog. Of note she was placed on HCTZ on June 13 by her cardiologist for concern of lack of optimal blood pressure control. She did get outpatient labs yesterday when she began feeling unwell and noted that her sodium level was low at 127 which prompted her to come to the hospital.  After receiving half a bag of normal saline she reports feeling at least 50% better, though she still has mild headache and nausea and feels wiped out like she has no energy. She has been ambulating independently in the room and ate lunch    Review of Systems:  Review of Systems   Constitutional: Positive for activity change and fatigue. Negative for chills, diaphoresis, fever and unexpected weight change. HENT: Negative for trouble swallowing. Respiratory: Negative for cough, choking, chest tightness, shortness of breath, wheezing and stridor. Cardiovascular: Negative for chest pain, palpitations and leg swelling (someimes has left leg swelling, but normal now). Gastrointestinal: Positive for nausea. Negative for abdominal distention, abdominal pain, anal bleeding, blood in stool, constipation, diarrhea, rectal pain and vomiting. Genitourinary: Negative for difficulty urinating and dysuria. Musculoskeletal: Positive for arthralgias (knee pain). Negative for back pain and gait problem.    Skin: Negative for color change, pallor, rash and wound. Superficial skin tears from thin skin   Neurological: Positive for weakness and headaches. Negative for seizures, syncope and numbness. Psychiatric/Behavioral: Positive for confusion and decreased concentration. Past Medical and Surgical History:   Past Medical History:   Diagnosis Date   • Colon polyp    • Coronary artery disease    • Effusion of left knee     Last assessed - 9/10/15   • History of transfusion    • Hyperlipidemia    • Hypertension    • Myocardial infarction [facility])    • Tick bite     Last assessed - 4/21/17       Past Surgical History:   Procedure Laterality Date   • APPENDECTOMY     • COLONOSCOPY     • [place] CORONARY ARTERY BYP W/VEIN & ARTERY GRAFT 4 VEIN N/A 1/27/2020    Procedure: CORONARY ARTERY BYPASS GRAFT (CABG) x3 VESSELS, LIMA TO LAD, AND SVG TO PLB & OM;  Surgeon: Heather Donis DO;  [facility];  Service: Cardiac Surgery   • [place] ECHO TRANSESOPHAG R-T 2D W/PRB IMG ACQUISJ I&R N/A 1/27/2020    Procedure: TRANSESOPHAGEAL ECHOCARDIOGRAM (GET); Surgeon: Heather Donis DO;  Location[facility];  Service: Cardiac Surgery   • [place] NDSC SURG W/VIDEO-ASSISTED HARVEST VEIN CABG Left 1/27/2020    Procedure: HARVEST VEIN ENDOSCOPIC ([address]); Surgeon: Heather Donis DO;  Location[facility];  Service: Cardiac Surgery   • TONSILLECTOMY      Last assessed - 4/20/17   • TUBAL LIGATION      Last assessed - 4/20/17   • WISDOM TOOTH EXTRACTION      Last assessed - 4/20/17       Meds/Allergies:  Prior to Admission medications    Medication Sig Start Date End Date Taking? Authorizing Provider   acetaminophen (TYLENOL) 650 mg CR tablet Take 650 mg by mouth daily as needed for mild pain    Historical Provider, MD   aspirin (ECOTRIN LOW STRENGTH) 81 mg EC tablet Take 1 tablet (81 mg total) by mouth daily 4/10/20   Erika Paul MD   Calcium Carb-Cholecalciferol 600-200 MG-UNIT TABS Calcium 600 + D TABS  TAKE 1 TABLET DAILY.    Refills: 0    Active    Historical Provider, MD   denosumab (PROLIA) 60 mg/mL Inject 60 mg under the skin once    Historical Provider, MD   guaifenesin-codeine (GUAIFENESIN AC) 100-10 MG/5ML liquid Take 5 mL by mouth 3 (three) times a day as needed for cough  Patient not taking: Reported on 3/15/2023 2/22/23   Sylvester Patel DO   hydrochlorothiazide (HYDRODIURIL) 25 mg tablet Take 1 tablet (25 mg total) by mouth daily 6/13/23   Jefe Murray MD   losartan (COZAAR) 100 MG tablet TAKE ONE TABLET BY MOUTH EVERY DAY 6/20/23   Jefe Murray MD   melatonin 3 mg Take 3 mg by mouth daily at bedtime    Historical Provider, MD   metoprolol tartrate (LOPRESSOR) 25 mg tablet TAKE ONE-HALF TABLET BY MOUTH EVERY 12 HOURS 6/20/23   Jefe Murray MD   rosuvastatin (CRESTOR) 40 MG tablet TAKE ONE TABLET BY MOUTH EVERY DAY 6/20/23   Jefe Murray MD   VITAMIN D, CHOLECALCIFEROL, PO Take 2,600 Units/day by mouth    Historical Provider, MD       Allergies: No Known Allergies    Social History:  Marital Status:    Occupation:   Patient Pre-hospital Living Situation: Home  Patient Pre-hospital Level of Mobility: walks  Patient Pre-hospital Diet Restrictions: does not use much salt  Substance Use History:   Social History     Substance and Sexual Activity   Alcohol Use Yes    Comment: 1 wine nightly     Social History     Tobacco Use   Smoking Status Never   Smokeless Tobacco Never     Social History     Substance and Sexual Activity   Drug Use No       Family History:  Non contributory    Physical Exam:     Vitals:   Blood Pressure: 118/54 (07/06/23 1303)  Pulse: (!) 54 (07/06/23 1303)  Temperature: 97.8 °F (36.6 °C) (07/06/23 1303)  Temp Source: Oral (07/06/23 0912)  Respirations: 16 (07/06/23 1100)  SpO2: 98 % (07/06/23 1303)    Physical Exam  Vitals reviewed. Constitutional:       General: She is not in acute distress. Appearance: Normal appearance. She is not ill-appearing, toxic-appearing or diaphoretic. HENT:      Head: Normocephalic.       Nose: No congestion or rhinorrhea. Mouth/Throat:      Mouth: Mucous membranes are moist.      Pharynx: Oropharynx is clear. No oropharyngeal exudate or posterior oropharyngeal erythema. Eyes:      General: No scleral icterus. Right eye: No discharge. Left eye: No discharge. Conjunctiva/sclera: Conjunctivae normal.   Cardiovascular:      Rate and Rhythm: Normal rate and regular rhythm. Heart sounds: No murmur heard. Pulmonary:      Effort: No respiratory distress. Breath sounds: Normal breath sounds. No stridor. No wheezing, rhonchi or rales. Abdominal:      General: There is no distension. Musculoskeletal:         General: No swelling, tenderness, deformity or signs of injury. Cervical back: No rigidity. Right lower leg: No edema. Left lower leg: No edema. Skin:     General: Skin is warm and dry. Coloration: Skin is not jaundiced or pale. Findings: No erythema, lesion or rash. Neurological:      General: No focal deficit present. Mental Status: She is alert. Mental status is at baseline. Comments: Awake alert interactive no confusion   Psychiatric:         Mood and Affect: Mood normal.         Thought Content:  Thought content normal.          Additional Data:     Lab Results:  Results from last 7 days   Lab Units 07/06/23  0959   WBC Thousand/uL 6.46   HEMOGLOBIN g/dL 12.1   HEMATOCRIT % 36.6   PLATELETS Thousands/uL 221   NEUTROS PCT % 85*   LYMPHS PCT % 7*   MONOS PCT % 6   EOS PCT % 1     Results from last 7 days   Lab Units 07/06/23  1154 07/06/23  0959 07/05/23  1027   SODIUM mmol/L 129*   < > 127*   POTASSIUM mmol/L 3.8   < > 3.9   CHLORIDE mmol/L 98   < > 95*   CO2 mmol/L 24   < > 28   BUN mg/dL 14   < > 12   CREATININE mg/dL 0.53*   < > 0.57*   ANION GAP mmol/L 7   < > 4   CALCIUM mg/dL 8.5   < > 8.7   ALBUMIN g/dL  --   --  3.8   TOTAL BILIRUBIN mg/dL  --   --  0.39   ALK PHOS U/L  --   --  71   ALT U/L  --   --  35   AST U/L  --   -- 43   GLUCOSE RANDOM mg/dL 91   < >  --     < > = values in this interval not displayed. Lines/Drains:  Invasive Devices     Peripheral Intravenous Line  Duration           Peripheral IV 07/06/23 Right Antecubital <1 day                    Imaging:  CT head without contrast   Final Result by Dora Garcia MD (07/06 1253)      1. No acute intracranial abnormality. 2.  Pituitary gland is prominent with a superior convex contour. MRI correlation can be considered to exclude a pituitary lesion. Workstation performed: [id number]             EKG and Other Studies Reviewed on Admission:   · EKG: NSR    ** Please Note: This note has been constructed using a voice recognition system.  **

## 2023-07-06 NOTE — ED PROVIDER NOTES
History  Chief Complaint   Patient presents with   • Weakness - Generalized     Pt c/o feeling weak, mindless, and nauseous. Reports her sodium level is low. Patient is a 66-year-old female presents to the emergency department relating that for the last 2 days she has felt "very weak."  She notes that "even showering was a big deal."  She has felt nauseous and over the last few weeks additionally experienced headaches. She has additionally appreciated "mind fog."  She does have an upcoming appointment with her physician and yesterday had lab work in anticipation of that. She noted that her sodium level was low at 127 and suspects that this may be contributing to her symptoms. She has not felt ill and denies having had any fevers, nasal congestion, sore throat or coughing. Weight has been stable. She has not had vomiting and notes that she was able to eat today. She has not had any problems with bowel movements and has not had any loose stools. Urination has been unremarkable without change in frequency, presence of dysuria, hematuria or malodor. She had problems with her right knee over the last couple of months which resulted in leg swelling. She notes that she did have 2 duplexes during that time and received treatment for the knee including injections. No DVT was identified and the swelling has nearly fully resolved. She does check her weight regularly and has not appreciated any changes in this. With regard to headaches they are primarily right-sided. She often notes some discomfort coming up from the neck and suspects that that triggers her headaches. These have been ongoing for the last few weeks. She does not appreciate any vision disturbance, weakness or paresthesias with them. Past medical history significant for hypertension, hyperlipidemia and CAD. She did see her cardiologist on June 13 and was initiated on hydrochlorothiazide for tighter blood pressure control.   She notes that her blood pressures have been running extremely well with systolics in the 863F since initiation of that medication. She has never been specifically treated for hyponatremia. She did have 1 level which was slightly low in March of this year(134). Sodiums prior to that were normal.          Prior to Admission Medications   Prescriptions Last Dose Informant Patient Reported? Taking? Calcium Carb-Cholecalciferol 600-200 MG-UNIT TABS  Self Yes No   Sig: Calcium 600 + D TABS  TAKE 1 TABLET DAILY.    Refills: 0    Active   VITAMIN D, CHOLECALCIFEROL, PO  Self Yes No   Sig: Take 2,600 Units/day by mouth   acetaminophen (TYLENOL) 650 mg CR tablet  Self Yes No   Sig: Take 650 mg by mouth daily as needed for mild pain   aspirin (ECOTRIN LOW STRENGTH) 81 mg EC tablet  Self No No   Sig: Take 1 tablet (81 mg total) by mouth daily   denosumab (PROLIA) 60 mg/mL  Self Yes No   Sig: Inject 60 mg under the skin once   guaifenesin-codeine (GUAIFENESIN AC) 100-10 MG/5ML liquid   No No   Sig: Take 5 mL by mouth 3 (three) times a day as needed for cough   Patient not taking: Reported on 3/15/2023   hydrochlorothiazide (HYDRODIURIL) 25 mg tablet   No No   Sig: Take 1 tablet (25 mg total) by mouth daily   losartan (COZAAR) 100 MG tablet   No No   Sig: TAKE ONE TABLET BY MOUTH EVERY DAY   melatonin 3 mg  Self Yes No   Sig: Take 3 mg by mouth daily at bedtime   metoprolol tartrate (LOPRESSOR) 25 mg tablet   No No   Sig: TAKE ONE-HALF TABLET BY MOUTH EVERY 12 HOURS   rosuvastatin (CRESTOR) 40 MG tablet   No No   Sig: TAKE ONE TABLET BY MOUTH EVERY DAY      Facility-Administered Medications: None       Past Medical History:   Diagnosis Date   • Colon polyp    • Coronary artery disease    • Effusion of left knee     Last assessed - 9/10/15   • History of transfusion    • Hyperlipidemia    • Hypertension    • Myocardial infarction [facility])    • Tick bite     Last assessed - 4/21/17       Past Surgical History:   Procedure Laterality Date   • APPENDECTOMY     • COLONOSCOPY     • NV CORONARY ARTERY BYP W/VEIN & ARTERY GRAFT 4 VEIN N/A 1/27/2020    Procedure: CORONARY ARTERY BYPASS GRAFT (CABG) x3 VESSELS, LIMA TO LAD, AND SVG TO PLB & OM;  Surgeon: Prudencio Feng DO;  Location: [facility];  Service: Cardiac Surgery   • NV ECHO TRANSESOPHAG R-T 2D W/PRB IMG ACQUISJ I&R N/A 1/27/2020    Procedure: TRANSESOPHAGEAL ECHOCARDIOGRAM (GET); Surgeon: Prudencio Feng DO;  Location: [facility];  Service: Cardiac Surgery   • NV NDSC SURG W/VIDEO-ASSISTED HARVEST VEIN CABG Left 1/27/2020    Procedure: HARVEST VEIN ENDOSCOPIC ([address]); Surgeon: Prudencio Feng DO;  Location: [facility];  Service: Cardiac Surgery   • TONSILLECTOMY      Last assessed - 4/20/17   • TUBAL LIGATION      Last assessed - 4/20/17   • WISDOM TOOTH EXTRACTION      Last assessed - 4/20/17       Family History   Problem Relation Age of Onset   • Coronary artery disease Mother    • Arthritis Mother    • Other Mother         Cardiac Disorder    • Transient ischemic attack Mother    • Heart attack Father    • Sudden death Father         SCD   • Cancer Daughter 52        kidney   • No Known Problems Paternal Aunt      I have reviewed and agree with the history as documented. E-Cigarette/Vaping   • E-Cigarette Use Never User      E-Cigarette/Vaping Substances   • Nicotine No    • THC No    • CBD No    • Flavoring No    • Other No    • Unknown No      Social History     Tobacco Use   • Smoking status: Never   • Smokeless tobacco: Never   Vaping Use   • Vaping Use: Never used   Substance Use Topics   • Alcohol use: Yes     Comment: 1 wine nightly   • Drug use: No       Review of Systems   All other systems reviewed and are negative. Physical Exam  Physical Exam  Vitals and nursing note reviewed. Constitutional:       Appearance: Normal appearance.       Comments: Appears mildly malaised   HENT:      Mouth/Throat:      Mouth: Mucous membranes are moist.   Eyes:      General: No scleral icterus. Extraocular Movements: Extraocular movements intact. Conjunctiva/sclera: Conjunctivae normal.      Pupils: Pupils are equal, round, and reactive to light. Cardiovascular:      Rate and Rhythm: Normal rate and regular rhythm. Heart sounds: Normal heart sounds. Pulmonary:      Effort: Pulmonary effort is normal.      Breath sounds: Normal breath sounds. Abdominal:      Palpations: Abdomen is soft. Tenderness: There is no abdominal tenderness. There is no right CVA tenderness or left CVA tenderness. Musculoskeletal:         General: No tenderness. Cervical back: Normal range of motion. Right lower leg: No edema. Left lower leg: No edema. Skin:     General: Skin is warm and dry. Neurological:      General: No focal deficit present. Mental Status: She is alert and oriented to person, place, and time. Comments: No facial asymmetry. Good upper and lower extremity strength with all maneuvers. No dysmetria. Sensation grossly intact in the extremities. Psychiatric:         Mood and Affect: Mood normal.         Behavior: Behavior normal.         Thought Content:  Thought content normal.         Vital Signs  ED Triage Vitals   Temperature Pulse Respirations Blood Pressure SpO2   07/06/23 0912 07/06/23 0911 07/06/23 0911 07/06/23 0911 07/06/23 0911   (!) 97.4 °F (36.3 °C) 58 16 132/60 99 %      Temp Source Heart Rate Source Patient Position - Orthostatic VS BP Location FiO2 (%)   07/06/23 0912 07/06/23 0911 07/06/23 0911 07/06/23 0911 --   Oral Monitor Sitting Right arm       Pain Score       07/06/23 0911       No Pain           Vitals:    07/06/23 1013 07/06/23 1100 07/06/23 1303 07/06/23 1530   BP: 121/57 119/61 118/54 (!) 105/49   Pulse: (!) 52 (!) 53 (!) 54 61   Patient Position - Orthostatic VS: Sitting Sitting  Lying         Visual Acuity      ED Medications  Medications   aspirin (ECOTRIN LOW STRENGTH) EC tablet 81 mg (81 mg Oral Not Given 7/6/23 1421) melatonin tablet 3 mg (has no administration in time range)   acetaminophen (TYLENOL) tablet 650 mg (has no administration in time range)   losartan (COZAAR) tablet 100 mg (100 mg Oral Not Given 7/6/23 1422)   metoprolol tartrate (LOPRESSOR) partial tablet 12.5 mg (12.5 mg Oral Not Given 7/6/23 1422)   calcium carbonate-vitamin D 500 mg-5 mcg tablet 1 tablet (has no administration in time range)   atorvastatin (LIPITOR) tablet 80 mg (80 mg Oral Given 7/6/23 1556)   cholecalciferol (VITAMIN D3) tablet 1,000 Units (1,000 Units Oral Not Given 7/6/23 1422)   enoxaparin (LOVENOX) subcutaneous injection 40 mg (has no administration in time range)   sodium chloride 0.9 % bolus 500 mL (0 mL Intravenous Stopped 7/6/23 1124)       Diagnostic Studies  Results Reviewed     Procedure Component Value Units Date/Time    Basic metabolic panel [[id number]]  (Abnormal) Collected: 07/06/23 1805    Lab Status: Final result Specimen: Blood from Arm, Left Updated: 07/06/23 1832     Sodium 132 mmol/L      Potassium 3.6 mmol/L      Chloride 99 mmol/L      CO2 26 mmol/L      ANION GAP 7 mmol/L      BUN 14 mg/dL      Creatinine 0.60 mg/dL      Glucose 92 mg/dL      Calcium 9.1 mg/dL      eGFR 88 ml/min/1.73sq m     Narrative:      [facility] guidelines for Chronic Kid[facility] Disease (CKD):   •  Stage 1 with normal or high GFR (GFR > 90 mL/min/1.73 square meters)  •  Stage 2 Mild CKD (GFR = 60-89 mL/min/1.73 square meters)  •  Stage 3A Moderate CKD (GFR = 45-59 mL/min/1.73 square meters)  •  Stage 3B Moderate CKD (GFR = 30-44 mL/min/1.73 square meters)  •  Stage 4 Severe CKD (GFR = 15-29 mL/min/1.73 square meters)  •  Stage 5 End Stage CKD (GFR <15 mL/min/1.73 square meters)  Note: GFR calculation is accurate only with a steady state creatinine    Basic metabolic panel [[id number]]  (Abnormal) Collected: 07/06/23 1154    Lab Status: Final result Specimen: Blood from Arm, Right Updated: 07/06/23 1223     Sodium 129 mmol/L Potassium 3.8 mmol/L      Chloride 98 mmol/L      CO2 24 mmol/L      ANION GAP 7 mmol/L      BUN 14 mg/dL      Creatinine 0.53 mg/dL      Glucose 91 mg/dL      Calcium 8.5 mg/dL      eGFR 91 ml/min/1.73sq m     Narrative:      [facility] ([facility]D):   •  Stage 1 with normal or high GFR (GFR > 90 mL/min/1.73 square meters)  •  Stage 2 Mild CKD (GFR = 60-89 mL/min/1.73 square meters)  •  Stage 3A Moderate CKD (GFR = 45-59 mL/min/1.73 square meters)  •  Stage 3B Moderate CKD (GFR = 30-44 mL/min/1.73 square meters)  •  Stage 4 Severe CKD (GFR = 15-29 mL/min/1.73 square meters)  •  Stage 5 End Stage CKD (GFR <15 mL/min/1.73 square meters)  Note: GFR calculation is accurate only with a steady state creatinine    Sodium, urine, random [[id number]] Collected: 07/06/23 1034    Lab Status: Final result Specimen: Urine, Clean Catch Updated: 07/06/23 1105     Sodium, Ur 18    Creatinine, urine, random [[id number]] Collected: 07/06/23 1034    Lab Status: Final result Specimen: Urine, Clean Catch Updated: 07/06/23 1105     Creatinine, Ur 49.0 mg/dL     Osmolality, urine [[id number]]  (Normal) Collected: 07/06/23 1034    Lab Status: Final result Specimen: Urine, Clean Catch Updated: 07/06/23 1100     Osmolality, Ur 270 mmol/KG     TSH, 3rd generation with Free T4 reflex [[id number]]  (Normal) Collected: 07/06/23 0959    Lab Status: Final result Specimen: Blood from Arm, Right Updated: 07/06/23 1042     TSH 3RD GENERATON 0.953 uIU/mL     HS Troponin 0hr (reflex protocol) [[id number]]  (Normal) Collected: 07/06/23 0959    Lab Status: Final result Specimen: Blood from Arm, Right Updated: 07/06/23 1033     hs TnI 0hr 2 ng/L     UA (URINE) with reflex to Scope [[id number]] Collected: 07/06/23 1014    Lab Status: Final result Specimen: Urine, Clean Catch Updated: 07/06/23 1031     Color, UA Light Yellow     Clarity, UA Clear     Specific Gravity, UA 1.010     pH, UA 7.0 Leukocytes, UA Negative     Nitrite, UA Negative     Protein, UA Negative mg/dl      Glucose, UA Negative mg/dl      Ketones, UA Negative mg/dl      Urobilinogen, UA <2.0 mg/dl      Bilirubin, UA Negative     Occult Blood, UA Negative    Osmolality-If this is regarding a toxic alcohol, STOP. Test is not routinely indicated. Please consult medical  on call for further guidance.  [[id number]]  (Abnormal) Collected: 07/06/23 0959    Lab Status: Final result Specimen: Blood from Arm, Right Updated: 07/06/23 1027     Osmolality Serum 270 mmol/KG     Basic metabolic panel [[id number]]  (Abnormal) Collected: 07/06/23 0959    Lab Status: Final result Specimen: Blood from Arm, Right Updated: 07/06/23 1026     Sodium 127 mmol/L      Potassium 3.5 mmol/L      Chloride 93 mmol/L      CO2 27 mmol/L      ANION GAP 7 mmol/L      BUN 16 mg/dL      Creatinine 0.62 mg/dL      Glucose 101 mg/dL      Calcium 8.7 mg/dL      eGFR 87 ml/min/1.73sq m     Narrative:      [facility] guidelines for Chronic Kidney Disease (CKD):   •  Stage 1 with normal or high GFR (GFR > 90 mL/min/1.73 square meters)  •  Stage 2 Mild CKD (GFR = 60-89 mL/min/1.73 square meters)  •  Stage 3A Moderate CKD (GFR = 45-59 mL/min/1.73 square meters)  •  Stage 3B Moderate CKD (GFR = 30-44 mL/min/1.73 square meters)  •  Stage 4 Severe CKD (GFR = 15-29 mL/min/1.73 square meters)  •  Stage 5 End Stage CKD (GFR <15 mL/min/1.73 square meters)  Note: GFR calculation is accurate only with a steady state creatinine    CBC and differential [[id number]]  (Abnormal) Collected: 07/06/23 0959    Lab Status: Final result Specimen: Blood from Arm, Right Updated: 07/06/23 1010     WBC 6.46 Thousand/uL      RBC 3.80 Million/uL      Hemoglobin 12.1 g/dL      Hematocrit 36.6 %      MCV 96 fL      MCH 31.8 pg      MCHC 33.1 g/dL      RDW 13.3 %      MPV 11.0 fL      Platelets 130 Thousands/uL      nRBC 0 /100 WBCs      Neutrophils Relative 85 %      Immat GRANS % 0 %      Lymphocytes Relative 7 %      Monocytes Relative 6 %      Eosinophils Relative 1 %      Basophils Relative 1 %      Neutrophils Absolute 5.50 Thousands/µL      Immature Grans Absolute 0.02 Thousand/uL      Lymphocytes Absolute 0.46 Thousands/µL      Monocytes Absolute 0.41 Thousand/µL      Eosinophils Absolute 0.04 Thousand/µL      Basophils Absolute 0.03 Thousands/µL                  CT head without contrast   Final Result by Ольга Peck MD (07/06 1253)      1. No acute intracranial abnormality. 2.  Pituitary gland is prominent with a superior convex contour. MRI correlation can be considered to exclude a pituitary lesion. Workstation performed: [id number]                    Procedures  ECG 12 Lead Documentation Only    Date/Time: 7/6/2023 1:26 PM    Performed by: Emily Lange MD  Authorized by: Emily Lange MD    ECG reviewed by me, the ED Provider: yes    Patient location:  ED  Previous ECG:     Previous ECG:  Compared to current    Comparison ECG info:  October 6, 2020-similar morphology. Rate slightly higher on prior at 64. T wave inversion previously present in aVL and V2.   Rate:     ECG rate:  52    ECG rate assessment: bradycardic    Rhythm:     Rhythm: sinus bradycardia    Ectopy:     Ectopy: none    QRS:     QRS axis:  Normal    QRS intervals:  Normal  Conduction:     Conduction: normal    ST segments:     ST segments:  Normal  T waves:     T waves: flattening      Flattening:  AVL and V3    CriticalCare Time    Date/Time: 7/6/2023 7:16 PM    Performed by: Emily Lange MD  Authorized by: Emily Lange MD    Critical care provider statement:     Critical care time (minutes):  30    Critical care was necessary to treat or prevent imminent or life-threatening deterioration of the following conditions:  CNS failure or compromise and metabolic crisis    Critical care was time spent personally by me on the following activities:  Obtaining history from patient or surrogate, examination of patient, ordering and performing treatments and interventions, ordering and review of laboratory studies, ordering and review of radiographic studies, review of old charts, re-evaluation of patient's condition, evaluation of patient's response to treatment, development of treatment plan with patient or surrogate and discussions with consultants             ED Course  ED Course as of 07/06/23 1917   Thu Jul 06, 2023   1002 Patient's symptoms likely attributable to hyponatremia. Alternate etiologies include but are not limited to dehydration alone, ACS, hypothyroidism, infection, intracranial neoplasm. Suspect that hyponatremia is resultant from recent thiazide initiation. Alternately must consider SIADH. Given presence of headaches will scan to assess for any intracranial abnormality/mass. With chloride additionally slightly low do have concern for mild dehydration. Will administer bolus of 500 mL saline. As hyponatremia is acute for patient and she is asymptomatic anticipate admission. 1038 Osmolality is low-fitting with hyponatremia secondary to SIADH and/or thiazide use. 1110 Sodium unchanged from yesterday. Chloride slightly lower. Remainder of labs unremarkable. CT scan of the head is pending. We will plan to recheck BMP following bolus of saline     Reviewed case via [place] connect with Dr. Colette Dias. Subsequently reached out to nephrology to coordinate care.                                         MDM    Disposition  Final diagnoses:   Hyponatremia   Medication management     Time reflects when diagnosis was documented in both MDM as applicable and the Disposition within this note     Time User Action Codes Description Comment    7/6/2023 12:05 PM Faby STAHL Add [E87.1] Hyponatremia     7/6/2023  7:17 PM Faby Santoyo [Z79.899] Medication management       ED Disposition     ED Disposition   Admit    Condition   Stable    Date/Time   Thu Jul 6, 2023 12:05 PM    Comment   Case was discussed with Dr. Devin Hassan and the patient's admission status was agreed to be Admission Status: inpatient status to the service of Dr. Devin Hassan . Follow-up Information     Follow up With Specialties Details Why [address], DO Family Medicine Follow up  [address]  [address] [address] [phone]            Current Discharge Medication List      CONTINUE these medications which have NOT CHANGED    Details   acetaminophen (TYLENOL) 650 mg CR tablet Take 650 mg by mouth daily as needed for mild pain      aspirin (ECOTRIN LOW STRENGTH) 81 mg EC tablet Take 1 tablet (81 mg total) by mouth daily    Associated Diagnoses: S/P CABG x 3      Calcium Carb-Cholecalciferol 600-200 MG-UNIT TABS Calcium 600 + D TABS  TAKE 1 TABLET DAILY.    Refills: 0    Active      denosumab (PROLIA) 60 mg/mL Inject 60 mg under the skin once      guaifenesin-codeine (GUAIFENESIN AC) 100-10 MG/5ML liquid Take 5 mL by mouth 3 (three) times a day as needed for cough  Qty: 180 mL, Refills: 0    Associated Diagnoses: COVID-19      hydrochlorothiazide (HYDRODIURIL) 25 mg tablet Take 1 tablet (25 mg total) by mouth daily  Qty: 90 tablet, Refills: 3    Associated Diagnoses: Essential hypertension      losartan (COZAAR) 100 MG tablet TAKE ONE TABLET BY MOUTH EVERY DAY  Qty: 7 tablet, Refills: 0    Associated Diagnoses: Essential hypertension      melatonin 3 mg Take 3 mg by mouth daily at bedtime      metoprolol tartrate (LOPRESSOR) 25 mg tablet TAKE ONE-HALF TABLET BY MOUTH EVERY 12 HOURS  Qty: 7 tablet, Refills: 0    Associated Diagnoses: S/P CABG x 3      rosuvastatin (CRESTOR) 40 MG tablet TAKE ONE TABLET BY MOUTH EVERY DAY  Qty: 90 tablet, Refills: 3    Associated Diagnoses: Coronary artery disease of native artery of native heart with stable angina pectoris (HCC)      VITAMIN D, CHOLECALCIFEROL, PO Take 2,600 Units/day by mouth             No discharge procedures on file.     PDMP Review       Value Time User    PDMP Reviewed  Yes 2/22/2023  3:17 PM Bianca Matter, DO          ED Provider  Electronically Signed by           Simran Taylor MD  07/06/23 5306

## 2023-07-06 NOTE — ASSESSMENT & PLAN NOTE
· Patient presented with generalized weakness, mild headache, nausea and mild confusion. Already noting some improvement  · Symptoms due to hyponatremia.   See treatment above

## 2023-07-06 NOTE — ASSESSMENT & PLAN NOTE
· Continue Cozaar and Lopressor  · Discontinue HCTZ due to hyponatremia  · Monitor blood pressure.   Doubt she will need a substitute medication at this point but she does mention that as an outpatient she occasionally has situational spikes in her blood pressure with systolics in the 942-046 range

## 2023-07-06 NOTE — ASSESSMENT & PLAN NOTE
· Patient presented to the hospital with generalized weakness; noted on outpatient labs to be 127 with repeat on arrival 127. Received 500 ml NS with rise in na to 129  · Based on osm studies she is hypo-osmolar   · Likely due to volume depletion and HCTZ (started June 13th)  · Appreciate nephrology input  · Trend BMP every 8 hours. Continue FR.  Hold further IVF

## 2023-07-07 ENCOUNTER — TELEPHONE (OUTPATIENT)
Dept: NEPHROLOGY | Facility: CLINIC | Age: 78
End: 2023-07-07

## 2023-07-07 ENCOUNTER — TRANSITIONAL CARE MANAGEMENT (OUTPATIENT)
Dept: FAMILY MEDICINE CLINIC | Facility: CLINIC | Age: 78
End: 2023-07-07

## 2023-07-07 VITALS
SYSTOLIC BLOOD PRESSURE: 109 MMHG | OXYGEN SATURATION: 96 % | TEMPERATURE: 98 F | HEART RATE: 64 BPM | RESPIRATION RATE: 18 BRPM | DIASTOLIC BLOOD PRESSURE: 50 MMHG

## 2023-07-07 DIAGNOSIS — E87.1 HYPONATREMIA: Primary | ICD-10-CM

## 2023-07-07 PROBLEM — R53.1 GENERALIZED WEAKNESS: Status: RESOLVED | Noted: 2023-07-06 | Resolved: 2023-07-07

## 2023-07-07 PROBLEM — R93.0 ABNORMAL HEAD CT: Status: ACTIVE | Noted: 2023-07-07

## 2023-07-07 LAB
ANION GAP SERPL CALCULATED.3IONS-SCNC: 6 MMOL/L
ANION GAP SERPL CALCULATED.3IONS-SCNC: 7 MMOL/L
BUN SERPL-MCNC: 13 MG/DL (ref 5–25)
BUN SERPL-MCNC: 14 MG/DL (ref 5–25)
CALCIUM SERPL-MCNC: 8.4 MG/DL (ref 8.4–10.2)
CALCIUM SERPL-MCNC: 8.5 MG/DL (ref 8.4–10.2)
CHLORIDE SERPL-SCNC: 101 MMOL/L (ref 96–108)
CHLORIDE SERPL-SCNC: 102 MMOL/L (ref 96–108)
CO2 SERPL-SCNC: 24 MMOL/L (ref 21–32)
CO2 SERPL-SCNC: 24 MMOL/L (ref 21–32)
CORTIS AM PEAK SERPL-MCNC: 8.9 UG/DL (ref 6.7–22.6)
CREAT SERPL-MCNC: 0.54 MG/DL (ref 0.6–1.3)
CREAT SERPL-MCNC: 0.59 MG/DL (ref 0.6–1.3)
GFR SERPL CREATININE-BSD FRML MDRD: 88 ML/MIN/1.73SQ M
GFR SERPL CREATININE-BSD FRML MDRD: 91 ML/MIN/1.73SQ M
GLUCOSE P FAST SERPL-MCNC: 130 MG/DL (ref 65–99)
GLUCOSE SERPL-MCNC: 130 MG/DL (ref 65–140)
GLUCOSE SERPL-MCNC: 92 MG/DL (ref 65–140)
POTASSIUM SERPL-SCNC: 3.7 MMOL/L (ref 3.5–5.3)
POTASSIUM SERPL-SCNC: 3.7 MMOL/L (ref 3.5–5.3)
SODIUM SERPL-SCNC: 131 MMOL/L (ref 135–147)
SODIUM SERPL-SCNC: 133 MMOL/L (ref 135–147)
URATE SERPL-MCNC: 3.3 MG/DL (ref 2–7.5)

## 2023-07-07 PROCEDURE — 99232 SBSQ HOSP IP/OBS MODERATE 35: CPT | Performed by: INTERNAL MEDICINE

## 2023-07-07 PROCEDURE — 99239 HOSP IP/OBS DSCHRG MGMT >30: CPT | Performed by: PHYSICIAN ASSISTANT

## 2023-07-07 PROCEDURE — 84550 ASSAY OF BLOOD/URIC ACID: CPT | Performed by: PHYSICIAN ASSISTANT

## 2023-07-07 PROCEDURE — 80048 BASIC METABOLIC PNL TOTAL CA: CPT | Performed by: PHYSICIAN ASSISTANT

## 2023-07-07 PROCEDURE — 82533 TOTAL CORTISOL: CPT | Performed by: PHYSICIAN ASSISTANT

## 2023-07-07 RX ADMIN — Medication 12.5 MG: at 09:08

## 2023-07-07 RX ADMIN — Medication 1 TABLET: at 08:32

## 2023-07-07 RX ADMIN — Medication 1000 UNITS: at 08:32

## 2023-07-07 RX ADMIN — ASPIRIN 81 MG: 81 TABLET, COATED ORAL at 08:32

## 2023-07-07 RX ADMIN — LOSARTAN POTASSIUM 100 MG: 50 TABLET, FILM COATED ORAL at 08:32

## 2023-07-07 NOTE — PLAN OF CARE
Problem: PAIN - ADULT  Goal: Verbalizes/displays adequate comfort level or baseline comfort level  Description: Interventions:  - Encourage patient to monitor pain and request assistance  - Assess pain using appropriate pain scale  - Administer analgesics based on type and severity of pain and evaluate response  - Implement non-pharmacological measures as appropriate and evaluate response  - Consider cultural and social influences on pain and pain management  - Notify physician/advanced practitioner if interventions unsuccessful or patient reports new pain  Outcome: Progressing     Problem: DISCHARGE PLANNING  Goal: Discharge to home or other facility with appropriate resources  Description: INTERVENTIONS:  - Identify barriers to discharge w/patient and caregiver  - Arrange for needed discharge resources and transportation as appropriate  - Identify discharge learning needs (meds, wound care, etc.)  - Arrange for interpretive services to assist at discharge as needed  - Refer to Case Management Department for coordinating discharge planning if the patient needs post-hospital services based on physician/advanced practitioner order or complex needs related to functional status, cognitive ability, or social support system  Outcome: Progressing     Problem: METABOLIC, FLUID AND ELECTROLYTES - ADULT  Goal: Electrolytes maintained within normal limits  Description: INTERVENTIONS:  - Monitor labs and assess patient for signs and symptoms of electrolyte imbalances  - Administer electrolyte replacement as ordered  - Monitor response to electrolyte replacements, including repeat lab results as appropriate  - Instruct patient on fluid and nutrition as appropriate  Outcome: Progressing     Problem: METABOLIC, FLUID AND ELECTROLYTES - ADULT  Goal: Electrolytes maintained within normal limits  Description: INTERVENTIONS:  - Monitor labs and assess patient for signs and symptoms of electrolyte imbalances  - Administer electrolyte replacement as ordered  - Monitor response to electrolyte replacements, including repeat lab results as appropriate  - Instruct patient on fluid and nutrition as appropriate  Outcome: Progressing

## 2023-07-07 NOTE — APP STUDENT NOTE
LAURA STUDENT  Inpatient Progress Note for TRAINING ONLY  Not Part of Legal Medical Record     Progress Note - Jenny Crouch 68 y.o. female MRN: [id number]  Unit/Bed#: S -01 Encounter: [id number]    Hyponatremia  Assessment & Plan  • Patient presented to the ED with generalized fatigue, weakness, nausea, and headache. Per outpatient labs her sodium was 127, and 127 again in ED. • Received 500 mL normal saline with initial rise in sodium to 129, and now 133. • Hypo-osmolar based on osm studies, likely due to volume depletion and HCTZ she started June 13 per cardiology  • Nephrology consulted   • BMP completed this AM   • Continue with fluid restriction 1800mL, hold further IVF     Generalized weakness  Assessment & Plan  • Patient presented to ED with weakness x 3 days, which was attributed to hyponatremia   • Patient notes 100% improvement in symptoms this AM and feels ready for discharge     Essential hypertension  Assessment & Plan   • Patient's BPs currently stable in hospital (105-132)/(49-64) 116/53  • Patient noted occasional situational spikes with systolics in 252-313 range, which is why she was placed on HCTZ  • Continue Cozaar and Lopressor   • Discontinued HCTZ due to hyponatremia  • Continue monitoring BP at home, follow up with cardiologist if replacement agent needed. Coronary artery disease of native artery of native heart with stable angina pectoris [facility])  Assessment & Plan  • Continue Lopressor and atorvastatin     Arthritis  Assessment & Plan  • Continue acetaminophen for arthritis pain in the knees    Incidental Finding (prominent pituitary gland on CT scan)  Assessment & Plan  • Reviewed findings with patient on CT scan  • Patient education provided to follow up with PCP regarding prominent pituitary gland findings, and that an MRI would be indicated. • Red flags reviewed such as nipple discharge and vision changes.        VTE Pharmacologic Prophylaxis:   Pharmacologic: Enoxaparin (Lovenox)    Education and Discussions with Family / Patient:     Time Spent for Care: 30 minutes. More than 50% of total time spent on counseling and coordination of care as described above. Current Length of Stay: 1 day(s)    Current Patient Status: Observation     Discharge Plan: Plan for discharge today as Na improved to 133. Follow up with PCP in one week. Continue to monitor BP at home, follow with cardiologist post discharge. Code Status: Level 1 - Full Code    Subjective:   Patient is a 67 yo female with PMH of HTN and CAD who presented to ED with hyponatremia (Na 127 on arrival). She is seen on hospital day 1 after receiving 500mL NS and fluid restrictions with Na improvement to 133. Patient denies new symptoms of fevers, chills, chest pain, palpitations, LE edema, shortness of breath, abdominal pain, nausea, vomiting, fatigue, weakness, or headache. Patient states she is no longer in a brain fog and she feels 100% better. She feels ready for discharge today. Objective:     Vitals:   Temp (24hrs), Av.2 °F (36.8 °C), Min:97.8 °F (36.6 °C), Max:98.6 °F (37 °C)    Temp:  [97.8 °F (36.6 °C)-98.6 °F (37 °C)] 98 °F (36.7 °C)  HR:  [52-64] 64  Resp:  [16-18] 18  BP: (105-130)/(49-64) 109/50  SpO2:  [96 %-100 %] 96 %  There is no height or weight on file to calculate BMI. Input and Output Summary (last 24 hours): Intake/Output Summary (Last 24 hours) at 2023 0945  Last data filed at 2023 1124  Gross per 24 hour   Intake 500 ml   Output --   Net 500 ml       Physical Exam:     Physical Exam  Vitals reviewed. Constitutional:       General: She is not in acute distress. Appearance: Normal appearance. She is not ill-appearing, toxic-appearing or diaphoretic. HENT:      Head: Normocephalic and atraumatic. Nose: Nose normal.      Mouth/Throat:      Mouth: Mucous membranes are moist.   Eyes:      Extraocular Movements: Extraocular movements intact.       Conjunctiva/sclera: Conjunctivae normal.   Cardiovascular:      Rate and Rhythm: Normal rate and regular rhythm. Pulses: Normal pulses. Heart sounds: Normal heart sounds. No murmur heard. No friction rub. No gallop. Pulmonary:      Effort: Pulmonary effort is normal. No respiratory distress. Breath sounds: Normal breath sounds. No wheezing, rhonchi or rales. Abdominal:      General: Abdomen is flat. Bowel sounds are normal. There is no distension. Palpations: Abdomen is soft. Tenderness: There is no abdominal tenderness. Musculoskeletal:         General: No swelling. Normal range of motion. Right lower leg: No edema. Left lower leg: No edema. Skin:     General: Skin is warm and dry. Capillary Refill: Capillary refill takes less than 2 seconds. Neurological:      General: No focal deficit present. Mental Status: She is alert. Mental status is at baseline. Motor: No weakness. Gait: Gait normal.   Psychiatric:         Mood and Affect: Mood normal.         Behavior: Behavior normal.       Historical Information   Past Medical History:   Diagnosis Date   • Colon polyp    • Coronary artery disease    • Effusion of left knee     Last assessed - 9/10/15   • History of transfusion    • Hyperlipidemia    • Hypertension    • Myocardial infarction [facility])    • Tick bite     Last assessed - 4/21/17     Past Surgical History:   Procedure Laterality Date   • APPENDECTOMY     • COLONOSCOPY     • [place] CORONARY ARTERY BYP W/VEIN & ARTERY GRAFT 4 VEIN N/A 1/27/2020    Procedure: CORONARY ARTERY BYPASS GRAFT (CABG) x3 VESSELS, LIMA TO LAD, AND SVG TO PLB & OM;  Surgeon: Promise Valenzuela DO;  Location: [facility];  Service: Cardiac Surgery   • [place] ECHO TRANSESOPHAG R-T 2D W/PRB IMG ACQUISJ I&R N/A 1/27/2020    Procedure: TRANSESOPHAGEAL ECHOCARDIOGRAM (GET);   Surgeon: Promise Valenzuela DO;  Location: [facility];  Service: Cardiac Surgery   • [place] NDSC SURG W/VIDEO-ASSISTED HARVEST VEIN CABG Left 1/27/2020    Procedure: HARVEST VEIN ENDOSCOPIC (EVH); Surgeon: Alvin Quintanilla DO;  Location: [facility];  Service: Cardiac Surgery   • TONSILLECTOMY      Last assessed - 4/20/17   • TUBAL LIGATION      Last assessed - 4/20/17   • WISDOM TOOTH EXTRACTION      Last assessed - 4/20/17     Social History   Social History     Substance and Sexual Activity   Alcohol Use Yes    Comment: 1 wine nightly     Social History     Substance and Sexual Activity   Drug Use No     Social History     Tobacco Use   Smoking Status Never   Smokeless Tobacco Never     Family History:   Family History   Problem Relation Age of Onset   • Coronary artery disease Mother    • Arthritis Mother    • Other Mother         Cardiac Disorder    • Transient ischemic attack Mother    • Heart attack Father    • Sudden death Father         SCD   • Cancer Daughter 52        kidney   • No Known Problems Paternal Aunt        Meds/Allergies   all medications and allergies reviewed  No Known Allergies    Additional Data:     Labs:    Results from last 7 days   Lab Units 07/06/23  1805 07/06/23  0959   WBC Thousand/uL  --  6.46   HEMOGLOBIN g/dL  --  12.1   HEMATOCRIT %  --  36.6   PLATELETS Thousands/uL 221 221   NEUTROS PCT %  --  85*   LYMPHS PCT %  --  7*   MONOS PCT %  --  6   EOS PCT %  --  1     Results from last 7 days   Lab Units 07/07/23  0817 07/06/23  0959 07/05/23  1027   SODIUM mmol/L 133*   < > 127*   POTASSIUM mmol/L 3.7   < > 3.9   CHLORIDE mmol/L 102   < > 95*   CO2 mmol/L 24   < > 28   BUN mg/dL 13   < > 12   CREATININE mg/dL 0.54*   < > 0.57*   ANION GAP mmol/L 7   < > 4   CALCIUM mg/dL 8.5   < > 8.7   ALBUMIN g/dL  --   --  3.8   TOTAL BILIRUBIN mg/dL  --   --  0.39   ALK PHOS U/L  --   --  71   ALT U/L  --   --  35   AST U/L  --   --  43   GLUCOSE RANDOM mg/dL 130   < >  --     < > = values in this interval not displayed. * I Have Reviewed All Lab Data Listed Above. * Additional Pertinent Lab Tests Reviewed:  All Labs For Current Hospital Admission Reviewed    Last 24 Hours Medication List:   Current Facility-Administered Medications   Medication Dose Route Frequency Provider Last Rate   • acetaminophen  650 mg Oral Q6H PRN Karuna Wade PA-C     • aspirin  81 mg Oral Daily Karuna Wade PA-C     • atorvastatin  80 mg Oral Daily With [employer], PA-C     • calcium carbonate-vitamin D  1 tablet Oral Daily With Breakfast Karuna Wade PA-C     • cholecalciferol  1,000 Units Oral Daily Karuna Wade PA-C     • enoxaparin  40 mg Subcutaneous Daily Karuna Wade PA-C     • losartan  100 mg Oral Daily Karuna Wade PA-C     • melatonin  3 mg Oral HS Karuna Wade PA-C     • metoprolol tartrate  12.5 mg Oral Q12H Karuna Wade PA-C          Today, Patient Was Seen By: Ema Emerson    ** Please Note: Dictation voice to text software may have been used in the creation of this document.  **

## 2023-07-07 NOTE — ASSESSMENT & PLAN NOTE
· Head CT incidentally showed "pituitary gland is prominent with a superior convex contour.  MRI correlation can be considered to exclude a pituitary lesion."  · This was discussed directly with the patient who exhibits no concerning findings at this time

## 2023-07-07 NOTE — TELEPHONE ENCOUNTER
BMP in system    ----- Message from [address] sent at 7/7/2023 10:12 AM EDT -----  Patient was seen during hospitalization at [facility] for management of hyponatremia. Please arrange for BMP in approximately 3 to 5 days after discharge. Follow-up, nonurgent.

## 2023-07-07 NOTE — INCIDENTAL FINDINGS
Head CT   Finding: Prominent pituitary gland   Follow up required: MRI with attention to pituitary   This can be arranged by family doctor

## 2023-07-07 NOTE — DISCHARGE SUMMARY
[facility]  Discharge- Shantanu Berman 1945, 68 y.o. female MRN: [id number]  Unit/Bed#: S -01 Encounter: [id number]  Primary Care Provider: Monica Burnett DO   Date and time admitted to hospital: 7/6/2023  9:08 AM    * Hyponatremia  Assessment & Plan  · Patient presented to the hospital with generalized weakness; noted on outpatient labs to be 127 with repeat on arrival 127. Received only 500 ml NS with rise in na to 129. After additional observation overnight on fluid restriction she improved to 133  · Based on osm studies she is hypo-osmolar   · Likely due to volume depletion and HCTZ (started June 13th)  · Appreciate nephrology input      Essential hypertension  Assessment & Plan  · Continue Cozaar and Lopressor  · Discontinued HCTZ due to hyponatremia  · Monitor blood pressure. Doubt she will need a substitute medication at this point given that her blood pressures have been well controlled here, but she does mention that as an outpatient she occasionally has situational spikes in her blood pressure with systolics in the 388-102 range    Generalized weakness-resolved as of 7/7/2023  Assessment & Plan  · Patient presented with generalized weakness, mild headache, nausea and mild confusion. Symptoms due to hyponatremia. · Resolved    Abnormal head CT  Assessment & Plan  · Head CT incidentally showed "pituitary gland is prominent with a superior convex contour.  MRI correlation can be considered to exclude a pituitary lesion."  · This was discussed directly with the patient who exhibits no concerning findings at this time    Coronary artery disease of native artery of native heart with stable angina pectoris (HCC)  Assessment & Plan  · Continue BB and statin    Arthritis  Assessment & Plan  · With knee pain, continue tylenol      Medical Problems     Resolved Problems  Date Reviewed: 7/7/2023          Resolved    Generalized weakness 7/7/2023     Resolved by  Janie Manjarrez FRANDY        Discharging Physician / Practitioner: Rosa Elena Zepeda PA-C  PCP: Bianca Loya DO  Admission Date:   Admission Orders (From admission, onward)     Ordered        07/06/23 1429  Place in Observation  Once            07/06/23 1207  INPATIENT ADMISSION  Once                      Discharge Date: 07/07/23    Consultations During Hospital Stay:  · Nephrology    Procedures Performed:   · None    Significant Findings / Test Results:   · As above    Incidental Findings:   · Pituitary prominence discussed with patient    Test Results Pending at Discharge (will require follow up):   · none     Outpatient Tests Requested:  · Bmp  · MRI brain    Complications:  none    Reason for Admission: Generalized weakness    Hospital Course:   Justin Sanchez is a 68 y.o. female patient who originally presented to the hospital on 7/6/2023 due to generalized weakness, nausea, and mental fogginess. As an outpatient she had routine labs which did reveal a low sodium 127. On arrival to the ER her sodium level was also 127. It was noted that in the last couple weeks she had just been put on HCTZ for her blood pressure control and this was felt to be the culprit. She was seen by nephrology. She was given 500 mL of normal saline and observed with serial BMPs on a fluid restriction. Her sodium improved to 133 and her symptoms completely resolved    Please see above list of diagnoses and related plan for additional information. Condition at Discharge: stable    Discharge Day Visit / Exam:   Subjective: Patient reports she feels back to normal aside from the fact that she was not really allowed to sleep much last night due to interruptions. She denies any headache, nausea, confusion, visual changes, weakness or fatigue.   She has been ambulating independently without difficulty  Vitals: Blood Pressure: 109/50 (07/07/23 0915)  Pulse: 64 (07/07/23 0915)  Temperature: 98 °F (36.7 °C) (07/07/23 0915)  Temp Source: Oral (07/06/23 2218)  Respirations: 18 (07/07/23 0915)  SpO2: 96 % (07/07/23 0915)  Exam:   Physical Exam  Vitals reviewed. Constitutional:       General: She is not in acute distress. Appearance: Normal appearance. She is not ill-appearing, toxic-appearing or diaphoretic. HENT:      Head: Normocephalic. Eyes:      General: No scleral icterus. Right eye: No discharge. Conjunctiva/sclera: Conjunctivae normal.   Cardiovascular:      Rate and Rhythm: Normal rate and regular rhythm. Heart sounds: No murmur heard. Pulmonary:      Effort: No respiratory distress. Breath sounds: Normal breath sounds. No stridor. No wheezing, rhonchi or rales. Abdominal:      General: There is no distension. Tenderness: There is no abdominal tenderness. There is no guarding. Musculoskeletal:      Right lower leg: No edema. Left lower leg: No edema. Skin:     General: Skin is warm and dry. Coloration: Skin is not jaundiced or pale. Findings: No erythema, lesion or rash. Neurological:      General: No focal deficit present. Mental Status: She is alert. Mental status is at baseline. Comments: No confusion   Psychiatric:         Mood and Affect: Mood normal.         Thought Content: Thought content normal.          Discussion with Family: Patient declined call to . Discharge instructions/Information to patient and family:   See after visit summary for information provided to patient and family. Provisions for Follow-Up Care:  See after visit summary for information related to follow-up care and any pertinent home health orders. Disposition:   home    Planned Readmission: none     Discharge Statement:  I spent 30 minutes discharging the patient. This time was spent on the day of discharge. I had direct contact with the patient on the day of discharge.  Greater than 50% of the total time was spent examining patient, answering all patient questions, arranging and discussing plan of care with patient as well as directly providing post-discharge instructions. Additional time then spent on discharge activities. Case was discussed with nursing and nephrology    Discharge Medications:  See after visit summary for reconciled discharge medications provided to patient and/or family.       **Please Note: This note may have been constructed using a voice recognition system**

## 2023-07-07 NOTE — ASSESSMENT & PLAN NOTE
· Continue Cozaar and Lopressor  · Discontinued HCTZ due to hyponatremia  · Monitor blood pressure.   Doubt she will need a substitute medication at this point given that her blood pressures have been well controlled here, but she does mention that as an outpatient she occasionally has situational spikes in her blood pressure with systolics in the 634-864 range

## 2023-07-07 NOTE — PLAN OF CARE
Problem: METABOLIC, FLUID AND ELECTROLYTES - ADULT  Goal: Electrolytes maintained within normal limits  Description: INTERVENTIONS:  - Monitor labs and assess patient for signs and symptoms of electrolyte imbalances  - Administer electrolyte replacement as ordered  - Monitor response to electrolyte replacements, including repeat lab results as appropriate  - Instruct patient on fluid and nutrition as appropriate  Outcome: Progressing     Problem: PAIN - ADULT  Goal: Verbalizes/displays adequate comfort level or baseline comfort level  Description: Interventions:  - Encourage patient to monitor pain and request assistance  - Assess pain using appropriate pain scale  - Administer analgesics based on type and severity of pain and evaluate response  - Implement non-pharmacological measures as appropriate and evaluate response  - Consider cultural and social influences on pain and pain management  - Notify physician/advanced practitioner if interventions unsuccessful or patient reports new pain  Outcome: Progressing     Problem: DISCHARGE PLANNING  Goal: Discharge to home or other facility with appropriate resources  Description: INTERVENTIONS:  - Identify barriers to discharge w/patient and caregiver  - Arrange for needed discharge resources and transportation as appropriate  - Identify discharge learning needs (meds, wound care, etc.)  - Arrange for interpretive services to assist at discharge as needed  - Refer to Case Management Department for coordinating discharge planning if the patient needs post-hospital services based on physician/advanced practitioner order or complex needs related to functional status, cognitive ability, or social support system  Outcome: Progressing

## 2023-07-07 NOTE — ASSESSMENT & PLAN NOTE
· Patient presented to the hospital with generalized weakness; noted on outpatient labs to be 127 with repeat on arrival 127. Received only 500 ml NS with rise in na to 129.   After additional observation overnight on fluid restriction she improved to 133  · Based on osm studies she is hypo-osmolar   · Likely due to volume depletion and HCTZ (started June 13th)  · Appreciate nephrology input

## 2023-07-07 NOTE — PROGRESS NOTES
[facility] NOTE   Divina Jason 68 y.o. female MRN: [id number]  Unit/Bed#: S -[facility] Encounter: [id number]  Reason for Consult: Hyponatremia    ASSESSMENT and PLAN:  15-year-old female admitted with weakness. Recently started on thiazide diuretic. Nephrology consulted for management of hyponatremia    Mild hyponatremia with mild symptoms:  · Etiology: Recently started thiazide diuretic, +/- volume depletion +/- low solute intake +/- ADH stimulation from nausea  · Work-up: Serum osmolality 270 consistent with hypoosmolar hyponatremia. Serum osmolality 270 consistent with ADH dependent hyponatremia, urine sodium 18 which supports volume depletion. Uric acid 3.3.  TSH normal.  Cortisol level in progress  · Management: Initially given Photonix saline with improvement. Discontinue HCTZ. No further thiazide diuretics. Added to allergy/intolerance list.  His fluid restriction. · Status:  Appropriate rise in sodium level. Sodium level staying above 130 which is acceptable at this time. Stable for discharge. · Plan/recommendations:  · Patient follow-up. Labs in approximately 3 to 5 days  · No further thiazide diuretics-added to allergy intolerance list  · Modest fluid restriction-1500 to 1800 mL/day. Recommended use of electrolyte water such as propel, Pedialyte support, Gatorade for additional hydration. Hypertension/volume:  · Home medications: Losartan 100 mg daily, metoprolol 12.5 mg twice a day, HCTZ 25 mg daily  · Permanently discontinue HCTZ. No further thiazide diuretics. · Currently on losartan 100 mg daily and metoprolol 12.5 mg every 12 hours. DISPOSITION:  Stable from a renal standpoint. Cleared for discharge. Outpatient follow-up. Office messaged    SUBJECTIVE / 24H INTERVAL HISTORY:  No acute complaints. Feeling fine. No nausea. No dizziness or weakness reported.     OBJECTIVE:  Current Weight:    Vitals:    07/06/23 2218 07/07/23 0834 07/07/23 0908 07/07/23 0915   BP: 109/51 116/53 116/53 109/50   BP Location: Left arm      Pulse: 60 60 60 64   Resp: 16   18   Temp: 98.6 °F (37 °C) 98.3 °F (36.8 °C)  98 °F (36.7 °C)   TempSrc: Oral      SpO2: 97% 96%  96%       Intake/Output Summary (Last 24 hours) at 7/7/2023 0946  Last data filed at 7/6/2023 1124  Gross per 24 hour   Intake 500 ml   Output --   Net 500 ml     General: NAD  Skin: no rash  Eyes: anicteric sclera  ENT: moist mucous membrane  Neck: supple  Chest: CTA b/l, no ronchii, no wheeze, no rubs, no rales  CVS: s1s2, no murmur, no gallop, no rub  Abdomen: soft, nontender, nl sounds  Extremities: no edema LE b/l  : no [place]  Neuro: AAOX3  Psych: normal affect  Medications:    Current Facility-Administered Medications:   •  acetaminophen (TYLENOL) tablet 650 mg, 650 mg, Oral, Q6H PRN, Karuna Wade PA-C, 650 mg at 07/06/23 2145  •  aspirin (ECOTRIN LOW STRENGTH) EC tablet 81 mg, 81 mg, Oral, Daily, Karuna Wade PA-C, 81 mg at 07/07/23 6084  •  atorvastatin (LIPITOR) tablet 80 mg, 80 mg, Oral, Daily With Fermin Actdavida Wade PA-C, 80 mg at 07/06/23 1556  •  calcium carbonate-vitamin D 500 mg-5 mcg tablet 1 tablet, 1 tablet, Oral, Daily With Breakfast, Karuna Wade PA-C, 1 tablet at 07/07/23 9538  •  cholecalciferol (VITAMIN D3) tablet 1,000 Units, 1,000 Units, Oral, Daily, Karuna Wade PA-C, 1,000 Units at 07/07/23 1419  •  enoxaparin (LOVENOX) subcutaneous injection 40 mg, 40 mg, Subcutaneous, Daily, Karuna Wade PA-C  •  losartan (COZAAR) tablet 100 mg, 100 mg, Oral, Daily, Karuna Wade PA-C, 100 mg at 07/07/23 0721  •  melatonin tablet 3 mg, 3 mg, Oral, HS, Karuna Wade PA-C, 3 mg at 07/06/23 2120  •  metoprolol tartrate (LOPRESSOR) partial tablet 12.5 mg, 12.5 mg, Oral, Q12H, Karuna Wade PA-C, 12.5 mg at 07/07/23 0908    Laboratory Results:  Results from last 7 days   Lab Units 07/07/23  0817 07/06/23  2355 07/06/23  1805 07/06/23  1154 07/06/23  0959 07/05/23  1027   WBC Thousand/uL  --   --   --   -- 6. 46 6.38   HEMOGLOBIN g/dL  --   --   --   --  12.1 11.9   HEMATOCRIT %  --   --   --   --  36.6 35.6   PLATELETS Thousands/uL  --   --  221  --  221 235   POTASSIUM mmol/L 3.7 3.7 3.6 3.8 3.5 3.9   CHLORIDE mmol/L 102 101 99 98 93* 95*   CO2 mmol/L 24 24 26 24 27 28   BUN mg/dL 13 14 14 14 16 12   CREATININE mg/dL 0.54* 0.59* 0.60 0.53* 0.62 0.57*   CALCIUM mg/dL 8.5 8.4 9.1 8.5 8.7 8.7

## 2023-07-07 NOTE — ASSESSMENT & PLAN NOTE
· Patient presented with generalized weakness, mild headache, nausea and mild confusion. Symptoms due to hyponatremia.     · Resolved

## 2023-07-11 ENCOUNTER — LAB (OUTPATIENT)
Dept: LAB | Facility: CLINIC | Age: 78
End: 2023-07-11
Payer: MEDICARE

## 2023-07-11 DIAGNOSIS — E87.1 HYPONATREMIA: ICD-10-CM

## 2023-07-11 LAB
ANION GAP SERPL CALCULATED.3IONS-SCNC: 4 MMOL/L
BUN SERPL-MCNC: 13 MG/DL (ref 5–25)
CALCIUM SERPL-MCNC: 9.4 MG/DL (ref 8.3–10.1)
CHLORIDE SERPL-SCNC: 102 MMOL/L (ref 96–108)
CO2 SERPL-SCNC: 29 MMOL/L (ref 21–32)
CREAT SERPL-MCNC: 0.6 MG/DL (ref 0.6–1.3)
GFR SERPL CREATININE-BSD FRML MDRD: 88 ML/MIN/1.73SQ M
GLUCOSE P FAST SERPL-MCNC: 94 MG/DL (ref 65–99)
POTASSIUM SERPL-SCNC: 4.7 MMOL/L (ref 3.5–5.3)
SODIUM SERPL-SCNC: 135 MMOL/L (ref 135–147)

## 2023-07-11 PROCEDURE — 80048 BASIC METABOLIC PNL TOTAL CA: CPT

## 2023-07-11 PROCEDURE — 36415 COLL VENOUS BLD VENIPUNCTURE: CPT

## 2023-07-11 NOTE — RESULT ENCOUNTER NOTE
Please let [place] Gervais know that follow-up labs look good. Sodium level is normal.  No change at this time and plan of care.

## 2023-07-12 ENCOUNTER — TELEPHONE (OUTPATIENT)
Dept: NEPHROLOGY | Facility: CLINIC | Age: 78
End: 2023-07-12

## 2023-07-12 ENCOUNTER — OFFICE VISIT (OUTPATIENT)
Dept: FAMILY MEDICINE CLINIC | Facility: CLINIC | Age: 78
End: 2023-07-12

## 2023-07-12 VITALS
WEIGHT: 110 LBS | OXYGEN SATURATION: 97 % | HEIGHT: 63 IN | RESPIRATION RATE: 16 BRPM | BODY MASS INDEX: 19.49 KG/M2 | HEART RATE: 64 BPM | DIASTOLIC BLOOD PRESSURE: 64 MMHG | TEMPERATURE: 99.2 F | SYSTOLIC BLOOD PRESSURE: 108 MMHG

## 2023-07-12 DIAGNOSIS — E87.1 HYPONATREMIA: Primary | ICD-10-CM

## 2023-07-12 DIAGNOSIS — R93.0 ABNORMAL HEAD CT: ICD-10-CM

## 2023-07-12 DIAGNOSIS — I10 ESSENTIAL HYPERTENSION: ICD-10-CM

## 2023-07-12 DIAGNOSIS — I25.118 CORONARY ARTERY DISEASE OF NATIVE ARTERY OF NATIVE HEART WITH STABLE ANGINA PECTORIS (HCC): ICD-10-CM

## 2023-07-12 DIAGNOSIS — R53.1 GENERALIZED WEAKNESS: ICD-10-CM

## 2023-07-12 NOTE — TELEPHONE ENCOUNTER
LM for patient about the following, asked her to call back if she has any questions:    ----- Message from [address] [facility] sent at 7/11/2023  4:55 PM EDT -----  Please let Tea Garcia know that follow-up labs look good. Sodium level is normal.  No change at this time and plan of care.

## 2023-07-12 NOTE — PROGRESS NOTES
FAMILY PRACTICE OFFICE VISIT       NAME: Beatriz Sebastian  AGE: 68 y.o. SEX: female       : 1945        MRN: [id number]    DATE: 2023  TIME: 3:40 PM    Assessment and Plan   1. Hyponatremia  Comments:  Pt stable - condition resolved, off HCTZ. Pt continues to recover; strength coming back. Does have a f/u appt with Nephrology. 2. Generalized weakness  Comments:  Resolving; as above. 3. Essential hypertension  Comments:  Controlled on present management; off HCTZ. 4. Coronary artery disease of native artery of native heart with stable angina pectoris ([address])  Comments:  Stable; continues to work with Cardiology. 5. Abnormal head CT  Comments:  Pt with "prominent appearing pituitary gland" on CT in ER -> f/u, precautionary MRI Brain ordered. Orders:  -     MRI brain wo contrast; Future; Expected date: 2023         There are no Patient Instructions on file for this visit. Chief Complaint     Chief Complaint   Patient presents with   • Transition of Care Management     Patient being seen for TCM-- Hyponatremia        History of Present Illness   Beatriz Sebastian is a 68y.o.-year-old female who presents in f/u from her hospitalization  - 23 for progressive weakness, hyponatremia. Records reviewed. Pt had been on HCTZ x 2 weeks - soon after noted feeling poorly. Her HCTZ was stopped, placed on a short term fluid restriction, and gave NS IVF bolus - symptoms resolved. Feels well now. F/u Na level yesterday at 135. Review of Systems   Review of Systems   Constitutional: Positive for fatigue. Negative for activity change. Respiratory: Negative for shortness of breath. Cardiovascular: Negative for chest pain. Neurological: Negative for weakness and headaches.        Active Problem List     Patient Active Problem List   Diagnosis   • Arthritis   • Cervical myofascial pain syndrome   • Cervical radiculopathy   • Cervicogenic headache   • Cervico-occipital neuralgia   • Depression • Mixed hyperlipidemia   • Essential hypertension   • Osteopenia of spine   • Spasmodic torticollis   • Laceration of occipital scalp   • Other secondary scoliosis, thoracolumbar region   • Syncope   • Coronary artery disease of native artery of native heart with stable angina pectoris (HCC)   • S/P CABG x 3   • Anemia   • Thrombocytopenia (HCC)   • Hyperchloremia   • Chylothorax   • Screening for colon cancer   • History of colon polyps   • Acute bilateral low back pain without sciatica   • PAC (premature atrial contraction)   • Primary osteoarthritis of left hip   • Closed wedge compression fracture of T11 vertebra (HCC)   • Closed wedge compression fracture of T12 vertebra (HCC)   • Chronic pain syndrome   • Osteoarthritis of spine with radiculopathy, lumbar region   • Vitamin D deficiency   • Pain and swelling of right lower extremity   • Hyponatremia   • Abnormal head CT         Past Medical History:  Past Medical History:   Diagnosis Date   • Anxiety    • Arthritis    • Colon polyp    • Coronary artery disease    • Effusion of left knee     Last assessed - 9/10/15   • History of transfusion    • Hyperlipidemia    • Hypertension    • Memory loss    • Myocardial infarction [facility])    • Scoliosis    • Tick bite     Last assessed - 4/21/17       Past Surgical History:  Past Surgical History:   Procedure Laterality Date   • APPENDECTOMY     • CARDIAC SURGERY     • COLONOSCOPY     • CORONARY ARTERY BYPASS GRAFT     • SD CORONARY ARTERY BYP W/VEIN & ARTERY GRAFT 4 VEIN N/A 01/27/2020    Procedure: CORONARY ARTERY BYPASS GRAFT (CABG) x3 VESSELS, LIMA TO LAD, AND SVG TO PLB & OM;  Surgeon: Alvin Quintanilla DO;  Location: [facility];  Service: Cardiac Surgery   • SD ECHO TRANSESOPHAG R-T 2D W/PRB IMG ACQUISJ I&R N/A 01/27/2020    Procedure: TRANSESOPHAGEAL ECHOCARDIOGRAM (GET);   Surgeon: Alvin Quintanilla DO;  Location: [facility];  Service: Cardiac Surgery   • SD NDSC SURG W/VIDEO-ASSISTED HARVEST VEIN CABG Left 01/27/2020    Procedure: HARVEST VEIN ENDOSCOPIC (EV[facility]); Surgeon: Aiden Dang DO;  Location: [facility];  Service: Cardiac Surgery   • TONSILLECTOMY      Last assessed - 4/20/17   • TUBAL LIGATION      Last assessed - 4/20/17   • WISDOM TOOTH EXTRACTION      Last assessed - 4/20/17       Family History:  Family History   Problem Relation Age of Onset   • Coronary artery disease Mother    • Arthritis Mother    • Other Mother         Cardiac Disorder    • Transient ischemic attack Mother    • Heart attack Father    • Sudden death Father         SCD   • Cancer Daughter 52        kidney   • No Known Problems Paternal Aunt        Social History:  Social History     Socioeconomic History   • Marital status:      Spouse name: Not on file   • Number of children: 3   • Years of education: Post Graduate   • Highest education level: Not on file   Occupational History   • Occupation:      Comment: P/T   • Occupation: Retired     Comment: RN   Tobacco Use   • Smoking status: Never   • Smokeless tobacco: Never   Vaping Use   • Vaping Use: Never used   Substance and Sexual Activity   • Alcohol use: Yes     Alcohol/week: 3.0 standard drinks of alcohol     Types: 3 Glasses of wine per week     Comment: 1 wine nightly   • Drug use: No   • Sexual activity: Not Currently   Other Topics Concern   • Not on file   Social History Narrative    Living alone     Social Determinants of Health     Financial Resource Strain: Low Risk  (1/9/2023)    Overall Financial Resource Strain (CARDIA)    • Difficulty of Paying Living Expenses: Not hard at all   Food Insecurity: Not on file   Transportation Needs: No Transportation Needs (1/9/2023)    PRAPARE - Transportation    • Lack of Transportation (Medical): No    • Lack of Transportation (Non-Medical):  No   Physical Activity: Not on file   Stress: Not on file   Social Connections: Not on file   Intimate Partner Violence: Not on file   Housing Stability: Not on file Objective     Vitals:    07/12/23 1502   BP: 108/64   Pulse: 64   Resp: 16   Temp: 99.2 °F (37.3 °C)   SpO2: 97%     Wt Readings from Last 3 Encounters:   07/12/23 49.9 kg (110 lb)   06/13/23 51.3 kg (113 lb)   06/07/23 50.8 kg (112 lb)       Physical Exam  Vitals and nursing note reviewed. Constitutional:       General: She is not in acute distress. Appearance: Normal appearance. She is not ill-appearing, toxic-appearing or diaphoretic. HENT:      Head: Normocephalic and atraumatic. Eyes:      General: No scleral icterus. Conjunctiva/sclera: Conjunctivae normal.   Cardiovascular:      Rate and Rhythm: Normal rate and regular rhythm. Heart sounds: Normal heart sounds. No murmur heard. No friction rub. No gallop. Pulmonary:      Effort: Pulmonary effort is normal. No respiratory distress. Breath sounds: Normal breath sounds. No stridor. No wheezing, rhonchi or rales. Musculoskeletal:      Cervical back: Normal range of motion and neck supple. No rigidity or tenderness. Lymphadenopathy:      Cervical: No cervical adenopathy. Neurological:      Mental Status: She is alert and oriented to person, place, and time. Psychiatric:         Mood and Affect: Mood normal.         Behavior: Behavior normal.         Thought Content:  Thought content normal.         Judgment: Judgment normal.         Pertinent Laboratory/Diagnostic Studies:  Lab Results   Component Value Date    GLUCOSE 138 01/27/2020    BUN 13 07/11/2023    CREATININE 0.60 07/11/2023    CALCIUM 9.4 07/11/2023     03/03/2015    K 4.7 07/11/2023    CO2 29 07/11/2023     07/11/2023     Lab Results   Component Value Date    ALT 35 07/05/2023    AST 43 07/05/2023    ALKPHOS 71 07/05/2023    BILITOT 0.2 03/03/2015       Lab Results   Component Value Date    WBC 6.46 07/06/2023    HGB 12.1 07/06/2023    HCT 36.6 07/06/2023    MCV 96 07/06/2023     07/06/2023       No results found for: "TSH"    No results found for: "CHOL"  Lab Results   Component Value Date    TRIG 66 07/05/2023     Lab Results   Component Value Date    HDL 72 07/05/2023     Lab Results   Component Value Date    LDLCALC 62 07/05/2023     No results found for: "HGBA1C"    Results for orders placed or performed in visit on 97/84/45   Basic metabolic panel   Result Value Ref Range    Sodium 135 135 - 147 mmol/L    Potassium 4.7 3.5 - 5.3 mmol/L    Chloride 102 96 - 108 mmol/L    CO2 29 21 - 32 mmol/L    ANION GAP 4 mmol/L    BUN 13 5 - 25 mg/dL    Creatinine 0.60 0.60 - 1.30 mg/dL    Glucose, Fasting 94 65 - 99 mg/dL    Calcium 9.4 8.3 - 10.1 mg/dL    eGFR 88 ml/min/1.73sq m       Orders Placed This Encounter   Procedures   • MRI brain wo contrast       ALLERGIES:  Allergies   Allergen Reactions   • Thiazide-Type Diuretics Other (See Comments)     Hyponatremia       Current Medications     Current Outpatient Medications   Medication Sig Dispense Refill   • acetaminophen (TYLENOL) 650 mg CR tablet Take 650 mg by mouth daily as needed for mild pain     • aspirin (ECOTRIN LOW STRENGTH) 81 mg EC tablet Take 1 tablet (81 mg total) by mouth daily     • Calcium Carb-Cholecalciferol 600-200 MG-UNIT TABS Calcium 600 + D TABS  TAKE 1 TABLET DAILY. Refills: 0    Active     • denosumab (PROLIA) 60 mg/mL Inject 60 mg under the skin once     • losartan (COZAAR) 100 MG tablet TAKE ONE TABLET BY MOUTH EVERY DAY 7 tablet 0   • melatonin 3 mg Take 3 mg by mouth daily at bedtime     • metoprolol tartrate (LOPRESSOR) 25 mg tablet TAKE ONE-HALF TABLET BY MOUTH EVERY 12 HOURS 7 tablet 0   • rosuvastatin (CRESTOR) 40 MG tablet TAKE ONE TABLET BY MOUTH EVERY DAY 90 tablet 3   • VITAMIN D, CHOLECALCIFEROL, PO Take 2,600 Units/day by mouth       No current facility-administered medications for this visit.          Health Maintenance     Health Maintenance   Topic Date Due   • COVID-19 Vaccine (5 - Pfizer series) 02/03/2023   • Influenza Vaccine (1) 09/01/2023   • Fall Risk 01/09/2024   • Urinary Incontinence Screening  01/09/2024   • Medicare Annual Wellness Visit (AWV)  01/09/2024   • Falls: Plan of Care  01/09/2024   • BMI: Adult  07/12/2024   • Breast Cancer Screening: Mammogram  05/31/2025   • Colorectal Cancer Screening  06/15/2025   • Hepatitis C Screening  Completed   • Osteoporosis Screening  Completed   • Pneumococcal Vaccine: 65+ Years  Completed   • HIB Vaccine  Aged Out   • IPV Vaccine  Aged Out   • Hepatitis A Vaccine  Aged Out   • Meningococcal ACWY Vaccine  Aged Out   • HPV Vaccine  Aged Out     Immunization History   Administered Date(s) Administered   • COVID-19 PFIZER VACCINE 0.3 ML IM 01/15/2021, 02/04/2021, 09/30/2021   • COVID-19 Pfizer Vac BIVALENT Darius-sucrose 12 Yr+ IM (BOOSTER ONLY) 10/03/2022   • Hep A, adult 11/19/2015   • INFLUENZA 10/01/2012, 10/10/2013, 10/10/2013, 10/01/2014, 10/01/2014, 10/01/2015, 10/16/2015, 10/01/2017, 10/20/2017, 11/06/2019, 10/15/2020, 11/10/2021, 10/03/2022   • Influenza Quadrivalent Preservative Free 3 years and older IM 10/01/2016, 10/20/2017   • Influenza, high dose seasonal 0.7 mL 11/21/2018   • Pneumococcal Conjugate 13-Valent 05/21/2019   • Pneumococcal Polysaccharide PPV23 05/02/2011   • Tdap 11/19/2015, 09/01/2018   • Typhoid Live, oral 11/19/2015   • Typhoid, Unspecified 11/19/2015   • Zoster 07/11/2018   • Zoster Vaccine Recombinant 11/27/2018          Sylvester Tsang DO

## 2023-07-19 DIAGNOSIS — R93.0 ABNORMAL HEAD CT: Primary | ICD-10-CM

## 2023-07-19 DIAGNOSIS — F41.8 SITUATIONAL ANXIETY: Primary | ICD-10-CM

## 2023-07-19 RX ORDER — LORAZEPAM 0.5 MG/1
TABLET ORAL
Qty: 2 TABLET | Refills: 0 | Status: SHIPPED | OUTPATIENT
Start: 2023-07-19 | End: 2023-09-19

## 2023-07-20 ENCOUNTER — HOSPITAL ENCOUNTER (OUTPATIENT)
Dept: MRI IMAGING | Facility: HOSPITAL | Age: 78
End: 2023-07-20
Payer: MEDICARE

## 2023-07-20 DIAGNOSIS — R93.0 ABNORMAL HEAD CT: ICD-10-CM

## 2023-07-20 PROCEDURE — G1004 CDSM NDSC: HCPCS

## 2023-07-20 PROCEDURE — 70553 MRI BRAIN STEM W/O & W/DYE: CPT

## 2023-07-20 PROCEDURE — A9585 GADOBUTROL INJECTION: HCPCS | Performed by: FAMILY MEDICINE

## 2023-07-20 RX ADMIN — GADOBUTROL 4 ML: 604.72 INJECTION INTRAVENOUS at 07:46

## 2023-07-25 ENCOUNTER — TELEPHONE (OUTPATIENT)
Dept: FAMILY MEDICINE CLINIC | Facility: CLINIC | Age: 78
End: 2023-07-25

## 2023-07-25 ENCOUNTER — TELEPHONE (OUTPATIENT)
Dept: OTHER | Facility: OTHER | Age: 78
End: 2023-07-25

## 2023-07-25 DIAGNOSIS — D35.2 PITUITARY MACROADENOMA WITH EXTRASELLAR EXTENSION (HCC): Primary | ICD-10-CM

## 2023-07-25 NOTE — TELEPHONE ENCOUNTER
Patient is calling to schedule an appoint and she also had some questions about ordering blood work prior to appointment please f/u

## 2023-07-26 ENCOUNTER — TELEPHONE (OUTPATIENT)
Dept: NEUROSURGERY | Facility: CLINIC | Age: 78
End: 2023-07-26

## 2023-07-26 DIAGNOSIS — D35.2 PITUITARY MACROADENOMA WITH EXTRASELLAR EXTENSION (HCC): Primary | ICD-10-CM

## 2023-07-26 NOTE — TELEPHONE ENCOUNTER
Established patient new problem    Waiting to here back from [facility] for intake appointment     Patient called in regards to a referral for fvf and endo    Nurse placed referral for fvf    Patient already established with endo has appointment tomorrow will tell them she needs blood work for pituitary    Faxed referral for fvf to dr Albert Pedraza  Fax: [phone]

## 2023-07-26 NOTE — PROGRESS NOTES
Received request for placement of ophthalmology referral to have FVF AND RETINAL OCT completed. Placed this as needed to complete intake process for already established patient.

## 2023-07-27 ENCOUNTER — OFFICE VISIT (OUTPATIENT)
Dept: ENDOCRINOLOGY | Facility: CLINIC | Age: 78
End: 2023-07-27
Payer: MEDICARE

## 2023-07-27 VITALS
BODY MASS INDEX: 19.67 KG/M2 | HEIGHT: 63 IN | WEIGHT: 111 LBS | HEART RATE: 60 BPM | DIASTOLIC BLOOD PRESSURE: 80 MMHG | SYSTOLIC BLOOD PRESSURE: 140 MMHG

## 2023-07-27 DIAGNOSIS — S22.080D CLOSED WEDGE COMPRESSION FRACTURE OF T11 VERTEBRA WITH ROUTINE HEALING, SUBSEQUENT ENCOUNTER: ICD-10-CM

## 2023-07-27 DIAGNOSIS — E55.9 VITAMIN D DEFICIENCY: ICD-10-CM

## 2023-07-27 DIAGNOSIS — M81.0 OSTEOPOROSIS, UNSPECIFIED OSTEOPOROSIS TYPE, UNSPECIFIED PATHOLOGICAL FRACTURE PRESENCE: Primary | ICD-10-CM

## 2023-07-27 DIAGNOSIS — D35.2 PITUITARY MACROADENOMA (HCC): ICD-10-CM

## 2023-07-27 PROCEDURE — 99214 OFFICE O/P EST MOD 30 MIN: CPT | Performed by: INTERNAL MEDICINE

## 2023-07-27 NOTE — PROGRESS NOTES
Rehana Monreal 68 y.o. female MRN: [id number]    Encounter: [id number]      Assessment/Plan     Assessment: This is a 68y.o.-year-old female with vitamin D deficiency. Plan:    Diagnoses and all orders for this visit:    Osteoporosis, unspecified osteoporosis type, unspecified pathological fracture presence  Continue Prolia injection every 6 months. Patient is due for her third Prolia injection in September 2023. 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 We will obtain following pituitary hormonal profile. Discussed with patient that if it is prolactin producing macroadenoma, she would benefit from medical management such as cabergoline therapy. We will follow-up on blood work results and make further recommendations make further recommendations  -     TSH + Free T4; Future  -     Cortisol Level,7-9 AM Specimen; Future  -     Insulin-like growth factor 1 (IGF-1); Future  -     Prolactin Lab Collect; Future  -     Follicle stimulating hormone Lab Collect; Future  -     Luteinizing hormone Lab Collect; Future  -     T4, free; Future  -     TSH, 3rd generation; Future  -     TSH + Free T4; Future  -     Cortisol Level,7-9 AM Specimen; Future    Closed wedge compression fracture of T11 vertebra with routine healing, subsequent encounter  Patient was offered anabolic treatment for osteoporosis however she preferred subcu Prolia injection every 6 months as she did not want to do injections on daily basis.   She was not a candidate for Evenity because of her recent history of MI and CABG      CC:   Pituitary macroadenoma osteoporosis    History of Present Illness     HPI:    Rehana Monreal is 51-year-old female with medical history of osteoporosis, history of vertebral compression fracture at T11, vitamin D deficiency is here for follow-up. She had a DEXA scan done in August 2022 which was consistent with osteopenia but FRAX score was elevated. She received her Prolia injection in September 2022 and again in March 2023, she tolerated it well. She takes calcium carbonate 600 mg daily and vitamin D3, 2500 IU daily. Denies any recent history of falls. She does not do any weightbearing exercises. Last vitamin D was within normal range. Also has history of coronary artery disease status post CABG. She was admitted to emergency room for about natremia recently and MRI of the pituitary showed pituitary macroadenoma extending to cavernous sinus. Patient denies visual problems, headaches, dizziness. MRI of pituitary     TECHNIQUE:  Multiplanar, multisequence imaging of the brain and sella was performed before and after gadolinium administration.     Targeted images of the sella were performed requiring additional time at acquisition and interpretation of approximately 25%     IV Contrast:  4 mL of Gadobutrol injection (SINGLE-DOSE)     IMAGE QUALITY:  Diagnostic.     FINDINGS:     BRAIN PARENCHYMA:  There is no discrete mass, mass effect or midline shift. Brainstem and cerebellum demonstrate normal signal. There is no intracranial hemorrhage. There is no evidence of acute infarction and diffusion imaging is unremarkable. Small   scattered hyperintensities on T2/FLAIR imaging are noted in the periventricular and subcortical white matter demonstrating an appearance that is statistically most likely to represent mild microangiopathic change. Normal postcontrast imaging.     VENTRICLES:  Normal for the patient's age.     SELLA AND PITUITARY GLAND: The pituitary gland is enlarged with slight extension through the diaphragma sella into the suprasellar cistern.  There is a poorly enhancing, slightly heterogeneous pituitary mass throughout the gland extending into the right   cavernous sinus encasing the cavernous internal carotid artery. The mass measures approximately 2.2 cm in transverse diameter, 1.3 cm in craniocaudad dimension within the midline. The stalk is displaced towards the left. There is no mass effect upon the   optic chiasm.     ORBITS:  Normal.     PARANASAL SINUSES:  Normal.     VASCULATURE:  Evaluation of the major intracranial vasculature demonstrates appropriate flow voids.     CALVARIUM AND SKULL BASE:  Normal.     EXTRACRANIAL SOFT TISSUES: Anterior subluxation of C2 upon C3.     IMPRESSION:     2.2 cm pituitary mass consistent with macroadenoma filling the sella turcica and extending laterally on the right into the cavernous sinus, encasing the cavernous internal carotid artery.  Neurosurgical consultation recommended.     Mild scattered white matter change within both cerebral hemispheres consistent with chronic microangiopathy.     This examination was marked "immediate notification" in Epic in order to begin the standard process by which the radiology reading room liaison alerts the referring practitioner    Component      Latest Ref Rng 7/6/2023 7/7/2023 7/11/2023   Sodium      135 - 147 mmol/L 129 (L)  133 (L)  135    Potassium      3.5 - 5.3 mmol/L 3.8  3.7  4.7    Chloride      96 - 108 mmol/L 98  102  102    CO2      21 - 32 mmol/L 24  24  29    Anion Gap      mmol/L 7  7  4    BUN      5 - 25 mg/dL 14  13  13    Creatinine      0.60 - 1.30 mg/dL 0.53 (L)  0.54 (L)  0.60    Glucose, Random      65 - 140 mg/dL 91  130     GLUCOSE FASTING      65 - 99 mg/dL  130 (H)  94    Calcium      8.3 - 10.1 mg/dL 8.5  8.5  9.4    eGFR      ml/min/1.73sq m 91  91  88    OSMOLALITY, SERUM      282 - 298 mmol/ (L)      SODIUM URINE 18      Osmolality, Ur      250 - 900 mmol/      EXT Creatinine Urine      mg/dL 49.0      TSH 3RD GENERATON      0.450 - 4.500 uIU/mL 0.953      Cortisol - AM      6.7 - 22.6 ug/dL  8.9 Review of Systems   Constitutional: Negative for activity change, diaphoresis, fatigue, fever and unexpected weight change. HENT: Negative. Eyes: Negative for visual disturbance. Respiratory: Negative for cough, chest tightness and shortness of breath. Cardiovascular: Negative for chest pain, palpitations and leg swelling. Gastrointestinal: Negative for abdominal pain, blood in stool, constipation, diarrhea, nausea and vomiting. Endocrine: Negative for cold intolerance, heat intolerance, polydipsia, polyphagia and polyuria. Genitourinary: Negative for dysuria, enuresis, frequency and urgency. Musculoskeletal: Negative for arthralgias and myalgias. Skin: Negative for pallor, rash and wound. Allergic/Immunologic: Negative. Neurological: Negative for dizziness, tremors, weakness and numbness. Hematological: Negative. Psychiatric/Behavioral: Negative. Historical Information   Past Medical History:   Diagnosis Date   • Anxiety    • Arthritis    • Colon polyp    • Coronary artery disease    • Effusion of left knee     Last assessed - 9/10/15   • History of transfusion    • Hyperlipidemia    • Hypertension    • Memory loss    • Myocardial infarction [facility])    • Scoliosis    • Tick bite     Last assessed - 4/21/17     Past Surgical History:   Procedure Laterality Date   • APPENDECTOMY     • CARDIAC SURGERY     • COLONOSCOPY     • CORONARY ARTERY BYPASS GRAFT     • MS CORONARY ARTERY BYP W/VEIN & ARTERY GRAFT 4 VEIN N/A 01/27/2020    Procedure: CORONARY ARTERY BYPASS GRAFT (CABG) x3 VESSELS, LIMA TO LAD, AND SVG TO PLB & OM;  Surgeon: Roman Caballero DO;  Location: [facility];  Service: Cardiac Surgery   • MS ECHO TRANSESOPHAG R-T 2D W/PRB IMG ACQUISJ I&R N/A 01/27/2020    Procedure: TRANSESOPHAGEAL ECHOCARDIOGRAM (GET);   Surgeon: Roman Caballero DO;  Location: [facility];  Service: Cardiac Surgery   • MS NDSC SURG W/VIDEO-ASSISTED HARVEST VEIN CABG Left 01/27/2020 Procedure: HARVEST VEIN ENDOSCOPIC ([facility]); Surgeon: Kelly Conn DO;  Location: [facility];  Service: Cardiac Surgery   • TONSILLECTOMY      Last assessed - 4/20/17   • TUBAL LIGATION      Last assessed - 4/20/17   • WISDOM TOOTH EXTRACTION      Last assessed - 4/20/17     Social History   Social History     Substance and Sexual Activity   Alcohol Use Yes   • Alcohol/week: 7.0 standard drinks of alcohol   • Types: 7 Glasses of wine per week    Comment: 1 wine nightly     Social History     Substance and Sexual Activity   Drug Use No     Social History     Tobacco Use   Smoking Status Never   • Passive exposure: Past   Smokeless Tobacco Never     Family History:   Family History   Problem Relation Age of Onset   • Coronary artery disease Mother    • Arthritis Mother    • Other Mother         Cardiac Disorder    • Transient ischemic attack Mother    • Heart attack Father    • Sudden death Father         SCD   • Cancer Daughter 52        kidney   • No Known Problems Paternal Aunt        Meds/Allergies   Current Outpatient Medications   Medication Sig Dispense Refill   • acetaminophen (TYLENOL) 650 mg CR tablet Take 650 mg by mouth daily as needed for mild pain     • aspirin (ECOTRIN LOW STRENGTH) 81 mg EC tablet Take 1 tablet (81 mg total) by mouth daily     • Calcium Carb-Cholecalciferol 600-200 MG-UNIT TABS Calcium 600 + D TABS  TAKE 1 TABLET DAILY. Refills: 0    Active     • denosumab (PROLIA) 60 mg/mL Inject 60 mg under the skin once     • LORazepam (Ativan) 0.5 mg tablet Take 1 to 2 tablets, approx 45 - 60 minutes prior to MRI, as needed for anxiety.  2 tablet 0   • losartan (COZAAR) 100 MG tablet TAKE ONE TABLET BY MOUTH EVERY DAY 7 tablet 0   • melatonin 3 mg Take 3 mg by mouth daily at bedtime     • metoprolol tartrate (LOPRESSOR) 25 mg tablet TAKE ONE-HALF TABLET BY MOUTH EVERY 12 HOURS 7 tablet 0   • rosuvastatin (CRESTOR) 40 MG tablet TAKE ONE TABLET BY MOUTH EVERY DAY 90 tablet 3   • VITAMIN D, CHOLECALCIFEROL, PO Take 2,600 Units/day by mouth       No current facility-administered medications for this visit. Allergies   Allergen Reactions   • Thiazide-Type Diuretics Other (See Comments)     Hyponatremia       Objective   Vitals: Blood pressure 140/80, pulse 60, height 5' 2.87" (1.597 m), weight 50.3 kg (111 lb). Physical Exam  Vitals reviewed. Constitutional:       General: She is not in acute distress. Appearance: She is well-developed. She is not diaphoretic. HENT:      Head: Normocephalic and atraumatic. Eyes:      General:         Right eye: No discharge. Left eye: No discharge. Conjunctiva/sclera: Conjunctivae normal.   Neck:      Thyroid: No thyromegaly. Cardiovascular:      Rate and Rhythm: Normal rate and regular rhythm. Pulses: Normal pulses. Heart sounds: Normal heart sounds. No murmur heard. Pulmonary:      Effort: Pulmonary effort is normal. No respiratory distress. Breath sounds: Normal breath sounds. No wheezing. Abdominal:      General: Bowel sounds are normal. There is no distension. Palpations: Abdomen is soft. Tenderness: There is no abdominal tenderness. Musculoskeletal:         General: No tenderness or deformity. Normal range of motion. Cervical back: Normal range of motion and neck supple. Skin:     General: Skin is warm and dry. Findings: No erythema or rash. Neurological:      General: No focal deficit present. Mental Status: She is alert and oriented to person, place, and time. Cranial Nerves: No cranial nerve deficit. Motor: No abnormal muscle tone. Deep Tendon Reflexes: Reflexes are normal and symmetric. Reflexes normal.   Psychiatric:         Behavior: Behavior normal.         The history was obtained from the review of the chart, patient.     Lab Results:   Lab Results   Component Value Date/Time    Potassium 4.7 07/11/2023 09:59 AM    Potassium 3.7 07/07/2023 08:17 AM    Potassium 3.7 07/06/2023 11:55 PM    Chloride 102 07/11/2023 09:59 AM    Chloride 102 07/07/2023 08:17 AM    Chloride 101 07/06/2023 11:55 PM    CO2 29 07/11/2023 09:59 AM    CO2 24 07/07/2023 08:17 AM    CO2 24 07/06/2023 11:55 PM    BUN 13 07/11/2023 09:59 AM    BUN 13 07/07/2023 08:17 AM    BUN 14 07/06/2023 11:55 PM    Creatinine 0.60 07/11/2023 09:59 AM    Creatinine 0.54 (L) 07/07/2023 08:17 AM    Creatinine 0.59 (L) 07/06/2023 11:55 PM    Glucose, Fasting 94 07/11/2023 09:59 AM    Glucose, Fasting 130 (H) 07/07/2023 08:17 AM    Glucose, Fasting 97 07/05/2023 10:27 AM    Calcium 9.4 07/11/2023 09:59 AM    Calcium 8.5 07/07/2023 08:17 AM    Calcium 8.4 07/06/2023 11:55 PM    eGFR 88 07/11/2023 09:59 AM    eGFR 91 07/07/2023 08:17 AM    eGFR 88 07/06/2023 11:55 PM    TSH 3RD GENERATON 0.953 07/06/2023 09:59 AM    TSH 3RD GENERATON 0.961 01/03/2023 08:33 AM    PTH 34.5 08/10/2022 12:33 PM    Vit D, 25-Hydroxy 34.9 03/09/2023 08:42 AM    Vit D, 25-Hydroxy 32.1 01/03/2023 08:33 AM    Vit D, 25-Hydroxy 36.5 09/01/2022 09:02 AM         Imaging Studies:         Results for orders placed during the hospital encounter of 08/25/22    DXA bone density spine hip and pelvis    Impression  1. Low bone mass (osteopenia). 2.  Since a DXA study from 2/16/2018, there has been:  A  STATISTICALLY SIGNIFICANT DECREASE in bone mineral density of  0.116 g/cm2 (9.7)% in the lumbar spine. A  STATISTICALLY SIGNIFICANT DECREASE in bone mineral density of  0.040 g/cm2 (4.6)% in the hips. 3.  The 10 year risk of hip fracture is 7.0% with the 10 year risk of major osteoporotic fracture being 17.5% as calculated by the [facility]/WHO fracture risk assessment tool (FRAX).     4.  The current NOF guidelines recommend treating patients with a T-score of -2.5 or less in the lumbar spine or hips, or in post-menopausal women and men over the age of 48 with low bone mass (osteopenia) and a FRAX 10 year risk score of >3% for hip  fracture and/or >20% for major osteoporotic fracture. 5.  The NOF recommends follow-up DXA in 1-2 years after initiating therapy for osteoporosis and every 2 years thereafter. More frequent evaluation is appropriate for patients with conditions associated with rapid bone loss, such as glucocorticoid  therapy. The interval between DXA screenings may be longer for individuals without major risk factors and initial T-score in the normal or upper low bone mass range. The FRAX algorithm has certain limitations:  -FRAX has not been validated in patients currently or previously treated with pharmacotherapy for osteoporosis. In such patients, clinical judgment must be exercised in interpreting FRAX scores. -Prior hip, vertebral and humeral fragility fractures appear to confer greater risk of subsequent fracture than fractures at other sites (this is especially true for individuals with severe vertebral fractures), but quantification of this incremental  risk is not possible with FRAX. -FRAX underestimates fracture risk in patients with history of multiple fragility fractures. -FRAX may underestimate fracture risk in patients with history of frequent falls.  -It is not appropriate to use FRAX to monitor treatment response. WHO CLASSIFICATION:  Normal (a T-score of -1.0 or higher)  Low bone mineral density (a T-score of less than -1.0 but higher than -2.5)  Osteoporosis (a T-score of -2.5 or less)  Severe osteoporosis (a T-score of -2.5 or less with a fragility fracture)    LEAST SIGNIFICANT CHANGE AT 95% C.I:  Lumbar spine: 0.036 gm/cm2 (3.2%). Total hip: 0.018 gm/cm2 (2.0%). Forearm: 0.024 gm/cm2 (3.2%). Workstation performed: [id number]            I have personally reviewed pertinent reports. Portions of the record may have been created with voice recognition software.  Occasional wrong word or "sound a like" substitutions may have occurred due to the inherent limitations of voice recognition software. Read the chart carefully and recognize, using context, where substitutions have occurred.

## 2023-07-28 ENCOUNTER — TELEPHONE (OUTPATIENT)
Dept: ENDOCRINOLOGY | Facility: CLINIC | Age: 78
End: 2023-07-28

## 2023-07-28 ENCOUNTER — LAB (OUTPATIENT)
Dept: LAB | Facility: CLINIC | Age: 78
End: 2023-07-28
Payer: MEDICARE

## 2023-07-28 DIAGNOSIS — E22.1 HYPERPROLACTINEMIA (HCC): Primary | ICD-10-CM

## 2023-07-28 DIAGNOSIS — D35.2 PITUITARY MACROADENOMA (HCC): ICD-10-CM

## 2023-07-28 LAB
CORTIS AM PEAK SERPL-MCNC: 11.9 UG/DL (ref 6.7–22.6)
FSH SERPL-ACNC: 12.2 MIU/ML
LH SERPL-ACNC: 2.6 MIU/ML
PROLACTIN SERPL-MCNC: 81.54 NG/ML (ref 2.74–19.64)
T4 FREE SERPL-MCNC: 0.63 NG/DL (ref 0.61–1.12)
TSH SERPL DL<=0.05 MIU/L-ACNC: 1.06 UIU/ML (ref 0.45–4.5)

## 2023-07-28 PROCEDURE — 84439 ASSAY OF FREE THYROXINE: CPT

## 2023-07-28 PROCEDURE — 84305 ASSAY OF SOMATOMEDIN: CPT

## 2023-07-28 PROCEDURE — 82533 TOTAL CORTISOL: CPT

## 2023-07-28 PROCEDURE — 84146 ASSAY OF PROLACTIN: CPT

## 2023-07-28 PROCEDURE — 83001 ASSAY OF GONADOTROPIN (FSH): CPT

## 2023-07-28 PROCEDURE — 36415 COLL VENOUS BLD VENIPUNCTURE: CPT

## 2023-07-28 PROCEDURE — 83002 ASSAY OF GONADOTROPIN (LH): CPT

## 2023-07-28 PROCEDURE — 84443 ASSAY THYROID STIM HORMONE: CPT

## 2023-07-28 NOTE — TELEPHONE ENCOUNTER
Please inform pt that prolactin is elevated, she will need to do another blood work which is called prolactin by dilution ,I have ordered it   She can go tomorrow , base on that results, we will decide whether to start medical T/t   Thanks     Etheleen Riedel

## 2023-07-28 NOTE — TELEPHONE ENCOUNTER
Called patient and discussed the results of cortisol and thyroid blood work  Discussed sofar the cortisol in thyroid blood work is within normal range/  Does not have any clinical manifestation of hypothyroidism or adrenal insufficiency/  However based on her cortisol of 11.0 I would recommend to give hydrocortisone 100 mg before surgery IV, then every 8 hours if patient will be going for pituitary surgery in future. This was also explained to patient.   Will wait for other blood work to come back    [facility]

## 2023-07-29 LAB — IGF-I SERPL-MCNC: 64 NG/ML (ref 42–185)

## 2023-07-31 ENCOUNTER — APPOINTMENT (OUTPATIENT)
Dept: LAB | Facility: CLINIC | Age: 78
End: 2023-07-31
Payer: MEDICARE

## 2023-07-31 DIAGNOSIS — E22.1 HYPERPROLACTINEMIA (HCC): ICD-10-CM

## 2023-07-31 PROCEDURE — 84146 ASSAY OF PROLACTIN: CPT

## 2023-07-31 PROCEDURE — 36415 COLL VENOUS BLD VENIPUNCTURE: CPT

## 2023-08-01 LAB — PROLACTIN SERPL-MCNC: 72.01 NG/ML (ref 2.74–19.64)

## 2023-08-02 ENCOUNTER — TELEPHONE (OUTPATIENT)
Dept: ENDOCRINOLOGY | Facility: CLINIC | Age: 78
End: 2023-08-02

## 2023-08-03 ENCOUNTER — TELEPHONE (OUTPATIENT)
Dept: ENDOCRINOLOGY | Facility: CLINIC | Age: 78
End: 2023-08-03

## 2023-08-03 DIAGNOSIS — E22.1 HYPERPROLACTINEMIA (HCC): Primary | ICD-10-CM

## 2023-08-03 RX ORDER — CABERGOLINE 0.5 MG/1
0.25 TABLET ORAL 2 TIMES WEEKLY
Qty: 10 TABLET | Refills: 0 | Status: SHIPPED | OUTPATIENT
Start: 2023-08-03

## 2023-08-03 NOTE — TELEPHONE ENCOUNTER
Covering for Dr. Da Little as she is out. I reviewed her last note which stated that if Prolactin by dilution remained high, we would consider starting cabergoline. I would suggest starting cabergoline 0.5 mg, take 1/2 tablet twice a week (for example, Monday and Thursday only). Please send script to pharmacy. We can also repeat a prolactin level in 1 month (please order).

## 2023-08-03 NOTE — TELEPHONE ENCOUNTER
Patient called back. Requests that a note be placed in the chart with any recommendations as she has an appointment with Dr. Elza Diana, Neurosurgery, tomorrow. Also requests a call back with any recommended lab work so she could complete it today. Please advise.

## 2023-08-04 ENCOUNTER — TRANSCRIBE ORDERS (OUTPATIENT)
Dept: NEUROSURGERY | Facility: CLINIC | Age: 78
End: 2023-08-04

## 2023-08-04 ENCOUNTER — CONSULT (OUTPATIENT)
Dept: NEUROSURGERY | Facility: CLINIC | Age: 78
End: 2023-08-04
Payer: MEDICARE

## 2023-08-04 VITALS
HEART RATE: 57 BPM | BODY MASS INDEX: 20.43 KG/M2 | SYSTOLIC BLOOD PRESSURE: 152 MMHG | WEIGHT: 111 LBS | TEMPERATURE: 98.4 F | HEIGHT: 62 IN | RESPIRATION RATE: 16 BRPM | DIASTOLIC BLOOD PRESSURE: 80 MMHG

## 2023-08-04 DIAGNOSIS — D35.2 PITUITARY MACROADENOMA WITH EXTRASELLAR EXTENSION (HCC): Primary | ICD-10-CM

## 2023-08-04 PROCEDURE — 99214 OFFICE O/P EST MOD 30 MIN: CPT | Performed by: NEUROLOGICAL SURGERY

## 2023-08-04 NOTE — TELEPHONE ENCOUNTER
Called pt to attempt to clarify her message yesterday. .but she said she was driving and could not talk. She said she would call back.

## 2023-08-04 NOTE — PROGRESS NOTES
Neurosurgery Office Note  David Call 68 y.o. female MRN: [id number]      Assessment/Plan      Diagnoses and all orders for this visit:    Pituitary macroadenoma with extrasellar extension [facility])  -     Ambulatory Referral to Neurosurgery  -     MRI brain pituitary wo and w contrast; Future          Discussion:    Pain Score: 0-No pain    49-year-old woman seen previously by our practice for history of T12 compression fracture. In 7/2023, presented to the ER with generalized weakness, malaise, headaches. She underwent work-up which demonstrated hyponatremia. CT the head showed a sellar abnormality, follow-up MRI pituitary demonstrates pituitary mass with some slight suprasellar extension but no chiasmal contact, and some incursion into the right cavernous sinus. Pituitary lab panel all normal except for somewhat elevated prolactin, around 80. This would likely be more consistent with "stalk effect", rather than a prolactin secreting adenoma. Endocrinology did prescribe cabergoline, but she has yet to start this. Formal visual fields were performed, and document some bitemporal inferior quadrant deficits. However, as there is no chiasm contact by the mass, I expect these are technical issues did not reveal findings. I certainly cannot explain them on the basis of the pituitary mass. I reviewed with the patient the natural history of pituitary adenomas. Unlikely that the pituitary mass would be related to her hyponatremia, which has been corrected. The pituitary mass itself has likely been present for many years. I recommended a follow-up MRI pituitary scan be done in 6 months. Barring significant growth that would put her chiasm at risk, I expect this mass will not be of clinical significance in her lifetime, and would not recommend aggressive intervention.   She had some questions about the utility of cabergoline, and I suggested she follow-up with her endocrinologist to discuss this further. Questions answered to the patient and her daughters' satisfaction. I will plan to see her back in 6 months with a new MRI scan pituitary, which I have ordered. 08/04/23 Metrics: [id number], or 0.778; VAS 60      CHIEF COMPLAINT    Chief Complaint   Patient presents with   • Consult     Consult- Pit macroadenoma MRI Brain 7/20/23, CT Head 7/6/23       HISTORY    History of Present Illness     68y.o. year old female     HPI    See Discussion    REVIEW OF SYSTEMS    Review of Systems   Constitutional: Positive for fatigue. HENT: Negative. Eyes: Negative. Wears glasses   Respiratory: Negative. Cardiovascular: Negative. Gastrointestinal: Negative. Endocrine: Negative. Genitourinary: Positive for urgency. Leakage, but very rare   Musculoskeletal: Negative for back pain (right knee, waiting on knee replacement surgery), gait problem and neck pain (improved over the last week or two, neck pain radiates into back of head to right side of head). Skin: Negative. Allergic/Immunologic: Negative. Neurological: Positive for speech difficulty (sometimes trouble finding words and/or will say the wrong or opposite word). Negative for dizziness, seizures, syncope, weakness, numbness and headaches (back of head to right side of head only associate with the neck pain which has improved and due to low sodium). Hematological: Bruises/bleeds easily (patient on ASA 81). Psychiatric/Behavioral: Positive for confusion (mild), decreased concentration and sleep disturbance (in general).         Short term memory loss and forgetful     Stated recent episode which was seen in ED 7/6/23, symptoms of feeling out of touch, disconnected feeling, unfocused, nausea, headache, maybe allergic reaction to medication and low sodium levels         Meds/Allergies     Current Outpatient Medications   Medication Sig Dispense Refill   • acetaminophen (TYLENOL) 650 mg CR tablet Take 650 mg by mouth daily as needed for mild pain     • aspirin (ECOTRIN LOW STRENGTH) 81 mg EC tablet Take 1 tablet (81 mg total) by mouth daily     • cabergoline (DOSTINEX) 0.5 MG tablet Take 0.5 tablets (0.25 mg total) by mouth 2 (two) times a week 10 tablet 0   • Calcium Carb-Cholecalciferol 600-200 MG-UNIT TABS Calcium 600 + D TABS  TAKE 1 TABLET DAILY. Refills: 0    Active     • denosumab (PROLIA) 60 mg/mL Inject 60 mg under the skin every 6 (six) months     • losartan (COZAAR) 100 MG tablet TAKE ONE TABLET BY MOUTH EVERY DAY 7 tablet 0   • melatonin 3 mg Take 3 mg by mouth daily at bedtime     • metoprolol tartrate (LOPRESSOR) 25 mg tablet TAKE ONE-HALF TABLET BY MOUTH EVERY 12 HOURS 7 tablet 0   • rosuvastatin (CRESTOR) 40 MG tablet TAKE ONE TABLET BY MOUTH EVERY DAY 90 tablet 3   • VITAMIN D, CHOLECALCIFEROL, PO Take 2,600 Units/day by mouth     • LORazepam (Ativan) 0.5 mg tablet Take 1 to 2 tablets, approx 45 - 60 minutes prior to MRI, as needed for anxiety. (Patient not taking: Reported on 8/4/2023) 2 tablet 0     No current facility-administered medications for this visit.        Allergies   Allergen Reactions   • Thiazide-Type Diuretics Other (See Comments)     Hyponatremia       PAST HISTORY    Past Medical History:   Diagnosis Date   • Anxiety    • Arthritis    • Colon polyp    • Coronary artery disease    • Effusion of left knee     Last assessed - 9/10/15   • History of transfusion    • Hyperlipidemia    • Hypertension    • Memory loss    • Myocardial infarction [facility])    • Scoliosis    • Tick bite     Last assessed - 4/21/17       Past Surgical History:   Procedure Laterality Date   • APPENDECTOMY     • CARDIAC SURGERY     • COLONOSCOPY     • CORONARY ARTERY BYPASS GRAFT     • PA CORONARY ARTERY BYP W/VEIN & ARTERY GRAFT 4 VEIN N/A 01/27/2020    Procedure: CORONARY ARTERY BYPASS GRAFT (CABG) x3 VESSELS, LIMA TO LAD, AND SVG TO PLB & OM;  Surgeon: Saritha Lindo DO;  [facility];  Service: Cardiac Surgery • CO ECHO TRANSESOPHAG R-T 2D W/PRB IMG ACQUISJ I&R N/A 01/27/2020    Procedure: TRANSESOPHAGEAL ECHOCARDIOGRAM (GET); Surgeon: Alvin Quintanilla DO;  Location[facility];  Service: Cardiac Surgery   • CO NDSC SURG W/VIDEO-ASSISTED HARVEST VEIN CABG Left 01/27/2020    Procedure: HARVEST VEIN ENDOSCOPIC ([address]); Surgeon: Alvin Quintanilla DO;  Location[facility];  Service: Cardiac Surgery   • TONSILLECTOMY      Last assessed - 4/20/17   • TUBAL LIGATION      Last assessed - 4/20/17   • TUMOR REMOVAL  1998    pelvic benign tumor removal   • WISDOM TOOTH EXTRACTION      Last assessed - 4/20/17       Social History     Tobacco Use   • Smoking status: Never     Passive exposure: Past   • Smokeless tobacco: Never   Vaping Use   • Vaping Use: Never used   Substance Use Topics   • Alcohol use: Yes     Alcohol/week: 7.0 standard drinks of alcohol     Types: 7 Glasses of wine per week     Comment: 1 wine nightly   • Drug use: No       Family History   Problem Relation Age of Onset   • Coronary artery disease Mother    • Arthritis Mother    • Other Mother         Cardiac Disorder    • Transient ischemic attack Mother    • Heart attack Father    • Sudden death Father         SCD   • Cancer Daughter 52        kidney   • No Known Problems Paternal Aunt          The following portions of the patient's history were reviewed in this encounter and updated as appropriate: Past medical, surgical, family, and social history, as well as medications, allergies, and review of systems. EXAM    Vitals:Blood pressure 152/80, pulse 57, temperature 98.4 °F (36.9 °C), resp. rate 16, height 5' 2" (1.575 m), weight 50.3 kg (111 lb). ,Body mass index is 20.3 kg/m². Physical Exam    Neurologic Exam      MEDICAL DECISION MAKING    Imaging Studies:     MRI knee right wo contrast    Result Date: 8/1/2023  Narrative: [id number]. [id number]    MRI brain pituitary wo and w contrast    Result Date: 7/25/2023  Narrative: MRI BRAIN AND SELLA  WITH AND WITHOUT CONTRAST INDICATION:  R93.0: Abnormal findings on diagnostic imaging of skull and head, not elsewhere classified COMPARISON: CT brain dated 7/6/2023. TECHNIQUE: Multiplanar, multisequence imaging of the brain and sella was performed before and after gadolinium administration. Targeted images of the sella were performed requiring additional time at acquisition and interpretation of approximately 25% IV Contrast:  4 mL of Gadobutrol injection (SINGLE-DOSE) IMAGE QUALITY:  Diagnostic. FINDINGS: BRAIN PARENCHYMA:  There is no discrete mass, mass effect or midline shift. Brainstem and cerebellum demonstrate normal signal. There is no intracranial hemorrhage. There is no evidence of acute infarction and diffusion imaging is unremarkable. Small scattered hyperintensities on T2/FLAIR imaging are noted in the periventricular and subcortical white matter demonstrating an appearance that is statistically most likely to represent mild microangiopathic change. Normal postcontrast imaging. VENTRICLES:  Normal for the patient's age. SELLA AND PITUITARY GLAND: The pituitary gland is enlarged with slight extension through the diaphragma sella into the suprasellar cistern. There is a poorly enhancing, slightly heterogeneous pituitary mass throughout the gland extending into the right cavernous sinus encasing the cavernous internal carotid artery. The mass measures approximately 2.2 cm in transverse diameter, 1.3 cm in craniocaudad dimension within the midline. The stalk is displaced towards the left. There is no mass effect upon the optic chiasm. ORBITS:  Normal. PARANASAL SINUSES:  Normal. VASCULATURE:  Evaluation of the major intracranial vasculature demonstrates appropriate flow voids. CALVARIUM AND SKULL BASE:  Normal. EXTRACRANIAL SOFT TISSUES: Anterior subluxation of C2 upon C3.      Impression: 2.2 cm pituitary mass consistent with macroadenoma filling the sella turcica and extending laterally on the right into the cavernous sinus, encasing the cavernous internal carotid artery. Neurosurgical consultation recommended. Mild scattered white matter change within both cerebral hemispheres consistent with chronic microangiopathy. This examination was marked "immediate notification" in Epic in order to begin the standard process by which the radiology reading room liaison alerts the referring practitioner. Workstation performed: [id number]     MRI KNEE RIGHT WO CONTRAST    Result Date: 7/17/2023  Narrative: History: Right knee pain, evalaute for meniscus tear Additional history obtained from electronic medical record/by technologist: None. Technique: Multiplanar, multisequence noncontrast MRI of the right knee. Comparison: Right knee x-rays dated 3/27/2023. Findings: LIGAMENTS Cruciate ligaments: ACL and PCL degeneration without full-thickness tear. Medial collateral ligament: Thickening and mild irregularity of the proximal to mid MCL fibers. No full-thickness tear. Mild periligamentous edema. Lateral collateral ligament: Thickening of the proximal LCL fibers. No full-thickness tear. Posterolateral corner structures: Moderate popliteal origin tendinosis. IT band: Intact. MENISCI Medial meniscus: No tear. Lateral meniscus: Longitudinal increased T2 signal within posterior horn lateral meniscus (series 5 image 12) superimposed on horizontal intermediate signal degeneration. No displaced flap. EXTENSOR MECHANISM The distal quadriceps and patellar tendons are intact with mild tendinosis. TT TG distance of 16 mm. There is no retinacular disruption. FLUID Large knee joint effusion and moderate-sized Baker's cyst formation with partial rupture and synovitis. Medial head of gastrocnemius tendon origin: Intact.  OSSEOUS and ARTICULAR STRUCTURES Bones: Focal bone marrow edema pattern within far lateral aspect of the lateral femoral condyle with subchondral hypointense signal. Patellofemoral compartment: Near full to full-thickness diffuse cartilage loss within the patella with multifocal areas of mild subchondral edema and cystic change and diffuse surface fissuring. Mild to moderate diffuse cartilage loss within trochlea with diffuse surface fissuring and cartilage heterogeneity. Medial compartment: Moderate diffuse cartilage loss with diffuse surface fissuring. Lateral compartment: Focal areas of full-thickness cartilage defects within posterior weightbearing lateral femoral condyle and opposing lateral tibial plateau with subchondral edema, superimposed on moderate diffuse cartilage loss. NEUROVASCULAR STRUCTURES Nerves: No intrinsic or extrinsic mass effect on the tibial and common peroneal nerves. Vessels: No aberrant tibial artery. Impression: Impression: 1.  Longitudinal tear posterior horn lateral meniscus superimposed on horizontal signal degeneration. No displaced flap. 2.  Findings suggestive of subchondral insufficiency fracture lateral femoral condyle far lateral aspect. No significant depression. 3.  Focal areas of full-thickness cartilage defects within posterior weightbearing lateral femoral condyle and opposing lateral tibial plateau with subchondral edema. 4.  Near full to full-thickness diffuse cartilage loss within patella with multifocal areas of full-thickness defects and associated mild subchondral edema and cystic change. Increased TT TG distance of 16 mm. 5.  Large knee joint effusion and moderate-sized partially ruptured Baker's cyst formation with synovitis. Workstation:[id number]    CT head without contrast    Result Date: 7/6/2023  Narrative: CT BRAIN - WITHOUT CONTRAST INDICATION:   Headache, new or worsening (Age >= 50y) recent HAs (predomionantly R sided), new hyponatremia. COMPARISON: CT head 1/24/2020 and additional prior studies TECHNIQUE:  CT examination of the brain was performed. Multiplanar 2D reformatted images were created from the source data. Radiation dose length product (DLP) for this visit:  873 mGy-cm . This examination, like all CT scans performed in the [facility], was performed utilizing techniques to minimize radiation dose exposure, including the use of iterative reconstruction and automated exposure control. IMAGE QUALITY:  Diagnostic. FINDINGS: PARENCHYMA:  No intracranial mass, mass effect or midline shift. No CT signs of acute infarction. No acute parenchymal hemorrhage. The pituitary gland is prominent with a convex superior contour. VENTRICLES AND EXTRA-AXIAL SPACES:  Normal for the patient's age. VISUALIZED ORBITS: Normal visualized orbits. PARANASAL SINUSES: Normal visualized paranasal sinuses. CALVARIUM AND EXTRACRANIAL SOFT TISSUES:  Normal.     Impression: 1. No acute intracranial abnormality. 2.  Pituitary gland is prominent with a superior convex contour. MRI correlation can be considered to exclude a pituitary lesion. Workstation performed: [id number]       I have personally reviewed pertinent reports. and I have personally reviewed pertinent films in PACS      PLEASE NOTE:  This encounter may have been completed utilizing the [employer] Direct Speech Voice Recognition Software. Grammatical errors, random word insertions, pronoun errors and incomplete sentences are occasional consequences of the system due to software limitations, ambient noise and hardware issues. These may be missed by proof reading prior to affixing electronic signature. Any questions or concerns about the content, text or information contained within the body of this dictation should be directly addressed to the advanced practitioner or physician for clarification.

## 2023-08-10 ENCOUNTER — OFFICE VISIT (OUTPATIENT)
Dept: OBGYN CLINIC | Facility: MEDICAL CENTER | Age: 78
End: 2023-08-10
Payer: MEDICARE

## 2023-08-10 VITALS
BODY MASS INDEX: 20.8 KG/M2 | WEIGHT: 113 LBS | SYSTOLIC BLOOD PRESSURE: 161 MMHG | HEART RATE: 60 BPM | DIASTOLIC BLOOD PRESSURE: 77 MMHG | HEIGHT: 62 IN

## 2023-08-10 DIAGNOSIS — M17.11 PRIMARY OSTEOARTHRITIS OF RIGHT KNEE: Primary | ICD-10-CM

## 2023-08-10 PROCEDURE — 99204 OFFICE O/P NEW MOD 45 MIN: CPT | Performed by: STUDENT IN AN ORGANIZED HEALTH CARE EDUCATION/TRAINING PROGRAM

## 2023-08-10 PROCEDURE — 20610 DRAIN/INJ JOINT/BURSA W/O US: CPT | Performed by: STUDENT IN AN ORGANIZED HEALTH CARE EDUCATION/TRAINING PROGRAM

## 2023-08-10 RX ORDER — BUPIVACAINE HYDROCHLORIDE 2.5 MG/ML
4 INJECTION, SOLUTION INFILTRATION; PERINEURAL
Status: COMPLETED | OUTPATIENT
Start: 2023-08-10 | End: 2023-08-10

## 2023-08-10 RX ORDER — TRIAMCINOLONE ACETONIDE 40 MG/ML
40 INJECTION, SUSPENSION INTRA-ARTICULAR; INTRAMUSCULAR
Status: COMPLETED | OUTPATIENT
Start: 2023-08-10 | End: 2023-08-10

## 2023-08-10 RX ADMIN — TRIAMCINOLONE ACETONIDE 40 MG: 40 INJECTION, SUSPENSION INTRA-ARTICULAR; INTRAMUSCULAR at 10:30

## 2023-08-10 RX ADMIN — BUPIVACAINE HYDROCHLORIDE 4 ML: 2.5 INJECTION, SOLUTION INFILTRATION; PERINEURAL at 10:30

## 2023-08-10 NOTE — PROGRESS NOTES
Knee New Office Note    Assessment:     1. Primary osteoarthritis of right knee        Plan:  Findings today are consistent with osteoarthritis of the right knee. Imaging and prognosis was reviewed with the patient today. Discussed conservative treatment options in the form of cortisone steroid injection, VISCO injections, anti-inflammatories, low impact exercises and Voltaren gel vs surgical intervention (TKA). Patient wishes to proceed with conservative treatments. Aspiration and cortisone steroid injection was administered today to the right knee. Patient should avoid strenuous activities for the next 1-2 days. Patient should avoid vaccines for the next 2 weeks if possible. If patient is diabetic, should monitor glucose levels for the next 7 to 10 days. Can apply cold compress for soreness. If patient feels relief with the cortisone injections, procedure can be repeated every 3 months. If patient sees minimal/no relief with cortisone injection, treatment with VISCO injections can be further discussed. Patient would benefit from low impact exercises such as stationary biking, swimming and flat surface walking. Continue with home exercise plan. Follow up in 6 weeks       Problem List Items Addressed This Visit    None  Visit Diagnoses     Primary osteoarthritis of right knee    -  Primary    Relevant Orders    Large joint arthrocentesis         Subjective:     Patient ID: Vincenzo Fisher is a 68 y.o. female. Chief Complaint:  HPI:  The patient presents with a chief complaint of right knee pain. The pain began 5 month(s) ago and is not associated with an acute injury. Patient does note she was walking 3 miles and started experiencing right knee pain. The patient describes the pain as aching and throbbing and 5 out of 10 in intensity. It is constant in timing, and localizes the pain to the medial joint line.  The pain is worse with walking, standing, ascending stairs, descending stairs and squatting and relieved with rest, ice, medication: Tylenol used and beneficial, rest sitting, rest lying down, avoiding painful activities. She denies mechanical symptoms such as locking and catching. She admits to instability of the knee. Patient has a cortisone injection in March which gave her about 1 month of relief. She also tried VISCO injections with no relief. She attended PT and has transitioned to a home exercise plan. Treatment was with Dr. Mihaela Keen at [address] [place] [address]:  Allergies   Allergen Reactions   • Thiazide-Type Diuretics Other (See Comments)     Hyponatremia     Medications:  all current active meds have been reviewed  Past Medical History:  Past Medical History:   Diagnosis Date   • Anxiety    • Arthritis    • Colon polyp    • Coronary artery disease    • Effusion of left knee     Last assessed - 9/10/15   • History of transfusion    • Hyperlipidemia    • Hypertension    • Memory loss    • Myocardial infarction [facility])    • Scoliosis    • Tick bite     Last assessed - 4/21/17     Past Surgical History:  Past Surgical History:   Procedure Laterality Date   • APPENDECTOMY     • CARDIAC SURGERY     • COLONOSCOPY     • CORONARY ARTERY BYPASS GRAFT     • NM CORONARY ARTERY BYP W/VEIN & ARTERY GRAFT 4 VEIN N/A 01/27/2020    Procedure: CORONARY ARTERY BYPASS GRAFT (CABG) x3 VESSELS, LIMA TO LAD, AND SVG TO PLB & OM;  Surgeon: Waldemar Gray DO;  [facility];  Service: Cardiac Surgery   • NM ECHO TRANSESOPHAG R-T 2D W/PRB IMG ACQUISJ I&R N/A 01/27/2020    Procedure: TRANSESOPHAGEAL ECHOCARDIOGRAM (GET); Surgeon: Waldemar Gray DO;  [facility];  Service: Cardiac Surgery   • NM NDSC SURG W/VIDEO-ASSISTED HARVEST VEIN CABG Left 01/27/2020    Procedure: HARVEST VEIN ENDOSCOPIC ([address]);   Surgeon: Waldemar Gray DO;  [facility];  Service: Cardiac Surgery   • TONSILLECTOMY      Last assessed - 4/20/17   • TUBAL LIGATION      Last assessed - 4/20/17   • TUMOR REMOVAL  1998    pelvic benign tumor removal   • WISDOM TOOTH EXTRACTION      Last assessed - 4/20/17     Family History:  Family History   Problem Relation Age of Onset   • Coronary artery disease Mother    • Arthritis Mother    • Other Mother         Cardiac Disorder    • Transient ischemic attack Mother    • Heart attack Father    • Sudden death Father         SCD   • Cancer Daughter 52        kidney   • No Known Problems Paternal Aunt      Social History:  Social History     Substance and Sexual Activity   Alcohol Use Yes   • Alcohol/week: 7.0 standard drinks of alcohol   • Types: 7 Glasses of wine per week    Comment: 1 wine nightly     Social History     Substance and Sexual Activity   Drug Use No     Social History     Tobacco Use   Smoking Status Never   • Passive exposure: Past   Smokeless Tobacco Never           ROS:  General: Per HPI  Skin: Negative, except if noted below  HEENT: Negative  Respiratory: Negative  Cardiovascular: Negative  Gastrointestinal: Negative  Urinary: Negative  Vascular: Negative  Musculoskeletal: Positive per HPI   Neurologic: Positive per HPI  Endocrine: Negative    Objective:  BP Readings from Last 1 Encounters:   08/10/23 161/77      Wt Readings from Last 1 Encounters:   08/10/23 51.3 kg (113 lb)        Respiratory:   non-labored respirations    Lymphatics:  no palpable lymph nodes    Gait:   altered    Neurologic:   Alert and oriented times 3  Patient with normal sensation except as noted below  Deep tendon reflexes 2+ except as noted in MSK exam    Bilateral Lower Extremity:    Right knee:      Inspection:  Skin intact    Overall limb alignment neutral    Effusion: large    ROM 0-125 w/o pain    Extensor Lag: none    Palpation: medial Joint line tenderness to palpation    AP Stability at 90 deg stable    M/L stability in full extension stable    M/L stability in midflexion stable    Motor: 5/5 Q/HS/TA/GS/P    Pulses: 2+ DP / 2+ PT    SILT DP/SP/S/S/TN    Imaging:  My interpretation XR AP scanogram/AP bilateral knee/lateral/chang/sunrise right knee: moderate joint space narrowing, subchondral sclerosis, subchondral cysts, osteophyte formation. No fracture or dislocation. MRI right knee: longitudinal tear posterior horn of lateral mensicus. Full thickness cartilage defect posterior weight bearing lateral femoral condyle and lateral tibial plateau. Full thickness cartilage loss through the patella. Large knee effusion    Large joint arthrocentesis: R knee  Universal Protocol:  Consent: Verbal consent obtained. Risks and benefits: risks, benefits and alternatives were discussed  Consent given by: patient  Time out: Immediately prior to procedure a "time out" was called to verify the correct patient, procedure, equipment, support staff and site/side marked as required. Timeout called at: 8/10/2023 11:02 AM.  Patient understanding: patient states understanding of the procedure being performed  Site marked: the operative site was marked  Patient identity confirmed: verbally with patient    Supporting Documentation  Indications: pain   Procedure Details  Location: knee - R knee  Preparation: Patient was prepped and draped in the usual sterile fashion  Needle size: 18 G  Ultrasound guidance: no  Approach: anterolateral  Medications administered: 4 mL bupivacaine 0.25 %; 40 mg triamcinolone acetonide 40 mg/mL    Aspirate amount: 25 mL  Aspirate: clear    Patient tolerance: patient tolerated the procedure well with no immediate complications  Dressing:  Sterile dressing applied          BMI:   Estimated body mass index is 20.67 kg/m² as calculated from the following:    Height as of this encounter: 5' 2" (1.575 m). Weight as of this encounter: 51.3 kg (113 lb). BSA:   Estimated body surface area is 1.5 meters squared as calculated from the following:    Height as of this encounter: 5' 2" (1.575 m). Weight as of this encounter: 51.3 kg (113 lb).            Scribe Attestation    I,:  Lacey Ash am acting as a scribe while in the presence of the attending physician.:       I,:  Jessica Roads, DO personally performed the services described in this documentation    as scribed in my presence.:

## 2023-08-31 ENCOUNTER — TELEPHONE (OUTPATIENT)
Dept: ENDOCRINOLOGY | Facility: CLINIC | Age: 78
End: 2023-08-31

## 2023-08-31 ENCOUNTER — OFFICE VISIT (OUTPATIENT)
Dept: FAMILY MEDICINE CLINIC | Facility: CLINIC | Age: 78
End: 2023-08-31
Payer: MEDICARE

## 2023-08-31 VITALS
TEMPERATURE: 97.2 F | SYSTOLIC BLOOD PRESSURE: 130 MMHG | OXYGEN SATURATION: 100 % | HEART RATE: 54 BPM | WEIGHT: 111.2 LBS | DIASTOLIC BLOOD PRESSURE: 62 MMHG | BODY MASS INDEX: 20.34 KG/M2 | RESPIRATION RATE: 16 BRPM

## 2023-08-31 DIAGNOSIS — M25.511 ACUTE PAIN OF RIGHT SHOULDER: Primary | ICD-10-CM

## 2023-08-31 PROCEDURE — 99213 OFFICE O/P EST LOW 20 MIN: CPT | Performed by: NURSE PRACTITIONER

## 2023-08-31 NOTE — PROGRESS NOTES
Name: Radha Ashley      : 1945      MRN: [id number]  Encounter Provider: RACHEL Monk  Encounter Date: 2023   Encounter department: [facility]     1. Acute pain of right shoulder  Assessment & Plan:  Due to trauma . Normal strength and sensation. Pain with AROM, pain with passive shoulder adduction. Treat with rest, nsaids. OK to use her R arm but avoid overuse as much as possible. Discussed options for medication, will start with ibuprofen 1 tab q8 for 5 days. Ok to increase to 2 tabs if necessary. Advised to take with food. If pain worsens or fails to improve she will call for recommendations. Subjective      Pt is a 68 y.o. y/o female who is seen today for evaluation of shoulder pain. Past medical history includes CAD, HTN, PAC, cervical radiculopathy, lumbar OA, spasmodic torticolis, T11 T12 compression fracture, osteopenia, scoliosis, thrombocytopenia, anemia, hyperlipidemia, CABG x 3, depression. She states on  she was walking her dog and the dog suddenly took off across the street and pulled her. She fell and landed on her R elbow and R knee. Initially nothing was bothering her but the next day she developed pain in her R shoulder when she goes to lift her arm. She denies numbness, tingling, swelling, bruising. No injury to her knee. She tried ice and acetaminophen initially but now is not taking anything. She says pain is less than  but has been unchanged otherwise. She describes pain as an ache, it does not radiate. Review of Systems   Respiratory: Negative for chest tightness and shortness of breath. Cardiovascular: Negative for chest pain. Gastrointestinal: Negative for vomiting. Musculoskeletal: Positive for arthralgias. Negative for joint swelling and myalgias. Neurological: Negative for weakness and numbness. Hematological: Negative for adenopathy.    Psychiatric/Behavioral: Negative for sleep disturbance. Current Outpatient Medications on File Prior to Visit   Medication Sig   • acetaminophen (TYLENOL) 650 mg CR tablet Take 650 mg by mouth daily as needed for mild pain   • aspirin (ECOTRIN LOW STRENGTH) 81 mg EC tablet Take 1 tablet (81 mg total) by mouth daily   • Calcium Carb-Cholecalciferol 600-200 MG-UNIT TABS Calcium 600 + D TABS  TAKE 1 TABLET DAILY. Refills: 0    Active   • denosumab (PROLIA) 60 mg/mL Inject 60 mg under the skin every 6 (six) months   • losartan (COZAAR) 100 MG tablet TAKE ONE TABLET BY MOUTH EVERY DAY   • melatonin 3 mg Take 3 mg by mouth daily at bedtime   • metoprolol tartrate (LOPRESSOR) 25 mg tablet TAKE ONE-HALF TABLET BY MOUTH EVERY 12 HOURS   • rosuvastatin (CRESTOR) 40 MG tablet TAKE ONE TABLET BY MOUTH EVERY DAY   • VITAMIN D, CHOLECALCIFEROL, PO Take 2,600 Units/day by mouth   • cabergoline (DOSTINEX) 0.5 MG tablet Take 0.5 tablets (0.25 mg total) by mouth 2 (two) times a week (Patient not taking: Reported on 8/31/2023)   • LORazepam (Ativan) 0.5 mg tablet Take 1 to 2 tablets, approx 45 - 60 minutes prior to MRI, as needed for anxiety. (Patient not taking: Reported on 8/4/2023)       Objective     /62   Pulse (!) 54   Temp (!) 97.2 °F (36.2 °C)   Resp 16   Wt 50.4 kg (111 lb 3.2 oz)   SpO2 100%   BMI 20.34 kg/m²     Physical Exam  Vitals reviewed. Constitutional:       General: She is awake. She is not in acute distress. Appearance: Normal appearance. She is well-developed and well-groomed. She is not ill-appearing. Cardiovascular:      Rate and Rhythm: Regular rhythm. Bradycardia present. Pulses: Normal pulses. Heart sounds: Normal heart sounds. No murmur heard. Pulmonary:      Effort: Pulmonary effort is normal.      Breath sounds: Normal breath sounds. Musculoskeletal:         General: Normal range of motion. Right shoulder: Tenderness present.  No swelling, deformity, effusion, bony tenderness or crepitus. Normal range of motion. Normal strength. Normal pulse. Left shoulder: Normal.      Cervical back: Full passive range of motion without pain and normal range of motion. No rigidity. No muscular tenderness. Normal range of motion. Comments: Pain with active ROM, passive ROM pain with shoulder adduction. Skin:     General: Skin is warm and dry. Findings: No erythema. Neurological:      Mental Status: She is alert and oriented to person, place, and time. Sensory: Sensation is intact. Motor: Motor function is intact. Deep Tendon Reflexes: Reflexes are normal and symmetric. Psychiatric:         Attention and Perception: Attention normal.         Mood and Affect: Mood normal.         Speech: Speech normal.         Behavior: Behavior normal. Behavior is cooperative. Thought Content:  Thought content normal.         Cognition and Memory: Cognition normal.         Judgment: Judgment normal.       RACHEL Ohara

## 2023-08-31 NOTE — ASSESSMENT & PLAN NOTE
Due to trauma 8/26. Normal strength and sensation. Pain with AROM, pain with passive shoulder adduction. Treat with rest, nsaids. OK to use her R arm but avoid overuse as much as possible. Discussed options for medication, will start with ibuprofen 1 tab q8 for 5 days. Ok to increase to 2 tabs if necessary. Advised to take with food. If pain worsens or fails to improve she will call for recommendations.

## 2023-09-06 NOTE — TELEPHONE ENCOUNTER
Received Summary of Benefits for Prolia on 9/26/23. No auth required. Patient has met her $226 medicare deductible. There is no OOP cost to the patient.

## 2023-09-13 DIAGNOSIS — E87.1 HYPONATREMIA: Primary | ICD-10-CM

## 2023-09-15 ENCOUNTER — APPOINTMENT (OUTPATIENT)
Dept: LAB | Facility: CLINIC | Age: 78
End: 2023-09-15
Payer: MEDICARE

## 2023-09-15 DIAGNOSIS — D35.2 PITUITARY MACROADENOMA WITH EXTRASELLAR EXTENSION (HCC): ICD-10-CM

## 2023-09-15 DIAGNOSIS — E87.1 HYPONATREMIA: ICD-10-CM

## 2023-09-15 DIAGNOSIS — E55.9 VITAMIN D DEFICIENCY: ICD-10-CM

## 2023-09-15 DIAGNOSIS — M81.0 OSTEOPOROSIS, UNSPECIFIED OSTEOPOROSIS TYPE, UNSPECIFIED PATHOLOGICAL FRACTURE PRESENCE: ICD-10-CM

## 2023-09-15 DIAGNOSIS — E22.1 HYPERPROLACTINEMIA (HCC): ICD-10-CM

## 2023-09-15 LAB
25(OH)D3 SERPL-MCNC: 39.1 NG/ML (ref 30–100)
ANION GAP SERPL CALCULATED.3IONS-SCNC: 7 MMOL/L
BUN SERPL-MCNC: 18 MG/DL (ref 5–25)
CALCIUM SERPL-MCNC: 9.1 MG/DL (ref 8.4–10.2)
CHLORIDE SERPL-SCNC: 101 MMOL/L (ref 96–108)
CO2 SERPL-SCNC: 29 MMOL/L (ref 21–32)
CREAT SERPL-MCNC: 0.73 MG/DL (ref 0.6–1.3)
GFR SERPL CREATININE-BSD FRML MDRD: 79 ML/MIN/1.73SQ M
GLUCOSE P FAST SERPL-MCNC: 95 MG/DL (ref 65–99)
POTASSIUM SERPL-SCNC: 4.3 MMOL/L (ref 3.5–5.3)
PROLACTIN SERPL-MCNC: 83.41 NG/ML (ref 2.74–19.64)
SODIUM SERPL-SCNC: 137 MMOL/L (ref 135–147)

## 2023-09-15 PROCEDURE — 36415 COLL VENOUS BLD VENIPUNCTURE: CPT

## 2023-09-15 PROCEDURE — 84146 ASSAY OF PROLACTIN: CPT

## 2023-09-15 PROCEDURE — 82306 VITAMIN D 25 HYDROXY: CPT

## 2023-09-15 PROCEDURE — 80048 BASIC METABOLIC PNL TOTAL CA: CPT

## 2023-09-18 NOTE — PROGRESS NOTES
Assessment and Plan:    Roger Khan was seen today for follow-up. Diagnoses and all orders for this visit:    Hyponatremia  -     torsemide (DEMADEX) 5 MG tablet; Take 1 tablet (5 mg total) by mouth daily  -     Basic metabolic panel; Future    Essential hypertension  -     torsemide (DEMADEX) 5 MG tablet; Take 1 tablet (5 mg total) by mouth daily  -     Basic metabolic panel; Future      Hyponatremia- Now improved. Continue to avoid thiazide diuretics. Secondary to thiazide diuretic, +/- volume depletion +/- low solute intake +/- ADH stimulation from nausea. Sodium level is normal.    Hypertension-  BP slightly high above goal.  Will add torsemide 5mg daily. Continue losartan and metoprolol. Can increase torsemide as needed based on home BPs. Please send in BP log in about 1 month. BP elevated at home 662-929 systolic average. Creatinine and electrolytes within normal limits. Follow up in 6 months. Please call the office with any questions or concerns. She is switching PCPs and will see Dr. Sakina Calle in January. Reason for Visit: Follow-up (HFU: Hyponatremia)    HPI: Alexander Jones is a 68 y.o. female who is here for follow up of hyponatremia. She presents today with a main complaint that since stopping the thiazide diuretic, she feels her blood pressure are labile and mostly elevated. She states they can increase up to 570 systolic but usually range 699-715 systolic. She denies SOB, N/V/D, LE edema, dizziness. She feels foggy when her pressure is significantly elevated. She does keep a BP log at home. ROS: A complete review of systems was performed and was negative unless otherwise noted in the history of present illness.     Allergies:   Thiazide-type diuretics    Medications:     Current Outpatient Medications:   •  acetaminophen (TYLENOL) 650 mg CR tablet, Take 650 mg by mouth daily as needed for mild pain, Disp: , Rfl:   •  aspirin (ECOTRIN LOW STRENGTH) 81 mg EC tablet, Take 1 tablet (81 mg total) by mouth daily, Disp: , Rfl:   •  Calcium Carb-Cholecalciferol 600-200 MG-UNIT TABS, Calcium 600 + D TABS TAKE 1 TABLET DAILY. Refills: 0  Active, Disp: , Rfl:   •  denosumab (PROLIA) 60 mg/mL, Inject 60 mg under the skin every 6 (six) months, Disp: , Rfl:   •  losartan (COZAAR) 100 MG tablet, TAKE ONE TABLET BY MOUTH EVERY DAY, Disp: 7 tablet, Rfl: 0  •  melatonin 3 mg, Take 3 mg by mouth daily at bedtime, Disp: , Rfl:   •  metoprolol tartrate (LOPRESSOR) 25 mg tablet, TAKE ONE-HALF TABLET BY MOUTH EVERY 12 HOURS, Disp: 7 tablet, Rfl: 0  •  rosuvastatin (CRESTOR) 40 MG tablet, TAKE ONE TABLET BY MOUTH EVERY DAY, Disp: 90 tablet, Rfl: 3  •  torsemide (DEMADEX) 5 MG tablet, Take 1 tablet (5 mg total) by mouth daily, Disp: 30 tablet, Rfl: 3  •  VITAMIN D, CHOLECALCIFEROL, PO, Take 2,600 Units/day by mouth, Disp: , Rfl:   •  cabergoline (DOSTINEX) 0.5 MG tablet, Take 0.5 tablets (0.25 mg total) by mouth 2 (two) times a week (Patient not taking: Reported on 8/31/2023), Disp: 10 tablet, Rfl: 0    Past Medical History:   Diagnosis Date   • Anxiety    • Arthritis    • Colon polyp    • Coronary artery disease    • Effusion of left knee     Last assessed - 9/10/15   • History of transfusion    • Hyperlipidemia    • Hypertension    • Memory loss    • Myocardial infarction [facility])    • Scoliosis    • Tick bite     Last assessed - 4/21/17     Past Surgical History:   Procedure Laterality Date   • APPENDECTOMY     • CARDIAC SURGERY     • COLONOSCOPY     • CORONARY ARTERY BYPASS GRAFT     • [place] CORONARY ARTERY BYP W/VEIN & ARTERY GRAFT 4 VEIN N/A 01/27/2020    Procedure: CORONARY ARTERY BYPASS GRAFT (CABG) x3 VESSELS, LIMA TO LAD, AND SVG TO PLB & OM;  Surgeon: Leigh Ann Huston DO;  [facility];  Service: Cardiac Surgery   • [place] ECHO TRANSESOPHAG R-T 2D W/PRB IMG ACQUISJ I&R N/A 01/27/2020    Procedure: TRANSESOPHAGEAL ECHOCARDIOGRAM (GET);   Surgeon: Leigh Ann Net, DO;  Location[facility];  Service: Cardiac Surgery   • [place] NDSC SURG W/VIDEO-ASSISTED HARVEST VEIN CABG Left 01/27/2020    Procedure: HARVEST VEIN ENDOSCOPIC ([facility]); Surgeon: Dustin Hernandez DO;  Location[facility];  Service: Cardiac Surgery   • TONSILLECTOMY      Last assessed - 4/20/17   • TUBAL LIGATION      Last assessed - 4/20/17   • TUMOR REMOVAL  1998    pelvic benign tumor removal   • WISDOM TOOTH EXTRACTION      Last assessed - 4/20/17     Family History   Problem Relation Age of Onset   • Coronary artery disease Mother    • Arthritis Mother    • Other Mother         Cardiac Disorder    • Transient ischemic attack Mother    • Heart attack Father    • Sudden death Father         SCD   • Cancer Daughter 52        kidney   • No Known Problems Paternal Aunt       reports that she has never smoked. She has been exposed to tobacco smoke. She has never used smokeless tobacco. She reports current alcohol use of about 7.0 standard drinks of alcohol per week. She reports that she does not use drugs. Physical Exam:   Vitals:    09/19/23 0835 09/19/23 0837 09/19/23 0840 09/19/23 0841   BP: 138/60 140/71 136/68 148/74   BP Location: Right arm Left arm Left arm Right arm   Patient Position: Sitting Sitting Sitting Sitting   Cuff Size: Standard Standard Standard Standard   Pulse:  56     Weight:       Height:         Body mass index is 20.56 kg/m². General: NAD  Neuro: AAO  Skin: no rash  Eyes: anicteric  ENMT: mm moist  Neck: no masses  Respiratory: CTAB  Cardiovascular: RRR  Extremities: no edema  Gastrointestinal: soft nt nd    Procedure:  No results found for this or any previous visit.     Lab Results   Component Value Date    GLUCOSE 138 01/27/2020    CALCIUM 9.1 09/15/2023     03/03/2015    K 4.3 09/15/2023    CO2 29 09/15/2023     09/15/2023    BUN 18 09/15/2023    CREATININE 0.73 09/15/2023       Results from last 7 days   Lab Units 09/15/23  0832   POTASSIUM mmol/L 4.3   CHLORIDE mmol/L 101   CO2 mmol/L 29   BUN mg/dL 18   CREATININE mg/dL 0.73   CALCIUM mg/dL 9.1     I have personally reviewed the blood work as stated above and in my note. I have personally reviewed last renal note.

## 2023-09-19 ENCOUNTER — TELEPHONE (OUTPATIENT)
Dept: ENDOCRINOLOGY | Facility: CLINIC | Age: 78
End: 2023-09-19

## 2023-09-19 ENCOUNTER — OFFICE VISIT (OUTPATIENT)
Dept: NEPHROLOGY | Facility: CLINIC | Age: 78
End: 2023-09-19
Payer: MEDICARE

## 2023-09-19 VITALS
DIASTOLIC BLOOD PRESSURE: 74 MMHG | SYSTOLIC BLOOD PRESSURE: 148 MMHG | HEART RATE: 56 BPM | WEIGHT: 112.4 LBS | BODY MASS INDEX: 20.68 KG/M2 | HEIGHT: 62 IN

## 2023-09-19 DIAGNOSIS — I10 ESSENTIAL HYPERTENSION: ICD-10-CM

## 2023-09-19 DIAGNOSIS — E87.1 HYPONATREMIA: Primary | ICD-10-CM

## 2023-09-19 DIAGNOSIS — Z95.1 S/P CABG X 3: ICD-10-CM

## 2023-09-19 PROCEDURE — 99213 OFFICE O/P EST LOW 20 MIN: CPT | Performed by: PHYSICIAN ASSISTANT

## 2023-09-19 RX ORDER — TORSEMIDE 5 MG/1
5 TABLET ORAL DAILY
Qty: 30 TABLET | Refills: 3 | Status: SHIPPED | OUTPATIENT
Start: 2023-09-19

## 2023-09-19 RX ORDER — LOSARTAN POTASSIUM 100 MG/1
100 TABLET ORAL DAILY
Qty: 90 TABLET | Refills: 3 | Status: SHIPPED | OUTPATIENT
Start: 2023-09-19

## 2023-09-19 NOTE — PATIENT INSTRUCTIONS
Hyponatremia- Now improved. Continue to avoid thiazide diuretics. Secondary to thiazide diuretic, +/- volume depletion +/- low solute intake +/- ADH stimulation from nausea. Sodium level is normal.    Hypertension-  BP slightly high. Will add torsemide 5mg daily. Continue losartan and metoprolol. Can increase torsemide as needed based on home BPs. Please send in BP log in about 1 month. BP elevated at home 610-297 systolic average. Creatinine and electrolytes within normal limits. Follow up in 6 months. Please call the office with any questions or concerns. She is switching PCPs and will see Dr. Maria Del Rosario Howard in January.

## 2023-09-19 NOTE — TELEPHONE ENCOUNTER
----- Message from rPasad Srivastava PA-C sent at 9/19/2023  9:52 AM EDT -----  Hi Dr. MONGE     This patient last saw you and is scheduled to see you back again. Thank you.      Starr Villeda     ----- Message -----  From: Lab, Background User  Sent: 9/15/2023   1:23 PM EDT  To: Prasad Srivastava PA-C

## 2023-09-19 NOTE — TELEPHONE ENCOUNTER
I called pt to discuss the results of prolactin. Her prolactin is elevated and would benefit from change in dose of cabergoline   Left a message to give us a call to make telephone appointment so we can discuss all her blood work in detail and neck steps.     Please make appt for telephone visit, next week   Thanks   Sindy Sumner

## 2023-09-26 ENCOUNTER — OFFICE VISIT (OUTPATIENT)
Dept: ENDOCRINOLOGY | Facility: CLINIC | Age: 78
End: 2023-09-26
Payer: MEDICARE

## 2023-09-26 ENCOUNTER — TELEPHONE (OUTPATIENT)
Dept: ENDOCRINOLOGY | Facility: CLINIC | Age: 78
End: 2023-09-26

## 2023-09-26 VITALS
HEIGHT: 63 IN | BODY MASS INDEX: 19.84 KG/M2 | DIASTOLIC BLOOD PRESSURE: 68 MMHG | OXYGEN SATURATION: 96 % | WEIGHT: 112 LBS | SYSTOLIC BLOOD PRESSURE: 138 MMHG | HEART RATE: 50 BPM

## 2023-09-26 DIAGNOSIS — E55.9 VITAMIN D DEFICIENCY: ICD-10-CM

## 2023-09-26 DIAGNOSIS — E22.1 HYPERPROLACTINEMIA (HCC): Primary | ICD-10-CM

## 2023-09-26 DIAGNOSIS — M81.0 OSTEOPOROSIS, UNSPECIFIED OSTEOPOROSIS TYPE, UNSPECIFIED PATHOLOGICAL FRACTURE PRESENCE: ICD-10-CM

## 2023-09-26 DIAGNOSIS — D35.2 PITUITARY MACROADENOMA (HCC): ICD-10-CM

## 2023-09-26 DIAGNOSIS — S22.080D CLOSED WEDGE COMPRESSION FRACTURE OF T11 VERTEBRA WITH ROUTINE HEALING, SUBSEQUENT ENCOUNTER: ICD-10-CM

## 2023-09-26 PROCEDURE — 99214 OFFICE O/P EST MOD 30 MIN: CPT | Performed by: INTERNAL MEDICINE

## 2023-09-26 PROCEDURE — 96372 THER/PROPH/DIAG INJ SC/IM: CPT | Performed by: INTERNAL MEDICINE

## 2023-09-26 NOTE — PATIENT INSTRUCTIONS
Do blood work first, Prolactin by Dilution Technique   We will call to review the results   Follow up in 6 months, march 2024 with blood work prior the appt ( 1 week before)     We will start Cabergoline once blood work is done, and we review the results       [facility]

## 2023-09-26 NOTE — TELEPHONE ENCOUNTER
Devendra Feliciano  Patient has an appt on April 8th 24 for a Prolia inj  Thank you   Marshall Castaneda

## 2023-09-26 NOTE — PROGRESS NOTES
Trena Schwartz 66 y.o. female MRN: [id number]    Encounter: [id number]      Assessment/Plan     Assessment: This is a 66y.o.-year-old female with pituitary adenoma   Plan:    Diagnoses and all orders for this visit:    Hyperprolactinemia ([address])  Serum prolactin level is in 70-80 range. Most likely this is from pituitary macroadenoma, causing stalk effect. Discussed to obtain prolactin by dilution technique to assess true hyperprolactinemia from adenoma. Blood work ordered again. Discussed that starting cabergoline, low-dose may help to stabilize the tumor, even though it is not producing prolactin as per observational studies. Patient will think about it and let us know  Also follow-up on prolactin by dilution technique    -     Prolactin, Dilution Study; Future  -     Prolactin Lab Collect; Future  -     denosumab (PROLIA) subcutaneous injection 60 mg    Osteoporosis, unspecified osteoporosis type, unspecified pathological fracture presence  Patient is tolerating Prolia injection 60 mg subcu every 6 months. She is due for Prolia injection today  -     denosumab (PROLIA) subcutaneous injection 60 mg    Vitamin D deficiency  Continue current dose of vitamin D3 supplementation  Closed wedge compression fracture of T11 vertebra with routine healing, subsequent encounter  Stable findings  Pituitary macroadenoma (HCC)  Followed by neurosurgery, most likely pituitary microadenoma is nonsecretory  We will continue to monitor pituitary hormonal profile  Discussed to go to the ER by calling 911, if she develops sudden acute headache, associated with blurriness of vision, nausea vomiting.       CC:   Hyperprolactinemia, pituitary adenoma      History of Present Illness     HPI:    Trena Schwartz is 19-year-old woman with medical history of osteoporosis, treated with Prolia injection every 6 months, history of vertebral fracture at T11, vitamin D deficiency, pituitary macroadenoma, elevated prolactin is here for follow-up visit.  She received Prolia injection in September 2022, March 2023 and due for Prolia injection again today. She is tolerating it well. She denies any recent history of fractures or falls. She takes calcium carbonate 600 mg daily and vitamin D3 2500 IU daily. Her last vitamin D is within normal range. He also has history of coronary artery disease status post CABG. She is not any experiencing any cardiovascular symptoms    She was found to have incidental finding of pituitary macroadenoma as she was admitted for hyponatremia, pituitary adenoma is extending to cavernous sinus. She denies any visual symptoms, headaches, dizziness. He was seen by ophthalmology as well as neurosurgery, and is managed conservatively. Per neurosurgery, it does not appear to be prolactinoma as prolactin was slightly elevated most likely stalk effect. Prolactin by dilution was ordered however it was not completed and the test was canceled by lab.   Patient was started on cabergoline empirically, patient is hesitant to take cabergoline    Lab Results   Component Value Date    CREATININE 0.73 09/15/2023           BP Readings from Last 3 Encounters:   09/28/23 126/62   09/26/23 138/68   09/19/23 148/74       Component      Latest Ref Rng 7/28/2023 7/31/2023   Sodium      135 - 147 mmol/L     Potassium      3.5 - 5.3 mmol/L     Chloride      96 - 108 mmol/L     CO2      21 - 32 mmol/L     Anion Gap      mmol/L     BUN      5 - 25 mg/dL     Creatinine      0.60 - 1.30 mg/dL     GLUCOSE FASTING      65 - 99 mg/dL     Calcium      8.4 - 10.2 mg/dL     eGFR      ml/min/1.73sq m     INSULIN-LIKE GROWTH FACTOR-1      42 - 185 ng/mL 64     PROLACTIN      2.74 - 19.64 ng/mL 81.54 (H)  72.01 (H)    FSH, POC      See Comment mIU/mL 12.2     LUTEINIZING HORMONE      See Comment mIU/mL 2.6     Free T4      0.61 - 1.12 ng/dL 0.63     TSH 3RD GENERATON      0.450 - 4.500 uIU/mL 1.059     Vit D, 25-Hydroxy      30.0 - 100.0 ng/mL Component      Latest Ref Rng 9/15/2023   Sodium      135 - 147 mmol/L 137    Potassium      3.5 - 5.3 mmol/L 4.3    Chloride      96 - 108 mmol/L 101    CO2      21 - 32 mmol/L 29    Anion Gap      mmol/L 7    BUN      5 - 25 mg/dL 18    Creatinine      0.60 - 1.30 mg/dL 0.73    GLUCOSE FASTING      65 - 99 mg/dL 95    Calcium      8.4 - 10.2 mg/dL 9.1    eGFR      ml/min/1.73sq m 79    INSULIN-LIKE GROWTH FACTOR-1      42 - 185 ng/mL    PROLACTIN      2.74 - 19.64 ng/mL 83.41 (H)    FSH, POC      See Comment mIU/mL    LUTEINIZING HORMONE      See Comment mIU/mL    Free T4      0.61 - 1.12 ng/dL    TSH 3RD GENERATON      0.450 - 4.500 uIU/mL    Vit D, 25-Hydroxy      30.0 - 100.0 ng/mL 39.1       Component      Latest Ref Rng 7/28/2023   Cortisol - AM      6.7 - 22.6 ug/dL 11.9              Review of Systems   Constitutional: Negative for activity change, diaphoresis, fatigue, fever and unexpected weight change. HENT: Negative. Eyes: Negative for visual disturbance. Respiratory: Negative for cough, chest tightness and shortness of breath. Cardiovascular: Negative for chest pain, palpitations and leg swelling. Gastrointestinal: Negative for abdominal pain, blood in stool, constipation, diarrhea, nausea and vomiting. Endocrine: Negative for cold intolerance, heat intolerance, polydipsia, polyphagia and polyuria. Genitourinary: Negative for dysuria, enuresis, frequency and urgency. Musculoskeletal: Negative for arthralgias and myalgias. Skin: Negative for pallor, rash and wound. Allergic/Immunologic: Negative. Neurological: Negative for dizziness, tremors, weakness and numbness. Hematological: Negative. Psychiatric/Behavioral: Negative.         Historical Information   Past Medical History:   Diagnosis Date   • Anxiety    • Arthritis    • Colon polyp    • Coronary artery disease    • Effusion of left knee     Last assessed - 9/10/15   • History of transfusion    • Hyperlipidemia    • Hypertension    • Memory loss    • Myocardial infarction [facility])    • Scoliosis    • Tick bite     Last assessed - 4/21/17     Past Surgical History:   Procedure Laterality Date   • APPENDECTOMY     • CARDIAC SURGERY     • COLONOSCOPY     • CORONARY ARTERY BYPASS GRAFT     • [place] CORONARY ARTERY BYP W/VEIN & ARTERY GRAFT 4 VEIN N/A 01/27/2020    Procedure: CORONARY ARTERY BYPASS GRAFT (CABG) x3 VESSELS, LIMA TO LAD, AND SVG TO PLB & OM;  Surgeon: India Burroughs DO;  [facility];  Service: Cardiac Surgery   • [place] ECHO TRANSESOPHAG R-T 2D W/PRB IMG ACQUISJ I&R N/A 01/27/2020    Procedure: TRANSESOPHAGEAL ECHOCARDIOGRAM (GET); Surgeon: India Burroughs DO;  [facility];  Service: Cardiac Surgery   • [place] NDSC SURG W/VIDEO-ASSISTED HARVEST VEIN CABG Left 01/27/2020    Procedure: HARVEST VEIN ENDOSCOPIC ([address]);   Surgeon: India Burroughs DO;  [facility];  Service: Cardiac Surgery   • TONSILLECTOMY      Last assessed - 4/20/17   • TUBAL LIGATION      Last assessed - 4/20/17   • TUMOR REMOVAL  1998    pelvic benign tumor removal   • WISDOM TOOTH EXTRACTION      Last assessed - 4/20/17     Social History   Social History     Substance and Sexual Activity   Alcohol Use Yes   • Alcohol/week: 7.0 standard drinks of alcohol   • Types: 7 Glasses of wine per week    Comment: 1 wine nightly     Social History     Substance and Sexual Activity   Drug Use No     Social History     Tobacco Use   Smoking Status Never   • Passive exposure: Past   Smokeless Tobacco Never     Family History:   Family History   Problem Relation Age of Onset   • Coronary artery disease Mother    • Arthritis Mother    • Other Mother         Cardiac Disorder    • Transient ischemic attack Mother    • Heart attack Father    • Sudden death Father         SCD   • Cancer Daughter 52        kidney   • No Known Problems Paternal Aunt        Meds/Allergies   Current Outpatient Medications   Medication Sig Dispense Refill   • acetaminophen (TYLENOL) 650 mg CR tablet Take 650 mg by mouth daily as needed for mild pain     • aspirin (ECOTRIN LOW STRENGTH) 81 mg EC tablet Take 1 tablet (81 mg total) by mouth daily     • Calcium Carb-Cholecalciferol 600-200 MG-UNIT TABS Calcium 600 + D TABS  TAKE 1 TABLET DAILY. Refills: 0    Active     • denosumab (PROLIA) 60 mg/mL Inject 60 mg under the skin every 6 (six) months     • losartan (COZAAR) 100 MG tablet Take 1 tablet (100 mg total) by mouth daily 90 tablet 3   • melatonin 3 mg Take 3 mg by mouth daily at bedtime     • metoprolol tartrate (LOPRESSOR) 25 mg tablet Take 0.5 tablets (12.5 mg total) by mouth every 12 (twelve) hours 90 tablet 3   • rosuvastatin (CRESTOR) 40 MG tablet TAKE ONE TABLET BY MOUTH EVERY DAY 90 tablet 3   • torsemide (DEMADEX) 5 MG tablet Take 1 tablet (5 mg total) by mouth daily 30 tablet 3   • VITAMIN D, CHOLECALCIFEROL, PO Take 2,600 Units/day by mouth       No current facility-administered medications for this visit. Allergies   Allergen Reactions   • Thiazide-Type Diuretics Other (See Comments)     Hyponatremia       Objective   Vitals: Blood pressure 138/68, pulse (!) 50, height 5' 3" (1.6 m), weight 50.8 kg (112 lb), SpO2 96 %. Physical Exam  Vitals reviewed. Constitutional:       General: She is not in acute distress. Appearance: Normal appearance. She is not ill-appearing. HENT:      Head: Normocephalic and atraumatic. Nose: Nose normal.   Eyes:      Extraocular Movements: Extraocular movements intact. Conjunctiva/sclera: Conjunctivae normal.   Pulmonary:      Effort: No respiratory distress. Musculoskeletal:      Cervical back: Normal range of motion. Neurological:      General: No focal deficit present. Mental Status: She is alert and oriented to person, place, and time.    Psychiatric:         Mood and Affect: Mood normal.         Behavior: Behavior normal.         The history was obtained from the review of the chart, patient. Lab Results:   Lab Results   Component Value Date/Time    TSH 3RD GENERATON 1.059 07/28/2023 08:34 AM    TSH 3RD GENERATON 0.953 07/06/2023 09:59 AM    TSH 3RD GENERATON 0.961 01/03/2023 08:33 AM    Free T4 0.63 07/28/2023 08:34 AM       Imaging Studies:       I have personally reviewed pertinent reports. Portions of the record may have been created with voice recognition software. Occasional wrong word or "sound a like" substitutions may have occurred due to the inherent limitations of voice recognition software. Read the chart carefully and recognize, using context, where substitutions have occurred.

## 2023-09-27 NOTE — PROGRESS NOTES
Assessment/Plan:    Charlene Tucker came into the [facility] Office today 09/27/23 to receive Prolia injection. Verbal consent obtained. Consent given by: patient    patient states patient has been medically healthy with no underlining concerns/complications. Charlene Tucker presents with no symptoms today. All insturctions were reviewed with the patient. If the patient should have any questions/concerns, advised patient to contacted [facility]. Subjective:     History provided by: patient    Patient ID: Charlene Tucker is a 68 y.o. female      Objective:    Vitals:    09/26/23 1100   BP: 138/68   BP Location: Right arm   Patient Position: Sitting   Cuff Size: Standard   Pulse: (!) 50   SpO2: 96%   Weight: 50.8 kg (112 lb)   Height: 5' 3" (1.6 m)       Patient tolerated the injection well without any complications. Injection site/s R Arm. Medication was provided by Office. Patient signed consent form yes   Patient signed Kenji Amabile form yes (If no patient is not a medicare patient). Patient waited 15 minutes after injection no (This only applies to patient's receiving first time injection).        Last Visit: 9/19/2023  Next visit:9/26/2023

## 2023-09-28 ENCOUNTER — OFFICE VISIT (OUTPATIENT)
Dept: OBGYN CLINIC | Facility: MEDICAL CENTER | Age: 78
End: 2023-09-28
Payer: MEDICARE

## 2023-09-28 VITALS
DIASTOLIC BLOOD PRESSURE: 62 MMHG | HEART RATE: 55 BPM | HEIGHT: 63 IN | BODY MASS INDEX: 19.84 KG/M2 | WEIGHT: 112 LBS | SYSTOLIC BLOOD PRESSURE: 126 MMHG

## 2023-09-28 DIAGNOSIS — M17.11 PRIMARY OSTEOARTHRITIS OF RIGHT KNEE: Primary | ICD-10-CM

## 2023-09-28 PROCEDURE — 99213 OFFICE O/P EST LOW 20 MIN: CPT | Performed by: STUDENT IN AN ORGANIZED HEALTH CARE EDUCATION/TRAINING PROGRAM

## 2023-09-28 NOTE — PROGRESS NOTES
Knee New Office Note    Assessment:     1. Primary osteoarthritis of right knee        Plan:     Problem List Items Addressed This Visit    None  Visit Diagnoses     Primary osteoarthritis of right knee    -  Primary          Findings today are consistent with right knee osteoarthritis. Imaging and prognosis was reviewed with the patient today. Discussed treatment options including repeat cortisone steroid injection, visco injections, anti-inflammatories, low impact exercises and Voltaren gel vs surgical intervention for total knee arthroplasty. She would like to continue conservative management. Follow up in 6 weeks, consider repeat cortisone injection at that time. Subjective:     Patient ID: Alexander Jones is a 68 y.o. female. Chief Complaint:  HPI:  68 y.o. female presents to the office for follow up of right knee osteoarthritis. At last visit she received an aspiration and cortisone injection which was significantly beneficial for 7-10 days and still has been dulling her pain. She describes her pain as dull and achy. She continues to experience medial and lateral knee pain. She notes occasional radiation up her lateral thigh and down her lateral calf both eminating from her knee. She does experience buckling of the right knee weekly. She has tried visco injections in the past without relief. She denies any numbness or tingling.      Allergy:  Allergies   Allergen Reactions   • Thiazide-Type Diuretics Other (See Comments)     Hyponatremia     Medications:  all current active meds have been reviewed  Past Medical History:  Past Medical History:   Diagnosis Date   • Anxiety    • Arthritis    • Colon polyp    • Coronary artery disease    • Effusion of left knee     Last assessed - 9/10/15   • History of transfusion    • Hyperlipidemia    • Hypertension    • Memory loss    • Myocardial infarction [facility])    • Scoliosis    • Tick bite     Last assessed - 4/21/17     Past Surgical History:  Past Surgical History: Procedure Laterality Date   • APPENDECTOMY     • CARDIAC SURGERY     • COLONOSCOPY     • CORONARY ARTERY BYPASS GRAFT     • NM CORONARY ARTERY BYP W/VEIN & ARTERY GRAFT 4 VEIN N/A 01/27/2020    Procedure: CORONARY ARTERY BYPASS GRAFT (CABG) x3 VESSELS, LIMA TO LAD, AND SVG TO PLB & OM;  Surgeon: Abel Bence, DO;  [facility];  Service: Cardiac Surgery   • NM ECHO TRANSESOPHAG R-T 2D W/PRB IMG ACQUISJ I&R N/A 01/27/2020    Procedure: TRANSESOPHAGEAL ECHOCARDIOGRAM (GET); Surgeon: Abel Bence, DO;  [facility];  Service: Cardiac Surgery   • NM NDSC SURG W/VIDEO-ASSISTED HARVEST VEIN CABG Left 01/27/2020    Procedure: HARVEST VEIN ENDOSCOPIC ([address]);   Surgeon: Abel Bence, DO;  [facility];  Service: Cardiac Surgery   • TONSILLECTOMY      Last assessed - 4/20/17   • TUBAL LIGATION      Last assessed - 4/20/17   • TUMOR REMOVAL  1998    pelvic benign tumor removal   • WISDOM TOOTH EXTRACTION      Last assessed - 4/20/17     Family History:  Family History   Problem Relation Age of Onset   • Coronary artery disease Mother    • Arthritis Mother    • Other Mother         Cardiac Disorder    • Transient ischemic attack Mother    • Heart attack Father    • Sudden death Father         SCD   • Cancer Daughter 52        kidney   • No Known Problems Paternal Aunt      Social History:  Social History     Substance and Sexual Activity   Alcohol Use Yes   • Alcohol/week: 7.0 standard drinks of alcohol   • Types: 7 Glasses of wine per week    Comment: 1 wine nightly     Social History     Substance and Sexual Activity   Drug Use No     Social History     Tobacco Use   Smoking Status Never   • Passive exposure: Past   Smokeless Tobacco Never           ROS:  General: Per HPI  Skin: Negative, except if noted below  HEENT: Negative  Respiratory: Negative  Cardiovascular: Negative  Gastrointestinal: Negative  Urinary: Negative  Vascular: Negative  Musculoskeletal: Positive per HPI Neurologic: Positive per HPI  Endocrine: Negative    Objective:  BP Readings from Last 1 Encounters:   09/28/23 126/62      Wt Readings from Last 1 Encounters:   09/28/23 50.8 kg (112 lb)        Respiratory:   non-labored respirations    Lymphatics:  no palpable lymph nodes    Gait:   altered     Neurologic:   Alert and oriented times 3  Patient with normal sensation except as noted below  Deep tendon reflexes 2+ except as noted in MSK exam     Bilateral Lower Extremity:     Right knee: Inspection:  Skin intact    Overall limb alignment neutral    Effusion: mild    ROM 3-125 w/o pain    Extensor Lag: none    Palpation: medial Joint line tenderness to palpation    AP Stability at 90 deg stable    M/L stability in full extension stable    M/L stability in midflexion stable    Motor: 5/5 Q/HS/TA/GS/P    Pulses: 2+ DP / 2+ PT    SILT DP/SP/S/S/TN    Imaging:  No new imaging obtained today    BMI:   Estimated body mass index is 19.84 kg/m² as calculated from the following:    Height as of this encounter: 5' 3" (1.6 m). Weight as of this encounter: 50.8 kg (112 lb). BSA:   Estimated body surface area is 1.51 meters squared as calculated from the following:    Height as of this encounter: 5' 3" (1.6 m). Weight as of this encounter: 50.8 kg (112 lb).            Scribe Attestation    I,:  Raphael Faust am acting as a scribe while in the presence of the attending physician.:       I,:  Elver Felder, DO personally performed the services described in this documentation    as scribed in my presence.:

## 2023-10-02 ENCOUNTER — TELEPHONE (OUTPATIENT)
Dept: NEPHROLOGY | Facility: CLINIC | Age: 78
End: 2023-10-02

## 2023-10-02 DIAGNOSIS — D35.2 PITUITARY MACROADENOMA (HCC): Primary | ICD-10-CM

## 2023-10-02 NOTE — TELEPHONE ENCOUNTER
Please inform pt that Prolactin by dliution is the must test, to assess true prolactin level. It should  be covered by her insurance as it is a different test not the same as prolactin.     Before starting cabergoline I would like to have prolactin by dilution done for sure     [address]

## 2023-10-02 NOTE — TELEPHONE ENCOUNTER
Patient went to have lab work done for Prolactin level and Prolactin Dilution level and the lab wouldn't draw it as she just had Prolactin drawn on 9/15 and insurance wouldn't cover it. She asked if she could just do the Dilution study but they refused because they were worried insurance wouldn't cover that either. Patient wondering if Dr. Emy Chappell can use the labs drawn on 9/15 to determine what to do about starting Cabergoline.

## 2023-10-03 NOTE — TELEPHONE ENCOUNTER
Lab results for Prolactin dilution test viewed. Patient notified and she said if you think she should be on the medication she will take it.  Please advise

## 2023-10-04 ENCOUNTER — TELEPHONE (OUTPATIENT)
Dept: ENDOCRINOLOGY | Facility: CLINIC | Age: 78
End: 2023-10-04

## 2023-10-04 DIAGNOSIS — E22.1 HYPERPROLACTINEMIA (HCC): Primary | ICD-10-CM

## 2023-10-04 DIAGNOSIS — E55.9 VITAMIN D DEFICIENCY: ICD-10-CM

## 2023-10-04 RX ORDER — CABERGOLINE 0.5 MG/1
TABLET ORAL
Qty: 6 TABLET | Refills: 2 | Status: SHIPPED | OUTPATIENT
Start: 2023-10-04

## 2023-10-04 NOTE — TELEPHONE ENCOUNTER
Please inform pt that as we discussed we will start very low dose cabergoline 0.25 mg once a week   Have sent prescription to the pharmacy  She should go for blood work for prolactin in 3 months  Order is in epic    Moo Irais

## 2023-10-11 ENCOUNTER — OFFICE VISIT (OUTPATIENT)
Dept: FAMILY MEDICINE CLINIC | Facility: CLINIC | Age: 78
End: 2023-10-11
Payer: MEDICARE

## 2023-10-11 VITALS
TEMPERATURE: 98.3 F | HEART RATE: 62 BPM | WEIGHT: 113 LBS | BODY MASS INDEX: 20.02 KG/M2 | OXYGEN SATURATION: 96 % | SYSTOLIC BLOOD PRESSURE: 122 MMHG | RESPIRATION RATE: 14 BRPM | DIASTOLIC BLOOD PRESSURE: 62 MMHG | HEIGHT: 63 IN

## 2023-10-11 DIAGNOSIS — Z23 ENCOUNTER FOR IMMUNIZATION: ICD-10-CM

## 2023-10-11 DIAGNOSIS — I10 ESSENTIAL HYPERTENSION: Primary | ICD-10-CM

## 2023-10-11 PROCEDURE — 90662 IIV NO PRSV INCREASED AG IM: CPT

## 2023-10-11 PROCEDURE — 99213 OFFICE O/P EST LOW 20 MIN: CPT | Performed by: FAMILY MEDICINE

## 2023-10-11 PROCEDURE — G0008 ADMIN INFLUENZA VIRUS VAC: HCPCS

## 2023-10-11 RX ORDER — TORSEMIDE 10 MG/1
10 TABLET ORAL DAILY
Qty: 30 TABLET | Refills: 2 | Status: SHIPPED | OUTPATIENT
Start: 2023-10-11 | End: 2024-01-09

## 2023-10-12 ENCOUNTER — TELEPHONE (OUTPATIENT)
Dept: FAMILY MEDICINE CLINIC | Facility: CLINIC | Age: 78
End: 2023-10-12

## 2023-10-12 NOTE — TELEPHONE ENCOUNTER
PRISCA: spoke to patient to get her scheduled for her BP but she had come in yesterday and saw Dr. Harvinder Solano.

## 2023-10-13 NOTE — PROGRESS NOTES
Name: Alejandra Garrison      : 1945      MRN: [id number]  Encounter Provider: Sara Perez DO  Encounter Date: 10/11/2023   Encounter department: [facility]     1. Essential hypertension  Assessment & Plan:  Blood Pressure: 122/62    -Pressure stable in office. Patient reporting at home she is getting readings 150-170/90 in the past few days. -previously was well controlled on losartan 100 mg, metoprolol 25 mg, and HCTZ which needed to be discontinued due to hyponatremia  -Recently had torsemide 5 mg added by nephrology which she believes has not changed anything.  -Denies any symptoms of hyper or hypotension  -We will increase torsemide to 5 mg p.o. daily  -Continue to monitor blood pressures at home  -Some monitoring for low-salt, low-carb, whole food diet.  -Crease activity level for goal of 30 minutes of moderate intensity exercise 5 days a week as able. -Follow-up with continued elevation in blood pressure as needed. Orders:  -     torsemide (DEMADEX) 10 mg tablet; Take 1 tablet (10 mg total) by mouth daily    2. Encounter for immunization  -     influenza vaccine, high-dose, PF 0.7 mL (Haylie White)           Subjective     Nidia Hermosillo is a 70-year-old female with past medical history of hypertension, hyperlipidemia, CAD who presents today for follow-up of her blood pressure. Over the past month or so she has recently been discontinued on HCTZ due to hyponatremia. She has followed with nephrology after her incident of hyponatremia and noted to be improved off HCTZ. However, with continued elevations in her blood pressure they added torsemide 5 mg p.o. daily. Since adding this she has noted mild improvement in her blood pressure however still at home getting numbers 150s sometimes 170s over 90s. She admits she is trying to watch her diet, decrease her salt. We discussed the importance of this. He denies any signs and symptoms of hypo or hypertension.   She denies any fevers, chills, nausea, vomiting, diarrhea. She is eating per her normal and trying to get adequate fluid intake. She denies any trouble sleeping. No chest pain, palpitations, shortness of breath, no unwanted weight gain or loss. Review of Systems   Constitutional:  Negative for chills and fever. HENT:  Negative for congestion, ear pain, postnasal drip, rhinorrhea and sore throat. Eyes:  Negative for pain and visual disturbance. Respiratory:  Negative for cough and shortness of breath. Cardiovascular:  Negative for chest pain and palpitations. Gastrointestinal:  Negative for abdominal pain and vomiting. Genitourinary:  Negative for dysuria and hematuria. Musculoskeletal:  Negative for arthralgias and back pain. Skin:  Negative for color change and rash. Neurological:  Negative for dizziness, seizures, syncope, weakness and headaches. Psychiatric/Behavioral:  Negative for confusion, sleep disturbance and suicidal ideas. The patient is not nervous/anxious. All other systems reviewed and are negative. Past Medical History:   Diagnosis Date    Anxiety     Arthritis     Colon polyp     Coronary artery disease     Effusion of left knee     Last assessed - 9/10/15    History of transfusion     Hyperlipidemia     Hypertension     Memory loss     Myocardial infarction [facility])     Scoliosis     Tick bite     Last assessed - 4/21/17     Past Surgical History:   Procedure Laterality Date    APPENDECTOMY      CARDIAC SURGERY      COLONOSCOPY      CORONARY ARTERY BYPASS GRAFT      DC CORONARY ARTERY BYP W/VEIN & ARTERY GRAFT 4 VEIN N/A 01/27/2020    Procedure: CORONARY ARTERY BYPASS GRAFT (CABG) x3 VESSELS, LIMA TO LAD, AND SVG TO PLB & OM;  Surgeon: Cynthia Holland DO;  [facility];  Service: Cardiac Surgery    DC ECHO TRANSESOPHAG R-T 2D W/PRB IMG ACQUISJ I&R N/A 01/27/2020    Procedure: TRANSESOPHAGEAL ECHOCARDIOGRAM (GET);   Surgeon: Cynthia Holland DO;  [facility];  Service: Cardiac Surgery    FL NDSC SURG W/VIDEO-ASSISTED HARVEST VEIN CABG Left 01/27/2020    Procedure: HARVEST VEIN ENDOSCOPIC ([facility]); Surgeon: Rashad Carrillo DO;  [facility];  Service: Cardiac Surgery    TONSILLECTOMY      Last assessed - 4/20/17    TUBAL LIGATION      Last assessed - 4/20/17    TUMOR REMOVAL  1998    pelvic benign tumor removal    WISDOM TOOTH EXTRACTION      Last assessed - 4/20/17     Family History   Problem Relation Age of Onset    Coronary artery disease Mother     Arthritis Mother     Other Mother         Cardiac Disorder     Transient ischemic attack Mother     Heart attack Father     Sudden death Father         SCD    Cancer Daughter 52        kidney    No Known Problems Paternal Aunt      Social History     Socioeconomic History    Marital status:      Spouse name: None    Number of children: 3    Years of education: Post Graduate    Highest education level: None   Occupational History    Occupation:      Comment: P/T    Occupation: Retired     Comment: RN   Tobacco Use    Smoking status: Never     Passive exposure: Past    Smokeless tobacco: Never   Vaping Use    Vaping Use: Never used   Substance and Sexual Activity    Alcohol use: Yes     Alcohol/week: 7.0 standard drinks of alcohol     Types: 7 Glasses of wine per week     Comment: 1 wine nightly    Drug use: No    Sexual activity: Not Currently   Other Topics Concern    None   Social History Narrative    Living alone     Social Determinants of Health     Financial Resource Strain: Low Risk  (1/9/2023)    Overall Financial Resource Strain (CARDIA)     Difficulty of Paying Living Expenses: Not hard at all   Food Insecurity: Not on file   Transportation Needs: No Transportation Needs (1/9/2023)    PRAPARE - Transportation     Lack of Transportation (Medical): No     Lack of Transportation (Non-Medical):  No   Physical Activity: Not on file   Stress: Not on file   Social Connections: Not on file   Intimate Partner Violence: Not on file   Housing Stability: Not on file     Current Outpatient Medications on File Prior to Visit   Medication Sig    acetaminophen (TYLENOL) 650 mg CR tablet Take 650 mg by mouth daily as needed for mild pain    aspirin (ECOTRIN LOW STRENGTH) 81 mg EC tablet Take 1 tablet (81 mg total) by mouth daily    Calcium Carb-Cholecalciferol 600-200 MG-UNIT TABS Calcium 600 + D TABS  TAKE 1 TABLET DAILY.    Refills: 0    Active    denosumab (PROLIA) 60 mg/mL Inject 60 mg under the skin every 6 (six) months    losartan (COZAAR) 100 MG tablet Take 1 tablet (100 mg total) by mouth daily    melatonin 3 mg Take 3 mg by mouth daily at bedtime    metoprolol tartrate (LOPRESSOR) 25 mg tablet Take 0.5 tablets (12.5 mg total) by mouth every 12 (twelve) hours    rosuvastatin (CRESTOR) 40 MG tablet TAKE ONE TABLET BY MOUTH EVERY DAY    VITAMIN D, CHOLECALCIFEROL, PO Take 2,600 Units/day by mouth    cabergoline (DOSTINEX) 0.5 MG tablet Take 1/2 tab once a week (Patient not taking: Reported on 10/11/2023)     Allergies   Allergen Reactions    Thiazide-Type Diuretics Other (See Comments)     Hyponatremia     Immunization History   Administered Date(s) Administered    COVID-19 PFIZER VACCINE 0.3 ML IM 01/15/2021, 02/04/2021, 09/30/2021    COVID-19 Pfizer Vac BIVALENT Darius-sucrose 12 Yr+ IM 10/03/2022    Hep A, adult 11/19/2015    INFLUENZA 10/01/2012, 10/10/2013, 10/10/2013, 10/01/2014, 10/01/2014, 10/01/2015, 10/16/2015, 10/01/2017, 10/20/2017, 11/06/2019, 10/15/2020, 11/10/2021, 10/03/2022    Influenza Quadrivalent Preservative Free 3 years and older IM 10/01/2016, 10/20/2017    Influenza, high dose seasonal 0.7 mL 11/21/2018, 10/11/2023    Pneumococcal Conjugate 13-Valent 05/21/2019    Pneumococcal Polysaccharide PPV23 05/02/2011    Tdap 11/19/2015, 09/01/2018    Typhoid Live, oral 11/19/2015    Typhoid, Unspecified 11/19/2015    Zoster 07/11/2018    Zoster Vaccine Recombinant 11/27/2018 Objective     /62 (BP Location: Left arm, Patient Position: Sitting, Cuff Size: Adult)   Pulse 62   Temp 98.3 °F (36.8 °C) (Tympanic)   Resp 14   Ht 5' 3" (1.6 m)   Wt 51.3 kg (113 lb)   SpO2 96%   BMI 20.02 kg/m²     Physical Exam  Vitals and nursing note reviewed. Constitutional:       Appearance: Normal appearance. HENT:      Head: Normocephalic and atraumatic. Right Ear: Tympanic membrane and external ear normal.      Left Ear: Tympanic membrane and external ear normal.      Nose: Nose normal.      Mouth/Throat:      Mouth: Mucous membranes are moist.   Eyes:      Extraocular Movements: Extraocular movements intact. Conjunctiva/sclera: Conjunctivae normal.      Pupils: Pupils are equal, round, and reactive to light. Cardiovascular:      Rate and Rhythm: Normal rate and regular rhythm. Pulses: Normal pulses. Heart sounds: Normal heart sounds. Pulmonary:      Effort: Pulmonary effort is normal.      Breath sounds: Normal breath sounds. Abdominal:      General: Bowel sounds are normal.      Palpations: Abdomen is soft. Musculoskeletal:         General: Normal range of motion. Cervical back: Normal range of motion. Lymphadenopathy:      Cervical: No cervical adenopathy. Skin:     General: Skin is warm. Capillary Refill: Capillary refill takes less than 2 seconds. Neurological:      General: No focal deficit present. Mental Status: She is alert and oriented to person, place, and time.    Psychiatric:         Mood and Affect: Mood normal.         Behavior: Behavior normal.       Rogelio Shields DO

## 2023-10-13 NOTE — ASSESSMENT & PLAN NOTE
Blood Pressure: 122/62    -Pressure stable in office. Patient reporting at home she is getting readings 150-170/90 in the past few days. -previously was well controlled on losartan 100 mg, metoprolol 25 mg, and HCTZ which needed to be discontinued due to hyponatremia  -Recently had torsemide 5 mg added by nephrology which she believes has not changed anything.  -Denies any symptoms of hyper or hypotension  -We will increase torsemide to 5 mg p.o. daily  -Continue to monitor blood pressures at home  -Some monitoring for low-salt, low-carb, whole food diet.  -Crease activity level for goal of 30 minutes of moderate intensity exercise 5 days a week as able. -Follow-up with continued elevation in blood pressure as needed.

## 2023-10-17 ENCOUNTER — OFFICE VISIT (OUTPATIENT)
Dept: OBGYN CLINIC | Facility: MEDICAL CENTER | Age: 78
End: 2023-10-17
Payer: MEDICARE

## 2023-10-17 VITALS
WEIGHT: 113 LBS | HEIGHT: 63 IN | HEART RATE: 60 BPM | SYSTOLIC BLOOD PRESSURE: 149 MMHG | BODY MASS INDEX: 20.02 KG/M2 | DIASTOLIC BLOOD PRESSURE: 79 MMHG

## 2023-10-17 DIAGNOSIS — M17.11 PRIMARY OSTEOARTHRITIS OF RIGHT KNEE: Primary | ICD-10-CM

## 2023-10-17 PROCEDURE — 99213 OFFICE O/P EST LOW 20 MIN: CPT | Performed by: STUDENT IN AN ORGANIZED HEALTH CARE EDUCATION/TRAINING PROGRAM

## 2023-10-17 NOTE — PROGRESS NOTES
Knee Follow up Office Note    Assessment:   Findings today are consistent with right knee osteoarthritis. Imaging and prognosis was reviewed with the patient today. Discussed treatment options including repeat cortisone steroid injection, visco injections, anti-inflammatories, low impact exercises and Voltaren gel vs surgical intervention for total knee arthroplasty. She would like to continue nonoperative management at this time after surgical discussion. She can follow up in 4 weeks for repeat injection to right knee and re-evaluation. Plan:   Diagnoses and all orders for this visit:    Primary osteoarthritis of right knee         Subjective:     Patient ID: Sharmila Dumont is a 66 y.o. female. Chief Complaint: Right knee pain  HPI: Right knee pain for years located lateral knee, radiating down into anterolateral lower leg. Pain is worse with activities and keeps her from doing the things she wants to do. She can't kneel due to anterior knee pain. She has tried tylenol but doesn't need to take it most days. The steroid and visco injections have given her good relief in the past but she states that they dont last very long just a couple week. She notes that she cannot do yoga or kneel to garden without some pain and so she avoids these activities. But she reports no pain with walking. She is here today to discuss surgery.       Allergy:  Allergies   Allergen Reactions    Thiazide-Type Diuretics Other (See Comments)     Hyponatremia     Medications:  all current active meds have been reviewed  Past Medical History:  Past Medical History:   Diagnosis Date    Anxiety     Arthritis     Colon polyp     Coronary artery disease     Effusion of left knee     Last assessed - 9/10/15    History of transfusion     Hyperlipidemia     Hypertension     Memory loss     Myocardial infarction [facility])     Scoliosis     Tick bite     Last assessed - 4/21/17     Past Surgical History:  Past Surgical History:   Procedure Laterality Date APPENDECTOMY      CARDIAC SURGERY      COLONOSCOPY      CORONARY ARTERY BYPASS GRAFT      CO CORONARY ARTERY BYP W/VEIN & ARTERY GRAFT 4 VEIN N/A 01/27/2020    Procedure: CORONARY ARTERY BYPASS GRAFT (CABG) x3 VESSELS, LIMA TO LAD, AND SVG TO PLB & OM;  Surgeon: Long Leal DO;  [facility];  Service: Cardiac Surgery    CO ECHO TRANSESOPHAG R-T 2D W/PRB IMG ACQUISJ I&R N/A 01/27/2020    Procedure: TRANSESOPHAGEAL ECHOCARDIOGRAM (GET); Surgeon: Long Leal DO;  [facility];  Service: Cardiac Surgery    CO NDSC SURG W/VIDEO-ASSISTED HARVEST VEIN CABG Left 01/27/2020    Procedure: HARVEST VEIN ENDOSCOPIC ([address]);   Surgeon: Long Leal DO;  [facility];  Service: Cardiac Surgery    TONSILLECTOMY      Last assessed - 4/20/17    TUBAL LIGATION      Last assessed - 4/20/17    TUMOR REMOVAL  1998    pelvic benign tumor removal    WISDOM TOOTH EXTRACTION      Last assessed - 4/20/17     Family History:  Family History   Problem Relation Age of Onset    Coronary artery disease Mother     Arthritis Mother     Other Mother         Cardiac Disorder     Transient ischemic attack Mother     Heart attack Father     Sudden death Father         SCD    Cancer Daughter 52        kidney    No Known Problems Paternal Aunt      Social History:  Social History     Substance and Sexual Activity   Alcohol Use Yes    Alcohol/week: 7.0 standard drinks of alcohol    Types: 7 Glasses of wine per week    Comment: 1 wine nightly     Social History     Substance and Sexual Activity   Drug Use No     Social History     Tobacco Use   Smoking Status Never    Passive exposure: Past   Smokeless Tobacco Never           ROS:  General: Per HPI  Skin: Negative, except if noted below  HEENT: Negative  Respiratory: Negative  Cardiovascular: Negative  Gastrointestinal: Negative  Urinary: Negative  Vascular: Negative  Musculoskeletal: Positive per HPI   Neurologic: Positive per HPI  Endocrine: Negative    Objective:  BP Readings from Last 1 Encounters:   10/17/23 149/79      Wt Readings from Last 1 Encounters:   10/17/23 51.3 kg (113 lb)        Respiratory:   non-labored respirations    Lymphatics:  no palpable lymph nodes    Gait:   normal    Neurologic:   Alert and oriented times 3  Patient with normal sensation except as noted below  Deep tendon reflexes 2+ except as noted in MSK exam    Right knee: Inspection:  intact    Overall limb alignment normal    Effusion: moderate    ROM normal with pain    Extensor Lag: none    Palpation: medial Joint line tenderness to palpation    AP Stability at 90 deg mild laxity to anterior drawer    M/L stability in full extension stable    M/L stability in midflexion stable    Motor: 5/5 Q/HS/TA/GS/P    Pulses: 2+ DP    SILT DP/SP/S/S/TN    Imaging:  My interpretation XR AP/lateral/sunrise right knee: moderate joint space narrowing. No fracture or dislocation. BMI:   Estimated body mass index is 20.02 kg/m² as calculated from the following:    Height as of this encounter: 5' 3" (1.6 m). Weight as of this encounter: 51.3 kg (113 lb). BSA:   Estimated body surface area is 1.52 meters squared as calculated from the following:    Height as of this encounter: 5' 3" (1.6 m). Weight as of this encounter: 51.3 kg (113 lb).            Scribe Attestation      I,:   am acting as a scribe while in the presence of the attending physician.:       I,:   personally performed the services described in this documentation    as scribed in my presence.:

## 2023-10-20 DIAGNOSIS — D35.2 PITUITARY MACROADENOMA (HCC): ICD-10-CM

## 2023-10-21 RX ORDER — CABERGOLINE 0.5 MG/1
TABLET ORAL
Qty: 6 TABLET | Refills: 2 | Status: SHIPPED | OUTPATIENT
Start: 2023-10-21

## 2023-10-23 DIAGNOSIS — D35.2 PITUITARY MACROADENOMA (HCC): ICD-10-CM

## 2023-10-23 RX ORDER — CABERGOLINE 0.5 MG/1
TABLET ORAL
Qty: 6 TABLET | Refills: 2 | Status: SHIPPED | OUTPATIENT
Start: 2023-10-23 | End: 2023-10-25 | Stop reason: SDUPTHER

## 2023-10-24 ENCOUNTER — TELEPHONE (OUTPATIENT)
Dept: ENDOCRINOLOGY | Facility: CLINIC | Age: 78
End: 2023-10-24

## 2023-10-24 DIAGNOSIS — D35.2 PITUITARY MACROADENOMA (HCC): ICD-10-CM

## 2023-10-24 NOTE — TELEPHONE ENCOUNTER
[facility] care vikram called due to the medication cmes in packs of 8 they need to know the quantity by 8's you would like to prescribe to the patient

## 2023-10-25 RX ORDER — CABERGOLINE 0.5 MG/1
TABLET ORAL
Qty: 8 TABLET | Refills: 2 | Status: SHIPPED | OUTPATIENT
Start: 2023-10-25

## 2023-10-26 ENCOUNTER — TELEPHONE (OUTPATIENT)
Dept: FAMILY MEDICINE CLINIC | Facility: CLINIC | Age: 78
End: 2023-10-26

## 2023-10-26 DIAGNOSIS — I10 ESSENTIAL HYPERTENSION: Primary | ICD-10-CM

## 2023-10-26 RX ORDER — AMLODIPINE BESYLATE 2.5 MG/1
2.5 TABLET ORAL DAILY
Qty: 30 TABLET | Refills: 5 | Status: SHIPPED | OUTPATIENT
Start: 2023-10-26

## 2023-10-26 NOTE — TELEPHONE ENCOUNTER
Regarding: FW: Blood Pressure  Contact: [phone]    ----- Message -----  From: Keith Grimes  Sent: 10/25/2023   8:53 AM EDT  To: Hermilo Atifal[facility]  Subject: Blood Pressure                                   ----- Message from Keith Grimes sent at 10/25/2023  8:53 AM EDT -----       ----- Message from Zita Krabbe to Monroe Jay MD sent at 10/25/2023  8:20 AM -----   Reji Mcqueen..  I saw Dr. Carl Dai on October 11 regarding my blood pressure. He increased my Torsemide and asked me to send him my BP readings after I started the increased dosage which I began on 10/12. (I compared my BP device during the visit. Casey mcarthur read 10 mm higher) I  usually sat quietly before doing the readings at home. 10/13- 6a 138/74  10/14 7:30 a 147/74  10/15 8:30 158/77  10/16  11a 173/91 (after this reading, I rested for 15 minutes and repeated it --177/96. Then I went for a walk and meditated - At 2pm, 175/93 (headacbe)  10/17  6:30 a 153/82  10/18 6a 136/78. Casey Ghoshah .               12:30p 162/76 ( after sitting and eating lunch)  10/19  7:00x232/73  10/20  8 a 151/72  10/22 8a 152/74  10/24 8a-170/86                730p - worked outside, became [employer], after lying down                 for 30 minutes, 186/90  10/25 7a 136/72                 Thanks you so much. ..   Zita Krabbe

## 2023-11-14 ENCOUNTER — OFFICE VISIT (OUTPATIENT)
Dept: FAMILY MEDICINE CLINIC | Facility: CLINIC | Age: 78
End: 2023-11-14
Payer: MEDICARE

## 2023-11-14 ENCOUNTER — APPOINTMENT (OUTPATIENT)
Dept: LAB | Facility: CLINIC | Age: 78
End: 2023-11-14
Payer: MEDICARE

## 2023-11-14 VITALS
WEIGHT: 113.4 LBS | HEART RATE: 58 BPM | OXYGEN SATURATION: 98 % | BODY MASS INDEX: 20.09 KG/M2 | TEMPERATURE: 97 F | RESPIRATION RATE: 14 BRPM | DIASTOLIC BLOOD PRESSURE: 60 MMHG | HEIGHT: 63 IN | SYSTOLIC BLOOD PRESSURE: 122 MMHG

## 2023-11-14 DIAGNOSIS — I10 ESSENTIAL HYPERTENSION: ICD-10-CM

## 2023-11-14 DIAGNOSIS — R41.3 MEMORY CHANGES: ICD-10-CM

## 2023-11-14 DIAGNOSIS — E87.1 HYPONATREMIA: ICD-10-CM

## 2023-11-14 DIAGNOSIS — R51.9 NONINTRACTABLE HEADACHE, UNSPECIFIED CHRONICITY PATTERN, UNSPECIFIED HEADACHE TYPE: ICD-10-CM

## 2023-11-14 DIAGNOSIS — I10 ESSENTIAL HYPERTENSION: Primary | ICD-10-CM

## 2023-11-14 DIAGNOSIS — E46 PROTEIN-CALORIE MALNUTRITION, UNSPECIFIED SEVERITY (HCC): ICD-10-CM

## 2023-11-14 LAB
ALBUMIN SERPL BCP-MCNC: 4.4 G/DL (ref 3.5–5)
ALP SERPL-CCNC: 39 U/L (ref 34–104)
ALT SERPL W P-5'-P-CCNC: 27 U/L (ref 7–52)
ANION GAP SERPL CALCULATED.3IONS-SCNC: 7 MMOL/L
AST SERPL W P-5'-P-CCNC: 46 U/L (ref 13–39)
BASOPHILS # BLD AUTO: 0.09 THOUSANDS/ÂΜL (ref 0–0.1)
BASOPHILS NFR BLD AUTO: 1 % (ref 0–1)
BILIRUB SERPL-MCNC: 0.44 MG/DL (ref 0.2–1)
BUN SERPL-MCNC: 19 MG/DL (ref 5–25)
CALCIUM SERPL-MCNC: 10 MG/DL (ref 8.4–10.2)
CHLORIDE SERPL-SCNC: 95 MMOL/L (ref 96–108)
CO2 SERPL-SCNC: 32 MMOL/L (ref 21–32)
CREAT SERPL-MCNC: 0.68 MG/DL (ref 0.6–1.3)
EOSINOPHIL # BLD AUTO: 0.09 THOUSAND/ÂΜL (ref 0–0.61)
EOSINOPHIL NFR BLD AUTO: 1 % (ref 0–6)
ERYTHROCYTE [DISTWIDTH] IN BLOOD BY AUTOMATED COUNT: 14.1 % (ref 11.6–15.1)
GFR SERPL CREATININE-BSD FRML MDRD: 84 ML/MIN/1.73SQ M
GLUCOSE P FAST SERPL-MCNC: 88 MG/DL (ref 65–99)
HCT VFR BLD AUTO: 38.4 % (ref 34.8–46.1)
HGB BLD-MCNC: 12.9 G/DL (ref 11.5–15.4)
IMM GRANULOCYTES # BLD AUTO: 0.02 THOUSAND/UL (ref 0–0.2)
IMM GRANULOCYTES NFR BLD AUTO: 0 % (ref 0–2)
LYMPHOCYTES # BLD AUTO: 1.35 THOUSANDS/ÂΜL (ref 0.6–4.47)
LYMPHOCYTES NFR BLD AUTO: 20 % (ref 14–44)
MCH RBC QN AUTO: 32.8 PG (ref 26.8–34.3)
MCHC RBC AUTO-ENTMCNC: 33.6 G/DL (ref 31.4–37.4)
MCV RBC AUTO: 98 FL (ref 82–98)
MONOCYTES # BLD AUTO: 0.57 THOUSAND/ÂΜL (ref 0.17–1.22)
MONOCYTES NFR BLD AUTO: 9 % (ref 4–12)
NEUTROPHILS # BLD AUTO: 4.61 THOUSANDS/ÂΜL (ref 1.85–7.62)
NEUTS SEG NFR BLD AUTO: 69 % (ref 43–75)
NRBC BLD AUTO-RTO: 0 /100 WBCS
PLATELET # BLD AUTO: 231 THOUSANDS/UL (ref 149–390)
PMV BLD AUTO: 11.9 FL (ref 8.9–12.7)
POTASSIUM SERPL-SCNC: 4.5 MMOL/L (ref 3.5–5.3)
PROT SERPL-MCNC: 6.8 G/DL (ref 6.4–8.4)
RBC # BLD AUTO: 3.93 MILLION/UL (ref 3.81–5.12)
SODIUM SERPL-SCNC: 134 MMOL/L (ref 135–147)
TSH SERPL DL<=0.05 MIU/L-ACNC: 1.58 UIU/ML (ref 0.45–4.5)
VIT B12 SERPL-MCNC: 397 PG/ML (ref 180–914)
WBC # BLD AUTO: 6.73 THOUSAND/UL (ref 4.31–10.16)

## 2023-11-14 PROCEDURE — 36415 COLL VENOUS BLD VENIPUNCTURE: CPT

## 2023-11-14 PROCEDURE — 84443 ASSAY THYROID STIM HORMONE: CPT

## 2023-11-14 PROCEDURE — 99214 OFFICE O/P EST MOD 30 MIN: CPT | Performed by: FAMILY MEDICINE

## 2023-11-14 PROCEDURE — 82607 VITAMIN B-12: CPT

## 2023-11-14 PROCEDURE — 80053 COMPREHEN METABOLIC PANEL: CPT

## 2023-11-14 PROCEDURE — 85025 COMPLETE CBC W/AUTO DIFF WBC: CPT

## 2023-11-14 RX ORDER — AMLODIPINE BESYLATE 2.5 MG/1
5 TABLET ORAL DAILY
Qty: 30 TABLET | Refills: 5 | Status: SHIPPED | OUTPATIENT
Start: 2023-11-14
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
## 2023-11-15 ENCOUNTER — HOSPITAL ENCOUNTER (OUTPATIENT)
Dept: MRI IMAGING | Facility: HOSPITAL | Age: 78
Discharge: HOME | End: 2023-11-15
Payer: MEDICARE

## 2023-11-15 DIAGNOSIS — H91.8X3 ASYMMETRICAL HEARING LOSS: ICD-10-CM

## 2023-11-15 PROCEDURE — G1004 CDSM NDSC: HCPCS

## 2023-11-15 PROCEDURE — A9585 GADOBUTROL INJECTION: HCPCS

## 2023-11-15 PROCEDURE — 70553 MRI BRAIN STEM W/O & W/DYE: CPT

## 2023-11-15 RX ORDER — GADOBUTROL 604.72 MG/ML
5 INJECTION INTRAVENOUS
Status: COMPLETED | OUTPATIENT
Start: 2023-11-15 | End: 2023-11-15

## 2023-11-15 RX ADMIN — GADOBUTROL 5 ML: 604.72 INJECTION INTRAVENOUS at 08:56

## 2023-11-16 ENCOUNTER — APPOINTMENT (EMERGENCY)
Dept: RADIOLOGY | Facility: HOSPITAL | Age: 78
End: 2023-11-16
Payer: MEDICARE

## 2023-11-16 ENCOUNTER — HOSPITAL ENCOUNTER (EMERGENCY)
Facility: HOSPITAL | Age: 78
Discharge: HOME | End: 2023-11-16
Attending: EMERGENCY MEDICINE
Payer: MEDICARE

## 2023-11-16 VITALS
SYSTOLIC BLOOD PRESSURE: 149 MMHG | OXYGEN SATURATION: 100 % | HEART RATE: 67 BPM | TEMPERATURE: 97.9 F | RESPIRATION RATE: 18 BRPM | DIASTOLIC BLOOD PRESSURE: 75 MMHG

## 2023-11-16 DIAGNOSIS — R00.2 PALPITATIONS: Primary | ICD-10-CM

## 2023-11-16 DIAGNOSIS — E87.1 HYPONATREMIA: ICD-10-CM

## 2023-11-16 DIAGNOSIS — I49.3 PREMATURE VENTRICULAR CONTRACTIONS: ICD-10-CM

## 2023-11-16 LAB
ALBUMIN SERPL BCP-MCNC: 4.6 G/DL (ref 3.5–5)
ALP SERPL-CCNC: 44 U/L (ref 34–104)
ALT SERPL W P-5'-P-CCNC: 27 U/L (ref 7–52)
ANION GAP SERPL CALCULATED.3IONS-SCNC: 10 MMOL/L
AST SERPL W P-5'-P-CCNC: 46 U/L (ref 13–39)
BASOPHILS # BLD AUTO: 0.05 THOUSANDS/ÂΜL (ref 0–0.1)
BASOPHILS NFR BLD AUTO: 1 % (ref 0–1)
BILIRUB SERPL-MCNC: 0.55 MG/DL (ref 0.2–1)
BUN SERPL-MCNC: 21 MG/DL (ref 5–25)
CALCIUM SERPL-MCNC: 9.5 MG/DL (ref 8.4–10.2)
CARDIAC TROPONIN I PNL SERPL HS: 2 NG/L
CHLORIDE SERPL-SCNC: 97 MMOL/L (ref 96–108)
CO2 SERPL-SCNC: 25 MMOL/L (ref 21–32)
CREAT SERPL-MCNC: 0.61 MG/DL (ref 0.6–1.3)
EOSINOPHIL # BLD AUTO: 0.11 THOUSAND/ÂΜL (ref 0–0.61)
EOSINOPHIL NFR BLD AUTO: 2 % (ref 0–6)
ERYTHROCYTE [DISTWIDTH] IN BLOOD BY AUTOMATED COUNT: 13.8 % (ref 11.6–15.1)
GFR SERPL CREATININE-BSD FRML MDRD: 87 ML/MIN/1.73SQ M
GLUCOSE SERPL-MCNC: 92 MG/DL (ref 65–140)
HCT VFR BLD AUTO: 39.3 % (ref 34.8–46.1)
HGB BLD-MCNC: 12.9 G/DL (ref 11.5–15.4)
IMM GRANULOCYTES # BLD AUTO: 0.02 THOUSAND/UL (ref 0–0.2)
IMM GRANULOCYTES NFR BLD AUTO: 0 % (ref 0–2)
LYMPHOCYTES # BLD AUTO: 1.53 THOUSANDS/ÂΜL (ref 0.6–4.47)
LYMPHOCYTES NFR BLD AUTO: 21 % (ref 14–44)
MAGNESIUM SERPL-MCNC: 2.2 MG/DL (ref 1.9–2.7)
MCH RBC QN AUTO: 31.8 PG (ref 26.8–34.3)
MCHC RBC AUTO-ENTMCNC: 32.8 G/DL (ref 31.4–37.4)
MCV RBC AUTO: 97 FL (ref 82–98)
MONOCYTES # BLD AUTO: 0.52 THOUSAND/ÂΜL (ref 0.17–1.22)
MONOCYTES NFR BLD AUTO: 7 % (ref 4–12)
NEUTROPHILS # BLD AUTO: 4.98 THOUSANDS/ÂΜL (ref 1.85–7.62)
NEUTS SEG NFR BLD AUTO: 69 % (ref 43–75)
NRBC BLD AUTO-RTO: 0 /100 WBCS
PLATELET # BLD AUTO: 248 THOUSANDS/UL (ref 149–390)
PMV BLD AUTO: 10.9 FL (ref 8.9–12.7)
POTASSIUM SERPL-SCNC: 3.8 MMOL/L (ref 3.5–5.3)
PROT SERPL-MCNC: 7 G/DL (ref 6.4–8.4)
RBC # BLD AUTO: 4.06 MILLION/UL (ref 3.81–5.12)
SODIUM SERPL-SCNC: 132 MMOL/L (ref 135–147)
WBC # BLD AUTO: 7.21 THOUSAND/UL (ref 4.31–10.16)

## 2023-11-16 PROCEDURE — 99284 EMERGENCY DEPT VISIT MOD MDM: CPT | Performed by: EMERGENCY MEDICINE

## 2023-11-16 PROCEDURE — 83735 ASSAY OF MAGNESIUM: CPT

## 2023-11-16 PROCEDURE — 84484 ASSAY OF TROPONIN QUANT: CPT | Performed by: EMERGENCY MEDICINE

## 2023-11-16 PROCEDURE — 85025 COMPLETE CBC W/AUTO DIFF WBC: CPT | Performed by: EMERGENCY MEDICINE

## 2023-11-16 PROCEDURE — 36415 COLL VENOUS BLD VENIPUNCTURE: CPT

## 2023-11-16 PROCEDURE — 80053 COMPREHEN METABOLIC PANEL: CPT | Performed by: EMERGENCY MEDICINE

## 2023-11-16 PROCEDURE — 93005 ELECTROCARDIOGRAM TRACING: CPT

## 2023-11-16 PROCEDURE — 99285 EMERGENCY DEPT VISIT HI MDM: CPT

## 2023-11-16 PROCEDURE — 71045 X-RAY EXAM CHEST 1 VIEW: CPT

## 2023-11-17 ENCOUNTER — TELEPHONE (OUTPATIENT)
Dept: FAMILY MEDICINE CLINIC | Facility: CLINIC | Age: 78
End: 2023-11-17

## 2023-11-17 LAB
ATRIAL RATE: 66 BPM
P AXIS: 86 DEGREES
PR INTERVAL: 176 MS
QRS AXIS: 94 DEGREES
QRSD INTERVAL: 82 MS
QT INTERVAL: 400 MS
QTC INTERVAL: 419 MS
T WAVE AXIS: 77 DEGREES
VENTRICULAR RATE: 66 BPM

## 2023-11-17 PROCEDURE — 93010 ELECTROCARDIOGRAM REPORT: CPT | Performed by: INTERNAL MEDICINE

## 2023-11-17 NOTE — ED PROVIDER NOTES
History  Chief Complaint   Patient presents with    Palpitations     Pt reports feels palpitations "heart rate feels abnormal", states last 3-4 hrs, denies CP, also c/o brain fog     Patient is a 66-year-old female with history of triple-vessel bypass several years ago presents to the emergency department with palpitations sensation that occurred today while at home. She is on several medications for blood pressure including beta-blocker, calcium channel blocker, and ARB as well as diuretic. She has been on several blood pressure medications in the past and reports that lisinopril was recently added in addition to cabergoline. She does not know if these new medications are causing her palpitations today. She reports that when she was feeling them today she checked a heart monitor and noticed PVCs on the monitor. She reports that after arrival to the emergency department she no longer was having any PVCs. She denies any shortness of breath, chest pain, nausea, vomiting, lightheadedness and otherwise feels in her usual state of health. She denies any recent illnesses or injuries. Prior to Admission Medications   Prescriptions Last Dose Informant Patient Reported? Taking? Calcium Carb-Cholecalciferol 600-200 MG-UNIT TABS  Self Yes No   Sig: Calcium 600 + D TABS  TAKE 1 TABLET DAILY.    Refills: 0    Active   VITAMIN D, CHOLECALCIFEROL, PO  Self Yes No   Sig: Take 2,600 Units/day by mouth   acetaminophen (TYLENOL) 650 mg CR tablet  Self Yes No   Sig: Take 650 mg by mouth daily as needed for mild pain   amLODIPine (NORVASC) 2.5 mg tablet   No No   Sig: Take 2 tablets (5 mg total) by mouth daily   aspirin (ECOTRIN LOW STRENGTH) 81 mg EC tablet  Self No No   Sig: Take 1 tablet (81 mg total) by mouth daily   cabergoline (DOSTINEX) 0.5 MG tablet   No No   Sig: Take 1/2 tablet once a week ( 2 tabs per month)   denosumab (PROLIA) 60 mg/mL  Self Yes No   Sig: Inject 60 mg under the skin every 6 (six) months losartan (COZAAR) 100 MG tablet  Self No No   Sig: Take 1 tablet (100 mg total) by mouth daily   melatonin 3 mg  Self Yes No   Sig: Take 3 mg by mouth daily at bedtime   metoprolol tartrate (LOPRESSOR) 25 mg tablet  Self No No   Sig: Take 0.5 tablets (12.5 mg total) by mouth every 12 (twelve) hours   rosuvastatin (CRESTOR) 40 MG tablet  Self No No   Sig: TAKE ONE TABLET BY MOUTH EVERY DAY   torsemide (DEMADEX) 10 mg tablet   No No   Sig: Take 1 tablet (10 mg total) by mouth daily      Facility-Administered Medications: None       Past Medical History:   Diagnosis Date    Anxiety     Arthritis     Colon polyp     Coronary artery disease     Effusion of left knee     Last assessed - 9/10/15    History of transfusion     Hyperlipidemia     Hypertension     Memory loss     Myocardial infarction ([address])     Scoliosis     Tick bite     Last assessed - 4/21/17       Past Surgical History:   Procedure Laterality Date    APPENDECTOMY      CARDIAC SURGERY      COLONOSCOPY      CORONARY ARTERY BYPASS GRAFT      AK CORONARY ARTERY BYP W/VEIN & ARTERY GRAFT 4 VEIN N/A 01/27/2020    Procedure: CORONARY ARTERY BYPASS GRAFT (CABG) x3 VESSELS, LIMA TO LAD, AND SVG TO PLB & OM;  Surgeon: Shemar Schultz DO;  Location: [facility];  Service: Cardiac Surgery    AK ECHO TRANSESOPHAG R-T 2D W/PRB IMG ACQUISJ I&R N/A 01/27/2020    Procedure: TRANSESOPHAGEAL ECHOCARDIOGRAM (GET); Surgeon: Shemar Schultz DO;  Location: [facility];  Service: Cardiac Surgery    AK NDSC SURG W/VIDEO-ASSISTED HARVEST VEIN CABG Left 01/27/2020    Procedure: HARVEST VEIN ENDOSCOPIC ([address]);   Surgeon: Shemar Schultz DO;  Location: [facility];  Service: Cardiac Surgery    TONSILLECTOMY      Last assessed - 4/20/17    TUBAL LIGATION      Last assessed - 4/20/17    TUMOR REMOVAL  1998    pelvic benign tumor removal    WISDOM TOOTH EXTRACTION      Last assessed - 4/20/17       Family History   Problem Relation Age of Onset    Coronary artery disease Mother Arthritis Mother     Other Mother         Cardiac Disorder     Transient ischemic attack Mother     Heart attack Father     Sudden death Father         SCD    Cancer Daughter 52        kidney    No Known Problems Paternal Aunt      I have reviewed and agree with the history as documented. E-Cigarette/Vaping    E-Cigarette Use Never User      E-Cigarette/Vaping Substances    Nicotine No     THC No     CBD No     Flavoring No     Other No     Unknown No      Social History     Tobacco Use    Smoking status: Never     Passive exposure: Past    Smokeless tobacco: Never   Vaping Use    Vaping Use: Never used   Substance Use Topics    Alcohol use: Yes     Alcohol/week: 7.0 standard drinks of alcohol     Types: 7 Glasses of wine per week     Comment: 1 wine nightly    Drug use: No        Review of Systems   Constitutional:  Negative for chills and fever. Respiratory:  Negative for cough and shortness of breath. Cardiovascular:  Positive for palpitations. Negative for chest pain. Gastrointestinal:  Negative for abdominal pain, constipation, diarrhea, nausea and vomiting. Genitourinary:  Negative for dysuria and hematuria. Musculoskeletal:  Negative for arthralgias and back pain. Skin:  Negative for color change and rash. Neurological:  Negative for dizziness, seizures, syncope, light-headedness and headaches. All other systems reviewed and are negative. Physical Exam  ED Triage Vitals [11/16/23 1646]   Temperature Pulse Respirations Blood Pressure SpO2   97.9 °F (36.6 °C) 67 18 149/75 100 %      Temp Source Heart Rate Source Patient Position - Orthostatic VS BP Location FiO2 (%)   Oral Monitor Sitting Right arm --      Pain Score       --             Orthostatic Vital Signs  Vitals:    11/16/23 1646   BP: 149/75   Pulse: 67   Patient Position - Orthostatic VS: Sitting       Physical Exam  Vitals and nursing note reviewed. Constitutional:       General: She is not in acute distress. Appearance: Normal appearance. She is well-developed. She is not ill-appearing. HENT:      Head: Normocephalic and atraumatic. Right Ear: External ear normal.      Left Ear: External ear normal.      Mouth/Throat:      Pharynx: Oropharynx is clear. No oropharyngeal exudate or posterior oropharyngeal erythema. Eyes:      General:         Right eye: No discharge. Left eye: No discharge. Extraocular Movements: Extraocular movements intact. Conjunctiva/sclera: Conjunctivae normal.   Cardiovascular:      Rate and Rhythm: Normal rate and regular rhythm. Pulses: Normal pulses. Heart sounds: Normal heart sounds. No murmur heard. Pulmonary:      Effort: Pulmonary effort is normal. No respiratory distress. Breath sounds: Normal breath sounds. No wheezing, rhonchi or rales. Abdominal:      General: Abdomen is flat. Palpations: Abdomen is soft. Tenderness: There is no abdominal tenderness. There is no guarding or rebound. Musculoskeletal:         General: No swelling or deformity. Normal range of motion. Cervical back: Neck supple. No tenderness. Skin:     General: Skin is warm and dry. Capillary Refill: Capillary refill takes less than 2 seconds. Neurological:      Mental Status: She is alert.          ED Medications  Medications - No data to display    Diagnostic Studies  Results Reviewed       Procedure Component Value Units Date/Time    Magnesium [[id number]]  (Normal) Collected: 11/16/23 1657    Lab Status: Final result Specimen: Blood from Arm, Right Updated: 11/16/23 1827     Magnesium 2.2 mg/dL     HS Troponin 0hr (reflex protocol) [[id number]]  (Normal) Collected: 11/16/23 1657    Lab Status: Final result Specimen: Blood from Arm, Right Updated: 11/16/23 1729     hs TnI 0hr 2 ng/L     Comprehensive metabolic panel [[id number]]  (Abnormal) Collected: 11/16/23 1657    Lab Status: Final result Specimen: Blood from Arm, Right Updated: 11/16/23 1454 Sodium 132 mmol/L      Potassium 3.8 mmol/L      Chloride 97 mmol/L      CO2 25 mmol/L      ANION GAP 10 mmol/L      BUN 21 mg/dL      Creatinine 0.61 mg/dL      Glucose 92 mg/dL      Calcium 9.5 mg/dL      AST 46 U/L      ALT 27 U/L      Alkaline Phosphatase 44 U/L      Total Protein 7.0 g/dL      Albumin 4.6 g/dL      Total Bilirubin 0.55 mg/dL      eGFR 87 ml/min/1.73sq m     Narrative:      National Kidney Disease Foundation guidelines for Chronic Kidney Disease (CKD):     Stage 1 with normal or high GFR (GFR > 90 mL/min/1.73 square meters)    Stage 2 Mild CKD (GFR = 60-89 mL/min/1.73 square meters)    Stage 3A Moderate CKD (GFR = 45-59 mL/min/1.73 square meters)    Stage 3B Moderate CKD (GFR = 30-44 mL/min/1.73 square meters)    Stage 4 Severe CKD (GFR = 15-29 mL/min/1.73 square meters)    Stage 5 End Stage CKD (GFR <15 mL/min/1.73 square meters)  Note: GFR calculation is accurate only with a steady state creatinine    CBC and differential [[id number]] Collected: 11/16/23 1657    Lab Status: Final result Specimen: Blood from Arm, Right Updated: 11/16/23 1708     WBC 7.21 Thousand/uL      RBC 4.06 Million/uL      Hemoglobin 12.9 g/dL      Hematocrit 39.3 %      MCV 97 fL      MCH 31.8 pg      MCHC 32.8 g/dL      RDW 13.8 %      MPV 10.9 fL      Platelets 675 Thousands/uL      nRBC 0 /100 WBCs      Neutrophils Relative 69 %      Immat GRANS % 0 %      Lymphocytes Relative 21 %      Monocytes Relative 7 %      Eosinophils Relative 2 %      Basophils Relative 1 %      Neutrophils Absolute 4.98 Thousands/µL      Immature Grans Absolute 0.02 Thousand/uL      Lymphocytes Absolute 1.53 Thousands/µL      Monocytes Absolute 0.52 Thousand/µL      Eosinophils Absolute 0.11 Thousand/µL      Basophils Absolute 0.05 Thousands/µL                    XR chest 1 view portable    (Results Pending)         Procedures  ECG 12 Lead Documentation Only    Date/Time: 11/16/2023 5:00 PM    Performed by:  DO Henri Ellis by: Brooke Portillo DO    Indications / Diagnosis:  Palpitations  ECG reviewed by me, the ED Provider: yes    Patient location:  ED  Previous ECG:     Previous ECG:  Compared to current    Similarity:  No change  Interpretation:     Interpretation: normal    Rate:     ECG rate:  66    ECG rate assessment: normal    Rhythm:     Rhythm: sinus rhythm    Ectopy:     Ectopy: none    QRS:     QRS axis:  Right    QRS intervals:  Normal  Conduction:     Conduction: normal    ST segments:     ST segments:  Normal  T waves:     T waves: normal    Q waves:     Q waves:  V1, V2, V3 and aVL        ED Course                             SBIRT 22yo+      Flowsheet Row Most Recent Value   Initial Alcohol Screen: US AUDIT-C     1. How often do you have a drink containing alcohol? 6 Filed at: 11/16/2023 1817   2. How many drinks containing alcohol do you have on a typical day you are drinking? 0 Filed at: 11/16/2023 1817   3a. Male UNDER 65: How often do you have five or more drinks on one occasion? 0 Filed at: 11/16/2023 1817   3b. FEMALE Any Age, or MALE 65+: How often do you have 4 or more drinks on one occassion? 0 Filed at: 11/16/2023 1817   Audit-C Score 6 Filed at: 11/16/2023 7210   STONE: How many times in the past year have you. .. Used an illegal drug or used a prescription medication for non-medical reasons? Never Filed at: 11/16/2023 1817                  Medical Decision Making  78F with history of triple-vessel bypass from 2020 presents to the emergency department with palpitations sensation occurring earlier today. She checked her watch at the time and noted PVCs. She denies chest pain, shortness of breath, lightheadedness and reports that she otherwise feels in her usual state of health. She did recently start lisinopril in addition to her ARB, beta-blocker, and diuretic that she already takes. She also recently started taking cabergoline. Initially, on physical exam patient had no abnormal findings.   While Dr. Amaya Vela was interviewing the patient she reported feeling palpitations and PVCs were noted on the monitor, however patient had no other PVCs or palpitations while in the department. Laboratory evaluation including CBC, CMP, high-sensitivity opponent, magnesium all within normal limits other than mild hyponatremia with sodium of 132. ECG nonconcerning for acute ischemia or dysrhythmia-doubt ACS    Patient does not meet any the criteria for hospitalization for palpitations. - diagnostic purposes  - family history of sudden death  - need to perform EPS, invasive investigations or in-hospital telemetric monitoring-   - therapeutic purposes  - bradyarrhythmia requiring implantation of pacemaker  - pacemaker/ICD malfunction not rectify will by reprogramming  - ventricular tachyarrhythmia requiring immediate interruption and or ICD implantation or catheter  - supraventricular tachycardia requiring interruption immediately or in a short time or catheter ablation  - presence of heart failure other symptoms of hemodynamic compromise  - severe structural heart disease requiring surgery or interventional procedures   - severe systemic cause  - psychiatric decompensation    - No associated chest pain  - No associated neuro symptoms  - No severe associated SOB  - No severe associated weakness or lightheadedness    In the absence of concerning findings on physical exam, laboratory evaluation or ecg patient is appropriate for discharge at this time. Return precautions are discussed with the patient and they demonstrate understanding of the plan. Their questions are all answered to their satisfaction and the patient is discharged home. Amount and/or Complexity of Data Reviewed  Labs: ordered. Radiology: ordered.           Disposition  Final diagnoses:   Palpitations   Premature ventricular contractions   Hyponatremia     Time reflects when diagnosis was documented in both MDM as applicable and the Disposition within this note       Time User Action Codes Description Comment    11/16/2023  6:36 PM [facility] Add [R00.2] Palpitations     11/16/2023  6:37 PM [facility] Add [I49.3] Premature ventricular contractions     11/16/2023  6:37 PM [facility] Add [E87.1] Hyponatremia           ED Disposition       ED Disposition   Discharge    Condition   Stable    Date/Time   Thu Nov 16, 2023 [place] discharge to home/self care. Follow-up Information       Follow up With Specialties Details Why Contact Info Additional Information    Veena Wakefield MD Cardiology, Cardiology Imaging, Multidisciplinary Call  To discuss palpitations and medications [address]  [address] Emergency Department Emergency Medicine Go to  if you develop severe chest pain that is associated with sweating, nausea, vomiting, lightheadedness, pain that radiates to any other part of your body, or is made worse with exertion [address] [facility], [place], [place], [address]            Discharge Medication List as of 11/16/2023  6:39 PM        CONTINUE these medications which have NOT CHANGED    Details   acetaminophen (TYLENOL) 650 mg CR tablet Take 650 mg by mouth daily as needed for mild pain, Historical Med      amLODIPine (NORVASC) 2.5 mg tablet Take 2 tablets (5 mg total) by mouth daily, Starting Tue 11/14/2023, Normal      aspirin (ECOTRIN LOW STRENGTH) 81 mg EC tablet Take 1 tablet (81 mg total) by mouth daily, Starting Fri 4/10/2020, No Print      cabergoline (DOSTINEX) 0.5 MG tablet Take 1/2 tablet once a week ( 2 tabs per month), Normal      Calcium Carb-Cholecalciferol 600-200 MG-UNIT TABS Calcium 600 + D TABS  TAKE 1 TABLET DAILY.    Refills: 0    Active, Historical Med      denosumab (PROLIA) 60 mg/mL Inject 60 mg under the skin every 6 (six) months, Historical Med      losartan (COZAAR) 100 MG tablet Take 1 tablet (100 mg total) by mouth daily, Starting Tue 9/19/2023, Normal      melatonin 3 mg Take 3 mg by mouth daily at bedtime, Historical Med      metoprolol tartrate (LOPRESSOR) 25 mg tablet Take 0.5 tablets (12.5 mg total) by mouth every 12 (twelve) hours, Starting Tue 9/19/2023, Normal      rosuvastatin (CRESTOR) 40 MG tablet TAKE ONE TABLET BY MOUTH EVERY DAY, Normal      torsemide (DEMADEX) 10 mg tablet Take 1 tablet (10 mg total) by mouth daily, Starting Wed 10/11/2023, Until Tue 1/9/2024, Normal      VITAMIN D, CHOLECALCIFEROL, PO Take 2,600 Units/day by mouth, Historical Med           No discharge procedures on file. PDMP Review         Value Time User    PDMP Reviewed  Yes 7/19/2023 12:30 PM Yesenia Beckman DO             ED Provider  Attending physically available and evaluated Heraclio Bolivar. I managed the patient along with the ED Attending.     Electronically Signed by           Frank Dean DO  11/16/23 4520

## 2023-11-17 NOTE — TELEPHONE ENCOUNTER
----- Message from THE [facility] sent at 11/17/2023 12:59 PM EST -----  Regarding: FW: Deny Warren  Contact: [phone]    ----- Message -----  From: Amish Dang  Sent: 11/17/2023  12:24 PM EST  To: [facility]  Subject: FW: Deny Warren                                     ----- Message -----  From: Deny Warren  Sent: 11/17/2023   6:35 AM EST  To: [facility]  Subject: Sarah Mackey,    The  Brain MRI results are available. I started the increased Amlodipine on 11/15. BP's have been  running between 132/86 and 156/80... .(My machine seems to run about 10 mm higher than yours.)    Yesterday was  I very tired with a very "foggy" brain and having PVC's. Went to ER. ..they recommended follow up with cardiologist...will call Dr. Manju Chadwick today. Let me know if I should make appointment to see you before my Jan. 11th appointment. Thank you very much. ...  Hussein Manuel

## 2023-11-17 NOTE — ED ATTENDING ATTESTATION
11/16/2023  IAshlie MD, saw and evaluated the patient. I have discussed the patient with the resident/non-physician practitioner and agree with the resident's/non-physician practitioner's findings, Plan of Care, and MDM as documented in the resident's/non-physician practitioner's note, except where noted. All available labs and Radiology studies were reviewed. I was present for key portions of any procedure(s) performed by the resident/non-physician practitioner and I was immediately available to provide assistance. At this point I agree with the current assessment done in the Emergency Department. I have conducted an independent evaluation of this patient a history and physical is as follows:    72-year-old presenting to the ER with palpitations. No chest pain. No short of breath. No leg pain or swelling. No fevers or chills. No nausea vomiting. Limited caffeine use. Her iWatch said she had PVCs. She has no history of PVCs. While in the room patient had palpitations and PVCs on monitor. EKG is otherwise normal.  Reviewed patient's blood work x-ray ordered by resident, without significant abnormalities. Patient not lightheaded or dizzy, ambulating without difficulty. will follow-up with cardiologist and PCP as outpatient.     ED Course         Critical Care Time  Procedures

## 2023-11-29 ENCOUNTER — OFFICE VISIT (OUTPATIENT)
Dept: CARDIOLOGY CLINIC | Facility: CLINIC | Age: 78
End: 2023-11-29
Payer: MEDICARE

## 2023-11-29 VITALS
BODY MASS INDEX: 20.09 KG/M2 | SYSTOLIC BLOOD PRESSURE: 136 MMHG | WEIGHT: 113.4 LBS | HEART RATE: 52 BPM | DIASTOLIC BLOOD PRESSURE: 71 MMHG | HEIGHT: 63 IN

## 2023-11-29 DIAGNOSIS — R00.2 PALPITATIONS: Primary | ICD-10-CM

## 2023-11-29 DIAGNOSIS — Z95.1 S/P CABG X 3: ICD-10-CM

## 2023-11-29 DIAGNOSIS — I10 ESSENTIAL HYPERTENSION: ICD-10-CM

## 2023-11-29 DIAGNOSIS — E78.2 MIXED HYPERLIPIDEMIA: ICD-10-CM

## 2023-11-29 DIAGNOSIS — I49.1 PAC (PREMATURE ATRIAL CONTRACTION): ICD-10-CM

## 2023-11-29 DIAGNOSIS — I49.3 PVC'S (PREMATURE VENTRICULAR CONTRACTIONS): ICD-10-CM

## 2023-11-29 DIAGNOSIS — I25.118 CORONARY ARTERY DISEASE OF NATIVE ARTERY OF NATIVE HEART WITH STABLE ANGINA PECTORIS (HCC): ICD-10-CM

## 2023-11-29 PROCEDURE — 99214 OFFICE O/P EST MOD 30 MIN: CPT | Performed by: NURSE PRACTITIONER

## 2023-11-29 RX ORDER — METOPROLOL SUCCINATE 25 MG/1
TABLET, EXTENDED RELEASE ORAL
Qty: 45 TABLET | Refills: 1 | Status: SHIPPED | OUTPATIENT
Start: 2023-11-29

## 2023-11-29 RX ORDER — TORSEMIDE 10 MG/1
5 TABLET ORAL DAILY
Qty: 15 TABLET | Refills: 2
Start: 2023-11-29 | End: 2024-02-27

## 2023-11-29 NOTE — LETTER
November 29, 2023     Pa Britt, [address] [address]    Patient: Deny Warren   YOB: 1945   Date of Visit: 11/29/2023       Dear Dr. Paz Mackey:    Thank you for referring Deny Warren to me for evaluation. Below are my notes for this consultation. If you have questions, please do not hesitate to call me. I look forward to following your patient along with you. Sincerely,        RACHEL Lugo        CC: MD Kenan Barr, [address] [facility]  11/29/2023  8:48 AM  Sign when Signing Visit  Cardiology  Acute   Office Visit Note  Deny Warren   66 y.o.   female   MRN: [id number]  [address]. [facility] [address]  [phone]    PCP: Pa Britt MD  Cardiologist: Dr Manju Chadwick                 Summary of recommendations  Avoid cardiac stimulants  Hydrate  Switch metoprolol to tartrate to metoprolol succinate and uptitrate slightly: Increase to 25 mg in the morning, continue 12.5 mg in the p.m. She will send me a Rithmio message, if having any problems with this  Will obtain 48-hour Holter monitor after on this regimen for a few days  Follow up will be scheduled with Dr Manju Chadwick in a few months          Assessment/plan  Palpitations. Recent ED visit 11/16. Seemed to correlate with PVCs on the monitor  We will change metoprolol to tartrate to metoprolol succinate and increase slightly, to 25 mg in the morning, continue 12.5 mg in the p.m.  CAD, S/P NSTEMI 2/20 with 3vCAD ( presented with diphoresis and syncope);  S/P CABGx3 LIMA--> LAD, SVG to RI, SVG--> OM1 1/2020  --On ASA 81 mg/d a high intensity statin, beta blocker  --post operatively developed a chylothrax . conservative rx with a CT-averted thoracic duct embolization  --She denies any angina  Hypertension, essential.  /71. On metoprolol tartrate 12.5 mg q12h , losartan 100 mg/d, amlodipine 5 mg daily, torsemide 5 mg daily. Monitor with changes as above  Hyperlipidemia, on rosuvastatin 40 mg/d; previously on atorvastatin 80. Heart healthy diet recommended. LDL 62, at goal   Latest Reference Range & Units 05/31/22 09:12 01/03/23 08:33 07/05/23 10:27   Cholesterol See Comment mg/dL 159 161 147   Triglycerides See Comment mg/dL 128 88 66   HDL >=50 mg/dL 65 66 72   Non-HDL Cholesterol mg/dl 94     LDL Calculated 0 - 100 mg/dL 68 77 62   LDL CHOLESTEROL DIRECT 0 - 100 mg/dl 73     pituitary macroadenoma, incidental finding-conservative management per neurosurgery ( eval 8/4/23) . hyperprolactinemia -now on cabergoline  Hx hyponatremia. HCTZ DCd by nephrology. Continue to avoid thiazide diuretics. Her loop diuretic was recently decreased as well. Cardiac testing  TTE 1/24/2020 EF 70. No RWMA. RV normal. Mild MR. LHC 1/24/2020  LAD: The vessel was medium sized. The proximal and mid LAD is heavily calcified with diffuse disease of 70-80%. Circumflex: The vessel was medium sized and heavily calcified. Ostial circumflex: There was a discrete 85 % stenosis. Mid circumflex: There was a discrete 85 % stenosis. 1st obtuse marginal: The vessel was small to medium sized. There was a tubular 50 % stenosis. 2nd obtuse marginal: The vessel was small to medium sized. There was a discrete 85 % stenosis. RCA: The vessel was medium sized. Dominant. There is heavy calcification in the proximal, mid, and distal vessel. There are multiple significant lesions ( 70-90% ) seen throughout this entire area. Impression:  Severely calcified 3 vessel disease. Evaluation for CABG   Zio patch 11/9/2020:  11 days 19 hrs. I reviewed this with her specifically today. Twenty-six episodes of SVT, no AFib. Average heart rate 70 bpm.  Triggered events correlated with sinus rhythm  TTE 1/2022. EF 60%. Normal wall motion. Normal diastolic fxn. Mild AI. Mild MR.   Mild TR.                      MARLENY Callaway is a 77 yo female with hypertension and dyslipidemia. She is a retired registered nurse and lifelong nonsmoker. She has not had any previously known coronary disease nor heart failure. She has been quite active walking 2-3 miles a day with her dog, and performing yoga. In the past she has run marathons but stopped a few years ago. She does have a strong family history of heart disease; mother had a myocardial fraction and her father  of sudden death. Recently she presented to the hospital with nausea, diaphoresis and syncope. On the morning of admission she awoke around 5:00 a.m. walked to the bathroom and had a syncopal event. She apparently was lying on the bathroom floor for about an hour and had difficulty walking back to the bedroom due to generalized weakness. When she came to, she felt very tired, weak and nauseous. EMS was called and she was brought to the ED. Her presenting EKG showed sinus rhythm without any acute ST segment changes. Chest x-ray and CT head showed no acute abnormalities. She received supportive care. He Her initial troponin was 1.75, the 2nd 3.17. She was followed by Cardiology who recommended cardiac catheterization. This showed severely calcified 3 vessel disease. She was referred to CT surgery for candidacy for CABG. After appropriate testing, she underwent CABG x3 ; LIMA-LAD,  SVG--RI, SVG--Om1. A final GET demonstrated normal LV function. She developed a chylothorax postoperatively, treated with chest tube. Imdur was added to her regimen given a small LIMA. 2/10/2020  She presents for follow-up, accompanied by her sister. She has been feeling fatigued , occasionally lightheaded and dizzy. Her EKG today shows : NSR 74 bpm. /64. She is at her pre-hospital weight. Will stop torsemide and potassium. She also is concerned about her chest tube site incisions as 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. They are open, draining and erythematous. Sternal incision is satisfactory , healing . Will Treat with Keflex x7 days. Follow-up labs have been requested by her PCP. Will stop her diuretic and potassium    Interval history  August 2020 seen by her cardiologist.  Doing well. Statin therapy changed to Crestor 40. Was noted her nitrate was discontinued. ER Adm with near-syncope, cramping in legs,, weakness, dizzy, nauseous, lightheaded. October 6.serial troponins x2 negative. Workup unremarkable. CK negative. Diagnosed with vasovagal syncope. Given Zofran and fluids    Patient requested COVID testing 10 6:  Negative      10/14/2020  ER follow-up. Patient in concerned that her episode of near-syncope felt like she did when she had a prior heart attack. She also was concerned she could have COVID. She was tested and this was negative. EKG reviewed personally October 6:  Normal sinus rhythm. Normal QTC, poor R-wave progression similar to prior  Orthostatics neg  Vitals:    11/29/23 0811   BP: 136/71   Pulse: (!) 52       To review, on the evening of her near syncopal episode:  She retired to bed. She had severe leg cramps. She got up to walk around. She then became diaphoretic, dizzy, nauseous, had profuse vomiting and then had diarrhea. She was very weak, she slid to the ground. She used her smart watch to call 911. Her workup was were unremarkable as previously discussed above. Since discharge, and in the last few weeks she has noted fatigue. Yesterday she had significant dyspnea on exertion which is new. She has been walking a few miles a day, daily, up until when her 8 year old mother moved in with her. Due to her duties and family obligations she has not been able to walk as much as she had been . She denies any palpitations. Given that when she had her MI she presented with atypical symptoms, she is quite concerned about her most recent symptoms. She is agreeable to wearing a 2 week event monitor to assess for any potential arrhythmias as a cause of her symptoms.   Will also schedule a nuclear stress test.  She will return in a couple weeks to see her cardiologist.    Interval history  11/2020; 3/2021; 11/10/21  OV with her cardiologist      11/29/21. Patient called:  Palpitations. On 1 occasion, it lasted 2 hours. Her heart rate was . Apple watch reading a fib. Pt does not have a history of AFib. Compliant with metoprolol. Recommended office visit. She had a ZIO Patch November 2020. It is not clear what happened to the results. In the computer is listed as pending. 12/2/21  Acute visit   Chief complaint palpitations  Since Thanksgiving time she has noted discrete palpitations. She is not overly bothered by them. Her smart watch identified atrial fibrillation. She has been download them to her iPhone and will send us a PDF. I reviewed these. They do appear to represent atrial fibrillation. She is amenable to wearing a repeat ZIO patch. Previously there was no AFib disclosed. After shared decision making, she is agreeable to starting anticoagulation with Eliquis 5 mg b.i.d. .  We discussed risks and benefits. CBC in 2 weeks   She will continue on metoprolol tartrate 12.5 mg Q 12   For now continue on a baby aspirin a day as she does have a history of CAD  EKG in the office today showed normal sinus rhythm        Interval  history  1/24/22; 6/2/22; 6/13/23 OV Dr Casandra Cho  1/24: It was identified symptoms of palpitations and her Apple watch indicated she may have AFib. We correlated this with a Zio patch, and while the Apple watch was indicating AFib, the Zio patch confirm that this was sinus rhythm with PACs. While she certainly could develop atrial fibrillation in the future given her underlying heart disease, I have seen no evidence of this so far. Eliquis was [facility]- and continued on low dose ASA 81 mg/d    6/23: At the last visit, her blood pressure was a little high. Losartan was increased to 50 mg daily  No angina, lipids controlled.   HCTZ 25 mg/d was added for BP and for lower extremity edema. Doppler showed no DVT      11/14/23  OV PCP   amlodipine increased to 5 mg daily  It was noted nephrology discontinued HCTZ given hyponatremia. In its place she was started on torsemide, initially 2.5 mg daily, which was uptitrated  also started on carbogeline on November 2  by endo for hyperproloactinemia     11/16/23 ED visit  CC: palpitations x 3-4 hrs. "brain fog"  ECG: NSR /Possible Left atrial enlargement/Rightward axis/anteroseptal infarct (cited on or before 06-OCT-2020)  When compared with ECG of 06-JUL-2023 09:19,No significant change was found   Labs: Na 132. K 3.8 Mag 2.2 . AST 46  CXR NAD  /75  Concern for PVCs notrd on tele when she felt palpitations  Advised F/ U Cards    11/29/23 Acute OV  Was in ED with palpitations  PMH: palpitations. CAD,S/P CABG, HTN, HL.  EF preserved  Since discharge, her dose of torsemide was cut back a bit by her PCP  She still feels palpitations, mostly in the p.m. She can feel an extra beat about every third beat she tells me. Unclear whether this is sudden. They go away on their own. She does drink a daily glass of wine however she notes that the palpitations occur in the afternoon, before she drinks wine. She does not note that wine makes them worse. She denies any exertional chest pain or shortness of breath. She is currently on metoprolol tartrate 12.5 mg every 12. Will change to succinate formula to get a longer acting beta-blocker response. Will also increase the dose to 25 mg in the morning, continue the 12.5 mg in the p.m. Will check a 48-hour monitor in a few days after being on this regime      I have spent  25 minutes with Patient  today in which greater than 50% of this time was spent in counseling/coordination of care regarding Intructions for management, Patient and family education, Importance of tx compliance and Risk factor reductions.   Assessment  Diagnoses and all orders for this visit:    Palpitations  -     metoprolol succinate (TOPROL-XL) 25 mg 24 hr tablet; Take one tablet in the am ( 25 mg) and 0.5 tablet ( 12.5 mg) in the pm  -     Holter monitor; Future    PAC (premature atrial contraction)    Coronary artery disease of native artery of native heart with stable angina pectoris (HCC)    Essential hypertension  -     torsemide (DEMADEX) 10 mg tablet; Take 0.5 tablets (5 mg total) by mouth daily    Mixed hyperlipidemia    S/P CABG x 3    PVC's (premature ventricular contractions)  -     metoprolol succinate (TOPROL-XL) 25 mg 24 hr tablet; Take one tablet in the am ( 25 mg) and 0.5 tablet ( 12.5 mg) in the pm  -     Holter monitor; Future          Past Medical History:   Diagnosis Date   • Anxiety    • Arthritis    • Colon polyp    • Coronary artery disease    • Effusion of left knee     Last assessed - 9/10/15   • History of transfusion    • Hyperlipidemia    • Hypertension    • Memory loss    • Myocardial infarction [facility])    • Scoliosis    • Tick bite     Last assessed - 4/21/17       Review of Systems   Constitutional: Negative for chills and malaise/fatigue. Cardiovascular:  Positive for palpitations. Negative for chest pain, claudication, cyanosis, dyspnea on exertion, irregular heartbeat, leg swelling, near-syncope, orthopnea, paroxysmal nocturnal dyspnea and syncope. Respiratory:  Negative for cough and shortness of breath. Gastrointestinal:  Negative for heartburn and nausea. Neurological:  Negative for dizziness, focal weakness, headaches, light-headedness and weakness. All other systems reviewed and are negative. Allergies   Allergen Reactions   • Thiazide-Type Diuretics Other (See Comments)     Hyponatremia     .     Current Outpatient Medications:   •  acetaminophen (TYLENOL) 650 mg CR tablet, Take 650 mg by mouth daily as needed for mild pain, Disp: , Rfl:   •  amLODIPine (NORVASC) 2.5 mg tablet, Take 2 tablets (5 mg total) by mouth daily, Disp: 30 tablet, Rfl: 5  •  aspirin (ECOTRIN LOW STRENGTH) 81 mg EC tablet, Take 1 tablet (81 mg total) by mouth daily, Disp: , Rfl:   •  cabergoline (DOSTINEX) 0.5 MG tablet, Take 1/2 tablet once a week ( 2 tabs per month), Disp: 8 tablet, Rfl: 2  •  Calcium Carb-Cholecalciferol 600-200 MG-UNIT TABS, Calcium 600 + D TABS TAKE 1 TABLET DAILY. Refills: 0  Active, Disp: , Rfl:   •  denosumab (PROLIA) 60 mg/mL, Inject 60 mg under the skin every 6 (six) months, Disp: , Rfl:   •  losartan (COZAAR) 100 MG tablet, Take 1 tablet (100 mg total) by mouth daily, Disp: 90 tablet, Rfl: 3  •  melatonin 3 mg, Take 3 mg by mouth daily at bedtime, Disp: , Rfl:   •  metoprolol succinate (TOPROL-XL) 25 mg 24 hr tablet, Take one tablet in the am ( 25 mg) and 0.5 tablet ( 12.5 mg) in the pm, Disp: 45 tablet, Rfl: 1  •  rosuvastatin (CRESTOR) 40 MG tablet, TAKE ONE TABLET BY MOUTH EVERY DAY, Disp: 90 tablet, Rfl: 3  •  torsemide (DEMADEX) 10 mg tablet, Take 0.5 tablets (5 mg total) by mouth daily, Disp: 15 tablet, Rfl: 2  •  VITAMIN D, CHOLECALCIFEROL, PO, Take 2,600 Units/day by mouth (Patient not taking: Reported on 11/29/2023), Disp: , Rfl:         Social History     Socioeconomic History   • Marital status:      Spouse name: Not on file   • Number of children: 3   • Years of education: Post Graduate   • Highest education level: Not on file   Occupational History   • Occupation:      Comment: P/T   • Occupation: Retired     Comment: RN   Tobacco Use   • Smoking status: Never     Passive exposure: Past   • Smokeless tobacco: Never   Vaping Use   • Vaping Use: Never used   Substance and Sexual Activity   • Alcohol use:  Yes     Alcohol/week: 7.0 standard drinks of alcohol     Types: 7 Glasses of wine per week     Comment: 1 wine nightly   • Drug use: No   • Sexual activity: Not Currently   Other Topics Concern   • Not on file   Social History Narrative    Living alone     Social Determinants of Health     Financial Resource Strain: Low Risk  (1/9/2023)    Overall Financial Resource Strain (CARDIA)    • Difficulty of Paying Living Expenses: Not hard at all   Food Insecurity: Not on file   Transportation Needs: No Transportation Needs (1/9/2023)    PRAPARE - Transportation    • Lack of Transportation (Medical): No    • Lack of Transportation (Non-Medical): No   Physical Activity: Not on file   Stress: Not on file   Social Connections: Not on file   Intimate Partner Violence: Not on file   Housing Stability: Not on file       Family History   Problem Relation Age of Onset   • Coronary artery disease Mother    • Arthritis Mother    • Other Mother         Cardiac Disorder    • Transient ischemic attack Mother    • Heart attack Father    • Sudden death Father         SCD   • Cancer Daughter 52        kidney   • No Known Problems Paternal Aunt        Physical Exam  Vitals and nursing note reviewed. Constitutional:       General: She is not in acute distress. Appearance: She is not diaphoretic. HENT:      Head: Normocephalic and atraumatic. Eyes:      Conjunctiva/sclera: Conjunctivae normal.   Cardiovascular:      Rate and Rhythm: Normal rate and regular rhythm. Pulses: Intact distal pulses. Heart sounds: Normal heart sounds. Pulmonary:      Effort: Pulmonary effort is normal.      Breath sounds: Normal breath sounds. Abdominal:      General: Bowel sounds are normal.      Palpations: Abdomen is soft. Musculoskeletal:         General: Normal range of motion. Cervical back: Normal range of motion and neck supple. Skin:     General: Skin is warm and dry. Neurological:      Mental Status: She is alert and oriented to person, place, and time. Vitals: Blood pressure 136/71, pulse (!) 52, height 5' 3" (1.6 m), weight 51.4 kg (113 lb 6.4 oz).    Wt Readings from Last 3 Encounters:   11/29/23 51.4 kg (113 lb 6.4 oz)   11/14/23 51.4 kg (113 lb 6.4 oz)   10/17/23 51.3 kg (113 lb)         Labs & Results:  Lab Results Component Value Date    WBC 7.21 11/16/2023    HGB 12.9 11/16/2023    HCT 39.3 11/16/2023    MCV 97 11/16/2023     11/16/2023     No results found for: "BNP"  No components found for: "CHEM"  Total CK   Date Value Ref Range Status   10/06/2020 130 26 - 192 U/L Final     Troponin I   Date Value Ref Range Status   10/06/2020 <0.02 <=0.04 ng/mL Final     Comment:     S[employer]ens Chemistry analyzer 99% cutoff is > 0.04 ng/mL in network labs     o cTnI 99% cutoff is useful only when applied to patients in the clinical setting of myocardial ischemia   o cTnI 99% cutoff should be interpreted in the context of clinical history, ECG findings and possibly cardiac imaging to establish correct diagnosis. o cTnI 99% cutoff may be suggestive but clearly not indicative of a coronary event without the clinical setting of myocardial ischemia. 10/06/2020 <0.02 <=0.04 ng/mL Final     Comment:     Siemens Chemistry analyzer 99% cutoff is > 0.04 ng/mL in network labs     o cTnI 99% cutoff is useful only when applied to patients in the clinical setting of myocardial ischemia   o cTnI 99% cutoff should be interpreted in the context of clinical history, ECG findings and possibly cardiac imaging to establish correct diagnosis. o cTnI 99% cutoff may be suggestive but clearly not indicative of a coronary event without the clinical setting of myocardial ischemia.     01/24/2020 3.97 (H) <=0.04 ng/mL Final     Comment:       Siemens Chemistry analyzer 99% cutoff is > 0.04 ng/mL in network labs     o cTnI 99% cutoff is useful only when applied to patients in the clinical setting of myocardial ischemia   o cTnI 99% cutoff should be interpreted in the context of clinical history, ECG findings and possibly cardiac imaging to establish correct diagnosis. o cTnI 99% cutoff may be suggestive but clearly not indicative of a coronary event without the clinical setting of myocardial ischemia.        Results for orders placed during the hospital encounter of 20   Echo complete with contrast if indicated    Narrative [address], [address] [facility]  [phone]    Transthoracic Echocardiogram  2D, M-mode, Doppler, and Color Doppler    Study date:  2020    Patient: Marianne Quinteros  MR number: [id number]  Account number: [de-identified]  : 1945  Age: 76 years  Gender: Female  Status: Inpatient  Location: Bedside  Height: 64 in  Weight: 116.8 lb  BP: 129/ 67 mmHg    Indications: Non ST elevation MI. Diagnoses: I21.4 - Non-ST elevation (NSTEMI) myocardial infarction    Sonographer:  ERIC Vazquez  Primary Physician:  Stacia Brush DO  Referring Physician:  Desirae Carpenter MD  Group:  [facility]  cc:  Vita Man MD  Interpreting Physician:  Vita Man MD    SUMMARY    LEFT VENTRICLE:  Systolic function was vigorous. Ejection fraction was estimated to be 70 %. There were no regional wall motion abnormalities. Wall thickness was at the upper limits of normal.    RIGHT VENTRICLE:  The size was normal.  Systolic function was normal.    MITRAL VALVE:  There was mild regurgitation. HISTORY: PRIOR HISTORY: HTN, HLD, syncope. PROCEDURE: The procedure was performed at the bedside. This was a routine study. The transthoracic approach was used. The study included complete 2D imaging, M-mode, complete spectral Doppler, and color Doppler. The heart rate was 66 bpm,  at the start of the study. Echocardiographic views were limited due to poor acoustic window availability. This was a technically difficult study. LEFT VENTRICLE: Size was normal. Systolic function was vigorous. Ejection fraction was estimated to be 70 %. There were no regional wall motion abnormalities.  Wall thickness was at the upper limits of normal. DOPPLER: Left ventricular  diastolic function parameters were normal.    RIGHT VENTRICLE: The size was normal. Systolic function was normal. Wall thickness was normal.    LEFT ATRIUM: Size was normal.    RIGHT ATRIUM: Size was normal.    MITRAL VALVE: Valve structure was normal. There was normal leaflet separation. DOPPLER: The transmitral velocity was within the normal range. There was no evidence for stenosis. There was mild regurgitation. AORTIC VALVE: The valve was trileaflet. Leaflets exhibited mildly increased thickness, normal cuspal separation, and sclerosis. DOPPLER: Transaortic velocity was within the normal range. There was no evidence for stenosis. There was no  significant regurgitation. TRICUSPID VALVE: The valve structure was normal. There was normal leaflet separation. DOPPLER: The transtricuspid velocity was within the normal range. There was no evidence for stenosis. There was no significant regurgitation. The  tricuspid jet envelope definition was inadequate for estimation of RV systolic pressure. PULMONIC VALVE: Leaflets exhibited normal thickness, no calcification, and normal cuspal separation. DOPPLER: The transpulmonic velocity was within the normal range. There was no significant regurgitation. PERICARDIUM: There was no pericardial effusion. AORTA: The root exhibited normal size. SYSTEMIC VEINS: IVC: The inferior vena cava was normal in size. Respirophasic changes were normal.    SYSTEM MEASUREMENT TABLES    2D  %FS: 36.42 %  Ao Diam: 3.15 cm  EDV(Teich): 40.82 ml  EF(Teich): 67.55 %  ESV(Teich): 13.25 ml  IVSd: 1.05 cm  LA Diam: 2.94 cm  LVIDd: 3.2 cm  LVIDs: 2.03 cm  LVPWd: 0.88 cm  SV(Teich): 27.58 ml    CW  AV Env. Ti: 304.5 ms  AV MaxP.2 mmHg  AV VTI: 21.69 cm  AV Vmax: 1.14 m/s  AV Vmean: 0.71 m/s  AV meanP.33 mmHg    MM  TAPSE: 2.46 cm    PW  LVOT Env. Ti: 349.48 ms  LVOT VTI: 19.33 cm  LVOT Vmax: 0.91 m/s  LVOT Vmean: 0.55 m/s  LVOT maxPG: 3.34 mmHg  LVOT meanP.44 mmHg  MV A [place]: 0.96 m/s  MV Dec [place]: 5.8 m/s2  MV DecT: 180.05 ms  MV E [place]: 1.04 m/s  MV E/A Ratio: 1.08  MV PHT: 52.21 ms  MVA By PHT: 4.21 cm2    [facility]    Prepared and electronically signed by    Belkys Washington MD  Signed 24-Jan-2020 19:02:39       No results found for this or any previous visit. This note was completed in part utilizing [email or web address] direct voice recognition software. Grammatical errors, random word insertion, spelling mistakes, and incomplete sentences may be an occasional consequence of the system secondary to software limitations, ambient noise and hardware issues. At the time of dictation, efforts were made to edit, clarify and /or correct errors. Please read the chart carefully and recognize, using context, where substitutions have occurred.   If you have any questions or concerns about the context, text or information contained within the body of this dictation, please contact myself, the provider, for further clarification

## 2023-12-05 ENCOUNTER — OFFICE VISIT (OUTPATIENT)
Dept: FAMILY MEDICINE CLINIC | Facility: CLINIC | Age: 78
End: 2023-12-05
Payer: MEDICARE

## 2023-12-05 VITALS
DIASTOLIC BLOOD PRESSURE: 60 MMHG | OXYGEN SATURATION: 99 % | WEIGHT: 111.2 LBS | BODY MASS INDEX: 19.7 KG/M2 | RESPIRATION RATE: 18 BRPM | HEIGHT: 63 IN | SYSTOLIC BLOOD PRESSURE: 130 MMHG | HEART RATE: 57 BPM | TEMPERATURE: 96.9 F

## 2023-12-05 DIAGNOSIS — M41.55 OTHER SECONDARY SCOLIOSIS, THORACOLUMBAR REGION: Primary | ICD-10-CM

## 2023-12-05 PROCEDURE — 99213 OFFICE O/P EST LOW 20 MIN: CPT | Performed by: NURSE PRACTITIONER

## 2023-12-05 NOTE — ASSESSMENT & PLAN NOTE
Having progressively worsening pain with known history of scoliosis.   Repeat spinal xray and referred to PT

## 2023-12-05 NOTE — PROGRESS NOTES
Name: Radha Ashley      : 1945      MRN: [id number]  Encounter Provider: RACHEL Monk  Encounter Date: 2023   Encounter department: [facility]     1. Other secondary scoliosis, thoracolumbar region  Assessment & Plan:  Having progressively worsening pain with known history of scoliosis. Repeat spinal xray and referred to PT    Orders:  -     XR entire spine (scoliosis) 4-5 vw; Future; Expected date: 2023  -     Ambulatory Referral to Physical Therapy; Future           Subjective      Pt is a 66 y.o. y/o female who is seen today for evaluation of back pan. Past medical history of CAD, HTN, lumbar OA, cervical radiculopathy, compression fracture T11-12, osteopenia, scoliosis, thrombocytopenia, depression, hyperlipidemia, s/p cabg x3. She says her pain has seemed to getting worse over time, she does not feel like it is at the same level as her fractures. She feels like her hips are asymmetrical due to her scoliosis and so by the end of the day she has such bad back pain that she can't comfortably hold herself up. She was going to PT for a period of time and she did think it was helping but she had to stop because she ended up with a knee issue that she was then being treated for. She gets relief when supine. No numbness or tingling, no loss of control of bowel or bladder. She puts heat on it at night. She uses acetaminophen 1000 mg daily and this seems to help a bit. Review of Systems   Constitutional:  Negative for chills and fever. Respiratory:  Negative for shortness of breath. Cardiovascular:  Negative for chest pain. Gastrointestinal:  Negative for constipation and diarrhea. Genitourinary:  Negative for difficulty urinating and enuresis. Musculoskeletal:  Positive for back pain. Neurological:  Negative for weakness and numbness. Psychiatric/Behavioral:  Negative for sleep disturbance.         Current Outpatient Medications on File Prior to Visit   Medication Sig    acetaminophen (TYLENOL) 650 mg CR tablet Take 650 mg by mouth daily as needed for mild pain    amLODIPine (NORVASC) 2.5 mg tablet Take 2 tablets (5 mg total) by mouth daily    aspirin (ECOTRIN LOW STRENGTH) 81 mg EC tablet Take 1 tablet (81 mg total) by mouth daily    cabergoline (DOSTINEX) 0.5 MG tablet Take 1/2 tablet once a week ( 2 tabs per month)    Calcium Carb-Cholecalciferol 600-200 MG-UNIT TABS Calcium 600 + D TABS  TAKE 1 TABLET DAILY. Refills: 0    Active    denosumab (PROLIA) 60 mg/mL Inject 60 mg under the skin every 6 (six) months    losartan (COZAAR) 100 MG tablet Take 1 tablet (100 mg total) by mouth daily    melatonin 3 mg Take 3 mg by mouth daily at bedtime    metoprolol succinate (TOPROL-XL) 25 mg 24 hr tablet Take one tablet in the am ( 25 mg) and 0.5 tablet ( 12.5 mg) in the pm    rosuvastatin (CRESTOR) 40 MG tablet TAKE ONE TABLET BY MOUTH EVERY DAY    torsemide (DEMADEX) 10 mg tablet Take 0.5 tablets (5 mg total) by mouth daily    VITAMIN D, CHOLECALCIFEROL, PO Take 2,600 Units/day by mouth       Objective     /60 (BP Location: Right arm, Patient Position: Sitting, Cuff Size: Standard)   Pulse 57   Temp (!) 96.9 °F (36.1 °C) (Tympanic)   Resp 18   Ht 5' 3" (1.6 m)   Wt 50.4 kg (111 lb 3.2 oz)   SpO2 99%   BMI 19.70 kg/m²     Physical Exam  Vitals reviewed. Constitutional:       General: She is awake. She is not in acute distress. Appearance: Normal appearance. She is well-developed and well-groomed. She is not ill-appearing or diaphoretic. Eyes:      General: Lids are normal.      Conjunctiva/sclera: Conjunctivae normal.   Cardiovascular:      Rate and Rhythm: Normal rate and regular rhythm. Heart sounds: Normal heart sounds. No murmur heard. Pulmonary:      Effort: Pulmonary effort is normal.      Breath sounds: Normal breath sounds. Musculoskeletal:      Right lower leg: No edema.       Left lower leg: No edema. Comments: Abnormal spinal curvature noted at multiple levels; hip height asymmetry. Skin:     General: Skin is dry. Neurological:      Mental Status: She is alert and oriented to person, place, and time. Psychiatric:         Attention and Perception: Attention normal.         Mood and Affect: Mood normal.         Speech: Speech normal.         Behavior: Behavior normal. Behavior is cooperative. Thought Content:  Thought content normal.         Cognition and Memory: Cognition normal.         Judgment: Judgment normal.       RACHEL Bourne

## 2023-12-07 ENCOUNTER — HOSPITAL ENCOUNTER (OUTPATIENT)
Dept: RADIOLOGY | Facility: HOSPITAL | Age: 78
End: 2023-12-07
Payer: MEDICARE

## 2023-12-07 DIAGNOSIS — M41.55 OTHER SECONDARY SCOLIOSIS, THORACOLUMBAR REGION: ICD-10-CM

## 2023-12-07 DIAGNOSIS — Z95.1 S/P CABG X 3: ICD-10-CM

## 2023-12-07 DIAGNOSIS — J94.0 CHYLOTHORAX, UNSPECIFIED LATERALITY: ICD-10-CM

## 2023-12-07 PROCEDURE — 72082 X-RAY EXAM ENTIRE SPI 2/3 VW: CPT

## 2023-12-14 ENCOUNTER — PATIENT MESSAGE (OUTPATIENT)
Dept: FAMILY MEDICINE CLINIC | Facility: CLINIC | Age: 78
End: 2023-12-14

## 2023-12-14 DIAGNOSIS — G47.00 INSOMNIA, UNSPECIFIED TYPE: Primary | ICD-10-CM

## 2023-12-14 RX ORDER — TRAZODONE HYDROCHLORIDE 50 MG/1
50 TABLET ORAL
Qty: 30 TABLET | Refills: 1 | Status: SHIPPED | OUTPATIENT
Start: 2023-12-14

## 2023-12-15 NOTE — PHYSICAL THERAPY NOTE
Physical Therapy Evaluation     Patient's Name: Serenity Macias    Admitting Diagnosis  NSTEMI (non-ST elevated myocardial infarction) [facility]) [I21 4]    Problem List  Patient Active Problem List   Diagnosis    Arthritis    Cervical myofascial pain syndrome    Cervical radiculopathy    Cervicogenic headache    Cervico-occipital neuralgia    Depression    Hyperlipidemia    Essential hypertension    Osteopenia of spine    Spasmodic torticollis    Laceration of occipital scalp    Other secondary scoliosis, lumbar region    [facility]STEMI [facility]non-ST elevation myocardial infarction) ([facility] [address] )    Syncope    Atherosclerosis of native coronary artery with angina pectoris ([facility] [address] )    S/P CABG x 3       Past Medical History  Past Medical History:   Diagnosis Date    Effusion of left knee     Last assessed - 9/10/15    Hyperlipidemia     Hypertension     Tick bite     Last assessed - 4/21/17       Past Surgical History  Past Surgical History:   Procedure Laterality Date    APPENDECTOMY      ND CABG, ARTERY-VEIN, FOUR N/A 1/27/2020    Procedure: CORONARY ARTERY BYPASS GRAFT (CABG) x3 VESSELS, LIMA TO LAD, AND SVG TO PLB & OM;  Surgeon: Quinn Qureshi DO;  [facility]: [facility];  Service: Cardiac Surgery    ND ECHO TRANSESOPHAG R-T 2D W/PRB IMG ACQUISJ I&R N/A 1/27/2020    Procedure: TRANSESOPHAGEAL ECHOCARDIOGRAM (GET); Surgeon: Quinn Qureshi DO;  [facility];  Service: Cardiac Surgery    ND ENDOSCOPY W/VIDEO-ASST VEIN HARVEST,CABG Left 1/27/2020    Procedure: HARVEST VEIN ENDOSCOPIC ([address]);   Surgeon: Quinn Qureshi DO;  [facility];  Service: Cardiac Surgery    TONSILLECTOMY      Last assessed - 4/20/17    TUBAL LIGATION      Last assessed - 4/20/17    WISDOM TOOTH EXTRACTION      Last assessed - 4/20/17 01/28/20 1134   Note Type   Note type Eval only   Pain Assessment   Pain Assessment 0-10   Pain Score 5  (states 0/10 w/ rest -> up to 5/10 w/ activity + deep breaths)   Pain Type Acute pain;Surgical pain   Pain Location Chest;Incision   Pain Orientation Mid   Pain Descriptors Discomfort;Pressure   Pain Frequency Intermittent   Pain Onset Ongoing   Clinical Progression Gradually improving   Effect of Pain on Daily Activities guarding    Patient's Stated Pain Goal No pain   Hospital Pain Intervention(s) Repositioned; Ambulation/increased activity; Distraction; Emotional support;Relaxation technique   Response to Interventions tolerated   Multiple Pain Sites No   Home Living   Type of [address] Two level;1/2 bath on main level;Bed/bath upstairs; Able to live on main level with bedroom/bathroom;Stairs to enter without rails  (2 HARMAN w/ wall (no HR); 10 steps to 2nd floor bed/bath w/ HR)   Bathroom Shower/Tub Tub/shower unit   Bathroom Toilet Standard   Bathroom Equipment Shower chair   Home Equipment Other (Comment)  (denies)   Additional Comments pt states that if she needed to she could stay on the 1st floor upon d/c    Prior Function   Level of [place] Independent with ADLs and functional mobility  (w/out AD)   Lives With Alone   Receives Help From [facility] in the last 6 months 1 to 4   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 )   Comments pt states that her dtr will be able to stay w/ her upon d/c    Restrictions/Precautions   Weight Bearing Precautions Per Order No   Braces or Orthoses Other (Comment)  (denies)   Other Precautions Cardiac/sternal;Telemetry; Fall Risk;Pain  (CT)   General   Additional Pertinent History cleared for assessment w/ nsg   Family/Caregiver Present Yes   Cognition   Overall Cognitive Status WFL   Arousal/Participation Cooperative   Attention Within functional limits   Orientation Level Oriented to person;Oriented to place;Oriented to situation   Memory Decreased recall of precautions   Following Commands Follows one step commands without difficulty   Comments pt seen reclined in chair on arrival w/ several family members in the room; [FreeTextEntry1] : SUMI is a 26 year old female  Constitutional: healthy appearing, NAD, and normal body habitus  can stand on toes no numbness in legs can get up from chair normal gait pt is pleasant and agreeable to work w/ therapy team    RUE Assessment   RUE Assessment WFL  (AROM)   LUE Assessment   LUE Assessment WFL  (AROM)   RLE Assessment   RLE Assessment WFL  (AROM)   Strength RLE   RLE Overall Strength 4-/5  (grossly)   LLE Assessment   LLE Assessment WFL  (AROM)   Strength LLE   LLE Overall Strength 4-/5  (grossly)   Transfers   Sit to Stand 3  Moderate assistance   Additional items Assist x 1; Increased time required;Verbal cues   Stand to Sit 4  Minimal assistance   Additional items Assist x 1; Increased time required;Verbal cues   Additional Comments at end of the session pt was seated in chair, SCD on; pillows placed for UE support/comfort; call bell w/in reach   Ambulation/Elevation   Gait pattern Narrow THOMAS; Inconsistent alejandro; Short stride; Excessively slow   Gait Assistance 4  Minimal assist  (w/ bouts of (S) )   Additional items Assist x 1;Verbal cues  (SBAx2 for line management and chair follow )   Assistive Device Rolling walker   Distance 200ft    Balance   Static Sitting Fair   Static Standing Fair -   Ambulatory Poor +   Activity Tolerance   Activity Tolerance Patient tolerated treatment well   Nurse Made Aware yes Marco Antonio Russo RN   Assessment   Prognosis Good   Problem List Decreased strength;Decreased endurance; Impaired balance;Decreased mobility   Assessment Pt is a 75 y/o female s/p episode of syncope and fall in home following significant diaphoresis and nausea that woke pt from her sleep who presented to [facility] w/ elevated troponins and dx of NSTEMI  Further w/u revealed dx of MVCAD and pt was transferred to [facility] for [facility] surgery consultation  Rec for CABG from cardiac surgery on 1/25/20  Pt underwent "Coronary artery bypass grafting x 3 with left internal mammary artery to left anterior descending artery, saphenous vein graft to ramus intermedius and saphenous vein graft to obtuse marginal 1" on 1/27/20  Pt is now 1 day s/p CABGx3   PT now consulted for assessment of mobility and d/c needs  Pt's PMHx/co-morbidities affecting current course include HTN, past MI, arthritis, depression, headaches and personal factors of living alone and 2 HARMAN home w/ 10 steps to access 2nd floor bed/bath  Patient's clinical presentation is currently unstable/unpredictable due to telemetry monitoring, abnormal lab values, CT in place, increased reliance on AD, and ongoing medical management/monitoring in the ICU  Pt presents with post-op pain and guarding, generalized weakness of the LEs, decreased functional endurance and activity tolerance compared to baseline, and impaired balance and gait deviations requiring the use of a RW at this time  Will cont to follow pt in PT as medical status allows to progress tx targeting pt's mobility, functional endurance, progression to stairs, level of (I), as well as pt safety in return to pt's PLOF  Otherwise, anticipate pt will return home w/ available support (from dtr who will be staying w/ her) upon D/C provided she cont improving w/ mobility, safety, stairs navigation and endurance and when medically cleared; home PT follow up is recommended at this time; will follow  Barriers to Discharge Inaccessible home environment   Goals   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   STG Expiration Date 02/07/20   Short Term Goal #1 7-10 days  Pt will ambulate 400 ft with least restrictive device PRN mod (I) to facilitate safe return to home environment and community ambulatory distances  Pt will navigate 2 step w/o railing, but with SPC PRN mod (I) to allow pt to enter/exit home environment safely  Pt will also navigate 10 stairs w/ rail and SPC PRN mod(I) to allow pt to access 2nd floor bed/bath in home environment  Pt will be (I) for transfers to allow pt increased (I), ability to navigate home set up, and safely transfer to and from desired locations in home environment   Pt will be (I) for bed mobility to allow pt to transition into bed and OOB safely  Pt will progress to balance and therapeutic exercises in order to continue building upon pt's strength, mobility, safety and (I)  PT Treatment Day 0   Plan   Treatment/Interventions Functional transfer training;LE strengthening/ROM; Elevations; Therapeutic exercise; Endurance training;Patient/family training;Equipment eval/education; Bed mobility;Gait training;Spoke to nursing;OT;Family  (PT spoke to CM)   PT Frequency Other (Comment)  (4-6x/week)   Recommendation   Recommendation Home with family support;Home PT  (rule out 1st floor set up )   Equipment Recommended Walker  (at this time; will monitor progression to SPC/no AD )   Modified [place] Scale   Modified [place] Scale 4         Pheobe Collet, SPT

## 2023-12-19 ENCOUNTER — EVALUATION (OUTPATIENT)
Dept: PHYSICAL THERAPY | Facility: CLINIC | Age: 78
End: 2023-12-19
Payer: MEDICARE

## 2023-12-19 DIAGNOSIS — M41.55 OTHER SECONDARY SCOLIOSIS, THORACOLUMBAR REGION: Primary | ICD-10-CM

## 2023-12-19 PROCEDURE — 97530 THERAPEUTIC ACTIVITIES: CPT

## 2023-12-19 PROCEDURE — 97162 PT EVAL MOD COMPLEX 30 MIN: CPT

## 2023-12-19 NOTE — PROGRESS NOTES
PT Evaluation     Today's date: 2023  Patient name: Bella Sargent  : 1945  MRN: [id number]  Referring provider: Annabelle Walsh C*  Dx:   Encounter Diagnosis     ICD-10-CM    1. Other secondary scoliosis, thoracolumbar region  M41.55 Ambulatory Referral to Physical Therapy                     Assessment  Assessment details: Bella Sargent is a pleasant 78 y.o. female presents with signs and symptoms consistent with:   Other secondary scoliosis, thoracolumbar region    Problem List:  1) Impaired strength   2) Impaired Motor control     Comparable signs:  1) Standing  2) Walking     she has impaired strength, impaired ROM and leg length discrepency secondary to scoliosis and resulting in worry over not knowing what's wrong and fear of not being able to keep active. These impairments listed above are preventing the patient from participating in functional activity. No further referral appears necessary at this time based upon examination results, and is negative for any red flags. Prognosis is good given HEP compliance and attendance to physical therapy 2x a week.  Positive prognostic indicators include positive attitude toward recovery and good understanding of diagnosis and treatment plan options.  Negative prognostic indicators include chronicity of symptoms.  Patent will benefit from skilled physical therapy at this time to address deficits to improve overall function and return to PLOF. Patient verbalized understanding of POC, HEP, and return demonstrated HEP. All questions were answered to patients satisfaction.     Please contact me if you have any questions or recommendations. Thank you for the referral and the opportunity to share in Bella Sargent's care.            Impairments: abnormal muscle firing, abnormal or restricted ROM, activity intolerance, impaired physical strength, lacks appropriate home exercise program, pain with function, poor posture  and poor body mechanics  Understanding of  Dx/Px/POC: good   Prognosis: good    Goals  Impairment Goals 4-6 weeks  In order to improve and maximize function patient will be able to...  - Decrease pain intensity to <2/10  - Improve lumbar AROM to >100% throughout  - Increase hip strength to 5/5 throughout  - Centralize symptoms and decrease numbness frequency/duration    Functional Goals 6-8 weeks  In order to improve and maximize function patient will be able to...  - Return to Prior Level of Function with no greater than 2/10 pain   - Increase Functional Status Measure (FOTO) to: >predicted outcomes  - Be independent and compliant with HEP  - Tolerate sitting and or standing without increased pain/compensation/difficulty for at least 30 minutes  - Perform sit to stand without increased pain/compensation/difficulty   - Return to gentle yoga with minimal discomfort          Plan  Patient would benefit from: skilled physical therapy  Planned modality interventions: cryotherapy, thermotherapy: hydrocollator packs and TENS  Planned therapy interventions: home exercise program, graded exercise, functional ROM exercises, flexibility, body mechanics training, postural training, patient education, therapeutic activities, therapeutic exercise, manual therapy, joint mobilization, neuromuscular re-education, motor coordination training, graded activity, stretching and strengthening  Frequency: 2x week  Duration in weeks: 8  Treatment plan discussed with: patient and PCP        Subjective Evaluation    History of Present Illness  Mechanism of injury: Patient presents to PT today with discomfort in her mid and low back that started about 2 months ago. She has a history of scoliosis. She had a history of thoracic fracture in fall of 2022. She notes from her fracture she feels that her spine has shifted. She notes that she is having a R knee issue and she is getting injections for that. Bridges are discomfort. 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 Getting  on and off the floor     Difficulty: with walking a mile, hills, cooking dinner, standing (starts around late afternoon)   Relieved with: lying flat  Patient Goals  Patient goals for therapy: decreased pain and independence with ADLs/IADLs  Patient goal: stand up straight without pain, walk a mile  Pain  Current pain ratin  At worst pain ratin  Location: low back  Quality: dull ache and burning  Alleviating factors: laying flat.  Aggravating factors: walking, standing and stair climbing    Social Support  Lives with: alone      Diagnostic Tests  X-ray: normal      Objective     Concurrent Complaints  Positive for bladder dysfunction. Negative for night pain, disturbed sleep, bowel dysfunction and saddle (S4) numbness    Postural Observations  Seated posture: fair  Standing posture: fair    Additional Postural Observation Details  Elevated pelvis on left side  Shortened limb on left ( 1 cm difference)    Active Range of Motion   Cervical/Thoracic Spine       Thoracic    Flexion:  Restriction level: moderate  Extension:  Restriction level: moderate  Left lateral flexion:  Restriction level: moderate  Right lateral flexion:  Restriction level: moderate  Left rotation:  Restriction level: moderate  Right rotation:  Restriction level: moderate    Lumbar   Flexion:  Restriction level: moderate  Extension:  with pain Restriction level: moderate  Left lateral flexion:  Restriction level: moderate  Right lateral flexion:  Restriction level: moderate  Left rotation:  Restriction level: moderate  Right rotation:  Restriction level: moderate  Mechanical Assessment    Cervical      Thoracic      Lumbar    Standing flexion:    Pain location:no change  Standing extension:   Pain location: no change    Strength/Myotome Testing     Left Hip   Planes of Motion   Flexion: 4-  Extension: 3+ (challenged with engaging glutes)  Abduction: 3+  External rotation: 4-    Right Hip   Planes of Motion   Flexion: 4-  Extension: 3+  (challenged with engaging glutes)  Abduction: 3+  External rotation: 4-    Left Knee   Flexion: 5  Extension: 5    Right Knee   Flexion: 5  Extension: 5    Left Ankle/Foot   Dorsiflexion: 5  Plantar flexion: 5    Right Ankle/Foot   Dorsiflexion: 5  Plantar flexion: 5    Muscle Activation   Patient able to activate left transverse abdominals, left multifidus, right transverse abdominals and right multifidus.     General Comments:    Lower quarter screen   Hips: unremarkable  Knees: unremarkable  Foot/ankle: unremarkable    Lumbar Comments  Hypomobility throughout thoracic              Diagnosis:  Scoliosis    Precautions:  See chart   Comparable signs 1) Standing  2) Walking   Primary Impairments: 1) Core/glutes/posterior chain  2) lumbar and thoracic spine hypomobility    Patient Goals Yoga,    Manual Therapy 12/19        STM                                    Re-evaluation          Exercise Diary          Therapeutic Exercise         Bike/UBE          Open books         LTR         PPU          Mod satinder         Leg press                  Neuromuscular Re-education         Multifidi          Rows                                                                Therapeutic Activities         Education  Scoliosis         xwalk                                    Modalities

## 2023-12-26 ENCOUNTER — TELEPHONE (OUTPATIENT)
Dept: NEPHROLOGY | Facility: CLINIC | Age: 78
End: 2023-12-26

## 2023-12-28 ENCOUNTER — OFFICE VISIT (OUTPATIENT)
Dept: PHYSICAL THERAPY | Facility: CLINIC | Age: 78
End: 2023-12-28
Payer: MEDICARE

## 2023-12-28 DIAGNOSIS — M41.55 OTHER SECONDARY SCOLIOSIS, THORACOLUMBAR REGION: Primary | ICD-10-CM

## 2023-12-28 PROCEDURE — 97110 THERAPEUTIC EXERCISES: CPT

## 2023-12-28 PROCEDURE — 97112 NEUROMUSCULAR REEDUCATION: CPT

## 2023-12-28 PROCEDURE — 97530 THERAPEUTIC ACTIVITIES: CPT

## 2023-12-28 NOTE — PROGRESS NOTES
Daily Note     Today's date: 2023  Patient name: Bella Sargent  : 1945  MRN: [id number]  Referring provider: Annabelle Walsh C*  Dx:   Encounter Diagnosis     ICD-10-CM    1. Other secondary scoliosis, thoracolumbar region  M41.55                      Subjective: Patient notes a little stiff, but its the morning she feels good.       Objective: See treatment diary below      Assessment: Tolerated treatment well. Patient demonstrated fatigue post treatment, exhibited good technique with therapeutic exercises, and would benefit from continued PT. Limited rib mobility on L side, with difficulty elevating ribs. He responded well to gentle mobility today with education of breathing at end range rather than forced end range. Patient HEP updated for home       Plan: Continue per plan of care.  Progress treatment as tolerated.       Diagnosis:  Scoliosis    Precautions:  See chart   Comparable signs 1) Standing  2) Walking   Primary Impairments: 1) Core/glutes/posterior chain  2) lumbar and thoracic spine hypomobility    Patient Goals Yoga,    Manual Therapy        STM                                    Re-evaluation          Exercise Diary          Therapeutic Exercise         Bike/UBE   5'        Open books  10x10 ea       LTR  10x10 ea       PPU   Extension in standing 20x        Mod satinder  2' ea       Sciatic nerve glide  2x10 simone                Neuromuscular Re-education         Breathing   Rib depression        Multifidi          Rows                                                                Therapeutic Activities         Education  Scoliosis  End range        xwalk                                    Modalities

## 2024-01-03 ENCOUNTER — OFFICE VISIT (OUTPATIENT)
Dept: PHYSICAL THERAPY | Facility: CLINIC | Age: 79
End: 2024-01-03
Payer: MEDICARE

## 2024-01-03 DIAGNOSIS — M41.55 OTHER SECONDARY SCOLIOSIS, THORACOLUMBAR REGION: Primary | ICD-10-CM

## 2024-01-03 PROCEDURE — 97112 NEUROMUSCULAR REEDUCATION: CPT

## 2024-01-03 PROCEDURE — 97530 THERAPEUTIC ACTIVITIES: CPT

## 2024-01-03 PROCEDURE — 97110 THERAPEUTIC EXERCISES: CPT

## 2024-01-03 NOTE — PROGRESS NOTES
Daily Note     Today's date: 1/3/2024  Patient name: Bella Sargent  : 1945  MRN: [id number]  Referring provider: Annabelle Walsh C*  Dx:   Encounter Diagnosis     ICD-10-CM    1. Other secondary scoliosis, thoracolumbar region  M41.55                      Subjective: Patient notes a little stiff, but its the morning she feels good.       Objective: See treatment diary below      Assessment: Tolerated treatment well. Patient demonstrated fatigue post treatment, exhibited good technique with therapeutic exercises, and would benefit from continued PT. Discussed with patient importance of strengthening with good response to education. Patient focused on breathing to allow for improved rib mobility on left side. She needed verbal cues for bridges to allow for glute activation.       Plan: Continue per plan of care.  Progress treatment as tolerated.       Diagnosis:  Scoliosis    Precautions:  See chart   Comparable signs 1) Standing  2) Walking   Primary Impairments: 1) Core/glutes/posterior chain  2) lumbar and thoracic spine hypomobility    Patient Goals Yoga,    Manual Therapy 12/19 12/28 1/3      [facility]                                    Re-evaluation          Exercise Diary          Therapeutic Exercise         Bike/UBE   5'  5'      Open books  10x10 ea 10x10 ea      LTR  10x10 ea 10x10 ea      PPU   Extension in standing 20x        Mod satinder  2' ea       Sciatic nerve glide  2x10 simone       Lat stretch w/ breath    2x10      Neuromuscular Re-education         Breathing   Rib depression  Rib depression       Multifidi          Bird dogs    2x10      Bridge    2x10      Prone hip extension   2x10 off table                                           Therapeutic Activities         Education  Scoliosis  End range  Strengthing, ROM and working with breath       xwalk                                    Modalities

## 2024-01-04 DIAGNOSIS — D35.2 PITUITARY MACROADENOMA (HCC): ICD-10-CM

## 2024-01-04 NOTE — TELEPHONE ENCOUNTER
Patient had some trouble with breaking her pills in half and is not due to her refill from mail order pharmacy until 1/31/2024 so she would like a month emergency supply.  Medication qued up. Please advise

## 2024-01-05 ENCOUNTER — OFFICE VISIT (OUTPATIENT)
Dept: PHYSICAL THERAPY | Facility: CLINIC | Age: 79
End: 2024-01-05
Payer: MEDICARE

## 2024-01-05 DIAGNOSIS — M41.55 OTHER SECONDARY SCOLIOSIS, THORACOLUMBAR REGION: Primary | ICD-10-CM

## 2024-01-05 PROCEDURE — 97110 THERAPEUTIC EXERCISES: CPT

## 2024-01-05 PROCEDURE — 97530 THERAPEUTIC ACTIVITIES: CPT

## 2024-01-05 PROCEDURE — 97112 NEUROMUSCULAR REEDUCATION: CPT

## 2024-01-05 RX ORDER — CABERGOLINE 0.5 MG/1
TABLET ORAL
Qty: 4 TABLET | Refills: 0 | Status: SHIPPED | OUTPATIENT
Start: 2024-01-05

## 2024-01-05 NOTE — PROGRESS NOTES
Daily Note     Today's date: 2024  Patient name: Bella Sargent  : 1945  MRN: [id number]  Referring provider: Annabelle Walsh C*  Dx:   Encounter Diagnosis     ICD-10-CM    1. Other secondary scoliosis, thoracolumbar region  M41.55                      Subjective: Patient notes she is tired.       Objective: See treatment diary below      Assessment: Tolerated treatment well. Patient demonstrated fatigue post treatment, exhibited good technique with therapeutic exercises, and would benefit from continued PT. Y's given for posture cueing and breathing to allow for improved thoracic extension and chest opening. Patient needed verbal cueing for lifting her chest up to the marco. She improved with bridges. She was challenged with multifidi walk outs with keeping core engaged.       Plan: Continue per plan of care.  Progress treatment as tolerated.       Diagnosis:  Scoliosis    Precautions:  See chart   Comparable signs 1) Standing  2) Walking   Primary Impairments: 1) Core/glutes/posterior chain  2) lumbar and thoracic spine hypomobility    Patient Goals Yoga,    Manual Therapy 12/19 12/28 1/3 1/5     [facility]                                    Re-evaluation          Exercise Diary          Therapeutic Exercise         Bike/UBE   5'  5' 5'     Open books  10x10 ea 10x10 ea      LTR  10x10 ea 10x10 ea      PPU   Extension in standing 20x        Mod satinder  2' ea       Sciatic nerve glide  2x10 simone       Lat stretch w/ breath    2x10 Ball roll out 20x      Neuromuscular Re-education         Breathing   Rib depression  Rib depression       Big stick walk outs    4laps 2.5#      Bird dogs    2x10 10x     Bridge    2x10 GTB 2x10     Prone hip extension   2x10 off table       Ys     Breathing 2x10                                 Therapeutic Activities         Education  Scoliosis  End range  Strengthing, ROM and working with breath  Opening up thoracic spine.      xwalk                                    Modalities

## 2024-01-08 ENCOUNTER — OFFICE VISIT (OUTPATIENT)
Dept: PHYSICAL THERAPY | Facility: CLINIC | Age: 79
End: 2024-01-08
Payer: MEDICARE

## 2024-01-08 DIAGNOSIS — M41.55 OTHER SECONDARY SCOLIOSIS, THORACOLUMBAR REGION: Primary | ICD-10-CM

## 2024-01-08 PROCEDURE — 97530 THERAPEUTIC ACTIVITIES: CPT

## 2024-01-08 PROCEDURE — 97112 NEUROMUSCULAR REEDUCATION: CPT

## 2024-01-08 PROCEDURE — 97110 THERAPEUTIC EXERCISES: CPT

## 2024-01-08 NOTE — PROGRESS NOTES
"Daily Note     Today's date: 2024  Patient name: Bella Sargent  : 1945  MRN: [id number]  Referring provider: Annabelle Walsh C*  Dx:   Encounter Diagnosis     ICD-10-CM    1. Other secondary scoliosis, thoracolumbar region  M41.55                      Subjective: Patient notes she is feeling overall good.       Objective: See treatment diary below      Assessment: Tolerated treatment well. Patient demonstrated fatigue post treatment, exhibited good technique with therapeutic exercises, and would benefit from continued PT. Patient demonstrated improvement with bird dogs today after VC for lifting with her glute to prevent hamstring cramping. Patient focused on endurance today and weight bearing exercises. Continual cueing for upright posture. Slight LOB with farmers carries but able to recover.       Plan: Continue per plan of care.  Progress treatment as tolerated.       Diagnosis:  Scoliosis    Precautions:  See chart   Comparable signs 1) Standing  2) Walking   Primary Impairments: 1) Core/glutes/posterior chain  2) lumbar and thoracic spine hypomobility    Patient Goals Yoga,    Manual Therapy 12/19 12/28 1/3 1/5 1/8    [facility]                                    Re-evaluation          Exercise Diary          Therapeutic Exercise         Bike/UBE  5'  5'  5' 5' 5'    Leg press      2x15 45#     Open books  10x10 ea 10x10 ea      LTR  10x10 ea 10x10 ea      PPU   Extension in standing 20x        Mod satinder  2' ea       Sciatic nerve glide  2x10 simone       Lat stretch w/ breath    2x10 Ball roll out 20x  Ball roll out with breath 20x    Neuromuscular Re-education         Breathing   Rib depression  Rib depression       Big stick walk outs    4laps 2.5#  5 laps 2.5# ea     Bird dogs    2x10 10x 5\" 5x ea     Bridge    2x10 GTB 2x10     Prone hip extension   2x10 off table       Ys     Breathing 2x10  Breathing YTB 2x10                               Therapeutic Activities         Education  Scoliosis  End " range  Strengthing, ROM and working with breath  Opening up thoracic spine.      xwalk         Embarrass carries     10#  2 laps ea                      Modalities

## 2024-01-11 ENCOUNTER — OFFICE VISIT (OUTPATIENT)
Dept: PHYSICAL THERAPY | Facility: CLINIC | Age: 79
End: 2024-01-11
Payer: MEDICARE

## 2024-01-11 ENCOUNTER — OFFICE VISIT (OUTPATIENT)
Dept: FAMILY MEDICINE CLINIC | Facility: CLINIC | Age: 79
End: 2024-01-11
Payer: MEDICARE

## 2024-01-11 VITALS
BODY MASS INDEX: 19.84 KG/M2 | HEIGHT: 63 IN | WEIGHT: 112 LBS | SYSTOLIC BLOOD PRESSURE: 118 MMHG | TEMPERATURE: 97.2 F | RESPIRATION RATE: 14 BRPM | DIASTOLIC BLOOD PRESSURE: 60 MMHG | OXYGEN SATURATION: 100 % | HEART RATE: 54 BPM

## 2024-01-11 DIAGNOSIS — G47.00 INSOMNIA, UNSPECIFIED TYPE: ICD-10-CM

## 2024-01-11 DIAGNOSIS — Z00.00 MEDICARE ANNUAL WELLNESS VISIT, SUBSEQUENT: Primary | ICD-10-CM

## 2024-01-11 DIAGNOSIS — I10 ESSENTIAL HYPERTENSION: ICD-10-CM

## 2024-01-11 DIAGNOSIS — M25.511 ACUTE PAIN OF RIGHT SHOULDER: ICD-10-CM

## 2024-01-11 DIAGNOSIS — M41.55 OTHER SECONDARY SCOLIOSIS, THORACOLUMBAR REGION: Primary | ICD-10-CM

## 2024-01-11 DIAGNOSIS — E87.1 HYPONATREMIA: ICD-10-CM

## 2024-01-11 PROBLEM — E46 PROTEIN-CALORIE MALNUTRITION, UNSPECIFIED SEVERITY (HCC): Status: RESOLVED | Noted: 2023-11-14 | Resolved: 2024-01-11

## 2024-01-11 PROCEDURE — 97110 THERAPEUTIC EXERCISES: CPT

## 2024-01-11 PROCEDURE — G0439 PPPS, SUBSEQ VISIT: HCPCS | Performed by: FAMILY MEDICINE

## 2024-01-11 PROCEDURE — 97112 NEUROMUSCULAR REEDUCATION: CPT

## 2024-01-11 NOTE — PATIENT INSTRUCTIONS
Medicare Preventive Visit Patient Instructions  Thank you for completing your Welcome to Medicare Visit or Medicare Annual Wellness Visit today. Your next wellness visit will be due in one year (1/11/2025).  The screening/preventive services that you may require over the next 5-10 years are detailed below. Some tests may not apply to you based off risk factors and/or age. Screening tests ordered at today's visit but not completed yet may show as past due. Also, please note that scanned in results may not display below.  Preventive Screenings:  Service Recommendations Previous Testing/Comments   Colorectal Cancer Screening  * Colonoscopy    * Fecal Occult Blood Test (FOBT)/Fecal Immunochemical Test (FIT)  * Fecal DNA/Cologuard Test  * Flexible Sigmoidoscopy Age: 45-75 years old   Colonoscopy: every 10 years (may be performed more frequently if at higher risk)  OR  FOBT/FIT: every 1 year  OR  Cologuard: every 3 years  OR  Sigmoidoscopy: every 5 years  Screening may be recommended earlier than age 45 if at higher risk for colorectal cancer. Also, an individualized decision between you and your healthcare provider will decide whether screening between the ages of 76-85 would be appropriate. Colonoscopy: 06/15/2020  FOBT/FIT: Not on file  Cologuard: Not on file  Sigmoidoscopy: Not on file          Breast Cancer Screening Age: 40+ years old  Frequency: every 1-2 years  Not required if history of left and right mastectomy Mammogram: 05/31/2023        Cervical Cancer Screening Between the ages of 21-29, pap smear recommended once every 3 years.   Between the ages of 30-65, can perform pap smear with HPV co-testing every 5 years.   Recommendations may differ for women with a history of total hysterectomy, cervical cancer, or abnormal pap smears in past. Pap Smear: Not on file        Hepatitis C Screening Once for adults born between 1945 and 1965  More frequently in patients at high risk for Hepatitis C Hep C Antibody:  05/18/2021        Diabetes Screening 1-2 times per year if you're at risk for diabetes or have pre-diabetes Fasting glucose: 88 mg/dL (11/14/2023)  A1C: No results in last 5 years (No results in last 5 years)      Cholesterol Screening Once every 5 years if you don't have a lipid disorder. May order more often based on risk factors. Lipid panel: 07/05/2023          Other Preventive Screenings Covered by Medicare:  Abdominal Aortic Aneurysm (AAA) Screening: covered once if your at risk. You're considered to be at risk if you have a family history of AAA.  Lung Cancer Screening: covers low dose CT scan once per year if you meet all of the following conditions: (1) Age 55-77; (2) No signs or symptoms of lung cancer; (3) Current smoker or have quit smoking within the last 15 years; (4) You have a tobacco smoking history of at least 20 pack years (packs per day multiplied by number of years you smoked); (5) You get a written order from a healthcare provider.  Glaucoma Screening: covered annually if you're considered high risk: (1) You have diabetes OR (2) Family history of glaucoma OR (3)  aged 50 and older OR (4)  American aged 65 and older  Osteoporosis Screening: covered every 2 years if you meet one of the following conditions: (1) You're estrogen deficient and at risk for osteoporosis based off medical history and other findings; (2) Have a vertebral abnormality; (3) On glucocorticoid therapy for more than 3 months; (4) Have primary hyperparathyroidism; (5) On osteoporosis medications and need to assess response to drug therapy.   Last bone density test (DXA Scan): 12/07/2023.  HIV Screening: covered annually if you're between the age of 15-65. Also covered annually if you are younger than 15 and older than 65 with risk factors for HIV infection. For pregnant patients, it is covered up to 3 times per pregnancy.    Immunizations:  Immunization Recommendations   Influenza Vaccine Annual  influenza vaccination during flu season is recommended for all persons aged >= 6 months who do not have contraindications   Pneumococcal Vaccine   * Pneumococcal conjugate vaccine = PCV13 (Prevnar 13), PCV15 (Vaxneuvance), PCV20 (Prevnar 20)  * Pneumococcal polysaccharide vaccine = PPSV23 (Pneumovax) Adults 19-63 yo with certain risk factors or if 65+ yo  If never received any pneumonia vaccine: recommend Prevnar 20 (PCV20)  Give PCV20 if previously received 1 dose of PCV13 or PPSV23   Hepatitis B Vaccine 3 dose series if at intermediate or high risk (ex: diabetes, end stage renal disease, liver disease)   Respiratory syncytial virus (RSV) Vaccine - COVERED BY MEDICARE PART D  * RSVPreF3 (Arexvy) CDC recommends that adults 60 years of age and older may receive a single dose of RSV vaccine using shared clinical decision-making (SCDM)   Tetanus (Td) Vaccine - COST NOT COVERED BY MEDICARE PART B Following completion of primary series, a booster dose should be given every 10 years to maintain immunity against tetanus. Td may also be given as tetanus wound prophylaxis.   Tdap Vaccine - COST NOT COVERED BY MEDICARE PART B Recommended at least once for all adults. For pregnant patients, recommended with each pregnancy.   Shingles Vaccine (Shingrix) - COST NOT COVERED BY MEDICARE PART B  2 shot series recommended in those 19 years and older who have or will have weakened immune systems or those 50 years and older     Health Maintenance Due:      Topic Date Due   • Breast Cancer Screening: Mammogram  05/31/2025   • Colorectal Cancer Screening  06/15/2025   • Hepatitis C Screening  Completed     Immunizations Due:      Topic Date Due   • COVID-19 Vaccine (5 - 2023-24 season) 09/01/2023     Advance Directives   What are advance directives?  Advance directives are legal documents that state your wishes and plans for medical care. These plans are made ahead of time in case you lose your ability to make decisions for yourself.  Advance directives can apply to any medical decision, such as the treatments you want, and if you want to donate organs.   What are the types of advance directives?  There are many types of advance directives, and each state has rules about how to use them. You may choose a combination of any of the following:  Living will:  This is a written record of the treatment you want. You can also choose which treatments you do not want, which to limit, and which to stop at a certain time. This includes surgery, medicine, IV fluid, and tube feedings.   Durable power of  for healthcare (DPAHC):  This is a written record that states who you want to make healthcare choices for you when you are unable to make them for yourself. This person, called a proxy, is usually a family member or a friend. You may choose more than 1 proxy.  Do not resuscitate (DNR) order:  A DNR order is used in case your heart stops beating or you stop breathing. It is a request not to have certain forms of treatment, such as CPR. A DNR order may be included in other types of advance directives.  Medical directive:  This covers the care that you want if you are in a coma, near death, or unable to make decisions for yourself. You can list the treatments you want for each condition. Treatment may include pain medicine, surgery, blood transfusions, dialysis, IV or tube feedings, and a ventilator (breathing machine).  Values history:  This document has questions about your views, beliefs, and how you feel and think about life. This information can help others choose the care that you would choose.  Why are advance directives important?  An advance directive helps you control your care. Although spoken wishes may be used, it is better to have your wishes written down. Spoken wishes can be misunderstood, or not followed. Treatments may be given even if you do not want them. An advance directive may make it easier for your family to make difficult  choices about your care.       © Copyright [employer] 2018 Information is for End User's use only and may not be sold, redistributed or otherwise used for commercial purposes. All illustrations and images included in CareNotes® are the copyrighted property of [employer] or [employer]

## 2024-01-11 NOTE — PROGRESS NOTES
Assessment and Plan:     AWV performed. Mammo last year was normal ( birads 1). UTD for pneumonia vaccine.  Received flu, RSV, and latest COVID booster.  DEXA due in August 2024 and will provide script at next appt. Attending PT for back pain. Sleep improved with trazodone and is taking prn. BP at goal. Home readings 120-150's/70-80.  Follows with ortho for chronic knee pain and had adequate relief with the last CSI. Limit fluids prior to bed to help reduce urinary urgency at night. RTC in 6 months     Problem List Items Addressed This Visit     Essential hypertension    Hyponatremia    Acute pain of right shoulder   Other Visit Diagnoses     Medicare annual wellness visit, subsequent    -  Primary    Insomnia, unspecified type                Depression Screening and Follow-up Plan: Patient was screened for depression during today's encounter. They screened negative with a PHQ-9 score of 3.    Falls Plan of Care: balance, strength, and gait training instructions were provided. Medications that increase falls were reviewed. Vitamin D supplementation was recommended.     Urinary Incontinence Plan of Care: counseling topics discussed: limiting fluid intake 3-4 hours before bed.     Preventive health issues were discussed with patient, and age appropriate screening tests were ordered as noted in patient's After Visit Summary.  Personalized health advice and appropriate referrals for health education or preventive services given if needed, as noted in patient's After Visit Summary.     History of Present Illness:     Patient presents for a Medicare Wellness Visit    Vision worse at night and reading is hard   Follows with ophthalmologist year and had refractory testing   Tried not to drive at night   Had hearing tested and told she had mild hearing loss in the right ear. Words does sound clear   Back and knee pain. Going to PT for the back which is helping.  Dog pulled her down and fell. Landed on the elbow. Also had  right shoulder pain since. Hurt to lift above the head  Has chronic knee pain. Had several viscous injection and few steroid injections which the last one helped   Received RSV, flu this season. Also got the new COVID booster       Patient Care Team:  Dewey Barrera MD as PCP - General (Family Medicine)  DO Maksim James MD Daryl Gordon, CRNP Darren Traub, DO     Review of Systems:     Review of Systems     Problem List:     Patient Active Problem List   Diagnosis   • Arthritis   • Cervical myofascial pain syndrome   • Cervical radiculopathy   • Cervicogenic headache   • Cervico-occipital neuralgia   • Depression   • Mixed hyperlipidemia   • Essential hypertension   • Osteopenia of spine   • Spasmodic torticollis   • Laceration of occipital scalp   • Other secondary scoliosis, thoracolumbar region   • Syncope   • Coronary artery disease of native artery of native heart with stable angina pectoris (HCC)   • S/P CABG x 3   • Anemia   • Thrombocytopenia (HCC)   • Hyperchloremia   • Chylothorax   • Screening for colon cancer   • History of colon polyps   • Acute bilateral low back pain without sciatica   • PAC (premature atrial contraction)   • Primary osteoarthritis of left hip   • Closed wedge compression fracture of T11 vertebra (HCC)   • Closed wedge compression fracture of T12 vertebra (HCC)   • Chronic pain syndrome   • Osteoarthritis of spine with radiculopathy, lumbar region   • Vitamin D deficiency   • Pain and swelling of right lower extremity   • Hyponatremia   • Abnormal head CT   • Acute pain of right shoulder      Past Medical and Surgical History:     Past Medical History:   Diagnosis Date   • Anxiety    • Arthritis    • Colon polyp    • Coronary artery disease    • Effusion of left knee     Last assessed - 9/10/15   • History of transfusion    • Hyperlipidemia    • Hypertension    • Memory loss    • Myocardial infarction (HCC)    • Protein-calorie malnutrition, unspecified  severity (HCC) 11/14/2023   • Scoliosis    • Tick bite     Last assessed - 4/21/17     Past Surgical History:   Procedure Laterality Date   • APPENDECTOMY     • CARDIAC SURGERY     • COLONOSCOPY     • CORONARY ARTERY BYPASS GRAFT     • MO CORONARY ARTERY BYP W/VEIN & ARTERY GRAFT 4 VEIN N/A 01/27/2020    Procedure: CORONARY ARTERY BYPASS GRAFT (CABG) x3 VESSELS, LIMA TO LAD, AND SVG TO PLB & OM;  Surgeon: Peter Colmenares DO;  Location: [facility];  Service: Cardiac Surgery   • MO ECHO TRANSESOPHAG R-T 2D W/PRB IMG ACQUISJ I&R N/A 01/27/2020    Procedure: TRANSESOPHAGEAL ECHOCARDIOGRAM (GET);  Surgeon: Peter Colmenares DO;  Location: [facility];  Service: Cardiac Surgery   • MO NDSC SURG W/VIDEO-ASSISTED HARVEST VEIN CABG Left 01/27/2020    Procedure: HARVEST VEIN ENDOSCOPIC (EVH);  Surgeon: Peter Colmenares DO;  Location: [facility];  Service: Cardiac Surgery   • TONSILLECTOMY      Last assessed - 4/20/17   • TUBAL LIGATION      Last assessed - 4/20/17   • TUMOR REMOVAL  1998    pelvic benign tumor removal   • WISDOM TOOTH EXTRACTION      Last assessed - 4/20/17      Family History:     Family History   Problem Relation Age of Onset   • Coronary artery disease Mother    • Arthritis Mother    • Other Mother         Cardiac Disorder    • Transient ischemic attack Mother    • Heart attack Father    • Sudden death Father         SCD   • Cancer Daughter 49        kidney   • No Known Problems Paternal Aunt       Social History:     Social History     Socioeconomic History   • Marital status:      Spouse name: None   • Number of children: 3   • Years of education: Post Graduate   • Highest education level: None   Occupational History   • Occupation:      Comment: P/T   • Occupation: Retired     Comment: RN   Tobacco Use   • Smoking status: Never     Passive exposure: Past   • Smokeless tobacco: Never   Vaping Use   • Vaping status: Never Used   Substance and Sexual Activity   • Alcohol use: Yes      Alcohol/week: 7.0 standard drinks of alcohol     Types: 7 Glasses of wine per week     Comment: 1 wine nightly   • Drug use: No   • Sexual activity: Not Currently   Other Topics Concern   • None   Social History Narrative    Living alone     Social Determinants of Health     Financial Resource Strain: Low Risk  (1/11/2024)    Overall Financial Resource Strain (CARDIA)    • Difficulty of Paying Living Expenses: Not hard at all   Food Insecurity: Not on file   Transportation Needs: No Transportation Needs (1/11/2024)    PRAPARE - Transportation    • Lack of Transportation (Medical): No    • Lack of Transportation (Non-Medical): No   Physical Activity: Not on file   Stress: Not on file   Social Connections: Not on file   Intimate Partner Violence: Not on file   Housing Stability: Not on file      Medications and Allergies:     Current Outpatient Medications   Medication Sig Dispense Refill   • acetaminophen (TYLENOL) 650 mg CR tablet Take 650 mg by mouth daily as needed for mild pain     • amLODIPine (NORVASC) 5 mg tablet Take 1 tablet (5 mg total) by mouth daily 90 tablet 1   • aspirin (ECOTRIN LOW STRENGTH) 81 mg EC tablet Take 1 tablet (81 mg total) by mouth daily     • cabergoline (DOSTINEX) 0.5 MG tablet Take 1/2 tablet once a week ( 2 tabs per month) 4 tablet 0   • Calcium Carb-Cholecalciferol 600-200 MG-UNIT TABS Calcium 600 + D TABS  TAKE 1 TABLET DAILY.   Refills: 0    Active     • denosumab (PROLIA) 60 mg/mL Inject 60 mg under the skin every 6 (six) months     • losartan (COZAAR) 100 MG tablet Take 1 tablet (100 mg total) by mouth daily 90 tablet 1   • melatonin 3 mg Take 3 mg by mouth daily at bedtime     • metoprolol succinate (TOPROL-XL) 25 mg 24 hr tablet Take one tablet in the am ( 25 mg) and 0.5 tablet ( 12.5 mg) in the pm 135 tablet 1   • rosuvastatin (CRESTOR) 40 MG tablet TAKE ONE TABLET BY MOUTH EVERY DAY 90 tablet 3   • torsemide (DEMADEX) 10 mg tablet Take 0.5 tablets (5 mg total) by mouth daily  45 tablet 1   • traZODone (DESYREL) 50 mg tablet Take 1 tablet (50 mg total) by mouth daily at bedtime 30 tablet 1   • VITAMIN D, CHOLECALCIFEROL, PO Take 2,600 Units/day by mouth       No current facility-administered medications for this visit.     Allergies   Allergen Reactions   • Thiazide-Type Diuretics Other (See Comments)     Hyponatremia      Immunizations:     Immunization History   Administered Date(s) Administered   • COVID-19 PFIZER VACCINE 0.3 ML IM 01/15/2021, 02/04/2021, 09/30/2021   • COVID-19 Pfizer Vac BIVALENT Darius-sucrose 12 Yr+ IM 10/03/2022   • Hep A, adult 11/19/2015   • INFLUENZA 10/01/2012, 10/10/2013, 10/10/2013, 10/01/2014, 10/01/2014, 10/01/2015, 10/16/2015, 10/01/2017, 10/20/2017, 11/06/2019, 10/15/2020, 11/10/2021, 10/03/2022   • Influenza Quadrivalent Preservative Free 3 years and older IM 10/01/2016, 10/20/2017   • Influenza, high dose seasonal 0.7 mL 11/21/2018, 10/11/2023   • Pneumococcal Conjugate 13-Valent 05/21/2019   • Pneumococcal Polysaccharide PPV23 05/02/2011   • Tdap 11/19/2015, 09/01/2018   • Typhoid Live, oral 11/19/2015   • Typhoid, Unspecified 11/19/2015   • Zoster 07/11/2018   • Zoster Vaccine Recombinant 11/27/2018      Health Maintenance:         Topic Date Due   • Breast Cancer Screening: Mammogram  05/31/2025   • Colorectal Cancer Screening  06/15/2025   • Hepatitis C Screening  Completed         Topic Date Due   • COVID-19 Vaccine (5 - 2023-24 season) 09/01/2023      Medicare Screening Tests and Risk Assessments:     Bella is here for her Subsequent Wellness visit. Last Medicare Wellness visit information reviewed, patient interviewed and updates made to the record today.      Health Risk Assessment:   Patient rates overall health as very good. Patient feels that their physical health rating is same. Patient is satisfied with their life. Eyesight was rated as slightly worse. Hearing was rated as slightly worse. Patient feels that their emotional and mental health  rating is same. Patients states they are never, rarely angry. Patient states they are sometimes unusually tired/fatigued. Pain experienced in the last 7 days has been some. Patient's pain rating has been 4/10. Patient states that she has experienced no weight loss or gain in last 6 months. Vision worse at night and reading is hard   Follows with ophthalmologist year and had refractory testing   Tried not to drive at night   Had hearing tested and told she had mild hearing loss in the right ear. Words does sound clear   Back and knee pain. Going to PT for the back which is helping.    Depression Screening:   PHQ-9 Score: 3      Fall Risk Screening:   In the past year, patient has experienced: history of falling in past year    Number of falls: 1  Injured during fall?: Yes    Feels unsteady when standing or walking?: No    Worried about falling?: Yes      Urinary Incontinence Screening:   Patient has leaked urine accidently in the last six months. Dog pulled her down and fell. Landed on the elbow. Also had right shoulder pain since. Hurt to lift above the head    Home Safety:  Patient does not have trouble with stairs inside or outside of their home. Patient has working smoke alarms and has working carbon monoxide detector. Home safety hazards include: none.     Nutrition:   Current diet is Low Cholesterol, Low Saturated Fat, No Added Salt and Limited junk food.     Medications:   Patient is currently taking over-the-counter supplements. OTC medications include: see medication list. Patient is able to manage medications.     Activities of Daily Living (ADLs)/Instrumental Activities of Daily Living (IADLs):   Walk and transfer into and out of bed and chair?: Yes  Dress and groom yourself?: Yes    Bathe or shower yourself?: Yes    Feed yourself? Yes  Do your laundry/housekeeping?: Yes  Manage your money, pay your bills and track your expenses?: Yes  Make your own meals?: Yes    Do your own shopping?: Yes    Previous  Hospitalizations:   Any hospitalizations or ED visits within the last 12 months?: Yes    How many hospitalizations have you had in the last year?: 1-2    Advance Care Planning:   Living will: Yes    Durable POA for healthcare: Yes    Advanced directive: Yes      PREVENTIVE SCREENINGS      Cardiovascular Screening:    General: Screening Not Indicated and History Lipid Disorder      Diabetes Screening:     General: Screening Current      Colorectal Cancer Screening:     General: Screening Current      Breast Cancer Screening:     General: Screening Current      Cervical Cancer Screening:    General: Screening Not Indicated      Osteoporosis Screening:    General: Screening Not Indicated and History Osteoporosis      Lung Cancer Screening:     General: Screening Not Indicated      Hepatitis C Screening:    General: Screening Current    Screening, Brief Intervention, and Referral to Treatment (SBIRT)    Screening  Typical number of drinks in a day: 1  Typical number of drinks in a week: 7  Interpretation: Low risk drinking behavior.    AUDIT-C Screenin) How often did you have a drink containing alcohol in the past year? 4 or more times a week  2) How many drinks did you have on a typical day when you were drinking in the past year? 1 to 2  3) How often did you have 6 or more drinks on one occasion in the past year? never    AUDIT-C Score: 4  Interpretation: Score 3-12 (female): POSITIVE screen for alcohol misuse    AUDIT Screenin) How often during the last year have you found that you were not able to stop drinking once you had started? 0 - never  5) How often during the last year have you failed to do what was normally expected from you because of drinking? 1 - less than monthly  6) How often during the last year have you needed a first drink in the morning to get yourself going after a heavy drinking session? 0 - never  7) How often during the last year have you had a feeling of guilt or remorse after  "drinking? 1 - less than monthly  8) How often during the last year have you been unable to remember what happened the night before because you had been drinking? 0 - never  9) Have you or someone else been injured as a result of your drinking? 0 - no  10) Has a relative or friend or a doctor or another health worker been concerned about your drinking or suggested you cut down? 0 - no    AUDIT Score: 6  Interpretation: Low risk alcohol consumption    Single Item Drug Screening:  How often have you used an illegal drug (including marijuana) or a prescription medication for non-medical reasons in the past year? never    Single Item Drug Screen Score: 0  Interpretation: Negative screen for possible drug use disorder    No results found.     Physical Exam:     /60 (BP Location: Left arm, Patient Position: Sitting, Cuff Size: Adult)   Pulse (!) 54   Temp (!) 97.2 °F (36.2 °C) (Tympanic)   Resp 14   Ht 5' 3\" (1.6 m)   Wt 50.8 kg (112 lb)   SpO2 100%   BMI 19.84 kg/m²     Physical Exam  Constitutional:       General: She is not in acute distress.     Appearance: Normal appearance. She is not ill-appearing or toxic-appearing.   HENT:      Head: Normocephalic and atraumatic.      Mouth/Throat:      Mouth: Mucous membranes are moist.   Eyes:      Extraocular Movements: Extraocular movements intact.   Cardiovascular:      Rate and Rhythm: Normal rate and regular rhythm.      Heart sounds: No murmur heard.  Pulmonary:      Effort: Pulmonary effort is normal. No respiratory distress.      Breath sounds: No stridor. No wheezing, rhonchi or rales.   Musculoskeletal:      Right shoulder: No swelling, deformity, tenderness or bony tenderness. Decreased range of motion.   Skin:     General: Skin is warm.   Neurological:      Mental Status: She is alert and oriented to person, place, and time.          Dewey Barrera MD  "

## 2024-01-11 NOTE — PROGRESS NOTES
"Daily Note     Today's date: 2024  Patient name: Bella Sargent  : 1945  MRN: [id number]  Referring provider: Annabelle Walsh C*  Dx:   Encounter Diagnosis     ICD-10-CM    1. Other secondary scoliosis, thoracolumbar region  M41.55                      Subjective: Patient notes she is feeling overall good.       Objective: See treatment diary below      Assessment: Tolerated treatment well. Patient demonstrated fatigue post treatment, exhibited good technique with therapeutic exercises, and would benefit from continued PT. Moderately challenged with pulses today. Progressed weight with big stick walk outs with slight compensation in forward trunk lean. Encouraged to engage glutes and core t/o session with good posture. Table top position was challenging.       Plan: Continue per plan of care.  Progress treatment as tolerated.       Diagnosis:  Scoliosis    Precautions:  See chart   Comparable signs 1) Standing  2) Walking   Primary Impairments: 1) Core/glutes/posterior chain  2) lumbar and thoracic spine hypomobility    Patient Goals Yoga,    Manual Therapy 12/19 12/28 1/3 1/5 1/8 1/11   [facility]                                    Re-evaluation          Exercise Diary          Therapeutic Exercise         Bike/UBE  5'  5'  5' 5' 5' 5'    Leg press      2x15 45#  2x15 60#   Open books  10x10 ea 10x10 ea      LTR  10x10 ea 10x10 ea      PPU   Extension in standing 20x        Mod satinder  2' ea       Sciatic nerve glide  2x10 simone       Lat stretch w/ breath    2x10 Ball roll out 20x  Ball roll out with breath 20x Ball roll out with breath 5x ea   Neuromuscular Re-education         Breathing   Rib depression  Rib depression       Big stick walk outs    4laps 2.5#  5 laps 2.5# ea  4# 2 laps ea   Bird dogs    2x10 10x 5\" 5x ea  5\" 5x  pulses   Bridge    2x10 GTB 2x10     Prone hip extension   2x10 off table       Ys     Breathing 2x10  Breathing YTB 2x10    Table top flys       2x10                   "   Therapeutic Activities         Education  Scoliosis  End range  Strengthing, ROM and working with breath  Opening up thoracic spine.      [employer]         [place] carries     10#  2 laps ea                      Modalities

## 2024-01-15 ENCOUNTER — OFFICE VISIT (OUTPATIENT)
Dept: PHYSICAL THERAPY | Facility: CLINIC | Age: 79
End: 2024-01-15
Payer: MEDICARE

## 2024-01-15 DIAGNOSIS — M41.55 OTHER SECONDARY SCOLIOSIS, THORACOLUMBAR REGION: Primary | ICD-10-CM

## 2024-01-15 PROCEDURE — 97112 NEUROMUSCULAR REEDUCATION: CPT

## 2024-01-15 PROCEDURE — 97110 THERAPEUTIC EXERCISES: CPT

## 2024-01-15 NOTE — PROGRESS NOTES
"Daily Note     Today's date: 1/15/2024  Patient name: Bella Sargent  : 1945  MRN: [id number]  Referring provider: Annabelle Walsh C*  Dx:   Encounter Diagnosis     ICD-10-CM    1. Other secondary scoliosis, thoracolumbar region  M41.55                      Subjective: Patient states she did yoga yesterday and it felt good.       Objective: See treatment diary below      Assessment: Patient doing well with charted program.  Worked on core and postural strengthening. She is able to self correct posture when cued, occasionally requiring tactile cues. Difficult maintaining neutral spine with UE/LE, so performed bird dogs with LE only. Added mirror to farmers carry for visual feed back which patient was then able to maintain upright posture with less verbal cues. She notes good challenge with muscle activation. Education provided on DOMS as well as updated HEP, she verbalized understanding. Patient demonstrated fatigue post treatment and would benefit from continued PT      Plan: Continue per plan of care.      Diagnosis:  Scoliosis    Precautions:  See chart   Comparable signs 1) Standing  2) Walking   Primary Impairments: 1) Core/glutes/posterior chain  2) lumbar and thoracic spine hypomobility    Patient Goals Yoga,    Manual Therapy 1/15  1/3 1/5 1/8 1/11   [facility]                                    Re-evaluation          Exercise Diary          Therapeutic Exercise         Bike/UBE  5'   5' 5' 5' 5'    Leg press  2x15 60#    2x15 45#  2x15 60#   Open books   10x10 ea      LTR   10x10 ea      PPU          Mod satinder         Sciatic nerve glide         Lat stretch w/ breath  Ball roll out with breath 5x ea  2x10 Ball roll out 20x  Ball roll out with breath 20x Ball roll out with breath 5x ea   Neuromuscular Re-education         Breathing    Rib depression       Big stick walk outs 4# 2 laps ea    4laps 2.5#  5 laps 2.5# ea  4# 2 laps ea   Bird dogs  2x5 5\" LE only  2x10 10x 5\" 5x ea  5\" 5x  pulses   Bridge    " "2x10 GTB 2x10     Prone hip extension   2x10 off table       Ys  Breathing YTB 2x10   Breathing 2x10  Breathing YTB 2x10    Table top flys  2x10 with breath     2x10   W/superman 10x 2\"                  Therapeutic Activities         Education    Strengthing, ROM and working with breath  Opening up thoracic spine.      xwalk         [place] carries 10# KB 2 laps ea no break w/ mirror    10#  2 laps ea                      Modalities                              "

## 2024-01-18 ENCOUNTER — OFFICE VISIT (OUTPATIENT)
Dept: PHYSICAL THERAPY | Facility: CLINIC | Age: 79
End: 2024-01-18
Payer: MEDICARE

## 2024-01-18 DIAGNOSIS — M41.55 OTHER SECONDARY SCOLIOSIS, THORACOLUMBAR REGION: Primary | ICD-10-CM

## 2024-01-18 PROCEDURE — 97530 THERAPEUTIC ACTIVITIES: CPT

## 2024-01-18 PROCEDURE — 97112 NEUROMUSCULAR REEDUCATION: CPT

## 2024-01-18 PROCEDURE — 97110 THERAPEUTIC EXERCISES: CPT

## 2024-01-18 NOTE — PROGRESS NOTES
"Daily Note     Today's date: 2024  Patient name: Bella Sargent  : 1945  MRN: [id number]  Referring provider: Annabelle Walsh C*  Dx:   Encounter Diagnosis     ICD-10-CM    1. Other secondary scoliosis, thoracolumbar region  M41.55                      Subjective: Patient states she did yoga yesterday and it felt good.       Objective: See treatment diary below      Assessment: Patient doing well with charted program. Continued with core and postural strengthening. She had good response to xwalks with external rotation th mild fatigue noted. VC for step ups to engage glutes. Educated on ways to modify ball stretch at home. Patient demonstrated improvement in posture, however when fatigued did need cueing to engage core, as noted by decreased endurance.  Patient demonstrated fatigue post treatment and would benefit from continued PT      Plan: Continue per plan of care.      Diagnosis:  Scoliosis    Precautions:  See chart   Comparable signs 1) Standing  2) Walking   Primary Impairments: 1) Core/glutes/posterior chain  2) lumbar and thoracic spine hypomobility    Patient Goals Yoga,    Manual Therapy 1/15 1/18 1/3 1/5 1/8 1/11   [facility]                                    Re-evaluation          Exercise Diary          Therapeutic Exercise         Bike/UBE  5'   5' 5' 5' 5'    Leg press  2x15 60# 2x15 65#    2x15 45#  2x15 60#   Open books   10x10 ea      LTR   10x10 ea      PPU          Mod satinder         Thread needle  2x10 ea qped        Lat stretch w/ breath  Ball roll out with breath 5x ea Ball roll out with breath  2x10 Ball roll out 20x  Ball roll out with breath 20x Ball roll out with breath 5x ea   Neuromuscular Re-education         Breathing    Rib depression       Big stick walk outs 4# 2 laps ea    4laps 2.5#  5 laps 2.5# ea  4# 2 laps ea   Bird dogs  2x5 5\" LE only  2x10 10x 5\" 5x ea  5\" 5x  pulses   Bridge    2x10 GTB 2x10     Prone hip extension   2x10 off table       Ys  Breathing YTB 2x10 In " "lunge band on foot 2x10   Breathing 2x10  Breathing YTB 2x10    Table top flys  2x10 with breath     2x10   W/superman 10x 2\"                  Therapeutic Activities         Education    Strengthing, ROM and working with breath  Opening up thoracic spine.      xwalk  YTB w/ ER        [place] carries 10# KB 2 laps ea no break w/ mirror 10# KB 2 laps ea    10#  2 laps ea    Step ups   10# KB biodex 10x ea                 Modalities                              "

## 2024-01-22 ENCOUNTER — EVALUATION (OUTPATIENT)
Dept: PHYSICAL THERAPY | Facility: CLINIC | Age: 79
End: 2024-01-22
Payer: MEDICARE

## 2024-01-22 DIAGNOSIS — M41.55 OTHER SECONDARY SCOLIOSIS, THORACOLUMBAR REGION: Primary | ICD-10-CM

## 2024-01-22 PROCEDURE — 97530 THERAPEUTIC ACTIVITIES: CPT

## 2024-01-22 PROCEDURE — 97110 THERAPEUTIC EXERCISES: CPT

## 2024-01-22 NOTE — PROGRESS NOTES
PT Re-Evaluation     Today's date: 2024  Patient name: Bella Sargent  : 1945  MRN: [id number]  Referring provider: Annabelle Walsh C*  Dx:   Encounter Diagnosis     ICD-10-CM    1. Other secondary scoliosis, thoracolumbar region  M41.55                      Assessment  Assessment details: RE24 Bella presents to PT with good overall improvement and is making good progress with strength and endurance today. She has made good progress and has improved with her ability to understand her condition and how to manage it over a lifetime. She still lacks glute engagement with hip extension demonstrated compensation with lumbar multifidi, She will benefit from continued skilled PT as she still requires cueing, however will decrease frequency to 1x a week for another moth after next week in order to allow for good carry over at home.     IE:Bella Sargent is a pleasant 78 y.o. female presents with signs and symptoms consistent with:   Other secondary scoliosis, thoracolumbar region    Problem List:  1) Impaired strength   2) Impaired Motor control     Comparable signs:  1) Standing  2) Walking     she has impaired strength, impaired ROM and leg length discrepency secondary to scoliosis and resulting in worry over not knowing what's wrong and fear of not being able to keep active. These impairments listed above are preventing the patient from participating in functional activity. No further referral appears necessary at this time based upon examination results, and is negative for any red flags. Prognosis is good given HEP compliance and attendance to physical therapy 2x a week.  Positive prognostic indicators include positive attitude toward recovery and good understanding of diagnosis and treatment plan options.  Negative prognostic indicators include chronicity of symptoms.  Patent will benefit from skilled physical therapy at this time to address deficits to improve overall function and return to PLOF.  Patient verbalized understanding of POC, HEP, and return demonstrated HEP. All questions were answered to patients satisfaction.     Please contact me if you have any questions or recommendations. Thank you for the referral and the opportunity to share in Bella Sargent's care.            Impairments: abnormal muscle firing, abnormal or restricted ROM, activity intolerance, impaired physical strength, lacks appropriate home exercise program, pain with function, poor posture  and poor body mechanics  Understanding of Dx/Px/POC: good   Prognosis: good    Goals  Impairment Goals 4-6 weeks Progressing 1/22/24  In order to improve and maximize function patient will be able to...  - Decrease pain intensity to <2/10  - Improve lumbar AROM to >100% throughout  - Increase hip strength to 5/5 throughout  - Centralize symptoms and decrease numbness frequency/duration    Functional Goals 6-8 weeks progressing 1/22/24  In order to improve and maximize function patient will be able to...  - Return to Prior Level of Function with no greater than 2/10 pain   - Increase Functional Status Measure (FOTO) to: >predicted outcomes  - Be independent and compliant with HEP  - Tolerate sitting and or standing without increased pain/compensation/difficulty for at least 30 minutes  - Perform sit to stand without increased pain/compensation/difficulty   - Return to gentle yoga with minimal discomfort    New goals 1/22/24  Effectively manage symptoms and home program for long term success.           Plan  Patient would benefit from: skilled physical therapy  Planned modality interventions: cryotherapy, thermotherapy: hydrocollator packs and TENS  Planned therapy interventions: home exercise program, graded exercise, functional ROM exercises, flexibility, body mechanics training, postural training, patient education, therapeutic activities, therapeutic exercise, manual therapy, joint mobilization, neuromuscular re-education, motor coordination  training, graded activity, stretching and strengthening  Frequency: 1x week  Duration in weeks: 5  Treatment plan discussed with: patient and PCP        Subjective Evaluation    History of Present Illness  Mechanism of injury: RE 24. Patient presents to re-evaluation today with 75% improvement today. She notes that she has been more aware of her posture and feels that she has been able to go longer prior to having to lay down. She notices in yoga she is able to get out off the floor, and she is stronger there. She notes that her back problem was not as severe as it was late in the day. She feels that her core is getting stronger. She notes that she feels that she needs a good program that she is able to do throughout. She still feels that she is limited with strength and balance still. She notes standing is still a little better.     IE:Patient presents to PT today with discomfort in her mid and low back that started about 2 months ago. She has a history of scoliosis. She had a history of thoracic fracture in fall of . She notes from her fracture she feels that her spine has shifted. She notes that she is having a R knee issue and she is getting injections for that. Bridges are discomfort. 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 Getting on and off the floor     Difficulty: with walking a mile, hills, cooking dinner, standing (starts around late afternoon)   Relieved with: lying flat  Patient Goals  Patient goals for therapy: decreased pain and independence with ADLs/IADLs  Patient goal: stand up straight without pain, walk a mile (progressing)  Pain  Current pain ratin  At worst pain ratin (at night)  Location: low back  Quality: dull ache and burning  Alleviating factors: laying flat.  Aggravating factors: walking, standing and stair climbing    Social Support  Lives with: alone      Diagnostic Tests  X-ray: normal        Objective     Concurrent Complaints  Positive for bladder  dysfunction. Negative for night pain, disturbed sleep, bowel dysfunction and saddle (S4) numbness    Postural Observations  Seated posture: fair  Standing posture: fair    Additional Postural Observation Details  Elevated pelvis on left side  Shortened limb on left ( 1 cm difference)    Active Range of Motion   Cervical/Thoracic Spine       Thoracic    Flexion:  Restriction level: minimal  Extension:  Restriction level: moderate  Left lateral flexion:  Restriction level: minimal  Right lateral flexion:  Restriction level: minimal  Left rotation:  Restriction level: moderate  Right rotation:  Restriction level: moderate    Lumbar   Flexion:  Restriction level: minimal  Extension:  Restriction level: moderate  Left lateral flexion:  Restriction level: minimal  Right lateral flexion:  Restriction level: minimal  Left rotation:  Restriction level: minimal  Right rotation:  Restriction level: minimal  Mechanical Assessment    Cervical      Thoracic      Lumbar    Standing flexion:    Pain location:no change  Standing extension:   Pain location: no change    Strength/Myotome Testing     Left Hip   Planes of Motion   Flexion: 4  Extension: 4- (challenged with engaging glutes)  Abduction: 4-  External rotation: 4    Right Hip   Planes of Motion   Flexion: 4  Extension: 4 (challenged with engaging glutes)  Abduction: 4-  External rotation: 4    Left Knee   Flexion: 5  Extension: 5    Right Knee   Flexion: 5  Extension: 5    Left Ankle/Foot   Dorsiflexion: 5  Plantar flexion: 5    Right Ankle/Foot   Dorsiflexion: 5  Plantar flexion: 5    Muscle Activation   Patient able to activate left transverse abdominals, left multifidus, right transverse abdominals and right multifidus.     General Comments:    Lower quarter screen   Hips: unremarkable  Knees: unremarkable  Foot/ankle: unremarkable    Lumbar Comments  Hypomobility throughout thoracic   30s STS 12             Diagnosis:  Scoliosis    Precautions:  See chart   Comparable  "signs 1) Standing  2) Walking   Primary Impairments: 1) Core/glutes/posterior chain  2) lumbar and thoracic spine hypomobility    Patient Goals Yoga,    Manual Therapy 1/15 1/18 1/22  1/8 1/11   STM                                    Re-evaluation    SP      Exercise Diary          Therapeutic Exercise         Bike/UBE  5'   7 min endurance  5' 5'    Leg press  2x15 60# 2x15 65#    2x15 45#  2x15 60#   Open books         LTR         PPU          Mod satinder         Thread needle  2x10 ea qped        Lat stretch w/ breath  Ball roll out with breath 5x ea Ball roll out with breath    Ball roll out with breath 20x Ball roll out with breath 5x ea   Neuromuscular Re-education         Breathing          Big stick walk outs 4# 2 laps ea     5 laps 2.5# ea  4# 2 laps ea   Bird dogs  2x5 5\" LE only    5\" 5x ea  5\" 5x  pulses   Bridge          Prone hip extension         Ys  Breathing YTB 2x10 In lunge band on foot 2x10    Breathing YTB 2x10    Table top flys  2x10 with breath     2x10   W/superman 10x 2\"                  Therapeutic Activities         Education    Home program, maintain of sx, managing chronicity of sx      xwalk  YTB w/ ER        [place] carries 10# KB 2 laps ea no break w/ mirror 10# KB 2 laps ea    10#  2 laps ea    Step ups   10# KB biodex 10x ea                 Modalities                       "

## 2024-01-24 ENCOUNTER — OFFICE VISIT (OUTPATIENT)
Dept: PHYSICAL THERAPY | Facility: CLINIC | Age: 79
End: 2024-01-24
Payer: MEDICARE

## 2024-01-24 DIAGNOSIS — M41.55 OTHER SECONDARY SCOLIOSIS, THORACOLUMBAR REGION: Primary | ICD-10-CM

## 2024-01-24 PROCEDURE — 97530 THERAPEUTIC ACTIVITIES: CPT

## 2024-01-24 PROCEDURE — 97110 THERAPEUTIC EXERCISES: CPT

## 2024-01-24 PROCEDURE — 97112 NEUROMUSCULAR REEDUCATION: CPT

## 2024-01-24 NOTE — PROGRESS NOTES
"Daily Note     Today's date: 2024  Patient name: Bella Sargent  : 1945  MRN: [id number]  Referring provider: Annabelle Walsh C*  Dx:   Encounter Diagnosis     ICD-10-CM    1. Other secondary scoliosis, thoracolumbar region  M41.55                      Subjective: Patient reports doing well today.       Objective: See treatment diary below      Assessment: Tolerated treatment well. Patient demonstrated fatigue post treatment, exhibited good technique with therapeutic exercises, and would benefit from continued PT Reviewd HEP for home to help manage. Patient was given print out with cues to maintain upright posture and work on opening up chest. Patient displayed good return demos and demonstrated good form throughout session.       Plan: Continue per plan of care.  Progress treatment as tolerated.         Diagnosis:  Scoliosis    Precautions:  See chart   Comparable signs 1) Standing  2) Walking   Primary Impairments: 1) Core/glutes/posterior chain  2) lumbar and thoracic spine hypomobility    Patient Goals Yoga,    Manual Therapy 1/15 1/18 1/22 1/24  1/11   STM                                    Re-evaluation    SP      Exercise Diary          Therapeutic Exercise         Bike/UBE  5'   7 min endurance 5' TM for upright posture  5'    Leg press  2x15 60# 2x15 65#     2x15 60#   Open books         LTR         Cat cow    2x10     Mod satinder         Thread needle  2x10 ea qped   2x10 ea      Lat stretch w/ breath  Ball roll out with breath 5x ea Ball roll out with breath   Jcarlos pose 20x  Ball roll out with breath 5x ea   Neuromuscular Re-education         Breathing          Big stick walk outs 4# 2 laps ea      4# 2 laps ea   Bird dogs  2x5 5\" LE only   2x8 simone   5\" 5x  pulses   Bridge          Prone hip extension         Ys  Breathing YTB 2x10 In lunge band on foot 2x10        Table top flys  2x10 with breath     2x10   W/superman 10x 2\"                  Therapeutic Activities         Education    " Home program, maintain of sx, managing chronicity of sx HEP and cues for HEP     xwalk  YTB w/ ER   2 lap YTB      [place] carries 10# KB 2 laps ea no break w/ mirror 10# KB 2 laps ea        Step ups   10# KB biodex 10x ea                 Modalities

## 2024-01-25 ENCOUNTER — APPOINTMENT (OUTPATIENT)
Dept: PHYSICAL THERAPY | Facility: CLINIC | Age: 79
End: 2024-01-25
Payer: MEDICARE

## 2024-02-02 ENCOUNTER — OFFICE VISIT (OUTPATIENT)
Dept: PHYSICAL THERAPY | Facility: CLINIC | Age: 79
End: 2024-02-02
Payer: MEDICARE

## 2024-02-02 DIAGNOSIS — M41.55 OTHER SECONDARY SCOLIOSIS, THORACOLUMBAR REGION: Primary | ICD-10-CM

## 2024-02-02 PROCEDURE — 97112 NEUROMUSCULAR REEDUCATION: CPT

## 2024-02-02 PROCEDURE — 97530 THERAPEUTIC ACTIVITIES: CPT

## 2024-02-02 PROCEDURE — 97110 THERAPEUTIC EXERCISES: CPT

## 2024-02-02 NOTE — PROGRESS NOTES
"Daily Note     Today's date: 2024  Patient name: Bella Sargent  : 1945  MRN: [id number]  Referring provider: Annabelle Walsh C*  Dx:   Encounter Diagnosis     ICD-10-CM    1. Other secondary scoliosis, thoracolumbar region  M41.55                      Subjective: Patient reports doing well today.       Objective: See treatment diary below      Assessment: Tolerated treatment well. Patient demonstrated fatigue post treatment, exhibited good technique with therapeutic exercises, and would benefit from continued PT Continued with education on mobility and movement to improve strength and prevent over pressure on end range. HEP reviewed and modified based off patient tolerance.       Plan: Continue per plan of care.  Progress treatment as tolerated.         Diagnosis:  Scoliosis    Precautions:  See chart   Comparable signs 1) Standing  2) Walking   Primary Impairments: 1) Core/glutes/posterior chain  2) lumbar and thoracic spine hypomobility    Patient Goals Yoga,    Manual Therapy 1/15 1/18 1/22 1/24 2/2 1/11   STM                                    Re-evaluation    SP      Exercise Diary          Therapeutic Exercise         Bike/UBE  5'   7 min endurance 5' TM for upright posture 5' elliptical  5'    Leg press  2x15 60# 2x15 65#     2x15 60#   Open books         LTR         Cat cow    2x10 2x10    Mod satinder         Thread needle  2x10 ea qped   2x10 ea  2x10 ea    Lat stretch w/ breath  Ball roll out with breath 5x ea Ball roll out with breath   Jcarlos pose 20x Child pose 20x Ball roll out with breath 5x ea   Neuromuscular Re-education         Breathing          Big stick walk outs 4# 2 laps ea      4# 2 laps ea   Bird dogs  2x5 5\" LE only   2x8 simone  2x10 5\" 5x  pulses   Bridge          Prone hip extension         Ys  Breathing YTB 2x10 In lunge band on foot 2x10        Table top flys  2x10 with breath     2x10   W/superman 10x 2\"                  Therapeutic Activities         Education    Home " program, maintain of sx, managing chronicity of sx HEP and cues for HEP HEP, cues, mobility     xwalk  YTB w/ ER   2 lap YTB   2 lap RTB    [place] carries 10# KB 2 laps ea no break w/ mirror 10# KB 2 laps ea        Step ups   10# KB biodex 10x ea                 Modalities

## 2024-02-05 ENCOUNTER — APPOINTMENT (OUTPATIENT)
Dept: PHYSICAL THERAPY | Facility: CLINIC | Age: 79
End: 2024-02-05
Payer: MEDICARE

## 2024-02-06 ENCOUNTER — HOSPITAL ENCOUNTER (OUTPATIENT)
Dept: MRI IMAGING | Facility: HOSPITAL | Age: 79
Discharge: HOME | End: 2024-02-06
Attending: NEUROLOGICAL SURGERY
Payer: MEDICARE

## 2024-02-06 ENCOUNTER — OFFICE VISIT (OUTPATIENT)
Dept: OBGYN CLINIC | Facility: MEDICAL CENTER | Age: 79
End: 2024-02-06
Payer: MEDICARE

## 2024-02-06 VITALS
HEIGHT: 63 IN | SYSTOLIC BLOOD PRESSURE: 138 MMHG | WEIGHT: 112 LBS | HEART RATE: 52 BPM | DIASTOLIC BLOOD PRESSURE: 72 MMHG | BODY MASS INDEX: 19.84 KG/M2

## 2024-02-06 DIAGNOSIS — D35.2 PITUITARY MACROADENOMA WITH EXTRASELLAR EXTENSION (HCC): ICD-10-CM

## 2024-02-06 DIAGNOSIS — M17.11 PRIMARY OSTEOARTHRITIS OF RIGHT KNEE: Primary | ICD-10-CM

## 2024-02-06 PROCEDURE — 70553 MRI BRAIN STEM W/O & W/DYE: CPT

## 2024-02-06 PROCEDURE — G1004 CDSM NDSC: HCPCS

## 2024-02-06 PROCEDURE — 99213 OFFICE O/P EST LOW 20 MIN: CPT | Performed by: STUDENT IN AN ORGANIZED HEALTH CARE EDUCATION/TRAINING PROGRAM

## 2024-02-06 PROCEDURE — A9585 GADOBUTROL INJECTION: HCPCS | Performed by: NEUROLOGICAL SURGERY

## 2024-02-06 RX ORDER — GUAIFENESIN AND CODEINE PHOSPHATE 100; 10 MG/5ML; MG/5ML
SYRUP ORAL
COMMUNITY
End: 2024-02-16

## 2024-02-06 RX ORDER — GADOBUTROL 604.72 MG/ML
5 INJECTION INTRAVENOUS
Status: COMPLETED | OUTPATIENT
Start: 2024-02-06 | End: 2024-02-06

## 2024-02-06 RX ADMIN — GADOBUTROL 5 ML: 604.72 INJECTION INTRAVENOUS at 09:31

## 2024-02-06 NOTE — PROGRESS NOTES
Knee Follow up Office Note    Assessment/Plan:   Findings today are consistent with right knee osteoarthritis. Imaging and prognosis was reviewed with the patient today. Discussed treatment options including repeat cortisone steroid injection, visco injections, anti-inflammatories, low impact exercises and Voltaren gel vs surgical intervention for total knee arthroplasty. She would like to continue nonoperative management at this time after surgical discussion. She can follow up as needed         Subjective:     Patient ID: Bella Sargent is a 78 y.o. female.  Chief Complaint: Right knee pain  HPI: 79 yo female presents for follow up of right knee osteoarthritis. Pain is worse with activities and keeps her from doing the things she wants to do. Since her last injection, she has had good relief. Takes tylenol as needed. Last injection gave her good relief compared to steroid and visco injections in the past. She wants to discuss more details about knee replacement and checkup today.       Allergy:  Allergies   Allergen Reactions    Thiazide-Type Diuretics Other (See Comments)     Hyponatremia     Medications:  all current active meds have been reviewed  Past Medical History:  Past Medical History:   Diagnosis Date    Anxiety     Arthritis     Colon polyp     Coronary artery disease     Effusion of left knee     Last assessed - 9/10/15    History of transfusion     Hyperlipidemia     Hypertension     Memory loss     Myocardial infarction (HCC)     Protein-calorie malnutrition, unspecified severity (HCC) 11/14/2023    Scoliosis     Tick bite     Last assessed - 4/21/17     Past Surgical History:  Past Surgical History:   Procedure Laterality Date    APPENDECTOMY      CARDIAC SURGERY      COLONOSCOPY      CORONARY ARTERY BYPASS GRAFT      AR CORONARY ARTERY BYP W/VEIN & ARTERY GRAFT 4 VEIN N/A 01/27/2020    Procedure: CORONARY ARTERY BYPASS GRAFT (CABG) x3 VESSELS, LIMA TO LAD, AND SVG TO PLB & OM;  Surgeon: Peter  DO Dedra;  Location: [facility];  Service: Cardiac Surgery    OH ECHO TRANSESOPHAG R-T 2D W/PRB IMG ACQUISJ I&R N/A 01/27/2020    Procedure: TRANSESOPHAGEAL ECHOCARDIOGRAM (GET);  Surgeon: Peter Colmenares DO;  Location: [facility];  Service: Cardiac Surgery    OH NDSC SURG W/VIDEO-ASSISTED HARVEST VEIN CABG Left 01/27/2020    Procedure: HARVEST VEIN ENDOSCOPIC (EVH);  Surgeon: Peter Colmenares DO;  Location: [facility];  Service: Cardiac Surgery    TONSILLECTOMY      Last assessed - 4/20/17    TUBAL LIGATION      Last assessed - 4/20/17    TUMOR REMOVAL  1998    pelvic benign tumor removal    WISDOM TOOTH EXTRACTION      Last assessed - 4/20/17     Family History:  Family History   Problem Relation Age of Onset    Coronary artery disease Mother     Arthritis Mother     Other Mother         Cardiac Disorder     Transient ischemic attack Mother     Heart attack Father     Sudden death Father         SCD    Cancer Daughter 49        kidney    No Known Problems Paternal Aunt      Social History:  Social History     Substance and Sexual Activity   Alcohol Use Yes    Alcohol/week: 7.0 standard drinks of alcohol    Types: 7 Glasses of wine per week    Comment: 1 wine nightly     Social History     Substance and Sexual Activity   Drug Use No     Social History     Tobacco Use   Smoking Status Never    Passive exposure: Past   Smokeless Tobacco Never           ROS:  General: Per HPI  Skin: Negative, except if noted below  HEENT: Negative  Respiratory: Negative  Cardiovascular: Negative  Gastrointestinal: Negative  Urinary: Negative  Vascular: Negative  Musculoskeletal: Positive per HPI   Neurologic: Positive per HPI  Endocrine: Negative    Objective:  BP Readings from Last 1 Encounters:   02/06/24 138/72      Wt Readings from Last 1 Encounters:   02/06/24 50.8 kg (112 lb)        Respiratory:   non-labored respirations    Lymphatics:  no palpable lymph nodes    Gait:   normal    Neurologic:   Alert and oriented  "times 3  Patient with normal sensation except as noted below  Deep tendon reflexes 2+ except as noted in MSK exam    Right knee:     Inspection:  intact    Overall limb alignment mild varus    Effusion: moderate    ROM 0-130 with pain    Extensor Lag: none    Palpation: medial Joint line tenderness to palpation    AP Stability at 90 deg mild laxity to anterior drawer    M/L stability in full extension stable    M/L stability in midflexion stable    Motor: 5/5 Q/HS/TA/GS/P    Pulses: 2+ DP    SILT DP/SP/S/S/TN    Imaging:  My interpretation XR AP/lateral/sunrise right knee: moderate joint space narrowing. No fracture or dislocation.     BMI:   Estimated body mass index is 19.84 kg/m² as calculated from the following:    Height as of this encounter: 5' 3\" (1.6 m).    Weight as of this encounter: 50.8 kg (112 lb).  BSA:   Estimated body surface area is 1.51 meters squared as calculated from the following:    Height as of this encounter: 5' 3\" (1.6 m).    Weight as of this encounter: 50.8 kg (112 lb).                  "

## 2024-02-08 ENCOUNTER — OFFICE VISIT (OUTPATIENT)
Dept: PHYSICAL THERAPY | Facility: CLINIC | Age: 79
End: 2024-02-08
Payer: MEDICARE

## 2024-02-08 DIAGNOSIS — M41.55 OTHER SECONDARY SCOLIOSIS, THORACOLUMBAR REGION: Primary | ICD-10-CM

## 2024-02-08 PROCEDURE — 97110 THERAPEUTIC EXERCISES: CPT

## 2024-02-08 PROCEDURE — 97112 NEUROMUSCULAR REEDUCATION: CPT

## 2024-02-08 PROCEDURE — 97530 THERAPEUTIC ACTIVITIES: CPT

## 2024-02-08 NOTE — PROGRESS NOTES
"Daily Note     Today's date: 2024  Patient name: Bella Sargent  : 1945  MRN: [id number]  Referring provider: Annabelle Walsh C*  Dx:   Encounter Diagnosis     ICD-10-CM    1. Other secondary scoliosis, thoracolumbar region  M41.55                      Subjective: Patient reports that yesterday was her first bad day in a while. She walked 3 miles at a faster pace with her friend and realized that it was sorer yesterday but today she feels better      Objective: See treatment diary below      Assessment: Tolerated treatment well. Patient demonstrated fatigue post treatment, exhibited good technique with therapeutic exercises, and would benefit from continued PT Educated with DOMs and ways to navigate symptoms. Minor verbal cues to emphasize thoracic extension throughout. Patient responds really well with education to help empower patient diagnosis. Great form noted with bird dogs. Endurance is improving as good tolerance to xwalks      Plan: Continue per plan of care.  Progress treatment as tolerated.         Diagnosis:  Scoliosis    Precautions:  See chart   Comparable signs 1) Standing  2) Walking   Primary Impairments: 1) Core/glutes/posterior chain  2) lumbar and thoracic spine hypomobility    Patient Goals Yoga,    Manual Therapy 1/15 1/18 1/22 1/24 2/2 2/8   STM                                    Re-evaluation    SP      Exercise Diary          Therapeutic Exercise         Bike/UBE  5'   7 min endurance 5' TM for upright posture 5' elliptical  5'    Leg press  2x15 60# 2x15 65#        Open books         LTR         Cat cow    2x10 2x10 2x10   Mod satinder         Thread needle  2x10 ea qped   2x10 ea  2x10 ea    Lat stretch w/ breath  Ball roll out with breath 5x ea Ball roll out with breath   Jcarlos pose 20x Child pose 20x    Neuromuscular Re-education         Breathing          Big stick walk outs 4# 2 laps ea         Bird dogs  2x5 5\" LE only   2x8 simone  2x10 Rows 2x10   Bridge          Prone hip " "extension         Ys  Breathing YTB 2x10 In lunge band on foot 2x10     In lunge 1# 2x10   Table top flys  2x10 with breath        W/superman 10x 2\"                  Therapeutic Activities         Education    Home program, maintain of sx, managing chronicity of sx HEP and cues for HEP HEP, cues, mobility  DOMs    xwalk  YTB w/ ER   2 lap YTB   2 lap RTB 3 lap RTB   [place] carries 10# KB 2 laps ea no break w/ mirror 10# KB 2 laps ea     10#    Step ups   10# KB biodex 10x ea                 Modalities                            "

## 2024-02-13 ENCOUNTER — APPOINTMENT (OUTPATIENT)
Dept: PHYSICAL THERAPY | Facility: CLINIC | Age: 79
End: 2024-02-13
Payer: MEDICARE

## 2024-02-15 ENCOUNTER — OFFICE VISIT (OUTPATIENT)
Dept: PHYSICAL THERAPY | Facility: CLINIC | Age: 79
End: 2024-02-15
Payer: MEDICARE

## 2024-02-15 DIAGNOSIS — M41.55 OTHER SECONDARY SCOLIOSIS, THORACOLUMBAR REGION: Primary | ICD-10-CM

## 2024-02-15 DIAGNOSIS — D35.2 PITUITARY MACROADENOMA (HCC): ICD-10-CM

## 2024-02-15 PROCEDURE — 97110 THERAPEUTIC EXERCISES: CPT

## 2024-02-15 PROCEDURE — 97530 THERAPEUTIC ACTIVITIES: CPT

## 2024-02-15 PROCEDURE — 97112 NEUROMUSCULAR REEDUCATION: CPT

## 2024-02-15 NOTE — PROGRESS NOTES
Daily Note     Today's date: 2/15/2024  Patient name: Bella Sargent  : 1945  MRN: [id number]  Referring provider: Annabelle Walsh C*  Dx:   Encounter Diagnosis     ICD-10-CM    1. Other secondary scoliosis, thoracolumbar region  M41.55                      Subjective: Patient reports that she is doing well. She notes that she has been working on the exercises and feels that her core has been more engaged.       Objective: See treatment diary below      Assessment: Tolerated treatment well. Patient demonstrated fatigue post treatment, exhibited good technique with therapeutic exercises, and would benefit from continued PT Good form throughout session. Able to demonstrate progression of x-walks to green band with core engagement. Less cueing needed for core.       Plan: Continue per plan of care.  Progress treatment as tolerated.         Diagnosis:  Scoliosis    Precautions:  See chart   Comparable signs 1) Standing  2) Walking   Primary Impairments: 1) Core/glutes/posterior chain  2) lumbar and thoracic spine hypomobility    Patient Goals Yoga,    Manual Therapy 2/15  1/22 1/24 2/2 2/8   STM                                    Re-evaluation    SP      Exercise Diary          Therapeutic Exercise         Bike/UBE  5'   7 min endurance 5' TM for upright posture 5' elliptical  5'    Leg press          Open books         LTR         Cat cow 2x10   2x10 2x10 2x10   Mod satinder         Thread needle     2x10 ea  2x10 ea    Lat stretch w/ breath     Jcarlos pose 20x Child pose 20x    Neuromuscular Re-education         Breathing          Big stick walk outs         Bird dogs  Row 2x10   2x8 simone  2x10 Rows 2x10   Bridge          Prone hip extension         Ys  In lunge 1# 2x10     In lunge 1# 2x10   Table top flys          W/superman                  Therapeutic Activities         Education  Mobility, oseoporisis weight bearing exercises  Home program, maintain of sx, managing chronicity of sx HEP and cues for HEP  HEP, cues, mobility  DOMs    xwalk 2 lap RTB   2 lap YTB   2 lap RTB 3 lap RTB   [place] carries 10# 2 laps ea     10#    Step ups                   Modalities

## 2024-02-16 ENCOUNTER — OFFICE VISIT (OUTPATIENT)
Dept: CARDIOLOGY CLINIC | Facility: CLINIC | Age: 79
End: 2024-02-16
Payer: MEDICARE

## 2024-02-16 VITALS
HEART RATE: 57 BPM | DIASTOLIC BLOOD PRESSURE: 60 MMHG | WEIGHT: 113 LBS | BODY MASS INDEX: 20.02 KG/M2 | RESPIRATION RATE: 12 BRPM | OXYGEN SATURATION: 96 % | SYSTOLIC BLOOD PRESSURE: 116 MMHG | HEIGHT: 63 IN

## 2024-02-16 DIAGNOSIS — I25.118 CORONARY ARTERY DISEASE OF NATIVE ARTERY OF NATIVE HEART WITH STABLE ANGINA PECTORIS (HCC): ICD-10-CM

## 2024-02-16 DIAGNOSIS — Z95.1 S/P CABG X 3: ICD-10-CM

## 2024-02-16 DIAGNOSIS — E87.1 HYPONATREMIA: ICD-10-CM

## 2024-02-16 DIAGNOSIS — I10 ESSENTIAL HYPERTENSION: Primary | ICD-10-CM

## 2024-02-16 DIAGNOSIS — I49.1 PAC (PREMATURE ATRIAL CONTRACTION): ICD-10-CM

## 2024-02-16 DIAGNOSIS — E78.2 MIXED HYPERLIPIDEMIA: ICD-10-CM

## 2024-02-16 PROCEDURE — 99214 OFFICE O/P EST MOD 30 MIN: CPT | Performed by: INTERNAL MEDICINE

## 2024-02-16 RX ORDER — CABERGOLINE 0.5 MG/1
TABLET ORAL
Qty: 4 TABLET | Refills: 0 | Status: SHIPPED | OUTPATIENT
Start: 2024-02-16

## 2024-02-16 NOTE — PROGRESS NOTES
Cardiology Follow Up    Bella Sargent  1945  [id number]  [facility]  [facility] [address] [phone]    1. Essential hypertension        2. Coronary artery disease of native artery of native heart with stable angina pectoris (HCC)        3. PAC (premature atrial contraction)        4. Mixed hyperlipidemia        5. S/P CABG x 3        6. Hyponatremia  Basic metabolic panel          Discussion/Summary:      Hypertension, she is on a few different agents. She wants to minimize as many medications as possible, but also her blood pressure is currently on the lower side in the office. She had hyponatremia with hydrochlorothiazide. I would stop her torsemide. Continue amlodipine, losartan, metoprolol.    Given her prior hyponatremia, should have repeat BMP to monitor this. It was in the lower 130s when last checked. Discontinue loop diuretic as noted above.    Dyslipidemia is well-controlled with 40 mg of Crestor.    She had palpitations. She increased her dose of metoprolol and her symptoms have improved dramatically.    Prior CABG, but no angina symptoms currently. She gets some shortness of breath when walking up an incline, but walks about 2 miles a day and denies symptoms with this. While still no clear plans for it, she may require an orthopedic surgery for her knees. If necessary, no contraindication from a cardiac standpoint.    Previous History:  Non ST elevation in January, 2020.  She had a cardiac catheterization with calcified vessels and multivessel CAD.  She ultimately did underwent bypass surgery with 3 grafts. LIMA to the LAD, vein grafts to OM and Ramus.  Her EF is preserved.  She was started on appropriate medical therapy.  She was kept on Imdur for 30 days after her bypass because the size of the LIMA was small and felt to be somewhat spasmodic, but subsequently stopped.    ER visit in 10/2020 for a near  syncopal episode and some symptoms which she felt were similar to her MI. Subsequent stress test unremarkable.    Was feeling palpitations, and got an alert from her Apple watch saying there was atrial fibrillation. She started on Eliquis, and additional testing was done.  Does not appear that this was truly AFib when I correlated what was noted on the watch with a zio patch. Seems more PACs. Eliquis was stopped.     Interval history:    The last time I saw her, I started her on HCTZ because her blood pressure was high. She developed some hyponatremia so she was in the hospital for short period of time. This was stopped.    Follow-up, she had seen nephrology. Her blood pressure was elevated so she was initially put on 5 of torsemide. That was subsequently increased to 10 mg and then was added on 2.5 and then 5 mg of amlodipine.    Blood pressure is labile at home. She can tell sometimes when it is high because she feels headache or a fullness in her head. In the office today, it is actually on the lower side.    She also had seen Mary and had gone to the emergency room on 1 occasion because she was feeling more palpitations. Her metoprolol was increased very slightly and this has helped with her symptoms significantly.    Problem List       Mixed hyperlipidemia    Coronary artery disease of native artery of native heart with stable angina pectoris ([facility])    Overview Signed 1/24/2020  4:00 PM by Christie Hopkins     Added automatically from request for surgery [id number]         Arthritis    Cervical myofascial pain syndrome    Cervical radiculopathy    Cervicogenic headache    Cervico-occipital neuralgia    Depression    Essential hypertension    Osteopenia of spine    Spasmodic torticollis    Laceration of occipital scalp    Other secondary scoliosis, lumbar region    NSTEMI (non-ST elevation myocardial infarction) ([facility])    Syncope    S/P CABG x 3    Anemia    Thrombocytopenia ([facility])    Hyperchloremia    Chylothorax     Screening for colon cancer    Overview Signed 4/14/2020  8:34 AM by Aj Steele MD     Pt referred to GI for colon cancer screening.         History of colon polyps          Past Medical History:   Diagnosis Date    Anxiety     Arthritis     Colon polyp     Coronary artery disease     Effusion of left knee     Last assessed - 9/10/15    History of transfusion     Hyperlipidemia     Hypertension     Memory loss     Myocardial infarction (HCC)     Protein-calorie malnutrition, unspecified severity (HCC) 11/14/2023    Scoliosis     Tick bite     Last assessed - 4/21/17     Social History     Tobacco Use    Smoking status: Never     Passive exposure: Past    Smokeless tobacco: Never   Vaping Use    Vaping status: Never Used   Substance Use Topics    Alcohol use: Yes     Alcohol/week: 7.0 standard drinks of alcohol     Types: 7 Glasses of wine per week     Comment: 1 wine nightly    Drug use: No      Family History   Problem Relation Age of Onset    Coronary artery disease Mother     Arthritis Mother     Other Mother         Cardiac Disorder     Transient ischemic attack Mother     Heart attack Father     Sudden death Father         SCD    Cancer Daughter 49        kidney    No Known Problems Paternal Aunt      Past Surgical History:   Procedure Laterality Date    APPENDECTOMY      CARDIAC SURGERY      COLONOSCOPY      CORONARY ARTERY BYPASS GRAFT      WA CORONARY ARTERY BYP W/VEIN & ARTERY GRAFT 4 VEIN N/A 01/27/2020    Procedure: CORONARY ARTERY BYPASS GRAFT (CABG) x3 VESSELS, LIMA TO LAD, AND SVG TO PLB & OM;  Surgeon: Peter Colmenares DO;  Location: [facility];  Service: Cardiac Surgery    WA ECHO TRANSESOPHAG R-T 2D W/PRB IMG ACQUISJ I&R N/A 01/27/2020    Procedure: TRANSESOPHAGEAL ECHOCARDIOGRAM (GET);  Surgeon: Peter Colmenares DO;  Location: [facility];  Service: Cardiac Surgery    WA NDSC SURG W/VIDEO-ASSISTED HARVEST VEIN CABG Left 01/27/2020    Procedure: HARVEST VEIN ENDOSCOPIC (EVH);  Surgeon:  Peter Colmenares DO;  Location: [facility];  Service: Cardiac Surgery    TONSILLECTOMY      Last assessed - 4/20/17    TUBAL LIGATION      Last assessed - 4/20/17    TUMOR REMOVAL  1998    pelvic benign tumor removal    WISDOM TOOTH EXTRACTION      Last assessed - 4/20/17       Current Outpatient Medications:     acetaminophen (TYLENOL) 650 mg CR tablet, Take 650 mg by mouth daily as needed for mild pain, Disp: , Rfl:     amLODIPine (NORVASC) 5 mg tablet, Take 1 tablet (5 mg total) by mouth daily, Disp: 90 tablet, Rfl: 1    aspirin (ECOTRIN LOW STRENGTH) 81 mg EC tablet, Take 1 tablet (81 mg total) by mouth daily, Disp: , Rfl:     cabergoline (DOSTINEX) 0.5 MG tablet, TAKE 1/2 TABLET BY MOUTH ONCE A WEEK ( 2 TABLETS PER MONTH- EMERGENCY SUPPLY. PT SHOULD USE PILL CUTTER TO CUT THE PILLS), Disp: 4 tablet, Rfl: 0    Calcium Carb-Cholecalciferol 600-200 MG-UNIT TABS, Calcium 600 + D TABS TAKE 1 TABLET DAILY.  Refills: 0  Active, Disp: , Rfl:     denosumab (PROLIA) 60 mg/mL, Inject 60 mg under the skin every 6 (six) months, Disp: , Rfl:     losartan (COZAAR) 100 MG tablet, Take 1 tablet (100 mg total) by mouth daily, Disp: 90 tablet, Rfl: 1    melatonin 3 mg, Take 3 mg by mouth daily at bedtime, Disp: , Rfl:     metoprolol succinate (TOPROL-XL) 25 mg 24 hr tablet, Take one tablet in the am ( 25 mg) and 0.5 tablet ( 12.5 mg) in the pm, Disp: 135 tablet, Rfl: 1    rosuvastatin (CRESTOR) 40 MG tablet, TAKE ONE TABLET BY MOUTH EVERY DAY, Disp: 90 tablet, Rfl: 3    traZODone (DESYREL) 50 mg tablet, Take 1 tablet (50 mg total) by mouth daily at bedtime, Disp: 30 tablet, Rfl: 1    VITAMIN D, CHOLECALCIFEROL, PO, Take 2,600 Units/day by mouth, Disp: , Rfl:   Allergies   Allergen Reactions    Thiazide-Type Diuretics Other (See Comments)     Hyponatremia       Vitals:    02/16/24 1401   BP: 116/60   BP Location: Left arm   Patient Position: Sitting   Cuff Size: Adult   Pulse: 57   Resp: 12   SpO2: 96%   Weight: 51.3 kg (113 lb)  "  Height: 5' 3\" (1.6 m)     Vitals:    02/16/24 1401   Weight: 51.3 kg (113 lb)      Height: 5' 3\" (160 cm)   Body mass index is 20.02 kg/m².    Physical Exam:  GEN: Bella Sargent appears well, alert and oriented x 3, pleasant and cooperative   HEENT: pupils equal, round, and reactive to light; extraocular muscles intact  NECK: supple, no carotid bruits   HEART: regular rhythm, normal S1 and S2, no murmurs, clicks, gallops or rubs   LUNGS: clear to auscultation bilaterally; no wheezes, rales, or rhonchi   ABDOMEN: normal bowel sounds, soft, no tenderness, no distention  EXTREMITIES: peripheral pulses normal; no clubbing, cyanosis, or edema  NEURO: no focal findings   SKIN: normal without suspicious lesions on exposed skin    ROS:  Positive for shortness of breath with incline. Palpitations are improved.  Except as noted in HPI, is otherwise reviewed in detail and a 12 point review of systems is negative.  ROS reviewed and is unchanged    Labs:  Lab Results   Component Value Date     03/03/2015    K 3.8 11/16/2023    CL 97 11/16/2023    CREATININE 0.61 11/16/2023    BUN 21 11/16/2023    CO2 25 11/16/2023    ALT 27 11/16/2023    AST 46 (H) 11/16/2023    INR 1.01 01/24/2020    GLUF 88 11/14/2023    WBC 7.21 11/16/2023    HGB 12.9 11/16/2023    HCT 39.3 11/16/2023     11/16/2023     No results found for: \"CHOL\"  Lab Results   Component Value Date    LDLCALC 62 07/05/2023    LDLCALC 77 01/03/2023    LDLCALC 68 05/31/2022     Lab Results   Component Value Date    HDL 72 07/05/2023    HDL 66 01/03/2023    HDL 65 05/31/2022     Lab Results   Component Value Date    TRIG 66 07/05/2023    TRIG 88 01/03/2023    TRIG 128 05/31/2022     Testing:      Echo 1/20/22:  Left Ventricle: Left ventricular cavity size is normal. The left ventricular ejection fraction is 60%. Systolic function is normal. Wall motion is normal. Diastolic function is normal.    Aortic Valve: There is mild regurgitation.    Mitral Valve: There is " mild regurgitation.    Tricuspid Valve: There is mild regurgitation.      Zio patch 12/2021:  Patient had a min HR of 47 bpm, max HR of 184 bpm, and avg HR of 69  bpm. Predominant underlying rhythm was Sinus Rhythm. 13  Supraventricular Tachycardia runs occurred, the run with the fastest  interval lasting 14 beats with a max rate of 184 bpm, the longest lasting  20 beats with an avg rate of 123 bpm. Supraventricular Tachycardia was  detected within +/- 45 seconds of symptomatic patient event(s). Isolated  SVEs were rare (<1.0%), SVE Couplets were rare (<1.0%), and SVE  Triplets were rare (<1.0%). Isolated VEs were rare (<1.0%), and no VE  Couplets or VE Triplets were present.     Agree with above. Brief SVT only, no afib. Only one episode of palpitations correlated with any significant arrhythmia (frequent PACs/brief SVT). Remaining PSVT was asymptomatic and other symptoms of palpitations were sinus rhythm.    Stress Test 11/4/2020:  SUMMARY:  -  Rest ECG: Normal sinus rhythm. Normal baseline ECG.  -  Stress results: Duration of exercise was 8 min and 0 sec. Maximal work rate was 10.1 METs. Target heart rate was achieved. There was resting hypertension with an appropriate blood pressure response to stress. There was no chest pain  during stress.  -  ECG conclusions: The stress ECG was normal. Arrhythmia during stress: isolated atrial premature beats.  -  Perfusion imaging: There were no perfusion defects.  -  Gated SPECT: The calculated left ventricular ejection fraction was 82 %. Left ventricular ejection fraction was within normal limits by visual estimate. There was no left ventricular regional abnormality.     IMPRESSIONS: Normal study after maximal exercise without reproduction of symptoms. Myocardial perfusion imaging was normal at rest and with stress. Left ventricular systolic function was normal.    Cardiac Cath 1/24/2020:  CORONARY CIRCULATION:  LAD: The vessel was medium sized. The proximal and mid LAD is  heavily calcified with diffuse disease of 70-80%.  Circumflex: The vessel was medium sized and heavily calcified.  Ostial circumflex: There was a discrete 85 % stenosis.  Mid circumflex: There was a discrete 85 % stenosis.  1st obtuse marginal: The vessel was small to medium sized. There was a tubular 50 % stenosis.  2nd obtuse marginal: The vessel was small to medium sized. There was a discrete 85 % stenosis.  RCA: The vessel was medium sized. Dominant. There is heavy calcification in the proximal, mid, and distal vessel. There are multiple significant lesions ( 70-90% ) seen throughout this entire area.    Echo 1/2020:  LEFT VENTRICLE:  Systolic function was vigorous. Ejection fraction was estimated to be 70 %.  There were no regional wall motion abnormalities.  Wall thickness was at the upper limits of normal.     RIGHT VENTRICLE:  The size was normal.  Systolic function was normal.     MITRAL VALVE:  There was mild regurgitation.

## 2024-02-20 ENCOUNTER — APPOINTMENT (OUTPATIENT)
Dept: PHYSICAL THERAPY | Facility: CLINIC | Age: 79
End: 2024-02-20
Payer: MEDICARE

## 2024-02-21 PROBLEM — D35.2 PITUITARY MACROADENOMA (HCC): Status: ACTIVE | Noted: 2024-02-21

## 2024-02-21 PROBLEM — S01.01XA LACERATION OF OCCIPITAL SCALP: Status: RESOLVED | Noted: 2018-09-05 | Resolved: 2024-02-21

## 2024-02-21 NOTE — ASSESSMENT & PLAN NOTE
Is This A New Presentation, Or A Follow-Up?: Skin Lesions
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     Plan:  Imaging reviewed with patient, stable findings, no optic chiasm compression, right cavernous ICA encasement unchanged.  No plans for intervention at this time, continue to monitor with serial imaging.  Seen by endocrine 9/2023, now on very low dose cabergoline due to elevated prolactin.  Next appointment 4/2024.  Continue regular VF testing with ophthalmology and have results faxed to office for review.  Last VF 7/2023 with some defects noted however again no optic chiasm compression; patient states she did not do well with this testing  Patient is to return to the office in 1 year, joint appointment with Dr. Calvert, with repeat MRI brain pituitary w wo contrast or sooner should patient develop worsening symptoms or red flag signs  Patient declined to see MD today    
How Severe Is Your Skin Lesion?: mild
Have Your Skin Lesions Been Treated?: not been treated
Additional History: Declined removal of pants during exam

## 2024-02-22 ENCOUNTER — EVALUATION (OUTPATIENT)
Dept: PHYSICAL THERAPY | Facility: CLINIC | Age: 79
End: 2024-02-22
Payer: MEDICARE

## 2024-02-22 ENCOUNTER — OFFICE VISIT (OUTPATIENT)
Dept: NEUROSURGERY | Facility: CLINIC | Age: 79
End: 2024-02-22
Payer: MEDICARE

## 2024-02-22 VITALS
HEART RATE: 74 BPM | BODY MASS INDEX: 20.02 KG/M2 | TEMPERATURE: 98.1 F | WEIGHT: 113 LBS | DIASTOLIC BLOOD PRESSURE: 71 MMHG | HEIGHT: 63 IN | SYSTOLIC BLOOD PRESSURE: 161 MMHG

## 2024-02-22 DIAGNOSIS — M41.55 OTHER SECONDARY SCOLIOSIS, THORACOLUMBAR REGION: Primary | ICD-10-CM

## 2024-02-22 DIAGNOSIS — D35.2 PITUITARY MACROADENOMA (HCC): Primary | ICD-10-CM

## 2024-02-22 PROCEDURE — 97530 THERAPEUTIC ACTIVITIES: CPT

## 2024-02-22 PROCEDURE — 99215 OFFICE O/P EST HI 40 MIN: CPT | Performed by: PHYSICIAN ASSISTANT

## 2024-02-22 PROCEDURE — 97140 MANUAL THERAPY 1/> REGIONS: CPT

## 2024-02-22 NOTE — PROGRESS NOTES
Neurosurgery Office Note  Bella Sargent 78 y.o. female MRN: [id number]      Assessment/Plan     Pituitary macroadenoma (HCC)  Patient presents for 6 month follow up of pituitary macroadenoma  Found incidentally 7/2023 during work up for generalized weakness, malaise and headaches  Follows with endocrine, on very low dose cabergoline  See ophthalmology regularly    Imaging:  MRI brain pituitary w wo contrast 2/4/2024: 2.2 cm pituitary macroadenoma with definite right cavernous sinus invasion and slight suprasellar extension, similar to prior exam.     Plan:  Imaging reviewed with patient, stable findings, no optic chiasm compression, right cavernous ICA encasement unchanged.  No plans for intervention at this time, continue to monitor with serial imaging.  Seen by endocrine 9/2023, now on very low dose cabergoline due to elevated prolactin.  Next appointment 4/2024.  Continue regular VF testing with ophthalmology and have results faxed to office for review.  Last VF 7/2023 with some defects noted however again no optic chiasm compression; patient states she did not do well with this testing  Patient is to return to the office in 1 year, joint appointment with Dr. Calvert, with repeat MRI brain pituitary w wo contrast or sooner should patient develop worsening symptoms or red flag signs  Patient declined to see MD today       Diagnoses and all orders for this visit:    Pituitary macroadenoma (HCC)  -     MRI brain pituitary wo and w contrast; Future  -     BUN; Future  -     Creatinine, serum; Future          I have spent a total time of 40 minutes on 02/22/24 in caring for this patient including Diagnostic results, Instructions for management, Patient and family education, Impressions, Counseling / Coordination of care, Documenting in the medical record, Reviewing / ordering tests, medicine, procedures  , and Obtaining or reviewing history  .      CHIEF COMPLAINT    Chief Complaint   Patient presents with    Follow-up      2.2 Pituitary Macroadenoma- Brain Mri 2/6/24         HISTORY    History of Present Illness     78 y.o. year old female with past medical history significant for coronary artery disease, hyperlipidemia, hypertension, prior MI, scoliosis who presents for 6-month follow-up of pituitary macroadenoma.  This was noted during workup for generalized weakness, malaise and headaches in July 2023.    Patient states overall she is doing well.  No visual complaints, no hormonal imbalance issues, etc.  She is being followed by endocrinology, last seen in September 2023, started on a very low-dose of cabergoline weekly however states she is having a lot of difficulty cutting the pill and will talk to her about either stopping this agent or taking the full pill moving forward.  She follows with ophthalmology, last visual fields completed in July 2023 which does demonstrate some inferior by temporal field defects however imaging does not correlate with this as she has no optic chiasm compression.  She is set to see endocrinology again in April 2024 and ophthalmology again in September 2024.        See Discussion    REVIEW OF SYSTEMS    Review of Systems   Constitutional: Negative.    HENT: Negative.     Eyes: Negative.    Respiratory: Negative.     Cardiovascular: Negative.    Gastrointestinal: Negative.    Endocrine: Negative.    Genitourinary: Negative.    Musculoskeletal: Negative.    Skin: Negative.    Allergic/Immunologic: Negative.    Neurological: Negative.         2.2 Pituitary Macroadenoma- Brain Mri 2/6/24    Endo/ labs on 9/2023,   VF on 7/31/23- Dr Stoll   Has f/u scheduled for Sept. 16/20024    ENT 10/13/23     Hematological: Negative.    Psychiatric/Behavioral: Negative.     All other systems reviewed and are negative.      ROS obtained by MA. Reviewed. See HPI.     Meds/Allergies     Current Outpatient Medications   Medication Sig Dispense Refill    acetaminophen (TYLENOL) 650 mg CR tablet Take 650 mg by mouth  daily as needed for mild pain      amLODIPine (NORVASC) 5 mg tablet Take 1 tablet (5 mg total) by mouth daily 90 tablet 1    aspirin (ECOTRIN LOW STRENGTH) 81 mg EC tablet Take 1 tablet (81 mg total) by mouth daily      cabergoline (DOSTINEX) 0.5 MG tablet TAKE 1/2 TABLET BY MOUTH ONCE A WEEK ( 2 TABLETS PER MONTH- EMERGENCY SUPPLY. PT SHOULD USE PILL CUTTER TO CUT THE PILLS) 4 tablet 0    Calcium Carb-Cholecalciferol 600-200 MG-UNIT TABS Calcium 600 + D TABS  TAKE 1 TABLET DAILY.   Refills: 0    Active      denosumab (PROLIA) 60 mg/mL Inject 60 mg under the skin every 6 (six) months      losartan (COZAAR) 100 MG tablet Take 1 tablet (100 mg total) by mouth daily 90 tablet 1    melatonin 3 mg Take 3 mg by mouth daily at bedtime      metoprolol succinate (TOPROL-XL) 25 mg 24 hr tablet Take one tablet in the am ( 25 mg) and 0.5 tablet ( 12.5 mg) in the pm 135 tablet 1    rosuvastatin (CRESTOR) 40 MG tablet TAKE ONE TABLET BY MOUTH EVERY DAY 90 tablet 3    traZODone (DESYREL) 50 mg tablet Take 1 tablet (50 mg total) by mouth daily at bedtime 30 tablet 1    VITAMIN D, CHOLECALCIFEROL, PO Take 2,600 Units/day by mouth       No current facility-administered medications for this visit.       Allergies   Allergen Reactions    Thiazide-Type Diuretics Other (See Comments)     Hyponatremia       PAST HISTORY    Past Medical History:   Diagnosis Date    Anxiety     Arthritis     Colon polyp     Coronary artery disease     Effusion of left knee     Last assessed - 9/10/15    History of transfusion     Hyperlipidemia     Hypertension     Memory loss     Myocardial infarction (HCC)     Protein-calorie malnutrition, unspecified severity (HCC) 11/14/2023    Scoliosis     Tick bite     Last assessed - 4/21/17       Past Surgical History:   Procedure Laterality Date    APPENDECTOMY      CARDIAC SURGERY      COLONOSCOPY      CORONARY ARTERY BYPASS GRAFT      WI CORONARY ARTERY BYP W/VEIN & ARTERY GRAFT 4 VEIN N/A 01/27/2020     "Procedure: CORONARY ARTERY BYPASS GRAFT (CABG) x3 VESSELS, LIMA TO LAD, AND SVG TO PLB & OM;  Surgeon: Peter Colmenares DO;  Location: [facility];  Service: Cardiac Surgery    SD ECHO TRANSESOPHAG R-T 2D W/PRB IMG ACQUISJ I&R N/A 01/27/2020    Procedure: TRANSESOPHAGEAL ECHOCARDIOGRAM (GET);  Surgeon: Peter Colmenares DO;  Location: [facility];  Service: Cardiac Surgery    SD NDSC SURG W/VIDEO-ASSISTED HARVEST VEIN CABG Left 01/27/2020    Procedure: HARVEST VEIN ENDOSCOPIC (EVH);  Surgeon: Peter Colmenares DO;  Location: [facility];  Service: Cardiac Surgery    TONSILLECTOMY      Last assessed - 4/20/17    TUBAL LIGATION      Last assessed - 4/20/17    TUMOR REMOVAL  1998    pelvic benign tumor removal    WISDOM TOOTH EXTRACTION      Last assessed - 4/20/17       Social History     Tobacco Use    Smoking status: Never     Passive exposure: Past    Smokeless tobacco: Never   Vaping Use    Vaping status: Never Used   Substance Use Topics    Alcohol use: Yes     Alcohol/week: 7.0 standard drinks of alcohol     Types: 7 Glasses of wine per week     Comment: 1 wine nightly    Drug use: No       Family History   Problem Relation Age of Onset    Coronary artery disease Mother     Arthritis Mother     Other Mother         Cardiac Disorder     Transient ischemic attack Mother     Heart attack Father     Sudden death Father         SCD    Cancer Daughter 49        kidney    No Known Problems Paternal Aunt          Above history personally reviewed.       EXAM    Vitals:Blood pressure 161/71, pulse 74, temperature 98.1 °F (36.7 °C), temperature source Temporal, height 5' 3\" (1.6 m), weight 51.3 kg (113 lb).,Body mass index is 20.02 kg/m².     Physical Exam  Constitutional:       General: She is awake.      Appearance: Normal appearance.   HENT:      Head: Normocephalic and atraumatic.   Eyes:      Extraocular Movements: EOM normal.      Conjunctiva/sclera: Conjunctivae normal.   Cardiovascular:      Rate and Rhythm: " Normal rate.   Pulmonary:      Effort: Pulmonary effort is normal. No respiratory distress.   Skin:     General: Skin is warm and dry.   Neurological:      Mental Status: She is alert and oriented to person, place, and time.      Motor: Motor strength is normal.     Coordination: Finger-Nose-Finger Test normal.      Gait: Gait is intact.   Psychiatric:         Attention and Perception: Attention and perception normal.         Mood and Affect: Mood and affect normal.         Speech: Speech normal.         Behavior: Behavior normal. Behavior is cooperative.         Thought Content: Thought content normal.         Cognition and Memory: Cognition and memory normal.         Judgment: Judgment normal.         Neurologic Exam     Mental Status   Oriented to person, place, and time.   Follows 1 step commands.   Attention: normal. Concentration: normal.   Speech: speech is normal   Level of consciousness: alert  Knowledge: good.   Normal comprehension.     Cranial Nerves     CN II   Right visual field deficit: none  Left visual field deficit: none     CN III, IV, VI   Extraocular motions are normal.   CN III: no CN III palsy  CN VI: no CN VI palsy  Nystagmus: none   Diplopia: none  Ophthalmoparesis: none  Upgaze: normal  Downgaze: normal  Conjugate gaze: present    CN V   Right facial sensation deficit: none  Left facial sensation deficit: none    CN VII   Right facial weakness: none  Left facial weakness: none    CN VIII   Hearing: intact    CN IX, X   CN IX normal.   CN X normal.     CN XI   Right trapezius strength: normal  Left trapezius strength: normal    CN XII   CN XII normal.     Motor Exam   Muscle bulk: normal  Overall muscle tone: normal  Right arm pronator drift: absent  Left arm pronator drift: absent    Strength   Strength 5/5 throughout.     Gait, Coordination, and Reflexes     Gait  Gait: normal    Coordination   Finger to nose coordination: normal    Tremor   Resting tremor: absent  Intention tremor:  absent  Action tremor: absent    Reflexes   Right : 2+  Left : 2+        MEDICAL DECISION MAKING    Imaging Studies:     MRI brain pituitary wo and w contrast    Result Date: 2/12/2024  Narrative: MRI BRAIN AND SELLA  WITH AND WITHOUT CONTRAST INDICATION:  D35.2: Benign neoplasm of pituitary gland Pituitary adenoma f/u pit adenoma COMPARISON: MRI brain IAC with and without contrast 11/15/2023. MRI brain pituitary with and without contrast 7/20/2023. CT head without contrast 7/6/2023, 1/24/2020. TECHNIQUE: Multiplanar, multisequence imaging of the brain and sella was performed before and after gadolinium administration. Targeted images of the sella were performed requiring additional time at acquisition and interpretation of approximately 25% IV Contrast:  5 mL of Gadobutrol injection (SINGLE-DOSE) IMAGE QUALITY:  Diagnostic. FINDINGS: BRAIN PARENCHYMA:  There is no discrete parenchymal mass, mass effect or midline shift.  Brainstem and cerebellum demonstrate normal signal. There is no intracranial hemorrhage.  There is no evidence of acute infarction and diffusion imaging is unremarkable. Small scattered hyperintensities on T2/FLAIR imaging are noted in the periventricular and subcortical white matter demonstrating an appearance that is statistically most likely to represent mild microangiopathic change.     Normal postcontrast imaging. VENTRICLES:  Normal for the patient's age. SELLA AND PITUITARY GLAND: 2.2 x 2.1 x 1.4 cm enhancing sellar mass with complete encasement of intracavernous right internal carotid artery and slight suprasellar extension (13:64, 11:6), similar to prior exam. Leftward deviation of pituitary stalk. Optic chiasm is normal. ORBITS:  Normal. PARANASAL SINUSES:  Normal. VASCULATURE:  Evaluation of the major intracranial vasculature demonstrates appropriate flow voids. CALVARIUM AND SKULL BASE:  Normal. EXTRACRANIAL SOFT TISSUES:  Normal.     Impression: 2.2 cm pituitary macroadenoma  with definite right cavernous sinus invasion and slight suprasellar extension, similar to prior exam. No acute intracranial abnormality. Workstation performed: [id number]       I have personally reviewed pertinent reports.   and I have personally reviewed pertinent films in PACS

## 2024-02-22 NOTE — PROGRESS NOTES
PT Re-Evaluation  and PT Discharge    Today's date: 2024  Patient name: Bella Sargent  : 1945  MRN: [id number]  Referring provider: Annabelle Walsh C*  Dx:   Encounter Diagnosis     ICD-10-CM    1. Other secondary scoliosis, thoracolumbar region  M41.55                      Assessment  Assessment details: RE 24 Patient has made good progress with PT and has met functional PT goals at this time. Patient will be discharged from skilled PT services and will continue to make progress with I HEP. Patients HEP was reviewed in order to ensure compliance and success at home, in which exercise bands and printouts were given. We will be available if the patient needs physical therapy in the future. Patient was given an opportunity to ask questions. All questions were answered to patients satisfaction.     RE24 Bella presents to PT with good overall improvement and is making good progress with strength and endurance today. She has made good progress and has improved with her ability to understand her condition and how to manage it over a lifetime. She still lacks glute engagement with hip extension demonstrated compensation with lumbar multifidi, She will benefit from continued skilled PT as she still requires cueing, however will decrease frequency to 1x a week for another moth after next week in order to allow for good carry over at home.     IE:Bella Sargent is a pleasant 78 y.o. female presents with signs and symptoms consistent with:   Other secondary scoliosis, thoracolumbar region    Problem List:  1) Impaired strength   2) Impaired Motor control     Comparable signs:  1) Standing  2) Walking     she has impaired strength, impaired ROM and leg length discrepency secondary to scoliosis and resulting in worry over not knowing what's wrong and fear of not being able to keep active. These impairments listed above are preventing the patient from participating in functional activity. No further referral  appears necessary at this time based upon examination results, and is negative for any red flags. Prognosis is good given HEP compliance and attendance to physical therapy 2x a week.  Positive prognostic indicators include positive attitude toward recovery and good understanding of diagnosis and treatment plan options.  Negative prognostic indicators include chronicity of symptoms.  Patent will benefit from skilled physical therapy at this time to address deficits to improve overall function and return to PLOF. Patient verbalized understanding of POC, HEP, and return demonstrated HEP. All questions were answered to patients satisfaction.     Please contact me if you have any questions or recommendations. Thank you for the referral and the opportunity to share in Bella Sargent's care.            Understanding of Dx/Px/POC: good   Prognosis: good    Goals  Impairment Goals 4-6 weeks MET  In order to improve and maximize function patient will be able to...  - Decrease pain intensity to <2/10  - Improve lumbar AROM to >100% throughout  - Increase hip strength to 5/5 throughout  - Centralize symptoms and decrease numbness frequency/duration    Functional Goals 6-8 weeks MET  In order to improve and maximize function patient will be able to...  - Return to Prior Level of Function with no greater than 2/10 pain   - Increase Functional Status Measure (FOTO) to: >predicted outcomes  - Be independent and compliant with HEP  - Tolerate sitting and or standing without increased pain/compensation/difficulty for at least 30 minutes  - Perform sit to stand without increased pain/compensation/difficulty   - Return to gentle yoga with minimal discomfort    New goals 1/22/24 MET  Effectively manage symptoms and home program for long term success.           Plan  Patient would benefit from: skilled physical therapy  Planned therapy interventions: home exercise program  Treatment plan discussed with: patient        Subjective  Evaluation    History of Present Illness  Mechanism of injury: RE24  RE 24. Patient presents to re-evaluation today with 75% improvement today. She notes that she has been more aware of her posture and feels that she has been able to go longer prior to having to lay down. She notices in yoga she is able to get out off the floor, and she is stronger there. She notes that her back problem was not as severe as it was late in the day. She feels that her core is getting stronger. She notes that she feels that she needs a good program that she is able to do throughout. She still feels that she is limited with strength and balance still. She notes standing is still a little better.     IE:Patient presents to PT today with discomfort in her mid and low back that started about 2 months ago. She has a history of scoliosis. She had a history of thoracic fracture in fall of . She notes from her fracture she feels that her spine has shifted. She notes that she is having a R knee issue and she is getting injections for that. Bridges are discomfort. 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 Getting on and off the floor     Difficulty: with walking a mile, hills, cooking dinner, standing (starts around late afternoon)   Relieved with: lying flat  Patient Goals  Patient goals for therapy: decreased pain and independence with ADLs/IADLs  Patient goal: stand up straight without pain, walk a mile (progressing)  Pain  Current pain ratin  At worst pain ratin (at night)  Location: low back  Quality: dull ache and burning  Alleviating factors: laying flat.  Aggravating factors: walking, standing and stair climbing    Social Support  Lives with: alone      Diagnostic Tests  X-ray: normal        Objective     Concurrent Complaints  Positive for bladder dysfunction. Negative for night pain, disturbed sleep, bowel dysfunction and saddle (S4) numbness    Postural Observations  Seated posture: fair  Standing  posture: fair    Additional Postural Observation Details  Elevated pelvis on left side  Shortened limb on left ( 1 cm difference)    Active Range of Motion   Cervical/Thoracic Spine       Thoracic    Flexion:  Restriction level: minimal  Extension:  Restriction level: minimal  Left lateral flexion:  Restriction level: minimal  Right lateral flexion:  Restriction level: minimal  Left rotation:  Restriction level: minimal  Right rotation:  Restriction level: minimal    Lumbar   Flexion:  Restriction level: minimal  Extension:  Restriction level: minimal  Left lateral flexion:  Restriction level: minimal  Right lateral flexion:  Restriction level: minimal  Left rotation:  Restriction level: minimal  Right rotation:  Restriction level: minimal  Mechanical Assessment    Cervical      Thoracic      Lumbar    Standing flexion:    Pain location:no change  Standing extension:   Pain location: no change    Strength/Myotome Testing     Left Hip   Planes of Motion   Flexion: 4  Extension: 4 (challenged with engaging glutes)  Abduction: 4  External rotation: 4    Right Hip   Planes of Motion   Flexion: 4  Extension: 4 (challenged with engaging glutes)  Abduction: 4  External rotation: 4    Left Knee   Flexion: 5  Extension: 5    Right Knee   Flexion: 5  Extension: 5    Left Ankle/Foot   Dorsiflexion: 5  Plantar flexion: 5    Right Ankle/Foot   Dorsiflexion: 5  Plantar flexion: 5    Muscle Activation   Patient able to activate left transverse abdominals, left multifidus, right transverse abdominals and right multifidus.     General Comments:    Lower quarter screen   Hips: unremarkable  Knees: unremarkable  Foot/ankle: unremarkable    Lumbar Comments  Hypomobility throughout thoracic   30s STS 12             Diagnosis:  Scoliosis    Precautions:  See chart   Comparable signs 1) Standing  2) Walking   Primary Impairments: 1) Core/glutes/posterior chain  2) lumbar and thoracic spine hypomobility    Patient Goals Yoga,    Manual  "Therapy 1/15 1/18 1/22  1/8 1/11   STM                                    Re-evaluation    SP      Exercise Diary          Therapeutic Exercise         Bike/UBE  5'   7 min endurance  5' 5'    Leg press  2x15 60# 2x15 65#    2x15 45#  2x15 60#   Open books         LTR         PPU          Mod satinder         Thread needle  2x10 ea qped        Lat stretch w/ breath  Ball roll out with breath 5x ea Ball roll out with breath    Ball roll out with breath 20x Ball roll out with breath 5x ea   Neuromuscular Re-education         Breathing          Big stick walk outs 4# 2 laps ea     5 laps 2.5# ea  4# 2 laps ea   Bird dogs  2x5 5\" LE only    5\" 5x ea  5\" 5x  pulses   Bridge          Prone hip extension         Ys  Breathing YTB 2x10 In lunge band on foot 2x10    Breathing YTB 2x10    Table top flys  2x10 with breath     2x10   W/superman 10x 2\"                  Therapeutic Activities         Education    Home program, maintain of sx, managing chronicity of sx      xwalk  YTB w/ ER        Maury carries 10# KB 2 laps ea no break w/ mirror 10# KB 2 laps ea    10#  2 laps ea    Step ups   10# KB biodex 10x ea                 Modalities                       "

## 2024-02-23 ENCOUNTER — APPOINTMENT (OUTPATIENT)
Dept: PHYSICAL THERAPY | Facility: CLINIC | Age: 79
End: 2024-02-23
Payer: MEDICARE

## 2024-02-23 ENCOUNTER — PATIENT MESSAGE (OUTPATIENT)
Dept: NEPHROLOGY | Facility: CLINIC | Age: 79
End: 2024-02-23

## 2024-02-27 ENCOUNTER — APPOINTMENT (OUTPATIENT)
Dept: LAB | Facility: CLINIC | Age: 79
End: 2024-02-27
Payer: MEDICARE

## 2024-02-27 DIAGNOSIS — I10 ESSENTIAL HYPERTENSION: ICD-10-CM

## 2024-02-27 DIAGNOSIS — D35.2 PITUITARY MACROADENOMA (HCC): ICD-10-CM

## 2024-02-27 DIAGNOSIS — E55.9 VITAMIN D DEFICIENCY: ICD-10-CM

## 2024-02-27 DIAGNOSIS — E87.1 HYPONATREMIA: ICD-10-CM

## 2024-02-27 DIAGNOSIS — E22.1 HYPERPROLACTINEMIA (HCC): ICD-10-CM

## 2024-02-27 LAB
ANION GAP SERPL CALCULATED.3IONS-SCNC: 7 MMOL/L
BUN SERPL-MCNC: 18 MG/DL (ref 5–25)
CALCIUM SERPL-MCNC: 9.4 MG/DL (ref 8.4–10.2)
CHLORIDE SERPL-SCNC: 101 MMOL/L (ref 96–108)
CO2 SERPL-SCNC: 29 MMOL/L (ref 21–32)
CREAT SERPL-MCNC: 0.64 MG/DL (ref 0.6–1.3)
GFR SERPL CREATININE-BSD FRML MDRD: 85 ML/MIN/1.73SQ M
GLUCOSE P FAST SERPL-MCNC: 88 MG/DL (ref 65–99)
POTASSIUM SERPL-SCNC: 4.3 MMOL/L (ref 3.5–5.3)
SODIUM SERPL-SCNC: 137 MMOL/L (ref 135–147)

## 2024-02-27 PROCEDURE — 36415 COLL VENOUS BLD VENIPUNCTURE: CPT

## 2024-02-27 PROCEDURE — 80048 BASIC METABOLIC PNL TOTAL CA: CPT

## 2024-03-20 ENCOUNTER — OFFICE VISIT (OUTPATIENT)
Dept: NEPHROLOGY | Facility: CLINIC | Age: 79
End: 2024-03-20
Payer: MEDICARE

## 2024-03-20 VITALS
HEIGHT: 63 IN | HEART RATE: 60 BPM | DIASTOLIC BLOOD PRESSURE: 58 MMHG | WEIGHT: 111.4 LBS | SYSTOLIC BLOOD PRESSURE: 122 MMHG | BODY MASS INDEX: 19.74 KG/M2

## 2024-03-20 DIAGNOSIS — I10 ESSENTIAL HYPERTENSION: ICD-10-CM

## 2024-03-20 DIAGNOSIS — E87.1 HYPONATREMIA: Primary | ICD-10-CM

## 2024-03-20 PROCEDURE — 99213 OFFICE O/P EST LOW 20 MIN: CPT | Performed by: PHYSICIAN ASSISTANT

## 2024-03-20 NOTE — PROGRESS NOTES
Assessment and Plan:    Bella was seen today for follow-up.    Diagnoses and all orders for this visit:    Hyponatremia    Essential hypertension    Hyponatremia- Now improved.  Continue to avoid thiazide diuretics. Secondary to thiazide diuretic, +/- volume depletion +/- low solute intake +/- ADH stimulation from nausea.  Sodium level is normal.     Hypertension-  Antihypertensive regimen includes amlodipine 5mg daily, losartan 100mg daily, and metoprolol 25mg / 12.5mg BID.  Avoid NSAIDs.  Stay active.  Eat healthy.  BP acceptable.  No changes.   BP elevated at home but her cuff reads about 10 points higher than office readings.      Creatinine and electrolytes within normal limits.     Follow up as needed.  Please call the office with any questions or concerns.      Reason for Visit: Follow-up (Hyponatremia)    HPI: Bella Sargent is a 78 y.o. female who is here for follow up of hypertension.  She is feeling well overall and has no acute complaints.  She denies SOB.  She eats healthy.  She walks daily.      ROS: A complete review of systems was performed and was negative unless otherwise noted in the history of present illness.    Allergies:   Thiazide-type diuretics    Medications:     Current Outpatient Medications:     acetaminophen (TYLENOL) 650 mg CR tablet, Take 650 mg by mouth daily as needed for mild pain, Disp: , Rfl:     amLODIPine (NORVASC) 5 mg tablet, Take 1 tablet (5 mg total) by mouth daily, Disp: 90 tablet, Rfl: 1    aspirin (ECOTRIN LOW STRENGTH) 81 mg EC tablet, Take 1 tablet (81 mg total) by mouth daily, Disp: , Rfl:     cabergoline (DOSTINEX) 0.5 MG tablet, TAKE 1/2 TABLET BY MOUTH ONCE A WEEK ( 2 TABLETS PER MONTH- EMERGENCY SUPPLY. PT SHOULD USE PILL CUTTER TO CUT THE PILLS), Disp: 4 tablet, Rfl: 0    Calcium Carb-Cholecalciferol 600-200 MG-UNIT TABS, Calcium 600 + D TABS TAKE 1 TABLET DAILY.  Refills: 0  Active, Disp: , Rfl:     denosumab (PROLIA) 60 mg/mL, Inject 60 mg under the skin every 6  (six) months, Disp: , Rfl:     losartan (COZAAR) 100 MG tablet, Take 1 tablet (100 mg total) by mouth daily, Disp: 90 tablet, Rfl: 1    metoprolol succinate (TOPROL-XL) 25 mg 24 hr tablet, Take one tablet in the am ( 25 mg) and 0.5 tablet ( 12.5 mg) in the pm, Disp: 135 tablet, Rfl: 1    rosuvastatin (CRESTOR) 40 MG tablet, TAKE ONE TABLET BY MOUTH EVERY DAY, Disp: 90 tablet, Rfl: 3    traZODone (DESYREL) 50 mg tablet, Take 1 tablet (50 mg total) by mouth daily at bedtime, Disp: 30 tablet, Rfl: 1    VITAMIN D, CHOLECALCIFEROL, PO, Take 2,600 Units/day by mouth, Disp: , Rfl:     melatonin 3 mg, Take 3 mg by mouth daily at bedtime (Patient not taking: Reported on 3/20/2024), Disp: , Rfl:     Past Medical History:   Diagnosis Date    Anxiety     Arthritis     Colon polyp     Coronary artery disease     Effusion of left knee     Last assessed - 9/10/15    History of transfusion     Hyperlipidemia     Hypertension     Memory loss     Myocardial infarction (HCC)     Protein-calorie malnutrition, unspecified severity (HCC) 11/14/2023    Scoliosis     Tick bite     Last assessed - 4/21/17     Past Surgical History:   Procedure Laterality Date    APPENDECTOMY      CARDIAC SURGERY      COLONOSCOPY      CORONARY ARTERY BYPASS GRAFT      HI CORONARY ARTERY BYP W/VEIN & ARTERY GRAFT 4 VEIN N/A 01/27/2020    Procedure: CORONARY ARTERY BYPASS GRAFT (CABG) x3 VESSELS, LIMA TO LAD, AND SVG TO PLB & OM;  Surgeon: Peter Colmenares DO;  [facility];  Service: Cardiac Surgery    HI ECHO TRANSESOPHAG R-T 2D W/PRB IMG ACQUISJ I&R N/A 01/27/2020    Procedure: TRANSESOPHAGEAL ECHOCARDIOGRAM (GET);  Surgeon: Peter Colmenares DO;  [facility];  Service: Cardiac Surgery    HI NDSC SURG W/VIDEO-ASSISTED HARVEST VEIN CABG Left 01/27/2020    Procedure: HARVEST VEIN ENDOSCOPIC ([facility]);  Surgeon: Peter Colmenares DO;  [facility];  Service: Cardiac Surgery    TONSILLECTOMY      Last assessed - 4/20/17    TUBAL  "LIGATION      Last assessed - 4/20/17    TUMOR REMOVAL  1998    pelvic benign tumor removal    WISDOM TOOTH EXTRACTION      Last assessed - 4/20/17     Family History   Problem Relation Age of Onset    Coronary artery disease Mother     Arthritis Mother     Other Mother         Cardiac Disorder     Transient ischemic attack Mother     Heart attack Father     Sudden death Father         SCD    Cancer Daughter 49        kidney    No Known Problems Paternal Aunt       reports that she has never smoked. She has been exposed to tobacco smoke. She has never used smokeless tobacco. She reports current alcohol use of about 7.0 standard drinks of alcohol per week. She reports that she does not use drugs.    Physical Exam:   Vitals:    03/20/24 1405 03/20/24 1421   BP: 130/56 122/58   BP Location: Left arm Left arm   Patient Position: Sitting Sitting   Cuff Size: Standard Standard   Pulse: 60    Weight: 50.5 kg (111 lb 6.4 oz)    Height: 5' 3\" (1.6 m)      Body mass index is 19.73 kg/m².    General: NAD  Neuro: AAO  Skin: no rash  Eyes: anicteric  ENMT: mm moist  Neck: no masses  Respiratory: CTAB  Cardiovascular: RRR  Extremities: no edema  Gastrointestinal: soft nt nd    Procedure:  No results found for this or any previous visit.    Lab Results   Component Value Date    GLUCOSE 138 01/27/2020    CALCIUM 9.4 02/27/2024     03/03/2015    K 4.3 02/27/2024    CO2 29 02/27/2024     02/27/2024    BUN 18 02/27/2024    CREATININE 0.64 02/27/2024     I have personally reviewed the blood work as stated above and in my note.  "

## 2024-03-20 NOTE — PATIENT INSTRUCTIONS
Hyponatremia- Now improved.  Continue to avoid thiazide diuretics. Secondary to thiazide diuretic, +/- volume depletion +/- low solute intake +/- ADH stimulation from nausea.  Sodium level is normal.     Hypertension-  Antihypertensive regimen includes amlodipine 5mg daily, losartan 100mg daily, and metoprolol 25mg / 12.5mg BID.  Avoid NSAIDs.  Stay active.  Eat healthy.  BP acceptable.  No changes.   BP elevated at home but her cuff reads about 10 points higher than office readings.      Creatinine and electrolytes within normal limits.     Follow up as needed.  Please call the office with any questions or concerns.

## 2024-04-02 ENCOUNTER — APPOINTMENT (OUTPATIENT)
Dept: LAB | Facility: CLINIC | Age: 79
End: 2024-04-02
Payer: MEDICARE

## 2024-04-02 LAB
25(OH)D3 SERPL-MCNC: 44.5 NG/ML (ref 30–100)
ANION GAP SERPL CALCULATED.3IONS-SCNC: 7 MMOL/L (ref 4–13)
BUN SERPL-MCNC: 16 MG/DL (ref 5–25)
CALCIUM SERPL-MCNC: 9 MG/DL (ref 8.4–10.2)
CHLORIDE SERPL-SCNC: 101 MMOL/L (ref 96–108)
CO2 SERPL-SCNC: 30 MMOL/L (ref 21–32)
CORTIS AM PEAK SERPL-MCNC: 12.7 UG/DL (ref 6.7–22.6)
CREAT SERPL-MCNC: 0.63 MG/DL (ref 0.6–1.3)
GFR SERPL CREATININE-BSD FRML MDRD: 86 ML/MIN/1.73SQ M
GLUCOSE P FAST SERPL-MCNC: 88 MG/DL (ref 65–99)
POTASSIUM SERPL-SCNC: 4.3 MMOL/L (ref 3.5–5.3)
PROLACTIN SERPL-MCNC: 1.35 NG/ML (ref 2.74–19.64)
SODIUM SERPL-SCNC: 138 MMOL/L (ref 135–147)
T3 SERPL-MCNC: 1.1 NG/ML
T4 FREE SERPL-MCNC: 0.74 NG/DL (ref 0.61–1.12)
TSH SERPL DL<=0.05 MIU/L-ACNC: 1.42 UIU/ML (ref 0.45–4.5)

## 2024-04-02 PROCEDURE — 84443 ASSAY THYROID STIM HORMONE: CPT

## 2024-04-02 PROCEDURE — 84480 ASSAY TRIIODOTHYRONINE (T3): CPT

## 2024-04-02 PROCEDURE — 82306 VITAMIN D 25 HYDROXY: CPT

## 2024-04-02 PROCEDURE — 84146 ASSAY OF PROLACTIN: CPT

## 2024-04-02 PROCEDURE — 82533 TOTAL CORTISOL: CPT

## 2024-04-02 PROCEDURE — 84439 ASSAY OF FREE THYROXINE: CPT

## 2024-04-02 PROCEDURE — 80048 BASIC METABOLIC PNL TOTAL CA: CPT

## 2024-04-06 DIAGNOSIS — D35.2 PITUITARY MACROADENOMA (HCC): ICD-10-CM

## 2024-04-08 ENCOUNTER — OFFICE VISIT (OUTPATIENT)
Dept: ENDOCRINOLOGY | Facility: CLINIC | Age: 79
End: 2024-04-08
Payer: MEDICARE

## 2024-04-08 ENCOUNTER — TELEPHONE (OUTPATIENT)
Dept: ENDOCRINOLOGY | Facility: CLINIC | Age: 79
End: 2024-04-08

## 2024-04-08 VITALS
SYSTOLIC BLOOD PRESSURE: 122 MMHG | HEIGHT: 63 IN | BODY MASS INDEX: 20.13 KG/M2 | DIASTOLIC BLOOD PRESSURE: 78 MMHG | HEART RATE: 56 BPM | WEIGHT: 113.6 LBS | OXYGEN SATURATION: 96 %

## 2024-04-08 DIAGNOSIS — M81.0 OSTEOPOROSIS, UNSPECIFIED OSTEOPOROSIS TYPE, UNSPECIFIED PATHOLOGICAL FRACTURE PRESENCE: ICD-10-CM

## 2024-04-08 DIAGNOSIS — E55.9 VITAMIN D DEFICIENCY: ICD-10-CM

## 2024-04-08 DIAGNOSIS — E22.1 HYPERPROLACTINEMIA (HCC): ICD-10-CM

## 2024-04-08 DIAGNOSIS — D35.2 PITUITARY MACROADENOMA (HCC): Primary | ICD-10-CM

## 2024-04-08 PROCEDURE — 96372 THER/PROPH/DIAG INJ SC/IM: CPT | Performed by: PHYSICIAN ASSISTANT

## 2024-04-08 PROCEDURE — 99214 OFFICE O/P EST MOD 30 MIN: CPT | Performed by: PHYSICIAN ASSISTANT

## 2024-04-08 RX ORDER — CABERGOLINE 0.5 MG/1
TABLET ORAL
Qty: 4 TABLET | Refills: 2 | Status: SHIPPED | OUTPATIENT
Start: 2024-04-08

## 2024-04-08 RX ORDER — CABERGOLINE 0.5 MG/1
TABLET ORAL
Qty: 4 TABLET | Refills: 0 | OUTPATIENT
Start: 2024-04-08

## 2024-04-08 NOTE — PROGRESS NOTES
Patient Progress Note      Referring Provider  Dewey Barrera Md  [address]  [address]  [place] [address]     History of Present Illness:     Patient is a 78-year-old female here for follow-up of osteoporosis and pituitary macroadenoma.  She has a medical history of vertebral compression fracture of T11.  Her DEXA scan done in August 2022 was consistent with osteopenia but her FRAX scores were elevated.  She received her first Prolia injection in September 2022.  Her last Prolia injection was in September 2023 and is due for repeat today.  She did tolerate it well. She also takes calcium carbonate 600 mg daily and vitamin D 2600 units daily.  No recent falls.  She does do weightbearing exercises.  Most recent vitamin D is normal at 44.5.   Pituitary macroadenoma: MRI of the brain pituitary in July 2023 showed a 2.2 cm pituitary mass consistent with an adenoma filling the sella turcica and extending laterally on the right into the cavernous sinus, encasing the cavernous internal carotid artery.  Repeat MRI done in February 2024 showed similar findings.  She does follow-up with neurosurgery and has repeat MRI scheduled.  Prolactin levels were previously elevated which is why she was started on cabergoline 0.25 mg weekly.  Since starting cabergoline her prolactin improved from 83.41 to 1.35.  Other pituitary hormones were within normal range. She did have visual fields testing in the past year.    Patient Active Problem List   Diagnosis    Arthritis    Cervical myofascial pain syndrome    Cervical radiculopathy    Cervicogenic headache    Cervico-occipital neuralgia    Depression    Mixed hyperlipidemia    Essential hypertension    Osteopenia of spine    Spasmodic torticollis    Other secondary scoliosis, thoracolumbar region    Syncope    Coronary artery disease of native artery of native heart with stable angina pectoris (HCC)    S/P CABG x 3    Anemia    Thrombocytopenia (HCC)    Hyperchloremia     Chylothorax    Screening for colon cancer    History of colon polyps    Acute bilateral low back pain without sciatica    PAC (premature atrial contraction)    Primary osteoarthritis of left hip    Closed wedge compression fracture of T11 vertebra (HCC)    Closed wedge compression fracture of T12 vertebra (HCC)    Chronic pain syndrome    Osteoarthritis of spine with radiculopathy, lumbar region    Vitamin D deficiency    Pain and swelling of right lower extremity    Hyponatremia    Abnormal head CT    Acute pain of right shoulder    Primary osteoarthritis of right knee    Pituitary macroadenoma (HCC)    Osteoporosis    Hyperprolactinemia (HCC)     Past Medical History:   Diagnosis Date    Anxiety     Arthritis     Colon polyp     Coronary artery disease     Effusion of left knee     Last assessed - 9/10/15    History of transfusion     Hyperlipidemia     Hypertension     Memory loss     Myocardial infarction (HCC)     Protein-calorie malnutrition, unspecified severity (HCC) 11/14/2023    Scoliosis     Tick bite     Last assessed - 4/21/17      Past Surgical History:   Procedure Laterality Date    APPENDECTOMY      CARDIAC SURGERY      COLONOSCOPY      CORONARY ARTERY BYPASS GRAFT      AK CORONARY ARTERY BYP W/VEIN & ARTERY GRAFT 4 VEIN N/A 01/27/2020    Procedure: CORONARY ARTERY BYPASS GRAFT (CABG) x3 VESSELS, LIMA TO LAD, AND SVG TO PLB & OM;  Surgeon: Peter Colmenares DO;  [facility];  Service: Cardiac Surgery    AK ECHO TRANSESOPHAG R-T 2D W/PRB IMG ACQUISJ I&R N/A 01/27/2020    Procedure: TRANSESOPHAGEAL ECHOCARDIOGRAM (GET);  Surgeon: Peter Colmenares DO;  [facility];  Service: Cardiac Surgery    AK NDSC SURG W/VIDEO-ASSISTED HARVEST VEIN CABG Left 01/27/2020    Procedure: HARVEST VEIN ENDOSCOPIC ([facility]);  Surgeon: Peter Colmenares DO;  [facility];  Service: Cardiac Surgery    TONSILLECTOMY      Last assessed - 4/20/17    TUBAL LIGATION      Last assessed - 4/20/17    TUMOR  REMOVAL  1998    pelvic benign tumor removal    WISDOM TOOTH EXTRACTION      Last assessed - 4/20/17      Family History   Problem Relation Age of Onset    Coronary artery disease Mother     Arthritis Mother     Other Mother         Cardiac Disorder     Transient ischemic attack Mother     Heart attack Father     Sudden death Father         SCD    Cancer Daughter 49        kidney    No Known Problems Paternal Aunt      Social History     Tobacco Use    Smoking status: Never     Passive exposure: Past    Smokeless tobacco: Never   Substance Use Topics    Alcohol use: Yes     Alcohol/week: 7.0 standard drinks of alcohol     Types: 7 Glasses of wine per week     Comment: 1 wine nightly     Allergies   Allergen Reactions    Thiazide-Type Diuretics Other (See Comments)     Hyponatremia     Current Outpatient Medications   Medication Sig Dispense Refill    acetaminophen (TYLENOL) 650 mg CR tablet Take 650 mg by mouth daily as needed for mild pain      amLODIPine (NORVASC) 5 mg tablet Take 1 tablet (5 mg total) by mouth daily 90 tablet 1    aspirin (ECOTRIN LOW STRENGTH) 81 mg EC tablet Take 1 tablet (81 mg total) by mouth daily      cabergoline (DOSTINEX) 0.5 MG tablet TAKE 1/2 TABLET BY MOUTH ONCE A WEEK ( 2 TABLETS PER MONTH- EMERGENCY SUPPLY. PT SHOULD USE PILL CUTTER TO CUT THE PILLS) 4 tablet 2    Calcium Carb-Cholecalciferol 600-200 MG-UNIT TABS Calcium 600 + D TABS  TAKE 1 TABLET DAILY.   Refills: 0    Active      denosumab (PROLIA) 60 mg/mL Inject 60 mg under the skin every 6 (six) months      losartan (COZAAR) 100 MG tablet Take 1 tablet (100 mg total) by mouth daily 90 tablet 1    metoprolol succinate (TOPROL-XL) 25 mg 24 hr tablet Take one tablet in the am ( 25 mg) and 0.5 tablet ( 12.5 mg) in the pm 135 tablet 1    rosuvastatin (CRESTOR) 40 MG tablet TAKE ONE TABLET BY MOUTH EVERY DAY 90 tablet 3    traZODone (DESYREL) 50 mg tablet Take 1 tablet (50 mg total) by mouth daily at bedtime 30 tablet 1    VITAMIN D,  "CHOLECALCIFEROL, PO Take 2,600 Units/day by mouth      melatonin 3 mg Take 3 mg by mouth daily at bedtime (Patient not taking: Reported on 3/20/2024)       No current facility-administered medications for this visit.         Review of Systems   Constitutional:  Negative for activity change, appetite change, fatigue and unexpected weight change.   HENT:  Negative for trouble swallowing.    Eyes:  Negative for visual disturbance.   Respiratory:  Negative for shortness of breath.    Cardiovascular:  Negative for chest pain and palpitations.   Gastrointestinal:  Negative for constipation and diarrhea.   Endocrine: Negative for cold intolerance and heat intolerance.   Musculoskeletal:  Positive for arthralgias (arthritis).   Skin: Negative.    Neurological:  Negative for tremors and headaches.   Psychiatric/Behavioral: Negative.         Physical Exam:  Body mass index is 20.12 kg/m².  /78   Pulse 56   Ht 5' 3\" (1.6 m)   Wt 51.5 kg (113 lb 9.6 oz)   SpO2 96%   BMI 20.12 kg/m²    Wt Readings from Last 3 Encounters:   04/08/24 51.5 kg (113 lb 9.6 oz)   03/20/24 50.5 kg (111 lb 6.4 oz)   02/22/24 51.3 kg (113 lb)       Physical Exam  Vitals and nursing note reviewed.   Constitutional:       Appearance: She is well-developed.   HENT:      Head: Normocephalic.   Eyes:      General: No scleral icterus.     Pupils: Pupils are equal, round, and reactive to light.   Neck:      Thyroid: No thyromegaly.   Cardiovascular:      Rate and Rhythm: Normal rate and regular rhythm.      Pulses:           Radial pulses are 2+ on the right side and 2+ on the left side.      Heart sounds: No murmur heard.  Pulmonary:      Effort: Pulmonary effort is normal. No respiratory distress.      Breath sounds: Normal breath sounds. No wheezing.   Musculoskeletal:      Cervical back: Neck supple.   Skin:     General: Skin is warm and dry.   Neurological:      Mental Status: She is alert.       Patient's shoes and socks were not " "removed.          Labs:   No results found for: \"HGBA1C\"    Lab Results   Component Value Date    HDL 72 07/05/2023    TRIG 66 07/05/2023       Lab Results   Component Value Date    GLUCOSE 138 01/27/2020    CALCIUM 9.0 04/02/2024     03/03/2015    K 4.3 04/02/2024    CO2 30 04/02/2024     04/02/2024    BUN 16 04/02/2024    CREATININE 0.63 04/02/2024        eGFR   Date Value Ref Range Status   04/02/2024 86 ml/min/1.73sq m Final       Lab Results   Component Value Date    ALT 27 11/16/2023    AST 46 (H) 11/16/2023    ALKPHOS 44 11/16/2023    BILITOT 0.2 03/03/2015       Lab Results   Component Value Date    PXM9JZKHURNI 1.421 04/02/2024    R8JGIMB 1.1 04/02/2024       Plan:    Diagnoses and all orders for this visit:    Pituitary macroadenoma (HCC) / Hyperprolactinemia (HCC)  Pituitary adenoma remaining relatively stable  Has repeat MRI scheduled  Follows up with neurosurgery every 6 months  Recommended routine visual field testing  Prolactin levels improved after starting cabergoline.  Continue current dose of cabergoline.  Monitor other pituitary hormones.  -     cabergoline (DOSTINEX) 0.5 MG tablet; TAKE 1/2 TABLET BY MOUTH ONCE A WEEK ( 2 TABLETS PER MONTH- EMERGENCY SUPPLY. PT SHOULD USE PILL CUTTER TO CUT THE PILLS)  -     Prolactin; Future  -     Insulin-like growth factor 1 (IGF-1); Future  -     Cortisol Level,7-9 AM Specimen; Future  -     T4, free; Future  -     TSH, 3rd generation; Future    Osteoporosis, unspecified osteoporosis type, unspecified pathological fracture presence  Continue Prolia every 6 months; received injection today.  Continue calcium and vitamin D supplementation  Check DEXA scan in August 2024  Take precautions to avoid falls  Do weightbearing exercises as tolerated  -     DXA bone density spine hip and pelvis; Future  -     Vitamin D 25 hydroxy; Future  -     Basic metabolic panel; Future    Vitamin D deficiency  Vitamin D 44.5  Continue current supplementation  -     " Vitamin D 25 hydroxy; Future          Discussed with the patient and all questions fully answered. She will call me if any problems arise.    Counseled patient on diagnostic results, prognosis, risk and benefit of treatment options, instruction for management, importance of treatment compliance, risk  factor reduction and impressions      Gianna Lewis PA-C

## 2024-04-09 PROCEDURE — 96372 THER/PROPH/DIAG INJ SC/IM: CPT

## 2024-04-09 NOTE — PROGRESS NOTES
Assessment/Plan:    Bella Sargent came into the [facility] Office today 04/09/24 to receive Prolia injection.      Verbal consent obtained.  Consent given by: patient    patient states patient has been medically healthy with no underlining concerns/complications.      Bella Sargent presents with no symptoms today.       All insturctions were reviewed with the patient.    If the patient should have any questions/concerns, advised patient to contacted [facility].       Subjective:     History provided by: patient    Patient ID: Bella Sargent is a 78 y.o. female      Objective:    There were no vitals filed for this visit.    Patient tolerated the injection well without any complications.  Injection site/s Left arm.  Medication was provided by Office.    Patient signed consent form yes   Patient signed ABN form yes (If no patient is not a medicare patient).   Patient waited 15 minutes after injection no (This only applies to patient's receiving first time injection).       Last Visit: 4/8/2024  Next visit:Visit date not found

## 2024-04-12 ENCOUNTER — OFFICE VISIT (OUTPATIENT)
Dept: FAMILY MEDICINE CLINIC | Facility: CLINIC | Age: 79
End: 2024-04-12

## 2024-04-12 ENCOUNTER — APPOINTMENT (OUTPATIENT)
Dept: RADIOLOGY | Facility: CLINIC | Age: 79
End: 2024-04-12
Payer: MEDICARE

## 2024-04-12 ENCOUNTER — APPOINTMENT (OUTPATIENT)
Dept: LAB | Facility: CLINIC | Age: 79
End: 2024-04-12
Payer: MEDICARE

## 2024-04-12 VITALS
BODY MASS INDEX: 19.88 KG/M2 | WEIGHT: 112.2 LBS | RESPIRATION RATE: 14 BRPM | HEIGHT: 63 IN | OXYGEN SATURATION: 100 % | DIASTOLIC BLOOD PRESSURE: 62 MMHG | SYSTOLIC BLOOD PRESSURE: 120 MMHG | TEMPERATURE: 96.7 F | HEART RATE: 58 BPM

## 2024-04-12 DIAGNOSIS — R07.81 PLEURITIC CHEST PAIN: ICD-10-CM

## 2024-04-12 DIAGNOSIS — R10.9 ACUTE RIGHT FLANK PAIN: ICD-10-CM

## 2024-04-12 DIAGNOSIS — R10.9 ACUTE RIGHT FLANK PAIN: Primary | ICD-10-CM

## 2024-04-12 DIAGNOSIS — D69.6 THROMBOCYTOPENIA (HCC): ICD-10-CM

## 2024-04-12 LAB — D DIMER PPP FEU-MCNC: <0.27 UG/ML FEU

## 2024-04-12 PROCEDURE — 85379 FIBRIN DEGRADATION QUANT: CPT

## 2024-04-12 PROCEDURE — 36415 COLL VENOUS BLD VENIPUNCTURE: CPT

## 2024-04-12 PROCEDURE — 71101 X-RAY EXAM UNILAT RIBS/CHEST: CPT

## 2024-04-12 NOTE — PROGRESS NOTES
Name: Bella Sargent      : 1945      MRN: [id number]  Encounter Provider: Dewey Barrera MD  Encounter Date: 2024   Encounter department: [facility]    Assessment & Plan     1. Acute right flank pain  -     XR ribs right w pa chest min 3 views; Future; Expected date: 2024  -     D-dimer, quantitative; Future    2. Thrombocytopenia (HCC)  Comments:  Last plt count was normal at 248    3. Pleuritic chest pain  Comments:  Sudden right sided flank pain that started 1 day ago. Low wells score. No rashes to suggest shingles. No bony tenderness to suggest fracture. Lungs clear. Possibly MSK. Would still suggest a D dimer and if normal, may proceed with Xray. If elevated, will discuss CTA. Last kidney function was acceptable. In the meantime, may apply heat and lidocaine patches   Orders:  -     D-dimer, quantitative; Future           Subjective      Presents with right flank flank pain that is pleuritic in nature   Started yesterday and onset was sudden  History of scoliosis, T 11 compression fracture in the past, and osteoporosis  She recently joined a yoga class that does traction exercises to help alleviate back pain   No rashes, swelling, cough, hemoptysis, recent travels, recent surgeries, urinary symptoms, or history of VTE     Back Pain  This is a new problem. The current episode started in the past 7 days. The problem occurs constantly. The problem has been gradually worsening since onset. The pain is present in the lumbar spine. The quality of the pain is described as stabbing. The pain does not radiate. The pain is at a severity of 8/10. The pain is The same all the time. The symptoms are aggravated by bending, coughing, position and twisting. Stiffness is present All day. Pertinent negatives include no abdominal pain, bladder incontinence, bowel incontinence, chest pain, dysuria, fever, headaches, leg pain, numbness, paresis, paresthesias, pelvic pain, perianal numbness,  tingling, weakness or weight loss. Risk factors include history of osteoporosis and poor posture.     Review of Systems   Constitutional:  Negative for fever and weight loss.   Respiratory:  Positive for shortness of breath (mild). Negative for cough.    Cardiovascular:  Negative for chest pain, palpitations and leg swelling.   Gastrointestinal:  Negative for abdominal pain and bowel incontinence.   Genitourinary:  Negative for bladder incontinence, dysuria, hematuria and pelvic pain.   Musculoskeletal:  Positive for back pain.   Neurological:  Negative for tingling, weakness, numbness, headaches and paresthesias.       Current Outpatient Medications on File Prior to Visit   Medication Sig    acetaminophen (TYLENOL) 650 mg CR tablet Take 650 mg by mouth daily as needed for mild pain    amLODIPine (NORVASC) 5 mg tablet Take 1 tablet (5 mg total) by mouth daily    aspirin (ECOTRIN LOW STRENGTH) 81 mg EC tablet Take 1 tablet (81 mg total) by mouth daily    cabergoline (DOSTINEX) 0.5 MG tablet TAKE 1/2 TABLET BY MOUTH ONCE A WEEK ( 2 TABLETS PER MONTH- EMERGENCY SUPPLY. PT SHOULD USE PILL CUTTER TO CUT THE PILLS)    Calcium Carb-Cholecalciferol 600-200 MG-UNIT TABS Calcium 600 + D TABS  TAKE 1 TABLET DAILY.   Refills: 0    Active    denosumab (PROLIA) 60 mg/mL Inject 60 mg under the skin every 6 (six) months    losartan (COZAAR) 100 MG tablet Take 1 tablet (100 mg total) by mouth daily    melatonin 3 mg Take 3 mg by mouth daily at bedtime    metoprolol succinate (TOPROL-XL) 25 mg 24 hr tablet Take one tablet in the am ( 25 mg) and 0.5 tablet ( 12.5 mg) in the pm    rosuvastatin (CRESTOR) 40 MG tablet TAKE ONE TABLET BY MOUTH EVERY DAY    traZODone (DESYREL) 50 mg tablet Take 1 tablet (50 mg total) by mouth daily at bedtime    VITAMIN D, CHOLECALCIFEROL, PO Take 2,600 Units/day by mouth       Objective     /62 (BP Location: Left arm, Patient Position: Sitting, Cuff Size: Adult)   Pulse 58   Temp (!) 96.7 °F (35.9  "°C) (Tympanic)   Resp 14   Ht 5' 3\" (1.6 m)   Wt 50.9 kg (112 lb 3.2 oz)   SpO2 100%   BMI 19.88 kg/m²     Physical Exam  Constitutional:       General: She is not in acute distress.     Appearance: Normal appearance. She is not ill-appearing or toxic-appearing.   HENT:      Head: Normocephalic and atraumatic.   Cardiovascular:      Rate and Rhythm: Normal rate and regular rhythm.      Heart sounds: No murmur heard.  Pulmonary:      Effort: Pulmonary effort is normal. No respiratory distress.      Breath sounds: Normal breath sounds. No stridor. No wheezing or rhonchi.   Abdominal:      General: There is no distension.      Palpations: Abdomen is soft. There is no mass.      Tenderness: There is no abdominal tenderness. There is no right CVA tenderness, guarding or rebound.      Hernia: No hernia is present.   Musculoskeletal:      Thoracic back: No swelling, edema, signs of trauma, spasms, tenderness or bony tenderness. Scoliosis present.        Back:       Comments: Pain but no tenderness on exam    Skin:     Findings: No bruising or rash.   Neurological:      Mental Status: She is alert.   Psychiatric:         Mood and Affect: Mood normal.         Behavior: Behavior normal.         Thought Content: Thought content normal.       Dewey Barrera MD    "

## 2024-04-13 ENCOUNTER — PATIENT MESSAGE (OUTPATIENT)
Dept: FAMILY MEDICINE CLINIC | Facility: CLINIC | Age: 79
End: 2024-04-13

## 2024-04-13 DIAGNOSIS — R10.9 ACUTE RIGHT FLANK PAIN: Primary | ICD-10-CM

## 2024-04-13 DIAGNOSIS — R07.81 PLEURITIC CHEST PAIN: ICD-10-CM

## 2024-04-17 ENCOUNTER — NURSE TRIAGE (OUTPATIENT)
Age: 79
End: 2024-04-17

## 2024-04-17 DIAGNOSIS — I25.118 CORONARY ARTERY DISEASE INVOLVING NATIVE CORONARY ARTERY OF NATIVE HEART WITH OTHER FORM OF ANGINA PECTORIS (HCC): ICD-10-CM

## 2024-04-17 DIAGNOSIS — R07.9 CHEST PAIN, UNSPECIFIED TYPE: Primary | ICD-10-CM

## 2024-04-17 NOTE — TELEPHONE ENCOUNTER
I will order a nuclear stress test to help answer this.  Since she is having a lot of orthopedic issues, I will do a pharmacologic stress so she doesn't have to walk fast on the treadmill.

## 2024-04-17 NOTE — TELEPHONE ENCOUNTER
"Pt saw PCP for upper middle back, R flank/rib pain last week. They did a chest xray and D Dimer. All normal  She thought she was getting better, but pain seems worse today and wants to make sure its not cardiac. (She also sent the PCP a message in my chart)  She is experiencing increased SOB w exertion associated w/ stabbing pain when taking a deep breath.  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 RESPIRATORY STATUS: \"Describe your breathing?\" (e.g., wheezing, shortness of breath, unable to speak, severe coughing)       Shortness of breath with R flank pain  2. ONSET: \"When did this breathing problem begin?\"       Week ago  3. PATTERN \"Does the difficult breathing come and go, or has it been constant since it started?\"       Waxes and wanes with movement  4. SEVERITY: \"How bad is your breathing?\" (e.g., mild, moderate, severe)     - MILD: No SOB at rest, mild SOB with walking, speaks normally in sentences, can lay down, no retractions, pulse < 100.     - MODERATE: SOB at rest, SOB with minimal exertion and prefers to sit, cannot lie down flat, speaks in phrases, mild retractions, audible wheezing, pulse 100-120.     - SEVERE: Very SOB at rest, speaks in single words, struggling to breathe, sitting hunched forward, retractions, pulse > 120       mild  5. RECURRENT SYMPTOM: \"Have you had difficulty breathing before?\" If Yes, ask: \"When was the last time?\" and \"What happened that time?\"       no  6. CARDIAC HISTORY: \"Do you have any history of heart disease?\" (e.g., heart attack, angina, bypass surgery, angioplasty)       MI in January 2020, CABG  7. LUNG HISTORY: \"Do you have any history of lung " "disease?\"  (e.g., pulmonary embolus, asthma, emphysema)      No lung history  8. CAUSE: \"What do you think is causing the breathing problem?\"       Rib pain  9. OTHER SYMPTOMS: \"Do you have any other symptoms? (e.g., dizziness, runny nose, cough, chest pain, fever)      No cough, chest pain    Protocols used: Breathing Difficulty-ADULT-[place]    "

## 2024-04-18 ENCOUNTER — PATIENT MESSAGE (OUTPATIENT)
Dept: FAMILY MEDICINE CLINIC | Facility: CLINIC | Age: 79
End: 2024-04-18

## 2024-04-24 ENCOUNTER — HOSPITAL ENCOUNTER (OUTPATIENT)
Dept: CT IMAGING | Facility: HOSPITAL | Age: 79
Discharge: HOME | End: 2024-04-24
Payer: MEDICARE

## 2024-04-24 DIAGNOSIS — R07.81 PLEURITIC CHEST PAIN: ICD-10-CM

## 2024-04-24 DIAGNOSIS — R10.9 ACUTE RIGHT FLANK PAIN: ICD-10-CM

## 2024-04-24 PROCEDURE — 71250 CT THORAX DX C-: CPT

## 2024-04-26 ENCOUNTER — OFFICE VISIT (OUTPATIENT)
Dept: FAMILY MEDICINE CLINIC | Facility: CLINIC | Age: 79
End: 2024-04-26
Payer: MEDICARE

## 2024-04-26 VITALS
HEART RATE: 57 BPM | RESPIRATION RATE: 14 BRPM | OXYGEN SATURATION: 99 % | HEIGHT: 63 IN | TEMPERATURE: 97.2 F | WEIGHT: 112.6 LBS | BODY MASS INDEX: 19.95 KG/M2 | DIASTOLIC BLOOD PRESSURE: 60 MMHG | SYSTOLIC BLOOD PRESSURE: 120 MMHG

## 2024-04-26 DIAGNOSIS — R07.81 PLEURITIC CHEST PAIN: ICD-10-CM

## 2024-04-26 DIAGNOSIS — I10 ESSENTIAL HYPERTENSION: ICD-10-CM

## 2024-04-26 DIAGNOSIS — R10.9 ACUTE RIGHT FLANK PAIN: Primary | ICD-10-CM

## 2024-04-26 PROCEDURE — G2211 COMPLEX E/M VISIT ADD ON: HCPCS | Performed by: FAMILY MEDICINE

## 2024-04-26 PROCEDURE — 99214 OFFICE O/P EST MOD 30 MIN: CPT | Performed by: FAMILY MEDICINE

## 2024-04-26 RX ORDER — METHOCARBAMOL 500 MG/1
500 TABLET, FILM COATED ORAL
Start: 2024-04-26

## 2024-04-26 NOTE — PROGRESS NOTES
Name: Bella Sargent      : 1945      MRN: [id number]  Encounter Provider: Dewey Barrera MD  Encounter Date: 2024   Encounter department: [facility]    Assessment & Plan     Continue to have pain in the right flank area. Etiology unclear. CT reviewed. No findings to explain her pain. Possibly muscular. Will try muscle relaxer. Also suggest RUQ u/s to rule out gallbladder/liver pathology. BP at goal. Has nuclear study scheduled as well     1. Acute right flank pain  -     methocarbamol (ROBAXIN) 500 mg tablet; Take 1 tablet (500 mg total) by mouth daily at bedtime as needed for muscle spasms  -     US right upper quadrant; Future; Expected date: 2024    2. Essential hypertension    3. Pleuritic chest pain  -     methocarbamol (ROBAXIN) 500 mg tablet; Take 1 tablet (500 mg total) by mouth daily at bedtime as needed for muscle spasms  -     US right upper quadrant; Future; Expected date: 2024           Subjective      Continues to have RUQ/ pleuritic pain   Worse at night or if she is on her feet a lot   Exacerbated when she sit up, lay down, or take a deep breath  Pain has improved from when it started  Reviewed CT scan   Scheduled to have nuclear stress test which was ordered by cardiologist      Review of Systems    Current Outpatient Medications on File Prior to Visit   Medication Sig    acetaminophen (TYLENOL) 650 mg CR tablet Take 650 mg by mouth daily as needed for mild pain    amLODIPine (NORVASC) 5 mg tablet Take 1 tablet (5 mg total) by mouth daily    aspirin (ECOTRIN LOW STRENGTH) 81 mg EC tablet Take 1 tablet (81 mg total) by mouth daily    cabergoline (DOSTINEX) 0.5 MG tablet TAKE 1/2 TABLET BY MOUTH ONCE A WEEK ( 2 TABLETS PER MONTH- EMERGENCY SUPPLY. PT SHOULD USE PILL CUTTER TO CUT THE PILLS)    Calcium Carb-Cholecalciferol 600-200 MG-UNIT TABS Calcium 600 + D TABS  TAKE 1 TABLET DAILY.   Refills: 0    Active    denosumab (PROLIA) 60 mg/mL Inject 60 mg under  "the skin every 6 (six) months    losartan (COZAAR) 100 MG tablet Take 1 tablet (100 mg total) by mouth daily    melatonin 3 mg Take 3 mg by mouth daily at bedtime    metoprolol succinate (TOPROL-XL) 25 mg 24 hr tablet Take one tablet in the am ( 25 mg) and 0.5 tablet ( 12.5 mg) in the pm    rosuvastatin (CRESTOR) 40 MG tablet TAKE ONE TABLET BY MOUTH EVERY DAY    traZODone (DESYREL) 50 mg tablet Take 1 tablet (50 mg total) by mouth daily at bedtime    VITAMIN D, CHOLECALCIFEROL, PO Take 2,600 Units/day by mouth       Objective     /60 (BP Location: Left arm, Patient Position: Sitting, Cuff Size: Adult)   Pulse 57   Temp (!) 97.2 °F (36.2 °C) (Tympanic)   Resp 14   Ht 5' 3\" (1.6 m)   Wt 51.1 kg (112 lb 9.6 oz)   SpO2 99%   BMI 19.95 kg/m²     Physical Exam  Eyes:      Extraocular Movements: Extraocular movements intact.   Pulmonary:      Effort: Pulmonary effort is normal.      Breath sounds: No stridor. No wheezing, rhonchi or rales.   Abdominal:      General: There is no distension.      Palpations: Abdomen is soft. There is no mass.      Tenderness: There is abdominal tenderness (RUQ). There is no rebound.      Hernia: No hernia is present.      Comments: Neg soto   Neurological:      Mental Status: She is oriented to person, place, and time.     Dewey Barrera MD    "

## 2024-04-29 ENCOUNTER — TELEPHONE (OUTPATIENT)
Age: 79
End: 2024-04-29

## 2024-04-29 NOTE — TELEPHONE ENCOUNTER
Just an FYI; Due to her husbands experience, she is very nervous about lexiscan tomorrow @ 8:30 at [place] and asking if you will be there? I advised you will be rounding and can be contacted if anything urgent may arise. She was at ease knowing you will be around.

## 2024-04-30 ENCOUNTER — HOSPITAL ENCOUNTER (OUTPATIENT)
Facility: HOSPITAL | Age: 79
Discharge: HOME | End: 2024-04-30
Attending: INTERNAL MEDICINE
Payer: MEDICARE

## 2024-04-30 ENCOUNTER — HOSPITAL ENCOUNTER (OUTPATIENT)
Dept: NON INVASIVE DIAGNOSTICS | Facility: HOSPITAL | Age: 79
Discharge: HOME | End: 2024-04-30
Attending: INTERNAL MEDICINE
Payer: MEDICARE

## 2024-04-30 VITALS — WEIGHT: 112 LBS | BODY MASS INDEX: 19.84 KG/M2 | HEIGHT: 63 IN

## 2024-04-30 DIAGNOSIS — I25.118 CORONARY ARTERY DISEASE INVOLVING NATIVE CORONARY ARTERY OF NATIVE HEART WITH OTHER FORM OF ANGINA PECTORIS (HCC): ICD-10-CM

## 2024-04-30 DIAGNOSIS — R07.9 CHEST PAIN, UNSPECIFIED TYPE: ICD-10-CM

## 2024-04-30 LAB
MAX HR PERCENT: 64 %
MAX HR: 92 BPM
NUC STRESS EJECTION FRACTION: 75 %
RATE PRESSURE PRODUCT: NORMAL
SL CV REST NUCLEAR ISOTOPE DOSE: 10.9 MCI
SL CV STRESS NUCLEAR ISOTOPE DOSE: 33 MCI
SL CV STRESS RECOVERY BP: NORMAL MMHG
SL CV STRESS RECOVERY HR: 57 BPM
SL CV STRESS RECOVERY O2 SAT: 100 %
STRESS ANGINA INDEX: 0
STRESS BASELINE BP: NORMAL MMHG
STRESS BASELINE HR: 59 BPM
STRESS O2 SAT REST: 96 %
STRESS PEAK HR: 87 BPM
STRESS POST ESTIMATED WORKLOAD: 1.5 METS
STRESS POST O2 SAT PEAK: 100 %
STRESS POST PEAK BP: 124 MMHG
STRESS/REST PERFUSION RATIO: 1.06

## 2024-04-30 PROCEDURE — 93017 CV STRESS TEST TRACING ONLY: CPT

## 2024-04-30 PROCEDURE — 93016 CV STRESS TEST SUPVJ ONLY: CPT | Performed by: INTERNAL MEDICINE

## 2024-04-30 PROCEDURE — 78452 HT MUSCLE IMAGE SPECT MULT: CPT

## 2024-04-30 PROCEDURE — 78452 HT MUSCLE IMAGE SPECT MULT: CPT | Performed by: INTERNAL MEDICINE

## 2024-04-30 PROCEDURE — A9502 TC99M TETROFOSMIN: HCPCS

## 2024-04-30 PROCEDURE — 93018 CV STRESS TEST I&R ONLY: CPT | Performed by: INTERNAL MEDICINE

## 2024-04-30 RX ORDER — REGADENOSON 0.08 MG/ML
0.4 INJECTION, SOLUTION INTRAVENOUS ONCE
Status: COMPLETED | OUTPATIENT
Start: 2024-04-30 | End: 2024-04-30

## 2024-04-30 RX ORDER — AMINOPHYLLINE 25 MG/ML
50 INJECTION, SOLUTION INTRAVENOUS ONCE
Status: COMPLETED | OUTPATIENT
Start: 2024-04-30 | End: 2024-04-30

## 2024-04-30 RX ADMIN — REGADENOSON 0.4 MG: 0.08 INJECTION, SOLUTION INTRAVENOUS at 10:40

## 2024-04-30 RX ADMIN — AMINOPHYLLINE 50 MG: 25 INJECTION, SOLUTION INTRAVENOUS at 10:45

## 2024-05-02 LAB
CHEST PAIN STATEMENT: NORMAL
MAX DIASTOLIC BP: 70 MMHG
MAX PREDICTED HEART RATE: 142 BPM
PROTOCOL NAME: NORMAL
REASON FOR TERMINATION: NORMAL
STRESS POST EXERCISE DUR MIN: 3 MIN
STRESS POST EXERCISE DUR SEC: 0 SEC
STRESS POST PEAK HR: 92 BPM
STRESS POST PEAK SYSTOLIC BP: 138 MMHG
TARGET HR FORMULA: NORMAL
TEST INDICATION: NORMAL

## 2024-05-21 ENCOUNTER — OFFICE VISIT (OUTPATIENT)
Dept: FAMILY MEDICINE CLINIC | Facility: CLINIC | Age: 79
End: 2024-05-21
Payer: MEDICARE

## 2024-05-21 VITALS
DIASTOLIC BLOOD PRESSURE: 60 MMHG | TEMPERATURE: 98.5 F | HEART RATE: 63 BPM | HEIGHT: 63 IN | SYSTOLIC BLOOD PRESSURE: 112 MMHG | RESPIRATION RATE: 16 BRPM | BODY MASS INDEX: 19.63 KG/M2 | WEIGHT: 110.8 LBS | OXYGEN SATURATION: 98 %

## 2024-05-21 DIAGNOSIS — J30.1 ALLERGIC RHINITIS DUE TO POLLEN, UNSPECIFIED SEASONALITY: ICD-10-CM

## 2024-05-21 DIAGNOSIS — J06.9 VIRAL UPPER RESPIRATORY TRACT INFECTION: Primary | ICD-10-CM

## 2024-05-21 PROCEDURE — 99213 OFFICE O/P EST LOW 20 MIN: CPT | Performed by: FAMILY MEDICINE

## 2024-05-21 PROCEDURE — G2211 COMPLEX E/M VISIT ADD ON: HCPCS | Performed by: FAMILY MEDICINE

## 2024-05-21 RX ORDER — GUAIFENESIN 600 MG/1
600 TABLET, EXTENDED RELEASE ORAL EVERY 12 HOURS SCHEDULED
Qty: 30 TABLET | Refills: 0 | Status: SHIPPED | OUTPATIENT
Start: 2024-05-21

## 2024-05-21 RX ORDER — LORATADINE 10 MG/1
10 TABLET ORAL DAILY
Qty: 30 TABLET | Refills: 0 | Status: SHIPPED | OUTPATIENT
Start: 2024-05-21

## 2024-05-21 NOTE — PROGRESS NOTES
Ambulatory Visit  Name: Bella Sargent      : 1945      MRN: [id number]  Encounter Provider: Dewey Barrera MD  Encounter Date: 2024   Encounter department: [facility]    Assessment & Plan   1. Viral upper respiratory tract infection  Comments:  Likely viral or possibly allergies considering the sx started after working in the yard. Start claritin and mucinex without DM. Do not advise her to continue codeine. Call precautions reviewed   Orders:  -     loratadine (CLARITIN) 10 mg tablet; Take 1 tablet (10 mg total) by mouth daily  -     guaiFENesin (MUCINEX) 600 mg 12 hr tablet; Take 1 tablet (600 mg total) by mouth every 12 (twelve) hours  2. Allergic rhinitis due to pollen, unspecified seasonality  -     loratadine (CLARITIN) 10 mg tablet; Take 1 tablet (10 mg total) by mouth daily  -     guaiFENesin (MUCINEX) 600 mg 12 hr tablet; Take 1 tablet (600 mg total) by mouth every 12 (twelve) hours       History of Present Illness     Presents with cough for 5 days  Started after doing yard work and is worse when outside  Cough is mildly productive in the am, barky   No wheezing or chest tightness   Mildly short of breath   Tried codeine with guaifenesin but made her drowsy  Cough is unchanged  No sneezing or nasal congestion  Does have PND  No sick contacts     Cough  This is a new problem. The current episode started in the past 7 days. The problem has been waxing and waning. The problem occurs every few minutes. The cough is Non-productive. Associated symptoms include a sore throat. Pertinent negatives include no chest pain, chills, ear congestion, ear pain, fever, headaches, heartburn, hemoptysis, myalgias, nasal congestion, postnasal drip, rash, rhinorrhea, shortness of breath, sweats, weight loss or wheezing. The symptoms are aggravated by lying down.       Review of Systems   Constitutional:  Negative for chills, fever and weight loss.   HENT:  Positive for sore throat. Negative for  "ear pain, postnasal drip and rhinorrhea.    Respiratory:  Positive for cough. Negative for hemoptysis, shortness of breath and wheezing.    Cardiovascular:  Negative for chest pain.   Gastrointestinal:  Negative for heartburn.   Musculoskeletal:  Negative for myalgias.   Skin:  Negative for rash.   Neurological:  Negative for headaches.       Objective     /60 (BP Location: Left arm, Patient Position: Sitting, Cuff Size: Standard)   Pulse 63   Temp 98.5 °F (36.9 °C) (Tympanic)   Resp 16   Ht 5' 3\" (1.6 m)   Wt 50.3 kg (110 lb 12.8 oz)   SpO2 98%   BMI 19.63 kg/m²     Physical Exam  Constitutional:       General: She is not in acute distress.     Appearance: Normal appearance. She is not ill-appearing.   HENT:      Head: Normocephalic and atraumatic.      Right Ear: Tympanic membrane normal.      Left Ear: Tympanic membrane normal.      Nose: Congestion present.      Mouth/Throat:      Mouth: Mucous membranes are moist.   Eyes:      General:         Right eye: No discharge.         Left eye: No discharge.      Extraocular Movements: Extraocular movements intact.   Cardiovascular:      Rate and Rhythm: Normal rate and regular rhythm.      Heart sounds: No murmur heard.  Pulmonary:      Effort: Pulmonary effort is normal. No respiratory distress.      Breath sounds: Normal breath sounds. No stridor. No wheezing, rhonchi or rales.   Abdominal:      General: There is no distension.      Palpations: Abdomen is soft. There is no mass.      Tenderness: There is no abdominal tenderness. There is no guarding or rebound.      Hernia: No hernia is present.   Skin:     General: Skin is warm.   Neurological:      Mental Status: She is alert and oriented to person, place, and time.   Psychiatric:         Behavior: Behavior normal.       Administrative Statements     "

## 2024-05-24 ENCOUNTER — OFFICE VISIT (OUTPATIENT)
Dept: FAMILY MEDICINE CLINIC | Facility: CLINIC | Age: 79
End: 2024-05-24
Payer: MEDICARE

## 2024-05-24 VITALS
OXYGEN SATURATION: 100 % | HEART RATE: 65 BPM | WEIGHT: 111.4 LBS | SYSTOLIC BLOOD PRESSURE: 122 MMHG | DIASTOLIC BLOOD PRESSURE: 62 MMHG | TEMPERATURE: 97.2 F | RESPIRATION RATE: 16 BRPM | BODY MASS INDEX: 19.74 KG/M2 | HEIGHT: 63 IN
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
## 2024-05-24 DIAGNOSIS — R05.1 ACUTE COUGH: Primary | ICD-10-CM

## 2024-05-24 DIAGNOSIS — D35.2 PITUITARY MACROADENOMA (HCC): ICD-10-CM

## 2024-05-24 DIAGNOSIS — R60.0 PEDAL EDEMA: ICD-10-CM

## 2024-05-24 DIAGNOSIS — H57.89 RED EYES: ICD-10-CM

## 2024-05-24 PROCEDURE — 99214 OFFICE O/P EST MOD 30 MIN: CPT | Performed by: FAMILY MEDICINE

## 2024-05-24 PROCEDURE — G2211 COMPLEX E/M VISIT ADD ON: HCPCS | Performed by: FAMILY MEDICINE

## 2024-05-24 RX ORDER — CABERGOLINE 0.5 MG/1
TABLET ORAL
Qty: 4 TABLET | Refills: 2 | Status: SHIPPED | OUTPATIENT
Start: 2024-05-24

## 2024-05-24 RX ORDER — CIPROFLOXACIN HYDROCHLORIDE 3.5 MG/ML
1 SOLUTION/ DROPS TOPICAL EVERY 4 HOURS
Qty: 5 ML | Refills: 0 | Status: SHIPPED | OUTPATIENT
Start: 2024-05-24

## 2024-05-24 RX ORDER — PREDNISONE 20 MG/1
20 TABLET ORAL DAILY
Qty: 5 TABLET | Refills: 0 | Status: SHIPPED | OUTPATIENT
Start: 2024-05-24 | End: 2024-05-29

## 2024-05-24 NOTE — TELEPHONE ENCOUNTER
Pt stated that the pill is too hard to cut and that cause her to only get 1/2 pill out of the one pill. After this Saturday, no more for next Saturday 06/01/24.

## 2024-05-24 NOTE — PROGRESS NOTES
Ambulatory Visit  Name: Bella Sargent      : 1945      MRN: [id number]  Encounter Provider: Dewey Barrera MD  Encounter Date: 2024   Encounter department: [facility]    Assessment & Plan   1. Acute cough  Comments:  Persistent cough. Lungs clear. Will trial a 5 days course of low dose prednisone. Continue allergy med and switch to robittusin. Call precautions  Orders:  -     predniSONE 20 mg tablet; Take 1 tablet (20 mg total) by mouth daily for 5 days  2. Red eyes  Comments:  Redness and bruising around the right eye following a fall. Does not believe anything got into the eye. Woke up w/ discharge. Conjunctiva red and sclera injected. Scant green discharge noted. Abx drop given for prophylaxis. Orbital bone mildly tender   Orders:  -     ciprofloxacin (CILOXAN) 0.3 % ophthalmic solution; Administer 1 drop to the right eye every 4 (four) hours Every 4 hour while awake  3. Pedal edema  Comments:  Noted this am. 1+ pitting near the toes. Did have pizza last night. Weight unchanged. Lungs clears. Possibly a result of Na  4. Fall, initial encounter  Comments:  Missed a step while climbing to the put water in a bird bath.Fell forward slightly and hit her glasses against  the feeder. Redness in the eye and drainage. Mild facial/orbital tenderness along the zygomatic arch. Fall and call precautions        History of Present Illness     Seen today with a an ongoing cough. Was seen earlier this week with cough after working in the yard. Symptoms were thought to be allergy related and was prescribed Claritin. She was also advised to take mucinex w.o DM.   Presently, the cough is still bothersome.   Last night she took flonase, claritin, codeine   Also reports a fall on Wednesday while trying to put water chelo a bird bath  She fell forwards and hot her glasses again the cement structure  No LOC but woke up with swelling around the right eye and drainage      Cough  This is a recurrent problem.  The current episode started in the past 7 days. The problem has been unchanged. The problem occurs every few minutes. The cough is Productive of blood-tinged sputum. Associated symptoms include hemoptysis, postnasal drip and a sore throat. Pertinent negatives include no chest pain, chills, ear congestion, ear pain, fever, headaches, heartburn, myalgias, nasal congestion, rash, rhinorrhea, shortness of breath, sweats, weight loss or wheezing. The symptoms are aggravated by lying down.       Review of Systems   Constitutional:  Negative for chills, fever and weight loss.   HENT:  Positive for postnasal drip and sore throat. Negative for ear pain and rhinorrhea.    Respiratory:  Positive for cough and hemoptysis. Negative for shortness of breath and wheezing.    Cardiovascular:  Negative for chest pain.   Gastrointestinal:  Negative for heartburn.   Musculoskeletal:  Negative for myalgias.   Skin:  Negative for rash.   Neurological:  Negative for headaches.     Medical History Reviewed by provider this encounter:       Current Outpatient Medications on File Prior to Visit   Medication Sig Dispense Refill    acetaminophen (TYLENOL) 650 mg CR tablet Take 650 mg by mouth daily as needed for mild pain      amLODIPine (NORVASC) 5 mg tablet Take 1 tablet (5 mg total) by mouth daily 90 tablet 1    aspirin (ECOTRIN LOW STRENGTH) 81 mg EC tablet Take 1 tablet (81 mg total) by mouth daily      Calcium Carb-Cholecalciferol 600-200 MG-UNIT TABS Calcium 600 + D TABS  TAKE 1 TABLET DAILY.   Refills: 0    Active      denosumab (PROLIA) 60 mg/mL Inject 60 mg under the skin every 6 (six) months      guaiFENesin (MUCINEX) 600 mg 12 hr tablet Take 1 tablet (600 mg total) by mouth every 12 (twelve) hours 30 tablet 0    loratadine (CLARITIN) 10 mg tablet Take 1 tablet (10 mg total) by mouth daily 30 tablet 0    losartan (COZAAR) 100 MG tablet Take 1 tablet (100 mg total) by mouth daily 90 tablet 1    melatonin 3 mg Take 3 mg by mouth daily  "at bedtime      metoprolol succinate (TOPROL-XL) 25 mg 24 hr tablet Take one tablet in the am ( 25 mg) and 0.5 tablet ( 12.5 mg) in the pm 135 tablet 1    rosuvastatin (CRESTOR) 40 MG tablet TAKE ONE TABLET BY MOUTH EVERY DAY 90 tablet 3    traZODone (DESYREL) 50 mg tablet Take 1 tablet (50 mg total) by mouth daily at bedtime 30 tablet 1    VITAMIN D, CHOLECALCIFEROL, PO Take 2,600 Units/day by mouth      [DISCONTINUED] cabergoline (DOSTINEX) 0.5 MG tablet TAKE 1/2 TABLET BY MOUTH ONCE A WEEK ( 2 TABLETS PER MONTH- EMERGENCY SUPPLY. PT SHOULD USE PILL CUTTER TO CUT THE PILLS) 4 tablet 2     No current facility-administered medications on file prior to visit.      Social History     Tobacco Use    Smoking status: Never     Passive exposure: Past    Smokeless tobacco: Never   Vaping Use    Vaping status: Never Used   Substance and Sexual Activity    Alcohol use: Yes     Alcohol/week: 7.0 standard drinks of alcohol     Types: 7 Glasses of wine per week     Comment: 1 wine nightly    Drug use: No    Sexual activity: Not Currently     Objective     /62 (BP Location: Left arm, Patient Position: Sitting, Cuff Size: Standard)   Pulse 65   Temp (!) 97.2 °F (36.2 °C) (Tympanic)   Resp 16   Ht 5' 3\" (1.6 m)   Wt 50.5 kg (111 lb 6.4 oz)   SpO2 100%   BMI 19.73 kg/m²     Physical Exam  Constitutional:       General: She is not in acute distress.     Appearance: Normal appearance. She is not ill-appearing or toxic-appearing.   HENT:      Head: Normocephalic and atraumatic.        Comments: Bruising around the eye   Eyes:      General:         Right eye: Discharge (scant green discharge) present. No foreign body.      Conjunctiva/sclera:      Right eye: Right conjunctiva is injected. No hemorrhage.     Comments: Sclera injected   Cardiovascular:      Rate and Rhythm: Normal rate and regular rhythm.      Heart sounds: No murmur heard.  Pulmonary:      Effort: Pulmonary effort is normal.      Breath sounds: Normal " breath sounds.   Abdominal:      Palpations: Abdomen is soft.   Musculoskeletal:        Feet:    Feet:      Right foot:      Skin integrity: Callus present.      Comments: Pedal edema  Hammer toe of the second toe   Callus at the tip of the right third toe  Neurological:      Mental Status: She is alert.

## 2024-06-10 ENCOUNTER — TELEPHONE (OUTPATIENT)
Age: 79
End: 2024-06-10

## 2024-06-10 DIAGNOSIS — I25.118 CORONARY ARTERY DISEASE OF NATIVE ARTERY OF NATIVE HEART WITH STABLE ANGINA PECTORIS (HCC): ICD-10-CM

## 2024-06-10 RX ORDER — ROSUVASTATIN CALCIUM 40 MG/1
40 TABLET, COATED ORAL DAILY
Qty: 90 TABLET | Refills: 1 | Status: SHIPPED | OUTPATIENT
Start: 2024-06-10

## 2024-06-10 NOTE — TELEPHONE ENCOUNTER
Spoke with patient, states has a lot of fatigue during the day, sometimes 2-3x/day may need to lay down.      She is attributing this to the increased dose of Metoprolol 12.5mg she has been taking in the PM since December, for PVCs. Takes 25mg AM.     States PVCs have subsided, HR sometimes in 40's during the night but mainly 50's.    Inquiring if she can stop the 12.5mg dose to see if the fatigue improves.    Please update patient @[phone] with recommendation.    Please advise.

## 2024-07-03 ENCOUNTER — TELEPHONE (OUTPATIENT)
Dept: OBGYN CLINIC | Facility: HOSPITAL | Age: 79
End: 2024-07-03

## 2024-07-03 NOTE — TELEPHONE ENCOUNTER
Caller: Patient    Doctor: Gavin    Reason for call: Patient wants to reschedule 7/9 appointment with Dr. Alonso, however Dr. Alonso has no availability after July. Please call back patient to reschedule.    Call back#: [phone]

## 2024-07-08 NOTE — TELEPHONE ENCOUNTER
Left message for patient to call me back DIRECTLY to schedule.  Left my DIRECT phone # [phone] on message.

## 2024-07-21 DIAGNOSIS — I10 ESSENTIAL HYPERTENSION: ICD-10-CM

## 2024-07-21 RX ORDER — AMLODIPINE BESYLATE 5 MG/1
5 TABLET ORAL DAILY
Qty: 90 TABLET | Refills: 1 | Status: SHIPPED | OUTPATIENT
Start: 2024-07-21

## 2024-07-24 DIAGNOSIS — I49.3 PVC'S (PREMATURE VENTRICULAR CONTRACTIONS): ICD-10-CM

## 2024-07-24 DIAGNOSIS — R00.2 PALPITATIONS: ICD-10-CM

## 2024-07-24 RX ORDER — METOPROLOL SUCCINATE 25 MG/1
TABLET, EXTENDED RELEASE ORAL
Qty: 135 TABLET | Refills: 1 | Status: SHIPPED | OUTPATIENT
Start: 2024-07-24

## 2024-08-23 ENCOUNTER — TELEPHONE (OUTPATIENT)
Dept: OBGYN CLINIC | Facility: CLINIC | Age: 79
End: 2024-08-23

## 2024-08-23 NOTE — TELEPHONE ENCOUNTER
Left VM informing Pt that appt on 9/23 @ 4:15pm needs to be rescheduled due to change in Dr. Alonso's schedule. Left callback #: [phone].

## 2024-08-26 ENCOUNTER — TELEPHONE (OUTPATIENT)
Dept: OBGYN CLINIC | Facility: CLINIC | Age: 79
End: 2024-08-26

## 2024-08-26 NOTE — TELEPHONE ENCOUNTER
2nd call attempt. M to reschedule appt on 9/23 @ 4:15pm w/Dr. Alonso. Explained that due to a change in his schedule, appt needs to be rescheduled. Left callback #: [phone]

## 2024-08-29 ENCOUNTER — RA CDI HCC (OUTPATIENT)
Dept: OTHER | Facility: HOSPITAL | Age: 79
End: 2024-08-29

## 2024-08-29 DIAGNOSIS — I25.2 HISTORY OF NON-ST ELEVATION MYOCARDIAL INFARCTION (NSTEMI): Primary | ICD-10-CM

## 2024-09-03 ENCOUNTER — OFFICE VISIT (OUTPATIENT)
Dept: CARDIOLOGY CLINIC | Facility: CLINIC | Age: 79
End: 2024-09-03
Payer: MEDICARE

## 2024-09-03 VITALS
WEIGHT: 109 LBS | RESPIRATION RATE: 16 BRPM | SYSTOLIC BLOOD PRESSURE: 138 MMHG | OXYGEN SATURATION: 97 % | HEIGHT: 63 IN | HEART RATE: 54 BPM | DIASTOLIC BLOOD PRESSURE: 60 MMHG | TEMPERATURE: 97.8 F | BODY MASS INDEX: 19.31 KG/M2

## 2024-09-03 DIAGNOSIS — I10 ESSENTIAL HYPERTENSION: ICD-10-CM

## 2024-09-03 DIAGNOSIS — Z95.1 S/P CABG X 3: ICD-10-CM

## 2024-09-03 DIAGNOSIS — I49.1 PAC (PREMATURE ATRIAL CONTRACTION): ICD-10-CM

## 2024-09-03 DIAGNOSIS — I25.118 CORONARY ARTERY DISEASE OF NATIVE ARTERY OF NATIVE HEART WITH STABLE ANGINA PECTORIS (HCC): ICD-10-CM

## 2024-09-03 DIAGNOSIS — R00.2 PALPITATIONS: ICD-10-CM

## 2024-09-03 DIAGNOSIS — I49.3 PVC'S (PREMATURE VENTRICULAR CONTRACTIONS): ICD-10-CM

## 2024-09-03 DIAGNOSIS — E78.2 MIXED HYPERLIPIDEMIA: ICD-10-CM

## 2024-09-03 PROCEDURE — 99214 OFFICE O/P EST MOD 30 MIN: CPT

## 2024-09-03 RX ORDER — METOPROLOL SUCCINATE 25 MG/1
25 TABLET, EXTENDED RELEASE ORAL DAILY
Start: 2024-09-03

## 2024-09-03 NOTE — PROGRESS NOTES
Bella Sargent  1945  [id number]  [facility] [address] [phone]    1. Coronary artery disease of native artery of native heart with stable angina pectoris (HCC)        2. S/P CABG x 3        3. Essential hypertension        4. Mixed hyperlipidemia  Lipid Panel with Direct LDL reflex      5. Palpitations  metoprolol succinate (TOPROL-XL) 25 mg 24 hr tablet      6. PAC (premature atrial contraction)        7. PVC's (premature ventricular contractions)  metoprolol succinate (TOPROL-XL) 25 mg 24 hr tablet          Summary/Discussion:  Coronary artery disease:  - s/p CABG x 3 in 7/2020  - NM rx stress test (4/2024): normal pharmacologic nuclear stress test   - echo (1/2022): normal LV, LVEF 60%, normal systolic/diastolic function, no RWMA, mild AR/MR/TR  - without symptoms of angina   - continue aspirin, statin and beta blocker    Mild AR/MR/TR:  - noted on most recent echo  - no clinical s/s of volume overload  - continue surveillance management     Hypertension:  - controlled, 138/60  - continue present medication regimen  - lifestyle modification   - close blood pressure monitoring     Dyslipidemia:  - Lipid Profile:    Latest Reference Range & Units 07/05/23 10:27   Cholesterol See Comment mg/dL 147   Triglycerides See Comment mg/dL 66   HDL >=50 mg/dL 72   LDL Calculated 0 - 100 mg/dL 62   - acceptable numbers-- continue statin  - due for updated lipids; order placed today   - encouraged low cholesterol, mediterranean diet, and annual lipid follow up    PACs/PVCs/palpitations:  - Zio (12/2021):  min HR of 47 bpm, max HR of 184 bpm, and avg HR of 69 bpm. Predominant underlying rhythm was Sinus Rhythm. 13 Supraventricular Tachycardia runs occurred, the run with the fastest interval lasting 14 beats with a max rate of 184 bpm, the longest lasting 20 beats with an avg rate of 123 bpm. Supraventricular Tachycardia was detected within +/- 45  seconds of symptomatic patient event(s). Isolated SVEs were rare (<1.0%), SVE Couplets were rare (<1.0%), and SVE Triplets were rare (<1.0%). Isolated VEs were rare (<1.0%), and no VE Couplets or VE Triplets were present  - palpitations are controlled on metoprolol 25 mg daily  reported symptoms of fatigue on higher dose     Hyponatremia:  - while on hydrochlorothiazide   - sodium (4/2/2024): 138    Interval History: Bella Sargent is a 78 y.o. year old female with history mentioned in problem list who presents to the office today for routine follow up.     Since her last office visit she has no significant cardiac complaints. She denies any chest pain/pressure/discomfort or shortness of breath. She denies lower extremity edema, orthopnea, and PND. She denies lightheadedness, dizziness, and syncope. She denies palpitations currently.       No further cardiac testing indicated at this time.    She will RTO on 2/20/2025 with Dr. Sarkar or sooner if necessary. She will call with any concerns.       Medical Problems       Problem List       Arthritis    Cervical myofascial pain syndrome    Cervical radiculopathy    Cervicogenic headache    Cervico-occipital neuralgia    Depression    Mixed hyperlipidemia    Essential hypertension    Osteopenia of spine    Spasmodic torticollis    Other secondary scoliosis, thoracolumbar region    Syncope    Coronary artery disease of native artery of native heart with stable angina pectoris (HCC)    Overview Signed 1/24/2020  4:00 PM by Christie Galaviz     Added automatically from request for surgery [id number]         S/P CABG x 3    Anemia    Thrombocytopenia (HCC)    Hyperchloremia    Chylothorax    Screening for colon cancer    Overview Signed 4/14/2020  8:34 AM by Aj Steele MD     Pt referred to GI for colon cancer screening.         History of colon polyps    Acute bilateral low back pain without sciatica    PAC (premature atrial contraction)    Primary osteoarthritis of left hip     Closed wedge compression fracture of T11 vertebra (HCC)    Closed wedge compression fracture of T12 vertebra (HCC)    Chronic pain syndrome    Osteoarthritis of spine with radiculopathy, lumbar region    Vitamin D deficiency    Pain and swelling of right lower extremity    Hyponatremia    Abnormal head CT    Acute pain of right shoulder    Primary osteoarthritis of right knee    Pituitary macroadenoma (HCC)    Osteoporosis    Hyperprolactinemia (HCC)    History of non-ST elevation myocardial infarction (NSTEMI)    Overview Signed 8/29/2024  2:00 PM by Shaneka Philippe     2/2020             Past Medical History:   Diagnosis Date    Anxiety     Arthritis     Colon polyp     Coronary artery disease     Effusion of left knee     Last assessed - 9/10/15    History of transfusion     Hyperlipidemia     Hypertension     Memory loss     Myocardial infarction (HCC)     Protein-calorie malnutrition, unspecified severity (HCC) 11/14/2023    Scoliosis     Tick bite     Last assessed - 4/21/17     Social History     Socioeconomic History    Marital status:      Spouse name: Not on file    Number of children: 3    Years of education: Post Graduate    Highest education level: Not on file   Occupational History    Occupation:      Comment: P/T    Occupation: Retired     Comment: RN   Tobacco Use    Smoking status: Never     Passive exposure: Past    Smokeless tobacco: Never   Vaping Use    Vaping status: Never Used   Substance and Sexual Activity    Alcohol use: Yes     Alcohol/week: 7.0 standard drinks of alcohol     Types: 7 Glasses of wine per week     Comment: 1 wine nightly    Drug use: No    Sexual activity: Not Currently   Other Topics Concern    Not on file   Social History Narrative    Living alone     Social Determinants of Health     Financial Resource Strain: Low Risk  (1/11/2024)    Overall Financial Resource Strain (CARDIA)     Difficulty of Paying Living Expenses: Not hard at all   Food Insecurity:  Not on file   Transportation Needs: No Transportation Needs (1/11/2024)    PRAPARE - Transportation     Lack of Transportation (Medical): No     Lack of Transportation (Non-Medical): No   Physical Activity: Not on file   Stress: Not on file   Social Connections: Not on file   Intimate Partner Violence: Not on file   Housing Stability: Not on file      Family History   Problem Relation Age of Onset    Coronary artery disease Mother     Arthritis Mother     Other Mother         Cardiac Disorder     Transient ischemic attack Mother     Heart attack Father     Sudden death Father         SCD    Cancer Daughter 49        kidney    No Known Problems Paternal Aunt      Past Surgical History:   Procedure Laterality Date    APPENDECTOMY      CARDIAC SURGERY      COLONOSCOPY      CORONARY ARTERY BYPASS GRAFT      AR CORONARY ARTERY BYP W/VEIN & ARTERY GRAFT 4 VEIN N/A 01/27/2020    Procedure: CORONARY ARTERY BYPASS GRAFT (CABG) x3 VESSELS, LIMA TO LAD, AND SVG TO PLB & OM;  Surgeon: Peter Colmenares DO;  [facility];  Service: Cardiac Surgery    AR ECHO TRANSESOPHAG R-T 2D W/PRB IMG ACQUISJ I&R N/A 01/27/2020    Procedure: TRANSESOPHAGEAL ECHOCARDIOGRAM (GET);  Surgeon: Peter Colmenares DO;  [facility];  Service: Cardiac Surgery    AR NDSC SURG W/VIDEO-ASSISTED HARVEST VEIN CABG Left 01/27/2020    Procedure: HARVEST VEIN ENDOSCOPIC (EVH);  Surgeon: Peter Colmenares DO;  [facility];  Service: Cardiac Surgery    TONSILLECTOMY      Last assessed - 4/20/17    TUBAL LIGATION      Last assessed - 4/20/17    TUMOR REMOVAL  1998    pelvic benign tumor removal    WISDOM TOOTH EXTRACTION      Last assessed - 4/20/17       Current Outpatient Medications:     acetaminophen (TYLENOL) 650 mg CR tablet, Take 650 mg by mouth daily as needed for mild pain, Disp: , Rfl:     amLODIPine (NORVASC) 5 mg tablet, TAKE ONE TABLET BY MOUTH EVERY DAY, Disp: 90 tablet, Rfl: 1    aspirin (ECOTRIN LOW STRENGTH) 81 mg EC  "tablet, Take 1 tablet (81 mg total) by mouth daily, Disp: , Rfl:     cabergoline (DOSTINEX) 0.5 MG tablet, TAKE 1/2 TABLET BY MOUTH ONCE A WEEK ( 2 TABLETS PER MONTH- EMERGENCY SUPPLY. PT SHOULD USE PILL CUTTER TO CUT THE PILLS), Disp: 4 tablet, Rfl: 2    Calcium Carb-Cholecalciferol 600-200 MG-UNIT TABS, Calcium 600 + D TABS TAKE 1 TABLET DAILY.  Refills: 0  Active, Disp: , Rfl:     denosumab (PROLIA) 60 mg/mL, Inject 60 mg under the skin every 6 (six) months, Disp: , Rfl:     losartan (COZAAR) 100 MG tablet, Take 1 tablet (100 mg total) by mouth daily, Disp: 90 tablet, Rfl: 1    metoprolol succinate (TOPROL-XL) 25 mg 24 hr tablet, Take 1 tablet (25 mg total) by mouth daily TAKING ONE TABLET DAILY IN THE MORNING, Disp: , Rfl:     rosuvastatin (CRESTOR) 40 MG tablet, TAKE 1 TABLET DAILY, Disp: 90 tablet, Rfl: 1    VITAMIN D, CHOLECALCIFEROL, PO, Take 2,600 Units/day by mouth, Disp: , Rfl:   Allergies   Allergen Reactions    Thiazide-Type Diuretics Other (See Comments)     Hyponatremia       Labs:     Chemistry        Component Value Date/Time     03/03/2015 0029    K 4.3 04/02/2024 0805    K 3.5 (L) 03/03/2015 0029     04/02/2024 0805     03/03/2015 0029    CO2 30 04/02/2024 0805    CO2 26 01/27/2020 1548    BUN 16 04/02/2024 0805    BUN 18 03/03/2015 0029    CREATININE 0.63 04/02/2024 0805    CREATININE 0.45 (L) 03/03/2015 0029        Component Value Date/Time    CALCIUM 9.0 04/02/2024 0805    CALCIUM 8.9 03/03/2015 0029    ALKPHOS 44 11/16/2023 1657    ALKPHOS 51 03/03/2015 0029    AST 46 (H) 11/16/2023 1657    AST 26 03/03/2015 0029    ALT 27 11/16/2023 1657    ALT 22 03/03/2015 0029    BILITOT 0.2 03/03/2015 0029            No results found for: \"CHOL\"  Lab Results   Component Value Date    HDL 72 07/05/2023    HDL 66 01/03/2023    HDL 65 05/31/2022     Lab Results   Component Value Date    LDLCALC 62 07/05/2023    LDLCALC 77 01/03/2023    LDLCALC 68 05/31/2022     Lab Results   Component Value " "Date    TRIG 66 07/05/2023    TRIG 88 01/03/2023    TRIG 128 05/31/2022     No results found for: \"CHOLHDL\"    Imaging: No results found.    ECG:  n/a    Review of Systems   All other systems reviewed and are negative.      Vitals:    09/03/24 1534   BP: 138/60   Pulse: (!) 54   Resp: 16   Temp: 97.8 °F (36.6 °C)   SpO2: 97%     Vitals:    09/03/24 1534   Weight: 49.4 kg (109 lb)     Height: 5' 3\" (160 cm)   Body mass index is 19.31 kg/m².    Physical Exam  Vitals and nursing note reviewed.   Constitutional:       General: She is not in acute distress.     Appearance: Normal appearance. She is not ill-appearing.   HENT:      Head: Normocephalic.      Nose: Nose normal.      Mouth/Throat:      Mouth: Mucous membranes are moist.      Pharynx: Oropharynx is clear.   Cardiovascular:      Rate and Rhythm: Regular rhythm. Bradycardia present.      Heart sounds: No murmur heard.  Pulmonary:      Effort: Pulmonary effort is normal.      Breath sounds: Normal breath sounds.   Musculoskeletal:      Cervical back: Normal range of motion.      Right lower leg: No edema.      Left lower leg: No edema.   Skin:     General: Skin is warm and dry.   Neurological:      Mental Status: She is alert and oriented to person, place, and time.   Psychiatric:         Mood and Affect: Mood normal.         Behavior: Behavior normal.        "

## 2024-09-30 ENCOUNTER — HOSPITAL ENCOUNTER (OUTPATIENT)
Dept: MAMMOGRAPHY | Facility: HOSPITAL | Age: 79
Discharge: HOME | End: 2024-09-30
Attending: FAMILY MEDICINE
Payer: MEDICARE

## 2024-09-30 VITALS — WEIGHT: 108.91 LBS | HEIGHT: 63 IN | BODY MASS INDEX: 19.3 KG/M2

## 2024-09-30 DIAGNOSIS — Z12.31 ENCOUNTER FOR SCREENING MAMMOGRAM FOR MALIGNANT NEOPLASM OF BREAST: ICD-10-CM

## 2024-09-30 PROCEDURE — 77063 BREAST TOMOSYNTHESIS BI: CPT

## 2024-09-30 PROCEDURE — 77067 SCR MAMMO BI INCL CAD: CPT

## 2024-10-07 ENCOUNTER — LAB (OUTPATIENT)
Dept: LAB | Facility: CLINIC | Age: 79
End: 2024-10-07
Payer: MEDICARE

## 2024-10-07 DIAGNOSIS — D35.2 PITUITARY MACROADENOMA (HCC): ICD-10-CM

## 2024-10-07 DIAGNOSIS — E55.9 VITAMIN D DEFICIENCY: ICD-10-CM

## 2024-10-07 DIAGNOSIS — E22.1 HYPERPROLACTINEMIA (HCC): ICD-10-CM

## 2024-10-07 DIAGNOSIS — M81.0 OSTEOPOROSIS, UNSPECIFIED OSTEOPOROSIS TYPE, UNSPECIFIED PATHOLOGICAL FRACTURE PRESENCE: ICD-10-CM

## 2024-10-07 DIAGNOSIS — E78.2 MIXED HYPERLIPIDEMIA: ICD-10-CM

## 2024-10-07 LAB
25(OH)D3 SERPL-MCNC: 38.6 NG/ML (ref 30–100)
ANION GAP SERPL CALCULATED.3IONS-SCNC: 7 MMOL/L (ref 4–13)
BUN SERPL-MCNC: 19 MG/DL (ref 5–25)
CALCIUM SERPL-MCNC: 8.8 MG/DL (ref 8.4–10.2)
CHLORIDE SERPL-SCNC: 102 MMOL/L (ref 96–108)
CHOLEST SERPL-MCNC: 139 MG/DL
CO2 SERPL-SCNC: 26 MMOL/L (ref 21–32)
CORTIS AM PEAK SERPL-MCNC: 10.7 UG/DL (ref 6.7–22.6)
CREAT SERPL-MCNC: 0.58 MG/DL (ref 0.6–1.3)
GFR SERPL CREATININE-BSD FRML MDRD: 87 ML/MIN/1.73SQ M
GLUCOSE P FAST SERPL-MCNC: 86 MG/DL (ref 65–99)
HDLC SERPL-MCNC: 53 MG/DL
LDLC SERPL CALC-MCNC: 65 MG/DL (ref 0–100)
POTASSIUM SERPL-SCNC: 4.1 MMOL/L (ref 3.5–5.3)
PROLACTIN SERPL-MCNC: 0.98 NG/ML (ref 2.74–19.64)
SODIUM SERPL-SCNC: 135 MMOL/L (ref 135–147)
T4 FREE SERPL-MCNC: 0.62 NG/DL (ref 0.61–1.12)
TRIGL SERPL-MCNC: 107 MG/DL
TSH SERPL DL<=0.05 MIU/L-ACNC: 1.06 UIU/ML (ref 0.45–4.5)

## 2024-10-07 PROCEDURE — 84146 ASSAY OF PROLACTIN: CPT

## 2024-10-07 PROCEDURE — 82306 VITAMIN D 25 HYDROXY: CPT

## 2024-10-07 PROCEDURE — 36415 COLL VENOUS BLD VENIPUNCTURE: CPT

## 2024-10-07 PROCEDURE — 80061 LIPID PANEL: CPT

## 2024-10-07 PROCEDURE — 84439 ASSAY OF FREE THYROXINE: CPT

## 2024-10-07 PROCEDURE — 82533 TOTAL CORTISOL: CPT

## 2024-10-07 PROCEDURE — 80048 BASIC METABOLIC PNL TOTAL CA: CPT

## 2024-10-07 PROCEDURE — 84443 ASSAY THYROID STIM HORMONE: CPT

## 2024-10-07 PROCEDURE — 84305 ASSAY OF SOMATOMEDIN: CPT

## 2024-10-09 LAB — IGF-I SERPL-MCNC: 54 NG/ML (ref 42–185)

## 2024-10-16 ENCOUNTER — OFFICE VISIT (OUTPATIENT)
Dept: ENDOCRINOLOGY | Facility: CLINIC | Age: 79
End: 2024-10-16
Payer: MEDICARE

## 2024-10-16 VITALS
BODY MASS INDEX: 19.91 KG/M2 | HEART RATE: 57 BPM | WEIGHT: 112.4 LBS | HEIGHT: 63 IN | SYSTOLIC BLOOD PRESSURE: 124 MMHG | OXYGEN SATURATION: 98 % | DIASTOLIC BLOOD PRESSURE: 80 MMHG

## 2024-10-16 DIAGNOSIS — E55.9 VITAMIN D DEFICIENCY: ICD-10-CM

## 2024-10-16 DIAGNOSIS — M81.0 OSTEOPOROSIS, UNSPECIFIED OSTEOPOROSIS TYPE, UNSPECIFIED PATHOLOGICAL FRACTURE PRESENCE: ICD-10-CM

## 2024-10-16 DIAGNOSIS — D35.2 PITUITARY MACROADENOMA (HCC): Primary | ICD-10-CM

## 2024-10-16 DIAGNOSIS — E22.1 HYPERPROLACTINEMIA (HCC): ICD-10-CM

## 2024-10-16 PROCEDURE — 96372 THER/PROPH/DIAG INJ SC/IM: CPT | Performed by: PHYSICIAN ASSISTANT

## 2024-10-16 PROCEDURE — 99214 OFFICE O/P EST MOD 30 MIN: CPT | Performed by: PHYSICIAN ASSISTANT

## 2024-10-16 NOTE — PROGRESS NOTES
"Assessment/Plan:    Bella Sargent came into the [facility] Endocrinology Office today 10/16/24 to receive Prolia injection.      Verbal consent obtained.  Consent given by: patient    patient states patient has been medically healthy with no underlining concerns/complications.      Bella Sargent presents with no symptoms today.       All insturctions were reviewed with the patient.    If the patient should have any questions/concerns, advised patient to contacted [facility] Endocrinology Office.       Subjective:     History provided by: patient    Patient ID: Bella Sargent is a 79 y.o. female      Objective:    Vitals:    10/16/24 1338   BP: 124/80   Pulse: 57   SpO2: 98%   Weight: 51 kg (112 lb 6.4 oz)   Height: 5' 3\" (1.6 m)       Patient tolerated the injection well without any complications.  Injection site/s Left.  Medication was provided by Office.    Patient signed consent form yes   Patient signed ABN form yes (If no patient is not a medicare patient).   Patient waited 15 minutes after injection no (This only applies to patient's receiving first time injection).       Last Visit: Visit date not found  Next visit:Visit date not found     "

## 2024-10-16 NOTE — PROGRESS NOTES
Patient Progress Note      CC: pituitary macroadenoma, osteoporosis      Referring Provider  Dewey Barrera Md  [address]  [address]  [place] [address]     History of Present Illness:   Bella Sargent is a 79 y.o. female here for follow-up of osteoporosis and pituitary macroadenoma.  She has a medical history of vertebral compression fracture of T11.  Her DEXA scan done in August 2022 was consistent with osteopenia but her FRAX scores were elevated.  She received her first Prolia injection in September 2022. Her last Prolia injection was in April 2024 and is due for repeat today. She did tolerate it well. She also takes calcium carbonate 600 mg daily and vitamin D 2600 units daily.  No recent falls or fractures.  She does do not do weightbearing exercises except for walking.  Most recent vitamin D is normal at 38.6.   Pituitary macroadenoma: MRI of the brain pituitary in July 2023 showed a 2.2 cm pituitary mass consistent with an adenoma filling the sella turcica and extending laterally on the right into the cavernous sinus, encasing the cavernous internal carotid artery. Repeat MRI done in February 2024 showed similar findings. She does follow-up with neurosurgery and has repeat MRI scheduled. Prolactin levels were previously elevated which is why she was started on cabergoline 0.25 mg weekly. Since starting cabergoline her prolactin improved from 83.41 to 0.98. Other pituitary hormones were within normal range. She did have visual fields testing Sept 2024.       Patient Active Problem List   Diagnosis    Arthritis    Cervical myofascial pain syndrome    Cervical radiculopathy    Cervicogenic headache    Cervico-occipital neuralgia    Depression    Mixed hyperlipidemia    Essential hypertension    Osteopenia of spine    Spasmodic torticollis    Other secondary scoliosis, thoracolumbar region    Syncope    Coronary artery disease of native artery of native heart with stable angina pectoris (HCC)     S/P CABG x 3    Anemia    Thrombocytopenia (HCC)    Hyperchloremia    Chylothorax    Screening for colon cancer    History of colon polyps    Acute bilateral low back pain without sciatica    PAC (premature atrial contraction)    Primary osteoarthritis of left hip    Closed wedge compression fracture of T11 vertebra (HCC)    Closed wedge compression fracture of T12 vertebra (HCC)    Chronic pain syndrome    Osteoarthritis of spine with radiculopathy, lumbar region    Vitamin D deficiency    Pain and swelling of right lower extremity    Hyponatremia    Abnormal head CT    Acute pain of right shoulder    Primary osteoarthritis of right knee    Pituitary macroadenoma (HCC)    Osteoporosis    Hyperprolactinemia (HCC)    History of non-ST elevation myocardial infarction (NSTEMI)      Past Medical History:   Diagnosis Date    Anxiety     Arthritis     Colon polyp     Coronary artery disease     Effusion of left knee     Last assessed - 9/10/15    History of transfusion     Hyperlipidemia     Hypertension     Memory loss     Myocardial infarction (HCC)     Protein-calorie malnutrition, unspecified severity (HCC) 11/14/2023    Scoliosis     Tick bite     Last assessed - 4/21/17      Past Surgical History:   Procedure Laterality Date    APPENDECTOMY      CARDIAC SURGERY      COLONOSCOPY      CORONARY ARTERY BYPASS GRAFT      CO CORONARY ARTERY BYP W/VEIN & ARTERY GRAFT 4 VEIN N/A 01/27/2020    Procedure: CORONARY ARTERY BYPASS GRAFT (CABG) x3 VESSELS, LIMA TO LAD, AND SVG TO PLB & OM;  Surgeon: Peter Colmenares DO;  [facility];  Service: Cardiac Surgery    CO ECHO TRANSESOPHAG R-T 2D W/PRB IMG ACQUISJ I&R N/A 01/27/2020    Procedure: TRANSESOPHAGEAL ECHOCARDIOGRAM (GET);  Surgeon: Peter Colmenares DO;  [facility];  Service: Cardiac Surgery    CO NDSC SURG W/VIDEO-ASSISTED HARVEST VEIN CABG Left 01/27/2020    Procedure: HARVEST VEIN ENDOSCOPIC (EVH);  Surgeon: Peter Colmenares DO;  [facility];  Service: Cardiac Surgery    TONSILLECTOMY      Last assessed - 4/20/17    TUBAL LIGATION      Last assessed - 4/20/17    TUMOR REMOVAL  1998    pelvic benign tumor removal    WISDOM TOOTH EXTRACTION      Last assessed - 4/20/17      Family History   Problem Relation Age of Onset    Coronary artery disease Mother     Arthritis Mother     Other Mother         Cardiac Disorder     Transient ischemic attack Mother     Heart attack Father     Sudden death Father         SCD    No Known Problems Sister     No Known Problems Sister     Cancer Daughter 49        kidney    No Known Problems Daughter     No Known Problems Daughter     No Known Problems Paternal Aunt     Breast cancer Niece 46     Social History     Tobacco Use    Smoking status: Never     Passive exposure: Past    Smokeless tobacco: Never   Substance Use Topics    Alcohol use: Yes     Alcohol/week: 7.0 standard drinks of alcohol     Types: 7 Glasses of wine per week     Comment: 1 wine nightly     Allergies   Allergen Reactions    Thiazide-Type Diuretics Other (See Comments)     Hyponatremia         Current Outpatient Medications:     acetaminophen (TYLENOL) 650 mg CR tablet, Take 650 mg by mouth daily as needed for mild pain, Disp: , Rfl:     amLODIPine (NORVASC) 5 mg tablet, TAKE ONE TABLET BY MOUTH EVERY DAY, Disp: 90 tablet, Rfl: 1    aspirin (ECOTRIN LOW STRENGTH) 81 mg EC tablet, Take 1 tablet (81 mg total) by mouth daily, Disp: , Rfl:     cabergoline (DOSTINEX) 0.5 MG tablet, TAKE 1/2 TABLET BY MOUTH ONCE A WEEK ( 2 TABLETS PER MONTH- EMERGENCY SUPPLY. PT SHOULD USE PILL CUTTER TO CUT THE PILLS), Disp: 4 tablet, Rfl: 2    Calcium Carb-Cholecalciferol 600-200 MG-UNIT TABS, Calcium 600 + D TABS TAKE 1 TABLET DAILY.  Refills: 0  Active, Disp: , Rfl:     denosumab (PROLIA) 60 mg/mL, Inject 60 mg under the skin every 6 (six) months, Disp: , Rfl:     losartan (COZAAR) 100 MG tablet, Take 1 tablet (100 mg total) by mouth daily, Disp: 90 tablet, Rfl: 1     "metoprolol succinate (TOPROL-XL) 25 mg 24 hr tablet, Take 1 tablet (25 mg total) by mouth daily TAKING ONE TABLET DAILY IN THE MORNING, Disp: , Rfl:     rosuvastatin (CRESTOR) 40 MG tablet, TAKE 1 TABLET DAILY, Disp: 90 tablet, Rfl: 1    VITAMIN D, CHOLECALCIFEROL, PO, Take 2,600 Units/day by mouth, Disp: , Rfl:   No current facility-administered medications for this visit.  Review of Systems   Constitutional:  Positive for fatigue (due to poor sleep). Negative for activity change, appetite change and unexpected weight change.   HENT:  Negative for trouble swallowing.    Eyes:  Positive for visual disturbance (left eye cataract).   Respiratory:  Negative for shortness of breath.    Cardiovascular:  Negative for chest pain and palpitations.   Gastrointestinal:  Negative for constipation and diarrhea.   Endocrine: Negative for cold intolerance, heat intolerance, polydipsia and polyuria.   Musculoskeletal:  Positive for back pain.   Skin: Negative.    Neurological:  Negative for headaches.   Psychiatric/Behavioral: Negative.         Physical Exam:  Body mass index is 19.91 kg/m².  /80   Pulse 57   Ht 5' 3\" (1.6 m)   Wt 51 kg (112 lb 6.4 oz)   SpO2 98%   BMI 19.91 kg/m²    Wt Readings from Last 3 Encounters:   10/16/24 51 kg (112 lb 6.4 oz)   09/30/24 49.4 kg (108 lb 14.5 oz)   09/03/24 49.4 kg (109 lb)       Physical Exam  Vitals and nursing note reviewed.   Constitutional:       Appearance: She is well-developed.   HENT:      Head: Normocephalic.   Eyes:      General: No scleral icterus.     Extraocular Movements: EOM normal.   Neck:      Thyroid: No thyromegaly.   Cardiovascular:      Rate and Rhythm: Normal rate and regular rhythm.      Pulses:           Radial pulses are 2+ on the right side and 2+ on the left side.      Heart sounds: No murmur heard.  Pulmonary:      Effort: Pulmonary effort is normal. No respiratory distress.      Breath sounds: Normal breath sounds. No wheezing.   Musculoskeletal:     "  Cervical back: Neck supple.   Skin:     General: Skin is warm and dry.   Neurological:      Mental Status: She is alert.   Psychiatric:         Mood and Affect: Mood and affect normal.           Labs:   Component      Latest Ref Rng 4/2/2024 10/7/2024   Sodium      135 - 147 mmol/L 138  135    Potassium      3.5 - 5.3 mmol/L 4.3  4.1    Chloride      96 - 108 mmol/L 101  102    Carbon Dioxide      21 - 32 mmol/L 30  26    ANION GAP      4 - 13 mmol/L 7  7    BUN      5 - 25 mg/dL 16  19    Creatinine      0.60 - 1.30 mg/dL 0.63  0.58 (L)    GLUCOSE, FASTING      65 - 99 mg/dL 88  86    Calcium      8.4 - 10.2 mg/dL 9.0  8.8    GFR, Calculated      ml/min/1.73sq m 86  87    Cholesterol      See Comment mg/dL  139    Triglycerides      See Comment mg/dL  107    HDL      >=50 mg/dL  53    LDL Calculated      0 - 100 mg/dL  65    FREE T4      0.61 - 1.12 ng/dL 0.74  0.62    TSH 3RD GENERATON      0.450 - 4.500 uIU/mL 1.421  1.060    VITAMIN D, 25-HYDROXY      30.0 - 100.0 ng/mL 44.5  38.6    Cortisol - AM      6.7 - 22.6 ug/dL 12.7  10.7    PROLACTIN      2.74 - 19.64 ng/mL 1.35 (L)  0.98 (L)    INSULIN-LIKE GROWTH FACTOR-1      42 - 185 ng/mL  54       Legend:  (L) Low      Plan:    Diagnoses and all orders for this visit:    Pituitary macroadenoma (HCC) / Hyperprolactinemia (HCC)  Pituitary adenoma remaining relatively stable  Has repeat MRI scheduled  Follows up with neurosurgery every 6 months  Recommended routine visual field testing  Prolactin levels improved after starting cabergoline. Continue current dose of cabergoline.  Monitor other pituitary hormones.  -     Prolactin; Future    Osteoporosis  Received Prolia injection today.  Repeat due in 6 months.  Continue vitamin D and calcium supplementation  Take precautions to avoid falls  Do weightbearing exercises as tolerated  Repeat DEXA scan is scheduled  -     Basic metabolic panel; Future  -     denosumab (PROLIA) subcutaneous injection 60 mg    Vitamin D  deficiency  Vitamin D is normal at 38.6  Continue current supplementation  -     Vitamin D 25 hydroxy; Future             Discussed with the patient diagnosis and treatment and all questions fully answered. She will call me if any problems arise.    Counseled patient on diagnostic results, prognosis, risk and benefit of treatment options, instruction for management, importance of treatment compliance, risk factor reduction and impressions.      Gianna Lewis PA-C

## 2024-11-01 DIAGNOSIS — D35.2 PITUITARY MACROADENOMA (HCC): ICD-10-CM

## 2024-11-04 RX ORDER — CABERGOLINE 0.5 MG/1
TABLET ORAL
Qty: 6 TABLET | Refills: 1 | Status: SHIPPED | OUTPATIENT
Start: 2024-11-04

## 2024-11-05 ENCOUNTER — OFFICE VISIT (OUTPATIENT)
Dept: FAMILY MEDICINE CLINIC | Facility: CLINIC | Age: 79
End: 2024-11-05
Payer: MEDICARE

## 2024-11-05 VITALS
WEIGHT: 111.4 LBS | DIASTOLIC BLOOD PRESSURE: 56 MMHG | HEART RATE: 59 BPM | OXYGEN SATURATION: 99 % | BODY MASS INDEX: 19.74 KG/M2 | TEMPERATURE: 97.6 F | RESPIRATION RATE: 16 BRPM | HEIGHT: 63 IN | SYSTOLIC BLOOD PRESSURE: 122 MMHG

## 2024-11-05 DIAGNOSIS — R10.9 RIGHT FLANK PAIN, CHRONIC: Primary | ICD-10-CM

## 2024-11-05 DIAGNOSIS — R52 NOCTURNAL PAIN: ICD-10-CM

## 2024-11-05 DIAGNOSIS — G89.29 RIGHT FLANK PAIN, CHRONIC: Primary | ICD-10-CM

## 2024-11-05 DIAGNOSIS — R10.11 RUQ PAIN: ICD-10-CM

## 2024-11-05 DIAGNOSIS — M41.55 OTHER SECONDARY SCOLIOSIS, THORACOLUMBAR REGION: ICD-10-CM

## 2024-11-05 DIAGNOSIS — M81.0 OSTEOPOROSIS WITHOUT CURRENT PATHOLOGICAL FRACTURE, UNSPECIFIED OSTEOPOROSIS TYPE: ICD-10-CM

## 2024-11-05 PROCEDURE — 99214 OFFICE O/P EST MOD 30 MIN: CPT | Performed by: FAMILY MEDICINE

## 2024-11-05 PROCEDURE — G2211 COMPLEX E/M VISIT ADD ON: HCPCS | Performed by: FAMILY MEDICINE

## 2024-11-05 RX ORDER — MAGNESIUM 200 MG
200 TABLET ORAL
COMMUNITY
Start: 2024-10-07

## 2024-11-05 RX ORDER — VIT C/B6/B5/MAGNESIUM/HERB 173 50-5-6-5MG
500 CAPSULE ORAL
COMMUNITY
Start: 2024-06-01

## 2024-11-05 NOTE — PROGRESS NOTES
Ambulatory Visit  Name: Bella Sargent      : 1945      MRN: [id number]  Encounter Provider: Dewey Barrera MD  Encounter Date: 2024   Encounter department: [facility]    Assessment & Plan  Right flank pain, chronic  Presents with ongoing right flank and right upper quadrant pain.  Was seen in the ED earlier this year and had a CT scan that was unrevealing.  She was diagnosed with pleuritic chest pain. She saw me afterwards and also felt pain was MSK.  Pain had improved but came back feeling more intense than before. She is now having pain at night that wakes her out of sleep. Tenderness notes in the RUQ w/ positive soto. Will get CT of abdomen. Also has osteoporosis and should be able to view part of the lumbar and thoracic spine to look for fracture. F.u results. If unrevealing, will refer to pain management  Orders:  •  CT abdomen w contrast; Future    Osteoporosis without current pathological fracture, unspecified osteoporosis type    Orders:  •  CT abdomen w contrast; Future    RUQ pain    Orders:  •  CT abdomen w contrast; Future    Nocturnal pain    Orders:  •  CT abdomen w contrast; Future    Other secondary scoliosis, thoracolumbar region    Orders:  •  CT abdomen w contrast; Future      Depression Screening and Follow-up Plan: Patient's depression screening was positive with a PHQ-9 score of 7. Clincally patient does not have depression. No treatment is required.   History of Present Illness     Back pain has gotten worse   Described it as a burning achy pain  Really painful at night   Pain wakes her up out of sleep   Pain is in the right flank and wraps around to the front  History of scoliosis and vertebral fracture  Any movement exacerbates the pain   Patient had participated in a back yoga course and felt it worked initially   Over the last few weeks she feels that she has regressed.    No rashes, urinary symptom, fevers, or nausea    Back Pain  This is a chronic  problem. The current episode started more than 1 year ago. The problem occurs constantly. The problem has been gradually worsening since onset. The pain is present in the sacro-iliac. The quality of the pain is described as aching, burning and stabbing. The pain is at a severity of 7/10. The pain is Worse during the night. The symptoms are aggravated by position, lying down and standing. Stiffness is present All day. Associated symptoms include abdominal pain. Pertinent negatives include no bladder incontinence, bowel incontinence, chest pain, dysuria, fever, headaches, leg pain, numbness, paresis, paresthesias, pelvic pain, perianal numbness, tingling, weakness or weight loss. Risk factors include history of osteoporosis and poor posture.       History obtained from : patient  Review of Systems   Constitutional:  Negative for fever and weight loss.   Cardiovascular:  Negative for chest pain.   Gastrointestinal:  Positive for abdominal pain. Negative for bowel incontinence.   Genitourinary:  Negative for bladder incontinence, dysuria and pelvic pain.   Musculoskeletal:  Positive for back pain.   Neurological:  Negative for tingling, weakness, numbness, headaches and paresthesias.     Medical History Reviewed by provider this encounter:       Past Medical History   Past Medical History:   Diagnosis Date   • Anxiety    • Arthritis    • Colon polyp    • Coronary artery disease    • Effusion of left knee     Last assessed - 9/10/15   • History of transfusion    • Hyperlipidemia    • Hypertension    • Memory loss    • Myocardial infarction (HCC)    • Protein-calorie malnutrition, unspecified severity (HCC) 11/14/2023   • Scoliosis    • Tick bite     Last assessed - 4/21/17     Past Surgical History:   Procedure Laterality Date   • APPENDECTOMY     • CARDIAC SURGERY     • COLONOSCOPY     • CORONARY ARTERY BYPASS GRAFT     • DE CORONARY ARTERY BYP W/VEIN & ARTERY GRAFT 4 VEIN N/A 01/27/2020    Procedure: CORONARY ARTERY  BYPASS GRAFT (CABG) x3 VESSELS, LIMA TO LAD, AND SVG TO PLB & OM;  Surgeon: Peter Colmenares DO;  Location: [facility];  Service: Cardiac Surgery   • GA ECHO TRANSESOPHAG R-T 2D W/PRB IMG ACQUISJ I&R N/A 01/27/2020    Procedure: TRANSESOPHAGEAL ECHOCARDIOGRAM (GET);  Surgeon: Peter Colmenares DO;  Location: [facility];  Service: Cardiac Surgery   • GA NDSC SURG W/VIDEO-ASSISTED HARVEST VEIN CABG Left 01/27/2020    Procedure: HARVEST VEIN ENDOSCOPIC (EVH);  Surgeon: Peter Colmenares DO;  Location: [facility];  Service: Cardiac Surgery   • TONSILLECTOMY      Last assessed - 4/20/17   • TUBAL LIGATION      Last assessed - 4/20/17   • TUMOR REMOVAL  1998    pelvic benign tumor removal   • WISDOM TOOTH EXTRACTION      Last assessed - 4/20/17     Family History   Problem Relation Age of Onset   • Coronary artery disease Mother    • Arthritis Mother    • Other Mother         Cardiac Disorder    • Transient ischemic attack Mother    • Heart disease Mother    • Hearing loss Mother    • Heart attack Father    • Sudden death Father         SCD   • No Known Problems Sister    • No Known Problems Sister    • Cancer Daughter 49        kidney   • No Known Problems Daughter    • No Known Problems Daughter    • No Known Problems Paternal Aunt    • Breast cancer Niece 46     Current Outpatient Medications on File Prior to Visit   Medication Sig Dispense Refill   • acetaminophen (TYLENOL) 650 mg CR tablet Take 650 mg by mouth daily as needed for mild pain     • amLODIPine (NORVASC) 5 mg tablet TAKE ONE TABLET BY MOUTH EVERY DAY 90 tablet 1   • aspirin (ECOTRIN LOW STRENGTH) 81 mg EC tablet Take 1 tablet (81 mg total) by mouth daily     • cabergoline (DOSTINEX) 0.5 MG tablet TAKE 1/2 TABLET BY MOUTH ONCE A WEEK (PATIENT SHOULD USE PILL CUTTER TO CUT THE PILLS) 6 tablet 1   • Calcium Carb-Cholecalciferol 600-200 MG-UNIT TABS Calcium 600 + D TABS  TAKE 1 TABLET DAILY.   Refills: 0    Active     • denosumab (PROLIA) 60 mg/mL Inject  60 mg under the skin every 6 (six) months     • losartan (COZAAR) 100 MG tablet Take 1 tablet (100 mg total) by mouth daily 90 tablet 1   • Magnesium 200 MG TABS 200 mg     • metoprolol succinate (TOPROL-XL) 25 mg 24 hr tablet Take 1 tablet (25 mg total) by mouth daily TAKING ONE TABLET DAILY IN THE MORNING     • rosuvastatin (CRESTOR) 40 MG tablet TAKE 1 TABLET DAILY 90 tablet 1   • Turmeric 500 MG CAPS 500 mg     • VITAMIN D, CHOLECALCIFEROL, PO Take 2,600 Units/day by mouth       No current facility-administered medications on file prior to visit.     Allergies   Allergen Reactions   • Thiazide-Type Diuretics Other (See Comments)     Hyponatremia      Current Outpatient Medications on File Prior to Visit   Medication Sig Dispense Refill   • acetaminophen (TYLENOL) 650 mg CR tablet Take 650 mg by mouth daily as needed for mild pain     • amLODIPine (NORVASC) 5 mg tablet TAKE ONE TABLET BY MOUTH EVERY DAY 90 tablet 1   • aspirin (ECOTRIN LOW STRENGTH) 81 mg EC tablet Take 1 tablet (81 mg total) by mouth daily     • cabergoline (DOSTINEX) 0.5 MG tablet TAKE 1/2 TABLET BY MOUTH ONCE A WEEK (PATIENT SHOULD USE PILL CUTTER TO CUT THE PILLS) 6 tablet 1   • Calcium Carb-Cholecalciferol 600-200 MG-UNIT TABS Calcium 600 + D TABS  TAKE 1 TABLET DAILY.   Refills: 0    Active     • denosumab (PROLIA) 60 mg/mL Inject 60 mg under the skin every 6 (six) months     • losartan (COZAAR) 100 MG tablet Take 1 tablet (100 mg total) by mouth daily 90 tablet 1   • Magnesium 200 MG TABS 200 mg     • metoprolol succinate (TOPROL-XL) 25 mg 24 hr tablet Take 1 tablet (25 mg total) by mouth daily TAKING ONE TABLET DAILY IN THE MORNING     • rosuvastatin (CRESTOR) 40 MG tablet TAKE 1 TABLET DAILY 90 tablet 1   • Turmeric 500 MG CAPS 500 mg     • VITAMIN D, CHOLECALCIFEROL, PO Take 2,600 Units/day by mouth       No current facility-administered medications on file prior to visit.      Social History     Tobacco Use   • Smoking status: Never     " Passive exposure: Past   • Smokeless tobacco: Never   Vaping Use   • Vaping status: Never Used   Substance and Sexual Activity   • Alcohol use: Yes     Alcohol/week: 3.0 standard drinks of alcohol     Types: 3 Glasses of wine per week     Comment: 1 wine nightly   • Drug use: No   • Sexual activity: Not Currently         Objective     /56 (BP Location: Left arm, Patient Position: Sitting, Cuff Size: Standard)   Pulse 59   Temp 97.6 °F (36.4 °C) (Tympanic)   Resp 16   Ht 5' 3\" (1.6 m)   Wt 50.5 kg (111 lb 6.4 oz)   SpO2 99%   BMI 19.73 kg/m²     Physical Exam  Vitals reviewed.   Constitutional:       General: She is not in acute distress.     Appearance: Normal appearance. She is not ill-appearing or toxic-appearing.   HENT:      Head: Normocephalic and atraumatic.   Cardiovascular:      Rate and Rhythm: Normal rate and regular rhythm.      Heart sounds: No murmur heard.  Pulmonary:      Effort: Pulmonary effort is normal.      Breath sounds: Normal breath sounds. No stridor.   Abdominal:      General: There is no distension.      Palpations: Abdomen is soft. There is no mass.      Tenderness: There is abdominal tenderness in the right upper quadrant. There is no right CVA tenderness, left CVA tenderness, guarding or rebound. Positive signs include Adair's sign.      Hernia: No hernia is present.   Musculoskeletal:      Thoracic back: No tenderness or bony tenderness. Decreased range of motion. Scoliosis present.      Lumbar back: No tenderness or bony tenderness. Decreased range of motion. Scoliosis present.   Lymphadenopathy:      Cervical: No cervical adenopathy.   Skin:     General: Skin is warm.   Neurological:      Mental Status: She is alert and oriented to person, place, and time.     Administrative Statements   I have spent a total time of 30 minutes in caring for this patient on the day of the visit/encounter including Impressions, Counseling / Coordination of care, Documenting in the medical " record, Reviewing / ordering tests, medicine, procedures  , and Obtaining or reviewing history  .

## 2024-11-07 ENCOUNTER — HOSPITAL ENCOUNTER (OUTPATIENT)
Dept: CT IMAGING | Facility: HOSPITAL | Age: 79
End: 2024-11-07
Payer: MEDICARE

## 2024-11-07 DIAGNOSIS — R10.11 RUQ PAIN: ICD-10-CM

## 2024-11-07 DIAGNOSIS — G89.29 RIGHT FLANK PAIN, CHRONIC: ICD-10-CM

## 2024-11-07 DIAGNOSIS — M41.55 OTHER SECONDARY SCOLIOSIS, THORACOLUMBAR REGION: ICD-10-CM

## 2024-11-07 DIAGNOSIS — R10.9 RIGHT FLANK PAIN, CHRONIC: ICD-10-CM

## 2024-11-07 DIAGNOSIS — R52 NOCTURNAL PAIN: ICD-10-CM

## 2024-11-07 DIAGNOSIS — M81.0 OSTEOPOROSIS WITHOUT CURRENT PATHOLOGICAL FRACTURE, UNSPECIFIED OSTEOPOROSIS TYPE: ICD-10-CM

## 2024-11-07 PROCEDURE — 74160 CT ABDOMEN W/CONTRAST: CPT

## 2024-11-07 RX ADMIN — IOHEXOL 50 ML: 350 INJECTION, SOLUTION INTRAVENOUS at 11:46

## 2024-11-14 ENCOUNTER — RESULTS FOLLOW-UP (OUTPATIENT)
Dept: FAMILY MEDICINE CLINIC | Facility: CLINIC | Age: 79
End: 2024-11-14

## 2024-11-14 DIAGNOSIS — R10.9 RIGHT FLANK PAIN, CHRONIC: Primary | ICD-10-CM

## 2024-11-14 DIAGNOSIS — M41.55 OTHER SECONDARY SCOLIOSIS, THORACOLUMBAR REGION: ICD-10-CM

## 2024-11-14 DIAGNOSIS — I25.118 CORONARY ARTERY DISEASE OF NATIVE ARTERY OF NATIVE HEART WITH STABLE ANGINA PECTORIS (HCC): ICD-10-CM

## 2024-11-14 DIAGNOSIS — G89.29 RIGHT FLANK PAIN, CHRONIC: Primary | ICD-10-CM

## 2024-11-14 DIAGNOSIS — S22.080S CLOSED WEDGE COMPRESSION FRACTURE OF T12 VERTEBRA, SEQUELA: ICD-10-CM

## 2024-11-14 DIAGNOSIS — M47.26 OSTEOARTHRITIS OF SPINE WITH RADICULOPATHY, LUMBAR REGION: ICD-10-CM

## 2024-11-14 RX ORDER — ROSUVASTATIN CALCIUM 40 MG/1
40 TABLET, COATED ORAL DAILY
Qty: 90 TABLET | Refills: 1 | Status: SHIPPED | OUTPATIENT
Start: 2024-11-14

## 2024-11-15 ENCOUNTER — EVALUATION (OUTPATIENT)
Dept: PHYSICAL THERAPY | Facility: CLINIC | Age: 79
End: 2024-11-15
Payer: MEDICARE

## 2024-11-15 DIAGNOSIS — G89.29 RIGHT FLANK PAIN, CHRONIC: ICD-10-CM

## 2024-11-15 DIAGNOSIS — M41.55 OTHER SECONDARY SCOLIOSIS, THORACOLUMBAR REGION: ICD-10-CM

## 2024-11-15 DIAGNOSIS — M47.26 OSTEOARTHRITIS OF SPINE WITH RADICULOPATHY, LUMBAR REGION: ICD-10-CM

## 2024-11-15 DIAGNOSIS — R10.9 RIGHT FLANK PAIN, CHRONIC: ICD-10-CM

## 2024-11-15 DIAGNOSIS — S22.080S CLOSED WEDGE COMPRESSION FRACTURE OF T12 VERTEBRA, SEQUELA: ICD-10-CM

## 2024-11-15 PROCEDURE — 97530 THERAPEUTIC ACTIVITIES: CPT

## 2024-11-15 PROCEDURE — 97162 PT EVAL MOD COMPLEX 30 MIN: CPT

## 2024-11-15 NOTE — PROGRESS NOTES
PT Evaluation     Today's date: 11/15/2024  Patient name: Bella Sargent  : 1945  MRN: [id number]  Referring provider: Dewey Barrera MD  Dx:   Encounter Diagnosis     ICD-10-CM    1. Right flank pain, chronic  R10.9 Ambulatory Referral to Physical Therapy    G89.29       2. Osteoarthritis of spine with radiculopathy, lumbar region  M47.26 Ambulatory Referral to Physical Therapy      3. Closed wedge compression fracture of T12 vertebra, sequela  S22.080S Ambulatory Referral to Physical Therapy      4. Other secondary scoliosis, thoracolumbar region  M41.55 Ambulatory Referral to Physical Therapy                     Assessment    Assessment details: IE:Bella Sargent is a pleasant 78 y.o. female presents with signs and symptoms consistent with:   Other secondary scoliosis, thoracolumbar region    Problem List:  1) Impaired strength   2) Impaired Motor control     Comparable signs:  1) Standing  2) Walking     she has impaired strength, impaired ROM and leg length discrepency secondary to scoliosis and resulting in worry over not knowing what's wrong and fear of not being able to keep active. These impairments listed above are preventing the patient from participating in functional activity. No further referral appears necessary at this time based upon examination results, and is negative for any red flags. Prognosis is good given HEP compliance and attendance to physical therapy 2x a week.  Positive prognostic indicators include positive attitude toward recovery and good understanding of diagnosis and treatment plan options.  Negative prognostic indicators include chronicity of symptoms.  Patent will benefit from skilled physical therapy at this time to address deficits to improve overall function and return to PLOF. Patient verbalized understanding of POC, HEP, and return demonstrated HEP. All questions were answered to patients satisfaction.     Please contact me if you have any questions or recommendations.  Thank you for the referral and the opportunity to share in Bella Sargent's care.            Understanding of Dx/Px/POC: good     Prognosis: good    Goals  Impairment Goals 4-6 weeks  In order to improve and maximize function patient will be able to...  - Decrease pain intensity to <2/10  - Improve lumbar AROM to >100% throughout  - Increase hip strength to 5/5 throughout  - Centralize symptoms and decrease numbness frequency/duration    Functional Goals 6-8 weeks   In order to improve and maximize function patient will be able to...  - Return to Prior Level of Function with no greater than 2/10 pain   - Increase Functional Status Measure (FOTO) to: >predicted outcomes  - Be independent and compliant with HEP  - Tolerate sitting and or standing without increased pain/compensation/difficulty for at least 30 minutes  - Perform sit to stand without increased pain/compensation/difficulty   - Return to gentle yoga with minimal discomfort              Plan  Patient would benefit from: skilled physical therapy    Planned therapy interventions: home exercise program    Frequency: 2x week  Duration in weeks: 8  Treatment plan discussed with: patient        Subjective Evaluation    History of Present Illness  Mechanism of injury: Patient presents to PT with chronic low back pain. She reports that lately she has been doing okay after discharge but she reports increase in pain in her mid back and difficulty sleeping. She did have a CT scan which was negative. She reports difficulty straightening her spine and decreased posture at 2 pm. Worse in afternoon, and needs rest and then able to do more afterward. She does Yoga for back pain which has been helping. She feels that she needs a boost to get back on track. She reports that the back has been increasing some discomfort. She forgot some of the exercises from previous sessions  Patient Goals  Patient goals for therapy: decreased pain and independence with ADLs/IADLs  Patient goal:  stand up straight without pain, walk a mile, understand her symptoms (progressing)  Pain  Current pain ratin  At worst pain ratin (at night)  Location: low back  Quality: dull ache and burning  Alleviating factors: laying flat.  Aggravating factors: walking, standing and stair climbing    Social Support  Lives with: alone      Diagnostic Tests  X-ray: normal        Objective     Concurrent Complaints  Positive for bladder dysfunction. Negative for night pain, disturbed sleep, bowel dysfunction and saddle (S4) numbness    Postural Observations  Seated posture: fair  Standing posture: fair    Additional Postural Observation Details  Elevated pelvis on left side  Shortened limb on left ( 1 cm difference)    Active Range of Motion   Cervical/Thoracic Spine       Thoracic    Flexion:  Restriction level: moderate  Extension:  Restriction level: moderate  Left lateral flexion:  Restriction level: moderate  Right lateral flexion:  Restriction level: moderate  Left rotation:  Restriction level: moderate  Right rotation:  Restriction level: moderate    Lumbar   Flexion:  Restriction level: moderate  Extension:  Restriction level: moderate  Left lateral flexion:  Restriction level: moderate  Right lateral flexion:  Restriction level: moderate  Left rotation:  Restriction level: moderate  Right rotation:  Restriction level: moderate  Mechanical Assessment    Cervical      Thoracic      Lumbar    Standing flexion:    Pain location:no change  Standing extension:   Pain location: no change    Strength/Myotome Testing     Left Hip   Planes of Motion   Flexion: 4-  Extension: 4- (challenged with engaging glutes)  Abduction: 4-  External rotation: 4-    Right Hip   Planes of Motion   Flexion: 4-  Extension: 4- (challenged with engaging glutes)  Abduction: 4-  External rotation: 4-    Left Knee   Flexion: 5  Extension: 5    Right Knee   Flexion: 5  Extension: 5    Left Ankle/Foot   Dorsiflexion: 5  Plantar flexion: 5    Right  Ankle/Foot   Dorsiflexion: 5  Plantar flexion: 5    Muscle Activation   Patient able to activate left transverse abdominals, left multifidus, right transverse abdominals and right multifidus.     General Comments:    Lower quarter screen   Hips: unremarkable  Knees: unremarkable  Foot/ankle: unremarkable    Lumbar Comments  Hypomobility throughout thoracic spine  Rib mobility on L side minimal  30s STS 10               POC Expires Auth Status Start Date Expiration Date PT Visit Limit    one month today/date: 11/15/2024       Date        Used        Remaining           Diagnosis:  Scoliosis    Precautions:  See chart   Comparable signs 1) walking   2) standing    Primary Impairments: 1) Strength   2) mobility    Patient Goals Manange symptoms   Manual Therapy today/date: 11/15/2024        PPU w/ OP         PA glides         T/s mobs         Hip mobs         Re-evaluation          Exercise Diary          Therapeutic Exercise         Bike/TM for ROM         PPU         Hip sags/standing ext         T/s extension                                    Neuromuscular Re-education         Multif NMR         Bridges          Bridges march          Bird dogs          Y                                    Therapeutic Activities         Education  POC, diagnosis, expecations        [place] carries                                    Modalities

## 2024-11-19 ENCOUNTER — OFFICE VISIT (OUTPATIENT)
Dept: PHYSICAL THERAPY | Facility: CLINIC | Age: 79
End: 2024-11-19
Payer: MEDICARE

## 2024-11-19 DIAGNOSIS — S22.080S CLOSED WEDGE COMPRESSION FRACTURE OF T12 VERTEBRA, SEQUELA: ICD-10-CM

## 2024-11-19 DIAGNOSIS — M41.55 OTHER SECONDARY SCOLIOSIS, THORACOLUMBAR REGION: ICD-10-CM

## 2024-11-19 DIAGNOSIS — R10.9 RIGHT FLANK PAIN, CHRONIC: Primary | ICD-10-CM

## 2024-11-19 DIAGNOSIS — M47.26 OSTEOARTHRITIS OF SPINE WITH RADICULOPATHY, LUMBAR REGION: ICD-10-CM

## 2024-11-19 DIAGNOSIS — G89.29 RIGHT FLANK PAIN, CHRONIC: Primary | ICD-10-CM

## 2024-11-19 PROCEDURE — 97110 THERAPEUTIC EXERCISES: CPT

## 2024-11-19 PROCEDURE — 97112 NEUROMUSCULAR REEDUCATION: CPT

## 2024-11-19 NOTE — PROGRESS NOTES
"Daily Note     Today's date: 2024  Patient name: Bella Sargent  : 1945  MRN: [id number]  Referring provider: Dewey Barrera MD  Dx:   Encounter Diagnosis     ICD-10-CM    1. Right flank pain, chronic  R10.9     G89.29       2. Osteoarthritis of spine with radiculopathy, lumbar region  M47.26       3. Closed wedge compression fracture of T12 vertebra, sequela  S22.080S       4. Other secondary scoliosis, thoracolumbar region  M41.55                      Subjective: Patient reports continue mid back pain       Objective: See treatment diary below      Assessment: Tolerated treatment well. Patient demonstrated fatigue post treatment, exhibited good technique with therapeutic exercises, and would benefit from continued PT. Mobility addressed to this date with good response. Cued mobility with breathing to allow for improved t/s extension. Trailed repeated motions with no significant change.       Plan: Continue per plan of care.  Progress treatment as tolerated.         POC Expires Auth Status Start Date Expiration Date PT Visit Limit    one month today/date: 11/15/2024       Date        Used        Remaining           Diagnosis:  Scoliosis    Precautions:  See chart   Comparable signs 1) walking   2) standing    Primary Impairments: 1) Strength   2) mobility    Patient Goals Manange symptoms   Manual Therapy today/date: 11/15/2024 11/19       PPU w/ OP         PA glides         T/s mobs         Hip mobs         Re-evaluation          Exercise Diary          Therapeutic Exercise         UBE   5' retro       PPU         Hip sags/standing ext         LTR   2x10       T/s extension         Ball rolls   2x10 5\" w/ ext        Repeated t/s rot  20x ea       Jcarlos pose with rot  2x10 5\" ea       Cat cow  2x10 5\"       Neuromuscular Re-education         Multif NMR         No monies/hitchers  2x10 GTB ea       Bridges          Bridges march          Bird dogs          Y                                  "   Therapeutic Activities         Education  POC, diagnosis, expecations        [place] carries                                    Modalities

## 2024-11-20 ENCOUNTER — OFFICE VISIT (OUTPATIENT)
Dept: PHYSICAL THERAPY | Facility: CLINIC | Age: 79
End: 2024-11-20
Payer: MEDICARE

## 2024-11-20 DIAGNOSIS — G89.29 RIGHT FLANK PAIN, CHRONIC: Primary | ICD-10-CM

## 2024-11-20 DIAGNOSIS — S22.080S CLOSED WEDGE COMPRESSION FRACTURE OF T12 VERTEBRA, SEQUELA: ICD-10-CM

## 2024-11-20 DIAGNOSIS — M47.26 OSTEOARTHRITIS OF SPINE WITH RADICULOPATHY, LUMBAR REGION: ICD-10-CM

## 2024-11-20 DIAGNOSIS — M41.55 OTHER SECONDARY SCOLIOSIS, THORACOLUMBAR REGION: ICD-10-CM

## 2024-11-20 DIAGNOSIS — R10.9 RIGHT FLANK PAIN, CHRONIC: Primary | ICD-10-CM

## 2024-11-20 PROCEDURE — 97110 THERAPEUTIC EXERCISES: CPT

## 2024-11-20 PROCEDURE — 97112 NEUROMUSCULAR REEDUCATION: CPT

## 2024-11-20 NOTE — PROGRESS NOTES
Daily Note     Today's date: 2024  Patient name: Bella Sargent  : 1945  MRN: [id number]  Referring provider: Dewey Barrera MD  Dx:   Encounter Diagnosis     ICD-10-CM    1. Right flank pain, chronic  R10.9     G89.29       2. Osteoarthritis of spine with radiculopathy, lumbar region  M47.26       3. Closed wedge compression fracture of T12 vertebra, sequela  S22.080S       4. Other secondary scoliosis, thoracolumbar region  M41.55             Start Time: 845  Stop Time: 930  Total time in clinic (min): 45 minutes    Subjective: Patient reports improvements in thoracic/lumbar sxs after last session and noted less discomfort at night which is when her sxs usually are heightened.      Objective: See treatment diary below      Assessment: Tolerated treatment well. Continued with current POC focused on thoracic mobility, strength, and postural training exercises to improve functional deficits, with an appropriate level of challenge demonstrated by patient throughout session. Added thoracic ext to promote improvements in thoracic mobility and postural deficits, with good tolerance and response noted by patient. Supine bridges introduced into patient's program as well which was challenging, but did not cause any exacerbations of sxs throughout exercise. Continue to progress patient as able. Patient demonstrated fatigue post treatment, exhibited good technique with therapeutic exercises, and would benefit from continued PT to address thoracolumbar deficits in order to maximize overall functional ability.    Plan: Continue per plan of care.  Progress treatment as tolerated.         POC Expires Auth Status Start Date Expiration Date PT Visit Limit    one month today/date: 11/15/2024       Date        Used        Remaining           Diagnosis:  Scoliosis    Precautions:  See chart   Comparable signs 1) walking   2) standing    Primary Impairments: 1) Strength   2) mobility    Patient Goals Manange symptoms  "  Manual Therapy today/date: 11/15/2024 11/19 11/20      PPU w/ OP         PA glides         T/s mobs         Hip mobs         Re-evaluation          Exercise Diary          Therapeutic Exercise         UBE   5' retro 5' retro      PPU         Hip sags/standing ext         LTR   2x10 2x10      T/s extension   5\"x10      Ball rolls   2x10 5\" w/ ext  2x10 5\" w/ ext      Repeated t/s rot  20x ea 20x ea      Jcarlos pose with rot  2x10 5\" ea 2x10 5\" ea      Cat cow  2x10 5\" 2x10 5\"      Neuromuscular Re-education         Multif NMR         No monies/hitchers  2x10 GTB ea 2x10 GTB ea      Bridges    2x10 5\"      Bridges march          Bird dogs          Y                                    Therapeutic Activities         Education  POC, diagnosis, expecations        [place] carries                                    Modalities                              "

## 2024-11-25 ENCOUNTER — OFFICE VISIT (OUTPATIENT)
Dept: PHYSICAL THERAPY | Facility: CLINIC | Age: 79
End: 2024-11-25
Payer: MEDICARE

## 2024-11-25 DIAGNOSIS — S22.080S CLOSED WEDGE COMPRESSION FRACTURE OF T12 VERTEBRA, SEQUELA: ICD-10-CM

## 2024-11-25 DIAGNOSIS — M47.26 OSTEOARTHRITIS OF SPINE WITH RADICULOPATHY, LUMBAR REGION: ICD-10-CM

## 2024-11-25 DIAGNOSIS — G89.29 RIGHT FLANK PAIN, CHRONIC: Primary | ICD-10-CM

## 2024-11-25 DIAGNOSIS — R10.9 RIGHT FLANK PAIN, CHRONIC: Primary | ICD-10-CM

## 2024-11-25 PROCEDURE — 97112 NEUROMUSCULAR REEDUCATION: CPT

## 2024-11-25 PROCEDURE — 97110 THERAPEUTIC EXERCISES: CPT

## 2024-11-25 NOTE — PROGRESS NOTES
"Daily Note     Today's date: 2024  Patient name: Bella Sargent  : 1945  MRN: [id number]  Referring provider: Dewey Barrera MD  Dx:   Encounter Diagnosis     ICD-10-CM    1. Right flank pain, chronic  R10.9     G89.29       2. Osteoarthritis of spine with radiculopathy, lumbar region  M47.26       3. Closed wedge compression fracture of T12 vertebra, sequela  S22.080S                        Subjective: Patient reports improvements in thoracic/lumbar sxs after last session and noted less discomfort at night which is when her sxs usually are heightened.      Objective: See treatment diary below      Assessment: Tolerated treatment well. Continued with current POC  and addressed posture and lengthening lumbar spine. She was challenged with core engagement to the date with verbal cueing needed for posterior pelvic tilt Patient demonstrated fatigue post treatment, exhibited good technique with therapeutic exercises, and would benefit from continued PT to address thoracolumbar deficits in order to maximize overall functional ability.    Plan: Continue per plan of care.  Progress treatment as tolerated.         POC Expires Auth Status Start Date Expiration Date PT Visit Limit    one month today/date: 11/15/2024       Date        Used        Remaining           Diagnosis:  Scoliosis    Precautions:  See chart   Comparable signs 1) walking   2) standing    Primary Impairments: 1) Strength   2) mobility    Patient Goals Manange symptoms   Manual Therapy today/date: 11/15/2024 11/19 11/20 11/25     PPU w/ OP         PA glides         T/s mobs         Hip mobs         Re-evaluation          Exercise Diary          Therapeutic Exercise         UBE   5' retro 5' retro 5' retro     PPU         Hip sags/standing ext         LTR   2x10 2x10      T/s extension   5\"x10 5\"x10     Ball rolls   2x10 5\" w/ ext  2x10 5\" w/ ext 2x10 5\" w/ ext     Repeated t/s rot  20x ea 20x ea      Jcarlos pose with rot  2x10 5\" ea 2x10 5\" ea " "2x10 5\" ea     Cat cow  2x10 5\" 2x10 5\" 2x10 5\"      Neuromuscular Re-education         Multif NMR         No monies/hitchers  2x10 GTB ea 2x10 GTB ea      Bridges    2x10 5\" 2x10 5\"     Bridges march          Dad bugs    Arms YMB 2x10  Heel taps 2x10     Bird dogs          Y                                    Therapeutic Activities         Education  POC, diagnosis, expecations        [place] carries                                    Modalities                              "

## 2024-11-26 ENCOUNTER — APPOINTMENT (OUTPATIENT)
Dept: PHYSICAL THERAPY | Facility: CLINIC | Age: 79
End: 2024-11-26
Payer: MEDICARE

## 2024-11-29 ENCOUNTER — TELEPHONE (OUTPATIENT)
Dept: FAMILY MEDICINE CLINIC | Facility: CLINIC | Age: 79
End: 2024-11-29

## 2024-11-29 NOTE — TELEPHONE ENCOUNTER
LMOM to informed patient that provider will be out of the office. Patient needs to reschedule 12/02/24 appointment. Please reschedule appointment.

## 2024-12-02 ENCOUNTER — OFFICE VISIT (OUTPATIENT)
Dept: PHYSICAL THERAPY | Facility: CLINIC | Age: 79
End: 2024-12-02
Payer: MEDICARE

## 2024-12-02 DIAGNOSIS — S22.080S CLOSED WEDGE COMPRESSION FRACTURE OF T12 VERTEBRA, SEQUELA: ICD-10-CM

## 2024-12-02 DIAGNOSIS — R10.9 RIGHT FLANK PAIN, CHRONIC: Primary | ICD-10-CM

## 2024-12-02 DIAGNOSIS — M47.26 OSTEOARTHRITIS OF SPINE WITH RADICULOPATHY, LUMBAR REGION: ICD-10-CM

## 2024-12-02 DIAGNOSIS — G89.29 RIGHT FLANK PAIN, CHRONIC: Primary | ICD-10-CM

## 2024-12-02 DIAGNOSIS — M41.55 OTHER SECONDARY SCOLIOSIS, THORACOLUMBAR REGION: ICD-10-CM

## 2024-12-02 PROCEDURE — 97112 NEUROMUSCULAR REEDUCATION: CPT

## 2024-12-02 PROCEDURE — 97110 THERAPEUTIC EXERCISES: CPT

## 2024-12-02 NOTE — PROGRESS NOTES
Daily Note     Today's date: 2024  Patient name: Bella Sargent  : 1945  MRN: [id number]  Referring provider: Dewey Barrera MD  Dx:   Encounter Diagnosis     ICD-10-CM    1. Right flank pain, chronic  R10.9     G89.29       2. Osteoarthritis of spine with radiculopathy, lumbar region  M47.26       3. Closed wedge compression fracture of T12 vertebra, sequela  S22.080S       4. Other secondary scoliosis, thoracolumbar region  M41.55           Start Time: 1445  Stop Time: 1530  Total time in clinic (min): 45 minutes      Subjective: Patient reports improvements overall with thoracic/lumbar sxs since starting PT, and notes when sxs are exacerbated (like they were this morning), patient lays down for 20 minutes and experiences relief when on her feet again.    Objective: See treatment diary below      Assessment: Tolerated treatment well. Continued with current POC, focused on improving patient's posture and lengthening lumbar spine, with an appropriate level of challenge demonstrated throughout session. Patient continues to experience relief of thoracolumbar tightness and motion restrictions with all lumbar mobility exercises/stretches performed today, with the most relief noted during LTRs and pball rollouts. Patient required verbal cues and reminders to maintain proper core activation and breathing techniques during deadbug exercise today, with good carryover noted. Continue to progress patient as able. Patient demonstrated fatigue post treatment, exhibited good technique with therapeutic exercises, and would benefit from continued PT to address thoracolumbar deficits in order to maximize overall functional ability.      Plan: Continue per plan of care.  Progress treatment as tolerated.           POC Expires Auth Status Start Date Expiration Date PT Visit Limit    one month today/date: 11/15/2024       Date        Used        Remaining           Diagnosis:  Scoliosis    Precautions:  See chart  "  Comparable signs 1) walking   2) standing    Primary Impairments: 1) Strength   2) mobility    Patient Goals Manange symptoms   Manual Therapy today/date: 11/15/2024 11/19 11/20 11/25 12/2    PPU w/ OP         PA chantell         T/s mobs         Hip mobs         Re-evaluation          Exercise Diary          Therapeutic Exercise         UBE   5' retro 5' retro 5' retro 5' retro    PPU         Hip sags/standing ext         LTR   2x10 2x10  2x10 5\"    T/s extension   5\"x10 5\"x10 5\"x10    Ball rolls   2x10 5\" w/ ext  2x10 5\" w/ ext 2x10 5\" w/ ext 2x10 5\" w/ ext    Repeated t/s rot  20x ea 20x ea      Jcarlos pose with rot  2x10 5\" ea 2x10 5\" ea 2x10 5\" ea 2x10 5\" ea    Cat cow  2x10 5\" 2x10 5\" 2x10 5\"  2x10 5\"    Neuromuscular Re-education         Multif NMR         No monies/hitchers  2x10 GTB ea 2x10 GTB ea      Bridges    2x10 5\" 2x10 5\" 2x10 5\"    Bridges march          Dad bugs    Arms YMB 2x10  Heel taps 2x10 YMB OH press 2x10    Bird dogs          Y                                    Therapeutic Activities         Education  POC, diagnosis, expecations        [facility] carries                                    Modalities                              "

## 2024-12-04 ENCOUNTER — OFFICE VISIT (OUTPATIENT)
Dept: PHYSICAL THERAPY | Facility: CLINIC | Age: 79
End: 2024-12-04
Payer: MEDICARE

## 2024-12-04 DIAGNOSIS — R10.9 RIGHT FLANK PAIN, CHRONIC: Primary | ICD-10-CM

## 2024-12-04 DIAGNOSIS — G89.29 RIGHT FLANK PAIN, CHRONIC: Primary | ICD-10-CM

## 2024-12-04 DIAGNOSIS — M41.55 OTHER SECONDARY SCOLIOSIS, THORACOLUMBAR REGION: ICD-10-CM

## 2024-12-04 DIAGNOSIS — S22.080S CLOSED WEDGE COMPRESSION FRACTURE OF T12 VERTEBRA, SEQUELA: ICD-10-CM

## 2024-12-04 DIAGNOSIS — M47.26 OSTEOARTHRITIS OF SPINE WITH RADICULOPATHY, LUMBAR REGION: ICD-10-CM

## 2024-12-04 PROCEDURE — 97110 THERAPEUTIC EXERCISES: CPT

## 2024-12-04 PROCEDURE — 97112 NEUROMUSCULAR REEDUCATION: CPT

## 2024-12-04 NOTE — PROGRESS NOTES
Daily Note     Today's date: 2024  Patient name: Bella Sargent  : 1945  MRN: [id number]  Referring provider: Dewey Barrera MD  Dx:   Encounter Diagnosis     ICD-10-CM    1. Right flank pain, chronic  R10.9     G89.29       2. Osteoarthritis of spine with radiculopathy, lumbar region  M47.26       3. Closed wedge compression fracture of T12 vertebra, sequela  S22.080S       4. Other secondary scoliosis, thoracolumbar region  M41.55             Start Time: 0800  Stop Time: 0845  Total time in clinic (min): 45 minutes      Subjective: Patient reports improvements overall with thoracic/lumbar sxs, and notes sxs are usually aggravated around 2pm, but before then, only experiences mild sxs.    Objective: See treatment diary below      Assessment: Tolerated treatment well. Continued with current POC, focused on improving patient's posture and lengthening lumbar spine, with an appropriate level of challenge demonstrated throughout session. Added bird dog exercise today to further improve core activation and stability with both UE and LE movement. Patient was challenged initially for patient due to core/lumbar weakness causing imbalance and instability when alternating motions, therefore modified exercise by first performing 10 reps of only alt LE, and then progressing to alt UE/LE, with good tolerance displayed by patient. Patient required minimal curing to avoid rotating hips/pelvis during exercise to prevent compensations, with good carryover noted. Continue to progress patient as able. Patient demonstrated fatigue post treatment, exhibited good technique with therapeutic exercises, and would benefit from continued PT to address thoracolumbar deficits in order to maximize overall functional ability.      Plan: Continue per plan of care.  Progress treatment as tolerated.           POC Expires Auth Status Start Date Expiration Date PT Visit Limit    one month today/date: 11/15/2024       Date        Used       [facility] Medicine Progress Note  Date of Service: 05/22/2019         Assessment & Plan     Stefanie Velazquez is a 76 year old female with a history of MI, colon cancer, type 2 diabetes, GERD, hyper thyroidism, hyperlipidemia, obesity, RC, hypertension, and recurrent DVTs admitted on 5/21/2019. She presents following a syncopal episode that occurred while seated.     Syncope  Syncopal episode that occurred while seated in her wheelchair.  No prodrome.  Some full body shaking noted by bystanders.  No incontinence.  No postictal period.  ECG shows sinus bradycardia @ 51 without ischemic changes.  CT head negative. Mildly anemic, consistent with baseline. Neuro exam grossly normal.  Presence of a prodrome is primarily concerning for arrhythmia versus seizure.  No incontinence, tongue biting, or postictal period, suggesting syncope is the more likely diagnosis.  - Telemetry- so far nsr and sinus dilip to 40's ( no sx here)  - Echo - pending  - Carotid US- 50-69%bilat int carotid. Not severe enough to cause syncope  - Venous US of legs- neg for dvt  - Troponin- nl  - Orthostatics- slight rise in hr but this may just be from the activity of standing. Was not symptomatic  -  VQ  Ordered and pending     H/O deep venous thrombosis  Right lower extremity DVTs September 2018 and February 2019.  She was no longer anticoagulated at the time of the second DVT.  History of rectal cancer, not active at the time of the DVTs.  Anticoagulated on Eliquis.  - Continue Eliquis.     Chronic ischemic heart disease  S/P CABG x 3 2002. PCI 2002 with stent placement.  No recent episodes of chest pain, dyspnea, JOAQUIN, or palpitations. On metoprolol ER 25 mg, simvastatin 40 mg.  - Continue metoprolo but lower dose of toprol bc of bradycardial, simvastatin.     Hypothyroidism  TSH 0.48 on 5/2/19.  Managed with Synthroid 88 mcg every day except Sunday, which she takes 44 mcg.  - Continue  "  Remaining           Diagnosis:  Scoliosis    Precautions:  See chart   Comparable signs 1) walking   2) standing    Primary Impairments: 1) Strength   2) mobility    Patient Goals Manange symptoms   Manual Therapy today/date: 11/15/2024 11/19 11/20 11/25 12/2 12/4   PPU w/ OP         PA chantell         T/s mobs         Hip mobs         Re-evaluation          Exercise Diary          Therapeutic Exercise         UBE   5' retro 5' retro 5' retro 5' retro 5' retro   PPU         Hip sags/standing ext         LTR *  2x10 2x10  2x10 5\" 2x10 5\"   T/s extension   5\"x10 5\"x10 5\"x10 5\"x10   Ball rolls   2x10 5\" w/ ext  2x10 5\" w/ ext 2x10 5\" w/ ext 2x10 5\" w/ ext 2x10 5\" w/ ext   Repeated t/s rot  20x ea 20x ea      Jcarlos pose with rot*  2x10 5\" ea 2x10 5\" ea 2x10 5\" ea 2x10 5\" ea 2x10 5\" ea   Cat cow*  2x10 5\" 2x10 5\" 2x10 5\"  2x10 5\" 2x10 5\" ea   Neuromuscular Re-education         Multif NMR         No monies/hitchers  2x10 GTB ea 2x10 GTB ea      Bridges*   2x10 5\" 2x10 5\" 2x10 5\" 2x10 5\"   Bridges march          Dad bugs*    Arms YMB 2x10  Heel taps 2x10 YMB OH press 2x10 Dead bug only 2x10 5\"   Bird dogs*      10x alt LE  10x alt LE/UE   Y                                    Therapeutic Activities         Education  POC, diagnosis, expecations        [place] carries                                    Modalities                              " Synthroid.     Esophageal reflux  - Continue omeprazole.     RC moderate  Not currently using CPAP.     Hyperlipidemia  - Continue simvastatin 40 mg.     Rectal cancer  Diagnosed with rectal adenocarcinoma (13 cm mass) Jan 2011. Completed chemo and resection. Follows with Dr. Martinez.     Anemia, likely due to CKD  Hgb 10.8 on admission,  Stable since 3/2019. Will check fe studies  - F/U with PCP     Benign essential hypertension  Patient blood pressures reviewed, appears well controlled on metoprolol succinate 25 mg and Lasix 20 mg.  - Continue Lasix and metoprolol.bc of hr occ in 40's and as of now no clear cause for syncope, I will decrease toprol to 12.5 daily     Restless leg syndrome  Chronic, unchanged.  Well managed with Requip 3 times daily.  - Continue Requip.     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  Takes NovoLog 10 units with breakfast, 12 units with lunch, and 12 units with dinner. Lantus 20 units at bedtime.  Gabapentin 300 mg at bedtime for neuropathy.  - Continue gabapentin, Novolog and Lantus.  - Medium correction scale.  - Hypo/hyperglycemia protocols.     CKD stage 4  Creatinine 1.64, GFR 30 on admission, consistent with her baseline.            Diet: Moderate Consistent CHO Diet    DVT Prophylaxis: Pneumatic Compression Devices  Lr Catheter: not present  Code Status: Full  Disposition Plan- await results of v/q, echo. If normal could discharge home with patch monitor tomorrow       Sandra Self       Interval History    Feels good. Not lightheaded. No cp. No spells overnight    Physical Exam   Temp:  [97  F (36.1  C)-97.9  F (36.6  C)] 97  F (36.1  C)  Pulse:  [49-69] 55  Heart Rate:  [55-64] 55  Resp:  [16-20] 20  BP: (112-157)/(40-63) 130/59  SpO2:  [93 %-96 %] 93 %    Weights:   Vitals:    05/21/19 1502   Weight: 93.6 kg (206 lb 5.6 oz)    Body mass index is 40.3 kg/m .  Tele- nsr or sinus dilip.  Overnight low was 48.  In 40's for a period today 10:55 am 11:15 am  Orthostatics- lying 118/58  pulse 49,  Standing 125/61 pulse 60    Constitutional: nad  CV: Regular  Respiratory: CTA bilaterally  GI: Soft, nontender  Skin: Warm and dry  Musculoskeletal:no edema  Neuro- moves all extrem equally    Data   Recent Labs   Lab 05/22/19  0007 05/21/19  1813 05/21/19  1101   WBC  --   --  3.8*   HGB  --   --  10.8*   MCV  --   --  86   PLT  --   --  215   NA  --   --  138   POTASSIUM  --   --  3.6   CHLORIDE  --   --  106   CO2  --   --  27   BUN  --   --  33*   CR  --   --  1.64*   ANIONGAP  --   --  5   DWIGHT  --   --  9.2   GLC  --   --  176*   ALBUMIN  --   --  2.9*   PROTTOTAL  --   --  6.5*   BILITOTAL  --   --  0.3   ALKPHOS  --   --  121   ALT  --   --  16   AST  --   --  18   TROPI <0.015 <0.015 <0.015       Recent Labs   Lab 05/22/19  0155 05/21/19  2146 05/21/19  1701 05/21/19  1101   GLC  --   --   --  176*   * 146* 267*  --         Imaging  Recent Results (from the past 24 hour(s))   US Carotid Bilateral    Narrative    BILATERAL CAROTID ULTRASOUND May 22, 2019 12:21 AM     HISTORY: Syncope.    COMPARISON: None.    RIGHT CAROTID FINDINGS: There is minimal to mild atherosclerotic  plaque at the carotid bifurcation and proximal internal carotid  artery.  Right ICA PSV:  176  cm/sec.  Right ICA EDV:  11 cm/sec.  Right ICA/CCA PSV Ratio:  1.09.    These indicate  50 - 69%  diameter stenosis of the right ICA.    Right Vertebral: Antegrade flow.   Right ECA: Antegrade flow.     LEFT CAROTID FINDINGS: There is mild smooth atherosclerotic  plaque/intimal thickening in the common carotid and proximal internal  carotid arteries.  Left ICA PSV:  165  cm/sec.  Left ICA EDV:  21 cm/sec.  Left ICA/CCA PSV Ratio:  0.98.    These indicate  50 - 69%  diameter stenosis of the left ICA.    Left Vertebral: Antegrade flow.   Left ECA: Antegrade flow.     Causes of Decreased Accuracy: Overall, exam was challenging as  patient  was unable to cooperate throughout the exam.      Impression    IMPRESSION:    1. 50-69% diameter stenosis of the right internal carotid artery  relative to the distal internal carotid artery diameter.  2. 50-69% diameter stenosis of the left internal carotid artery  relative to the distal internal carotid artery diameter.    SAPNA ANDERSON MD   US Lower Extremity Venous Duplex Bilateral    Narrative    ULTRASOUND VENOUS BILATERAL LOWER EXTREMITIES WITH DOPPLER  5/22/2019  12:21 AM     HISTORY: Syncopal episode. Suspect deep venous thrombus and pulmonary  embolus.    COMPARISON: None.    TECHNIQUE: Spectral waveform and color Doppler evaluation were  performed.    FINDINGS: Normal compressibility of bilateral common femoral, femoral,  popliteal, posterior tibial and peroneal veins. The greater saphenous  veins are not visualized bilaterally. Unremarkable Doppler waveform  evaluation of bilateral common femoral, femoral and popliteal veins.      Impression    IMPRESSION: No evidence of thrombus in the major veins of bilateral  lower extremities.    GAVIN RODAS MD            Medications       apixaban ANTICOAGULANT  5 mg Oral BID     furosemide  20 mg Oral BID     gabapentin  300 mg Oral At Bedtime     insulin aspart  1-7 Units Subcutaneous TID AC     insulin aspart  1-5 Units Subcutaneous At Bedtime     insulin aspart  10 Units Subcutaneous QAM AC     insulin aspart  12 Units Subcutaneous Daily with lunch     insulin aspart  12 Units Subcutaneous Daily with supper     insulin glargine  20 Units Subcutaneous At Bedtime     [START ON 5/26/2019] levothyroxine  44 mcg Oral Weekly     levothyroxine  88 mcg Oral Daily     metoprolol succinate ER  12.5 mg Oral Daily     omeprazole  20 mg Oral BID AC     potassium chloride  10 mEq Oral Daily     simvastatin  40 mg Oral Daily       Sandra Self

## 2024-12-09 ENCOUNTER — CONSULT (OUTPATIENT)
Dept: FAMILY MEDICINE CLINIC | Facility: CLINIC | Age: 79
End: 2024-12-09
Payer: MEDICARE

## 2024-12-09 ENCOUNTER — APPOINTMENT (OUTPATIENT)
Dept: PHYSICAL THERAPY | Facility: CLINIC | Age: 79
End: 2024-12-09
Payer: MEDICARE

## 2024-12-09 VITALS
WEIGHT: 112 LBS | OXYGEN SATURATION: 100 % | HEART RATE: 57 BPM | SYSTOLIC BLOOD PRESSURE: 118 MMHG | BODY MASS INDEX: 19.84 KG/M2 | DIASTOLIC BLOOD PRESSURE: 60 MMHG | HEIGHT: 63 IN | TEMPERATURE: 96.9 F | RESPIRATION RATE: 18 BRPM

## 2024-12-09 DIAGNOSIS — Z01.818 PREOP EXAMINATION: Primary | ICD-10-CM

## 2024-12-09 DIAGNOSIS — E78.2 MIXED HYPERLIPIDEMIA: ICD-10-CM

## 2024-12-09 DIAGNOSIS — H25.019 CORTICAL AGE-RELATED CATARACT, UNSPECIFIED LATERALITY: ICD-10-CM

## 2024-12-09 DIAGNOSIS — M81.6 LOCALIZED OSTEOPOROSIS WITHOUT CURRENT PATHOLOGICAL FRACTURE: ICD-10-CM

## 2024-12-09 DIAGNOSIS — Z95.1 S/P CABG X 3: ICD-10-CM

## 2024-12-09 DIAGNOSIS — I10 ESSENTIAL HYPERTENSION: ICD-10-CM

## 2024-12-09 DIAGNOSIS — I25.118 CORONARY ARTERY DISEASE OF NATIVE ARTERY OF NATIVE HEART WITH STABLE ANGINA PECTORIS (HCC): ICD-10-CM

## 2024-12-09 PROCEDURE — G2211 COMPLEX E/M VISIT ADD ON: HCPCS | Performed by: NURSE PRACTITIONER

## 2024-12-09 PROCEDURE — 99214 OFFICE O/P EST MOD 30 MIN: CPT | Performed by: NURSE PRACTITIONER

## 2024-12-09 NOTE — PROGRESS NOTES
Pre-operative Clearance  Name: Bella Sargent      : 1945      MRN: [id number]  Encounter Provider: RACHEL Steve  Encounter Date: 2024   Encounter department: [facility]    Assessment & Plan  Preop examination         Cortical age-related cataract, unspecified laterality         Coronary artery disease of native artery of native heart with stable angina pectoris (HCC)         Essential hypertension         Localized osteoporosis without current pathological fracture         S/P CABG x 3         Mixed hyperlipidemia         Pre-operative Clearance:     Revised Cardiac Risk Index:  RCI RISK CLASS II (1 risk factor, risk of major cardiac complications approximately 1.3%)    Clearance:  Patient is medically optimized (CLEARED) for proposed surgery without any additional cardiac testing.      Medication Instructions:   - May take tylenol for pain up until the night before surgery    - ACE Inhibitors or ARBs: Continue this medication up to the evening before surgery/procedure, but do not take the morning of the day of surgery.  - Beta blockers:  Continue to take this medication on your normal schedule.  - Calcium channel blockers: Continue to take this medication on your normal schedule.  - Hyperlipidemia meds: Continue to take this medication on your normal schedule.       History of Present Illness     Preop  Left cataract      Pre-op Exam  Surgery: left cataract  Anticipated Date of Surgery: 2025  Surgeon:     Left cataract surgery      Previous history of bleeding disorders or clots?: No  Previous Anesthesia reaction?: No  Prolonged steroid use in the last 6 months?: No    Assessment of Cardiac Risk:   - Unstable or severe angina or MI in the last 6 weeks or history of stent placement in the last year?: No   - Decompensated heart failure (e.g. New onset heart failure, NYHA  Class IV heart failure, or worsening existing heart failure)?: No  - Significant  arrhythmias such as high grade AV block, symptomatic ventricular arrhythmia, newly recognized ventricular tachycardia, supraventricular tachycardia with resting heart rate >100, or symptomatic bradycardia?: No  - Severe heart valve disease including aortic stenosis or symptomatic mitral stenosis?: No      Pre-operative Risk Factors:  Elevated-risk surgery: No    History of cerebrovascular disease: No    History of ischemic heart disease: Yes    Pre-operative treatment with insulin: No  Pre-operative creatinine >2 mg/dL: No    History of congestive heart failure: No    Duke Activity Status Index (DASI):   DASI Total Score: 36.2  METs: 7.2    Medications of Perioperative Concern:   Calcium channel blockers and Beta blockers    Review of Systems   Constitutional:  Negative for fatigue and fever.   HENT:  Negative for congestion, postnasal drip and rhinorrhea.    Eyes:  Positive for visual disturbance. Negative for photophobia.   Respiratory:  Negative for cough, shortness of breath and wheezing.    Cardiovascular:  Negative for chest pain and palpitations.   Gastrointestinal:  Negative for constipation, diarrhea, nausea and vomiting.   Genitourinary:  Negative for dysuria and frequency.   Musculoskeletal:  Negative for arthralgias, gait problem and neck pain.   Skin:  Negative for rash.   Neurological:  Negative for dizziness, light-headedness and headaches.   Hematological:  Negative for adenopathy.   Psychiatric/Behavioral:  Negative for dysphoric mood and sleep disturbance. The patient is not nervous/anxious.      Past Medical History   Past Medical History:   Diagnosis Date    Anxiety     Arthritis     Colon polyp     Coronary artery disease     Effusion of left knee     Last assessed - 9/10/15    History of transfusion     Hyperlipidemia     Hypertension     Memory loss     Myocardial infarction (HCC)     Protein-calorie malnutrition, unspecified severity (HCC) 11/14/2023    Scoliosis     Tick bite     Last  assessed - 4/21/17     Past Surgical History:   Procedure Laterality Date    APPENDECTOMY      CARDIAC SURGERY      COLONOSCOPY      CORONARY ARTERY BYPASS GRAFT      [place] CORONARY ARTERY BYP W/VEIN & ARTERY GRAFT 4 VEIN N/A 01/27/2020    Procedure: CORONARY ARTERY BYPASS GRAFT (CABG) x3 VESSELS, LIMA TO LAD, AND SVG TO PLB & OM;  Surgeon: Peter Colmenares DO;  [facility];  Service: Cardiac Surgery    [place] ECHO TRANSESOPHAG R-T 2D W/PRB IMG ACQUISJ I&R N/A 01/27/2020    Procedure: TRANSESOPHAGEAL ECHOCARDIOGRAM (GET);  Surgeon: Peter Colmenares DO;  [facility];  Service: Cardiac Surgery    [place] NDSC SURG W/VIDEO-ASSISTED HARVEST VEIN CABG Left 01/27/2020    Procedure: HARVEST VEIN ENDOSCOPIC (EVH);  Surgeon: Peter Colmenares DO;  [facility];  Service: Cardiac Surgery    TONSILLECTOMY      Last assessed - 4/20/17    TUBAL LIGATION      Last assessed - 4/20/17    TUMOR REMOVAL  1998    pelvic benign tumor removal    WISDOM TOOTH EXTRACTION      Last assessed - 4/20/17     Family History   Problem Relation Age of Onset    Coronary artery disease Mother     Arthritis Mother     Other Mother         Cardiac Disorder     Transient ischemic attack Mother     Heart disease Mother     Hearing loss Mother     Heart attack Father     Sudden death Father         SCD    No Known Problems Sister     No Known Problems Sister     Cancer Daughter 49        kidney    No Known Problems Daughter     No Known Problems Daughter     No Known Problems Paternal Aunt     Breast cancer Niece 46     Social History     Tobacco Use    Smoking status: Never     Passive exposure: Past    Smokeless tobacco: Never   Vaping Use    Vaping status: Never Used   Substance and Sexual Activity    Alcohol use: Yes     Alcohol/week: 3.0 standard drinks of alcohol     Types: 3 Glasses of wine per week     Comment: 1 wine nightly    Drug use: No    Sexual activity: Not Currently     Current Outpatient Medications on File Prior to  "Visit   Medication Sig    acetaminophen (TYLENOL) 650 mg CR tablet Take 650 mg by mouth daily as needed for mild pain    amLODIPine (NORVASC) 5 mg tablet TAKE ONE TABLET BY MOUTH EVERY DAY    aspirin (ECOTRIN LOW STRENGTH) 81 mg EC tablet Take 1 tablet (81 mg total) by mouth daily    cabergoline (DOSTINEX) 0.5 MG tablet TAKE 1/2 TABLET BY MOUTH ONCE A WEEK (PATIENT SHOULD USE PILL CUTTER TO CUT THE PILLS)    Calcium Carb-Cholecalciferol 600-200 MG-UNIT TABS Calcium 600 + D TABS  TAKE 1 TABLET DAILY.   Refills: 0    Active    denosumab (PROLIA) 60 mg/mL Inject 60 mg under the skin every 6 (six) months    losartan (COZAAR) 100 MG tablet Take 1 tablet (100 mg total) by mouth daily    Magnesium 200 MG TABS 200 mg    metoprolol succinate (TOPROL-XL) 25 mg 24 hr tablet Take 1 tablet (25 mg total) by mouth daily TAKING ONE TABLET DAILY IN THE MORNING    rosuvastatin (CRESTOR) 40 MG tablet TAKE 1 TABLET DAILY    Turmeric 500 MG CAPS 500 mg    VITAMIN D, CHOLECALCIFEROL, PO Take 2,600 Units/day by mouth     Allergies   Allergen Reactions    Thiazide-Type Diuretics Other (See Comments)     Hyponatremia     Objective   /60 (BP Location: Left arm, Patient Position: Sitting, Cuff Size: Standard)   Pulse 57   Temp (!) 96.9 °F (36.1 °C) (Tympanic)   Resp 18   Ht 5' 3\" (1.6 m)   Wt 50.8 kg (112 lb)   SpO2 100%   BMI 19.84 kg/m²     Physical Exam  Vitals and nursing note reviewed.   Constitutional:       Appearance: Normal appearance.   HENT:      Head: Normocephalic and atraumatic.      Right Ear: Tympanic membrane, ear canal and external ear normal.      Left Ear: Tympanic membrane, ear canal and external ear normal.      Nose: Nose normal.      Mouth/Throat:      Mouth: Mucous membranes are moist.   Eyes:      Extraocular Movements: Extraocular movements intact.      Conjunctiva/sclera: Conjunctivae normal.      Pupils: Pupils are equal, round, and reactive to light.   Cardiovascular:      Rate and Rhythm: Normal " rate and regular rhythm.      Heart sounds: Normal heart sounds.   Pulmonary:      Effort: Pulmonary effort is normal.      Breath sounds: Normal breath sounds.   Abdominal:      General: Bowel sounds are normal.      Palpations: Abdomen is soft.   Musculoskeletal:         General: Normal range of motion.      Cervical back: Normal range of motion and neck supple.   Skin:     General: Skin is warm and dry.      Capillary Refill: Capillary refill takes less than 2 seconds.   Neurological:      General: No focal deficit present.      Mental Status: She is alert and oriented to person, place, and time.   Psychiatric:         Mood and Affect: Mood normal.         Behavior: Behavior normal.         Thought Content: Thought content normal.         Judgment: Judgment normal.           RACHEL Steve

## 2024-12-10 ENCOUNTER — OFFICE VISIT (OUTPATIENT)
Dept: CARDIOLOGY CLINIC | Facility: CLINIC | Age: 79
End: 2024-12-10
Payer: MEDICARE

## 2024-12-10 VITALS
SYSTOLIC BLOOD PRESSURE: 120 MMHG | OXYGEN SATURATION: 96 % | DIASTOLIC BLOOD PRESSURE: 60 MMHG | BODY MASS INDEX: 19.84 KG/M2 | HEIGHT: 63 IN | WEIGHT: 112 LBS | HEART RATE: 58 BPM

## 2024-12-10 DIAGNOSIS — I10 ESSENTIAL HYPERTENSION: ICD-10-CM

## 2024-12-10 DIAGNOSIS — I49.1 PAC (PREMATURE ATRIAL CONTRACTION): ICD-10-CM

## 2024-12-10 DIAGNOSIS — Z95.1 S/P CABG X 3: ICD-10-CM

## 2024-12-10 DIAGNOSIS — Z01.810 PREOPERATIVE CARDIOVASCULAR EXAMINATION: Primary | ICD-10-CM

## 2024-12-10 DIAGNOSIS — I25.118 CORONARY ARTERY DISEASE OF NATIVE ARTERY OF NATIVE HEART WITH STABLE ANGINA PECTORIS (HCC): ICD-10-CM

## 2024-12-10 PROCEDURE — 93000 ELECTROCARDIOGRAM COMPLETE: CPT | Performed by: INTERNAL MEDICINE

## 2024-12-10 PROCEDURE — 99214 OFFICE O/P EST MOD 30 MIN: CPT | Performed by: INTERNAL MEDICINE

## 2024-12-10 NOTE — PROGRESS NOTES
Cardiology Follow Up    Bella Sargent  1945  [id number]  [facility]  [place] [facility] [address] [phone]    1. Preoperative cardiovascular examination  POCT ECG      2. S/P CABG x 3  POCT ECG      3. Coronary artery disease of native artery of native heart with stable angina pectoris (HCC)  POCT ECG      4. Essential hypertension  POCT ECG      5. PAC (premature atrial contraction)  POCT ECG          Discussion/Summary:    No contraindication to proceeding with her cataract surgery. No medication changes are necessary. Does not appear that she was asked to hold her aspirin for 5 days but if necessary, she could. EKG unchanged from before.    Coronary artery disease: Prior CABG. Last ischemic evaluation in April 2024 was unremarkable. Currently without any symptoms of angina. Blood pressure is well-controlled. Her lipid panel looks very good, also.    Hypertension: She had hyponatremia in the past with hydrochlorothiazide. She is on amlodipine, losartan, metoprolol. Although interested in tapering off medications, her blood pressure is excellent would not make any changes currently.    Palpitations previously. There was a concern for A-fib at 1 point but this was not borne out to be the case. These have been controlled. Continue her beta-blocker. No repeat testing necessary.    Can follow-up with me in 1 year.    Previous History:  Non ST elevation in January, 2020.  She had a cardiac catheterization with calcified vessels and multivessel CAD.  She ultimately did underwent bypass surgery with 3 grafts. LIMA to the LAD, vein grafts to OM and Ramus.  Her EF is preserved.  She was started on appropriate medical therapy.  She was kept on Imdur for 30 days after her bypass because the size of the LIMA was small and felt to be somewhat spasmodic, but subsequently stopped.    ER visit in 10/2020 for a near syncopal episode and some  symptoms which she felt were similar to her MI. Subsequent stress test unremarkable.    Was feeling palpitations, and got an alert from her Apple watch saying there was atrial fibrillation. She started on Eliquis, and additional testing was done.  Does not appear that this was truly AFib when I correlated what was noted on the watch with a zio patch. Seems more PACs. Eliquis was stopped.     Interval history:    She is going to get cataract surgery at the beginning of January. Comes for preoperative evaluation.    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 She was doing well at the time. She continues deny any cardiac complaints. She is not feeling any chest pain, shortness of breath, palpitations. Blood pressure has been under very good control when checked at recent office visits.    No changes to her medical therapy.    She did have a stress test done in April and it showed no perfusion defects.    Reports that her sister who is younger unfortunately had a pretty significant heart attack recently and has developed a cardiomyopathy.    Problem List       Mixed hyperlipidemia    Coronary artery disease of native artery of native heart with stable angina pectoris (HCC)    Overview Signed 1/24/2020  4:00 PM by Christie Hopkins     Added automatically from request for surgery [id number]         Arthritis    Cervical myofascial pain syndrome    Cervical radiculopathy    Cervicogenic headache    Cervico-occipital neuralgia    Depression    Essential hypertension    Osteopenia of spine    Spasmodic torticollis    Laceration of occipital scalp    Other secondary scoliosis, lumbar region    NSTEMI (non-ST elevation myocardial infarction) (HCC)    Syncope    S/P CABG x 3    Anemia    Thrombocytopenia (HCC)    Hyperchloremia    Chylothorax    Screening for colon cancer    Overview Signed 4/14/2020  8:34 AM by Aj Steele MD     Pt referred to GI for colon cancer screening.         History of colon  polyps          Past Medical History:   Diagnosis Date    Anxiety     Arthritis     Colon polyp     Coronary artery disease     Effusion of left knee     Last assessed - 9/10/15    History of transfusion     Hyperlipidemia     Hypertension     Memory loss     Myocardial infarction (HCC)     Protein-calorie malnutrition, unspecified severity (HCC) 11/14/2023    Scoliosis     Tick bite     Last assessed - 4/21/17     Social History     Tobacco Use    Smoking status: Never     Passive exposure: Past    Smokeless tobacco: Never   Vaping Use    Vaping status: Never Used   Substance Use Topics    Alcohol use: Yes     Alcohol/week: 3.0 standard drinks of alcohol     Types: 3 Glasses of wine per week     Comment: 1 wine nightly    Drug use: No      Family History   Problem Relation Age of Onset    Coronary artery disease Mother     Arthritis Mother     Other Mother         Cardiac Disorder     Transient ischemic attack Mother     Heart disease Mother     Hearing loss Mother     Heart attack Father     Sudden death Father         SCD    No Known Problems Sister     No Known Problems Sister     Cancer Daughter 49        kidney    No Known Problems Daughter     No Known Problems Daughter     No Known Problems Paternal Aunt     Breast cancer Niece 46     Past Surgical History:   Procedure Laterality Date    APPENDECTOMY      CARDIAC SURGERY      COLONOSCOPY      CORONARY ARTERY BYPASS GRAFT      [place] CORONARY ARTERY BYP W/VEIN & ARTERY GRAFT 4 VEIN N/A 01/27/2020    Procedure: CORONARY ARTERY BYPASS GRAFT (CABG) x3 VESSELS, LIMA TO LAD, AND SVG TO PLB & OM;  Surgeon: Peter Colmenares DO;  [facility];  Service: Cardiac Surgery    [place] ECHO TRANSESOPHAG R-T 2D W/PRB IMG ACQUISJ I&R N/A 01/27/2020    Procedure: TRANSESOPHAGEAL ECHOCARDIOGRAM (GET);  Surgeon: Peter Colmenares DO;  [facility];  Service: Cardiac Surgery    SC NDSC SURG W/VIDEO-ASSISTED HARVEST VEIN CABG Left 01/27/2020    Procedure: HARVEST VEIN  "ENDOSCOPIC (EVH);  Surgeon: Peter Colmenares DO;  [facility];  Service: Cardiac Surgery    TONSILLECTOMY      Last assessed - 4/20/17    TUBAL LIGATION      Last assessed - 4/20/17    TUMOR REMOVAL  1998    pelvic benign tumor removal    WISDOM TOOTH EXTRACTION      Last assessed - 4/20/17       Current Outpatient Medications:     acetaminophen (TYLENOL) 650 mg CR tablet, Take 650 mg by mouth daily as needed for mild pain, Disp: , Rfl:     amLODIPine (NORVASC) 5 mg tablet, TAKE ONE TABLET BY MOUTH EVERY DAY, Disp: 90 tablet, Rfl: 1    aspirin (ECOTRIN LOW STRENGTH) 81 mg EC tablet, Take 1 tablet (81 mg total) by mouth daily, Disp: , Rfl:     cabergoline (DOSTINEX) 0.5 MG tablet, TAKE 1/2 TABLET BY MOUTH ONCE A WEEK (PATIENT SHOULD USE PILL CUTTER TO CUT THE PILLS), Disp: 6 tablet, Rfl: 1    Calcium Carb-Cholecalciferol 600-200 MG-UNIT TABS, Calcium 600 + D TABS TAKE 1 TABLET DAILY.  Refills: 0  Active, Disp: , Rfl:     denosumab (PROLIA) 60 mg/mL, Inject 60 mg under the skin every 6 (six) months, Disp: , Rfl:     losartan (COZAAR) 100 MG tablet, Take 1 tablet (100 mg total) by mouth daily, Disp: 90 tablet, Rfl: 1    Magnesium 200 MG TABS, 200 mg, Disp: , Rfl:     metoprolol succinate (TOPROL-XL) 25 mg 24 hr tablet, Take 1 tablet (25 mg total) by mouth daily TAKING ONE TABLET DAILY IN THE MORNING, Disp: , Rfl:     rosuvastatin (CRESTOR) 40 MG tablet, TAKE 1 TABLET DAILY, Disp: 90 tablet, Rfl: 1    Turmeric 500 MG CAPS, 500 mg, Disp: , Rfl:     VITAMIN D, CHOLECALCIFEROL, PO, Take 2,600 Units/day by mouth, Disp: , Rfl:   Allergies   Allergen Reactions    Thiazide-Type Diuretics Other (See Comments)     Hyponatremia       Vitals:    12/10/24 1023   BP: 120/60   BP Location: Right arm   Patient Position: Sitting   Cuff Size: Standard   Pulse: 58   SpO2: 96%   Weight: 50.8 kg (112 lb)   Height: 5' 3\" (1.6 m)     Vitals:    12/10/24 1023   Weight: 50.8 kg (112 lb)      Height: 5' 3\" (160 cm)   Body mass index " "is 19.84 kg/m².    Physical Exam:  GEN: Bella Sargent appears well, alert and oriented x 3, pleasant and cooperative   HEENT: pupils equal, round, and reactive to light; extraocular muscles intact  NECK: supple, no carotid bruits   HEART: regular rhythm, normal S1 and S2, no murmurs, clicks, gallops or rubs   LUNGS: clear to auscultation bilaterally; no wheezes, rales, or rhonchi   ABDOMEN: normal bowel sounds, soft, no tenderness, no distention  EXTREMITIES: peripheral pulses normal; no clubbing, cyanosis, or edema  NEURO: no focal findings   SKIN: normal without suspicious lesions on exposed skin    ROS:  Positive for shortness of breath with incline. Palpitations are improved.  Except as noted in HPI, is otherwise reviewed in detail and a 12 point review of systems is negative.  ROS reviewed and is unchanged    Labs:  Lab Results   Component Value Date     03/03/2015    K 4.1 10/07/2024     10/07/2024    CREATININE 0.58 (L) 10/07/2024    BUN 19 10/07/2024    CO2 26 10/07/2024    ALT 27 11/16/2023    AST 46 (H) 11/16/2023    INR 1.01 01/24/2020    GLUF 86 10/07/2024    WBC 7.21 11/16/2023    HGB 12.9 11/16/2023    HCT 39.3 11/16/2023     11/16/2023     No results found for: \"CHOL\"  Lab Results   Component Value Date    LDLCALC 65 10/07/2024    LDLCALC 62 07/05/2023    LDLCALC 77 01/03/2023     Lab Results   Component Value Date    HDL 53 10/07/2024    HDL 72 07/05/2023    HDL 66 01/03/2023     Lab Results   Component Value Date    TRIG 107 10/07/2024    TRIG 66 07/05/2023    TRIG 88 01/03/2023     Testing:  Stress 4/2024  Stress ECG: No ST deviation is noted. The ECG was not diagnostic due to pharmacological (vasodilator) stress although no ischemic changes are noted with vasodilator infusion.    Stress Function: Left ventricular function post-stress is normal. Stress ejection fraction is > 75%.    Perfusion: There are no perfusion defects.    Stress Combined Conclusion: Left ventricular perfusion " is normal.     Normal pharmacologic nuclear stress test.       Echo 1/20/22:  Left Ventricle: Left ventricular cavity size is normal. The left ventricular ejection fraction is 60%. Systolic function is normal. Wall motion is normal. Diastolic function is normal.    Aortic Valve: There is mild regurgitation.    Mitral Valve: There is mild regurgitation.    Tricuspid Valve: There is mild regurgitation.      Zio patch 12/2021:  Patient had a min HR of 47 bpm, max HR of 184 bpm, and avg HR of 69  bpm. Predominant underlying rhythm was Sinus Rhythm. 13  Supraventricular Tachycardia runs occurred, the run with the fastest  interval lasting 14 beats with a max rate of 184 bpm, the longest lasting  20 beats with an avg rate of 123 bpm. Supraventricular Tachycardia was  detected within +/- 45 seconds of symptomatic patient event(s). Isolated  SVEs were rare (<1.0%), SVE Couplets were rare (<1.0%), and SVE  Triplets were rare (<1.0%). Isolated VEs were rare (<1.0%), and no VE  Couplets or VE Triplets were present.     Agree with above. Brief SVT only, no afib. Only one episode of palpitations correlated with any significant arrhythmia (frequent PACs/brief SVT). Remaining PSVT was asymptomatic and other symptoms of palpitations were sinus rhythm.    Stress Test 11/4/2020:  SUMMARY:  -  Rest ECG: Normal sinus rhythm. Normal baseline ECG.  -  Stress results: Duration of exercise was 8 min and 0 sec. Maximal work rate was 10.1 METs. Target heart rate was achieved. There was resting hypertension with an appropriate blood pressure response to stress. There was no chest pain  during stress.  -  ECG conclusions: The stress ECG was normal. Arrhythmia during stress: isolated atrial premature beats.  -  Perfusion imaging: There were no perfusion defects.  -  Gated SPECT: The calculated left ventricular ejection fraction was 82 %. Left ventricular ejection fraction was within normal limits by visual estimate. There was no left  ventricular regional abnormality.     IMPRESSIONS: Normal study after maximal exercise without reproduction of symptoms. Myocardial perfusion imaging was normal at rest and with stress. Left ventricular systolic function was normal.    Cardiac Cath 1/24/2020:  CORONARY CIRCULATION:  LAD: The vessel was medium sized. The proximal and mid LAD is heavily calcified with diffuse disease of 70-80%.  Circumflex: The vessel was medium sized and heavily calcified.  Ostial circumflex: There was a discrete 85 % stenosis.  Mid circumflex: There was a discrete 85 % stenosis.  1st obtuse marginal: The vessel was small to medium sized. There was a tubular 50 % stenosis.  2nd obtuse marginal: The vessel was small to medium sized. There was a discrete 85 % stenosis.  RCA: The vessel was medium sized. Dominant. There is heavy calcification in the proximal, mid, and distal vessel. There are multiple significant lesions ( 70-90% ) seen throughout this entire area.    Echo 1/2020:  LEFT VENTRICLE:  Systolic function was vigorous. Ejection fraction was estimated to be 70 %.  There were no regional wall motion abnormalities.  Wall thickness was at the upper limits of normal.     RIGHT VENTRICLE:  The size was normal.  Systolic function was normal.     MITRAL VALVE:  There was mild regurgitation.    EKG:  Sinus rhythm, 58 BPM.  Poor R wave progression.

## 2024-12-11 ENCOUNTER — OFFICE VISIT (OUTPATIENT)
Dept: PHYSICAL THERAPY | Facility: CLINIC | Age: 79
End: 2024-12-11
Payer: MEDICARE

## 2024-12-11 DIAGNOSIS — S22.080S CLOSED WEDGE COMPRESSION FRACTURE OF T12 VERTEBRA, SEQUELA: ICD-10-CM

## 2024-12-11 DIAGNOSIS — R10.9 RIGHT FLANK PAIN, CHRONIC: Primary | ICD-10-CM

## 2024-12-11 DIAGNOSIS — M47.26 OSTEOARTHRITIS OF SPINE WITH RADICULOPATHY, LUMBAR REGION: ICD-10-CM

## 2024-12-11 DIAGNOSIS — M41.55 OTHER SECONDARY SCOLIOSIS, THORACOLUMBAR REGION: ICD-10-CM

## 2024-12-11 DIAGNOSIS — G89.29 RIGHT FLANK PAIN, CHRONIC: Primary | ICD-10-CM

## 2024-12-11 PROCEDURE — 97112 NEUROMUSCULAR REEDUCATION: CPT

## 2024-12-11 PROCEDURE — 97110 THERAPEUTIC EXERCISES: CPT

## 2024-12-11 NOTE — PROGRESS NOTES
Daily Note     Today's date: 2024  Patient name: Bella Sargent  : 1945  MRN: [id number]  Referring provider: Dewey Barrera MD  Dx:   Encounter Diagnosis     ICD-10-CM    1. Right flank pain, chronic  R10.9     G89.29       2. Osteoarthritis of spine with radiculopathy, lumbar region  M47.26       3. Closed wedge compression fracture of T12 vertebra, sequela  S22.080S       4. Other secondary scoliosis, thoracolumbar region  M41.55             Start Time: 1000  Stop Time: 1053  Total time in clinic (min): 53 minutes      Subjective: Patient reports no new complaints or major changes since last session.       Objective: See treatment diary below      Assessment: Tolerated treatment well. Continued with current POC, focused on improving patient's posture and lengthening lumbar spine, with an appropriate level of challenge demonstrated throughout session. Added multifidus NMR exercise today to further improve core activation and stability, with good tolerance demonstrated by patient. Some VC required to remind patient to engage core muscle throughout qped exercises, with good carryover noted. Continue to progress patient as able. Patient demonstrated fatigue post treatment, exhibited good technique with therapeutic exercises, and would benefit from continued PT to address thoracolumbar deficits in order to maximize overall functional ability.      Plan: Continue per plan of care.  Progress treatment as tolerated.           POC Expires Auth Status Start Date Expiration Date PT Visit Limit    one month today/date: 11/15/2024       Date        Used        Remaining           Diagnosis:  Scoliosis    Precautions:  See chart   Comparable signs 1) walking   2) standing    Primary Impairments: 1) Strength   2) mobility    Patient Goals Manange symptoms   Manual Therapy    PPU w/ OP         EUGENIA abdul         T/s mobs         Hip mobs         Re-evaluation          Exercise Diary    "       Therapeutic Exercise         UBE  3'/3' 5' retro 5' retro 5' retro 5' retro 5' retro   PPU         Hip sags/standing ext         LTR * 2x10 5\" 2x10 2x10  2x10 5\" 2x10 5\"   T/s extension 5\"x10  5\"x10 5\"x10 5\"x10 5\"x10   Ball rolls  2x10 5\" w/ ext 2x10 5\" w/ ext  2x10 5\" w/ ext 2x10 5\" w/ ext 2x10 5\" w/ ext 2x10 5\" w/ ext   Repeated t/s rot 20x ea 20x ea 20x ea      Jcarlos pose with rot*  2x10 5\" ea 2x10 5\" ea 2x10 5\" ea 2x10 5\" ea 2x10 5\" ea   Cat cow* 2x10 5\" ea 2x10 5\" 2x10 5\" 2x10 5\"  2x10 5\" 2x10 5\" ea   Neuromuscular Re-education         Multif NMR 10x10\"        No monies/hitchers  2x10 GTB ea 2x10 GTB ea      Bridges* 2x10 5\"  2x10 5\" 2x10 5\" 2x10 5\" 2x10 5\"   Bridges march          Dad bugs* Dead bug only 2x10 5\"   Arms YMB 2x10  Heel taps 2x10 YMB OH press 2x10 Dead bug only 2x10 5\"   Bird dogs* 2x10 alt UE/LE     10x alt LE  10x alt LE/UE   Y                                    Therapeutic Activities         Education          [place] carries                                    Modalities                              "

## 2024-12-13 ENCOUNTER — APPOINTMENT (OUTPATIENT)
Dept: PHYSICAL THERAPY | Facility: CLINIC | Age: 79
End: 2024-12-13
Payer: MEDICARE

## 2024-12-16 ENCOUNTER — EVALUATION (OUTPATIENT)
Dept: PHYSICAL THERAPY | Facility: CLINIC | Age: 79
End: 2024-12-16
Payer: MEDICARE

## 2024-12-16 DIAGNOSIS — G89.29 RIGHT FLANK PAIN, CHRONIC: Primary | ICD-10-CM

## 2024-12-16 DIAGNOSIS — M47.26 OSTEOARTHRITIS OF SPINE WITH RADICULOPATHY, LUMBAR REGION: ICD-10-CM

## 2024-12-16 DIAGNOSIS — R10.9 RIGHT FLANK PAIN, CHRONIC: Primary | ICD-10-CM

## 2024-12-16 DIAGNOSIS — S22.080S CLOSED WEDGE COMPRESSION FRACTURE OF T12 VERTEBRA, SEQUELA: ICD-10-CM

## 2024-12-16 PROCEDURE — 97112 NEUROMUSCULAR REEDUCATION: CPT

## 2024-12-16 PROCEDURE — 97140 MANUAL THERAPY 1/> REGIONS: CPT

## 2024-12-16 PROCEDURE — 97110 THERAPEUTIC EXERCISES: CPT

## 2024-12-16 NOTE — PROGRESS NOTES
PT Re-Evaluation     Today's date: 2024  Patient name: Bella Sargent  : 1945  MRN: [id number]  Referring provider: Dewey Barrera MD  Dx:   Encounter Diagnosis     ICD-10-CM    1. Right flank pain, chronic  R10.9     G89.29       2. Osteoarthritis of spine with radiculopathy, lumbar region  M47.26       3. Closed wedge compression fracture of T12 vertebra, sequela  S22.080S                      Assessment  Impairments: abnormal muscle firing, abnormal or restricted ROM, activity intolerance, impaired physical strength, lacks appropriate home exercise program, pain with function, poor posture  and poor body mechanics    Assessment details: RE today/date: 2024 Patient presents for re-evaluation after participating in physical therapy. At this point they have been compliant with HEP and PT to this date and has made progress with ROM and strength and overall pain to this date. At this point they are still lacking core endurance and stabilization. They will continue to benefit from skilled PT to continue to address remaining impairments and progress toward optimal function and improve quality of life.     IE:Bella Sargent is a pleasant 78 y.o. female presents with signs and symptoms consistent with:   Other secondary scoliosis, thoracolumbar region    Problem List:  1) Impaired strength   2) Impaired Motor control     Comparable signs:  1) Standing  2) Walking     she has impaired strength, impaired ROM and leg length discrepency secondary to scoliosis and resulting in worry over not knowing what's wrong and fear of not being able to keep active. These impairments listed above are preventing the patient from participating in functional activity. No further referral appears necessary at this time based upon examination results, and is negative for any red flags. Prognosis is good given HEP compliance and attendance to physical therapy 2x a week.  Positive prognostic indicators include positive attitude  toward recovery and good understanding of diagnosis and treatment plan options.  Negative prognostic indicators include chronicity of symptoms.  Patent will benefit from skilled physical therapy at this time to address deficits to improve overall function and return to PLOF. Patient verbalized understanding of POC, HEP, and return demonstrated HEP. All questions were answered to patients satisfaction.     Please contact me if you have any questions or recommendations. Thank you for the referral and the opportunity to share in Bella Sargent's care.            Understanding of Dx/Px/POC: good     Prognosis: good    Goals  Impairment Goals 4-6 weeks Progressing 12/16/24  In order to improve and maximize function patient will be able to...  - Decrease pain intensity to <2/10  - Improve lumbar AROM to >100% throughout  - Increase hip strength to 5/5 throughout  - Centralize symptoms and decrease numbness frequency/duration    Functional Goals 6-8 weeks Progressing 12/16/24  In order to improve and maximize function patient will be able to...  - Return to Prior Level of Function with no greater than 2/10 pain   - Increase Functional Status Measure (FOTO) to: >predicted outcomes  - Be independent and compliant with HEP  - Tolerate sitting and or standing without increased pain/compensation/difficulty for at least 30 minutes  - Perform sit to stand without increased pain/compensation/difficulty   - Return to gentle yoga with minimal discomfort              Plan  Patient would benefit from: skilled physical therapy  Planned modality interventions: cryotherapy, thermotherapy: hydrocollator packs and TENS    Planned therapy interventions: home exercise program    Frequency: 2x week  Duration in weeks: 8  Treatment plan discussed with: patient      Subjective Evaluation    History of Present Illness  Mechanism of injury: RE 12/16 Shelly presents to PT today with 50% improvement in her low back. She reports that it is sore today.  She reports that her endurance is slightly better. She reports the mobility is better, but still struggling with the strength. Sitting in a normal upright posture flares her up. She continues to need to lay down in the middle of afternoon.     IE:Patient presents to PT with chronic low back pain. She reports that lately she has been doing okay after discharge but she reports increase in pain in her mid back and difficulty sleeping. She did have a CT scan which was negative. She reports difficulty straightening her spine and decreased posture at 2 pm. Worse in afternoon, and needs rest and then able to do more afterward. She does Yoga for back pain which has been helping. She feels that she needs a boost to get back on track. She reports that the back has been increasing some discomfort. She forgot some of the exercises from previous sessions  Patient Goals  Patient goals for therapy: decreased pain and independence with ADLs/IADLs  Patient goal: stand up straight without pain, walk a mile, understand her symptoms (progressing)  Pain  Current pain ratin  At worst pain ratin (at night)  Location: low back  Quality: dull ache and burning  Alleviating factors: laying flat.  Aggravating factors: walking, standing and stair climbing    Social Support  Lives with: alone      Diagnostic Tests  X-ray: normal      Objective     Concurrent Complaints  Positive for bladder dysfunction. Negative for night pain, disturbed sleep, bowel dysfunction and saddle (S4) numbness    Postural Observations  Seated posture: fair  Standing posture: fair    Additional Postural Observation Details  Elevated pelvis on left side  Shortened limb on left ( 1 cm difference)    Active Range of Motion   Cervical/Thoracic Spine       Thoracic    Flexion:  Restriction level: minimal  Extension:  Restriction level: moderate  Left lateral flexion:  Restriction level: minimal  Right lateral flexion:  Restriction level: minimal  Left rotation:   Restriction level: moderate  Right rotation:  Restriction level: moderate    Lumbar   Flexion:  Restriction level: moderate  Extension:  Restriction level: moderate  Left lateral flexion:  Restriction level: moderate  Right lateral flexion:  Restriction level: moderate  Left rotation:  Restriction level: moderate  Right rotation:  Restriction level: moderate    Additional Active Range of Motion Details  Hypomobility in hip extension   + elys test   Mechanical Assessment    Cervical      Thoracic      Lumbar    Standing flexion:    Pain location:no change  Standing extension:   Pain location: no change    Strength/Myotome Testing     Left Hip   Planes of Motion   Flexion: 4-  Extension: 4- (challenged with engaging glutes)  Abduction: 4-  External rotation: 4-    Right Hip   Planes of Motion   Flexion: 4-  Extension: 4- (challenged with engaging glutes)  Abduction: 4-  External rotation: 4-    Left Knee   Flexion: 5  Extension: 5    Right Knee   Flexion: 5  Extension: 5    Left Ankle/Foot   Dorsiflexion: 5  Plantar flexion: 5    Right Ankle/Foot   Dorsiflexion: 5  Plantar flexion: 5    Additional Strength Details  Increased multifidi activation and difficulty stabilizing core with hip extension    Muscle Activation   Patient able to activate left transverse abdominals, left multifidus, right transverse abdominals and right multifidus.     General Comments:    Lower quarter screen   Hips: unremarkable  Knees: unremarkable  Foot/ankle: unremarkable    Lumbar Comments  Hypomobility throughout thoracic spine  Rib mobility on L side minimal  30s STS 10               POC Expires Auth Status Start Date Expiration Date PT Visit Limit    one month today/date: 12/16/2024       Date        Used        Remaining           Diagnosis:  Scoliosis    Precautions:  See chart   Comparable signs 1) walking   2) standing    Primary Impairments: 1) Strength   2) mobility    Patient Goals Manange symptoms   Manual Therapy today/date:  12/16/2024        PPU w/ OP         EUGENIA abdul         T/s mobs         Hip mobs         Re-evaluation          Exercise Diary          Therapeutic Exercise         Bike/TM for ROM         PPU         Hip sags/standing ext         T/s extension                                    Neuromuscular Re-education         Multif NMR         Bridges          [facility] march          Bird dogs          Y                                    Therapeutic Activities         Education  POC, diagnosis, expecations        [place] carries                                    Modalities

## 2024-12-19 ENCOUNTER — OFFICE VISIT (OUTPATIENT)
Dept: PHYSICAL THERAPY | Facility: CLINIC | Age: 79
End: 2024-12-19
Payer: MEDICARE

## 2024-12-19 ENCOUNTER — TELEPHONE (OUTPATIENT)
Age: 79
End: 2024-12-19

## 2024-12-19 DIAGNOSIS — M47.26 OSTEOARTHRITIS OF SPINE WITH RADICULOPATHY, LUMBAR REGION: ICD-10-CM

## 2024-12-19 DIAGNOSIS — R10.9 RIGHT FLANK PAIN, CHRONIC: Primary | ICD-10-CM

## 2024-12-19 DIAGNOSIS — S22.080S CLOSED WEDGE COMPRESSION FRACTURE OF T12 VERTEBRA, SEQUELA: ICD-10-CM

## 2024-12-19 DIAGNOSIS — G89.29 RIGHT FLANK PAIN, CHRONIC: Primary | ICD-10-CM

## 2024-12-19 PROCEDURE — 97110 THERAPEUTIC EXERCISES: CPT

## 2024-12-19 PROCEDURE — 97112 NEUROMUSCULAR REEDUCATION: CPT

## 2024-12-19 PROCEDURE — 97530 THERAPEUTIC ACTIVITIES: CPT

## 2024-12-19 NOTE — PROGRESS NOTES
"Daily Note     Today's date: 2024  Patient name: Bella Sargent  : 1945  MRN: [id number]  Referring provider: Dewey Barrera MD  Dx:   Encounter Diagnosis     ICD-10-CM    1. Right flank pain, chronic  R10.9     G89.29       2. Osteoarthritis of spine with radiculopathy, lumbar region  M47.26       3. Closed wedge compression fracture of T12 vertebra, sequela  S22.080S                      Subjective: Patient reports that she feels okay to this date.       Objective: See treatment diary below      Assessment: Tolerated treatment well. Patient demonstrated fatigue post treatment, exhibited good technique with therapeutic exercises, and would benefit from continued PT. Patient lacks scapular endurance to this date and decreased core endurance. She required close supervision with incorporation in balance exercises. She demonstrates good improved mobility and needed verbal cueing for posture when fatigued.       Plan: Continue per plan of care.  Progress treatment as tolerated.         POC Expires Auth Status Start Date Expiration Date PT Visit Limit    one month today/date: 11/15/2024       Date        Used        Remaining           Diagnosis:  Scoliosis    Precautions:  See chart   Comparable signs 1) walking   2) standing    Primary Impairments: 1) Strength   2) mobility    Patient Goals Manange symptoms   Manual Therapy    PPU w/ OP         PA glides         T/s mobs         Hip mobs         Re-evaluation          Exercise Diary          Therapeutic Exercise         UBE  3'/3' 5' retro  5' retro 5' retro 5' retro 5' retro   PPU         Hip sags/standing ext         LTR * 2x10 5\" 2x10 2x10  2x10 5\" 2x10 5\"   T/s extension 5\"x10  5\"x10 5\"x10 5\"x10 5\"x10   Ball rolls  2x10 5\" w/ ext  2x10 5\" w/ ext 2x10 5\" w/ ext 2x10 5\" w/ ext 2x10 5\" w/ ext   Repeated t/s rot 20x ea 20x ea 20x ea      Jcarlos pose with rot*  2x10 2x10 5\" ea 2x10 5\" ea 2x10 5\" ea 2x10 5\" ea   Cat cow* 2x10 " "5\" ea  2x10 5\" 2x10 5\"  2x10 5\" 2x10 5\" ea   Neuromuscular Re-education         Multif NMR 10x10\"        No monies/hitchers   2x10 GTB ea      Bridges* 2x10 5\"  2x10 5\" 2x10 5\" 2x10 5\" 2x10 5\"   Bridges march          Dad bugs* Dead bug only 2x10 5\"   Arms YMB 2x10  Heel taps 2x10 YMB OH press 2x10 Dead bug only 2x10 5\"   Bird dogs* 2x10 alt UE/LE     10x alt LE  10x alt LE/UE   Y  RTB 2x10       Rot rows  2x10       Retro walks   10x 12#                 Therapeutic Activities         Education          [place] carries                                    Modalities                              "

## 2024-12-23 ENCOUNTER — HOSPITAL ENCOUNTER (OUTPATIENT)
Dept: RADIOLOGY | Facility: HOSPITAL | Age: 79
Discharge: HOME | End: 2024-12-23
Payer: MEDICARE

## 2024-12-23 ENCOUNTER — APPOINTMENT (OUTPATIENT)
Dept: PHYSICAL THERAPY | Facility: CLINIC | Age: 79
End: 2024-12-23
Payer: MEDICARE

## 2024-12-23 VITALS — WEIGHT: 112 LBS | HEIGHT: 63 IN | BODY MASS INDEX: 19.84 KG/M2

## 2024-12-23 DIAGNOSIS — M81.0 OSTEOPOROSIS, UNSPECIFIED OSTEOPOROSIS TYPE, UNSPECIFIED PATHOLOGICAL FRACTURE PRESENCE: ICD-10-CM

## 2024-12-23 PROCEDURE — 77080 DXA BONE DENSITY AXIAL: CPT

## 2024-12-26 ENCOUNTER — RESULTS FOLLOW-UP (OUTPATIENT)
Dept: ENDOCRINOLOGY | Facility: CLINIC | Age: 79
End: 2024-12-26

## 2024-12-30 ENCOUNTER — OFFICE VISIT (OUTPATIENT)
Dept: PHYSICAL THERAPY | Facility: CLINIC | Age: 79
End: 2024-12-30
Payer: MEDICARE

## 2024-12-30 DIAGNOSIS — M47.26 OSTEOARTHRITIS OF SPINE WITH RADICULOPATHY, LUMBAR REGION: ICD-10-CM

## 2024-12-30 DIAGNOSIS — R10.9 RIGHT FLANK PAIN, CHRONIC: Primary | ICD-10-CM

## 2024-12-30 DIAGNOSIS — S22.080S CLOSED WEDGE COMPRESSION FRACTURE OF T12 VERTEBRA, SEQUELA: ICD-10-CM

## 2024-12-30 DIAGNOSIS — G89.29 RIGHT FLANK PAIN, CHRONIC: Primary | ICD-10-CM

## 2024-12-30 PROCEDURE — 97112 NEUROMUSCULAR REEDUCATION: CPT

## 2024-12-30 PROCEDURE — 97110 THERAPEUTIC EXERCISES: CPT

## 2024-12-30 NOTE — PROGRESS NOTES
"Daily Note     Today's date: 2024  Patient name: Bella Sargent  : 1945  MRN: [id number]  Referring provider: Dewey Barrera MD  Dx:   Encounter Diagnosis     ICD-10-CM    1. Right flank pain, chronic  R10.9     G89.29       2. Osteoarthritis of spine with radiculopathy, lumbar region  M47.26       3. Closed wedge compression fracture of T12 vertebra, sequela  S22.080S                      Subjective: Patient reports that she feels like she has fallen off the band wagon with hosting all the holidays.       Objective: See treatment diary below      Assessment: Tolerated treatment well. Patient demonstrated fatigue post treatment, exhibited good technique with therapeutic exercises, and would benefit from continued PT. Patient responded well to core activation with emphasis on upper and lower strengthening. She needed rest breaks due to fatigue and close guarding with higher level balance exercises.       Plan: Continue per plan of care.  Progress treatment as tolerated.   Pt was 10 minutes late due to traffic        POC Expires Auth Status Start Date Expiration Date PT Visit Limit    one month today/date: 11/15/2024       Date        Used        Remaining           Diagnosis:  Scoliosis    Precautions:  See chart   Comparable signs 1) walking   2) standing    Primary Impairments: 1) Strength   2) mobility    Patient Goals Manange symptoms   Manual Therapy    PPU w/ OP         PA glides         T/s mobs         Hip mobs         Re-evaluation          Exercise Diary          Therapeutic Exercise         UBE  3'/3' 5' retro  5' retro 5' retro 5' retro 5' retro   PPU         Hip sags/standing ext         LTR * 2x10 5\" 2x10   2x10 5\" 2x10 5\"   T/s extension 5\"x10  2x10 5\"x10 5\"x10 5\"x10   Ball rolls  2x10 5\" w/ ext   2x10 5\" w/ ext 2x10 5\" w/ ext 2x10 5\" w/ ext   Repeated t/s rot 20x ea 20x ea       Jcarlos pose with rot*  2x10  2x10 5\" ea 2x10 5\" ea 2x10 5\" ea   Cat cow* 2x10 " "5\" ea   2x10 5\"  2x10 5\" 2x10 5\" ea   Neuromuscular Re-education         Multif NMR 10x10\"        No monies/hitchers         Bridges* 2x10 5\"   2x10 5\" 2x10 5\" 2x10 5\"   Bridges march          Dad bugs* Dead bug only 2x10 5\"   Arms YMB 2x10  Heel taps 2x10 YMB OH press 2x10 Dead bug only 2x10 5\"   Bird dogs* 2x10 alt UE/LE     10x alt LE  10x alt LE/UE   Y  RTB 2x10 RTB 2x10      Rot rows  2x10 2x10       Retro walks   10x 12#  12# 10x      KB passes   10# split stance simone      Therapeutic Activities         Education          [place] carries   2z10                                 Modalities                              "

## 2025-01-05 ENCOUNTER — RA CDI HCC (OUTPATIENT)
Dept: OTHER | Facility: HOSPITAL | Age: 80
End: 2025-01-05

## 2025-01-06 ENCOUNTER — APPOINTMENT (OUTPATIENT)
Dept: PHYSICAL THERAPY | Facility: CLINIC | Age: 80
End: 2025-01-06
Payer: MEDICARE

## 2025-01-07 ENCOUNTER — APPOINTMENT (OUTPATIENT)
Dept: PHYSICAL THERAPY | Facility: CLINIC | Age: 80
End: 2025-01-07
Payer: MEDICARE

## 2025-01-09 ENCOUNTER — APPOINTMENT (OUTPATIENT)
Dept: PHYSICAL THERAPY | Facility: CLINIC | Age: 80
End: 2025-01-09
Payer: MEDICARE

## 2025-01-13 ENCOUNTER — OFFICE VISIT (OUTPATIENT)
Dept: FAMILY MEDICINE CLINIC | Facility: CLINIC | Age: 80
End: 2025-01-13
Payer: MEDICARE

## 2025-01-13 VITALS
HEIGHT: 63 IN | TEMPERATURE: 96.8 F | WEIGHT: 112 LBS | HEART RATE: 56 BPM | OXYGEN SATURATION: 99 % | RESPIRATION RATE: 16 BRPM | DIASTOLIC BLOOD PRESSURE: 62 MMHG | SYSTOLIC BLOOD PRESSURE: 118 MMHG | BODY MASS INDEX: 19.84 KG/M2

## 2025-01-13 DIAGNOSIS — R32 URINARY INCONTINENCE, UNSPECIFIED TYPE: ICD-10-CM

## 2025-01-13 DIAGNOSIS — Z00.00 MEDICARE ANNUAL WELLNESS VISIT, SUBSEQUENT: Primary | ICD-10-CM

## 2025-01-13 DIAGNOSIS — K21.9 GASTROESOPHAGEAL REFLUX DISEASE WITHOUT ESOPHAGITIS: ICD-10-CM

## 2025-01-13 DIAGNOSIS — Z86.0100 HISTORY OF COLON POLYPS: ICD-10-CM

## 2025-01-13 DIAGNOSIS — D69.6 THROMBOCYTOPENIA (HCC): ICD-10-CM

## 2025-01-13 DIAGNOSIS — I25.118 CORONARY ARTERY DISEASE OF NATIVE ARTERY OF NATIVE HEART WITH STABLE ANGINA PECTORIS (HCC): ICD-10-CM

## 2025-01-13 DIAGNOSIS — E22.1 HYPERPROLACTINEMIA (HCC): ICD-10-CM

## 2025-01-13 DIAGNOSIS — D35.2 PITUITARY MACROADENOMA (HCC): ICD-10-CM

## 2025-01-13 DIAGNOSIS — Z12.11 COLON CANCER SCREENING: ICD-10-CM

## 2025-01-13 PROCEDURE — G0439 PPPS, SUBSEQ VISIT: HCPCS | Performed by: FAMILY MEDICINE

## 2025-01-13 NOTE — PATIENT INSTRUCTIONS
Medicare Preventive Visit Patient Instructions  Thank you for completing your Welcome to Medicare Visit or Medicare Annual Wellness Visit today. Your next wellness visit will be due in one year (1/14/2026).  The screening/preventive services that you may require over the next 5-10 years are detailed below. Some tests may not apply to you based off risk factors and/or age. Screening tests ordered at today's visit but not completed yet may show as past due. Also, please note that scanned in results may not display below.  Preventive Screenings:  Service Recommendations Previous Testing/Comments   Colorectal Cancer Screening  * Colonoscopy    * Fecal Occult Blood Test (FOBT)/Fecal Immunochemical Test (FIT)  * Fecal DNA/Cologuard Test  * Flexible Sigmoidoscopy Age: 45-75 years old   Colonoscopy: every 10 years (may be performed more frequently if at higher risk)  OR  FOBT/FIT: every 1 year  OR  Cologuard: every 3 years  OR  Sigmoidoscopy: every 5 years  Screening may be recommended earlier than age 45 if at higher risk for colorectal cancer. Also, an individualized decision between you and your healthcare provider will decide whether screening between the ages of 76-85 would be appropriate. Colonoscopy: 06/15/2020  FOBT/FIT: Not on file  Cologuard: Not on file  Sigmoidoscopy: Not on file    Screening Current     Breast Cancer Screening Age: 40+ years old  Frequency: every 1-2 years  Not required if history of left and right mastectomy Mammogram: 09/30/2024    Screening Current   Cervical Cancer Screening Between the ages of 21-29, pap smear recommended once every 3 years.   Between the ages of 30-65, can perform pap smear with HPV co-testing every 5 years.   Recommendations may differ for women with a history of total hysterectomy, cervical cancer, or abnormal pap smears in past. Pap Smear: Not on file    Screening Not Indicated   Hepatitis C Screening Once for adults born between 1945 and 1965  More frequently in  patients at high risk for Hepatitis C Hep C Antibody: 05/18/2021    Screening Current   Diabetes Screening 1-2 times per year if you're at risk for diabetes or have pre-diabetes Fasting glucose: 86 mg/dL (10/7/2024)  A1C: No results in last 5 years (No results in last 5 years)  Screening Current   Cholesterol Screening Once every 5 years if you don't have a lipid disorder. May order more often based on risk factors. Lipid panel: 10/07/2024    Screening Not Indicated  History Lipid Disorder     Other Preventive Screenings Covered by Medicare:  Abdominal Aortic Aneurysm (AAA) Screening: covered once if your at risk. You're considered to be at risk if you have a family history of AAA.  Lung Cancer Screening: covers low dose CT scan once per year if you meet all of the following conditions: (1) Age 55-77; (2) No signs or symptoms of lung cancer; (3) Current smoker or have quit smoking within the last 15 years; (4) You have a tobacco smoking history of at least 20 pack years (packs per day multiplied by number of years you smoked); (5) You get a written order from a healthcare provider.  Glaucoma Screening: covered annually if you're considered high risk: (1) You have diabetes OR (2) Family history of glaucoma OR (3)  aged 50 and older OR (4)  American aged 65 and older  Osteoporosis Screening: covered every 2 years if you meet one of the following conditions: (1) You're estrogen deficient and at risk for osteoporosis based off medical history and other findings; (2) Have a vertebral abnormality; (3) On glucocorticoid therapy for more than 3 months; (4) Have primary hyperparathyroidism; (5) On osteoporosis medications and need to assess response to drug therapy.   Last bone density test (DXA Scan): 12/23/2024.  HIV Screening: covered annually if you're between the age of 15-65. Also covered annually if you are younger than 15 and older than 65 with risk factors for HIV infection. For pregnant  patients, it is covered up to 3 times per pregnancy.    Immunizations:  Immunization Recommendations   Influenza Vaccine Annual influenza vaccination during flu season is recommended for all persons aged >= 6 months who do not have contraindications   Pneumococcal Vaccine   * Pneumococcal conjugate vaccine = PCV13 (Prevnar 13), PCV15 (Vaxneuvance), PCV20 (Prevnar 20)  * Pneumococcal polysaccharide vaccine = PPSV23 (Pneumovax) Adults 19-63 yo with certain risk factors or if 65+ yo  If never received any pneumonia vaccine: recommend Prevnar 20 (PCV20)  Give PCV20 if previously received 1 dose of PCV13 or PPSV23   Hepatitis B Vaccine 3 dose series if at intermediate or high risk (ex: diabetes, end stage renal disease, liver disease)   Respiratory syncytial virus (RSV) Vaccine - COVERED BY MEDICARE PART D  * RSVPreF3 (Arexvy) CDC recommends that adults 60 years of age and older may receive a single dose of RSV vaccine using shared clinical decision-making (SCDM)   Tetanus (Td) Vaccine - COST NOT COVERED BY MEDICARE PART B Following completion of primary series, a booster dose should be given every 10 years to maintain immunity against tetanus. Td may also be given as tetanus wound prophylaxis.   Tdap Vaccine - COST NOT COVERED BY MEDICARE PART B Recommended at least once for all adults. For pregnant patients, recommended with each pregnancy.   Shingles Vaccine (Shingrix) - COST NOT COVERED BY MEDICARE PART B  2 shot series recommended in those 19 years and older who have or will have weakened immune systems or those 50 years and older     Health Maintenance Due:      Topic Date Due   • Colorectal Cancer Screening  06/15/2025   • Breast Cancer Screening: Mammogram  09/30/2026   • Hepatitis C Screening  Completed     Immunizations Due:  There are no preventive care reminders to display for this patient.  Advance Directives   What are advance directives?  Advance directives are legal documents that state your wishes and  plans for medical care. These plans are made ahead of time in case you lose your ability to make decisions for yourself. Advance directives can apply to any medical decision, such as the treatments you want, and if you want to donate organs.   What are the types of advance directives?  There are many types of advance directives, and each state has rules about how to use them. You may choose a combination of any of the following:  Living will:  This is a written record of the treatment you want. You can also choose which treatments you do not want, which to limit, and which to stop at a certain time. This includes surgery, medicine, IV fluid, and tube feedings.   Durable power of  for healthcare (DPAHC):  This is a written record that states who you want to make healthcare choices for you when you are unable to make them for yourself. This person, called a proxy, is usually a family member or a friend. You may choose more than 1 proxy.  Do not resuscitate (DNR) order:  A DNR order is used in case your heart stops beating or you stop breathing. It is a request not to have certain forms of treatment, such as CPR. A DNR order may be included in other types of advance directives.  Medical directive:  This covers the care that you want if you are in a coma, near death, or unable to make decisions for yourself. You can list the treatments you want for each condition. Treatment may include pain medicine, surgery, blood transfusions, dialysis, IV or tube feedings, and a ventilator (breathing machine).  Values history:  This document has questions about your views, beliefs, and how you feel and think about life. This information can help others choose the care that you would choose.  Why are advance directives important?  An advance directive helps you control your care. Although spoken wishes may be used, it is better to have your wishes written down. Spoken wishes can be misunderstood, or not followed. Treatments  may be given even if you do not want them. An advance directive may make it easier for your family to make difficult choices about your care.   Fall Prevention    Fall prevention  includes ways to make your home and other areas safer. It also includes ways you can move more carefully to prevent a fall. Health conditions that cause changes in your blood pressure, vision, or muscle strength and coordination may increase your risk for falls. Medicines may also increase your risk for falls if they make you dizzy, weak, or sleepy.   Fall prevention tips:   Stand or sit up slowly.    Use assistive devices as directed.    Wear shoes that fit well and have soles that .    Wear a personal alarm.    Stay active.    Manage your medical conditions.    Home Safety Tips:  Add items to prevent falls in the bathroom.    Keep paths clear.    Install bright lights in your home.    Keep items you use often on shelves within reach.    Paint or place reflective tape on the edges of your stairs.    Urinary Incontinence   Urinary incontinence (UI)  is when you lose control of your bladder. UI develops because your bladder cannot store or empty urine properly. The 3 most common types of UI are stress incontinence, urge incontinence, or both.  Medicines:   May be given to help strengthen your bladder control. Report any side effects of medication to your healthcare provider.  Do pelvic muscle exercises often:  Your pelvic muscles help you stop urinating. Squeeze these muscles tight for 5 seconds, then relax for 5 seconds. Gradually work up to squeezing for 10 seconds. Do 3 sets of 15 repetitions a day, or as directed. This will help strengthen your pelvic muscles and improve bladder control.  Train your bladder:  Go to the bathroom at set times, such as every 2 hours, even if you do not feel the urge to go. You can also try to hold your urine when you feel the urge to go. For example, hold your urine for 5 minutes when you feel the urge  "to go. As that becomes easier, hold your urine for 10 minutes.   Self-care:   Keep a UI record.  Write down how often you leak urine and how much you leak. Make a note of what you were doing when you leaked urine.  Drink liquids as directed. You may need to limit the amount of liquid you drink to help control your urine leakage. Do not drink any liquid right before you go to bed. Limit or do not have drinks that contain caffeine or alcohol.   Prevent constipation.  Eat a variety of high-fiber foods. Good examples are high-fiber cereals, beans, vegetables, and whole-grain breads. Walking is the best way to trigger your intestines to have a bowel movement.  Exercise regularly and maintain a healthy weight.  Weight loss and exercise will decrease pressure on your bladder and help you control your leakage.   Use a catheter as directed  to help empty your bladder. A catheter is a tiny, plastic tube that is put into your bladder to drain your urine.   Go to behavior therapy as directed.  Behavior therapy may be used to help you learn to control your urge to urinate.    Alcohol Use and Your Health    Drinking too much can harm your health.  Excessive alcohol use leads to about 88,000 death in the [place] each year, and shortens the life of those who diet by almost 30 years.  Further, excessive drinking cost the economy $249 billion in 2010.  Most excessive drinkers are not alcohol dependent.    Excessive alcohol use has immediate effects that increase the risk of many harmful health conditions.  These are most often the result of binge drinking.  Over time, excessive alcohol use can lead to the development of chronic diseases and other series health problems.    What is considered a \"drink\"?        Excessive alcohol use includes:  Binge Drinking: For women, 4 or more drinks consumed on one occasion. For men, 5 or more drinks consumed on one occasion.  Heavy Drinking: For women, 8 or more drinks per week. For men, 15 " or more drinks per week  Any alcohol used by pregnant women  Any alcohol used by those under the age of 21 years    If you choose to drink, do so in moderation:  Do not drink at all if you are under the age of 21, or if you are or may be pregnant, or have health problems that could be made worse by drinking.  For women, up to 1 drink per day  For men, up to 2 drinks a day    No one should begin drinking or drink more frequently based on potential health benefits    Short-Term Health Risks:  Injuries: motor vehicle crashes, falls, drownings, burns  Violence: homicide, suicide, sexual assault, intimate partner violence  Alcohol poisoning  Reproductive health: risky sexual behaviors, unintended prengnacy, sexually transmitted diseases, miscarriage, stillbirth, fetal alcohol syndrome    Long-Term Health Risks:  Chronic diseases: high blood pressure, heart disease, stroke, liver disease, digestive problems  Cancers: breast, mouth and throat, liver, colon  Learning and memory problems: dementia, poor school performance  Mental health: depression, anxiety, insomnia  Social problems: lost productivity, family problems, unemployment  Alcohol dependence    For support and more information:  Substance Abuse and Mental Health Services Administration  [address]  [place] [address]  Web Address: http://www.[facility].gov    Alcoholics Anonymous        Web Address: http://www.aa.org    https://www.cdc.gov/alcohol/fact-sheets/alcohol-use.htm     © Copyright [employer] 2018 Information is for End User's use only and may not be sold, redistributed or otherwise used for commercial purposes. All illustrations and images included in CareNotes® are the copyrighted property of [employer] Inc. or [employer]      Patient Education     Urinary incontinence in females   The Basics   Written by the doctors and editors at [facility]   What is urinary incontinence? -- Urinary incontinence is the medical term for when a person leaks  "urine or loses bladder control.  Incontinence is a very common problem, but it is not a normal part of aging. If you have this problem, there are treatments that can help. There are also things that you can do on your own to stop or reduce urine leakage so you don't have to \"just live with it.\"  What are the symptoms of incontinence? -- There are different types of incontinence. Each causes different symptoms. The 3 most common types are:   Stress incontinence - With stress incontinence, you leak urine when you laugh, cough, sneeze, or do anything that \"stresses\" the belly. Stress incontinence is most common in females, especially those who have had a baby.   Urgency incontinence - With urgency incontinence, you feel a strong need to urinate all of a sudden. This is also known as \"urge incontinence.\" Often, the \"urge\" is so strong that you can't make it to the bathroom in time. \"Overactive bladder\" is another term for having a sudden, frequent urge to urinate. People with overactive bladder might or might not actually leak urine.   Mixed incontinence - With mixed incontinence, you have symptoms of both stress and urgency incontinence.  Is there anything I can do on my own to feel better? -- Yes. Here are some things that can help reduce urine leaks:   Reduce the amount of liquid that you drink, especially a few hours before bed.   Cut down on any foods or drinks that make your symptoms worse. Some people find that alcohol, caffeine, or spicy or acidic foods irritate the bladder.   Try to lose weight, if you are overweight. Your doctor or nurse can help you do this in a healthy way.   If you have diabetes, keep your blood sugar as close to your goal level as possible.   If you take medicines called diuretics, plan ahead. These medicines increase the need to urinate. Try to take them when you know you will be near a bathroom for a few hours. If you keep having problems with leakage because of diuretics, ask your " "doctor if you can take a lower dose or switch to a different medicine.  These techniques can also help improve bladder control:   Bladder retraining - During bladder retraining, you go to the bathroom at scheduled times. For instance, you might decide that you will go every hour. Make yourself go every hour, even if you don't feel like you need to. Try to wait the whole hour, even if you need to go sooner. Then, once you get used to going every hour, increase the amount of time you wait in between bathroom visits. Over time, you might be able to \"retrain\" your bladder to wait 3 or 4 hours between bathroom visits.   Pelvic floor muscle training - This involves learning exercises to strengthen and relax your pelvic muscles. These include the muscles that control the flow of urine and bowel movements. When done right, these exercises can help. But people often do them wrong. Ask your doctor or nurse how to do them right. Your doctor might suggest working with a physical therapist who has special training in these exercises.  Should I see my doctor or nurse? -- Yes. Your doctor or nurse can find out what might be causing your incontinence. They can also suggest ways to relieve the problem.  When you speak to your doctor or nurse, ask if any of the medicines you take could be causing your symptoms. Some medicines can cause incontinence or make it worse.  Some people choose to wear pads or special underwear. These can help if you accidentally leak urine once in a while. But they can also cause skin irritation if you use them a lot. If you have incontinence, ask your doctor or nurse how to treat it.  How is incontinence treated? -- The treatment options differ depending on what type of incontinence you have. Some of the options include:   Medicines to relax the bladder   Surgery to repair the tissues that support the bladder or to improve the flow of urine   Electrical stimulation of the nerves that relax the " bladder  Urinary incontinence is more common in people who have been through menopause. (Menopause is when you stop having monthly periods). Some people have vaginal dryness after menopause. If this is the case for you, a treatment called vaginal estrogen might help.  What will my life be like? -- Many people with incontinence can regain bladder control or at least reduce the amount of leakage they have. The most important thing is to tell your doctor or nurse. Then, work with them to find an approach that helps you.  All topics are updated as new evidence becomes available and our peer review process is complete.  This topic retrieved from [employer] on: Feb 26, 2024.  Topic 00494 Version 18.0  Release: 32.2.4 - C32.56  © 2024 UpToDate, Inc. and/or its affiliates. All rights reserved.  figure 1: Location of the bladder     This drawing shows the side view of a woman's body. The bladder is in front of the vagina. The urethra is the tube that carries urine from the bladder out of the body.  Graphic 020588 Version 1.0  Consumer Information Use and Disclaimer   Disclaimer: This generalized information is a limited summary of diagnosis, treatment, and/or medication information. It is not meant to be comprehensive and should be used as a tool to help the user understand and/or assess potential diagnostic and treatment options. It does NOT include all information about conditions, treatments, medications, side effects, or risks that may apply to a specific patient. It is not intended to be medical advice or a substitute for the medical advice, diagnosis, or treatment of a health care provider based on the health care provider's examination and assessment of a patient's specific and unique circumstances. Patients must speak with a health care provider for complete information about their health, medical questions, and treatment options, including any risks or benefits regarding use of medications. This information does not  endorse any treatments or medications as safe, effective, or approved for treating a specific patient. Up[employer]Date, Inc. and its affiliates disclaim any warranty or liability relating to this information or the use thereof.The use of this information is governed by the Terms of Use, available at https://www.[employer].com/en/know/clinical-effectiveness-terms. 2024© Up[employer]Date, Inc. and its affiliates and/or licensors. All rights reserved.  Copyright   © 2024 [employer], Inc. and/or its affiliates. All rights reserved.

## 2025-01-13 NOTE — PROGRESS NOTES
Name: Bella Sargent      : 1945      MRN: [id number]  Encounter Provider: Dewey Barrera MD  Encounter Date: 2025   Encounter department: [facility]    Assessment & Plan  Medicare annual wellness visit, subsequent  UTD for age appropriate screening. Repeat colonoscopy due 2025 with Dr. Steele. Referral provided. UTD for flu this season       History of colon polyps    Orders:    Ambulatory Referral to Gastroenterology; Future    Colon cancer screening  Last performed in 2020 and polyps were removed. Recalled in 2025  Orders:    Ambulatory Referral to Gastroenterology; Future    Hyperprolactinemia (HCC)  On Dostinex        Thrombocytopenia (HCC)         Pituitary macroadenoma (HCC)  On Dostinex        Coronary artery disease of native artery of native heart with stable angina pectoris (HCC)  Continue ASA, statin, and BB        Gastroesophageal reflux disease without esophagitis  Started drinking lemon water in the am and since, she's had reflux symptoms. Advised to avoid citrus, tomato base sauces, and spicy foods. Has also had italian food the past few weeks. OTC PPI x 2 weeks then as needed. Call if symptoms persists       Urinary incontinence, unspecified type            Preventive health issues were discussed with patient, and age appropriate screening tests were ordered as noted in patient's After Visit Summary. Personalized health advice and appropriate referrals for health education or preventive services given if needed, as noted in patient's After Visit Summary.    History of Present Illness     Acid reflux for  several  months   Started drinking lemon water around the time it started   Also asking if gyn visits are necessary at this age   Last colonoscopy was done 2020 and to be repeated 2025 due to personal history of polyps        Patient Care Team:  Dewey Barrera MD as PCP - General (Family Medicine)  DO Maksim James,  RACHEL Licea DO Priya D Rana, PA-C as Physician Assistant (Endocrinology)    Review of Systems  Medical History Reviewed by provider this encounter:  Tobacco  Allergies  Meds  Problems  Med Hx  Surg Hx  Fam Hx       Annual Wellness Visit Questionnaire   Bella is here for her Subsequent Wellness visit. Last Medicare Wellness visit information reviewed, patient interviewed and updates made to the record today.      Health Risk Assessment:   Patient rates overall health as very good. Patient feels that their physical health rating is slightly better. Patient is very satisfied with their life. Eyesight was rated as same. Hearing was rated as same. Patient feels that their emotional and mental health rating is same. Patients states they are never, rarely angry. Patient states they are sometimes unusually tired/fatigued. Pain experienced in the last 7 days has been some. Patient's pain rating has been 5/10. Patient states that she has experienced no weight loss or gain in last 6 months. Back pain    Depression Screening:   PHQ-9 Score: 3      Fall Risk Screening:   In the past year, patient has experienced: history of falling in past year    Number of falls: 1  Injured during fall?: No    Feels unsteady when standing or walking?: No    Worried about falling?: Yes      Urinary Incontinence Screening:   Patient has leaked urine accidently in the last six months. Last summer and slipped climbing up rock     Home Safety:  Patient does not have trouble with stairs inside or outside of their home. Patient has working smoke alarms and has working carbon monoxide detector. Home safety hazards include: none.     Nutrition:   Current diet is Low Cholesterol, Low Saturated Fat, No Added Salt and Limited junk food.     Medications:   Patient is currently taking over-the-counter supplements. OTC medications include: see medication list. Patient is able to manage medications.     Activities of Daily  Living (ADLs)/Instrumental Activities of Daily Living (IADLs):   Walk and transfer into and out of bed and chair?: Yes  Dress and groom yourself?: Yes    Bathe or shower yourself?: Yes    Feed yourself? Yes  Do your laundry/housekeeping?: Yes  Manage your money, pay your bills and track your expenses?: Yes  Make your own meals?: Yes    Do your own shopping?: Yes    Previous Hospitalizations:   Any hospitalizations or ED visits within the last 12 months?: No      Advance Care Planning:   Living will: Yes    Durable POA for healthcare: Yes    Advanced directive: Yes      PREVENTIVE SCREENINGS      Cardiovascular Screening:    General: Screening Not Indicated and History Lipid Disorder      Diabetes Screening:     General: Screening Current      Colorectal Cancer Screening:     General: Screening Current      Breast Cancer Screening:     General: Screening Current      Cervical Cancer Screening:    General: Screening Not Indicated      Osteoporosis Screening:    General: Screening Not Indicated and History Osteoporosis      Lung Cancer Screening:     General: Screening Not Indicated      Hepatitis C Screening:    General: Screening Current    Screening, Brief Intervention, and Referral to Treatment (SBIRT)    Screening  Typical number of drinks in a day: 1  Typical number of drinks in a week: 3  Interpretation: Low risk drinking behavior.    AUDIT-C Screenin) How often did you have a drink containing alcohol in the past year? 2 to 3 times a week  2) How many drinks did you have on a typical day when you were drinking in the past year? 1 to 2  3) How often did you have 6 or more drinks on one occasion in the past year? never    AUDIT-C Score: 3  Interpretation: Score 3-12 (female): POSITIVE screen for alcohol misuse    AUDIT Screenin) How often during the last year have you found that you were not able to stop drinking once you had started? 0 - never  5) How often during the last year have you failed to do  what was normally expected from you because of drinking? 0 - never  6) How often during the last year have you needed a first drink in the morning to get yourself going after a heavy drinking session? 0 - never  7) How often during the last year have you had a feeling of guilt or remorse after drinking? 1 - less than monthly  8) How often during the last year have you been unable to remember what happened the night before because you had been drinking? 0 - never  9) Have you or someone else been injured as a result of your drinking? 0 - no  10) Has a relative or friend or a doctor or another health worker been concerned about your drinking or suggested you cut down? 0 - no    AUDIT Score: 4  Interpretation: Low risk alcohol consumption    Single Item Drug Screening:  How often have you used an illegal drug (including marijuana) or a prescription medication for non-medical reasons in the past year? never    Single Item Drug Screen Score: 0  Interpretation: Negative screen for possible drug use disorder    Social Drivers of Health     Financial Resource Strain: Low Risk  (1/11/2024)    Overall Financial Resource Strain (CARDIA)     Difficulty of Paying Living Expenses: Not hard at all   Food Insecurity: No Food Insecurity (1/13/2025)    Hunger Vital Sign     Worried About Running Out of Food in the Last Year: Never true     Ran Out of Food in the Last Year: Never true   Transportation Needs: No Transportation Needs (1/13/2025)    PRAPARE - Transportation     Lack of Transportation (Medical): No     Lack of Transportation (Non-Medical): No   Housing Stability: Low Risk  (1/13/2025)    Housing Stability Vital Sign     Unable to Pay for Housing in the Last Year: No     Number of Times Moved in the Last Year: 0     Homeless in the Last Year: No   Utilities: Not At Risk (1/13/2025)    [facility] Utilities     Threatened with loss of utilities: No     No results found.    Objective   /62 (BP Location: Left arm, Patient  "Position: Sitting, Cuff Size: Adult)   Pulse 56   Temp (!) 96.8 °F (36 °C) (Tympanic)   Resp 16   Ht 5' 3.25\" (1.607 m)   Wt 50.8 kg (112 lb)   SpO2 99%   BMI 19.68 kg/m²     Physical Exam  Vitals reviewed.   Constitutional:       General: She is not in acute distress.     Appearance: Normal appearance. She is not ill-appearing or toxic-appearing.   HENT:      Head: Normocephalic and atraumatic.   Eyes:      Extraocular Movements: Extraocular movements intact.   Cardiovascular:      Rate and Rhythm: Normal rate and regular rhythm.      Heart sounds: No murmur heard.  Pulmonary:      Effort: Pulmonary effort is normal.      Breath sounds: Normal breath sounds.   Abdominal:      General: There is no distension.      Palpations: Abdomen is soft. There is no mass.      Tenderness: There is no abdominal tenderness. There is no guarding or rebound.      Hernia: No hernia is present.   Neurological:      Mental Status: She is alert and oriented to person, place, and time.         "

## 2025-01-14 ENCOUNTER — OFFICE VISIT (OUTPATIENT)
Dept: PHYSICAL THERAPY | Facility: CLINIC | Age: 80
End: 2025-01-14
Payer: MEDICARE

## 2025-01-14 DIAGNOSIS — R10.9 RIGHT FLANK PAIN, CHRONIC: Primary | ICD-10-CM

## 2025-01-14 DIAGNOSIS — M47.26 OSTEOARTHRITIS OF SPINE WITH RADICULOPATHY, LUMBAR REGION: ICD-10-CM

## 2025-01-14 DIAGNOSIS — S22.080S CLOSED WEDGE COMPRESSION FRACTURE OF T12 VERTEBRA, SEQUELA: ICD-10-CM

## 2025-01-14 DIAGNOSIS — G89.29 RIGHT FLANK PAIN, CHRONIC: Primary | ICD-10-CM

## 2025-01-14 PROCEDURE — 97110 THERAPEUTIC EXERCISES: CPT

## 2025-01-14 PROCEDURE — 97112 NEUROMUSCULAR REEDUCATION: CPT

## 2025-01-14 NOTE — PROGRESS NOTES
Daily Note     Today's date: 2025  Patient name: Bella Sargent  : 1945  MRN: [id number]  Referring provider: Dewey Barrera MD  Dx:   Encounter Diagnosis     ICD-10-CM    1. Right flank pain, chronic  R10.9     G89.29       2. Osteoarthritis of spine with radiculopathy, lumbar region  M47.26       3. Closed wedge compression fracture of T12 vertebra, sequela  S22.080S           Start Time: 832  Stop Time: 912  Total time in clinic (min): 40 minutes      Subjective: Patient reports having no significant lumbar/thoracic exacerbations of sxs recently, but still notes sxs being worse during the evening and is able to relieve sxs by laying down for a few minutes. Patient notes that her R knee has really been the issue recently and has been experiencing off and on pain/discomfort and thinks it might be time to get another injection to get some relief.      Objective: See treatment diary below      Assessment:  Tolerated treatment well. Continued with current POC, focused on improving patient's posture and lengthening lumbar spine, with an appropriate level of challenge demonstrated t/o session. Initiated session with warm up on recumbent bike vs UBE as per patient request, with no issue or increases in sxs. Added paloff press and pball  exercises today to further improve core activation and stability, with good tolerance demonstrated by patient, despite some episodes of B knee discomfort/locking which subsided t/o duration. Some VC required to remind patient to engage core muscle throughout qped exercises, with good carryover noted. Continue to progress patient as able. Patient demonstrated fatigue post treatment, exhibited good technique with therapeutic exercises, and would benefit from continued PT to address thoracolumbar deficits in order to maximize overall functional ability.       Plan: Continue per plan of care.  Progress treatment as tolerated.         POC Expires Auth Status Start Date  "Expiration Date PT Visit Limit    one month today/date: 11/15/2024       Date        Used        Remaining           Diagnosis:  Scoliosis    Precautions:  See chart   Comparable signs 1) walking   2) standing    Primary Impairments: 1) Strength   2) mobility    Patient Goals Manange symptoms   Manual Therapy 12/11 12/19 12/30 1/14 12/2 12/4   PPU w/ OP         PA glides         T/s mobs         Hip mobs         Re-evaluation          Exercise Diary          Therapeutic Exercise         UBE  3'/3' 5' retro  5' retro Recumbent bike 5' 5' retro 5' retro   PPU         Hip sags/standing ext         LTR * 2x10 5\" 2x10  2x10 2x10 5\" 2x10 5\"   T/s extension 5\"x10  2x10  5\"x10 5\"x10   Ball rolls  2x10 5\" w/ ext    2x10 5\" w/ ext 2x10 5\" w/ ext   Repeated t/s rot 20x ea 20x ea  20x ea     Jcarlos pose with rot*  2x10  2x10 2x10 5\" ea 2x10 5\" ea   Cat cow* 2x10 5\" ea   2x10 5\" ea 2x10 5\" 2x10 5\" ea   Neuromuscular Re-education         Multif NMR 10x10\"        No monies/hitchers         Bridges* 2x10 5\"   2x10 5\" 2x10 5\" 2x10 5\"   Bridges march          Dad bugs* Dead bug only 2x10 5\"    YMB OH press 2x10 Dead bug only 2x10 5\"   Bird dogs* 2x10 alt UE/LE   2x10 alt UE/LE  10x alt LE  10x alt LE/UE   Pball crushes    Standing 5\"x15     Y  RTB 2x10 RTB 2x10      Rot rows  2x10 2x10       Paloff press    8# 5\"x10 ea     Retro walks   10x 12#  12# 10x      KB passes   10# split stance simone      Therapeutic Activities         Education          [place] carries   2x10                                 Modalities                              "

## 2025-01-16 ENCOUNTER — EVALUATION (OUTPATIENT)
Dept: PHYSICAL THERAPY | Facility: CLINIC | Age: 80
End: 2025-01-16
Payer: MEDICARE

## 2025-01-16 ENCOUNTER — APPOINTMENT (OUTPATIENT)
Dept: PHYSICAL THERAPY | Facility: CLINIC | Age: 80
End: 2025-01-16
Payer: MEDICARE

## 2025-01-16 DIAGNOSIS — R10.9 RIGHT FLANK PAIN, CHRONIC: Primary | ICD-10-CM

## 2025-01-16 DIAGNOSIS — G89.29 RIGHT FLANK PAIN, CHRONIC: Primary | ICD-10-CM

## 2025-01-16 DIAGNOSIS — S22.080S CLOSED WEDGE COMPRESSION FRACTURE OF T12 VERTEBRA, SEQUELA: ICD-10-CM

## 2025-01-16 DIAGNOSIS — M47.26 OSTEOARTHRITIS OF SPINE WITH RADICULOPATHY, LUMBAR REGION: ICD-10-CM

## 2025-01-16 PROCEDURE — 97110 THERAPEUTIC EXERCISES: CPT

## 2025-01-16 PROCEDURE — 97112 NEUROMUSCULAR REEDUCATION: CPT

## 2025-01-16 PROCEDURE — 97140 MANUAL THERAPY 1/> REGIONS: CPT

## 2025-01-16 NOTE — PROGRESS NOTES
PT Re-Evaluation     Today's date: 2025  Patient name: Bella Sargent  : 1945  MRN: [id number]  Referring provider: Dewey Barrera MD  Dx:   Encounter Diagnosis     ICD-10-CM    1. Right flank pain, chronic  R10.9     G89.29       2. Osteoarthritis of spine with radiculopathy, lumbar region  M47.26       3. Closed wedge compression fracture of T12 vertebra, sequela  S22.080S                        Assessment  Impairments: abnormal muscle firing, abnormal or restricted ROM, activity intolerance, impaired physical strength, lacks appropriate home exercise program, pain with function, poor posture  and poor body mechanics    Assessment details: RE today/date: 2025 Patient presents for re-evaluation after participating in physical therapy. At this point they have been compliant with HEP and PT to this date and continues to make progress to this date. She did have cataract surgery which has delayed her progression to this date. Patient demonstrates decreased endurance especially in scapular strength and core. They will continue to benefit from skilled PT to continue to address remaining impairments and progress toward optimal function and improve quality of life.     RE today/date: 2024 Patient presents for re-evaluation after participating in physical therapy. At this point they have been compliant with HEP and PT to this date and has made progress with ROM and strength and overall pain to this date. At this point they are still lacking core endurance and stabilization. They will continue to benefit from skilled PT to continue to address remaining impairments and progress toward optimal function and improve quality of life.     IE:Bella Sargent is a pleasant 78 y.o. female presents with signs and symptoms consistent with:   Other secondary scoliosis, thoracolumbar region    Problem List:  1) Impaired strength   2) Impaired Motor control     Comparable signs:  1) Standing  2) Walking     she has  impaired strength, impaired ROM and leg length discrepency secondary to scoliosis and resulting in worry over not knowing what's wrong and fear of not being able to keep active. These impairments listed above are preventing the patient from participating in functional activity. No further referral appears necessary at this time based upon examination results, and is negative for any red flags. Prognosis is good given HEP compliance and attendance to physical therapy 2x a week.  Positive prognostic indicators include positive attitude toward recovery and good understanding of diagnosis and treatment plan options.  Negative prognostic indicators include chronicity of symptoms.  Patent will benefit from skilled physical therapy at this time to address deficits to improve overall function and return to PLOF. Patient verbalized understanding of POC, HEP, and return demonstrated HEP. All questions were answered to patients satisfaction.     Please contact me if you have any questions or recommendations. Thank you for the referral and the opportunity to share in Bella Sargent's care.            Understanding of Dx/Px/POC: good     Prognosis: good    Goals  Impairment Goals 4-6 weeks Progressing 12/16/24  In order to improve and maximize function patient will be able to...  - Decrease pain intensity to <2/10  - Improve lumbar AROM to >100% throughout  - Increase hip strength to 5/5 throughout  - Centralize symptoms and decrease numbness frequency/duration    Functional Goals 6-8 weeks Progressing 12/16/24  In order to improve and maximize function patient will be able to...  - Return to Prior Level of Function with no greater than 2/10 pain   - Increase Functional Status Measure (FOTO) to: >predicted outcomes  - Be independent and compliant with HEP  - Tolerate sitting and or standing without increased pain/compensation/difficulty for at least 30 minutes  - Perform sit to stand without increased pain/compensation/difficulty    - Return to gentle yoga with minimal discomfort              Plan  Patient would benefit from: skilled physical therapy  Planned modality interventions: cryotherapy, thermotherapy: hydrocollator packs and TENS    Planned therapy interventions: home exercise program    Frequency: 2x week  Duration in weeks: 8  Treatment plan discussed with: patient        Subjective Evaluation    History of Present Illness  Mechanism of injury: RE 1/16/24 patient presents PT today with 80% improvement. She recently had cataract surgery since surgery and she did a put a hold on her PT exercises since then. She reports that her mobility and strength are getting better, she states the strength is her biggest issue. She still lays down in the afternoon, she states that she is able to do stuff longer with PT. She reports that sitting is getting better.    RE 12/16 Shelly presents to PT today with 50% improvement in her low back. She reports that it is sore today. She reports that her endurance is slightly better. She reports the mobility is better, but still struggling with the strength. Sitting in a normal upright posture flares her up. She continues to need to lay down in the middle of afternoon.     IE:Patient presents to PT with chronic low back pain. She reports that lately she has been doing okay after discharge but she reports increase in pain in her mid back and difficulty sleeping. She did have a CT scan which was negative. She reports difficulty straightening her spine and decreased posture at 2 pm. Worse in afternoon, and needs rest and then able to do more afterward. She does Yoga for back pain which has been helping. She feels that she needs a boost to get back on track. She reports that the back has been increasing some discomfort. She forgot some of the exercises from previous sessions  Patient Goals  Patient goals for therapy: decreased pain and independence with ADLs/IADLs  Patient goal: stand up straight without pain,  walk a mile, understand her symptoms (making progress towards standing up straight, met walking)  Pain  Current pain ratin  At worst pain ratin (at night)  Location: low back  Quality: dull ache and burning  Alleviating factors: laying flat.  Aggravating factors: walking, standing and stair climbing    Social Support  Lives with: alone      Diagnostic Tests  X-ray: normal      Objective     Concurrent Complaints  Positive for bladder dysfunction. Negative for night pain, disturbed sleep, bowel dysfunction and saddle (S4) numbness    Postural Observations  Seated posture: fair  Standing posture: fair    Additional Postural Observation Details  Elevated pelvis on left side  Shortened limb on left ( 1 cm difference)    Active Range of Motion   Cervical/Thoracic Spine       Thoracic    Flexion:  Restriction level: minimal  Extension:  Restriction level: moderate  Left lateral flexion:  Restriction level: minimal  Right lateral flexion:  Restriction level: minimal  Left rotation:  Restriction level: minimal  Right rotation:  Restriction level: minimal    Lumbar   Flexion:  Restriction level: moderate  Extension:  Restriction level: moderate  Left lateral flexion:  Restriction level: moderate  Right lateral flexion:  Restriction level: moderate  Left rotation:  Restriction level: minimal  Right rotation:  Restriction level: minimal    Additional Active Range of Motion Details  Hypomobility in hip extension   + elys test   Mechanical Assessment    Cervical      Thoracic      Lumbar    Standing flexion:    Pain location:no change  Standing extension:   Pain location: no change    Strength/Myotome Testing     Left Hip   Planes of Motion   Flexion: 4-  Extension: 4- (challenged with engaging glutes)  Abduction: 4-  External rotation: 4-    Right Hip   Planes of Motion   Flexion: 4-  Extension: 4- (challenged with engaging glutes)  Abduction: 4-  External rotation: 4-    Left Knee   Flexion: 5  Extension: 5    Right  "Knee   Flexion: 5  Extension: 5    Left Ankle/Foot   Dorsiflexion: 5  Plantar flexion: 5    Right Ankle/Foot   Dorsiflexion: 5  Plantar flexion: 5    Additional Strength Details  Increased multifidi activation and difficulty stabilizing core with hip extension    Muscle Activation   Patient able to activate left transverse abdominals, left multifidus, right transverse abdominals and right multifidus.     General Comments:    Lower quarter screen   Hips: unremarkable  Knees: unremarkable  Foot/ankle: unremarkable    Lumbar Comments  Hypomobility throughout thoracic spine  Rib mobility on L side minimal  30s STS 10               POC Expires Auth Status Start Date Expiration Date PT Visit Limit    one month today/date: 11/15/2024       Date        Used        Remaining           Diagnosis:  Scoliosis    Precautions:  See chart   Comparable signs 1) walking   2) standing    Primary Impairments: 1) Strength   2) mobility    Patient Goals Manange symptoms   Manual Therapy 12/11 12/19 12/30 1/14 1/16 12/4   PPU w/ OP         PA glides         T/s mobs         Hip mobs         Re-evaluation      SP    Exercise Diary          Therapeutic Exercise         UBE  3'/3' 5' retro  5' retro Recumbent bike 5' 5' 5' retro   PPU         Hip sags/standing ext         LTR * 2x10 5\" 2x10  2x10  2x10 5\"   T/s extension 5\"x10  2x10   5\"x10   Ball rolls  2x10 5\" w/ ext     2x10 5\" w/ ext   Repeated t/s rot 20x ea 20x ea  20x ea     Jcarlos pose with rot*  2x10  2x10  2x10 5\" ea   Cat cow* 2x10 5\" ea   2x10 5\" ea  2x10 5\" ea   Neuromuscular Re-education         Multif NMR 10x10\"        No monies/hitchers         Bridges* 2x10 5\"   2x10 5\"  2x10 5\"   Bridges march          Dad bugs* Dead bug only 2x10 5\"     Dead bug only 2x10 5\"   Bird dogs* 2x10 alt UE/LE   2x10 alt UE/LE  10x alt LE  10x alt LE/UE   Pball crushes    Standing 5\"x15     Y  RTB 2x10 RTB 2x10      Rot rows  2x10 2x10       Paloff press    8# 5\"x10 ea     Retro walks   10x 12#  " 12# 10x  13# 10x    KB passes   10# split stance simone      Therapeutic Activities         Education          [place] carries   2x10  10# simone 3 laps                               Modalities

## 2025-01-19 DIAGNOSIS — I10 ESSENTIAL HYPERTENSION: ICD-10-CM

## 2025-01-20 ENCOUNTER — OFFICE VISIT (OUTPATIENT)
Dept: PHYSICAL THERAPY | Facility: CLINIC | Age: 80
End: 2025-01-20
Payer: MEDICARE

## 2025-01-20 DIAGNOSIS — M47.26 OSTEOARTHRITIS OF SPINE WITH RADICULOPATHY, LUMBAR REGION: ICD-10-CM

## 2025-01-20 DIAGNOSIS — G89.29 RIGHT FLANK PAIN, CHRONIC: Primary | ICD-10-CM

## 2025-01-20 DIAGNOSIS — R10.9 RIGHT FLANK PAIN, CHRONIC: Primary | ICD-10-CM

## 2025-01-20 DIAGNOSIS — S22.080S CLOSED WEDGE COMPRESSION FRACTURE OF T12 VERTEBRA, SEQUELA: ICD-10-CM

## 2025-01-20 PROCEDURE — 97110 THERAPEUTIC EXERCISES: CPT

## 2025-01-20 PROCEDURE — 97112 NEUROMUSCULAR REEDUCATION: CPT

## 2025-01-20 RX ORDER — AMLODIPINE BESYLATE 5 MG/1
5 TABLET ORAL DAILY
Qty: 90 TABLET | Refills: 1 | Status: SHIPPED | OUTPATIENT
Start: 2025-01-20

## 2025-01-20 NOTE — PROGRESS NOTES
"Daily Note     Today's date: 2025  Patient name: Bella Sargent  : 1945  MRN: [id number]  Referring provider: Dewey Barrera MD  Dx:   Encounter Diagnosis     ICD-10-CM    1. Right flank pain, chronic  R10.9     G89.29       2. Osteoarthritis of spine with radiculopathy, lumbar region  M47.26       3. Closed wedge compression fracture of T12 vertebra, sequela  S22.080S           Start Time: 1015  Stop Time: 1100  Total time in clinic (min): 45 minutes    Subjective: Patient reports that she \"overdid it\" yesterday and had some mild exacerbations of lumbar sxs, but notes that she is able to get relief from laying down for a few minutes.      Objective: See treatment diary below      Assessment: Tolerated treatment well. Continued with current POC, focused on improving patient's posture and lengthening lumbar spine, with an appropriate level of challenge demonstrated t/o session. Added stir the pot exercise to further improve core and lumbar strength/activation as well as stability, with no adverse response noted t/o. Modified bird dogs by only performing exercise with alt LE vs alt UE+LE due to patient experiencing compensations when performing with both UE and LE to this date. Progressed bridges by adding resistance and having patient perform hip abd during exercise, with no increases in sxs. Minimal VC required to remind patient to engage core muscle throughout qped exercises, with good carryover noted. Continue to progress patient as able. Patient exhibited good technique with therapeutic exercises, and would benefit from continued PT to address thoracolumbar deficits in order to maximize overall functional ability.      Plan: Continue per plan of care.        POC Expires Auth Status Start Date Expiration Date PT Visit Limit    one month today/date: 11/15/2024       Date        Used        Remaining           Diagnosis:  Scoliosis    Precautions:  See chart   Comparable signs 1) walking   2) standing  " "  Primary Impairments: 1) Strength   2) mobility    Patient Goals Manange symptoms   Manual Therapy 12/11 12/19 12/30 1/14 1/16 1/20   PPU w/ OP         PA glides         T/s mobs         Hip mobs         Re-evaluation      SP    Exercise Diary          Therapeutic Exercise         UBE  3'/3' 5' retro  5' retro Recumbent bike 5' 5' UBE 2.5'/2.5'   PPU         Hip sags/standing ext         LTR * 2x10 5\" 2x10  2x10     T/s extension 5\"x10  2x10      Ball rolls  2x10 5\" w/ ext        Repeated t/s rot 20x ea 20x ea  20x ea  20x ea   Jcarlos pose with rot*  2x10  2x10  2x10   Cat cow* 2x10 5\" ea   2x10 5\" ea  2x10 5\" ea   Neuromuscular Re-education         Multif NMR 10x10\"        No monies/hitchers         Bridges* 2x10 5\"   2x10 5\"     Bridges march       w/ abd PTB 2x10   Dad bugs* Dead bug only 2x10 5\"        Bird dogs* 2x10 alt UE/LE   2x10 alt UE/LE  2x10 alt LE only   Pball crushes    Standing 5\"x15     Y  RTB 2x10 RTB 2x10      Rot rows  2x10 2x10       Paloff press    8# 5\"x10 ea  8# 5\"x10 ea   Stir the pot         Retro walks   10x 12#  12# 10x  13# 10x 14# 10x   KB passes   10# split stance simone      Therapeutic Activities         Education          [place] carries   2x10  10# simone 3 laps                               Modalities                              "

## 2025-01-23 ENCOUNTER — OFFICE VISIT (OUTPATIENT)
Dept: PHYSICAL THERAPY | Facility: CLINIC | Age: 80
End: 2025-01-23
Payer: MEDICARE

## 2025-01-23 DIAGNOSIS — G89.29 RIGHT FLANK PAIN, CHRONIC: Primary | ICD-10-CM

## 2025-01-23 DIAGNOSIS — M47.26 OSTEOARTHRITIS OF SPINE WITH RADICULOPATHY, LUMBAR REGION: ICD-10-CM

## 2025-01-23 DIAGNOSIS — S22.080S CLOSED WEDGE COMPRESSION FRACTURE OF T12 VERTEBRA, SEQUELA: ICD-10-CM

## 2025-01-23 DIAGNOSIS — R10.9 RIGHT FLANK PAIN, CHRONIC: Primary | ICD-10-CM

## 2025-01-23 PROCEDURE — 97110 THERAPEUTIC EXERCISES: CPT

## 2025-01-23 PROCEDURE — 97112 NEUROMUSCULAR REEDUCATION: CPT

## 2025-01-23 NOTE — PROGRESS NOTES
Daily Note     Today's date: 2025  Patient name: Bella Sargent  : 1945  MRN: [id number]  Referring provider: Dewey Barrera MD  Dx:   Encounter Diagnosis     ICD-10-CM    1. Right flank pain, chronic  R10.9     G89.29       2. Osteoarthritis of spine with radiculopathy, lumbar region  M47.26       3. Closed wedge compression fracture of T12 vertebra, sequela  S22.080S             Start Time: 1015  Stop Time: 1056  Total time in clinic (min): 41 minutes    Subjective: Patient reports no new complaints and notes that PT has been helping so much and feels stronger and more stable than when she initially came to PT. Patient continues to note knee pain/episodes of buckling throughout her day.      Objective: See treatment diary below      Assessment: Tolerated treatment well. Continued with current POC, focused on improving patient's posture and lengthening lumbar spine, with an appropriate level of challenge demonstrated t/o session. Progressed pball crushers by adding alt marching while performing exercise to include a balance component and dual tasking, which was moderately more challenging due to some episodes of instability, but was tolerable overall. Improvements demonstrated with maintaining core/lumbar stability during bird dogs due to providing patient with a tactile cue of placing a hand on her back to prevent compensations. Continue to progress patient as able. Patient exhibited good technique with therapeutic exercises, and would benefit from continued PT to address thoracolumbar deficits in order to maximize overall functional ability.      Plan: Continue per plan of care.        POC Expires Auth Status Start Date Expiration Date PT Visit Limit    one month today/date: 11/15/2024       Date        Used        Remaining           Diagnosis:  Scoliosis    Precautions:  See chart   Comparable signs 1) walking   2) standing    Primary Impairments: 1) Strength   2) mobility    Patient Goals Manange  "symptoms   Manual Therapy 1/23 12/19 12/30 1/14 1/16 1/20   PPU w/ OP         PA glides         T/s mobs         Hip mobs         Re-evaluation      SP    Exercise Diary          Therapeutic Exercise         UBE  NuStep 5 min 5' retro  5' retro Recumbent bike 5' 5' UBE 2.5'/2.5'   PPU         Hip sags/standing ext         LTR *  2x10  2x10     T/s extension   2x10      Ball rolls          Repeated t/s rot 20x ea 20x ea  20x ea  20x ea   Jcarlos pose with rot* 2x10 2x10  2x10  2x10   Cat cow* 2x10 5\" ea   2x10 5\" ea  2x10 5\" ea   Neuromuscular Re-education         Multif NMR         No monies/hitchers         Bridges* 2x10 5\"   2x10 5\"     Bridges march       w/ abd PTB 2x10   Dad bugs*         Bird dogs* 2x10 alt UE/LE   2x10 alt UE/LE  2x10 alt LE only   Pball crushes Standing w/ alt march 5\"x15   Standing 5\"x15  Standing 5\"x15   Y  RTB 2x10 RTB 2x10      Rot rows  2x10 2x10       Paloff press 9# 5\"x10 ea   8# 5\"x10 ea  8# 5\"x10 ea   Stir the pot 9# CW/CCW 10x ea     8# CW/CCW 10x ea   Retro walks  14# 10x 10x 12#  12# 10x  13# 10x 14# 10x   KB passes   10# split stance simone      Therapeutic Activities         Education          [place] carries   2x10  10# simone 3 laps                               Modalities                              "

## 2025-01-27 ENCOUNTER — OFFICE VISIT (OUTPATIENT)
Dept: PHYSICAL THERAPY | Facility: CLINIC | Age: 80
End: 2025-01-27
Payer: MEDICARE

## 2025-01-27 DIAGNOSIS — R10.9 RIGHT FLANK PAIN, CHRONIC: Primary | ICD-10-CM

## 2025-01-27 DIAGNOSIS — M47.26 OSTEOARTHRITIS OF SPINE WITH RADICULOPATHY, LUMBAR REGION: ICD-10-CM

## 2025-01-27 DIAGNOSIS — G89.29 RIGHT FLANK PAIN, CHRONIC: Primary | ICD-10-CM

## 2025-01-27 DIAGNOSIS — S22.080S CLOSED WEDGE COMPRESSION FRACTURE OF T12 VERTEBRA, SEQUELA: ICD-10-CM

## 2025-01-27 PROCEDURE — 97530 THERAPEUTIC ACTIVITIES: CPT

## 2025-01-27 PROCEDURE — 97110 THERAPEUTIC EXERCISES: CPT

## 2025-01-27 PROCEDURE — 97112 NEUROMUSCULAR REEDUCATION: CPT

## 2025-01-27 NOTE — PROGRESS NOTES
Daily Note     Today's date: 2025  Patient name: Bella Sargent  : 1945  MRN: [id number]  Referring provider: Dewey Barrera MD  Dx:   Encounter Diagnosis     ICD-10-CM    1. Right flank pain, chronic  R10.9     G89.29       2. Osteoarthritis of spine with radiculopathy, lumbar region  M47.26       3. Closed wedge compression fracture of T12 vertebra, sequela  S22.080S                        Subjective: Patient reports that she has been able to sleep better to this date.       Objective: See treatment diary below      Assessment: Tolerated treatment well. Continue to address core and strength to this date with moderate cueing needed for scapular activation to this date. Patient demonstrated difficulty with stabilization with marching and most challenged with dynamic core to this date. She did need close guarding and difficulty stabilizing for balance. Continue to progress patient as able. Patient exhibited good technique with therapeutic exercises, and would benefit from continued PT to address thoracolumbar deficits in order to maximize overall functional ability.      Plan: Continue per plan of care.        POC Expires Auth Status Start Date Expiration Date PT Visit Limit    one month today/date: 11/15/2024       Date        Used        Remaining           Diagnosis:  Scoliosis    Precautions:  See chart   Comparable signs 1) walking   2) standing    Primary Impairments: 1) Strength   2) mobility    Patient Goals Manange symptoms   Manual Therapy    PPU w/ OP         PA glides         T/s mobs         Hip mobs         Re-evaluation      SP    Exercise Diary          Therapeutic Exercise         UBE  NuStep 5 min 5' Nu Step   Recumbent bike 5' 5' UBE 2.5'/2.5'   PPU         Hip sags/standing ext         LTR *    2x10     T/s extension  Rows 10# 2x10  T/s ext 20x       Ball rolls          Repeated t/s rot 20x ea   20x ea  20x ea   Jcarlos pose with rot* 2x10   2x10  2x10   Cat  "cow* 2x10 5\" ea 2x10 5\" ea  2x10 5\" ea  2x10 5\" ea   Neuromuscular Re-education         Dead lifts   RTB 2x10       No monies/hitchers         Bridges* 2x10 5\"   2x10 5\"     Marches   5#/10# 15x ea    w/ abd PTB 2x10   Dad bugs*         Bird dogs* 2x10 alt UE/LE   2x10 alt UE/LE  2x10 alt LE only   Pball crushes Standing w/ alt march 5\"x15   Standing 5\"x15  Standing 5\"x15   Y  RTB 2x10       Rot rows         Paloff press 9# 5\"x10 ea 10# 2 laps ea   8# 5\"x10 ea  8# 5\"x10 ea   Stir the pot 9# CW/CCW 10x ea     8# CW/CCW 10x ea   Retro walks  14# 10x    13# 10x 14# 10x   KB passes         Therapeutic Activities         Education          [place] carries  10# 2x2    10# simone 3 laps                               Modalities                              "

## 2025-01-30 ENCOUNTER — OFFICE VISIT (OUTPATIENT)
Dept: PHYSICAL THERAPY | Facility: CLINIC | Age: 80
End: 2025-01-30
Payer: MEDICARE

## 2025-01-30 DIAGNOSIS — S22.080S CLOSED WEDGE COMPRESSION FRACTURE OF T12 VERTEBRA, SEQUELA: ICD-10-CM

## 2025-01-30 DIAGNOSIS — G89.29 RIGHT FLANK PAIN, CHRONIC: Primary | ICD-10-CM

## 2025-01-30 DIAGNOSIS — M47.26 OSTEOARTHRITIS OF SPINE WITH RADICULOPATHY, LUMBAR REGION: ICD-10-CM

## 2025-01-30 DIAGNOSIS — R10.9 RIGHT FLANK PAIN, CHRONIC: Primary | ICD-10-CM

## 2025-01-30 PROCEDURE — 97112 NEUROMUSCULAR REEDUCATION: CPT

## 2025-01-30 PROCEDURE — 97110 THERAPEUTIC EXERCISES: CPT

## 2025-01-30 NOTE — PROGRESS NOTES
Daily Note     Today's date: 2025  Patient name: Bella Sargent  : 1945  MRN: [id number]  Referring provider: Dewey Barrera MD  Dx:   Encounter Diagnosis     ICD-10-CM    1. Right flank pain, chronic  R10.9     G89.29       2. Osteoarthritis of spine with radiculopathy, lumbar region  M47.26       3. Closed wedge compression fracture of T12 vertebra, sequela  S22.080S           Start Time: 1100  Stop Time: 1145  Total time in clinic (min): 45 minutes    Subjective: Patient reports attending a yoga class on zoom this morning and notes feeling stretched and relaxed today.      Objective: See treatment diary below      Assessment: Tolerated treatment well. Continued with current POC, focused on improving patient's posture, and core/lumbar strength and stability with an appropriate level of challenge demonstrated t/o session. Minimal cueing required to maintain scap activation and avoid UT compensations during rows today. Added resisted chops today to further challenge activating core muscles, with slight lumbar discomfort noted after due to the rotational aspect of exercise. Improvements demonstrated with maintaining core/lumbar stability during bird dogs, and patient was independently able to self correct her form and therefore was able to prevent compensations from occurring t/o. Continue to progress patient as able. Patient exhibited good technique with therapeutic exercises, and would benefit from continued PT to address thoracolumbar deficits in order to maximize overall functional ability.       Plan: Continue per plan of care.        POC Expires Auth Status Start Date Expiration Date PT Visit Limit    one month today/date: 11/15/2024       Date        Used        Remaining           Diagnosis:  Scoliosis    Precautions:  See chart   Comparable signs 1) walking   2) standing    Primary Impairments: 1) Strength   2) mobility    Patient Goals Manange symptoms   Manual Therapy   "1/20   PPU w/ OP         PA glides         T/s mobs         Hip mobs         Re-evaluation      SP    Exercise Diary          Therapeutic Exercise         UBE  NuStep 5 min 5' Nu Step  UBE 2.5'/2.5' Recumbent bike 5' 5' UBE 2.5'/2.5'   PPU         Hip sags/standing ext         LTR *    2x10     T/s extension  Rows 10# 2x10  T/s ext 20x Rows 10# 2x15      Ball rolls          Repeated t/s rot 20x ea  20x ea 20x ea  20x ea   Jcarlos pose with rot* 2x10   2x10  2x10   Cat cow* 2x10 5\" ea 2x10 5\" ea 2x10 5\" ea 2x10 5\" ea  2x10 5\" ea   Neuromuscular Re-education         Dead lifts   RTB 2x10       No monies/hitchers         Bridges* 2x10 5\"   2x10 5\"     Marches   5#/10# 15x ea 5#/10# 10x ea   w/ abd PTB 2x10   Dad bugs*         Bird dogs* 2x10 alt UE/LE  2x10 alt UE/LE 2x10 alt UE/LE  2x10 alt LE only   Pball crushes Standing w/ alt march 5\"x15   Standing 5\"x15  Standing 5\"x15   Y  RTB 2x10       Rot rows         Paloff press 9# 5\"x10 ea 10# 2 laps ea  9.5# 2 laps ea 8# 5\"x10 ea  8# 5\"x10 ea   Chops   10# 10x ea      Stir the pot 9# CW/CCW 10x ea     8# CW/CCW 10x ea   Retro walks  14# 10x    13# 10x 14# 10x   KB passes         Therapeutic Activities         Education          [place] carries  10# 2x2  10# simone 3 laps  10# simone 3 laps                               Modalities                              "

## 2025-02-03 ENCOUNTER — OFFICE VISIT (OUTPATIENT)
Dept: PHYSICAL THERAPY | Facility: CLINIC | Age: 80
End: 2025-02-03
Payer: MEDICARE

## 2025-02-03 DIAGNOSIS — M47.26 OSTEOARTHRITIS OF SPINE WITH RADICULOPATHY, LUMBAR REGION: ICD-10-CM

## 2025-02-03 DIAGNOSIS — R10.9 RIGHT FLANK PAIN, CHRONIC: Primary | ICD-10-CM

## 2025-02-03 DIAGNOSIS — G89.29 RIGHT FLANK PAIN, CHRONIC: Primary | ICD-10-CM

## 2025-02-03 DIAGNOSIS — S22.080S CLOSED WEDGE COMPRESSION FRACTURE OF T12 VERTEBRA, SEQUELA: ICD-10-CM

## 2025-02-03 PROCEDURE — 97112 NEUROMUSCULAR REEDUCATION: CPT

## 2025-02-03 PROCEDURE — 97530 THERAPEUTIC ACTIVITIES: CPT

## 2025-02-03 PROCEDURE — 97110 THERAPEUTIC EXERCISES: CPT

## 2025-02-03 NOTE — PROGRESS NOTES
Daily Note     Today's date: 2/3/2025  Patient name: eBlla Sargent  : 1945  MRN: [id number]  Referring provider: Dewey Barrera MD  Dx:   Encounter Diagnosis     ICD-10-CM    1. Right flank pain, chronic  R10.9     G89.29       2. Osteoarthritis of spine with radiculopathy, lumbar region  M47.26       3. Closed wedge compression fracture of T12 vertebra, sequela  S22.080S             Start Time: 1020  Stop Time: 1105  Total time in clinic (min): 45 minutes    Subjective: Patient reports continued improvements and has not had many episodes of exacerbated sxs recently since she has been staying active in both PT and yoga.      Objective: See treatment diary below      Assessment: Tolerated treatment well. Continued with current POC, focused on improving patient's posture, and core/lumbar strength and stability with an appropriate level of challenge demonstrated t/o session. Progressed bridges by adding resisted hip abd during exercise which was more challenging and fatiguing, but was tolerable overall and did not increase sxs t/o. Patient continues to demonstrate good core control and stability during performance of exercises and maintains proper breathing and awareness of her body to ensure proper muscle activation. Continue to progress patient as able. Patient exhibited good technique with therapeutic exercises, and would benefit from continued PT to address thoracolumbar deficits in order to maximize overall functional ability.       Plan: Continue per plan of care.        POC Expires Auth Status Start Date Expiration Date PT Visit Limit    one month today/date: 11/15/2024       Date        Used        Remaining           Diagnosis:  Scoliosis    Precautions:  See chart   Comparable signs 1) walking   2) standing    Primary Impairments: 1) Strength   2) mobility    Patient Goals Manange symptoms   Manual Therapy  2/3 1/16 1/20   PPU w/ OP         EUGENIA abdul         T/s mobs         Hip mobs      "    Re-evaluation      SP    Exercise Diary          Therapeutic Exercise         UBE  NuStep 5 min 5' Nu Step  UBE 2.5'/2.5' UBE 2.5'/2.5' 5' UBE 2.5'/2.5'   PPU         Hip sags/standing ext         LTR *    20x     T/s extension  Rows 10# 2x10  T/s ext 20x Rows 10# 2x15      Ball rolls          Repeated t/s rot 20x ea  20x ea 20x ea  20x ea   Jcarlos pose with rot* 2x10   2x10  2x10   Cat cow* 2x10 5\" ea 2x10 5\" ea 2x10 5\" ea 2x10 5\" ea  2x10 5\" ea   Neuromuscular Re-education         Dead lifts   RTB 2x10       No monies/hitchers         Bridges* 2x10 5\"   W/ abd GTB 2x10 5\"     Marches   5#/10# 15x ea 5#/10# 10x ea 5#/10# 15x ea  w/ abd PTB 2x10   Dad bugs*         Bird dogs* 2x10 alt UE/LE  2x10 alt UE/LE 2x10 alt UE/LE  2x10 alt LE only   Pball crushes Standing w/ alt march 5\"x15   Standing w/ alt march 5\"x20  Standing 5\"x15   Y  RTB 2x10       Rot rows         Paloff press 9# 5\"x10 ea 10# 2 laps ea  9.5# 2 laps ea   8# 5\"x10 ea   Chops   10# 10x ea      Stir the pot 9# CW/CCW 10x ea     8# CW/CCW 10x ea   Retro walks  14# 10x    13# 10x 14# 10x   KB passes         Therapeutic Activities         Education          [place] carries  10# 2x2  10# simone 3 laps 10# simone 3 laps 10# simone 3 laps                               Modalities                              "

## 2025-02-06 ENCOUNTER — APPOINTMENT (OUTPATIENT)
Dept: PHYSICAL THERAPY | Facility: CLINIC | Age: 80
End: 2025-02-06
Payer: MEDICARE

## 2025-02-10 ENCOUNTER — APPOINTMENT (OUTPATIENT)
Dept: PHYSICAL THERAPY | Facility: CLINIC | Age: 80
End: 2025-02-10
Payer: MEDICARE

## 2025-02-13 ENCOUNTER — EVALUATION (OUTPATIENT)
Dept: PHYSICAL THERAPY | Facility: CLINIC | Age: 80
End: 2025-02-13
Payer: MEDICARE

## 2025-02-13 DIAGNOSIS — S22.080S CLOSED WEDGE COMPRESSION FRACTURE OF T12 VERTEBRA, SEQUELA: ICD-10-CM

## 2025-02-13 DIAGNOSIS — R10.9 RIGHT FLANK PAIN, CHRONIC: Primary | ICD-10-CM

## 2025-02-13 DIAGNOSIS — G89.29 RIGHT FLANK PAIN, CHRONIC: Primary | ICD-10-CM

## 2025-02-13 DIAGNOSIS — M47.26 OSTEOARTHRITIS OF SPINE WITH RADICULOPATHY, LUMBAR REGION: ICD-10-CM

## 2025-02-13 PROCEDURE — 97140 MANUAL THERAPY 1/> REGIONS: CPT

## 2025-02-13 PROCEDURE — 97530 THERAPEUTIC ACTIVITIES: CPT

## 2025-02-13 NOTE — PROGRESS NOTES
PT Re-Evaluation     Today's date: 2025  Patient name: Bella Sargent  : 1945  MRN: [id number]  Referring provider: Dewey Barrera MD  Dx:   Encounter Diagnosis     ICD-10-CM    1. Right flank pain, chronic  R10.9     G89.29       2. Osteoarthritis of spine with radiculopathy, lumbar region  M47.26       3. Closed wedge compression fracture of T12 vertebra, sequela  S22.080S                          Assessment    Assessment details: Patient has made good progress with PT and has met functional PT goals at this time. Patient will be discharged from skilled PT services and will continue to make progress with I HEP. Patients HEP was reviewed in order to ensure compliance and success at home, in which exercise bands and printouts were given. We will be available if the patient needs physical therapy in the future. Patient was given an opportunity to ask questions. All questions were answered to patients satisfaction.     RE today/date: 2025 Patient presents for re-evaluation after participating in physical therapy. At this point they have been compliant with HEP and PT to this date and continues to make progress to this date. She did have cataract surgery which has delayed her progression to this date. Patient demonstrates decreased endurance especially in scapular strength and core. They will continue to benefit from skilled PT to continue to address remaining impairments and progress toward optimal function and improve quality of life.     RE today/date: 2024 Patient presents for re-evaluation after participating in physical therapy. At this point they have been compliant with HEP and PT to this date and has made progress with ROM and strength and overall pain to this date. At this point they are still lacking core endurance and stabilization. They will continue to benefit from skilled PT to continue to address remaining impairments and progress toward optimal function and improve quality of  life.     IE:Bella Sargent is a pleasant 78 y.o. female presents with signs and symptoms consistent with:   Other secondary scoliosis, thoracolumbar region    Problem List:  1) Impaired strength   2) Impaired Motor control     Comparable signs:  1) Standing  2) Walking     she has impaired strength, impaired ROM and leg length discrepency secondary to scoliosis and resulting in worry over not knowing what's wrong and fear of not being able to keep active. These impairments listed above are preventing the patient from participating in functional activity. No further referral appears necessary at this time based upon examination results, and is negative for any red flags. Prognosis is good given HEP compliance and attendance to physical therapy 2x a week.  Positive prognostic indicators include positive attitude toward recovery and good understanding of diagnosis and treatment plan options.  Negative prognostic indicators include chronicity of symptoms.  Patent will benefit from skilled physical therapy at this time to address deficits to improve overall function and return to PLOF. Patient verbalized understanding of POC, HEP, and return demonstrated HEP. All questions were answered to patients satisfaction.     Please contact me if you have any questions or recommendations. Thank you for the referral and the opportunity to share in Bella Sargent's care.            Understanding of Dx/Px/POC: good     Prognosis: good    Goals  Impairment Goals 4-6 weeks MET  In order to improve and maximize function patient will be able to...  - Decrease pain intensity to <2/10  - Improve lumbar AROM to >100% throughout  - Increase hip strength to 5/5 throughout  - Centralize symptoms and decrease numbness frequency/duration    Functional Goals 6-8 weeks MET  In order to improve and maximize function patient will be able to...  - Return to Prior Level of Function with no greater than 2/10 pain   - Increase Functional Status Measure (FOTO)  to: >predicted outcomes  - Be independent and compliant with HEP  - Tolerate sitting and or standing without increased pain/compensation/difficulty for at least 30 minutes  - Perform sit to stand without increased pain/compensation/difficulty   - Return to gentle yoga with minimal discomfort              Plan  Patient would benefit from: skilled physical therapy  Planned modality interventions: cryotherapy, thermotherapy: hydrocollator packs and TENS    Planned therapy interventions: home exercise program    Frequency: 2x week  Treatment plan discussed with: patient        Subjective Evaluation    History of Present Illness  Mechanism of injury: RE 2/13/25 patient presents to PT today with 95% improvement. She states that she is feeling a lot better and she has not been having ot lay down in the afternoon and occasionally she needs to. She has been traveling and it was okay. She reports that she has been doing spine care yoga to this date. She is ready to be d/c.    RE 1/16/24 patient presents PT today with 80% improvement. She recently had cataract surgery since surgery and she did a put a hold on her PT exercises since then. She reports that her mobility and strength are getting better, she states the strength is her biggest issue. She still lays down in the afternoon, she states that she is able to do stuff longer with PT. She reports that sitting is getting better.    RE 12/16 Shelly presents to PT today with 50% improvement in her low back. She reports that it is sore today. She reports that her endurance is slightly better. She reports the mobility is better, but still struggling with the strength. Sitting in a normal upright posture flares her up. She continues to need to lay down in the middle of afternoon.     IE:Patient presents to PT with chronic low back pain. She reports that lately she has been doing okay after discharge but she reports increase in pain in her mid back and difficulty sleeping. She did  have a CT scan which was negative. She reports difficulty straightening her spine and decreased posture at 2 pm. Worse in afternoon, and needs rest and then able to do more afterward. She does Yoga for back pain which has been helping. She feels that she needs a boost to get back on track. She reports that the back has been increasing some discomfort. She forgot some of the exercises from previous sessions  Patient Goals  Patient goals for therapy: decreased pain and independence with ADLs/IADLs  Patient goal: stand up straight without pain, walk a mile, understand her symptoms (met)  Pain  Current pain ratin  At worst pain rating: 3 (at night)  Location: low back  Quality: dull ache and burning  Alleviating factors: laying flat.  Aggravating factors: walking, standing and stair climbing    Social Support  Lives with: alone      Diagnostic Tests  X-ray: normal        Objective     Concurrent Complaints  Positive for bladder dysfunction. Negative for night pain, disturbed sleep, bowel dysfunction and saddle (S4) numbness    Postural Observations  Seated posture: fair  Standing posture: fair    Additional Postural Observation Details  Elevated pelvis on left side  Shortened limb on left ( 1 cm difference)    Active Range of Motion   Cervical/Thoracic Spine       Thoracic    Flexion:  WFL  Extension:  Restriction level: minimal  Left lateral flexion:  WFL  Right lateral flexion:  Restriction level: minimal  Left rotation:  Restriction level: minimal  Right rotation:  Restriction level: minimal    Lumbar   Flexion:  Restriction level: moderate  Extension:  Restriction level: moderate  Left lateral flexion:  Restriction level: minimal  Right lateral flexion:  Restriction level: minimal  Left rotation:  WFL  Right rotation:  WFL  Mechanical Assessment    Cervical      Thoracic      Lumbar    Standing flexion:    Pain location:no change  Standing extension:   Pain location: no change    Strength/Myotome Testing  "    Left Hip   Planes of Motion   Flexion: 4+  Extension: 4+ (challenged with engaging glutes)  Abduction: 4+  External rotation: 4+    Right Hip   Planes of Motion   Flexion: 4+  Extension: 4+ (challenged with engaging glutes)  Abduction: 4+  External rotation: 4+    Left Knee   Flexion: 5  Extension: 5    Right Knee   Flexion: 5  Extension: 5    Left Ankle/Foot   Dorsiflexion: 5  Plantar flexion: 5    Right Ankle/Foot   Dorsiflexion: 5  Plantar flexion: 5    Additional Strength Details  Increased multifidi activation and difficulty stabilizing core with hip extension    Muscle Activation   Patient able to activate left transverse abdominals, left multifidus, right transverse abdominals and right multifidus.     General Comments:    Lower quarter screen   Hips: unremarkable  Knees: unremarkable  Foot/ankle: unremarkable    Lumbar Comments  Hypomobility throughout thoracic spine  Rib mobility on L side minimal  30s STS 10               POC Expires Auth Status Start Date Expiration Date PT Visit Limit    one month today/date: 11/15/2024       Date        Used        Remaining           Diagnosis:  Scoliosis    Precautions:  See chart   Comparable signs 1) walking   2) standing    Primary Impairments: 1) Strength   2) mobility    Patient Goals Manange symptoms   Manual Therapy 1/23 1/27 1/30 2/3  1/20   PPU w/ OP         PA glides         T/s mobs         Hip mobs         Re-evaluation          Exercise Diary          Therapeutic Exercise         UBE  NuStep 5 min 5' Nu Step  UBE 2.5'/2.5' UBE 2.5'/2.5' 2.5/2.5 UBE 2.5'/2.5'   PPU         Hip sags/standing ext         LTR *    20x     T/s extension  Rows 10# 2x10  T/s ext 20x Rows 10# 2x15      Ball rolls          Repeated t/s rot 20x ea  20x ea 20x ea  20x ea   Jcarlos pose with rot* 2x10   2x10  2x10   Cat cow* 2x10 5\" ea 2x10 5\" ea 2x10 5\" ea 2x10 5\" ea  2x10 5\" ea   Neuromuscular Re-education         Dead lifts   RTB 2x10       No monies/hitchers         Bridges* " "2x10 5\"   W/ abd GTB 2x10 5\"     Marches   5#/10# 15x ea 5#/10# 10x ea 5#/10# 15x ea  w/ abd PTB 2x10   Dad bugs*         Bird dogs* 2x10 alt UE/LE  2x10 alt UE/LE 2x10 alt UE/LE  2x10 alt LE only   Pball crushes Standing w/ alt march 5\"x15   Standing w/ alt march 5\"x20  Standing 5\"x15   Y  RTB 2x10       Rot rows         Paloff press 9# 5\"x10 ea 10# 2 laps ea  9.5# 2 laps ea   8# 5\"x10 ea   Chops   10# 10x ea      Stir the pot 9# CW/CCW 10x ea     8# CW/CCW 10x ea   Retro walks  14# 10x     14# 10x   KB passes         Therapeutic Activities         Education      POC moving forward     [place] carries  10# 2x2  10# simone 3 laps 10# simone 3 laps                                Modalities                         "

## 2025-02-15 ENCOUNTER — HOSPITAL ENCOUNTER (OUTPATIENT)
Dept: MRI IMAGING | Facility: HOSPITAL | Age: 80
Discharge: HOME | End: 2025-02-15
Payer: MEDICARE

## 2025-02-15 DIAGNOSIS — D35.2 PITUITARY MACROADENOMA (HCC): ICD-10-CM

## 2025-02-15 PROCEDURE — 70553 MRI BRAIN STEM W/O & W/DYE: CPT

## 2025-02-15 PROCEDURE — A9585 GADOBUTROL INJECTION: HCPCS | Performed by: PHYSICIAN ASSISTANT

## 2025-02-15 PROCEDURE — G1004 CDSM NDSC: HCPCS

## 2025-02-15 RX ORDER — GADOBUTROL 604.72 MG/ML
5 INJECTION INTRAVENOUS
Status: COMPLETED | OUTPATIENT
Start: 2025-02-15 | End: 2025-02-15

## 2025-02-15 RX ADMIN — GADOBUTROL 5 ML: 604.72 INJECTION INTRAVENOUS at 15:43

## 2025-02-17 ENCOUNTER — APPOINTMENT (OUTPATIENT)
Dept: PHYSICAL THERAPY | Facility: CLINIC | Age: 80
End: 2025-02-17
Payer: MEDICARE

## 2025-02-20 ENCOUNTER — APPOINTMENT (OUTPATIENT)
Dept: PHYSICAL THERAPY | Facility: CLINIC | Age: 80
End: 2025-02-20
Payer: MEDICARE

## 2025-03-09 DIAGNOSIS — I10 ESSENTIAL HYPERTENSION: ICD-10-CM

## 2025-03-10 RX ORDER — LOSARTAN POTASSIUM 100 MG/1
100 TABLET ORAL DAILY
Qty: 90 TABLET | Refills: 1 | Status: SHIPPED | OUTPATIENT
Start: 2025-03-10

## 2025-03-12 ENCOUNTER — OFFICE VISIT (OUTPATIENT)
Dept: NEUROSURGERY | Facility: CLINIC | Age: 80
End: 2025-03-12
Payer: MEDICARE

## 2025-03-12 VITALS
WEIGHT: 112 LBS | TEMPERATURE: 98 F | HEIGHT: 63 IN | HEART RATE: 68 BPM | BODY MASS INDEX: 19.84 KG/M2 | SYSTOLIC BLOOD PRESSURE: 134 MMHG | DIASTOLIC BLOOD PRESSURE: 66 MMHG

## 2025-03-12 DIAGNOSIS — D35.2 PITUITARY MACROADENOMA (HCC): Primary | ICD-10-CM

## 2025-03-12 PROCEDURE — 99214 OFFICE O/P EST MOD 30 MIN: CPT | Performed by: NEUROLOGICAL SURGERY

## 2025-03-12 NOTE — PROGRESS NOTES
"Name: Bella Sargent      : 1945      MRN: [id number]  Encounter Provider: Maksim Calvert MD  Encounter Date: 3/12/2025   Encounter department: [facility] [place]  :  Assessment & Plan  Pituitary macroadenoma (HCC)    Orders:  •  MRI brain pituitary wo and w contrast; Future    Patient seen previously by our practice for history of T12 compression fracture.     In 2023, presented to the ER with generalized weakness, malaise, headaches.  She underwent work-up which demonstrated hyponatremia.     CT the head showed a sellar abnormality, follow-up MRI pituitary demonstrates pituitary mass with some slight suprasellar extension but no chiasmal contact, and some incursion into the right cavernous sinus.  Pituitary lab panel all normal except for somewhat elevated prolactin, around 80.  This would likely be more consistent with \"stalk effect\", rather than a prolactin secreting adenoma.  Endocrinology did prescribe cabergoline.     Initial formal visual fields were performed, and document some bitemporal inferior quadrant deficits.  However, as there is no chiasm contact by the mass, I expect these are technical issues did not reveal findings.  I certainly cannot explain them on the basis of the pituitary mass.  Will recent hartman in  showed some scattered superior quadrant deficits, so I imagine this is technical.     I reviewed with the patient the natural history of pituitary adenomas.  Intervention for the pituitary mass would be very unlikely to address  hyponatremia, which regardless has been corrected.  The pituitary mass itself has likely been present for many years.  I recommended a follow-up MRI pituitary scan be done in 12 months.  Barring significant growth that would put her chiasm at risk, I expect this mass will not be of clinical significance in her lifetime, and would not recommend aggressive intervention.  We again discussed the utility of cabergoline, and I " suggested she follow-up with her endocrinologist to discuss this further.     Questions answered to the patient and her daughters' satisfaction. I will schedule her to see our AP in 12 months with a new MRI scan pituitary, which I have ordered.     08/04/23 Metrics: EQ5D5L 78002, or 0.778; VAS 60    03/12/25 Metrics: EQ5D5L 67409=1.832, VAS 95, KPS 90    History of Present Illness     Bella Sargent is a 79 y.o. female who presents for follow-up    HPI     Review of Systems   Constitutional: Negative.    HENT: Negative.     Eyes: Negative.    Respiratory: Negative.     Cardiovascular: Negative.    Gastrointestinal: Negative.    Endocrine: Negative.    Genitourinary: Negative.    Musculoskeletal: Negative.    Skin: Negative.    Allergic/Immunologic: Negative.    Neurological: Negative.         1yr f/u pit macroadenoma  MRI brain pit wwo contrast 2/15/25  LOV 2/22 -- FVF bookmarked 9/18/24 -- endo 10/16    EQ5D5L/VAS/ECOG/KPS     Hematological: Negative.    Psychiatric/Behavioral: Negative.     All other systems reviewed and are negative.    I have personally reviewed the MA's review of systems and made changes as necessary.    Past Medical History   Past Medical History:   Diagnosis Date   • Anxiety    • Arthritis    • Colon polyp    • Coronary artery disease    • Effusion of left knee     Last assessed - 9/10/15   • History of transfusion    • Hyperlipidemia    • Hypertension    • Memory loss    • Myocardial infarction (HCC)    • Protein-calorie malnutrition, unspecified severity (HCC) 11/14/2023   • Scoliosis    • Tick bite     Last assessed - 4/21/17     Past Surgical History:   Procedure Laterality Date   • APPENDECTOMY     • CARDIAC SURGERY     • COLONOSCOPY     • CORONARY ARTERY BYPASS GRAFT     • MT CORONARY ARTERY BYP W/VEIN & ARTERY GRAFT 4 VEIN N/A 01/27/2020    Procedure: CORONARY ARTERY BYPASS GRAFT (CABG) x3 VESSELS, LIMA TO LAD, AND SVG TO PLB & OM;  Surgeon: Peter Colmenares DO;  Location: [facility];   Service: Cardiac Surgery   • MD ECHO TRANSESOPHAG R-T 2D W/PRB IMG ACQUISJ I&R N/A 01/27/2020    Procedure: TRANSESOPHAGEAL ECHOCARDIOGRAM (GET);  Surgeon: Peter Colmenares DO;  Location: [facility];  Service: Cardiac Surgery   • MD NDSC SURG W/VIDEO-ASSISTED HARVEST VEIN CABG Left 01/27/2020    Procedure: HARVEST VEIN ENDOSCOPIC (EVH);  Surgeon: Peter Colmenares DO;  Location: [facility];  Service: Cardiac Surgery   • TONSILLECTOMY      Last assessed - 4/20/17   • TUBAL LIGATION      Last assessed - 4/20/17   • TUMOR REMOVAL  1998    pelvic benign tumor removal   • WISDOM TOOTH EXTRACTION      Last assessed - 4/20/17     Family History   Problem Relation Age of Onset   • Coronary artery disease Mother    • Arthritis Mother    • Other Mother         Cardiac Disorder    • Transient ischemic attack Mother    • Heart disease Mother    • Hearing loss Mother    • Heart attack Father    • Sudden death Father         SCD   • No Known Problems Sister    • No Known Problems Sister    • Cancer Daughter 49        kidney   • No Known Problems Daughter    • No Known Problems Daughter    • No Known Problems Paternal Aunt    • Breast cancer Niece 46     she reports that she has never smoked. She has been exposed to tobacco smoke. She has never used smokeless tobacco. She reports current alcohol use of about 3.0 standard drinks of alcohol per week. She reports that she does not use drugs.  Current Outpatient Medications   Medication Instructions   • acetaminophen (TYLENOL) 650 mg, Daily PRN   • amLODIPine (NORVASC) 5 mg, Oral, Daily   • aspirin (ECOTRIN LOW STRENGTH) 81 mg, Oral, Daily   • cabergoline (DOSTINEX) 0.5 MG tablet TAKE 1/2 TABLET BY MOUTH ONCE A WEEK (PATIENT SHOULD USE PILL CUTTER TO CUT THE PILLS)   • Calcium Carb-Cholecalciferol 600-200 MG-UNIT TABS Calcium 600 + D TABS  TAKE 1 TABLET DAILY.   Refills: 0    Active   • denosumab (PROLIA) 60 mg, Every 6 months   • losartan (COZAAR) 100 mg, Oral, Daily   •  "metoprolol succinate (TOPROL-XL) 25 mg, Oral, Daily, TAKING ONE TABLET DAILY IN THE MORNING   • rosuvastatin (CRESTOR) 40 mg, Oral, Daily   • Turmeric 500 mg   • VITAMIN D, CHOLECALCIFEROL, PO 2,600 Units/day     Allergies   Allergen Reactions   • Thiazide-Type Diuretics Other (See Comments)     Hyponatremia      Objective   /66   Pulse 68   Temp 98 °F (36.7 °C) (Temporal)   Ht 5' 3.25\" (1.607 m)   Wt 50.8 kg (112 lb)   BMI 19.68 kg/m²     Physical Exam  Neurological Exam    Radiology Results Review: I have reviewed the following images/report studies in PACS: MRI pituitary 2/15/2025, see discussion          "

## 2025-03-26 ENCOUNTER — CONSULT (OUTPATIENT)
Dept: FAMILY MEDICINE CLINIC | Facility: CLINIC | Age: 80
End: 2025-03-26
Payer: MEDICARE

## 2025-03-26 VITALS
SYSTOLIC BLOOD PRESSURE: 120 MMHG | RESPIRATION RATE: 18 BRPM | OXYGEN SATURATION: 99 % | HEART RATE: 62 BPM | WEIGHT: 112.2 LBS | HEIGHT: 63 IN | TEMPERATURE: 97.7 F | DIASTOLIC BLOOD PRESSURE: 60 MMHG | BODY MASS INDEX: 19.88 KG/M2

## 2025-03-26 DIAGNOSIS — Z01.818 PRE-OP EXAMINATION: Primary | ICD-10-CM

## 2025-03-26 DIAGNOSIS — I25.118 CORONARY ARTERY DISEASE OF NATIVE ARTERY OF NATIVE HEART WITH STABLE ANGINA PECTORIS (HCC): ICD-10-CM

## 2025-03-26 DIAGNOSIS — E22.1 HYPERPROLACTINEMIA (HCC): ICD-10-CM

## 2025-03-26 DIAGNOSIS — D35.2 PITUITARY MACROADENOMA (HCC): ICD-10-CM

## 2025-03-26 DIAGNOSIS — I10 ESSENTIAL HYPERTENSION: ICD-10-CM

## 2025-03-26 PROCEDURE — G2211 COMPLEX E/M VISIT ADD ON: HCPCS | Performed by: NURSE PRACTITIONER

## 2025-03-26 PROCEDURE — 99214 OFFICE O/P EST MOD 30 MIN: CPT | Performed by: NURSE PRACTITIONER

## 2025-03-26 NOTE — PROGRESS NOTES
Name: Bella Sargent      : 1945      MRN: [id number]  Encounter Provider: RACHEL Penn  Encounter Date: 3/26/2025   Encounter department: [facility]  :  Assessment & Plan  Pre-op examination  Treatment of chronic conditions is optimized on current therapy.  Pt is cleared for proposed procedure.  Expect pt will do well with this low risk procedure.           Essential hypertension  Stable with current management.; continue amlodipine, losartan, metoprolol.       Coronary artery disease of native artery of native heart with stable angina pectoris (HCC)  Denies chest pain.  Follows regularly with cardiology.  Continue medications.  Cardiology cleared pt for last eye surgery and per pt, they do not need additional cardiac clearance for this procedure.       Pituitary macroadenoma (HCC)  Follow up neurosurgery       Hyperprolactinemia (HCC)  Continue dostinex.              History of Present Illness   Pt is a 79 y.o. female who is seen today for pre-op clearance.  Pt is scheduled for R eye cataract surgery on 4/10.  We reviewed PMH, PSH, social history, medications, and allergies.  Past medical history of CAD, HTN, PAC, hyperprolactinemia, pituitary macroadenoma, osteoarthritis, spasmodic torticollis, compression fracture T-11 and 12, osteoporosis, thrombocytopenia, depression, chronic pain, CABG, NSTEMI, hyperlipidemia.  Pre-op testing was not ordered.  Pt denies chest pain, palpitations, shortness of breath, headache, dizziness, numbness, tingling.  Appetite is normal, no N/V/D.        Review of Systems   Constitutional:  Negative for appetite change, chills, fatigue, fever and unexpected weight change.   HENT:  Negative for congestion, hearing loss and rhinorrhea.    Eyes:  Negative for visual disturbance.   Respiratory:  Negative for cough, chest tightness and shortness of breath.    Cardiovascular:  Negative for chest pain, palpitations and leg swelling.   Gastrointestinal:   "Negative for abdominal pain, constipation, diarrhea, nausea and vomiting.   Genitourinary:  Negative for difficulty urinating, dysuria and frequency.   Musculoskeletal:  Positive for back pain (chronic). Negative for arthralgias and myalgias.   Allergic/Immunologic: Negative for environmental allergies.   Neurological:  Negative for dizziness and headaches.   Psychiatric/Behavioral:  Negative for sleep disturbance.        Objective   /60 (BP Location: Left arm, Patient Position: Sitting, Cuff Size: Standard)   Pulse 62   Temp 97.7 °F (36.5 °C) (Tympanic)   Resp 18   Ht 5' 3.25\" (1.607 m)   Wt 50.9 kg (112 lb 3.2 oz)   SpO2 99%   BMI 19.72 kg/m²      Physical Exam  Vitals reviewed.   Constitutional:       General: She is awake. She is not in acute distress.     Appearance: Normal appearance. She is well-developed and well-groomed.   HENT:      Head: Normocephalic.   Eyes:      General: Lids are normal.      Conjunctiva/sclera: Conjunctivae normal.   Neck:      Vascular: Normal carotid pulses. No carotid bruit.   Cardiovascular:      Rate and Rhythm: Normal rate and regular rhythm.      Pulses: Normal pulses.      Heart sounds: Normal heart sounds. No murmur heard.  Pulmonary:      Effort: Pulmonary effort is normal. No tachypnea, accessory muscle usage or respiratory distress.      Breath sounds: Normal breath sounds.   Abdominal:      General: Abdomen is flat. Bowel sounds are normal.      Palpations: Abdomen is soft.      Tenderness: There is no abdominal tenderness.   Musculoskeletal:      Right lower leg: No edema.      Left lower leg: No edema.   Lymphadenopathy:      Cervical: No cervical adenopathy.   Neurological:      Mental Status: She is alert and oriented to person, place, and time.   Psychiatric:         Attention and Perception: Attention normal.         Mood and Affect: Mood normal.         Speech: Speech normal.         Behavior: Behavior normal. Behavior is cooperative.         Thought " Content: Thought content normal.         Cognition and Memory: Cognition normal.         Judgment: Judgment normal.

## 2025-03-26 NOTE — ASSESSMENT & PLAN NOTE
Denies chest pain.  Follows regularly with cardiology.  Continue medications.  Cardiology cleared pt for last eye surgery and per pt, they do not need additional cardiac clearance for this procedure.

## 2025-04-05 DIAGNOSIS — D35.2 PITUITARY MACROADENOMA (HCC): ICD-10-CM

## 2025-04-07 ENCOUNTER — TELEPHONE (OUTPATIENT)
Age: 80
End: 2025-04-07

## 2025-04-07 RX ORDER — CABERGOLINE 0.5 MG/1
TABLET ORAL
Qty: 6 TABLET | Refills: 1 | Status: SHIPPED | OUTPATIENT
Start: 2025-04-07

## 2025-04-07 NOTE — TELEPHONE ENCOUNTER
Spoke with [facility], sent a request for cardiac clearance for eye surgery in March and never received it.    Reviewed chart, clearance letter completed March 18th.    Refaxed clearance letter to [phone], advised if did not receive this time to call back.

## 2025-04-22 ENCOUNTER — OFFICE VISIT (OUTPATIENT)
Dept: ENDOCRINOLOGY | Facility: CLINIC | Age: 80
End: 2025-04-22
Payer: MEDICARE

## 2025-04-22 VITALS
HEART RATE: 59 BPM | SYSTOLIC BLOOD PRESSURE: 112 MMHG | HEIGHT: 63 IN | OXYGEN SATURATION: 98 % | WEIGHT: 113 LBS | BODY MASS INDEX: 20.02 KG/M2 | DIASTOLIC BLOOD PRESSURE: 72 MMHG

## 2025-04-22 DIAGNOSIS — M81.0 OSTEOPOROSIS, UNSPECIFIED OSTEOPOROSIS TYPE, UNSPECIFIED PATHOLOGICAL FRACTURE PRESENCE: ICD-10-CM

## 2025-04-22 DIAGNOSIS — E55.9 VITAMIN D DEFICIENCY: ICD-10-CM

## 2025-04-22 DIAGNOSIS — D35.2 PITUITARY MACROADENOMA (HCC): Primary | ICD-10-CM

## 2025-04-22 DIAGNOSIS — E22.1 HYPERPROLACTINEMIA (HCC): ICD-10-CM

## 2025-04-22 PROCEDURE — 96372 THER/PROPH/DIAG INJ SC/IM: CPT | Performed by: PHYSICIAN ASSISTANT

## 2025-04-22 PROCEDURE — 99214 OFFICE O/P EST MOD 30 MIN: CPT | Performed by: PHYSICIAN ASSISTANT

## 2025-04-22 RX ORDER — MAGNESIUM 200 MG
TABLET ORAL
COMMUNITY

## 2025-04-22 NOTE — ASSESSMENT & PLAN NOTE
Continue with routine follow-up with neurosurgery and MRIs  Recommended routine visual field testing.  She will notify her new doctor about her diagnosis of the pituitary macroadenoma.  Previously pituitary hormones were within acceptable range  Recheck labs prior to next visit  Seek care immediately for any worsening/severe headaches associated with nausea, vomiting, vision changes

## 2025-04-22 NOTE — PROGRESS NOTES
Name: Bella Sargent      : 1945      MRN: [id number]  Encounter Provider: Gianna Lewis PA-C  Encounter Date: 2025   Encounter department: [facility] [place]    CC: Pituitary macroadenoma, osteoporosis  Assessment & Plan  Pituitary macroadenoma (HCC)  Continue with routine follow-up with neurosurgery and MRIs  Recommended routine visual field testing.  She will notify her new doctor about her diagnosis of the pituitary macroadenoma.  Previously pituitary hormones were within acceptable range  Recheck labs prior to next visit  Seek care immediately for any worsening/severe headaches associated with nausea, vomiting, vision changes       Hyperprolactinemia (HCC)  Prolactin previously low at 0.98  Continue cabergoline  Check labs now       Osteoporosis, unspecified osteoporosis type, unspecified pathological fracture presence  Received Prolia injection today  Continue with Prolia injection every 6 months  Next DEXA scan due in 2024  Continue vitamin D and calcium supplementation  Take precautions to avoid falls  Do weightbearing exercises as tolerated  Orders:    denosumab (PROLIA) subcutaneous injection 60 mg    Vitamin D deficiency  Vitamin D previously normal at 38.6.   Continue current supplementation           History of Present Illness     Bella Sargent is a 79 y.o. female  here for follow-up of osteoporosis and pituitary macroadenoma.  She has a medical history of vertebral compression fracture of T11.  Her DEXA scan done in 2024 was consistent with osteopenia but her FRAX scores were elevated.  She received her first Prolia injection in 2022. Her last Prolia injection was in 2024 and is due for repeat today. She did tolerate it well. She also takes calcium carbonate 600 mg daily and vitamin D 2600 units daily.  No recent falls or fractures.  She does do not do weightbearing exercises except for walking.  Last vitamin D was  normal at 38.6.   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  There was no impingement of the optic chiasm.  She does follow-up with neurosurgery and has repeat MRI scheduled. Prolactin level previously was low at 0.98.  Denies nipple discharge and breast engorgement. She is taking cabergoline 0.25 mg weekly.  Other pituitary hormones were within normal range. She did have visual fields testing Sept 2024.    Review of Systems   Constitutional:  Negative for activity change, appetite change, fatigue and unexpected weight change.   HENT:  Negative for trouble swallowing.    Eyes:  Negative for visual disturbance.   Respiratory:  Negative for shortness of breath.    Cardiovascular:  Negative for chest pain and palpitations.   Gastrointestinal:  Positive for constipation. Negative for diarrhea.   Endocrine: Negative for cold intolerance and heat intolerance.   Musculoskeletal:  Positive for arthralgias (arthritis).   Neurological:  Negative for headaches.   Psychiatric/Behavioral: Negative.      as per HPI  Current Outpatient Medications on File Prior to Visit   Medication Sig Dispense Refill    acetaminophen (TYLENOL) 650 mg CR tablet Take 650 mg by mouth daily as needed for mild pain      amLODIPine (NORVASC) 5 mg tablet TAKE ONE TABLET BY MOUTH EVERY DAY 90 tablet 1    aspirin (ECOTRIN LOW STRENGTH) 81 mg EC tablet Take 1 tablet (81 mg total) by mouth daily      cabergoline (DOSTINEX) 0.5 MG tablet TAKE 1/2 TABLET BY MOUTH ONCE A WEEK (PATIENT SHOULD USE PILL CUTTER TO CUT THE PILLS) 6 tablet 1    Calcium Carb-Cholecalciferol 600-200 MG-UNIT TABS Calcium 600 + D TABS  TAKE 1 TABLET DAILY.   Refills: 0    Active      denosumab (PROLIA) 60 mg/mL Inject 60 mg under the skin every 6 (six) months      losartan (COZAAR) 100 MG tablet TAKE ONE TABLET BY MOUTH EVERY DAY 90 tablet 1     "Magnesium 200 MG TABS Take by mouth Pt takes 2 at night      metoprolol succinate (TOPROL-XL) 25 mg 24 hr tablet Take 1 tablet (25 mg total) by mouth daily TAKING ONE TABLET DAILY IN THE MORNING      rosuvastatin (CRESTOR) 40 MG tablet TAKE 1 TABLET DAILY 90 tablet 1    Turmeric 500 MG CAPS 500 mg      VITAMIN D, CHOLECALCIFEROL, PO Take 2,600 Units/day by mouth       No current facility-administered medications on file prior to visit.         Medical History Reviewed by provider this encounter:     .    Objective   /72 (BP Location: Left arm, Patient Position: Sitting, Cuff Size: Standard)   Pulse 59   Ht 5' 3.25\" (1.607 m)   Wt 51.3 kg (113 lb)   SpO2 98%   BMI 19.86 kg/m²      Body mass index is 19.86 kg/m².  Wt Readings from Last 3 Encounters:   04/22/25 51.3 kg (113 lb)   03/26/25 50.9 kg (112 lb 3.2 oz)   03/12/25 50.8 kg (112 lb)     Physical Exam  Vitals and nursing note reviewed.   Constitutional:       Appearance: She is well-developed.   HENT:      Head: Normocephalic.   Eyes:      General: No scleral icterus.  Neck:      Thyroid: No thyromegaly.   Cardiovascular:      Rate and Rhythm: Normal rate and regular rhythm.      Pulses:           Radial pulses are 2+ on the right side and 2+ on the left side.      Heart sounds: No murmur heard.  Pulmonary:      Effort: Pulmonary effort is normal. No respiratory distress.      Breath sounds: Normal breath sounds. No wheezing.   Musculoskeletal:      Cervical back: Neck supple.   Skin:     General: Skin is warm and dry.   Neurological:      Mental Status: She is alert.       Labs:   No results found for: \"HGBA1C\"  Lab Results   Component Value Date    CREATININE 0.58 (L) 10/07/2024    CREATININE 0.63 04/02/2024    CREATININE 0.64 02/27/2024    BUN 19 10/07/2024     03/03/2015    K 4.1 10/07/2024     10/07/2024    CO2 26 10/07/2024     eGFR   Date Value Ref Range Status   10/07/2024 87 ml/min/1.73sq m Final     Lab Results   Component Value " Date    HDL 53 10/07/2024    TRIG 107 10/07/2024     Lab Results   Component Value Date    ALT 27 11/16/2023    AST 46 (H) 11/16/2023    ALKPHOS 44 11/16/2023    BILITOT 0.2 03/03/2015     Lab Results   Component Value Date    RXP7SVKWUWRD 1.060 10/07/2024    LHN6XBMSXHCH 1.421 04/02/2024    HBK8OTZDCAQE 1.585 11/14/2023     Lab Results   Component Value Date    FREET4 0.62 10/07/2024       There are no Patient Instructions on file for this visit.    Discussed with the patient and all questioned fully answered. She will call me if any problems arise.    Administrative Statements   I have spent a total time of 30 minutes in caring for this patient on the day of the visit/encounter including Importance of tx compliance, Documenting in the medical record, Reviewing/placing orders in the medical record (including tests, medications, and/or procedures), and Obtaining or reviewing history  .

## 2025-04-22 NOTE — PROGRESS NOTES
"Assessment/Plan:    Bella Sargent came into the [facility] Endocrinology Office today 04/22/25 to receive prolia injection.      Verbal consent obtained.  Consent given by: patient    patient states patient has been medically healthy with no underlining concerns/complications.      Bella Sargent presents with no symptoms today.       All insturctions were reviewed with the patient.    If the patient should have any questions/concerns, advised patient to contacted [facility].       Subjective:     History provided by: patient    Patient ID: Bella Sargent is a 79 y.o. female      Objective:    Vitals:    04/22/25 1438   BP: 112/72   BP Location: Left arm   Patient Position: Sitting   Cuff Size: Standard   Pulse: 59   SpO2: 98%   Weight: 51.3 kg (113 lb)   Height: 5' 3.25\" (1.607 m)       Patient tolerated the injection well without any complications.  Injection site/s right arm.  Medication was provided by office.    Patient signed consent form yes   Patient signed ABN form yes (If no patient is not a medicare patient).   Patient waited 15 minutes after injection no (This only applies to patient's receiving first time injection).       Last Visit: Visit date not found  Next visit:Visit date not found     "

## 2025-04-22 NOTE — PATIENT INSTRUCTIONS
Please inform your new eye doctor of your diagnosis of the pituitary macroadenoma so that routine visual field testing can be done.     Complete fasting labs now and prior to next visit.

## 2025-04-22 NOTE — ASSESSMENT & PLAN NOTE
Received Prolia injection today  Continue with Prolia injection every 6 months  Next DEXA scan due in December 2024  Continue vitamin D and calcium supplementation  Take precautions to avoid falls  Do weightbearing exercises as tolerated  Orders:    denosumab (PROLIA) subcutaneous injection 60 mg

## 2025-04-23 DIAGNOSIS — R00.2 PALPITATIONS: ICD-10-CM

## 2025-04-23 DIAGNOSIS — I49.3 PVC'S (PREMATURE VENTRICULAR CONTRACTIONS): ICD-10-CM

## 2025-04-24 RX ORDER — METOPROLOL SUCCINATE 25 MG/1
TABLET, EXTENDED RELEASE ORAL
Qty: 135 TABLET | Refills: 1 | Status: SHIPPED | OUTPATIENT
Start: 2025-04-24

## 2025-04-25 ENCOUNTER — APPOINTMENT (OUTPATIENT)
Dept: LAB | Facility: CLINIC | Age: 80
End: 2025-04-25
Payer: MEDICARE

## 2025-04-25 DIAGNOSIS — D35.2 PITUITARY MACROADENOMA (HCC): ICD-10-CM

## 2025-04-25 DIAGNOSIS — M81.0 OSTEOPOROSIS, UNSPECIFIED OSTEOPOROSIS TYPE, UNSPECIFIED PATHOLOGICAL FRACTURE PRESENCE: ICD-10-CM

## 2025-04-25 DIAGNOSIS — E55.9 VITAMIN D DEFICIENCY: ICD-10-CM

## 2025-04-25 LAB
25(OH)D3 SERPL-MCNC: 37.7 NG/ML (ref 30–100)
ANION GAP SERPL CALCULATED.3IONS-SCNC: 6 MMOL/L (ref 4–13)
BUN SERPL-MCNC: 20 MG/DL (ref 5–25)
CALCIUM SERPL-MCNC: 9.1 MG/DL (ref 8.4–10.2)
CHLORIDE SERPL-SCNC: 102 MMOL/L (ref 96–108)
CO2 SERPL-SCNC: 30 MMOL/L (ref 21–32)
CREAT SERPL-MCNC: 0.58 MG/DL (ref 0.6–1.3)
GFR SERPL CREATININE-BSD FRML MDRD: 87 ML/MIN/1.73SQ M
GLUCOSE P FAST SERPL-MCNC: 85 MG/DL (ref 65–99)
POTASSIUM SERPL-SCNC: 4.6 MMOL/L (ref 3.5–5.3)
PROLACTIN SERPL-MCNC: 1.12 NG/ML (ref 2.74–19.64)
SODIUM SERPL-SCNC: 138 MMOL/L (ref 135–147)

## 2025-04-25 PROCEDURE — 36415 COLL VENOUS BLD VENIPUNCTURE: CPT

## 2025-04-25 PROCEDURE — 84146 ASSAY OF PROLACTIN: CPT

## 2025-04-25 PROCEDURE — 80048 BASIC METABOLIC PNL TOTAL CA: CPT

## 2025-04-25 PROCEDURE — 82306 VITAMIN D 25 HYDROXY: CPT

## 2025-04-28 ENCOUNTER — RESULTS FOLLOW-UP (OUTPATIENT)
Dept: ENDOCRINOLOGY | Facility: CLINIC | Age: 80
End: 2025-04-28

## 2025-05-01 DIAGNOSIS — I25.118 CORONARY ARTERY DISEASE OF NATIVE ARTERY OF NATIVE HEART WITH STABLE ANGINA PECTORIS (HCC): ICD-10-CM

## 2025-05-02 RX ORDER — ROSUVASTATIN CALCIUM 40 MG/1
40 TABLET, COATED ORAL DAILY
Qty: 90 TABLET | Refills: 1 | Status: SHIPPED | OUTPATIENT
Start: 2025-05-02

## 2025-05-18 ENCOUNTER — APPOINTMENT (EMERGENCY)
Dept: CT IMAGING | Facility: HOSPITAL | Age: 80
End: 2025-05-18
Payer: MEDICARE

## 2025-05-18 ENCOUNTER — HOSPITAL ENCOUNTER (EMERGENCY)
Facility: HOSPITAL | Age: 80
Discharge: HOME | End: 2025-05-18
Attending: EMERGENCY MEDICINE
Payer: MEDICARE

## 2025-05-18 VITALS
RESPIRATION RATE: 16 BRPM | OXYGEN SATURATION: 99 % | HEART RATE: 53 BPM | SYSTOLIC BLOOD PRESSURE: 152 MMHG | WEIGHT: 113.1 LBS | BODY MASS INDEX: 19.88 KG/M2 | DIASTOLIC BLOOD PRESSURE: 72 MMHG | TEMPERATURE: 97.7 F

## 2025-05-18 DIAGNOSIS — S09.90XA CLOSED HEAD INJURY, INITIAL ENCOUNTER: ICD-10-CM

## 2025-05-18 DIAGNOSIS — S50.319A ABRASION, ELBOW W/O INFECTION: ICD-10-CM
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
## 2025-05-18 PROCEDURE — 99284 EMERGENCY DEPT VISIT MOD MDM: CPT

## 2025-05-18 PROCEDURE — 99284 EMERGENCY DEPT VISIT MOD MDM: CPT | Performed by: EMERGENCY MEDICINE

## 2025-05-18 PROCEDURE — 70450 CT HEAD/BRAIN W/O DYE: CPT

## 2025-05-18 RX ORDER — GINSENG 100 MG
1 CAPSULE ORAL ONCE
Status: COMPLETED | OUTPATIENT
Start: 2025-05-18 | End: 2025-05-18

## 2025-05-18 RX ORDER — GINSENG 100 MG
1 CAPSULE ORAL 2 TIMES DAILY
Qty: 28 G | Refills: 0 | Status: SHIPPED | OUTPATIENT
Start: 2025-05-18

## 2025-05-18 RX ORDER — ACETAMINOPHEN 325 MG/1
650 TABLET ORAL ONCE
Status: COMPLETED | OUTPATIENT
Start: 2025-05-18 | End: 2025-05-18

## 2025-05-18 RX ADMIN — BACITRACIN ZINC 1 SMALL APPLICATION: 500 OINTMENT TOPICAL at 14:06

## 2025-05-18 RX ADMIN — ACETAMINOPHEN 650 MG: 325 TABLET, FILM COATED ORAL at 14:04

## 2025-05-18 NOTE — ED PROVIDER NOTES
Emergency Department Trauma Note  Bella Sargent 79 y.o. female MRN: [id number]  Unit/Bed#: ED 04/ED 04 Encounter: [id number]      Trauma Alert: Trauma Acuity: Trauma Evaluation  Model of Arrival:   via    Trauma Team: Current Providers  Attending Provider: Yadira Craig DO  Registered Nurse: Sridevi Patel RN  ED Technician: Kristen Stewart  Consultants:     None      History of Present Illness     Chief Complaint:   Chief Complaint   Patient presents with    Fall     Fall backwards while gardening, landed on cement, + headstrike, + baby ASA, - LOC     HPI:  Bella Sargent is a 79 y.o. female who presents with evaluation after fall.  Patient states she was digging in her garden when she fell backwards and struck her head.  No loss of consciousness does have mild pain to the back of her head no neck pain.  Also has abrasion to the elbow but full range of motion.  Patient denies any other injury.  Mechanism:Details of Incident: fall backwards while gardening Injury Date: 05/18/25 Injury Time: 1323 Injury Occurence Location - Specify County: [place]      Fall    Review of Systems    Historical Information     Immunizations:   Immunization History   Administered Date(s) Administered    COVID-19 PFIZER VACCINE 0.3 ML IM 01/15/2021, 02/04/2021, 09/30/2021    COVID-19 Pfizer Vac BIVALENT Darius-sucrose 12 Yr+ IM 10/03/2022    COVID-19 Pfizer mRNA vacc PF darius-sucrose 12 yr and older (Comirnaty) 09/26/2023, 09/24/2024    COVID-19 Pfizer vac (Darius-sucrose, gray cap) 12 yr+ IM 05/20/2022    Hep A, adult 11/19/2015    INFLUENZA 10/01/2012, 10/10/2013, 10/10/2013, 10/01/2014, 10/01/2014, 10/01/2015, 10/16/2015, 10/01/2017, 10/20/2017, 11/06/2019, 10/15/2020, 11/10/2021, 10/03/2022    Influenza Quadrivalent Preservative Free 3 years and older IM 10/01/2016, 10/20/2017    Influenza, high dose seasonal 0.7 mL 11/21/2018, 10/11/2023    Pneumococcal Conjugate 13-Valent 05/21/2019    Pneumococcal Polysaccharide PPV23 05/02/2011     Respiratory Syncytial Virus Vaccine (Recombinant) 01/09/2024    Tdap 11/19/2015, 09/01/2018    Typhoid Live, oral 11/19/2015    Typhoid, Unspecified 11/19/2015    Zoster 07/11/2018    Zoster Vaccine Recombinant 11/27/2018    influenza, trivalent, adjuvanted 09/24/2024       Past Medical History:   Diagnosis Date    Anxiety     Arthritis     Colon polyp     Coronary artery disease     Effusion of left knee     Last assessed - 9/10/15    Fractures     Heart disease     History of transfusion     HL (hearing loss) 2022    Hyperlipidemia     Hypertension     Low back pain 5/9/24    Memory loss     Myocardial infarction (HCC)     Osteoarthritis     Protein-calorie malnutrition, unspecified severity (HCC) 11/14/2023    Scoliosis     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    Tick bite     Last assessed - 4/21/17       Family History   Problem Relation Age of Onset    Coronary artery disease Mother     Arthritis Mother     Transient ischemic attack Mother     Heart disease Mother     Hearing loss Mother     Arrhythmia Mother     Hyperlipidemia Mother     Osteoarthritis Mother     Heart attack Father     Sudden death Father         SCD    No Known Problems Sister     No Known Problems Sister     Cancer Daughter 49        kidney    No Known Problems Daughter     No Known Problems Daughter     No Known Problems Paternal Aunt     Breast cancer Niece 46     Past Surgical History:   Procedure Laterality Date    ADENOIDECTOMY  1950    APPENDECTOMY      CARDIAC SURGERY      COLONOSCOPY      CORONARY ARTERY BYPASS GRAFT      HAND SURGERY      ID CORONARY ARTERY BYP W/VEIN & ARTERY GRAFT 4 VEIN N/A 01/27/2020    Procedure: CORONARY ARTERY BYPASS GRAFT (CABG) x3 VESSELS, LIMA TO LAD, AND SVG TO PLB & OM;  Surgeon: Peter Colmenares DO;  Location: [facility];  Service: Cardiac Surgery    ID ECHO TRANSESOPHAG R-T 2D W/PRB IMG ACQUISJ I&R N/A 01/27/2020    Procedure: TRANSESOPHAGEAL ECHOCARDIOGRAM (GET);  Surgeon: Peter Colmenares DO;   Location: [facility];  Service: Cardiac Surgery    N[place] NDSC SURG W/VIDEO-ASSISTED HARVEST VEIN CABG Left 2020    Procedure: HARVEST VEIN ENDOSCOPIC (EVH);  Surgeon: Peter Colmenares DO;  Location: [facility];  Service: Cardiac Surgery    TONSILLECTOMY      Last assessed - 17    TONSILLECTOMY  1950    TUBAL LIGATION      Last assessed - 17    TUMOR REMOVAL      pelvic benign tumor removal    WISDOM TOOTH EXTRACTION      Last assessed - 17     Social History[1]  E-Cigarette/Vaping    E-Cigarette Use Never User      E-Cigarette/Vaping Substances    Nicotine No     THC No     CBD No     Flavoring No     Other No     Unknown No        Family History: non-contributory    Meds/Allergies   Prior to Admission Medications   Prescriptions Last Dose Informant Patient Reported? Taking?   Calcium Carb-Cholecalciferol 600-200 MG-UNIT TABS  Self Yes No   Sig: Calcium 600 + D TABS  TAKE 1 TABLET DAILY.   Refills: 0    Active   Magnesium 200 MG TABS   Yes No   Sig: Take by mouth Pt takes 2 at night   Turmeric 500 MG CAPS  Self Yes No   Si mg   VITAMIN D, CHOLECALCIFEROL, PO  Self Yes No   Sig: Take 2,600 Units/day by mouth   acetaminophen (TYLENOL) 650 mg CR tablet  Self Yes No   Sig: Take 650 mg by mouth daily as needed for mild pain   amLODIPine (NORVASC) 5 mg tablet   No No   Sig: TAKE ONE TABLET BY MOUTH EVERY DAY   aspirin (ECOTRIN LOW STRENGTH) 81 mg EC tablet  Self No No   Sig: Take 1 tablet (81 mg total) by mouth daily   cabergoline (DOSTINEX) 0.5 MG tablet   No No   Sig: TAKE 1/2 TABLET BY MOUTH ONCE A WEEK (PATIENT SHOULD USE PILL CUTTER TO CUT THE PILLS)   denosumab (PROLIA) 60 mg/mL  Self Yes No   Sig: Inject 60 mg under the skin every 6 (six) months   losartan (COZAAR) 100 MG tablet   No No   Sig: TAKE ONE TABLET BY MOUTH EVERY DAY   metoprolol succinate (TOPROL-XL) 25 mg 24 hr tablet   No No   Sig: TAKE ONE TABLET BY MOUTH IN THE MORNING AND 1/2 TABLET IN THE EVENING   rosuvastatin  (CRESTOR) 40 MG tablet   No No   Sig: TAKE 1 TABLET DAILY      Facility-Administered Medications: None       Allergies[2]    PHYSICAL EXAM    PE limited by: None    Objective   Vitals:   First set: Temperature: 97.7 °F (36.5 °C) (05/18/25 1344)  Pulse: 61 (05/18/25 1344)  Respirations: 16 (05/18/25 1344)  Blood Pressure: (!) 202/81 (05/18/25 1344)  SpO2: 99 % (05/18/25 1344)    Primary Survey:   (A) Airway: Airway intact  (B) Breathing: Equal breath sounds bilaterally  (C) Circulation: Pulses:   normal  (D) Disabliity:  GCS Total:  15  (E) Expose:  Completed    Secondary Survey: (Click on Physical Exam tab above)  Physical Exam  Vitals and nursing note reviewed.   Constitutional:       General: She is not in acute distress.     Appearance: She is well-developed.   HENT:      Head: Normocephalic and atraumatic.       Eyes:      Conjunctiva/sclera: Conjunctivae normal.       Cardiovascular:      Rate and Rhythm: Normal rate and regular rhythm.      Heart sounds: No murmur heard.  Pulmonary:      Effort: Pulmonary effort is normal. No respiratory distress.      Breath sounds: Normal breath sounds.   Abdominal:      Palpations: Abdomen is soft.      Tenderness: There is no abdominal tenderness.     Musculoskeletal:         General: No swelling.      Cervical back: Neck supple.     Skin:     General: Skin is warm and dry.      Capillary Refill: Capillary refill takes less than 2 seconds.     Neurological:      Mental Status: She is alert.     Psychiatric:         Mood and Affect: Mood normal.         Cervical spine cleared by clinical criteria? Yes     Invasive Devices       None                   Lab Results:   Results Reviewed       None                   Imaging Studies:   Direct to CT: Yes  TRAUMA - CT head wo contrast   Final Result by Pallav N Shah, MD (05/18 3368)      No acute intracranial abnormality.      Redemonstrated sellar and suprasellar mass with probable right cavernous sinus extension. Please see the  recent MRI study report.                  Workstation performed: [id number]               Procedures  Procedures         ED Course           Medical Decision Making  Differential diagnosis includes but is not limited to scalp hematoma, closed head injury, elbow abrasion,    Problems Addressed:  Abrasion, elbow w/o infection: acute illness or injury  Closed head injury, initial encounter: acute illness or injury  Fall, initial encounter: acute illness or injury    Amount and/or Complexity of Data Reviewed  Radiology: ordered. Decision-making details documented in ED Course.    Risk  OTC drugs.  Prescription drug management.  Risk Details: Continue all current medications Tylenol as needed for pain discussed the case with neurosurgery who felt it was fine to follow-up with outpatient                Disposition  [facility] Transfer: No  Final diagnoses:   Fall, initial encounter   Closed head injury, initial encounter   Abrasion, elbow w/o infection     Time reflects when diagnosis was documented in both MDM as applicable and the Disposition within this note       Time User Action Codes Description Comment    5/18/2025  2:14 PM Yadira Craig Add [W19.XXXA] Fall, initial encounter     5/18/2025  2:14 PM Yadira Craig Add [S09.90XA] Closed head injury, initial encounter     5/18/2025  2:14 PM Yadira Craig Add [S50.319A] Abrasion, elbow w/o infection           ED Disposition       ED Disposition   Discharge    Condition   Stable    Date/Time   Sun May 18, 2025  2:14 PM    Comment   Bella Sargent discharge to home/self care.                   Follow-up Information       Follow up With Specialties Details Why Contact Info    Dewey Barrera MD Family Medicine Schedule an appointment as soon as possible for a visit  As needed [address]  [address]  [facility] [id number] [phone]      Maksim Calvert MD Neurosurgery Schedule an appointment as soon as possible for a visit   [address]  [place]  [address]  [facility] [address]  [phone]            Discharge Medication List as of 5/18/2025  2:16 PM        START taking these medications    Details   bacitracin topical ointment 500 units/g topical ointment Apply 1 large application topically 2 (two) times a day, Starting Sun 5/18/2025, Normal           CONTINUE these medications which have NOT CHANGED    Details   acetaminophen (TYLENOL) 650 mg CR tablet Take 650 mg by mouth daily as needed for mild pain, Historical Med      amLODIPine (NORVASC) 5 mg tablet TAKE ONE TABLET BY MOUTH EVERY DAY, Starting Mon 1/20/2025, Normal      aspirin (ECOTRIN LOW STRENGTH) 81 mg EC tablet Take 1 tablet (81 mg total) by mouth daily, Starting Fri 4/10/2020, No Print      cabergoline (DOSTINEX) 0.5 MG tablet TAKE 1/2 TABLET BY MOUTH ONCE A WEEK (PATIENT SHOULD USE PILL CUTTER TO CUT THE PILLS), Normal      Calcium Carb-Cholecalciferol 600-200 MG-UNIT TABS Calcium 600 + D TABS  TAKE 1 TABLET DAILY.   Refills: 0    Active, Historical Med      denosumab (PROLIA) 60 mg/mL Inject 60 mg under the skin every 6 (six) months, Historical Med      losartan (COZAAR) 100 MG tablet TAKE ONE TABLET BY MOUTH EVERY DAY, Starting Mon 3/10/2025, Normal      Magnesium 200 MG TABS Take by mouth Pt takes 2 at night, Historical Med      metoprolol succinate (TOPROL-XL) 25 mg 24 hr tablet TAKE ONE TABLET BY MOUTH IN THE MORNING AND 1/2 TABLET IN THE EVENING, Normal      rosuvastatin (CRESTOR) 40 MG tablet TAKE 1 TABLET DAILY, Starting Fri 5/2/2025, Normal      Turmeric 500 MG CAPS 500 mg, Starting Sat 6/1/2024, Historical Med      VITAMIN D, CHOLECALCIFEROL, PO Take 2,600 Units/day by mouth, Historical Med           No discharge procedures on file.    PDMP Review         Value Time User    PDMP Reviewed  Yes 7/19/2023 12:30 PM Sylvester Tsang DO            ED Provider  Electronically Signed by             [1]   Social History  Tobacco Use    Smoking status: Never     Passive exposure: Past     Smokeless tobacco: Never   Vaping Use    Vaping status: Never Used   Substance Use Topics    Alcohol use: Yes     Alcohol/week: 3.0 standard drinks of alcohol     Types: 3 Glasses of wine per week     Comment: 1 wine nightly    Drug use: No   [2]   Allergies  Allergen Reactions    Thiazide-Type Diuretics Other (See Comments)     Hyponatremia        Yadira Craig,   05/18/25 6225

## 2025-06-03 ENCOUNTER — APPOINTMENT (OUTPATIENT)
Age: 80
End: 2025-06-03
Attending: STUDENT IN AN ORGANIZED HEALTH CARE EDUCATION/TRAINING PROGRAM
Payer: MEDICARE

## 2025-06-03 ENCOUNTER — OFFICE VISIT (OUTPATIENT)
Age: 80
End: 2025-06-03
Payer: MEDICARE

## 2025-06-03 VITALS — HEIGHT: 63 IN | BODY MASS INDEX: 20.02 KG/M2 | WEIGHT: 113 LBS

## 2025-06-03 DIAGNOSIS — Z01.89 ENCOUNTER FOR LOWER EXTREMITY COMPARISON IMAGING STUDY: ICD-10-CM

## 2025-06-03 DIAGNOSIS — M17.0 PRIMARY OSTEOARTHRITIS OF BOTH KNEES: Primary | ICD-10-CM

## 2025-06-03 DIAGNOSIS — M25.561 RIGHT KNEE PAIN, UNSPECIFIED CHRONICITY: ICD-10-CM

## 2025-06-03 DIAGNOSIS — M25.562 LEFT KNEE PAIN, UNSPECIFIED CHRONICITY: ICD-10-CM

## 2025-06-03 PROCEDURE — 99214 OFFICE O/P EST MOD 30 MIN: CPT | Performed by: STUDENT IN AN ORGANIZED HEALTH CARE EDUCATION/TRAINING PROGRAM

## 2025-06-03 PROCEDURE — 77073 BONE LENGTH STUDIES: CPT

## 2025-06-03 PROCEDURE — 73564 X-RAY EXAM KNEE 4 OR MORE: CPT

## 2025-06-03 PROCEDURE — 20610 DRAIN/INJ JOINT/BURSA W/O US: CPT | Performed by: STUDENT IN AN ORGANIZED HEALTH CARE EDUCATION/TRAINING PROGRAM

## 2025-06-03 RX ORDER — TRIAMCINOLONE ACETONIDE 40 MG/ML
40 INJECTION, SUSPENSION INTRA-ARTICULAR; INTRAMUSCULAR
Status: COMPLETED | OUTPATIENT
Start: 2025-06-03 | End: 2025-06-03

## 2025-06-03 RX ORDER — LIDOCAINE HYDROCHLORIDE 10 MG/ML
4 INJECTION, SOLUTION INFILTRATION; PERINEURAL
Status: COMPLETED | OUTPATIENT
Start: 2025-06-03 | End: 2025-06-03

## 2025-06-03 RX ADMIN — TRIAMCINOLONE ACETONIDE 40 MG: 40 INJECTION, SUSPENSION INTRA-ARTICULAR; INTRAMUSCULAR at 13:45

## 2025-06-03 RX ADMIN — LIDOCAINE HYDROCHLORIDE 4 ML: 10 INJECTION, SOLUTION INFILTRATION; PERINEURAL at 13:45

## 2025-06-03 NOTE — PROGRESS NOTES
Knee New Office Note    Assessment:     1. Primary osteoarthritis of both knees    2. Left knee pain, unspecified chronicity    3. Right knee pain, unspecified chronicity    4. Encounter for lower extremity comparison imaging study        Plan:  Assessment & Plan  Primary osteoarthritis of both knees  Left knee pain, unspecified chronicity  Right knee pain, unspecified chronicity  Findings are consistent with bilateral knee osteoarthritis. Images were obtained and reviewed today. At this point she is a surgical candidate however she would like to hold off as her pain is intermittent. Left knee CSI was offered and administered today. She tolerated this well. Post injection instructions were provided. She may receive CSI ever 3 months. She may continue HEP and OTC medications. She will follow up in 3 months for reevaluation and we can consider bilateral knee CSI if her right knee begins to bother her again.       Orders:    XR knee 4+ vw right injury; Future    XR bone length (scanogram); Future    XR knee 4+ vw left injury; Future    Large joint arthrocentesis: L knee    Encounter for lower extremity comparison imaging study    Orders:    XR bone length (scanogram); Future      Subjective:     Patient ID: Bella Sargent is a 79 y.o. female.  Chief Complaint:  HPI:  79 y.o. female here for follow-up evaluation of right knee osteoarthritis and initial evaluation of left knee pain.  Patient states her right knee pain overall is doing well but she does report stiffness within her right knee.  Patient states when she is going from sitting to standing it is stiff on her but continues to get better as she gets around.  Patient states she has been having left knee pain over the past 6 months and denies any mechanism of injuries or traumas.  Patient states her pain has been located along the anterior aspect of her knee.  Patient states she thinks her left knee locks on her at times where she cannot fully extend it so she decides  not to fully extend knee.  Patient states she continues to do HEP and occasionally does yoga for exercise.  She states she has been taking Tylenol as needed for pain control.  She states she not at the point where she would want to consider surgery.      Allergy:  Allergies[1]  Medications:  all current active meds have been reviewed  Past Medical History:  Past Medical History[2]  Past Surgical History:  Past Surgical History[3]  Family History:  Family History[4]  Social History:  Social History     Substance and Sexual Activity   Alcohol Use Yes    Alcohol/week: 3.0 standard drinks of alcohol    Types: 3 Glasses of wine per week    Comment: 1 wine nightly     Social History     Substance and Sexual Activity   Drug Use No     Tobacco Use History[5]        ROS:  General: Per HPI  Skin: Negative, except if noted below  HEENT: Negative  Respiratory: Negative  Cardiovascular: Negative  Gastrointestinal: Negative  Urinary: Negative  Vascular: Negative  Musculoskeletal: Positive per HPI   Neurologic: Positive per HPI  Endocrine: Negative    Objective:  BP Readings from Last 1 Encounters:   05/18/25 152/72      Wt Readings from Last 1 Encounters:   06/03/25 51.3 kg (113 lb)        Respiratory:   non-labored respirations    Lymphatics:  no palpable lymph nodes    Gait:   Altered    Neurologic:   Alert and oriented times 3  Patient with normal sensation except as noted below  Deep tendon reflexes 2+ except as noted in MSK exam    Bilateral Lower Extremity:      Respiratory:   non-labored respirations    Lymphatics:  no palpable lymph nodes    Gait:   antalgic    Neurologic:   Alert and oriented times 3  Patient with normal sensation except as noted below  Deep tendon reflexes 2+ except as noted in MSK exam      Knee Exam    Left Knee:      Inspection:  skin intact    Overall limb alignment valgus    Effusion: mild    ROM 5-120 with pain    Extensor Lag: none    Palpation: medial Joint line tenderness to palpation    AP  "Stability at 90 deg     M/L stability in full extension     M/L stability in midflexion     Motor: 5/5 IP/Q/HS/TA/GS    Pulses: 2+ DP / 2+ PT    SILT DP/SP/S/S/TN    Right knee:     Inspection:  intact    Overall limb alignment valgus    Effusion: moderate    ROM 3-130 without pain    Extensor Lag: none    Palpation: medial Joint line tenderness to palpation    AP Stability at 90 deg mild laxity to anterior drawer    M/L stability in full extension stable    M/L stability in midflexion stable    Motor: 5/5 Q/HS/TA/GS/P    Pulses: 2+ DP    SILT DP/SP/S/S/TN      Imaging:  My interpretation XR AP scanogram/AP bilateral knee/lateral/chang/sunrise bilateral knee:  severe joint space narrowing, subchondral sclerosis, subchondral cysts, osteophyte formation. Bone on bone changes. Valgus. No fracture or dislocation.     BMI:   Estimated body mass index is 20.02 kg/m² as calculated from the following:    Height as of this encounter: 5' 3\" (1.6 m).    Weight as of this encounter: 51.3 kg (113 lb).  BSA:   Estimated body surface area is 1.52 meters squared as calculated from the following:    Height as of this encounter: 5' 3\" (1.6 m).    Weight as of this encounter: 51.3 kg (113 lb).           Large joint arthrocentesis: L knee    Performed by: Brandon Alonso DO  Authorized by: Brandon Alonso DO    Universal Protocol:  procedure performed by consultantConsent: Verbal consent obtained  Risks and benefits: risks, benefits and alternatives were discussed  Consent given by: patient  Patient understanding: patient states understanding of the procedure being performed  Site marked: the operative site was marked  Patient identity confirmed: verbally with patient  Supporting Documentation  Indications: pain     Is this a Visco injection? NoProcedure Details  Location: knee - L knee  Needle size: 22 G  Ultrasound guidance: no  Approach: anterolateral  Medications administered: 4 mL lidocaine 1 %; 40 mg triamcinolone " acetonide 40 mg/mL    Patient tolerance: patient tolerated the procedure well with no immediate complications  Dressing:  Sterile dressing applied            Scribe Attestation      I,:  Rodrick Moreland am acting as a scribe while in the presence of the attending physician.:       I,:  Brandon Alonso DO personally performed the services described in this documentation    as scribed in my presence.:                     [1]   Allergies  Allergen Reactions    Thiazide-Type Diuretics Other (See Comments)     Hyponatremia   [2]   Past Medical History:  Diagnosis Date    Anxiety     Arthritis     Colon polyp     Coronary artery disease     Effusion of left knee     Last assessed - 9/10/15    Fractures     Heart disease     History of transfusion     HL (hearing loss) 2022    Hyperlipidemia     Hypertension     Low back pain 5/9/24    Memory loss     Myocardial infarction (HCC)     Osteoarthritis     Protein-calorie malnutrition, unspecified severity (HCC) 11/14/2023    Scoliosis     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    Tick bite     Last assessed - 4/21/17   [3]   Past Surgical History:  Procedure Laterality Date    ADENOIDECTOMY  1950    APPENDECTOMY      CARDIAC SURGERY      COLONOSCOPY      CORONARY ARTERY BYPASS GRAFT      HAND SURGERY      GA CORONARY ARTERY BYP W/VEIN & ARTERY GRAFT 4 VEIN N/A 01/27/2020    Procedure: CORONARY ARTERY BYPASS GRAFT (CABG) x3 VESSELS, LIMA TO LAD, AND SVG TO PLB & OM;  Surgeon: Peter Colmenares DO;  [facility];  Service: Cardiac Surgery    GA ECHO TRANSESOPHAG R-T 2D W/PRB IMG ACQUISJ I&R N/A 01/27/2020    Procedure: TRANSESOPHAGEAL ECHOCARDIOGRAM (GET);  Surgeon: Peter Colmenares DO;  [facility];  Service: Cardiac Surgery    GA NDSC SURG W/VIDEO-ASSISTED HARVEST VEIN CABG Left 01/27/2020    Procedure: HARVEST VEIN ENDOSCOPIC (EVH);  Surgeon: Peter Colmenares DO;  [facility];  Service: Cardiac Surgery    TONSILLECTOMY      Last assessed - 4/20/17     TONSILLECTOMY  1950    TUBAL LIGATION      Last assessed - 4/20/17    TUMOR REMOVAL  1998    pelvic benign tumor removal    WISDOM TOOTH EXTRACTION      Last assessed - 4/20/17   [4]   Family History  Problem Relation Name Age of Onset    Coronary artery disease Mother Mother     Arthritis Mother Mother     Transient ischemic attack Mother Mother     Heart disease Mother Mother     Hearing loss Mother Mother     Arrhythmia Mother Mother     Hyperlipidemia Mother Mother     Osteoarthritis Mother Mother     Heart attack Father Father     Sudden death Father Father         SCD    No Known Problems Sister      No Known Problems Sister      Cancer Daughter  49        kidney    No Known Problems Daughter      No Known Problems Daughter      No Known Problems Paternal Aunt      Breast cancer Niece maternal 46   [5]   Social History  Tobacco Use   Smoking Status Never    Passive exposure: Past   Smokeless Tobacco Never

## 2025-06-29 ENCOUNTER — HOSPITAL ENCOUNTER (EMERGENCY)
Facility: HOSPITAL | Age: 80
Discharge: HOME | End: 2025-06-29
Attending: EMERGENCY MEDICINE
Payer: MEDICARE

## 2025-06-29 VITALS
RESPIRATION RATE: 18 BRPM | OXYGEN SATURATION: 97 % | HEART RATE: 64 BPM | SYSTOLIC BLOOD PRESSURE: 157 MMHG | TEMPERATURE: 97.8 F | DIASTOLIC BLOOD PRESSURE: 68 MMHG

## 2025-06-29 DIAGNOSIS — B02.9 SHINGLES: Primary | ICD-10-CM

## 2025-06-29 PROCEDURE — 99284 EMERGENCY DEPT VISIT MOD MDM: CPT | Performed by: EMERGENCY MEDICINE

## 2025-06-29 PROCEDURE — 99282 EMERGENCY DEPT VISIT SF MDM: CPT

## 2025-06-29 RX ORDER — VALACYCLOVIR HYDROCHLORIDE 1 G/1
1000 TABLET, FILM COATED ORAL EVERY 8 HOURS
Qty: 20 TABLET | Refills: 0 | Status: SHIPPED | OUTPATIENT
Start: 2025-06-30 | End: 2025-07-07 | Stop reason: ALTCHOICE

## 2025-06-29 RX ORDER — VALACYCLOVIR HYDROCHLORIDE 500 MG/1
1000 TABLET, FILM COATED ORAL ONCE
Status: COMPLETED | OUTPATIENT
Start: 2025-06-29 | End: 2025-06-29

## 2025-06-29 RX ADMIN — VALACYCLOVIR 1000 MG: 500 TABLET, FILM COATED ORAL at 20:54

## 2025-06-30 NOTE — ED PROVIDER NOTES
Time reflects when diagnosis was documented in both MDM as applicable and the Disposition within this note       Time User Action Codes Description Comment    6/29/2025  8:49 PM Matthew Quesada Add [B02.9] Shingles           ED Disposition       ED Disposition   Discharge    Condition   Stable    Date/Time   Sun Jun 29, 2025  8:49 PM    Comment   Bella Blackpas discharge to home/self care.                   Assessment & Plan       Medical Decision Making  Patient is a 79-year-old female seen in the emergency department with concern for rash.  Evaluation does not appear consistent with cellulitis or contact dermatitis. Patient declines pain medication. Evaluation is consistent with shingles/zoster. Plan to treat patient with course of antiviral medication, and have patient follow up with PCP/outpatient providers.  Patient stable for discharge home. Discharge instructions were reviewed with patient.    Problems Addressed:  Shingles: acute illness or injury    Risk  Prescription drug management.             Medications   valACYclovir (VALTREX) tablet 1,000 mg (1,000 mg Oral Given 6/29/25 2054)       ED Risk Strat Scores                    No data recorded                            History of Present Illness       Chief Complaint   Patient presents with    Rash     Rash in left groin started yesterday. Concerned for shingles.        Past Medical History[1]   Past Surgical History[2]   Family History[3]   Social History[4]   E-Cigarette/Vaping    E-Cigarette Use Never User       E-Cigarette/Vaping Substances    Nicotine No     THC No     CBD No     Flavoring No     Other No     Unknown No       I have reviewed and agree with the history as documented.     Patient is a 79-year-old female seen in the emergency department with concern for rash.  Patient notes rash to left groin which began yesterday.  Patient notes some irritation/sensitivity in the area.  Patient notes no fever, chest pain, shortness of breath, abdominal pain,  nausea, vomiting, weakness, numbness, tingling.  Patient denies having similar rash in the past.  Patient notes history of shingles vaccine several years ago.  Patient declines medication for pain control in the emergency department.        Review of Systems   Constitutional:  Negative for chills and fever.   HENT:  Negative for ear pain and sore throat.    Eyes:  Negative for pain and visual disturbance.   Respiratory:  Negative for cough and shortness of breath.    Cardiovascular:  Negative for chest pain and palpitations.   Gastrointestinal:  Negative for abdominal pain and vomiting.   Musculoskeletal:  Negative for gait problem and neck stiffness.   Skin:  Positive for rash. Negative for pallor.   Neurological:  Negative for weakness and numbness.   Psychiatric/Behavioral:  Negative for agitation and confusion.            Objective       ED Triage Vitals   Temperature Pulse Blood Pressure Respirations SpO2 Patient Position - Orthostatic VS   06/29/25 2041 06/29/25 2031 06/29/25 2031 06/29/25 2031 06/29/25 2031 06/29/25 2031   97.8 °F (36.6 °C) 64 157/68 18 97 % Sitting      Temp Source Heart Rate Source BP Location FiO2 (%) Pain Score    06/29/25 2041 06/29/25 2031 06/29/25 2031 -- --    Oral Monitor Left arm        Vitals      Date and Time Temp Pulse SpO2 Resp BP Pain Score FACES Pain Rating User   06/29/25 2041 97.8 °F (36.6 °C) -- -- -- -- -- -- KH   06/29/25 2031 -- 64 97 % 18 157/68 -- -- JM            Physical Exam  Vitals and nursing note reviewed.   Constitutional:       General: She is not in acute distress.     Appearance: She is well-developed.   HENT:      Head: Normocephalic and atraumatic.      Right Ear: External ear normal.      Left Ear: External ear normal.      Nose: Nose normal.      Mouth/Throat:      Pharynx: Oropharynx is clear.     Eyes:      General: No scleral icterus.     Conjunctiva/sclera: Conjunctivae normal.       Cardiovascular:      Rate and Rhythm: Normal rate.      Comments:  Well-perfused extremities  Pulmonary:      Effort: Pulmonary effort is normal. No respiratory distress.   Abdominal:      General: Abdomen is flat. There is no distension.      Tenderness: There is no abdominal tenderness.     Musculoskeletal:         General: No deformity or signs of injury.      Cervical back: Normal range of motion and neck supple.     Skin:     General: Skin is warm and dry.      Findings: Rash present.      Comments: Vesicular rash noted over left lower abdomen/inguinal region isolated to single dermatomal distribution     Neurological:      General: No focal deficit present.      Mental Status: She is alert.      Cranial Nerves: No cranial nerve deficit.      Sensory: No sensory deficit.     Psychiatric:         Mood and Affect: Mood normal.         Thought Content: Thought content normal.         Results Reviewed       None            No orders to display       Procedures    ED Medication and Procedure Management   Prior to Admission Medications   Prescriptions Last Dose Informant Patient Reported? Taking?   Calcium Carb-Cholecalciferol 600-200 MG-UNIT TABS  Self Yes No   Magnesium 200 MG TABS   Yes No   Sig: Take by mouth Pt takes 2 at night   Turmeric 500 MG CAPS  Self Yes No   Si mg   VITAMIN D, CHOLECALCIFEROL, PO  Self Yes No   Sig: Take 2,600 Units/day by mouth   acetaminophen (TYLENOL) 650 mg CR tablet  Self Yes No   Sig: Take 650 mg by mouth daily as needed for mild pain   amLODIPine (NORVASC) 5 mg tablet   No No   Sig: TAKE ONE TABLET BY MOUTH EVERY DAY   aspirin (ECOTRIN LOW STRENGTH) 81 mg EC tablet  Self No No   Sig: Take 1 tablet (81 mg total) by mouth daily   bacitracin topical ointment 500 units/g topical ointment   No No   Sig: Apply 1 large application topically 2 (two) times a day   cabergoline (DOSTINEX) 0.5 MG tablet   No No   Sig: TAKE 1/2 TABLET BY MOUTH ONCE A WEEK (PATIENT SHOULD USE PILL CUTTER TO CUT THE PILLS)   denosumab (PROLIA) 60 mg/mL  Self Yes No   Sig:  Inject 60 mg under the skin every 6 (six) months   losartan (COZAAR) 100 MG tablet   No No   Sig: TAKE ONE TABLET BY MOUTH EVERY DAY   metoprolol succinate (TOPROL-XL) 25 mg 24 hr tablet   No No   Sig: TAKE ONE TABLET BY MOUTH IN THE MORNING AND 1/2 TABLET IN THE EVENING   rosuvastatin (CRESTOR) 40 MG tablet   No No   Sig: TAKE 1 TABLET DAILY      Facility-Administered Medications: None     Discharge Medication List as of 6/29/2025  8:50 PM        START taking these medications    Details   valACYclovir (VALTREX) 1,000 mg tablet Take 1 tablet (1,000 mg total) by mouth every 8 (eight) hours for 7 days Do not start before June 30, 2025., Starting Mon 6/30/2025, Until Mon 7/7/2025, Normal           CONTINUE these medications which have NOT CHANGED    Details   acetaminophen (TYLENOL) 650 mg CR tablet Take 650 mg by mouth daily as needed for mild pain, Historical Med      amLODIPine (NORVASC) 5 mg tablet TAKE ONE TABLET BY MOUTH EVERY DAY, Starting Mon 1/20/2025, Normal      aspirin (ECOTRIN LOW STRENGTH) 81 mg EC tablet Take 1 tablet (81 mg total) by mouth daily, Starting Fri 4/10/2020, No Print      bacitracin topical ointment 500 units/g topical ointment Apply 1 large application topically 2 (two) times a day, Starting Sun 5/18/2025, Normal      cabergoline (DOSTINEX) 0.5 MG tablet TAKE 1/2 TABLET BY MOUTH ONCE A WEEK (PATIENT SHOULD USE PILL CUTTER TO CUT THE PILLS), Normal      Calcium Carb-Cholecalciferol 600-200 MG-UNIT TABS Historical Med      denosumab (PROLIA) 60 mg/mL Inject 60 mg under the skin every 6 (six) months, Historical Med      losartan (COZAAR) 100 MG tablet TAKE ONE TABLET BY MOUTH EVERY DAY, Starting Mon 3/10/2025, Normal      Magnesium 200 MG TABS Take by mouth Pt takes 2 at night, Historical Med      metoprolol succinate (TOPROL-XL) 25 mg 24 hr tablet TAKE ONE TABLET BY MOUTH IN THE MORNING AND 1/2 TABLET IN THE EVENING, Normal      rosuvastatin (CRESTOR) 40 MG tablet TAKE 1 TABLET DAILY,  Starting Fri 5/2/2025, Normal      Turmeric 500 MG CAPS 500 mg, Starting Sat 6/1/2024, Historical Med      VITAMIN D, CHOLECALCIFEROL, PO Take 2,600 Units/day by mouth, Historical Med           No discharge procedures on file.  ED SEPSIS DOCUMENTATION   Time reflects when diagnosis was documented in both MDM as applicable and the Disposition within this note       Time User Action Codes Description Comment    6/29/2025  8:49 PM Matthew Quesada Add [B02.9] Shingles                      [1]   Past Medical History:  Diagnosis Date    Anxiety     Arthritis     Colon polyp     Coronary artery disease     Effusion of left knee     Last assessed - 9/10/15    Fractures     Heart disease     History of transfusion     HL (hearing loss) 2022    Hyperlipidemia     Hypertension     Low back pain 5/9/24    Memory loss     Myocardial infarction (HCC)     Osteoarthritis     Protein-calorie malnutrition, unspecified severity (HCC) 11/14/2023    Scoliosis     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    Tick bite     Last assessed - 4/21/17   [2]   Past Surgical History:  Procedure Laterality Date    ADENOIDECTOMY  1950    APPENDECTOMY      CARDIAC SURGERY      COLONOSCOPY      CORONARY ARTERY BYPASS GRAFT      HAND SURGERY      MO CORONARY ARTERY BYP W/VEIN & ARTERY GRAFT 4 VEIN N/A 01/27/2020    Procedure: CORONARY ARTERY BYPASS GRAFT (CABG) x3 VESSELS, LIMA TO LAD, AND SVG TO PLB & OM;  Surgeon: Peter Colmenares DO;  [facility];  Service: Cardiac Surgery    MO ECHO TRANSESOPHAG R-T 2D W/PRB IMG ACQUISJ I&R N/A 01/27/2020    Procedure: TRANSESOPHAGEAL ECHOCARDIOGRAM (GET);  Surgeon: Peter Colmenares DO;  [facility];  Service: Cardiac Surgery    MO NDSC SURG W/VIDEO-ASSISTED HARVEST VEIN CABG Left 01/27/2020    Procedure: HARVEST VEIN ENDOSCOPIC ([facility]);  Surgeon: Peter Colmenares DO;  [facility];  Service: Cardiac Surgery    TONSILLECTOMY      Last assessed - 4/20/17    TONSILLECTOMY  1950    TUBAL LIGATION       Last assessed - 4/20/17    TUMOR REMOVAL  1998    pelvic benign tumor removal    WISDOM TOOTH EXTRACTION      Last assessed - 4/20/17   [3]   Family History  Problem Relation Name Age of Onset    Coronary artery disease Mother Mother     Arthritis Mother Mother     Transient ischemic attack Mother Mother     Heart disease Mother Mother     Hearing loss Mother Mother     Arrhythmia Mother Mother     Hyperlipidemia Mother Mother     Osteoarthritis Mother Mother     Heart attack Father Father     Sudden death Father Father         SCD    No Known Problems Sister      No Known Problems Sister      Cancer Daughter  49        kidney    No Known Problems Daughter      No Known Problems Daughter      No Known Problems Paternal Aunt      Breast cancer Niece maternal 46   [4]   Social History  Tobacco Use    Smoking status: Never     Passive exposure: Past    Smokeless tobacco: Never   Vaping Use    Vaping status: Never Used   Substance Use Topics    Alcohol use: Yes     Alcohol/week: 3.0 standard drinks of alcohol     Types: 3 Glasses of wine per week     Comment: 1 wine nightly    Drug use: No        Matthew Quesada MD  06/29/25 2937

## 2025-07-01 ENCOUNTER — OFFICE VISIT (OUTPATIENT)
Dept: FAMILY MEDICINE CLINIC | Facility: CLINIC | Age: 80
End: 2025-07-01
Payer: MEDICARE

## 2025-07-01 VITALS
WEIGHT: 108.4 LBS | SYSTOLIC BLOOD PRESSURE: 116 MMHG | HEIGHT: 63 IN | OXYGEN SATURATION: 100 % | HEART RATE: 62 BPM | TEMPERATURE: 98 F | DIASTOLIC BLOOD PRESSURE: 58 MMHG | BODY MASS INDEX: 19.21 KG/M2

## 2025-07-01 DIAGNOSIS — B02.9 HERPES ZOSTER WITHOUT COMPLICATION: Primary | ICD-10-CM

## 2025-07-01 PROCEDURE — 99213 OFFICE O/P EST LOW 20 MIN: CPT | Performed by: FAMILY MEDICINE

## 2025-07-01 PROCEDURE — G2211 COMPLEX E/M VISIT ADD ON: HCPCS | Performed by: FAMILY MEDICINE

## 2025-07-01 NOTE — PROGRESS NOTES
"Name: Bella Sargent      : 1945      MRN: [id number]  Encounter Provider: Dewey Barrera MD  Encounter Date: 2025   Encounter department: [facility]  :  Assessment & Plan  Herpes zoster without complication  Presents with a herpetic rash 5 days ago in the left groin/abdominal area and left buttocks. Pain is tolerable.  Patient already on antiviral which she should complete.  Patient should keep the area covered when in public.  She should avoid direct contact especially with someone who is immunocompromise.  Aware the vesicles or wounds must become dry before her risk of transmission reduces                     History of Present Illness   Developed a rash on Friday in the left groin/thigh region   It progressed and went to the ED on .    She was diagnosed with shingles and prescribed an antiviral.   States she received the shingles vaccine in the past.   Area is tender as if her skin were chaffing  She wanted to discuss contact precautions    Review of Systems   Constitutional:  Negative for fever.   Musculoskeletal:  Negative for arthralgias.   Skin:  Positive for rash.   Neurological:  Positive for numbness. Negative for weakness.       Objective   /58 (BP Location: Left arm, Patient Position: Sitting, Cuff Size: Standard)   Pulse 62   Temp 98 °F (36.7 °C)   Ht 5' 3\" (1.6 m)   Wt 49.2 kg (108 lb 6.4 oz)   SpO2 100%   BMI 19.20 kg/m²      Physical Exam    Skin:     Findings: Rash present. Rash is vesicular.           Comments: See photos         "

## 2025-07-07 ENCOUNTER — OFFICE VISIT (OUTPATIENT)
Dept: FAMILY MEDICINE CLINIC | Facility: CLINIC | Age: 80
End: 2025-07-07
Payer: MEDICARE

## 2025-07-07 VITALS
HEIGHT: 63 IN | RESPIRATION RATE: 16 BRPM | OXYGEN SATURATION: 97 % | BODY MASS INDEX: 19.07 KG/M2 | TEMPERATURE: 97.7 F | DIASTOLIC BLOOD PRESSURE: 64 MMHG | HEART RATE: 57 BPM | SYSTOLIC BLOOD PRESSURE: 100 MMHG | WEIGHT: 107.6 LBS

## 2025-07-07 DIAGNOSIS — R68.81 EARLY SATIETY: ICD-10-CM

## 2025-07-07 DIAGNOSIS — R10.32 LLQ PAIN: Primary | ICD-10-CM

## 2025-07-07 DIAGNOSIS — K59.00 CONSTIPATION, UNSPECIFIED CONSTIPATION TYPE: ICD-10-CM

## 2025-07-07 DIAGNOSIS — B02.9 HERPES ZOSTER WITHOUT COMPLICATION: ICD-10-CM

## 2025-07-07 PROCEDURE — 99214 OFFICE O/P EST MOD 30 MIN: CPT | Performed by: FAMILY MEDICINE

## 2025-07-07 PROCEDURE — G2211 COMPLEX E/M VISIT ADD ON: HCPCS | Performed by: FAMILY MEDICINE

## 2025-07-07 RX ORDER — POLYETHYLENE GLYCOL 3350 17 G/17G
17 POWDER, FOR SOLUTION ORAL DAILY PRN
Qty: 14 EACH | Refills: 0 | Status: SHIPPED | OUTPATIENT
Start: 2025-07-07

## 2025-07-07 NOTE — PROGRESS NOTES
Name: Bella Sargent      : 1945      MRN: [id number]  Encounter Provider: Dewey Barrera MD  Encounter Date: 2025   Encounter department: [facility]  :  Assessment & Plan  LLQ pain  Pain in the LLQ for 1 month with distension. Possibly a hernia vs obstruction vs constipation vs mass. Will get a CT AP. Has tried several OTC laxative, suppositories, and stool softeners with little relief.  Asked to increase fiber intake, add more fluids, and take miralax daily as needed.   Orders:    CT abdomen pelvis w contrast; Future    polyethylene glycol (MIRALAX) 17 g packet; Take 17 g by mouth daily as needed (constipation)    Herpes zoster without complication  Reoslving and took that last dose of valtrex   Orders:    CT abdomen pelvis w contrast; Future    Constipation, unspecified constipation type  Acute on chronic  Tried several laxative, suppositories, and stool softeners with little relief   Normally moves her bowels q3days but now going more than a week  Possibly slow transit vs anatomical obstruction  CTAP ordered along with miralax   Orders:    CT abdomen pelvis w contrast; Future    polyethylene glycol (MIRALAX) 17 g packet; Take 17 g by mouth daily as needed (constipation)    Early satiety    Orders:    CT abdomen pelvis w contrast; Future           History of Present Illness   Presents with pain in the LLQ. Had shingles last week which is resolving and completing antiviral.   Pain started 1 month ago but has progressed over the past week  Constipated and normally moves her bowels every 3 days.  Now going a week + without a BM and when she does, she will pass liquid stools  Pain is relieved for a few hours following a BM  Left lower quandrant appears to be more distended and uncomfortable  Had had episodes where the she had pain in the lower back and had to take tylenol  Also reports early satiety and weight loss. States she lost 3 lbs.   Does try to eat but has not desire at  "times  Has an appt with GI in Aug to discuss colonscopy but couldn't wait   Last CT A/P was in 2024 and there was no mention of a hernia or diverticulosis      Abdominal Pain  This is a new problem. The current episode started 1 to 4 weeks ago. The onset quality is gradual. The problem occurs constantly. The most recent episode lasted 2 weeks. The problem has been gradually worsening. The pain is located in the LLQ. The pain is at a severity of 4/10. The quality of the pain is aching and a sensation of fullness. The abdominal pain radiates to the left flank. Associated symptoms include anorexia, constipation, nausea (passing watery stools) and weight loss. Pertinent negatives include no diarrhea or vomiting. The pain is aggravated by certain positions. The pain is relieved by Certain positions.     Review of Systems   Constitutional:  Positive for appetite change, unexpected weight change and weight loss. Negative for fatigue.   Gastrointestinal:  Positive for abdominal pain, anorexia, constipation and nausea (passing watery stools). Negative for diarrhea and vomiting.   Musculoskeletal:  Positive for back pain (left lower back pain).   Skin:  Positive for rash (shingles resolving).       Objective   /64 (BP Location: Left arm, Patient Position: Sitting, Cuff Size: Standard)   Pulse 57   Temp 97.7 °F (36.5 °C) (Tympanic)   Resp 16   Ht 5' 3\" (1.6 m)   Wt 48.8 kg (107 lb 9.6 oz)   SpO2 97%   BMI 19.06 kg/m²      Physical Exam  Vitals reviewed.   Constitutional:       General: She is not in acute distress.     Appearance: Normal appearance. She is not ill-appearing or toxic-appearing.   HENT:      Head: Normocephalic and atraumatic.   Pulmonary:      Effort: Pulmonary effort is normal.   Abdominal:      General: Bowel sounds are increased. There is distension.      Palpations: There is no mass.      Tenderness: There is abdominal tenderness in the left lower quadrant. There is no guarding or rebound.      " Hernia: A hernia is present.     Skin:     Findings: Rash (resolving) present.     Neurological:      Mental Status: She is alert.     Psychiatric:         Mood and Affect: Mood normal.         Behavior: Behavior normal.         Thought Content: Thought content normal.

## 2025-07-08 ENCOUNTER — HOSPITAL ENCOUNTER (OUTPATIENT)
Dept: CT IMAGING | Facility: HOSPITAL | Age: 80
Discharge: HOME | End: 2025-07-08
Attending: FAMILY MEDICINE
Payer: MEDICARE

## 2025-07-08 DIAGNOSIS — K59.00 CONSTIPATION, UNSPECIFIED CONSTIPATION TYPE: ICD-10-CM

## 2025-07-08 DIAGNOSIS — R10.32 LLQ PAIN: ICD-10-CM

## 2025-07-08 DIAGNOSIS — R68.81 EARLY SATIETY: ICD-10-CM

## 2025-07-08 DIAGNOSIS — B02.9 HERPES ZOSTER WITHOUT COMPLICATION: ICD-10-CM

## 2025-07-08 PROCEDURE — 74177 CT ABD & PELVIS W/CONTRAST: CPT

## 2025-07-08 RX ADMIN — IOHEXOL 50 ML: 350 INJECTION, SOLUTION INTRAVENOUS at 12:31

## 2025-07-10 DIAGNOSIS — Z00.6 ENCOUNTER FOR EXAMINATION FOR NORMAL COMPARISON OR CONTROL IN CLINICAL RESEARCH PROGRAM: ICD-10-CM

## 2025-07-11 DIAGNOSIS — D35.2 PITUITARY MACROADENOMA (HCC): ICD-10-CM

## 2025-07-11 RX ORDER — CABERGOLINE 0.5 MG/1
TABLET ORAL
Qty: 6 TABLET | Refills: 1 | Status: SHIPPED | OUTPATIENT
Start: 2025-07-11

## 2025-07-11 NOTE — TELEPHONE ENCOUNTER
Reason for call:   [x] Refill   [] Prior Auth  [] Other:     Office:   [] PCP/Provider -   [x] Specialty/Provider -     Medication: cabergoline (DOSTINEX) 0.5 MG tablet     Dose/Frequency: TAKE 1/2 TABLET BY MOUTH ONCE A WEEK (PATIENT SHOULD USE PILL CUTTER TO CUT THE PILLS)     Quantity: 6    Pharmacy: [facility]   Does the patient have enough for 3 days?   [] Yes   [x] No - Send as HP to POD

## 2025-07-11 NOTE — TELEPHONE ENCOUNTER
Patient called rx refill line just checking status of refill due to phone cutting out. Advised refill was sent for processing and is going to [email or web address].

## 2025-07-14 ENCOUNTER — OFFICE VISIT (OUTPATIENT)
Dept: FAMILY MEDICINE CLINIC | Facility: CLINIC | Age: 80
End: 2025-07-14
Payer: MEDICARE

## 2025-07-14 VITALS
OXYGEN SATURATION: 97 % | SYSTOLIC BLOOD PRESSURE: 118 MMHG | DIASTOLIC BLOOD PRESSURE: 60 MMHG | WEIGHT: 106.6 LBS | BODY MASS INDEX: 18.89 KG/M2 | HEART RATE: 69 BPM | HEIGHT: 63 IN | TEMPERATURE: 98 F

## 2025-07-14 DIAGNOSIS — M81.6 LOCALIZED OSTEOPOROSIS WITHOUT CURRENT PATHOLOGICAL FRACTURE: ICD-10-CM

## 2025-07-14 DIAGNOSIS — R41.3 MEMORY CHANGES: ICD-10-CM

## 2025-07-14 DIAGNOSIS — M41.55 OTHER SECONDARY SCOLIOSIS, THORACOLUMBAR REGION: ICD-10-CM

## 2025-07-14 DIAGNOSIS — K59.00 CONSTIPATION, UNSPECIFIED CONSTIPATION TYPE: Primary | ICD-10-CM

## 2025-07-14 PROCEDURE — G2211 COMPLEX E/M VISIT ADD ON: HCPCS | Performed by: FAMILY MEDICINE

## 2025-07-14 PROCEDURE — 99214 OFFICE O/P EST MOD 30 MIN: CPT | Performed by: FAMILY MEDICINE

## 2025-07-14 NOTE — PROGRESS NOTES
"Name: Bella Sargent      : 1945      MRN: [id number]  Encounter Provider: Dewey Barrera MD  Encounter Date: 2025   Encounter department: [facility]  :  Assessment & Plan  Constipation, unspecified constipation type  CT scan confirmed constipation  Still without a proper BM   A/w early satiety and bloating   Taking prune juice  [place] stool 1  Add miralax daily for 2 day then BID prn for 2 days if she doesn't have liquid stools  Asked to call if she doesn't have a BM after 4 days        Other secondary scoliosis, thoracolumbar region  Scoliosis has evolved over the years and now described as an S shape curve   May be contrubuting to her constipation and lower back pain  Suggest seeing Dr. Carlos for OMT and possibly Dr. Gonzalez if she doesn't respond to OMT     Memory changes  B12 historically low  Start b12, 1000 mcg daily   If not improved, do congitive test at next appt       Localized osteoporosis without current pathological fracture  On prolia              History of Present Illness   Still constipated   Drinking prune juice   Held off on adding miralax until we reviwed the CT scan   Still without a large BM. Noticing small stool balls   Had a CT scan and confirmed constipation    Also concerned about her memory  Forgeting things   Will go to look for something and forget what she was doing   B12 was low and is not taking b12 supplements at this        Review of Systems   Gastrointestinal:  Positive for abdominal distention, abdominal pain, constipation and nausea. Negative for vomiting.   Musculoskeletal:  Positive for arthralgias (chronic left lower back pain and bilateral hip pain) and back pain.       Objective   /60 (BP Location: Left arm, Patient Position: Sitting, Cuff Size: Standard)   Pulse 69   Temp 98 °F (36.7 °C)   Ht 5' 3\" (1.6 m)   Wt 48.4 kg (106 lb 9.6 oz)   SpO2 97%   BMI 18.88 kg/m²      Physical Exam  Constitutional:       General: She is not in " acute distress.     Appearance: Normal appearance. She is not ill-appearing or toxic-appearing.   HENT:      Head: Normocephalic and atraumatic.     Cardiovascular:      Rate and Rhythm: Normal rate and regular rhythm.      Heart sounds: Murmur heard.   Pulmonary:      Effort: No respiratory distress.      Breath sounds: No stridor. No wheezing, rhonchi or rales.   Abdominal:      General: There is distension.      Tenderness: There is abdominal tenderness (left lower quandrant).     Musculoskeletal:      Thoracic back: Scoliosis present.      Lumbar back: Scoliosis present.     Neurological:      Mental Status: She is alert and oriented to person, place, and time.     Psychiatric:         Mood and Affect: Mood normal.         Behavior: Behavior normal.

## 2025-07-14 NOTE — ASSESSMENT & PLAN NOTE
Scoliosis has evolved over the years and now described as an S shape curve   May be contrubuting to her constipation and lower back pain  Suggest seeing Dr. Carlos for OMT and possibly Dr. Gonzalez if she doesn't respond to OMT

## 2025-07-15 ENCOUNTER — PATIENT MESSAGE (OUTPATIENT)
Dept: FAMILY MEDICINE CLINIC | Facility: CLINIC | Age: 80
End: 2025-07-15

## 2025-07-15 DIAGNOSIS — M41.9 SCOLIOSIS OF THORACOLUMBAR SPINE, UNSPECIFIED SCOLIOSIS TYPE: Primary | ICD-10-CM

## 2025-07-17 ENCOUNTER — APPOINTMENT (OUTPATIENT)
Dept: LAB | Facility: CLINIC | Age: 80
End: 2025-07-17

## 2025-07-17 DIAGNOSIS — Z00.6 ENCOUNTER FOR EXAMINATION FOR NORMAL COMPARISON OR CONTROL IN CLINICAL RESEARCH PROGRAM: ICD-10-CM

## 2025-07-17 PROCEDURE — 36415 COLL VENOUS BLD VENIPUNCTURE: CPT

## 2025-07-18 ENCOUNTER — HOSPITAL ENCOUNTER (OUTPATIENT)
Dept: RADIOLOGY | Facility: HOSPITAL | Age: 80
End: 2025-07-18
Attending: FAMILY MEDICINE
Payer: MEDICARE

## 2025-07-18 ENCOUNTER — NURSE TRIAGE (OUTPATIENT)
Age: 80
End: 2025-07-18

## 2025-07-18 DIAGNOSIS — M41.9 SCOLIOSIS OF THORACOLUMBAR SPINE, UNSPECIFIED SCOLIOSIS TYPE: ICD-10-CM

## 2025-07-18 PROCEDURE — 72082 X-RAY EXAM ENTIRE SPI 2/3 VW: CPT

## 2025-07-18 NOTE — TELEPHONE ENCOUNTER
"REASON FOR CONVERSATION: Shortness of Breath  Patient calling, complains of LOOMIS when walking uphill, increased fatigue when walking about a mile, used to be able to walk 2-3 miles.     SYMPTOMS: LOOMIS, fatigue  Denies cp, sob at rest, leg or abdominal swelling, palpitations, dizziness, sweating, nausea, vomiting     OTHER HEALTH INFORMATION: Last ov 12/10/24  Medications reviewed, patient is only taking Metoprolol succinate 25 mg daily in am.     PROTOCOL DISPOSITION: See Within 2 Weeks in Office    CARE ADVICE PROVIDED: Advised patient to call back with further questions or symptoms, seek ED evaluation for urgent or emergent symptoms. Patient verbalized understanding.    PRACTICE FOLLOW-UP: Appointment scheduled for 7/31/25 with RACHEL Crook    Reason for Disposition   MILD longstanding difficulty breathing (e.g., minimal/no SOB at rest, SOB with walking, pulse <100) and SAME as normal    Answer Assessment - Initial Assessment Questions  1. RESPIRATORY STATUS: \"Describe your breathing?\" (e.g., wheezing, shortness of breath, unable to speak, severe coughing)       LOOMIS when walking uphill, increased fatigue when walking about a mile, used to be able to walk 2-3 miles   2. ONSET: \"When did this breathing problem begin?\"       6 weeks ago  3. PATTERN \"Does the difficult breathing come and go, or has it been constant since it started?\"       Only on exertion   4. SEVERITY: \"How bad is your breathing?\" (e.g., mild, moderate, severe)       mild  5. RECURRENT SYMPTOM: \"Have you had difficulty breathing before?\" If Yes, ask: \"When was the last time?\" and \"What happened that time?\"       denies  6. CARDIAC HISTORY: \"Do you have any history of heart disease?\" (e.g., heart attack, angina, bypass surgery, angioplasty)       MI, CABG, hypertension, hyperlipidemia, PVCs and PACs  7. LUNG HISTORY: \"Do you have any history of lung disease?\"  (e.g., pulmonary embolus, asthma, emphysema)      denies  8. CAUSE: \"What do you think is " "causing the breathing problem?\"       unsure  9. OTHER SYMPTOMS: \"Do you have any other symptoms?\" (e.g., chest pain, cough, dizziness, fever, runny nose)      denies  10. O2 SATURATION MONITOR:  \"Do you use an oxygen saturation monitor (pulse oximeter) at home?\" If Yes, ask: \"What is your reading (oxygen level) today?\" \"What is your usual oxygen saturation reading?\" (e.g., 95%)        Does not have pulse ox  12. TRAVEL: \"Have you traveled out of the country in the last month?\" (e.g., travel history, exposures)        denies    Protocols used: Breathing Difficulty-Adult-[place]    "

## 2025-07-28 DIAGNOSIS — I10 ESSENTIAL HYPERTENSION: ICD-10-CM

## 2025-07-28 LAB
APOB+LDLR+PCSK9 GENE MUT ANL BLD/T: NOT DETECTED
BRCA1+BRCA2 DEL+DUP + FULL MUT ANL BLD/T: NOT DETECTED
MLH1+MSH2+MSH6+PMS2 GN DEL+DUP+FUL M: NOT DETECTED

## 2025-07-29 RX ORDER — AMLODIPINE BESYLATE 5 MG/1
5 TABLET ORAL DAILY
Qty: 90 TABLET | Refills: 1 | Status: SHIPPED | OUTPATIENT
Start: 2025-07-29

## 2025-07-31 ENCOUNTER — PREP FOR PROCEDURE (OUTPATIENT)
Dept: CARDIOLOGY CLINIC | Facility: CLINIC | Age: 80
End: 2025-07-31

## 2025-07-31 ENCOUNTER — TELEPHONE (OUTPATIENT)
Dept: CARDIOLOGY CLINIC | Facility: CLINIC | Age: 80
End: 2025-07-31

## 2025-07-31 ENCOUNTER — TELEPHONE (OUTPATIENT)
Age: 80
End: 2025-07-31

## 2025-07-31 ENCOUNTER — APPOINTMENT (OUTPATIENT)
Dept: LAB | Facility: CLINIC | Age: 80
End: 2025-07-31
Attending: NURSE PRACTITIONER
Payer: MEDICARE

## 2025-07-31 ENCOUNTER — OFFICE VISIT (OUTPATIENT)
Dept: CARDIOLOGY CLINIC | Facility: CLINIC | Age: 80
End: 2025-07-31
Payer: MEDICARE

## 2025-07-31 VITALS
SYSTOLIC BLOOD PRESSURE: 120 MMHG | DIASTOLIC BLOOD PRESSURE: 60 MMHG | BODY MASS INDEX: 18.85 KG/M2 | HEART RATE: 107 BPM | OXYGEN SATURATION: 99 % | WEIGHT: 106.4 LBS | HEIGHT: 63 IN

## 2025-07-31 DIAGNOSIS — R06.09 DOE (DYSPNEA ON EXERTION): ICD-10-CM

## 2025-07-31 DIAGNOSIS — E87.1 HYPONATREMIA: ICD-10-CM

## 2025-07-31 DIAGNOSIS — R00.2 PALPITATIONS: ICD-10-CM

## 2025-07-31 DIAGNOSIS — I49.3 PVC'S (PREMATURE VENTRICULAR CONTRACTIONS): ICD-10-CM

## 2025-07-31 DIAGNOSIS — I49.1 PAC (PREMATURE ATRIAL CONTRACTION): ICD-10-CM

## 2025-07-31 DIAGNOSIS — I10 ESSENTIAL HYPERTENSION: ICD-10-CM

## 2025-07-31 DIAGNOSIS — E78.2 MIXED HYPERLIPIDEMIA: ICD-10-CM

## 2025-07-31 DIAGNOSIS — R06.09 DOE (DYSPNEA ON EXERTION): Primary | ICD-10-CM

## 2025-07-31 DIAGNOSIS — Z95.1 S/P CABG X 3: ICD-10-CM

## 2025-07-31 DIAGNOSIS — I25.118 CORONARY ARTERY DISEASE OF NATIVE ARTERY OF NATIVE HEART WITH STABLE ANGINA PECTORIS (HCC): ICD-10-CM

## 2025-07-31 LAB
ANION GAP SERPL CALCULATED.3IONS-SCNC: 4 MMOL/L (ref 4–13)
BUN SERPL-MCNC: 14 MG/DL (ref 5–25)
CALCIUM SERPL-MCNC: 9.4 MG/DL (ref 8.4–10.2)
CHLORIDE SERPL-SCNC: 98 MMOL/L (ref 96–108)
CO2 SERPL-SCNC: 30 MMOL/L (ref 21–32)
CREAT SERPL-MCNC: 0.49 MG/DL (ref 0.6–1.3)
ERYTHROCYTE [DISTWIDTH] IN BLOOD BY AUTOMATED COUNT: 14.3 % (ref 11.6–15.1)
GFR SERPL CREATININE-BSD FRML MDRD: 92 ML/MIN/1.73SQ M
GLUCOSE SERPL-MCNC: 87 MG/DL (ref 65–140)
HCT VFR BLD AUTO: 36.4 % (ref 34.8–46.1)
HGB BLD-MCNC: 12.2 G/DL (ref 11.5–15.4)
MCH RBC QN AUTO: 33.3 PG (ref 26.8–34.3)
MCHC RBC AUTO-ENTMCNC: 33.5 G/DL (ref 31.4–37.4)
MCV RBC AUTO: 100 FL (ref 82–98)
PLATELET # BLD AUTO: 217 THOUSANDS/UL (ref 149–390)
PMV BLD AUTO: 10.7 FL (ref 8.9–12.7)
POTASSIUM SERPL-SCNC: 4.1 MMOL/L (ref 3.5–5.3)
RBC # BLD AUTO: 3.66 MILLION/UL (ref 3.81–5.12)
SODIUM SERPL-SCNC: 132 MMOL/L (ref 135–147)
WBC # BLD AUTO: 6.07 THOUSAND/UL (ref 4.31–10.16)

## 2025-07-31 PROCEDURE — 80048 BASIC METABOLIC PNL TOTAL CA: CPT

## 2025-07-31 PROCEDURE — 85027 COMPLETE CBC AUTOMATED: CPT

## 2025-07-31 PROCEDURE — 36415 COLL VENOUS BLD VENIPUNCTURE: CPT

## 2025-07-31 PROCEDURE — 99215 OFFICE O/P EST HI 40 MIN: CPT | Performed by: NURSE PRACTITIONER

## 2025-07-31 PROCEDURE — 93000 ELECTROCARDIOGRAM COMPLETE: CPT | Performed by: NURSE PRACTITIONER

## 2025-07-31 RX ORDER — METOPROLOL SUCCINATE 25 MG/1
25 TABLET, EXTENDED RELEASE ORAL 2 TIMES DAILY
Start: 2025-07-31

## 2025-07-31 RX ORDER — LOSARTAN POTASSIUM 100 MG/1
50 TABLET ORAL DAILY
Start: 2025-07-31

## 2025-07-31 RX ORDER — ASPIRIN 81 MG/1
324 TABLET ORAL DAILY
Start: 2025-07-31

## 2025-07-31 RX ORDER — METOPROLOL SUCCINATE 25 MG/1
25 TABLET, EXTENDED RELEASE ORAL DAILY
Start: 2025-07-31 | End: 2025-07-31 | Stop reason: SDUPTHER

## 2025-08-04 ENCOUNTER — HOSPITAL ENCOUNTER (OUTPATIENT)
Facility: HOSPITAL | Age: 80
Setting detail: OUTPATIENT SURGERY
Discharge: HOME | End: 2025-08-04
Attending: INTERNAL MEDICINE | Admitting: INTERNAL MEDICINE
Payer: MEDICARE

## 2025-08-04 ENCOUNTER — TELEPHONE (OUTPATIENT)
Dept: CARDIOLOGY CLINIC | Facility: CLINIC | Age: 80
End: 2025-08-04

## 2025-08-04 ENCOUNTER — TELEPHONE (OUTPATIENT)
Age: 80
End: 2025-08-04

## 2025-08-04 VITALS
BODY MASS INDEX: 18.61 KG/M2 | OXYGEN SATURATION: 99 % | WEIGHT: 105 LBS | HEIGHT: 63 IN | HEART RATE: 44 BPM | SYSTOLIC BLOOD PRESSURE: 139 MMHG | DIASTOLIC BLOOD PRESSURE: 58 MMHG | RESPIRATION RATE: 16 BRPM | TEMPERATURE: 97.2 F

## 2025-08-04 DIAGNOSIS — R06.09 DOE (DYSPNEA ON EXERTION): ICD-10-CM

## 2025-08-04 DIAGNOSIS — Z95.1 S/P CABG X 3: ICD-10-CM

## 2025-08-04 DIAGNOSIS — I25.118 CORONARY ARTERY DISEASE OF NATIVE ARTERY OF NATIVE HEART WITH STABLE ANGINA PECTORIS (HCC): Primary | ICD-10-CM

## 2025-08-04 DIAGNOSIS — I47.10 PSVT (PAROXYSMAL SUPRAVENTRICULAR TACHYCARDIA) (HCC): ICD-10-CM

## 2025-08-04 DIAGNOSIS — R00.2 PALPITATIONS: Primary | ICD-10-CM

## 2025-08-04 LAB
ATRIAL RATE: 54 BPM
P AXIS: 76 DEGREES
PR INTERVAL: 198 MS
QRS AXIS: 72 DEGREES
QRSD INTERVAL: 90 MS
QT INTERVAL: 412 MS
QTC INTERVAL: 391 MS
T WAVE AXIS: 70 DEGREES
VENTRICULAR RATE: 54 BPM

## 2025-08-04 PROCEDURE — 99153 MOD SED SAME PHYS/QHP EA: CPT | Performed by: INTERNAL MEDICINE

## 2025-08-04 PROCEDURE — C1769 GUIDE WIRE: HCPCS | Performed by: INTERNAL MEDICINE

## 2025-08-04 PROCEDURE — 93459 L HRT ART/GRFT ANGIO: CPT | Performed by: INTERNAL MEDICINE

## 2025-08-04 PROCEDURE — C1760 CLOSURE DEV, VASC: HCPCS | Performed by: INTERNAL MEDICINE

## 2025-08-04 PROCEDURE — C1887 CATHETER, GUIDING: HCPCS | Performed by: INTERNAL MEDICINE

## 2025-08-04 PROCEDURE — C1894 INTRO/SHEATH, NON-LASER: HCPCS | Performed by: INTERNAL MEDICINE

## 2025-08-04 PROCEDURE — 93005 ELECTROCARDIOGRAM TRACING: CPT

## 2025-08-04 PROCEDURE — 99152 MOD SED SAME PHYS/QHP 5/>YRS: CPT | Performed by: INTERNAL MEDICINE

## 2025-08-04 PROCEDURE — 93010 ELECTROCARDIOGRAM REPORT: CPT | Performed by: INTERNAL MEDICINE

## 2025-08-04 DEVICE — CLOSURE DEVICE ANGIO-SEAL VIP 6FR: Type: IMPLANTABLE DEVICE | Site: GROIN | Status: FUNCTIONAL

## 2025-08-04 RX ORDER — ASPIRIN 81 MG/1
324 TABLET, CHEWABLE ORAL ONCE
Status: COMPLETED | OUTPATIENT
Start: 2025-08-04 | End: 2025-08-04

## 2025-08-04 RX ORDER — SODIUM CHLORIDE 9 MG/ML
125 INJECTION, SOLUTION INTRAVENOUS CONTINUOUS
Status: DISCONTINUED | OUTPATIENT
Start: 2025-08-04 | End: 2025-08-04 | Stop reason: HOSPADM

## 2025-08-04 RX ORDER — ONDANSETRON 2 MG/ML
4 INJECTION INTRAMUSCULAR; INTRAVENOUS ONCE AS NEEDED
Status: DISCONTINUED | OUTPATIENT
Start: 2025-08-04 | End: 2025-08-04 | Stop reason: HOSPADM

## 2025-08-04 RX ORDER — FENTANYL CITRATE 50 UG/ML
INJECTION, SOLUTION INTRAMUSCULAR; INTRAVENOUS CODE/TRAUMA/SEDATION MEDICATION
Status: DISCONTINUED | OUTPATIENT
Start: 2025-08-04 | End: 2025-08-04 | Stop reason: HOSPADM

## 2025-08-04 RX ORDER — MIDAZOLAM HYDROCHLORIDE 2 MG/2ML
INJECTION, SOLUTION INTRAMUSCULAR; INTRAVENOUS CODE/TRAUMA/SEDATION MEDICATION
Status: DISCONTINUED | OUTPATIENT
Start: 2025-08-04 | End: 2025-08-04 | Stop reason: HOSPADM

## 2025-08-04 RX ORDER — OXYCODONE AND ACETAMINOPHEN 5; 325 MG/1; MG/1
1 TABLET ORAL EVERY 4 HOURS PRN
Status: DISCONTINUED | OUTPATIENT
Start: 2025-08-04 | End: 2025-08-04 | Stop reason: HOSPADM

## 2025-08-04 RX ORDER — SODIUM CHLORIDE 9 MG/ML
100 INJECTION, SOLUTION INTRAVENOUS CONTINUOUS
Status: DISCONTINUED | OUTPATIENT
Start: 2025-08-04 | End: 2025-08-04 | Stop reason: HOSPADM

## 2025-08-04 RX ORDER — NITROGLYCERIN 0.4 MG/1
0.4 TABLET SUBLINGUAL ONCE AS NEEDED
Status: DISCONTINUED | OUTPATIENT
Start: 2025-08-04 | End: 2025-08-04 | Stop reason: HOSPADM

## 2025-08-04 RX ORDER — ACETAMINOPHEN 325 MG/1
975 TABLET ORAL ONCE AS NEEDED
Status: DISCONTINUED | OUTPATIENT
Start: 2025-08-04 | End: 2025-08-04 | Stop reason: HOSPADM

## 2025-08-04 RX ORDER — EZETIMIBE 10 MG/1
10 TABLET ORAL DAILY
Qty: 30 TABLET | Refills: 4 | Status: SHIPPED | OUTPATIENT
Start: 2025-08-04

## 2025-08-04 RX ORDER — LIDOCAINE HYDROCHLORIDE 10 MG/ML
INJECTION, SOLUTION EPIDURAL; INFILTRATION; INTRACAUDAL; PERINEURAL CODE/TRAUMA/SEDATION MEDICATION
Status: DISCONTINUED | OUTPATIENT
Start: 2025-08-04 | End: 2025-08-04 | Stop reason: HOSPADM

## 2025-08-04 RX ADMIN — SODIUM CHLORIDE 125 ML/HR: 0.9 INJECTION, SOLUTION INTRAVENOUS at 07:24

## 2025-08-04 RX ADMIN — ASPIRIN 81 MG CHEWABLE TABLET 324 MG: 81 TABLET CHEWABLE at 07:24

## 2025-08-07 ENCOUNTER — APPOINTMENT (OUTPATIENT)
Dept: LAB | Facility: CLINIC | Age: 80
End: 2025-08-07
Payer: MEDICARE

## 2025-08-08 ENCOUNTER — TELEPHONE (OUTPATIENT)
Dept: CARDIAC REHAB | Facility: HOSPITAL | Age: 80
End: 2025-08-08

## 2025-08-08 ENCOUNTER — TELEPHONE (OUTPATIENT)
Age: 80
End: 2025-08-08

## 2025-08-08 DIAGNOSIS — E87.1 HYPONATREMIA: Primary | ICD-10-CM

## 2025-08-11 ENCOUNTER — OFFICE VISIT (OUTPATIENT)
Dept: NEPHROLOGY | Facility: CLINIC | Age: 80
End: 2025-08-11
Payer: MEDICARE

## 2025-08-12 ENCOUNTER — RESULTS FOLLOW-UP (OUTPATIENT)
Dept: NEPHROLOGY | Facility: CLINIC | Age: 80
End: 2025-08-12

## 2025-08-12 ENCOUNTER — APPOINTMENT (OUTPATIENT)
Dept: LAB | Facility: CLINIC | Age: 80
End: 2025-08-12
Payer: MEDICARE

## 2025-08-12 ENCOUNTER — DOCUMENTATION (OUTPATIENT)
Dept: NEPHROLOGY | Facility: CLINIC | Age: 80
End: 2025-08-12

## 2025-08-13 ENCOUNTER — OFFICE VISIT (OUTPATIENT)
Age: 80
End: 2025-08-13

## 2025-08-18 ENCOUNTER — APPOINTMENT (OUTPATIENT)
Dept: LAB | Facility: CLINIC | Age: 80
End: 2025-08-18
Payer: MEDICARE

## 2025-08-18 DIAGNOSIS — I10 ESSENTIAL HYPERTENSION: ICD-10-CM

## 2025-08-18 DIAGNOSIS — E87.1 HYPONATREMIA: ICD-10-CM

## 2025-08-18 LAB
ANION GAP SERPL CALCULATED.3IONS-SCNC: 8 MMOL/L (ref 4–13)
BUN SERPL-MCNC: 14 MG/DL (ref 5–25)
CALCIUM SERPL-MCNC: 9.5 MG/DL (ref 8.4–10.2)
CHLORIDE SERPL-SCNC: 99 MMOL/L (ref 96–108)
CO2 SERPL-SCNC: 31 MMOL/L (ref 21–32)
CREAT SERPL-MCNC: 0.62 MG/DL (ref 0.6–1.3)
GFR SERPL CREATININE-BSD FRML MDRD: 86 ML/MIN/1.73SQ M
GLUCOSE P FAST SERPL-MCNC: 73 MG/DL (ref 65–99)
MAGNESIUM SERPL-MCNC: 2.3 MG/DL (ref 1.9–2.7)
POTASSIUM SERPL-SCNC: 4.3 MMOL/L (ref 3.5–5.3)
SODIUM SERPL-SCNC: 138 MMOL/L (ref 135–147)

## 2025-08-18 PROCEDURE — 36415 COLL VENOUS BLD VENIPUNCTURE: CPT

## 2025-08-18 PROCEDURE — 83735 ASSAY OF MAGNESIUM: CPT

## 2025-08-18 PROCEDURE — 80048 BASIC METABOLIC PNL TOTAL CA: CPT

## 2025-08-19 ENCOUNTER — RESULTS FOLLOW-UP (OUTPATIENT)
Dept: NEPHROLOGY | Facility: CLINIC | Age: 80
End: 2025-08-19

## 2025-08-22 ENCOUNTER — TRANSCRIBE ORDERS (OUTPATIENT)
Dept: PULMONOLOGY | Facility: CLINIC | Age: 80
End: 2025-08-22

## 2025-08-22 DIAGNOSIS — I25.118 CORONARY ARTERY DISEASE OF NATIVE HEART WITH STABLE ANGINA PECTORIS, UNSPECIFIED VESSEL OR LESION TYPE (HCC): Primary | ICD-10-CM

## (undated) DEVICE — INTENDED FOR TISSUE SEPARATION, AND OTHER PROCEDURES THAT REQUIRE A SHARP SURGICAL BLADE TO PUNCTURE OR CUT.: Brand: BARD-PARKER ® CARBON RIB-BACK BLADES

## (undated) DEVICE — Device

## (undated) DEVICE — GAUZE SPONGES,16 PLY: Brand: CURITY

## (undated) DEVICE — DRESSING ALLEVYN LIFE HEEL 25 X 25.2CM

## (undated) DEVICE — SILVER-COATED ANTIBACTERIAL BARRIER DRESSING: Brand: ACTICOAT SURGIC 10X25CM 5PK US

## (undated) DEVICE — BONE WAX WHITE: Brand: BONE WAX WHITE

## (undated) DEVICE — SUCTION CATH 18 FR

## (undated) DEVICE — SUT ETHIBOND 2-0 SH-1/SH-1 30 IN X763H

## (undated) DEVICE — ALCON OPHTHALMIC KNIFE 15 °: Brand: ALCON

## (undated) DEVICE — ACE WRAP 6 IN UNSTERILE

## (undated) DEVICE — 32 FR RIGHT ANGLE – SOFT PVC CATHETER: Brand: PVC THORACIC CATHETERS

## (undated) DEVICE — 40601 PROLONGED POSITIONING SYSTEM: Brand: 40601 PROLONGED POSITIONING SYSTEM

## (undated) DEVICE — SUT PROLENE 7-0 BV175-8/BV175-8 24 IN EPM8747

## (undated) DEVICE — SUT PROLENE 4-0 BB 36 IN 8581H

## (undated) DEVICE — HEMOCLIP CARTRIDGE LRG

## (undated) DEVICE — CATH STRAIGHT RED RUBBER 20 FR

## (undated) DEVICE — TRAY FOLEY 16FR SURESTEP TEMP SENS URIMETER STAT LOK

## (undated) DEVICE — SUT SILK 2 60 IN SA8H

## (undated) DEVICE — SYRINGE 50ML LL

## (undated) DEVICE — VASOVIEW HEMOPRO 2: Brand: VASOVIEW HEMOPRO 2

## (undated) DEVICE — AORTIC PUNCH 5.2 MM DISP

## (undated) DEVICE — ANTIBACTERIAL UNDYED BRAIDED (POLYGLACTIN 910), SYNTHETIC ABSORBABLE SUTURE: Brand: COATED VICRYL

## (undated) DEVICE — UMBILICAL TAPE: Brand: DEROYAL

## (undated) DEVICE — BLADE BEAVER MINI SZ 69

## (undated) DEVICE — LIGACLIP MCA MULTIPLE CLIP APPLIERS, 20 SMALL CLIPS: Brand: LIGACLIP

## (undated) DEVICE — PLUMEPEN PRO 10FT

## (undated) DEVICE — FILTER SMOKE EVAC VIROSAFE

## (undated) DEVICE — PLEDGET CARDIO PTFE 9.5 X 4.8 SOFT LF (6EA/PK)

## (undated) DEVICE — RECIP.STERNUM SAW BLADE 34/7.5/0.7MM: Brand: AESCULAP

## (undated) DEVICE — ELECTRODE BLADE E-Z CLEAN 4IN -0014A

## (undated) DEVICE — EVERGRIP INSERT SET 61MM: Brand: FOGARTY EVERGRIP

## (undated) DEVICE — SILVER-COATED ANTIBACTERIAL BARRIER DRESSING: Brand: ACTICOAT SURGIC 10X12CM 5PK US

## (undated) DEVICE — SUT PROLENE 6-0 C-1/C-1 30 IN 8307H

## (undated) DEVICE — LIGHT HANDLE COVER SLEEVE DISP BLUE STELLAR

## (undated) DEVICE — SUT SILK 0 CT-1 30 IN 424H

## (undated) DEVICE — STERNAL WIRE

## (undated) DEVICE — SUT SILK 3-0 SH CR/8 18 IN C013D

## (undated) DEVICE — ADHESIVE SKIN HIGH VISCOSITY EXOFIN 1ML

## (undated) DEVICE — OASIS DRAIN, DUAL, IN-LINE, ATS COMPATIBLE: Brand: OASIS

## (undated) DEVICE — PENCIL ELECTROSURG E-Z CLEAN -0035H

## (undated) DEVICE — 3000CC GUARDIAN II: Brand: GUARDIAN

## (undated) DEVICE — THERMOFLECT BLANKET, L, 25EA                               TS THERMOFLECT BLANKET, 48" X 84", SILVER, 5/BG, 5 BG/CS NW: Brand: THERMOFLECT

## (undated) DEVICE — BLANKET HYPOTHERMIA ADULT GAYMAR

## (undated) DEVICE — 32 FR STRAIGHT – SOFT PVC CATHETER: Brand: PVC THORACIC CATHETERS

## (undated) DEVICE — TUBING INSUFFLATION SET ISO CONNECTOR

## (undated) DEVICE — INTENDED FOR TISSUE SEPARATION, AND OTHER PROCEDURES THAT REQUIRE A SHARP SURGICAL BLADE TO PUNCTURE OR CUT.: Brand: BARD-PARKER SAFETY BLADES SIZE 15, STERILE

## (undated) DEVICE — GLOVE SRG BIOGEL ECLIPSE 7

## (undated) DEVICE — PACK CABG PBDS

## (undated) DEVICE — GLOVE INDICATOR PI UNDERGLOVE SZ 7 BLUE

## (undated) DEVICE — SUT PDS PLUS 1 CTB 36 IN PDPB359T